# Patient Record
Sex: MALE | Race: WHITE | Employment: OTHER | URBAN - METROPOLITAN AREA
[De-identification: names, ages, dates, MRNs, and addresses within clinical notes are randomized per-mention and may not be internally consistent; named-entity substitution may affect disease eponyms.]

---

## 2017-01-03 ENCOUNTER — TELEPHONE (OUTPATIENT)
Dept: ENDOCRINOLOGY | Age: 67
End: 2017-01-03

## 2017-02-15 PROBLEM — R65.21 SEPTIC SHOCK (HCC): Status: ACTIVE | Noted: 2017-02-15

## 2017-02-15 PROBLEM — B99.9 INTRA-ABDOMINAL INFECTION: Status: ACTIVE | Noted: 2017-02-15

## 2017-02-15 PROBLEM — A41.9 SEPTIC SHOCK (HCC): Status: ACTIVE | Noted: 2017-02-15

## 2017-03-09 ENCOUNTER — TELEPHONE (OUTPATIENT)
Dept: SURGERY | Age: 67
End: 2017-03-09

## 2017-03-09 ENCOUNTER — OFFICE VISIT (OUTPATIENT)
Dept: SURGERY | Age: 67
End: 2017-03-09

## 2017-03-09 VITALS
HEIGHT: 77 IN | BODY MASS INDEX: 21.25 KG/M2 | WEIGHT: 180 LBS | DIASTOLIC BLOOD PRESSURE: 79 MMHG | SYSTOLIC BLOOD PRESSURE: 137 MMHG | HEART RATE: 81 BPM

## 2017-03-09 DIAGNOSIS — Z90.49 S/P LEFT COLECTOMY: Primary | ICD-10-CM

## 2017-03-09 PROCEDURE — 99024 POSTOP FOLLOW-UP VISIT: CPT | Performed by: SURGERY

## 2017-03-10 PROBLEM — Z90.49 S/P LEFT COLECTOMY: Status: ACTIVE | Noted: 2017-03-10

## 2017-03-23 ENCOUNTER — OFFICE VISIT (OUTPATIENT)
Dept: SURGERY | Age: 67
End: 2017-03-23

## 2017-03-23 VITALS
WEIGHT: 179.8 LBS | BODY MASS INDEX: 21.23 KG/M2 | SYSTOLIC BLOOD PRESSURE: 129 MMHG | HEIGHT: 77 IN | HEART RATE: 83 BPM | DIASTOLIC BLOOD PRESSURE: 75 MMHG

## 2017-03-23 DIAGNOSIS — Z90.49 S/P LEFT COLECTOMY: Primary | ICD-10-CM

## 2017-03-23 PROCEDURE — 99024 POSTOP FOLLOW-UP VISIT: CPT | Performed by: SURGERY

## 2017-04-05 PROBLEM — E87.20 LACTIC ACIDOSIS: Status: ACTIVE | Noted: 2017-04-05

## 2017-04-05 PROBLEM — R10.9 ABDOMINAL PAIN: Status: ACTIVE | Noted: 2017-04-05

## 2017-04-05 PROBLEM — E87.1 HYPONATREMIA: Status: ACTIVE | Noted: 2017-04-05

## 2017-04-12 ENCOUNTER — TELEPHONE (OUTPATIENT)
Dept: ENDOCRINOLOGY | Age: 67
End: 2017-04-12

## 2017-04-12 RX ORDER — INSULIN DEGLUDEC INJECTION 100 U/ML
INJECTION, SOLUTION SUBCUTANEOUS
Qty: 5 PEN | Refills: 0 | Status: SHIPPED | OUTPATIENT
Start: 2017-04-12 | End: 2017-06-20

## 2017-04-27 ENCOUNTER — OFFICE VISIT (OUTPATIENT)
Dept: SURGERY | Age: 67
End: 2017-04-27

## 2017-04-27 VITALS
HEART RATE: 75 BPM | WEIGHT: 182.6 LBS | DIASTOLIC BLOOD PRESSURE: 80 MMHG | SYSTOLIC BLOOD PRESSURE: 138 MMHG | HEIGHT: 77 IN | BODY MASS INDEX: 21.56 KG/M2

## 2017-04-27 DIAGNOSIS — Z90.49 S/P LEFT COLECTOMY: Primary | ICD-10-CM

## 2017-04-27 PROCEDURE — 99024 POSTOP FOLLOW-UP VISIT: CPT | Performed by: SURGERY

## 2017-05-02 ENCOUNTER — TELEPHONE (OUTPATIENT)
Dept: SURGERY | Age: 67
End: 2017-05-02

## 2017-06-13 ENCOUNTER — TELEPHONE (OUTPATIENT)
Dept: SURGERY | Age: 67
End: 2017-06-13

## 2017-07-02 PROBLEM — E87.6 HYPOKALEMIA: Status: ACTIVE | Noted: 2017-07-02

## 2017-07-02 PROBLEM — E83.42 HYPOMAGNESEMIA: Status: ACTIVE | Noted: 2017-07-02

## 2017-07-02 PROBLEM — I50.32 CHRONIC DIASTOLIC CHF (CONGESTIVE HEART FAILURE) (HCC): Chronic | Status: ACTIVE | Noted: 2017-07-02

## 2017-07-02 PROBLEM — E87.1 HYPONATREMIA: Status: RESOLVED | Noted: 2017-04-05 | Resolved: 2017-07-02

## 2017-07-02 PROBLEM — A41.9 SEPTIC SHOCK (HCC): Status: RESOLVED | Noted: 2017-02-15 | Resolved: 2017-07-02

## 2017-07-02 PROBLEM — J96.01 ACUTE HYPOXEMIC RESPIRATORY FAILURE (HCC): Status: ACTIVE | Noted: 2017-07-02

## 2017-07-02 PROBLEM — B99.9 INTRA-ABDOMINAL INFECTION: Status: RESOLVED | Noted: 2017-02-15 | Resolved: 2017-07-02

## 2017-07-02 PROBLEM — R18.8 ASCITES: Status: ACTIVE | Noted: 2017-07-02

## 2017-07-02 PROBLEM — S30.1XXA HEMATOMA OF ABDOMINAL WALL: Status: ACTIVE | Noted: 2017-07-01

## 2017-07-02 PROBLEM — R93.89 ABNORMAL CXR: Status: ACTIVE | Noted: 2017-07-02

## 2017-07-02 PROBLEM — R65.21 SEPTIC SHOCK (HCC): Status: RESOLVED | Noted: 2017-02-15 | Resolved: 2017-07-02

## 2017-07-02 PROBLEM — Z90.49 S/P LEFT COLECTOMY: Chronic | Status: ACTIVE | Noted: 2017-03-10

## 2017-07-02 PROBLEM — R10.9 ABDOMINAL PAIN: Status: RESOLVED | Noted: 2017-04-05 | Resolved: 2017-07-02

## 2017-07-12 LAB
GLUCOSE BLD-MCNC: 67 MG/DL (ref 70–99)
GLUCOSE BLD-MCNC: 83 MG/DL (ref 70–99)
PERFORMED ON: ABNORMAL
PERFORMED ON: NORMAL

## 2017-07-13 LAB
GLUCOSE BLD-MCNC: 192 MG/DL (ref 70–99)
GLUCOSE BLD-MCNC: 268 MG/DL (ref 70–99)
GLUCOSE BLD-MCNC: 301 MG/DL (ref 70–99)
PERFORMED ON: ABNORMAL

## 2017-07-18 PROBLEM — R93.89 ABNORMAL CXR: Status: RESOLVED | Noted: 2017-07-02 | Resolved: 2017-07-18

## 2017-07-18 PROBLEM — E83.42 HYPOMAGNESEMIA: Status: RESOLVED | Noted: 2017-07-02 | Resolved: 2017-07-18

## 2017-07-18 PROBLEM — E87.6 HYPOKALEMIA: Status: RESOLVED | Noted: 2017-07-02 | Resolved: 2017-07-18

## 2017-07-18 PROBLEM — R18.8 ASCITES: Status: RESOLVED | Noted: 2017-07-02 | Resolved: 2017-07-18

## 2017-07-18 PROBLEM — D64.9 NORMOCYTIC ANEMIA: Chronic | Status: ACTIVE | Noted: 2017-07-18

## 2017-07-18 PROBLEM — R73.9 ACUTE HYPERGLYCEMIA: Status: ACTIVE | Noted: 2017-07-18

## 2017-07-18 PROBLEM — J96.01 ACUTE HYPOXEMIC RESPIRATORY FAILURE (HCC): Status: RESOLVED | Noted: 2017-07-02 | Resolved: 2017-07-18

## 2017-07-18 PROBLEM — S30.1XXA HEMATOMA OF ABDOMINAL WALL: Status: RESOLVED | Noted: 2017-07-01 | Resolved: 2017-07-18

## 2017-07-18 PROBLEM — E87.20 LACTIC ACIDOSIS: Status: RESOLVED | Noted: 2017-04-05 | Resolved: 2017-07-18

## 2017-07-18 PROBLEM — R73.9 ACUTE HYPERGLYCEMIA: Chronic | Status: ACTIVE | Noted: 2017-07-18

## 2017-07-25 PROBLEM — E44.0 MODERATE MALNUTRITION (HCC): Chronic | Status: ACTIVE | Noted: 2017-07-25

## 2017-07-26 PROBLEM — R10.9 ABDOMINAL PAIN: Status: RESOLVED | Noted: 2017-04-05 | Resolved: 2017-07-26

## 2017-07-26 PROBLEM — G93.40 ENCEPHALOPATHY: Status: ACTIVE | Noted: 2017-07-26

## 2017-07-26 PROBLEM — R73.9 ACUTE HYPERGLYCEMIA: Status: RESOLVED | Noted: 2017-07-18 | Resolved: 2017-07-26

## 2017-08-14 ENCOUNTER — OFFICE VISIT (OUTPATIENT)
Dept: SURGERY | Age: 67
End: 2017-08-14

## 2017-08-14 VITALS
DIASTOLIC BLOOD PRESSURE: 85 MMHG | WEIGHT: 168.4 LBS | BODY MASS INDEX: 19.88 KG/M2 | HEIGHT: 77 IN | HEART RATE: 76 BPM | SYSTOLIC BLOOD PRESSURE: 133 MMHG

## 2017-08-14 DIAGNOSIS — Z90.49 S/P LEFT COLECTOMY: Primary | ICD-10-CM

## 2017-08-14 PROCEDURE — 99024 POSTOP FOLLOW-UP VISIT: CPT | Performed by: SURGERY

## 2017-08-28 ENCOUNTER — OFFICE VISIT (OUTPATIENT)
Dept: SURGERY | Age: 67
End: 2017-08-28

## 2017-08-28 VITALS
DIASTOLIC BLOOD PRESSURE: 78 MMHG | BODY MASS INDEX: 20.29 KG/M2 | HEIGHT: 77 IN | SYSTOLIC BLOOD PRESSURE: 131 MMHG | HEART RATE: 79 BPM | WEIGHT: 171.8 LBS

## 2017-08-28 DIAGNOSIS — Z90.49 S/P LEFT COLECTOMY: Primary | ICD-10-CM

## 2017-08-28 PROCEDURE — 99024 POSTOP FOLLOW-UP VISIT: CPT | Performed by: SURGERY

## 2017-09-18 ENCOUNTER — TELEPHONE (OUTPATIENT)
Dept: PULMONOLOGY | Age: 67
End: 2017-09-18

## 2017-09-20 ENCOUNTER — HOSPITAL ENCOUNTER (OUTPATIENT)
Dept: PULMONOLOGY | Age: 67
Discharge: HOME | End: 2017-09-20
Attending: INTERNAL MEDICINE | Admitting: INTERNAL MEDICINE

## 2017-09-20 VITALS — OXYGEN SATURATION: 99 %

## 2017-09-20 DIAGNOSIS — J44.9 CHRONIC OBSTRUCTIVE PULMONARY DISEASE (HCC): ICD-10-CM

## 2017-09-20 RX ORDER — ALBUTEROL SULFATE 90 UG/1
4 AEROSOL, METERED RESPIRATORY (INHALATION) ONCE
Status: COMPLETED | OUTPATIENT
Start: 2017-09-20 | End: 2017-09-20

## 2017-09-20 RX ADMIN — ALBUTEROL SULFATE 4 PUFF: 90 AEROSOL, METERED RESPIRATORY (INHALATION) at 10:02

## 2017-09-27 ENCOUNTER — OFFICE VISIT (OUTPATIENT)
Dept: PULMONOLOGY | Age: 67
End: 2017-09-27

## 2017-09-27 VITALS
RESPIRATION RATE: 20 BRPM | DIASTOLIC BLOOD PRESSURE: 54 MMHG | OXYGEN SATURATION: 94 % | SYSTOLIC BLOOD PRESSURE: 97 MMHG | TEMPERATURE: 97.7 F | HEART RATE: 81 BPM | BODY MASS INDEX: 20.78 KG/M2 | WEIGHT: 176 LBS | HEIGHT: 77 IN

## 2017-09-27 DIAGNOSIS — F17.200 TOBACCO DEPENDENCE: ICD-10-CM

## 2017-09-27 DIAGNOSIS — J41.8 MIXED SIMPLE AND MUCOPURULENT CHRONIC BRONCHITIS (HCC): Primary | Chronic | ICD-10-CM

## 2017-09-27 DIAGNOSIS — Z23 NEED FOR INFLUENZA VACCINATION: ICD-10-CM

## 2017-09-27 PROCEDURE — G8427 DOCREV CUR MEDS BY ELIG CLIN: HCPCS | Performed by: INTERNAL MEDICINE

## 2017-09-27 PROCEDURE — 3023F SPIROM DOC REV: CPT | Performed by: INTERNAL MEDICINE

## 2017-09-27 PROCEDURE — 1123F ACP DISCUSS/DSCN MKR DOCD: CPT | Performed by: INTERNAL MEDICINE

## 2017-09-27 PROCEDURE — 99214 OFFICE O/P EST MOD 30 MIN: CPT | Performed by: INTERNAL MEDICINE

## 2017-09-27 PROCEDURE — G8420 CALC BMI NORM PARAMETERS: HCPCS | Performed by: INTERNAL MEDICINE

## 2017-09-27 PROCEDURE — G0008 ADMIN INFLUENZA VIRUS VAC: HCPCS | Performed by: INTERNAL MEDICINE

## 2017-09-27 PROCEDURE — 4040F PNEUMOC VAC/ADMIN/RCVD: CPT | Performed by: INTERNAL MEDICINE

## 2017-09-27 PROCEDURE — 4004F PT TOBACCO SCREEN RCVD TLK: CPT | Performed by: INTERNAL MEDICINE

## 2017-09-27 PROCEDURE — G8926 SPIRO NO PERF OR DOC: HCPCS | Performed by: INTERNAL MEDICINE

## 2017-09-27 PROCEDURE — 3017F COLORECTAL CA SCREEN DOC REV: CPT | Performed by: INTERNAL MEDICINE

## 2017-09-27 PROCEDURE — 90662 IIV NO PRSV INCREASED AG IM: CPT | Performed by: INTERNAL MEDICINE

## 2017-09-27 RX ORDER — ATORVASTATIN CALCIUM 40 MG/1
40 TABLET, FILM COATED ORAL DAILY
COMMUNITY
End: 2018-03-29 | Stop reason: ALTCHOICE

## 2017-09-27 RX ORDER — AMLODIPINE BESYLATE 10 MG/1
10 TABLET ORAL DAILY
COMMUNITY
End: 2018-03-29

## 2017-09-27 RX ORDER — CLOPIDOGREL BISULFATE 75 MG/1
75 TABLET ORAL DAILY
COMMUNITY
End: 2018-03-29

## 2017-09-27 RX ORDER — ALBUTEROL SULFATE 90 UG/1
2 AEROSOL, METERED RESPIRATORY (INHALATION)
COMMUNITY
Start: 2016-08-18 | End: 2018-03-01 | Stop reason: CLARIF

## 2017-09-27 RX ORDER — CYCLOBENZAPRINE HCL 10 MG
10 TABLET ORAL 3 TIMES DAILY PRN
COMMUNITY
End: 2018-03-29 | Stop reason: ALTCHOICE

## 2017-09-27 RX ORDER — BUDESONIDE AND FORMOTEROL FUMARATE DIHYDRATE 160; 4.5 UG/1; UG/1
2 AEROSOL RESPIRATORY (INHALATION) 2 TIMES DAILY
COMMUNITY
End: 2018-03-27 | Stop reason: ALTCHOICE

## 2017-09-27 ASSESSMENT — ENCOUNTER SYMPTOMS
CONSTIPATION: 0
VOICE CHANGE: 0
EYE PAIN: 0
WHEEZING: 0
SORE THROAT: 0
STRIDOR: 0
ABDOMINAL PAIN: 0
EYE DISCHARGE: 0
DIARRHEA: 0
CHEST TIGHTNESS: 0
EYE ITCHING: 0
SHORTNESS OF BREATH: 1

## 2018-01-17 ENCOUNTER — OFFICE VISIT (OUTPATIENT)
Dept: ENDOCRINOLOGY | Age: 68
End: 2018-01-17

## 2018-01-17 VITALS
BODY MASS INDEX: 22.32 KG/M2 | SYSTOLIC BLOOD PRESSURE: 167 MMHG | HEIGHT: 77 IN | DIASTOLIC BLOOD PRESSURE: 87 MMHG | RESPIRATION RATE: 16 BRPM | WEIGHT: 189 LBS | OXYGEN SATURATION: 97 % | HEART RATE: 85 BPM

## 2018-01-17 LAB
GLUCOSE BLD-MCNC: 290 MG/DL
HBA1C MFR BLD: 8.7 %

## 2018-01-17 PROCEDURE — 4040F PNEUMOC VAC/ADMIN/RCVD: CPT | Performed by: INTERNAL MEDICINE

## 2018-01-17 PROCEDURE — G8484 FLU IMMUNIZE NO ADMIN: HCPCS | Performed by: INTERNAL MEDICINE

## 2018-01-17 PROCEDURE — 3017F COLORECTAL CA SCREEN DOC REV: CPT | Performed by: INTERNAL MEDICINE

## 2018-01-17 PROCEDURE — 1123F ACP DISCUSS/DSCN MKR DOCD: CPT | Performed by: INTERNAL MEDICINE

## 2018-01-17 PROCEDURE — 3045F PR MOST RECENT HEMOGLOBIN A1C LEVEL 7.0-9.0%: CPT | Performed by: INTERNAL MEDICINE

## 2018-01-17 PROCEDURE — G8427 DOCREV CUR MEDS BY ELIG CLIN: HCPCS | Performed by: INTERNAL MEDICINE

## 2018-01-17 PROCEDURE — 82962 GLUCOSE BLOOD TEST: CPT | Performed by: INTERNAL MEDICINE

## 2018-01-17 PROCEDURE — 4004F PT TOBACCO SCREEN RCVD TLK: CPT | Performed by: INTERNAL MEDICINE

## 2018-01-17 PROCEDURE — 99214 OFFICE O/P EST MOD 30 MIN: CPT | Performed by: INTERNAL MEDICINE

## 2018-01-17 PROCEDURE — 83036 HEMOGLOBIN GLYCOSYLATED A1C: CPT | Performed by: INTERNAL MEDICINE

## 2018-01-17 PROCEDURE — G8420 CALC BMI NORM PARAMETERS: HCPCS | Performed by: INTERNAL MEDICINE

## 2018-01-17 NOTE — PROGRESS NOTES
[facility] Endocrinology  Tg Dumont M.D.   Phone: [phone]   FAX: [phone]       Shalonda De La Rosa   YOB: 1950    Date of Visit:  1/17/2018    No Known Allergies  Outpatient Prescriptions Marked as Taking for the 1/17/18 encounter (Office Visit) with Lex Mclain MD   Medication Sig Dispense Refill    insulin aspart (NOVOLOG) 100 UNIT/ML injection vial Inject into the skin 3 times daily (before meals)      insulin detemir (LEVEMIR) 100 UNIT/ML injection vial Inject 22 Units into the skin nightly      umeclidinium-vilanterol (ANORO ELLIPTA) 62.5-25 MCG/INH AEPB inhaler Inhale 1 puff into the lungs daily 1 each 0    vitamin B-12 1000 MCG tablet Take 1 tablet by mouth daily 30 tablet 3    folic acid (FOLVITE) 1 MG tablet Take 1 tablet by mouth daily 30 tablet 3    Sodium Phosphates (FLEET) 7-19 GM/118ML Place 1 enema rectally as needed      glucagon 1 MG injection Inject 1 kit into the skin as needed      magnesium hydroxide (MILK OF MAGNESIA) 400 MG/5ML suspension Take 30 mLs by mouth daily as needed for Constipation      diazepam (VALIUM) 5 MG tablet Take 5 mg by mouth every 8 hours as needed for Anxiety      lisinopril (PRINIVIL;ZESTRIL) 10 MG tablet Take 2 tablets by mouth daily 30 tablet 3    metoprolol tartrate (LOPRESSOR) 25 MG tablet Take 1 tablet by mouth 2 times daily 60 tablet 3    bisacodyl (DULCOLAX) 10 MG suppository Place 10 mg rectally as needed for Constipation (if no result from MOM)      omeprazole (PRILOSEC) 20 MG delayed release capsule Take 1 capsule by mouth 2 times daily TAKE ONE CAPSULE BY MOUTH DAILY 60 capsule 2    acetaminophen (TYLENOL) 325 MG tablet Take 2 tablets by mouth every 4 hours as needed for Pain or Fever 120 tablet 3    VENTOLIN  (90 BASE) MCG/ACT inhaler INHALE TWO PUFFS BY MOUTH EVERY 6 HOURS AS NEEDED FOR WHEEZING OR SHORTNESS OF BREATH 1 Inhaler 3    tamsulosin (FLOMAX) 0.4 MG capsule TAKE TWO CAPSULES BY MOUTH DAILY 60 granuloma     per derm:Dr. Zuniga Kenly Restrictive lung disease     Under care of pulmo(Dr. Calhoun)    S/P colonoscopy 1996    Done secondary to rectal bleed:dx=hemorrhoids    S/P colonoscopy 11/11/10;10/23/2013    Dr. Aurora Clark 10/2016(3yrs):polyps;diverticulosis. Diverticulosis & polpy(removed). Next in10/2016.  S/P endoscopy 2009    EGD:stomach ulcers.  Superficial phlebitis of left leg 9/30/2013    Tachycardia     Therapeutic drug monitoring 4/8/15    OARRS report is consistent on 4/8/15;7/9/15;10/9/15;1/8/16;4/5/16    Type 1 diabetes mellitus not at goal [facility]) 3/17/14    updated diagnosis as per endo:Dr. Ivonne Albrecht     Past Surgical History:   Procedure Laterality Date    DIAGNOSTIC CARDIAC CATH LAB PROCEDURE  2013    FOOT SURGERY Left Hammer toe, 2nd toe    10/9/14    HIP FRACTURE SURGERY      LEG SURGERY      broken leg    OTHER SURGICAL HISTORY Left 11/21/2013    EXCISION 5TH METATRSAL LEFT FOOT          REVISION COLOSTOMY  06/27/2017    COLOSTOMY REVERSAL     TONSILLECTOMY      UPPER GASTROINTESTINAL ENDOSCOPY  7/16/2013    Esophageal Brushing     Family History   Problem Relation Age of Onset    Stroke Mother     Hypertension Mother     Arthritis Father     Substance Abuse Father      Etoh    Cancer Father      esophageal    Hypertension Father     Diabetes Brother     Diabetes Paternal Aunt     Asthma Neg Hx     Emphysema Neg Hx     Heart Failure Neg Hx      History   Smoking Status    Current Some Day Smoker    Packs/day: 0.25    Years: 35.00    Types: Cigarettes    Last attempt to quit: 12/1/2013   Smokeless Tobacco    Never Used      History   Alcohol Use    0.6 oz/week    1 Cans of beer per week       KRANTHI Walls is a 78 y.o. male who is here for follow-up for management of DM. He has a PMH of type 1 DM, COPD, GERD, anxiety, depression, diverticulosis. Was last seen in 08/16  Had colostomy for colon ischemia.      Currently at [facility] distension. There is no tenderness. Musculoskeletal: He exhibits no tenderness. Neurological: He is alert and oriented to person, place, and time. Skin: Skin is warm. No rash noted. He is not diaphoretic. Psychiatric: His behavior is normal.                 Assessment/Plan    1. Type 1 DM    This 78 y.o. old male has DM. Most likely  late onset type 1. ALLYSON antibodies and anti-islet cell antibodies are negative. C-peptide low consistent with insulinopenia. A1c. 8.3 %---> 8 %---> 8.4 %---> 7.5 % --->7.1 %---> 7.2 %  --> 8.6 % ---8.7 %       Fasting BS at goal.   BS high after meals.        -Continue levemir insulin 22 units QHS  -Increase novolog to 5 units TID with meals + medium dose sliding scale. Updated on eye exam. continue follow-up with the ophthalmologist.  No nephropathy ( last urine microalbumin 03/14). Pt on ASA. Non-smoker. BP at goal  Lipids at goal in 07/16 except low HDL. 2. Peripheral neuropathy. Has peripheral neuropathy . Follows with podiatry. 3. COPD managed by pulmonary      4. HTN/GERD. BP high. managed by PCP. 5. Colostomy. As per surgeon      Advised to fax sugars every 2 weeks to titrate the insulin dose.

## 2018-02-24 PROBLEM — E11.10 DIABETIC KETOACIDOSIS ASSOCIATED WITH TYPE 2 DIABETES MELLITUS (HCC): Status: ACTIVE | Noted: 2018-02-24

## 2018-02-24 PROBLEM — E87.20 ACIDOSIS, METABOLIC: Status: ACTIVE | Noted: 2018-02-24

## 2018-02-24 PROBLEM — N17.9 ARF (ACUTE RENAL FAILURE) (HCC): Status: ACTIVE | Noted: 2018-02-24

## 2018-02-24 PROBLEM — E11.10 DKA, TYPE 2, NOT AT GOAL (HCC): Status: ACTIVE | Noted: 2018-02-24

## 2018-02-25 PROBLEM — Z79.4 TYPE 2 DIABETES MELLITUS WITH KETOACIDOSIS WITHOUT COMA, WITH LONG-TERM CURRENT USE OF INSULIN (HCC): Status: ACTIVE | Noted: 2018-02-24

## 2018-03-01 ENCOUNTER — TELEPHONE (OUTPATIENT)
Dept: INTERNAL MEDICINE | Age: 68
End: 2018-03-01

## 2018-03-01 ENCOUNTER — OFFICE VISIT (OUTPATIENT)
Dept: INTERNAL MEDICINE | Age: 68
End: 2018-03-01

## 2018-03-01 ENCOUNTER — TELEPHONE (OUTPATIENT)
Dept: PAIN MANAGEMENT | Age: 68
End: 2018-03-01

## 2018-03-01 VITALS
BODY MASS INDEX: 21.61 KG/M2 | OXYGEN SATURATION: 99 % | WEIGHT: 183 LBS | HEART RATE: 89 BPM | TEMPERATURE: 97.8 F | SYSTOLIC BLOOD PRESSURE: 142 MMHG | HEIGHT: 77 IN | DIASTOLIC BLOOD PRESSURE: 82 MMHG

## 2018-03-01 DIAGNOSIS — G89.4 CHRONIC PAIN SYNDROME: Chronic | ICD-10-CM

## 2018-03-01 DIAGNOSIS — J41.8 MIXED SIMPLE AND MUCOPURULENT CHRONIC BRONCHITIS (HCC): Chronic | ICD-10-CM

## 2018-03-01 DIAGNOSIS — I10 HTN (HYPERTENSION), BENIGN: ICD-10-CM

## 2018-03-01 DIAGNOSIS — E11.10 TYPE 2 DIABETES MELLITUS WITH KETOACIDOSIS WITHOUT COMA, WITH LONG-TERM CURRENT USE OF INSULIN (HCC): Primary | ICD-10-CM

## 2018-03-01 DIAGNOSIS — Z79.4 TYPE 2 DIABETES MELLITUS WITH KETOACIDOSIS WITHOUT COMA, WITH LONG-TERM CURRENT USE OF INSULIN (HCC): Primary | ICD-10-CM

## 2018-03-01 PROBLEM — E87.20 ACIDOSIS, METABOLIC: Status: RESOLVED | Noted: 2018-02-24 | Resolved: 2018-03-01

## 2018-03-01 PROBLEM — G93.40 ENCEPHALOPATHY: Status: RESOLVED | Noted: 2017-07-26 | Resolved: 2018-03-01

## 2018-03-01 PROBLEM — E44.0 MODERATE MALNUTRITION (HCC): Chronic | Status: RESOLVED | Noted: 2017-07-25 | Resolved: 2018-03-01

## 2018-03-01 PROCEDURE — G8926 SPIRO NO PERF OR DOC: HCPCS | Performed by: NURSE PRACTITIONER

## 2018-03-01 PROCEDURE — 1111F DSCHRG MED/CURRENT MED MERGE: CPT | Performed by: NURSE PRACTITIONER

## 2018-03-01 PROCEDURE — G8427 DOCREV CUR MEDS BY ELIG CLIN: HCPCS | Performed by: NURSE PRACTITIONER

## 2018-03-01 PROCEDURE — G8484 FLU IMMUNIZE NO ADMIN: HCPCS | Performed by: NURSE PRACTITIONER

## 2018-03-01 PROCEDURE — 3045F PR MOST RECENT HEMOGLOBIN A1C LEVEL 7.0-9.0%: CPT | Performed by: NURSE PRACTITIONER

## 2018-03-01 PROCEDURE — 1123F ACP DISCUSS/DSCN MKR DOCD: CPT | Performed by: NURSE PRACTITIONER

## 2018-03-01 PROCEDURE — 4004F PT TOBACCO SCREEN RCVD TLK: CPT | Performed by: NURSE PRACTITIONER

## 2018-03-01 PROCEDURE — 3023F SPIROM DOC REV: CPT | Performed by: NURSE PRACTITIONER

## 2018-03-01 PROCEDURE — 4040F PNEUMOC VAC/ADMIN/RCVD: CPT | Performed by: NURSE PRACTITIONER

## 2018-03-01 PROCEDURE — 3017F COLORECTAL CA SCREEN DOC REV: CPT | Performed by: NURSE PRACTITIONER

## 2018-03-01 PROCEDURE — 99204 OFFICE O/P NEW MOD 45 MIN: CPT | Performed by: NURSE PRACTITIONER

## 2018-03-01 PROCEDURE — G8420 CALC BMI NORM PARAMETERS: HCPCS | Performed by: NURSE PRACTITIONER

## 2018-03-01 RX ORDER — SERTRALINE HYDROCHLORIDE 25 MG/1
25 TABLET, FILM COATED ORAL DAILY
COMMUNITY
End: 2018-03-06 | Stop reason: SDUPTHER

## 2018-03-01 RX ORDER — BLOOD-GLUCOSE METER
EACH MISCELLANEOUS
COMMUNITY
End: 2018-03-06 | Stop reason: SDUPTHER

## 2018-03-01 ASSESSMENT — ENCOUNTER SYMPTOMS
CHEST TIGHTNESS: 0
COUGH: 0
EYE REDNESS: 0
EYE ITCHING: 0
SINUS PRESSURE: 0
ABDOMINAL PAIN: 0
RHINORRHEA: 0
BLOOD IN STOOL: 0
SHORTNESS OF BREATH: 0
WHEEZING: 0
COLOR CHANGE: 0
BACK PAIN: 0
CONSTIPATION: 0
VOMITING: 0
DIARRHEA: 0
NAUSEA: 0
SORE THROAT: 0

## 2018-03-01 ASSESSMENT — PATIENT HEALTH QUESTIONNAIRE - PHQ9
SUM OF ALL RESPONSES TO PHQ9 QUESTIONS 1 & 2: 0
2. FEELING DOWN, DEPRESSED OR HOPELESS: 0
1. LITTLE INTEREST OR PLEASURE IN DOING THINGS: 0
SUM OF ALL RESPONSES TO PHQ QUESTIONS 1-9: 0

## 2018-03-01 NOTE — TELEPHONE ENCOUNTER
Thank you, this was my first time seeing this patient, so he is new to the practive. The pain is related to his osteoarthritis, primarily in his hips. His most recent urine drug screen was 7/26/17. I was not seeing him at this time, but it was positive for benzos, which I believe he was prescribed at that time. I have asked my MAs to respond to the letter sent.

## 2018-03-01 NOTE — PROGRESS NOTES
tablet Take 5 mg by mouth every 8 hours as needed for Anxiety      sennosides-docusate sodium (SENOKOT-S) 8.6-50 MG tablet Take 2 tablets by mouth 2 times daily      lisinopril (PRINIVIL;ZESTRIL) 10 MG tablet Take 2 tablets by mouth daily 30 tablet 3    metoprolol tartrate (LOPRESSOR) 25 MG tablet Take 1 tablet by mouth 2 times daily 60 tablet 3    bisacodyl (DULCOLAX) 10 MG suppository Place 10 mg rectally as needed for Constipation (if no result from MOM)      omeprazole (PRILOSEC) 20 MG delayed release capsule Take 1 capsule by mouth 2 times daily TAKE ONE CAPSULE BY MOUTH DAILY 60 capsule 2    acetaminophen (TYLENOL) 325 MG tablet Take 2 tablets by mouth every 4 hours as needed for Pain or Fever 120 tablet 3    VENTOLIN  (90 BASE) MCG/ACT inhaler INHALE TWO PUFFS BY MOUTH EVERY 6 HOURS AS NEEDED FOR WHEEZING OR SHORTNESS OF BREATH 1 Inhaler 3    tamsulosin (FLOMAX) 0.4 MG capsule TAKE TWO CAPSULES BY MOUTH DAILY 60 capsule 0    finasteride (PROSCAR) 5 MG tablet TAKE ONE TABLET BY MOUTH DAILY 30 tablet 0     No current facility-administered medications for this visit. Assessment:     1. Type 2 diabetes mellitus with ketoacidosis without coma, with long-term current use of insulin ([address]    2. Mixed simple and mucopurulent chronic bronchitis ([address]    3. HTN (hypertension), benign    4. Chronic pain syndrome      Plan:   1. Type 2 diabetes mellitus with ketoacidosis without coma, with long-term current use of insulin ([address]  - he has been managing well at home since discharge  - he is followed by Dr. Jose Alejandro Bearden and will be seeing him tomorrow  - believe that DKA is likely due to the fact that he was out of medications after discharge from a rehab facility recently. He now has his medication. 2. Mixed simple and mucopurulent chronic bronchitis (HCC)  - stable, controlled  - follows with pulmonology  - continue inhalers as prescribed     3.  HTN (hypertension), benign  - elevated today, will continue to monitor, may need to increase dose?  - encourage reestablishing with his cardiologist     4. Chronic pain syndrome  - will refer, he is aware we do no manage chronic pain in this office or prescribe any controlled substances for pain and anxiety.   - Jamse Kehr, MD (Pain)        Discussed use, benefit, and side effects of all prescribed medications. Barriers to medication compliance addressed. All patient questions answered. Pt voiced understanding. All patient questions answered. Pt voiced understanding.

## 2018-03-01 NOTE — TELEPHONE ENCOUNTER
Dante Darnell from [facility]  Concerned pt will go into OxyContin withdrawal   Pt was seen today 3/1/18

## 2018-03-01 NOTE — LETTER
all the above information, the referral will be considered incomplete and cannot be submitted for review. We will call the patient to schedule an appointment once the referral has been received and accepted. Unfortunately, our office is not able to accommodate any expedited referral or scheduling requests. If no new information is received within 30 days from this request, the referral will be canceled and will need to be resubmitted with the information requested above to be considered for new patient scheduling.      Thank you,     Pain Management Staff

## 2018-03-02 ENCOUNTER — OFFICE VISIT (OUTPATIENT)
Dept: ENDOCRINOLOGY | Age: 68
End: 2018-03-02

## 2018-03-02 VITALS
OXYGEN SATURATION: 99 % | WEIGHT: 192 LBS | DIASTOLIC BLOOD PRESSURE: 64 MMHG | HEIGHT: 77 IN | RESPIRATION RATE: 16 BRPM | SYSTOLIC BLOOD PRESSURE: 107 MMHG | HEART RATE: 88 BPM | BODY MASS INDEX: 22.67 KG/M2

## 2018-03-02 DIAGNOSIS — E10.65 TYPE 1 DIABETES MELLITUS WITH HYPERGLYCEMIA (HCC): Primary | ICD-10-CM

## 2018-03-02 PROCEDURE — G8420 CALC BMI NORM PARAMETERS: HCPCS | Performed by: INTERNAL MEDICINE

## 2018-03-02 PROCEDURE — 99214 OFFICE O/P EST MOD 30 MIN: CPT | Performed by: INTERNAL MEDICINE

## 2018-03-02 PROCEDURE — 1111F DSCHRG MED/CURRENT MED MERGE: CPT | Performed by: INTERNAL MEDICINE

## 2018-03-02 PROCEDURE — 3045F PR MOST RECENT HEMOGLOBIN A1C LEVEL 7.0-9.0%: CPT | Performed by: INTERNAL MEDICINE

## 2018-03-02 PROCEDURE — 1123F ACP DISCUSS/DSCN MKR DOCD: CPT | Performed by: INTERNAL MEDICINE

## 2018-03-02 PROCEDURE — 3017F COLORECTAL CA SCREEN DOC REV: CPT | Performed by: INTERNAL MEDICINE

## 2018-03-02 PROCEDURE — G8484 FLU IMMUNIZE NO ADMIN: HCPCS | Performed by: INTERNAL MEDICINE

## 2018-03-02 PROCEDURE — 4040F PNEUMOC VAC/ADMIN/RCVD: CPT | Performed by: INTERNAL MEDICINE

## 2018-03-02 PROCEDURE — 4004F PT TOBACCO SCREEN RCVD TLK: CPT | Performed by: INTERNAL MEDICINE

## 2018-03-02 PROCEDURE — G8427 DOCREV CUR MEDS BY ELIG CLIN: HCPCS | Performed by: INTERNAL MEDICINE

## 2018-03-02 NOTE — PROGRESS NOTES
tablet 3    omeprazole (PRILOSEC) 20 MG delayed release capsule Take 1 capsule by mouth 2 times daily TAKE ONE CAPSULE BY MOUTH DAILY 60 capsule 2    acetaminophen (TYLENOL) 325 MG tablet Take 2 tablets by mouth every 4 hours as needed for Pain or Fever 120 tablet 3    VENTOLIN  (90 BASE) MCG/ACT inhaler INHALE TWO PUFFS BY MOUTH EVERY 6 HOURS AS NEEDED FOR WHEEZING OR SHORTNESS OF BREATH 1 Inhaler 3    tamsulosin (FLOMAX) 0.4 MG capsule TAKE TWO CAPSULES BY MOUTH DAILY 60 capsule 0    finasteride (PROSCAR) 5 MG tablet TAKE ONE TABLET BY MOUTH DAILY 30 tablet 0         Vitals:    03/02/18 1026   BP: 107/64   Site: Left Arm   Position: Sitting   Cuff Size: Medium Adult   Pulse: 88   Resp: 16   SpO2: 99%   Weight: 192 lb (87.1 kg)   Height: 6' 5\" (1.956 m)     Body mass index is 22.77 kg/m². Wt Readings from Last 3 Encounters:   03/02/18 192 lb (87.1 kg)   03/01/18 183 lb (83 kg)   02/26/18 178 lb 6.4 oz (80.9 kg)     BP Readings from Last 3 Encounters:   03/02/18 107/64   03/01/18 (!) 142/82   02/27/18 (!) 142/88        Past Medical History:   Diagnosis Date    Anxiety disorder     BPH (benign prostatic hypertrophy)     Calcified granuloma of lung ([facility] 2014    2:stable per 3/25/14 CT chest    Cataract 1/20/14    OU:Dr. hayward:[facility]    Chronic pain     COPD (chronic obstructive pulmonary disease) ([facility]     Under care of pulmo(Dr. Calhoun)    Degenerative arthritis of hip     Depression 3/11/2015    Depression with anxiety     Prior psychiatrist & therapist:Dr. Skip Campbell.  Diabetes mellitus ([facility] 2004    Endo Dr. Yu Nip type 1 note below in [facility]    Diabetic eye exam ([facility] 1/20/14    [facility]:Dr. STEPHEN Hayward:No retinopathy.  Cataract    Diabetic retinopathy ([facility]     under care of [facility]:Dr. Adeel Parks    Diverticulitis 11/26/2011    Diverticulosis 11/26/2011    Emphysema     Esophageal candidiasis ([facility] 7/16/13    GI:EGD    Essential hypertension, benign 11/2010    Fatty liver 10/9/2012    Foot Status    Current Some Day Smoker    Packs/day: 0.25    Years: 35.00    Types: Cigarettes    Last attempt to quit: 12/1/2013   Smokeless Tobacco    Never Used      History   Alcohol Use    0.6 oz/week    1 Cans of beer per week       HPI      Jerome Roth is a 78 y.o. male who is here for follow-up for management of DM. He has a PMH of type 1 DM, COPD, GERD, anxiety, depression, diverticulosis. Had colostomy for colon ischemia. Was at [facility]. Now living     Interim history : had DKA in 02/18. ( was admitted at [facility]). He ran out of insulin. Diagnosed with Diabetes Mellitus in 2005. He says he has always been on insulin. Has diabetic retinopathy (Last eye exam: 05/15), No nephropathy . Has Peripheral neuropathy with numbness and tingling in both feet. No Macrovascular complications. Home regimen:  Currently on Basaglar 22 units at night. ( used levemir )                       Novolog 6 units TID with meals + SSI                              Was on metformin 500-1000 twice a  Day. Stopped as he most likely has type 1 DM. Blood glucose trend   fasting 100-150   pre-lunch 100-200  pre-supper 130-200  bedtime 100-150    Hypoglycemia. 1-2 times/week in fasting . Diet: Eats 3 meals/day at regular times   Had nutrition education in 01/14. Review of Systems   Constitutional: Positive for malaise/fatigue. Negative for weight loss. HENT: Negative for sore throat. Eyes: Negative for blurred vision. Respiratory: Positive for shortness of breath. Negative for cough. Cardiovascular: Positive for chest pain and leg swelling. Gastrointestinal: Positive for heartburn. Negative for nausea, vomiting and abdominal pain. Genitourinary: Negative for urgency and frequency. Musculoskeletal: Positive for myalgias and joint pain. Negative for back pain. Skin: Negative for rash. Neurological: Positive for tingling and sensory change.  Negative

## 2018-03-06 ENCOUNTER — TELEPHONE (OUTPATIENT)
Dept: INTERNAL MEDICINE | Age: 68
End: 2018-03-06

## 2018-03-06 RX ORDER — HYDRALAZINE HYDROCHLORIDE 50 MG/1
50 TABLET, FILM COATED ORAL 3 TIMES DAILY
Qty: 90 TABLET | Refills: 3 | Status: SHIPPED | OUTPATIENT
Start: 2018-03-06 | End: 2018-05-30 | Stop reason: SDUPTHER

## 2018-03-06 RX ORDER — OMEPRAZOLE 20 MG/1
20 CAPSULE, DELAYED RELEASE ORAL 2 TIMES DAILY
Qty: 60 CAPSULE | Refills: 2 | Status: SHIPPED | OUTPATIENT
Start: 2018-03-06 | End: 2018-05-30 | Stop reason: SDUPTHER

## 2018-03-06 RX ORDER — FOLIC ACID 1 MG/1
1 TABLET ORAL DAILY
Qty: 30 TABLET | Refills: 3 | Status: SHIPPED | OUTPATIENT
Start: 2018-03-06 | End: 2018-07-02 | Stop reason: SDUPTHER

## 2018-03-06 RX ORDER — FINASTERIDE 5 MG/1
TABLET, FILM COATED ORAL
Qty: 30 TABLET | Refills: 0 | Status: SHIPPED | OUTPATIENT
Start: 2018-03-06 | End: 2018-04-03 | Stop reason: SDUPTHER

## 2018-03-06 RX ORDER — BLOOD-GLUCOSE METER
EACH MISCELLANEOUS
Qty: 1 DEVICE | Refills: 0 | Status: ON HOLD | OUTPATIENT
Start: 2018-03-06 | End: 2021-10-07

## 2018-03-06 RX ORDER — LANOLIN ALCOHOL/MO/W.PET/CERES
100 CREAM (GRAM) TOPICAL DAILY
Qty: 30 TABLET | Refills: 3 | Status: ON HOLD | OUTPATIENT
Start: 2018-03-06 | End: 2022-02-21 | Stop reason: HOSPADM

## 2018-03-06 RX ORDER — TAMSULOSIN HYDROCHLORIDE 0.4 MG/1
CAPSULE ORAL
Qty: 60 CAPSULE | Refills: 0 | Status: SHIPPED | OUTPATIENT
Start: 2018-03-06 | End: 2018-04-03 | Stop reason: SDUPTHER

## 2018-03-06 RX ORDER — LANCETS 30 GAUGE
EACH MISCELLANEOUS
Qty: 100 EACH | Refills: 3 | OUTPATIENT
Start: 2018-03-06 | End: 2018-03-06 | Stop reason: SDUPTHER

## 2018-03-06 RX ORDER — SERTRALINE HYDROCHLORIDE 25 MG/1
25 TABLET, FILM COATED ORAL 3 TIMES DAILY
Qty: 90 TABLET | Refills: 0 | Status: SHIPPED | OUTPATIENT
Start: 2018-03-06 | End: 2018-04-03 | Stop reason: SDUPTHER

## 2018-03-06 RX ORDER — LISINOPRIL 10 MG/1
20 TABLET ORAL DAILY
Qty: 30 TABLET | Refills: 3 | Status: SHIPPED | OUTPATIENT
Start: 2018-03-06 | End: 2018-05-09 | Stop reason: SDUPTHER

## 2018-03-06 RX ORDER — LANCETS 30 GAUGE
EACH MISCELLANEOUS
Qty: 100 EACH | Refills: 3 | Status: SHIPPED | OUTPATIENT
Start: 2018-03-06 | End: 2018-03-22 | Stop reason: SDUPTHER

## 2018-03-07 ENCOUNTER — TELEPHONE (OUTPATIENT)
Dept: INTERNAL MEDICINE | Age: 68
End: 2018-03-07

## 2018-03-08 NOTE — TELEPHONE ENCOUNTER
Spoke with Siria from [facility]  She said she will stick with the Pain Management Dr. Janny Guadarrama she started with. Patient did p/u his medication and will start it. Patient needs referral to Psych. Siria will call us with a name of a physician that will except his insurance.

## 2018-03-12 ENCOUNTER — TELEPHONE (OUTPATIENT)
Dept: PAIN MANAGEMENT | Age: 68
End: 2018-03-12

## 2018-03-22 ENCOUNTER — TELEPHONE (OUTPATIENT)
Dept: ENDOCRINOLOGY | Age: 68
End: 2018-03-22

## 2018-03-22 RX ORDER — INSULIN GLARGINE 100 [IU]/ML
22 INJECTION, SOLUTION SUBCUTANEOUS NIGHTLY
Qty: 15 ML | Refills: 2 | Status: ON HOLD | OUTPATIENT
Start: 2018-03-22 | End: 2018-08-13

## 2018-03-22 RX ORDER — INSULIN GLARGINE 100 [IU]/ML
22 INJECTION, SOLUTION SUBCUTANEOUS NIGHTLY
COMMUNITY
End: 2018-03-22 | Stop reason: SDUPTHER

## 2018-03-22 RX ORDER — LANCETS 30 GAUGE
EACH MISCELLANEOUS
Qty: 100 EACH | Refills: 3 | Status: ON HOLD | OUTPATIENT
Start: 2018-03-22 | End: 2022-02-17

## 2018-03-27 ENCOUNTER — OFFICE VISIT (OUTPATIENT)
Dept: PULMONOLOGY | Age: 68
End: 2018-03-27

## 2018-03-27 ENCOUNTER — TELEPHONE (OUTPATIENT)
Dept: INTERNAL MEDICINE | Age: 68
End: 2018-03-27

## 2018-03-27 VITALS
TEMPERATURE: 97.5 F | BODY MASS INDEX: 22.2 KG/M2 | HEIGHT: 77 IN | HEART RATE: 85 BPM | DIASTOLIC BLOOD PRESSURE: 66 MMHG | SYSTOLIC BLOOD PRESSURE: 130 MMHG | RESPIRATION RATE: 16 BRPM | WEIGHT: 188 LBS | OXYGEN SATURATION: 99 %

## 2018-03-27 DIAGNOSIS — F17.200 TOBACCO DEPENDENCE: ICD-10-CM

## 2018-03-27 DIAGNOSIS — J41.0 SIMPLE CHRONIC BRONCHITIS (HCC): Primary | ICD-10-CM

## 2018-03-27 PROCEDURE — 99214 OFFICE O/P EST MOD 30 MIN: CPT | Performed by: INTERNAL MEDICINE

## 2018-03-27 PROCEDURE — 3017F COLORECTAL CA SCREEN DOC REV: CPT | Performed by: INTERNAL MEDICINE

## 2018-03-27 PROCEDURE — G8482 FLU IMMUNIZE ORDER/ADMIN: HCPCS | Performed by: INTERNAL MEDICINE

## 2018-03-27 PROCEDURE — 3023F SPIROM DOC REV: CPT | Performed by: INTERNAL MEDICINE

## 2018-03-27 PROCEDURE — G8427 DOCREV CUR MEDS BY ELIG CLIN: HCPCS | Performed by: INTERNAL MEDICINE

## 2018-03-27 PROCEDURE — G8420 CALC BMI NORM PARAMETERS: HCPCS | Performed by: INTERNAL MEDICINE

## 2018-03-27 PROCEDURE — 4004F PT TOBACCO SCREEN RCVD TLK: CPT | Performed by: INTERNAL MEDICINE

## 2018-03-27 PROCEDURE — 1111F DSCHRG MED/CURRENT MED MERGE: CPT | Performed by: INTERNAL MEDICINE

## 2018-03-27 PROCEDURE — G8926 SPIRO NO PERF OR DOC: HCPCS | Performed by: INTERNAL MEDICINE

## 2018-03-27 PROCEDURE — 4040F PNEUMOC VAC/ADMIN/RCVD: CPT | Performed by: INTERNAL MEDICINE

## 2018-03-27 PROCEDURE — 1123F ACP DISCUSS/DSCN MKR DOCD: CPT | Performed by: INTERNAL MEDICINE

## 2018-03-27 RX ORDER — ALBUTEROL SULFATE 90 UG/1
2 AEROSOL, METERED RESPIRATORY (INHALATION) EVERY 4 HOURS PRN
Qty: 1 INHALER | Refills: 11 | Status: SHIPPED | OUTPATIENT
Start: 2018-03-27

## 2018-03-27 ASSESSMENT — ENCOUNTER SYMPTOMS
EYE PAIN: 0
WHEEZING: 0
VOICE CHANGE: 0
EYE DISCHARGE: 0
CONSTIPATION: 0
EYE ITCHING: 0
COUGH: 0
DIARRHEA: 0
SHORTNESS OF BREATH: 1
ABDOMINAL PAIN: 0
SORE THROAT: 0

## 2018-03-27 NOTE — PROGRESS NOTES
Pulmonary Outpatient Note   Nirmal Low MD       3/27/2018    Chief Complaint:  Results (6 month fu,Mixed Simple & Mucopurulent Chronic Bronchitis)     HPI:   78y.o. year old male here for follow up regarding COPD. Has shortness of breath with heavy exertion and this is relieved with use of his albuterol MDI, he has used 5 times in the last two weeks. Was also prescribed Anoro but ran out of this medication and has not been using. Was recently admitted to [facility] for hyperglycemia issues and placed on ICS/LABA therapy due to formulary substitution. Past Medical History:   Diagnosis Date    Anxiety disorder     BPH (benign prostatic hypertrophy)     Calcified granuloma of lung ([address] 2014    2:stable per 3/25/14 CT chest    Cataract 1/20/14    OU:Dr. hayward:[facility]    Chronic pain     COPD (chronic obstructive pulmonary disease) (HCC)     Under care of pulmo(Dr. Calhoun)    Degenerative arthritis of hip     Depression 3/11/2015    Depression with anxiety     Prior psychiatrist & therapist:Dr. Talita Rodriguez.  Diabetes mellitus ([address] 2004    Endo Dr. Fabrizio Orellana type 1 note below in [facility]    Diabetic eye exam ([address] 1/20/14    [facility]:Dr. STEPHEN Hayward:No retinopathy. Cataract    Diabetic retinopathy ([address]     under care of [facility]:Dr. Vani Art    Diverticulitis 11/26/2011    Diverticulosis 11/26/2011    Emphysema     Esophageal candidiasis ([address] 7/16/13    GI:EGD    Essential hypertension, benign 11/2010    Fatty liver 10/9/2012    Foot ulcer:left 9/30/2013    Gastric ulcer, unspecified as acute or chronic, without mention of hemorrhage or perforation     GERD (gastroesophageal reflux disease)     Gout 1997    Right big toe    Hearing decreased     Left ear=60%. Right ear=80%.     Hemangioma 12/2010    Liver as per CT abdo    hepatitis c 7/26/17, 2010    Under care of Liver doc:Dr. Enrique Mejias    Hyperlipidemia LDL goal < 100     Low HDL (under 40) 10/9/2012    Peripheral neuropathy ([address] 2006    Pneumonia 10/15/13    Pyogenic granuloma     per derm:Dr. David Cancino Restrictive lung disease     Under care of pulmo(Dr. Calhoun)    S/P colonoscopy 1996    Done secondary to rectal bleed:dx=hemorrhoids    S/P colonoscopy 11/11/10;10/23/2013    Dr. Kirti Hughes 10/2016(3yrs):polyps;diverticulosis. Diverticulosis & polpy(removed). Next in10/2016.  S/P endoscopy 2009    EGD:stomach ulcers.     Superficial phlebitis of left leg 9/30/2013    Tachycardia     Therapeutic drug monitoring 4/8/15    OARRS report is consistent on 4/8/15;7/9/15;10/9/15;1/8/16;4/5/16    Type 1 diabetes mellitus not at goal [facility]) 3/17/14    updated diagnosis as per endo:Dr. Jaime Moody       Past Surgical History:   Procedure Laterality Date    DIAGNOSTIC CARDIAC CATH LAB PROCEDURE  2013    FOOT SURGERY Left Hammer toe, 2nd toe    10/9/14    HIP FRACTURE SURGERY      LEG SURGERY      broken leg    OTHER SURGICAL HISTORY Left 11/21/2013    EXCISION 5TH METATRSAL LEFT FOOT          REVISION COLOSTOMY  06/27/2017    COLOSTOMY REVERSAL     TONSILLECTOMY      UPPER GASTROINTESTINAL ENDOSCOPY  7/16/2013    Esophageal Brushing       Social History   Substance Use Topics    Smoking status: Current Some Day Smoker     Packs/day: 0.25     Years: 35.00     Types: Cigarettes     Last attempt to quit: 12/1/2013    Smokeless tobacco: Never Used    Alcohol use 0.6 oz/week     1 Cans of beer per week       Family History   Problem Relation Age of Onset    Stroke Mother     Hypertension Mother     Arthritis Father     Substance Abuse Father      Etoh    Cancer Father      esophageal    Hypertension Father     Diabetes Brother     Diabetes Paternal Aunt     Asthma Neg Hx     Emphysema Neg Hx     Heart Failure Neg Hx          Current Outpatient Prescriptions:     umeclidinium-vilanterol (ANORO ELLIPTA) 62.5-25 MCG/INH AEPB inhaler, Inhale 1 puff into the lungs daily, Disp: 1 each, Rfl: 11    albuterol sulfate HFA (PROAIR HFA) 108 (90 Base) MCG/ACT inhaler, Inhale 2 puffs into the lungs every 4 hours as needed for Wheezing or Shortness of Breath, Disp: 1 Inhaler, Rfl: 11    glucose blood VI test strips (TRUE METRIX BLOOD GLUCOSE TEST) strip, 1 each by In Vitro route 3 times daily Dx Code E 11.9, Disp: 100 each, Rfl: 2    BASAGLAR KWIKPEN 100 UNIT/ML injection pen, Inject 22 Units into the skin nightly Dx Code E 11.9, Disp: 15 mL, Rfl: 2    Lancets MISC, Testing 2-3 times daily DX Code: E11.9, Disp: 100 each, Rfl: 3    hydrALAZINE (APRESOLINE) 50 MG tablet, Take 1 tablet by mouth 3 times daily, Disp: 90 tablet, Rfl: 3    sertraline (ZOLOFT) 25 MG tablet, Take 1 tablet by mouth 3 times daily, Disp: 90 tablet, Rfl: 0    tamsulosin (FLOMAX) 0.4 MG capsule, TAKE TWO CAPSULES BY MOUTH DAILY, Disp: 60 capsule, Rfl: 0    omeprazole (PRILOSEC) 20 MG delayed release capsule, Take 1 capsule by mouth 2 times daily TAKE ONE CAPSULE BY MOUTH DAILY, Disp: 60 capsule, Rfl: 2    metoprolol tartrate (LOPRESSOR) 25 MG tablet, Take 1 tablet by mouth 2 times daily, Disp: 60 tablet, Rfl: 3    lisinopril (PRINIVIL;ZESTRIL) 10 MG tablet, Take 2 tablets by mouth daily, Disp: 30 tablet, Rfl: 3    thiamine 100 MG tablet, Take 1 tablet by mouth daily, Disp: 30 tablet, Rfl: 3    finasteride (PROSCAR) 5 MG tablet, TAKE ONE TABLET BY MOUTH DAILY, Disp: 30 tablet, Rfl: 0    folic acid (FOLVITE) 1 MG tablet, Take 1 tablet by mouth daily, Disp: 30 tablet, Rfl: 3    cyanocobalamin 1000 MCG tablet, Take 1 tablet by mouth daily, Disp: 30 tablet, Rfl: 3    Blood Glucose Monitoring Suppl (TRUE METRIX AIR GLUCOSE METER) DENIS, 1 meter, Disp: 1 Device, Rfl: 0    insulin aspart (NOVOLOG) 100 UNIT/ML injection vial, Inject 6 Units into the skin 3 times daily (before meals) +SSI, Disp: , Rfl:     amLODIPine (NORVASC) 10 MG tablet, Take 10 mg by mouth daily, Disp: , Rfl:     aspirin 81 MG tablet, Take 81 mg by mouth daily, Disp: , Rfl:     atorvastatin (LIPITOR) 40 MG tablet,

## 2018-03-29 ENCOUNTER — OFFICE VISIT (OUTPATIENT)
Dept: PAIN MANAGEMENT | Age: 68
End: 2018-03-29

## 2018-03-29 VITALS
SYSTOLIC BLOOD PRESSURE: 144 MMHG | BODY MASS INDEX: 22.2 KG/M2 | HEIGHT: 77 IN | OXYGEN SATURATION: 99 % | DIASTOLIC BLOOD PRESSURE: 79 MMHG | HEART RATE: 90 BPM | WEIGHT: 188 LBS

## 2018-03-29 DIAGNOSIS — M47.812 SPONDYLOSIS OF CERVICAL REGION WITHOUT MYELOPATHY OR RADICULOPATHY: ICD-10-CM

## 2018-03-29 DIAGNOSIS — M51.26 PROTRUSION OF LUMBAR INTERVERTEBRAL DISC: ICD-10-CM

## 2018-03-29 DIAGNOSIS — F41.9 ANXIETY: ICD-10-CM

## 2018-03-29 DIAGNOSIS — G89.4 CHRONIC PAIN SYNDROME: Primary | Chronic | ICD-10-CM

## 2018-03-29 DIAGNOSIS — M50.30 DDD (DEGENERATIVE DISC DISEASE), CERVICAL: ICD-10-CM

## 2018-03-29 DIAGNOSIS — M16.0 PRIMARY OSTEOARTHRITIS OF BOTH HIPS: ICD-10-CM

## 2018-03-29 DIAGNOSIS — F51.01 PRIMARY INSOMNIA: ICD-10-CM

## 2018-03-29 DIAGNOSIS — F32.A DEPRESSION, UNSPECIFIED DEPRESSION TYPE: ICD-10-CM

## 2018-03-29 DIAGNOSIS — G56.32 COMPRESSION OF LEFT RADIAL NERVE: ICD-10-CM

## 2018-03-29 DIAGNOSIS — J41.8 MIXED SIMPLE AND MUCOPURULENT CHRONIC BRONCHITIS (HCC): Chronic | ICD-10-CM

## 2018-03-29 DIAGNOSIS — M17.0 PRIMARY OSTEOARTHRITIS OF BOTH KNEES: Chronic | ICD-10-CM

## 2018-03-29 DIAGNOSIS — G60.9 IDIOPATHIC PERIPHERAL NEUROPATHY: Chronic | ICD-10-CM

## 2018-03-29 DIAGNOSIS — M51.36 LUMBAR DEGENERATIVE DISC DISEASE: Chronic | ICD-10-CM

## 2018-03-29 PROCEDURE — 3017F COLORECTAL CA SCREEN DOC REV: CPT | Performed by: NURSE PRACTITIONER

## 2018-03-29 PROCEDURE — G8427 DOCREV CUR MEDS BY ELIG CLIN: HCPCS | Performed by: NURSE PRACTITIONER

## 2018-03-29 PROCEDURE — G8420 CALC BMI NORM PARAMETERS: HCPCS | Performed by: NURSE PRACTITIONER

## 2018-03-29 PROCEDURE — 4040F PNEUMOC VAC/ADMIN/RCVD: CPT | Performed by: NURSE PRACTITIONER

## 2018-03-29 PROCEDURE — G8926 SPIRO NO PERF OR DOC: HCPCS | Performed by: NURSE PRACTITIONER

## 2018-03-29 PROCEDURE — 3023F SPIROM DOC REV: CPT | Performed by: NURSE PRACTITIONER

## 2018-03-29 PROCEDURE — G8482 FLU IMMUNIZE ORDER/ADMIN: HCPCS | Performed by: NURSE PRACTITIONER

## 2018-03-29 PROCEDURE — 99204 OFFICE O/P NEW MOD 45 MIN: CPT | Performed by: NURSE PRACTITIONER

## 2018-03-29 RX ORDER — HYDROCODONE BITARTRATE AND ACETAMINOPHEN 7.5; 325 MG/1; MG/1
1 TABLET ORAL EVERY 8 HOURS PRN
Qty: 84 TABLET | Refills: 0 | Status: SHIPPED | OUTPATIENT
Start: 2018-03-29 | End: 2018-04-26 | Stop reason: SDUPTHER

## 2018-03-29 RX ORDER — MULTIVIT WITH MINERALS/LUTEIN
1000 TABLET ORAL DAILY
COMMUNITY
End: 2018-11-05 | Stop reason: SDUPTHER

## 2018-03-29 RX ORDER — UREA 10 %
1 LOTION (ML) TOPICAL DAILY PRN
COMMUNITY
End: 2018-11-05 | Stop reason: CLARIF

## 2018-03-29 RX ORDER — DULOXETIN HYDROCHLORIDE 30 MG/1
30 CAPSULE, DELAYED RELEASE ORAL DAILY
Qty: 30 CAPSULE | Refills: 0 | Status: SHIPPED | OUTPATIENT
Start: 2018-03-29 | End: 2018-03-29

## 2018-03-29 RX ORDER — BUSPIRONE HYDROCHLORIDE 5 MG/1
5 TABLET ORAL DAILY
Qty: 30 TABLET | Refills: 0 | Status: SHIPPED | OUTPATIENT
Start: 2018-03-29 | End: 2018-04-26 | Stop reason: SDUPTHER

## 2018-03-29 RX ORDER — IBUPROFEN 200 MG
200 TABLET ORAL NIGHTLY PRN
COMMUNITY
End: 2019-03-02 | Stop reason: ALTCHOICE

## 2018-03-29 RX ORDER — VITAMIN B COMPLEX
1100 TABLET ORAL DAILY
COMMUNITY
End: 2018-11-05 | Stop reason: SDUPTHER

## 2018-04-02 ENCOUNTER — TELEPHONE (OUTPATIENT)
Dept: ENDOCRINOLOGY | Age: 68
End: 2018-04-02

## 2018-04-03 ENCOUNTER — TELEPHONE (OUTPATIENT)
Dept: INTERNAL MEDICINE | Age: 68
End: 2018-04-03

## 2018-04-03 RX ORDER — FINASTERIDE 5 MG/1
TABLET, FILM COATED ORAL
Qty: 30 TABLET | Refills: 3 | Status: SHIPPED | OUTPATIENT
Start: 2018-04-03 | End: 2018-05-30 | Stop reason: SDUPTHER

## 2018-04-03 RX ORDER — SERTRALINE HYDROCHLORIDE 25 MG/1
TABLET, FILM COATED ORAL
Qty: 90 TABLET | Refills: 0 | Status: SHIPPED | OUTPATIENT
Start: 2018-04-03 | End: 2018-04-03 | Stop reason: SDUPTHER

## 2018-04-03 RX ORDER — TAMSULOSIN HYDROCHLORIDE 0.4 MG/1
CAPSULE ORAL
Qty: 60 CAPSULE | Refills: 0 | Status: SHIPPED | OUTPATIENT
Start: 2018-04-03 | End: 2018-05-09 | Stop reason: SDUPTHER

## 2018-04-03 RX ORDER — SERTRALINE HYDROCHLORIDE 25 MG/1
25 TABLET, FILM COATED ORAL DAILY
Qty: 90 TABLET | Refills: 0 | Status: SHIPPED | OUTPATIENT
Start: 2018-04-03 | End: 2018-05-09 | Stop reason: SDUPTHER

## 2018-04-17 ENCOUNTER — TELEPHONE (OUTPATIENT)
Dept: INTERNAL MEDICINE | Age: 68
End: 2018-04-17

## 2018-04-18 ENCOUNTER — OFFICE VISIT (OUTPATIENT)
Dept: ENDOCRINOLOGY | Age: 68
End: 2018-04-18

## 2018-04-18 VITALS
DIASTOLIC BLOOD PRESSURE: 71 MMHG | RESPIRATION RATE: 18 BRPM | WEIGHT: 189.4 LBS | BODY MASS INDEX: 22.36 KG/M2 | SYSTOLIC BLOOD PRESSURE: 122 MMHG | HEART RATE: 89 BPM | HEIGHT: 77 IN | OXYGEN SATURATION: 97 %

## 2018-04-18 DIAGNOSIS — Z79.4 TYPE 2 DIABETES MELLITUS WITH KETOACIDOSIS WITHOUT COMA, WITH LONG-TERM CURRENT USE OF INSULIN (HCC): Primary | ICD-10-CM

## 2018-04-18 DIAGNOSIS — E11.10 TYPE 2 DIABETES MELLITUS WITH KETOACIDOSIS WITHOUT COMA, WITH LONG-TERM CURRENT USE OF INSULIN (HCC): Primary | ICD-10-CM

## 2018-04-18 LAB
GLUCOSE BLD-MCNC: 81 MG/DL
HBA1C MFR BLD: 7.6 %

## 2018-04-18 PROCEDURE — 3045F PR MOST RECENT HEMOGLOBIN A1C LEVEL 7.0-9.0%: CPT | Performed by: INTERNAL MEDICINE

## 2018-04-18 PROCEDURE — G8427 DOCREV CUR MEDS BY ELIG CLIN: HCPCS | Performed by: INTERNAL MEDICINE

## 2018-04-18 PROCEDURE — 83036 HEMOGLOBIN GLYCOSYLATED A1C: CPT | Performed by: INTERNAL MEDICINE

## 2018-04-18 PROCEDURE — 2022F DILAT RTA XM EVC RTNOPTHY: CPT | Performed by: INTERNAL MEDICINE

## 2018-04-18 PROCEDURE — 4004F PT TOBACCO SCREEN RCVD TLK: CPT | Performed by: INTERNAL MEDICINE

## 2018-04-18 PROCEDURE — 82962 GLUCOSE BLOOD TEST: CPT | Performed by: INTERNAL MEDICINE

## 2018-04-18 PROCEDURE — 1123F ACP DISCUSS/DSCN MKR DOCD: CPT | Performed by: INTERNAL MEDICINE

## 2018-04-18 PROCEDURE — 4040F PNEUMOC VAC/ADMIN/RCVD: CPT | Performed by: INTERNAL MEDICINE

## 2018-04-18 PROCEDURE — 99214 OFFICE O/P EST MOD 30 MIN: CPT | Performed by: INTERNAL MEDICINE

## 2018-04-18 PROCEDURE — G8420 CALC BMI NORM PARAMETERS: HCPCS | Performed by: INTERNAL MEDICINE

## 2018-04-18 PROCEDURE — 3017F COLORECTAL CA SCREEN DOC REV: CPT | Performed by: INTERNAL MEDICINE

## 2018-04-26 ENCOUNTER — OFFICE VISIT (OUTPATIENT)
Dept: PAIN MANAGEMENT | Age: 68
End: 2018-04-26

## 2018-04-26 VITALS
DIASTOLIC BLOOD PRESSURE: 82 MMHG | SYSTOLIC BLOOD PRESSURE: 158 MMHG | HEART RATE: 83 BPM | BODY MASS INDEX: 21.34 KG/M2 | WEIGHT: 180 LBS | OXYGEN SATURATION: 100 %

## 2018-04-26 DIAGNOSIS — M47.812 SPONDYLOSIS OF CERVICAL REGION WITHOUT MYELOPATHY OR RADICULOPATHY: ICD-10-CM

## 2018-04-26 DIAGNOSIS — M17.0 PRIMARY OSTEOARTHRITIS OF BOTH KNEES: Chronic | ICD-10-CM

## 2018-04-26 DIAGNOSIS — M16.0 PRIMARY OSTEOARTHRITIS OF BOTH HIPS: ICD-10-CM

## 2018-04-26 DIAGNOSIS — G89.4 CHRONIC PAIN SYNDROME: Primary | Chronic | ICD-10-CM

## 2018-04-26 DIAGNOSIS — G60.9 IDIOPATHIC PERIPHERAL NEUROPATHY: Chronic | ICD-10-CM

## 2018-04-26 DIAGNOSIS — F51.01 PRIMARY INSOMNIA: ICD-10-CM

## 2018-04-26 DIAGNOSIS — G43.109 MIGRAINE WITH AURA AND WITHOUT STATUS MIGRAINOSUS, NOT INTRACTABLE: ICD-10-CM

## 2018-04-26 DIAGNOSIS — G56.32 COMPRESSION OF LEFT RADIAL NERVE: ICD-10-CM

## 2018-04-26 DIAGNOSIS — F41.9 ANXIETY: ICD-10-CM

## 2018-04-26 DIAGNOSIS — M51.26 PROTRUSION OF LUMBAR INTERVERTEBRAL DISC: ICD-10-CM

## 2018-04-26 DIAGNOSIS — F32.A DEPRESSION, UNSPECIFIED DEPRESSION TYPE: ICD-10-CM

## 2018-04-26 DIAGNOSIS — M50.30 DDD (DEGENERATIVE DISC DISEASE), CERVICAL: ICD-10-CM

## 2018-04-26 DIAGNOSIS — M51.36 LUMBAR DEGENERATIVE DISC DISEASE: Chronic | ICD-10-CM

## 2018-04-26 PROCEDURE — 4040F PNEUMOC VAC/ADMIN/RCVD: CPT | Performed by: NURSE PRACTITIONER

## 2018-04-26 PROCEDURE — G8427 DOCREV CUR MEDS BY ELIG CLIN: HCPCS | Performed by: NURSE PRACTITIONER

## 2018-04-26 PROCEDURE — G8420 CALC BMI NORM PARAMETERS: HCPCS | Performed by: NURSE PRACTITIONER

## 2018-04-26 PROCEDURE — 1123F ACP DISCUSS/DSCN MKR DOCD: CPT | Performed by: NURSE PRACTITIONER

## 2018-04-26 PROCEDURE — 99213 OFFICE O/P EST LOW 20 MIN: CPT | Performed by: NURSE PRACTITIONER

## 2018-04-26 PROCEDURE — 3017F COLORECTAL CA SCREEN DOC REV: CPT | Performed by: NURSE PRACTITIONER

## 2018-04-26 PROCEDURE — 4004F PT TOBACCO SCREEN RCVD TLK: CPT | Performed by: NURSE PRACTITIONER

## 2018-04-26 RX ORDER — AMITRIPTYLINE HYDROCHLORIDE 25 MG/1
25 TABLET, FILM COATED ORAL NIGHTLY
Qty: 28 TABLET | Refills: 0 | Status: SHIPPED | OUTPATIENT
Start: 2018-04-26 | End: 2018-06-18 | Stop reason: SDUPTHER

## 2018-04-26 RX ORDER — BUSPIRONE HYDROCHLORIDE 5 MG/1
10 TABLET ORAL DAILY
Qty: 30 TABLET | Refills: 0 | Status: SHIPPED | OUTPATIENT
Start: 2018-04-26 | End: 2018-05-18 | Stop reason: SDUPTHER

## 2018-04-26 RX ORDER — HYDROCODONE BITARTRATE AND ACETAMINOPHEN 7.5; 325 MG/1; MG/1
1 TABLET ORAL EVERY 6 HOURS PRN
Qty: 112 TABLET | Refills: 0 | Status: SHIPPED | OUTPATIENT
Start: 2018-04-26 | End: 2018-05-18

## 2018-04-27 ENCOUNTER — TELEPHONE (OUTPATIENT)
Dept: PAIN MANAGEMENT | Age: 68
End: 2018-04-27

## 2018-04-30 ENCOUNTER — TELEPHONE (OUTPATIENT)
Dept: PAIN MANAGEMENT | Age: 68
End: 2018-04-30

## 2018-05-02 ENCOUNTER — TELEPHONE (OUTPATIENT)
Dept: ENDOCRINOLOGY | Age: 68
End: 2018-05-02

## 2018-05-03 ENCOUNTER — TELEPHONE (OUTPATIENT)
Dept: INTERNAL MEDICINE | Age: 68
End: 2018-05-03

## 2018-05-09 DIAGNOSIS — F51.01 PRIMARY INSOMNIA: ICD-10-CM

## 2018-05-09 DIAGNOSIS — G89.4 CHRONIC PAIN SYNDROME: Chronic | ICD-10-CM

## 2018-05-09 DIAGNOSIS — G43.109 MIGRAINE WITH AURA AND WITHOUT STATUS MIGRAINOSUS, NOT INTRACTABLE: ICD-10-CM

## 2018-05-09 DIAGNOSIS — F41.9 ANXIETY: ICD-10-CM

## 2018-05-09 RX ORDER — LISINOPRIL 10 MG/1
20 TABLET ORAL DAILY
Qty: 30 TABLET | Refills: 3 | Status: SHIPPED | OUTPATIENT
Start: 2018-05-09 | End: 2018-05-30 | Stop reason: SDUPTHER

## 2018-05-09 RX ORDER — AMITRIPTYLINE HYDROCHLORIDE 25 MG/1
25 TABLET, FILM COATED ORAL NIGHTLY
Qty: 28 TABLET | Refills: 0 | OUTPATIENT
Start: 2018-05-09

## 2018-05-09 RX ORDER — BUSPIRONE HYDROCHLORIDE 5 MG/1
TABLET ORAL
Qty: 30 TABLET | Refills: 0 | OUTPATIENT
Start: 2018-05-09

## 2018-05-09 RX ORDER — BLOOD SUGAR DIAGNOSTIC
STRIP MISCELLANEOUS
Qty: 300 EACH | Refills: 3 | Status: ON HOLD | OUTPATIENT
Start: 2018-05-09 | End: 2021-10-07

## 2018-05-09 RX ORDER — SERTRALINE HYDROCHLORIDE 25 MG/1
TABLET, FILM COATED ORAL
Qty: 90 TABLET | Refills: 0 | Status: SHIPPED | OUTPATIENT
Start: 2018-05-09 | End: 2018-08-27 | Stop reason: SDUPTHER

## 2018-05-09 RX ORDER — TAMSULOSIN HYDROCHLORIDE 0.4 MG/1
CAPSULE ORAL
Qty: 60 CAPSULE | Refills: 0 | Status: SHIPPED | OUTPATIENT
Start: 2018-05-09 | End: 2018-05-30 | Stop reason: SDUPTHER

## 2018-05-11 ENCOUNTER — TELEPHONE (OUTPATIENT)
Dept: INTERNAL MEDICINE | Age: 68
End: 2018-05-11

## 2018-05-18 ENCOUNTER — OFFICE VISIT (OUTPATIENT)
Dept: PAIN MANAGEMENT | Age: 68
End: 2018-05-18

## 2018-05-18 VITALS
WEIGHT: 191 LBS | HEART RATE: 84 BPM | DIASTOLIC BLOOD PRESSURE: 81 MMHG | OXYGEN SATURATION: 98 % | SYSTOLIC BLOOD PRESSURE: 141 MMHG | BODY MASS INDEX: 22.65 KG/M2

## 2018-05-18 DIAGNOSIS — M47.812 SPONDYLOSIS OF CERVICAL REGION WITHOUT MYELOPATHY OR RADICULOPATHY: ICD-10-CM

## 2018-05-18 DIAGNOSIS — G89.4 CHRONIC PAIN SYNDROME: Primary | Chronic | ICD-10-CM

## 2018-05-18 DIAGNOSIS — M51.36 LUMBAR DEGENERATIVE DISC DISEASE: Chronic | ICD-10-CM

## 2018-05-18 DIAGNOSIS — G89.4 CHRONIC PAIN SYNDROME: Chronic | ICD-10-CM

## 2018-05-18 DIAGNOSIS — M15.9 PRIMARY OSTEOARTHRITIS INVOLVING MULTIPLE JOINTS: ICD-10-CM

## 2018-05-18 DIAGNOSIS — G43.109 MIGRAINE WITH AURA AND WITHOUT STATUS MIGRAINOSUS, NOT INTRACTABLE: ICD-10-CM

## 2018-05-18 DIAGNOSIS — G60.9 IDIOPATHIC PERIPHERAL NEUROPATHY: Chronic | ICD-10-CM

## 2018-05-18 DIAGNOSIS — M50.30 DDD (DEGENERATIVE DISC DISEASE), CERVICAL: ICD-10-CM

## 2018-05-18 DIAGNOSIS — M51.26 PROTRUSION OF LUMBAR INTERVERTEBRAL DISC: ICD-10-CM

## 2018-05-18 DIAGNOSIS — F51.01 PRIMARY INSOMNIA: ICD-10-CM

## 2018-05-18 DIAGNOSIS — F41.9 ANXIETY: ICD-10-CM

## 2018-05-18 DIAGNOSIS — F32.A DEPRESSION, UNSPECIFIED DEPRESSION TYPE: ICD-10-CM

## 2018-05-18 PROCEDURE — 1123F ACP DISCUSS/DSCN MKR DOCD: CPT | Performed by: NURSE PRACTITIONER

## 2018-05-18 PROCEDURE — G8427 DOCREV CUR MEDS BY ELIG CLIN: HCPCS | Performed by: NURSE PRACTITIONER

## 2018-05-18 PROCEDURE — 4040F PNEUMOC VAC/ADMIN/RCVD: CPT | Performed by: NURSE PRACTITIONER

## 2018-05-18 PROCEDURE — 4004F PT TOBACCO SCREEN RCVD TLK: CPT | Performed by: NURSE PRACTITIONER

## 2018-05-18 PROCEDURE — G8420 CALC BMI NORM PARAMETERS: HCPCS | Performed by: NURSE PRACTITIONER

## 2018-05-18 PROCEDURE — 3017F COLORECTAL CA SCREEN DOC REV: CPT | Performed by: NURSE PRACTITIONER

## 2018-05-18 PROCEDURE — 99213 OFFICE O/P EST LOW 20 MIN: CPT | Performed by: NURSE PRACTITIONER

## 2018-05-18 RX ORDER — AMITRIPTYLINE HYDROCHLORIDE 25 MG/1
25 TABLET, FILM COATED ORAL NIGHTLY
Qty: 28 TABLET | Refills: 0 | OUTPATIENT
Start: 2018-05-18

## 2018-05-18 RX ORDER — BUSPIRONE HYDROCHLORIDE 5 MG/1
10 TABLET ORAL DAILY
Qty: 30 TABLET | Refills: 0 | Status: SHIPPED | OUTPATIENT
Start: 2018-05-18 | End: 2018-06-18 | Stop reason: SDUPTHER

## 2018-05-25 ENCOUNTER — TELEPHONE (OUTPATIENT)
Dept: ENDOCRINOLOGY | Age: 68
End: 2018-05-25

## 2018-05-29 ENCOUNTER — TELEPHONE (OUTPATIENT)
Dept: PAIN MANAGEMENT | Age: 68
End: 2018-05-29

## 2018-05-30 RX ORDER — HYDRALAZINE HYDROCHLORIDE 50 MG/1
50 TABLET, FILM COATED ORAL 3 TIMES DAILY
Qty: 270 TABLET | Refills: 0 | Status: SHIPPED | OUTPATIENT
Start: 2018-05-30 | End: 2019-03-02

## 2018-05-30 RX ORDER — TAMSULOSIN HYDROCHLORIDE 0.4 MG/1
CAPSULE ORAL
Qty: 180 CAPSULE | Refills: 0 | Status: ON HOLD | OUTPATIENT
Start: 2018-05-30 | End: 2020-04-12 | Stop reason: HOSPADM

## 2018-05-30 RX ORDER — FINASTERIDE 5 MG/1
TABLET, FILM COATED ORAL
Qty: 90 TABLET | Refills: 0 | Status: SHIPPED | OUTPATIENT
Start: 2018-05-30 | End: 2018-11-05 | Stop reason: SDUPTHER

## 2018-05-30 RX ORDER — LISINOPRIL 10 MG/1
20 TABLET ORAL DAILY
Qty: 90 TABLET | Refills: 0 | Status: SHIPPED | OUTPATIENT
Start: 2018-05-30 | End: 2018-06-05 | Stop reason: SDUPTHER

## 2018-05-30 RX ORDER — OMEPRAZOLE 20 MG/1
20 CAPSULE, DELAYED RELEASE ORAL 2 TIMES DAILY
Qty: 180 CAPSULE | Refills: 0 | Status: SHIPPED | OUTPATIENT
Start: 2018-05-30 | End: 2018-08-01 | Stop reason: SDUPTHER

## 2018-06-05 ENCOUNTER — OFFICE VISIT (OUTPATIENT)
Dept: INTERNAL MEDICINE | Age: 68
End: 2018-06-05

## 2018-06-05 VITALS
SYSTOLIC BLOOD PRESSURE: 172 MMHG | HEART RATE: 87 BPM | OXYGEN SATURATION: 98 % | TEMPERATURE: 98 F | BODY MASS INDEX: 22.32 KG/M2 | WEIGHT: 189 LBS | DIASTOLIC BLOOD PRESSURE: 96 MMHG | HEIGHT: 77 IN

## 2018-06-05 DIAGNOSIS — Z23 NEED FOR PROPHYLACTIC VACCINATION AND INOCULATION AGAINST VARICELLA: ICD-10-CM

## 2018-06-05 DIAGNOSIS — G43.109 MIGRAINE WITH AURA AND WITHOUT STATUS MIGRAINOSUS, NOT INTRACTABLE: ICD-10-CM

## 2018-06-05 DIAGNOSIS — Z13.220 SCREENING FOR HYPERLIPIDEMIA: ICD-10-CM

## 2018-06-05 DIAGNOSIS — J41.8 MIXED SIMPLE AND MUCOPURULENT CHRONIC BRONCHITIS (HCC): Chronic | ICD-10-CM

## 2018-06-05 DIAGNOSIS — I50.32 CHRONIC DIASTOLIC CHF (CONGESTIVE HEART FAILURE) (HCC): Chronic | ICD-10-CM

## 2018-06-05 DIAGNOSIS — Z00.00 ROUTINE GENERAL MEDICAL EXAMINATION AT A HEALTH CARE FACILITY: Primary | ICD-10-CM

## 2018-06-05 DIAGNOSIS — E11.10 TYPE 2 DIABETES MELLITUS WITH KETOACIDOSIS WITHOUT COMA, WITH LONG-TERM CURRENT USE OF INSULIN (HCC): ICD-10-CM

## 2018-06-05 DIAGNOSIS — J43.9 PULMONARY EMPHYSEMA, UNSPECIFIED EMPHYSEMA TYPE (HCC): ICD-10-CM

## 2018-06-05 DIAGNOSIS — Z79.4 TYPE 2 DIABETES MELLITUS WITH KETOACIDOSIS WITHOUT COMA, WITH LONG-TERM CURRENT USE OF INSULIN (HCC): ICD-10-CM

## 2018-06-05 DIAGNOSIS — F51.01 PRIMARY INSOMNIA: ICD-10-CM

## 2018-06-05 DIAGNOSIS — E78.2 MIXED HYPERLIPIDEMIA: Chronic | ICD-10-CM

## 2018-06-05 DIAGNOSIS — I10 HTN (HYPERTENSION), BENIGN: ICD-10-CM

## 2018-06-05 DIAGNOSIS — G89.4 CHRONIC PAIN SYNDROME: Chronic | ICD-10-CM

## 2018-06-05 LAB
CONTROL: POSITIVE
HEMOCCULT STL QL: POSITIVE

## 2018-06-05 PROCEDURE — 82274 ASSAY TEST FOR BLOOD FECAL: CPT | Performed by: NURSE PRACTITIONER

## 2018-06-05 PROCEDURE — 99397 PER PM REEVAL EST PAT 65+ YR: CPT | Performed by: NURSE PRACTITIONER

## 2018-06-05 PROCEDURE — 93000 ELECTROCARDIOGRAM COMPLETE: CPT | Performed by: NURSE PRACTITIONER

## 2018-06-05 RX ORDER — LISINOPRIL 30 MG/1
30 TABLET ORAL DAILY
Qty: 90 TABLET | Refills: 0 | Status: SHIPPED | OUTPATIENT
Start: 2018-06-05 | End: 2018-07-02 | Stop reason: SDUPTHER

## 2018-06-05 ASSESSMENT — ENCOUNTER SYMPTOMS
VOMITING: 0
DIARRHEA: 0
BACK PAIN: 0
PHOTOPHOBIA: 0
SORE THROAT: 0
EYE REDNESS: 0
BLOOD IN STOOL: 0
ABDOMINAL PAIN: 0
HEMOPTYSIS: 0
SINUS PAIN: 0
HEARTBURN: 0
DOUBLE VISION: 0
ORTHOPNEA: 0
COUGH: 0
STRIDOR: 0
BLURRED VISION: 0
SPUTUM PRODUCTION: 0
EYE DISCHARGE: 0
NAUSEA: 0
WHEEZING: 0
SHORTNESS OF BREATH: 0
CONSTIPATION: 0
EYE PAIN: 0

## 2018-06-05 ASSESSMENT — PATIENT HEALTH QUESTIONNAIRE - PHQ9
SUM OF ALL RESPONSES TO PHQ9 QUESTIONS 1 & 2: 0
1. LITTLE INTEREST OR PLEASURE IN DOING THINGS: 0
2. FEELING DOWN, DEPRESSED OR HOPELESS: 0
SUM OF ALL RESPONSES TO PHQ QUESTIONS 1-9: 0

## 2018-06-07 ENCOUNTER — TELEPHONE (OUTPATIENT)
Dept: INTERNAL MEDICINE | Age: 68
End: 2018-06-07

## 2018-06-07 DIAGNOSIS — F41.9 ANXIETY: ICD-10-CM

## 2018-06-07 DIAGNOSIS — G89.4 CHRONIC PAIN SYNDROME: Chronic | ICD-10-CM

## 2018-06-08 RX ORDER — OMEPRAZOLE 20 MG/1
CAPSULE, DELAYED RELEASE ORAL
Qty: 60 CAPSULE | Refills: 2 | Status: SHIPPED | OUTPATIENT
Start: 2018-06-08 | End: 2018-11-20 | Stop reason: CLARIF

## 2018-06-08 RX ORDER — BUSPIRONE HYDROCHLORIDE 5 MG/1
10 TABLET ORAL DAILY
Qty: 30 TABLET | Refills: 0 | OUTPATIENT
Start: 2018-06-08

## 2018-06-08 RX ORDER — TAMSULOSIN HYDROCHLORIDE 0.4 MG/1
CAPSULE ORAL
Qty: 60 CAPSULE | Refills: 0 | Status: ON HOLD | OUTPATIENT
Start: 2018-06-08 | End: 2018-10-19 | Stop reason: SDUPTHER

## 2018-06-12 ENCOUNTER — OFFICE VISIT (OUTPATIENT)
Dept: INTERNAL MEDICINE | Age: 68
End: 2018-06-12

## 2018-06-12 ENCOUNTER — TELEPHONE (OUTPATIENT)
Dept: INTERNAL MEDICINE | Age: 68
End: 2018-06-12

## 2018-06-12 VITALS
BODY MASS INDEX: 22.55 KG/M2 | HEIGHT: 77 IN | WEIGHT: 191 LBS | OXYGEN SATURATION: 99 % | HEART RATE: 80 BPM | SYSTOLIC BLOOD PRESSURE: 110 MMHG | DIASTOLIC BLOOD PRESSURE: 82 MMHG

## 2018-06-12 DIAGNOSIS — G56.30 RADIAL NERVE COMPRESSION, UNSPECIFIED LATERALITY: ICD-10-CM

## 2018-06-12 DIAGNOSIS — M51.36 LUMBAR DEGENERATIVE DISC DISEASE: Chronic | ICD-10-CM

## 2018-06-12 DIAGNOSIS — I50.32 CHRONIC DIASTOLIC CHF (CONGESTIVE HEART FAILURE) (HCC): Chronic | ICD-10-CM

## 2018-06-12 DIAGNOSIS — I10 HTN (HYPERTENSION), BENIGN: Primary | ICD-10-CM

## 2018-06-12 DIAGNOSIS — G89.4 CHRONIC PAIN SYNDROME: Primary | Chronic | ICD-10-CM

## 2018-06-12 DIAGNOSIS — R07.9 CHEST PAIN, UNSPECIFIED TYPE: ICD-10-CM

## 2018-06-12 PROCEDURE — 93000 ELECTROCARDIOGRAM COMPLETE: CPT | Performed by: NURSE PRACTITIONER

## 2018-06-12 PROCEDURE — 99213 OFFICE O/P EST LOW 20 MIN: CPT | Performed by: NURSE PRACTITIONER

## 2018-06-12 ASSESSMENT — ENCOUNTER SYMPTOMS
EYE ITCHING: 0
COLOR CHANGE: 0
ABDOMINAL PAIN: 0
NAUSEA: 0
BLOOD IN STOOL: 0
CONSTIPATION: 0
SHORTNESS OF BREATH: 0
CHEST TIGHTNESS: 0
BACK PAIN: 0
WHEEZING: 0
EYE REDNESS: 0
SINUS PRESSURE: 0
DIARRHEA: 0
RHINORRHEA: 0
VOMITING: 0
COUGH: 0
SORE THROAT: 0

## 2018-06-14 ENCOUNTER — TELEPHONE (OUTPATIENT)
Dept: ENDOCRINOLOGY | Age: 68
End: 2018-06-14

## 2018-06-18 ENCOUNTER — OFFICE VISIT (OUTPATIENT)
Dept: PAIN MANAGEMENT | Age: 68
End: 2018-06-18

## 2018-06-18 VITALS
DIASTOLIC BLOOD PRESSURE: 89 MMHG | HEART RATE: 78 BPM | OXYGEN SATURATION: 99 % | SYSTOLIC BLOOD PRESSURE: 137 MMHG | BODY MASS INDEX: 22.65 KG/M2 | WEIGHT: 191 LBS

## 2018-06-18 DIAGNOSIS — M47.812 SPONDYLOSIS OF CERVICAL REGION WITHOUT MYELOPATHY OR RADICULOPATHY: ICD-10-CM

## 2018-06-18 DIAGNOSIS — F41.9 ANXIETY: ICD-10-CM

## 2018-06-18 DIAGNOSIS — G43.109 MIGRAINE WITH AURA AND WITHOUT STATUS MIGRAINOSUS, NOT INTRACTABLE: ICD-10-CM

## 2018-06-18 DIAGNOSIS — M16.0 PRIMARY OSTEOARTHRITIS OF BOTH HIPS: ICD-10-CM

## 2018-06-18 DIAGNOSIS — G60.9 IDIOPATHIC PERIPHERAL NEUROPATHY: Chronic | ICD-10-CM

## 2018-06-18 DIAGNOSIS — G89.4 CHRONIC PAIN SYNDROME: Primary | Chronic | ICD-10-CM

## 2018-06-18 DIAGNOSIS — M51.36 LUMBAR DEGENERATIVE DISC DISEASE: Chronic | ICD-10-CM

## 2018-06-18 DIAGNOSIS — F51.01 PRIMARY INSOMNIA: ICD-10-CM

## 2018-06-18 DIAGNOSIS — M17.0 PRIMARY OSTEOARTHRITIS OF BOTH KNEES: Chronic | ICD-10-CM

## 2018-06-18 DIAGNOSIS — M50.30 DDD (DEGENERATIVE DISC DISEASE), CERVICAL: ICD-10-CM

## 2018-06-18 DIAGNOSIS — M51.26 PROTRUSION OF LUMBAR INTERVERTEBRAL DISC: ICD-10-CM

## 2018-06-18 PROCEDURE — 99213 OFFICE O/P EST LOW 20 MIN: CPT | Performed by: NURSE PRACTITIONER

## 2018-06-18 RX ORDER — AMITRIPTYLINE HYDROCHLORIDE 25 MG/1
25 TABLET, FILM COATED ORAL NIGHTLY
Qty: 28 TABLET | Refills: 0 | Status: SHIPPED | OUTPATIENT
Start: 2018-06-18 | End: 2018-07-16

## 2018-06-18 RX ORDER — HYDROCODONE BITARTRATE AND ACETAMINOPHEN 7.5; 325 MG/1; MG/1
1 TABLET ORAL EVERY 6 HOURS PRN
Qty: 112 TABLET | Refills: 0 | Status: SHIPPED | OUTPATIENT
Start: 2018-06-18 | End: 2018-07-16 | Stop reason: SDUPTHER

## 2018-06-18 RX ORDER — BUSPIRONE HYDROCHLORIDE 5 MG/1
10 TABLET ORAL DAILY
Qty: 60 TABLET | Refills: 0 | Status: SHIPPED | OUTPATIENT
Start: 2018-06-18 | End: 2018-08-01

## 2018-06-25 ENCOUNTER — TELEPHONE (OUTPATIENT)
Dept: INTERNAL MEDICINE | Age: 68
End: 2018-06-25

## 2018-06-26 ENCOUNTER — TELEPHONE (OUTPATIENT)
Dept: INTERNAL MEDICINE | Age: 68
End: 2018-06-26

## 2018-06-26 ENCOUNTER — TELEPHONE (OUTPATIENT)
Dept: SURGERY | Age: 68
End: 2018-06-26

## 2018-07-02 RX ORDER — FOLIC ACID 1 MG/1
1 TABLET ORAL DAILY
Qty: 30 TABLET | Refills: 3 | Status: SHIPPED | OUTPATIENT
Start: 2018-07-02 | End: 2019-04-27

## 2018-07-02 RX ORDER — LANOLIN ALCOHOL/MO/W.PET/CERES
1000 CREAM (GRAM) TOPICAL DAILY
Qty: 30 TABLET | Refills: 3 | Status: SHIPPED | OUTPATIENT
Start: 2018-07-02

## 2018-07-02 RX ORDER — LISINOPRIL 30 MG/1
30 TABLET ORAL DAILY
Qty: 90 TABLET | Refills: 0 | Status: ON HOLD | OUTPATIENT
Start: 2018-07-02 | End: 2018-10-19

## 2018-07-11 ENCOUNTER — TELEPHONE (OUTPATIENT)
Dept: SURGERY | Age: 68
End: 2018-07-11

## 2018-07-11 ENCOUNTER — TELEPHONE (OUTPATIENT)
Dept: INTERNAL MEDICINE | Age: 68
End: 2018-07-11

## 2018-07-11 NOTE — TELEPHONE ENCOUNTER
Patient called to check about what time he needed to start his Suprep. Went over instructions and over what things he can have to drink today. Advised patient that 2nd bottle of Suprep will be started tomorrow at 5:30 a.m. and no liquids after 8:30 a.m. Reminded patient to arrive at [facility]. at 10:00 a.m. Tomorrow and procedure is at 11:30 a.m. He knows that he will not be able to drive himself. No further questions at this time. Patient has my direct line and knows he can reach me until 6:30 p.m. today.

## 2018-07-12 ENCOUNTER — HOSPITAL ENCOUNTER (OUTPATIENT)
Dept: ENDOSCOPY | Age: 68
Discharge: HOME | End: 2018-07-12
Attending: SURGERY | Admitting: SURGERY

## 2018-07-12 VITALS
WEIGHT: 191 LBS | HEIGHT: 77 IN | SYSTOLIC BLOOD PRESSURE: 167 MMHG | OXYGEN SATURATION: 100 % | DIASTOLIC BLOOD PRESSURE: 86 MMHG | HEART RATE: 91 BPM | RESPIRATION RATE: 18 BRPM | TEMPERATURE: 97 F | BODY MASS INDEX: 22.55 KG/M2

## 2018-07-12 LAB
GLUCOSE BLD-MCNC: 224 MG/DL (ref 70–99)
PERFORMED ON: ABNORMAL

## 2018-07-12 PROCEDURE — 45385 COLONOSCOPY W/LESION REMOVAL: CPT | Performed by: SURGERY

## 2018-07-12 ASSESSMENT — PAIN - FUNCTIONAL ASSESSMENT: PAIN_FUNCTIONAL_ASSESSMENT: 0-10

## 2018-07-12 ASSESSMENT — LIFESTYLE VARIABLES: SMOKING_STATUS: 1

## 2018-07-12 ASSESSMENT — COPD QUESTIONNAIRES: CAT_SEVERITY: MODERATE

## 2018-07-12 NOTE — ANESTHESIA PRE-OP
Department of Anesthesiology  Preprocedure Note       Name:  Kacie Jules   Age:  78 y.o.  :  1950                                          MRN:  [id number]         Date:  2018      Surgeon:    Procedure:    Medications prior to admission:   Prior to Admission medications    Medication Sig Start Date End Date Taking? Authorizing Provider   vitamin B-12 (CYANOCOBALAMIN) 1000 MCG tablet TAKE 1 TABLET BY MOUTH DAILY 18  Yes RICCO James CNP   lisinopril (PRINIVIL;ZESTRIL) 30 MG tablet TAKE 1 TABLET BY MOUTH DAILY 18  Yes RICCO James CNP   folic acid (FOLVITE) 1 MG tablet TAKE 1 TABLET BY MOUTH DAILY 18  Yes RICCO James CNP   busPIRone (BUSPAR) 5 MG tablet Take 2 tablets by mouth daily 18  Yes RICCO Hidalgo CNP   amitriptyline (ELAVIL) 25 MG tablet Take 1 tablet by mouth nightly 18  Yes RICCO Hidalgo CNP   HYDROcodone-acetaminophen (NORCO) 7.5-325 MG per tablet Take 1 tablet by mouth every 6 hours as needed for Pain for up to 28 days. . 18 Yes RICCO Hidalgo CNP   tamsulosin (FLOMAX) 0.4 MG capsule TAKE TWO CAPSULES BY MOUTH EVERY DAY 18  Yes RICCO James CNP   omeprazole (PRILOSEC) 20 MG delayed release capsule TAKE 1 CAPSULE BY MOUTH 2 TIMES A DAY 18  Yes RICCO James CNP   hydrALAZINE (APRESOLINE) 50 MG tablet Take 1 tablet by mouth 3 times daily 18  Yes RICCO Crawford CNP   omeprazole (PRILOSEC) 20 MG delayed release capsule Take 1 capsule by mouth 2 times daily TAKE ONE CAPSULE BY MOUTH DAILY 18  Yes RICCO James CNP   finasteride (PROSCAR) 5 MG tablet TAKE 1 TABLET BY MOUTH DAILY 18  Yes RICCO James CNP   tamsulosin (FLOMAX) 0.4 MG capsule TAKE 2 CAPSULES BY MOUTH DAILY 18  Yes Osie Morovis, APRN - CNP   metoprolol tartrate (LOPRESSOR) 25 MG tablet Take 1 daily      ibuprofen (ADVIL;MOTRIN) 200 MG tablet Take 200 mg by mouth nightly as needed for Pain      B Complex Vitamins (VITAMIN B-COMPLEX) TABS Take 1,100 mg by mouth daily      umeclidinium-vilanterol (ANORO ELLIPTA) 62.5-25 MCG/INH AEPB inhaler Inhale 1 puff into the lungs daily 1 each 11    albuterol sulfate HFA (PROAIR HFA) 108 (90 Base) MCG/ACT inhaler Inhale 2 puffs into the lungs every 4 hours as needed for Wheezing or Shortness of Breath 1 Inhaler 11    BASAGLAR KWIKPEN 100 UNIT/ML injection pen Inject 22 Units into the skin nightly Dx Code E 11.9 (Patient taking differently: Inject 24 Units into the skin nightly Dx Code E 11.9) 15 mL 2    Lancets MISC Testing 2-3 times daily DX Code: E11.9 100 each 3    thiamine 100 MG tablet Take 1 tablet by mouth daily 30 tablet 3    Blood Glucose Monitoring Suppl (TRUE METRIX AIR GLUCOSE METER) DENIS 1 meter 1 Device 0    insulin aspart (NOVOLOG) 100 UNIT/ML injection vial Inject 10 Units into the skin 3 times daily (before meals) +SSI       glucagon 1 MG injection Inject 1 kit into the skin as needed      metoclopramide (REGLAN) 10 MG tablet Take 1 tablet by mouth 4 times daily as needed (Nausea and/or headache) 30 tablet 0    SUMAtriptan (IMITREX) 100 MG tablet Take 1 tablet by mouth once as needed for Migraine 9 tablet 0     No current facility-administered medications for this encounter. Allergies:     Allergies   Allergen Reactions    Neurontin [Gabapentin]      Gastric side effects       Problem List:    Patient Active Problem List   Diagnosis Code    GERD (gastroesophageal reflux disease) K21.9    Peripheral neuropathy of bilateral feet G62.9    COPD (chronic obstructive pulmonary disease) (HCC) J44.9    HLD (hyperlipidemia) E78.5    Fatty liver K76.0    Anxiety/depression F41.9    History of hepatitis C Z86.19    Knee osteoarthritis M17.10    Alcoholism ([address] F10.20    Chronic pain syndrome G89.4    Lumbar degenerative disc disease Smokeless tobacco: Never Used    Alcohol use Yes      Comment: 6 beers a week, sometimes more        LABS:    CBC  Lab Results   Component Value Date/Time    WBC 5.8 06/07/2018 11:08 AM    HGB 13.1 (L) 06/07/2018 11:08 AM    HCT 38.1 (L) 06/07/2018 11:08 AM     06/07/2018 11:08 AM     RENAL  Lab Results   Component Value Date/Time     06/07/2018 11:08 AM    K 3.8 06/07/2018 11:08 AM    K 4.0 02/27/2018 04:24 AM    CL 98 (L) 06/07/2018 11:08 AM    CO2 28 06/07/2018 11:08 AM    BUN 17 06/07/2018 11:08 AM    CREATININE 0.6 (L) 06/07/2018 11:08 AM    GLUCOSE 143 (H) 06/07/2018 11:08 AM     COAGS  Lab Results   Component Value Date/Time    PROTIME 12.3 07/26/2017 07:20 AM    INR 1.09 07/26/2017 07:20 AM    APTT 19.2 (L) 07/18/2017 06:06 AM        Anesthesia Plan      MAC     ASA 4       Induction: intravenous. Anesthetic plan and risks discussed with patient. Plan discussed with CRNA.     Attending anesthesiologist reviewed and agrees with Elli Bolaños MD   7/12/2018

## 2018-07-12 NOTE — H&P
Subjective:     Patient is a 78 y.o. man with history of colon polyps    HPI: Polyps on prior colonoscopies completed at outside hospitals. No FH of CRC    Patient Active Problem List    Diagnosis Date Noted    DDD (degenerative disc disease), cervical 03/29/2018    Spondylosis of cervical region without myelopathy or radiculopathy 03/29/2018    Protrusion of lumbar intervertebral disc 03/29/2018    ARF (acute renal failure) ([address] 02/24/2018    Type 2 diabetes mellitus with ketoacidosis without coma, with long-term current use of insulin ([address] 02/24/2018    Radial nerve compression     HTN (hypertension), benign     Migraine with aura and without status migrainosus, not intractable 07/26/2017    Chronic normocytic anemia 07/18/2017    Chronic dCHF (grade 1 LVDD) 07/02/2017    S/p reversal of colostomy (6/27/17)     S/P left colectomy 03/10/2017    Depression 03/11/2015    Lumbar degenerative disc disease 10/17/2014    Chronic pain syndrome 08/26/2013    Alcoholism ([address] 06/14/2013    Knee osteoarthritis 12/04/2012    History of hepatitis C 11/29/2012    Anxiety/depression 11/08/2012    Fatty liver 10/09/2012    HLD (hyperlipidemia) 11/29/2011    COPD (chronic obstructive pulmonary disease) ([address] 04/14/2011    Peripheral neuropathy of bilateral feet 11/12/2010    GERD (gastroesophageal reflux disease) 10/20/2010     Past Medical History:   Diagnosis Date    Anxiety disorder     BPH (benign prostatic hypertrophy)     Calcified granuloma of lung ([address] 2014    2:stable per 3/25/14 CT chest    Cataract 1/20/14    OU:Dr. mcclain:DANNYI    Chronic pain     COPD (chronic obstructive pulmonary disease) ([address]     Under care of pulmo(Dr. Calhoun)    Degenerative arthritis of hip     Depression 3/11/2015    Depression with anxiety     Prior psychiatrist & therapist:Dr. Tianna Ramachandran.      Diabetes mellitus ([address] 2004    Endo Dr. Leon Katz type 1 note below in [facility]    Diabetic eye exam ([facility] [facility] 1/20/14 admission. Allergies   Allergen Reactions    Neurontin [Gabapentin]      Gastric side effects      Social History   Substance Use Topics    Smoking status: Current Some Day Smoker     Packs/day: 1.00     Years: 35.00     Types: Cigarettes     Last attempt to quit: 12/1/2013    Smokeless tobacco: Never Used    Alcohol use Yes      Comment: 6 beers a week, sometimes more       Family History   Problem Relation Age of Onset   Osiel Downing Stroke Mother     Hypertension Mother     Arthritis Father     Substance Abuse Father         Etoh    Cancer Father         esophageal    Hypertension Father     Diabetes Brother     Diabetes Paternal Aunt     Asthma Neg Hx     Emphysema Neg Hx     Heart Failure Neg Hx       Review of Systems    GEN: reviewed and negative except as noted in HPI  GI: reviewed and negative except as noted in HPI      Objective:     Patient Vitals for the past 8 hrs:   BP Temp Pulse Resp SpO2 Height Weight   07/12/18 1020 (!) 167/86 97 °F (36.1 °C) 91 18 100 % 6' 5\" (1.956 m) 191 lb (86.6 kg)     GEN: appears well, no distress, appears stated age  PSYCH: normal mood, normal affect  NECK: no neck masses, trachea midline  ENT: moist oral mucosa; anicteric  SKIN: no rash or jaundice  CV: regular heart rate and rhythm  PULM: normal respiratory effort, no wheezing  GI: soft non tender abdomen. Normal bowel sounds  RECTAL: deferred   EXT/NEURO: normal gait, strength/sensation grossly intact in all extremities      Assessment:     Colonoscopy    Plan:     RBA discussed. Risks of bleeding and perforation. May need additional operation/treatment based on findings.  Patient understands and wishes to proceed    Chelle Patel M.D.  7/12/18   12:16 PM

## 2018-07-13 ENCOUNTER — OFFICE VISIT (OUTPATIENT)
Dept: INTERNAL MEDICINE | Age: 68
End: 2018-07-13

## 2018-07-13 VITALS
WEIGHT: 189 LBS | HEIGHT: 77 IN | SYSTOLIC BLOOD PRESSURE: 100 MMHG | HEART RATE: 90 BPM | OXYGEN SATURATION: 99 % | DIASTOLIC BLOOD PRESSURE: 60 MMHG | BODY MASS INDEX: 22.32 KG/M2

## 2018-07-13 DIAGNOSIS — F51.01 PRIMARY INSOMNIA: Primary | ICD-10-CM

## 2018-07-13 PROCEDURE — 99213 OFFICE O/P EST LOW 20 MIN: CPT | Performed by: NURSE PRACTITIONER

## 2018-07-13 ASSESSMENT — ENCOUNTER SYMPTOMS
DIARRHEA: 0
EYE REDNESS: 0
ABDOMINAL PAIN: 0
BACK PAIN: 0
RHINORRHEA: 0
WHEEZING: 0
EYE ITCHING: 0
SORE THROAT: 0
NAUSEA: 0
CHEST TIGHTNESS: 0
VOMITING: 0
CONSTIPATION: 0
SINUS PRESSURE: 0
SHORTNESS OF BREATH: 0
COUGH: 0
COLOR CHANGE: 0
BLOOD IN STOOL: 0

## 2018-07-13 NOTE — PROGRESS NOTES
Subjective:      Patient ID: Elian Williamson is a 78 y.o. male. Chief Complaint   Patient presents with    Medication Reaction     He is having hallucinations on         HPI   [address] is here today as he has been having hallucinations of seeing men in his room or dinosaurs outside his window that he knows are not there. He states that about a week and a half ago, he stopped his amitriptyline cold turkey because it was not helping with his sleep, and symptoms have been happening since. He is not sleeping at night, and this is making symptoms worse. He does not have and si/hi. He is not hearing things or voices. Past Medical History:   Diagnosis Date    Anxiety disorder     BPH (benign prostatic hypertrophy)     Calcified granuloma of lung ([address] 2014    2:stable per 3/25/14 CT chest    Cataract 1/20/14    OU:Dr. hayward:[facility]    Chronic pain     COPD (chronic obstructive pulmonary disease) (HCC)     Under care of pulmo(Dr. Calhoun)    Degenerative arthritis of hip     Depression 3/11/2015    Depression with anxiety     Prior psychiatrist & therapist:Dr. Riley Mckee.  Diabetes mellitus ([address] 2004    Endo Dr. Lary Jean type 1 [facility]    Diabetic eye exam ([facility] [address] 1/20/14    [facility]:Dr. STEPHEN Hayward:No retinopathy. Cataract    Diabetic retinopathy ([facility] [address]     under care of [facility]:Dr. Harlan Steiner    Diverticulitis 11/26/2011    Diverticulosis 11/26/2011    Emphysema     Esophageal candidiasis ([address] 7/16/13    GI:EGD    Essential hypertension, benign 11/2010    Fatty liver 10/9/2012    Foot ulcer:left 9/30/2013    Gastric ulcer, unspecified as acute or chronic, without mention of hemorrhage or perforation     GERD (gastroesophageal reflux disease)     Gout 1997    Right big toe    Hearing decreased     Left ear=60%. Right ear=80%.     Hemangioma 12/2010    Liver as per CT abdo    hepatitis c 7/26/17, 2010    Under care of Liver doc:Dr. Alejandrina Silva    Hyperlipidemia LDL goal < 100     Low HDL (under 40) 10/9/2012  Peripheral neuropathy ([facility] [address] 2006    Pneumonia 10/15/13    Pyogenic granuloma     per derm:Dr. Kris Fernandez Restrictive lung disease     Under care of pulmo(Dr. Calhoun)    S/P colonoscopy 1996    Done secondary to rectal bleed:dx=hemorrhoids    S/P colonoscopy 11/11/10;10/23/2013    Dr. Ely Vallecillo 10/2016(3yrs):polyps;diverticulosis. Diverticulosis & polpy(removed). Next in10/2016.  S/P endoscopy 2009    EGD:stomach ulcers.     Superficial phlebitis of left leg 9/30/2013    Tachycardia     Therapeutic drug monitoring 4/8/15    OARRS report is consistent on 4/8/15;7/9/15;10/9/15;1/8/16;4/5/16    Type 1 diabetes mellitus not at goal [facility]) 3/17/14    updated diagnosis as per endo:Dr. Rosemarie Hughes     Past Surgical History:   Procedure Laterality Date    DIAGNOSTIC CARDIAC CATH LAB PROCEDURE  2013    FOOT SURGERY Left Hammer toe, 2nd toe    10/9/14    HIP FRACTURE SURGERY      LEG SURGERY      broken leg    OTHER SURGICAL HISTORY Left 11/21/2013    EXCISION 5TH METATRSAL LEFT FOOT          REVISION COLOSTOMY  06/27/2017    COLOSTOMY REVERSAL     TONSILLECTOMY      UPPER GASTROINTESTINAL ENDOSCOPY  7/16/2013    Esophageal Brushing     Family History   Problem Relation Age of Onset    Stroke Mother     Hypertension Mother     Arthritis Father     Substance Abuse Father         Etoh    Cancer Father         esophageal    Hypertension Father     Diabetes Brother     Diabetes Paternal Aunt     Asthma Neg Hx     Emphysema Neg Hx     Heart Failure Neg Hx      Social History     Social History    Marital status: Single     Spouse name: N/A    Number of children: N/A    Years of education: N/A     Occupational History    unemployed      Social History Main Topics    Smoking status: Current Some Day Smoker     Packs/day: 1.00     Years: 35.00     Types: Cigarettes     Last attempt to quit: 12/1/2013    Smokeless tobacco: Never Used    Alcohol use Yes      Comment: 6 beers a week, sometimes more  Drug use: No    Sexual activity: No     Other Topics Concern    Not on file     Social History Narrative    No narrative on file       Review of Systems   Constitutional: Negative for chills, fatigue and fever. HENT: Negative for congestion, ear pain, postnasal drip, rhinorrhea, sinus pressure, sneezing and sore throat. Eyes: Negative for redness and itching. Respiratory: Negative for cough, chest tightness, shortness of breath and wheezing. Cardiovascular: Negative for chest pain and palpitations. Gastrointestinal: Negative for abdominal pain, blood in stool, constipation, diarrhea, nausea and vomiting. Endocrine: Negative for cold intolerance and heat intolerance. Genitourinary: Negative for difficulty urinating, dysuria, flank pain, frequency, hematuria and urgency. Musculoskeletal: Negative for arthralgias, back pain, joint swelling and myalgias. Skin: Negative for color change, pallor, rash and wound. Allergic/Immunologic: Negative for environmental allergies and food allergies. Neurological: Negative for dizziness, seizures, syncope, weakness, light-headedness, numbness and headaches. Hematological: Negative for adenopathy. Does not bruise/bleed easily. Psychiatric/Behavioral: Positive for hallucinations. Negative for confusion, sleep disturbance and suicidal ideas. The patient is not nervous/anxious and is not hyperactive. Objective:     Vitals:    07/13/18 1105   BP: 100/60   Site: Left Arm   Position: Sitting   Cuff Size: Medium Adult   Pulse: 90   SpO2: 99%   Weight: 189 lb (85.7 kg)   Height: 6' 5\" (1.956 m)     Physical Exam   Constitutional: He is oriented to person, place, and time. He appears well-developed and well-nourished. HENT:   Head: Normocephalic. Right Ear: External ear normal.   Left Ear: External ear normal.   Eyes: Conjunctivae and EOM are normal. Pupils are equal, round, and reactive to light. Neck: Normal range of motion. Neck supple.  No BY MOUTH DAILY 90 tablet 0    tamsulosin (FLOMAX) 0.4 MG capsule TAKE 2 CAPSULES BY MOUTH DAILY 180 capsule 0    metoprolol tartrate (LOPRESSOR) 25 MG tablet Take 1 tablet by mouth 2 times daily 180 tablet 0    AGAMATRIX PRESTO TEST strip USE TO TEST BLOOD SUGAR 3 TIMES A  each 3    sertraline (ZOLOFT) 25 MG tablet TAKE 1 TABLET BY MOUTH ONCE A DAY 90 tablet 0    Tens Unit MISC by Does not apply route Use as directed.  1 each 0    vitamin E 1000 units capsule Take 1,000 Units by mouth daily      B Complex Vitamins (VITAMIN B-COMPLEX) TABS Take 1,100 mg by mouth daily      umeclidinium-vilanterol (ANORO ELLIPTA) 62.5-25 MCG/INH AEPB inhaler Inhale 1 puff into the lungs daily 1 each 11    albuterol sulfate HFA (PROAIR HFA) 108 (90 Base) MCG/ACT inhaler Inhale 2 puffs into the lungs every 4 hours as needed for Wheezing or Shortness of Breath 1 Inhaler 11    BASAGLAR KWIKPEN 100 UNIT/ML injection pen Inject 22 Units into the skin nightly Dx Code E 11.9 (Patient taking differently: Inject 24 Units into the skin nightly Dx Code E 11.9) 15 mL 2    Lancets MISC Testing 2-3 times daily DX Code: E11.9 100 each 3    thiamine 100 MG tablet Take 1 tablet by mouth daily 30 tablet 3    Blood Glucose Monitoring Suppl (TRUE METRIX AIR GLUCOSE METER) DENIS 1 meter 1 Device 0    insulin aspart (NOVOLOG) 100 UNIT/ML injection vial Inject 10 Units into the skin 3 times daily (before meals) +SSI       glucagon 1 MG injection Inject 1 kit into the skin as needed      amitriptyline (ELAVIL) 25 MG tablet Take 1 tablet by mouth nightly 28 tablet 0    metoclopramide (REGLAN) 10 MG tablet Take 1 tablet by mouth 4 times daily as needed (Nausea and/or headache) 30 tablet 0    SUMAtriptan (IMITREX) 100 MG tablet Take 1 tablet by mouth once as needed for Migraine 9 tablet 0    melatonin 1 MG tablet Take 1 mg by mouth daily as needed for Sleep      ibuprofen (ADVIL;MOTRIN) 200 MG tablet Take 200 mg by mouth nightly as

## 2018-07-16 ENCOUNTER — OFFICE VISIT (OUTPATIENT)
Dept: PAIN MANAGEMENT | Age: 68
End: 2018-07-16

## 2018-07-16 VITALS
HEART RATE: 96 BPM | OXYGEN SATURATION: 96 % | DIASTOLIC BLOOD PRESSURE: 74 MMHG | SYSTOLIC BLOOD PRESSURE: 125 MMHG | BODY MASS INDEX: 22.41 KG/M2 | WEIGHT: 189 LBS

## 2018-07-16 DIAGNOSIS — F41.9 ANXIETY: ICD-10-CM

## 2018-07-16 DIAGNOSIS — F51.01 PRIMARY INSOMNIA: ICD-10-CM

## 2018-07-16 DIAGNOSIS — M16.0 PRIMARY OSTEOARTHRITIS OF BOTH HIPS: ICD-10-CM

## 2018-07-16 DIAGNOSIS — M47.812 SPONDYLOSIS OF CERVICAL REGION WITHOUT MYELOPATHY OR RADICULOPATHY: ICD-10-CM

## 2018-07-16 DIAGNOSIS — G89.4 CHRONIC PAIN SYNDROME: Primary | Chronic | ICD-10-CM

## 2018-07-16 DIAGNOSIS — M51.36 LUMBAR DEGENERATIVE DISC DISEASE: Chronic | ICD-10-CM

## 2018-07-16 DIAGNOSIS — M51.26 PROTRUSION OF LUMBAR INTERVERTEBRAL DISC: ICD-10-CM

## 2018-07-16 DIAGNOSIS — G43.109 MIGRAINE WITH AURA AND WITHOUT STATUS MIGRAINOSUS, NOT INTRACTABLE: ICD-10-CM

## 2018-07-16 DIAGNOSIS — G60.9 IDIOPATHIC PERIPHERAL NEUROPATHY: ICD-10-CM

## 2018-07-16 DIAGNOSIS — M50.30 DDD (DEGENERATIVE DISC DISEASE), CERVICAL: ICD-10-CM

## 2018-07-16 DIAGNOSIS — M17.0 PRIMARY OSTEOARTHRITIS OF BOTH KNEES: ICD-10-CM

## 2018-07-16 PROCEDURE — 99213 OFFICE O/P EST LOW 20 MIN: CPT | Performed by: NURSE PRACTITIONER

## 2018-07-16 RX ORDER — HYDROCODONE BITARTRATE AND ACETAMINOPHEN 7.5; 325 MG/1; MG/1
1 TABLET ORAL EVERY 6 HOURS PRN
Qty: 112 TABLET | Refills: 0 | Status: SHIPPED | OUTPATIENT
Start: 2018-07-16 | End: 2018-08-20

## 2018-07-16 NOTE — PATIENT INSTRUCTIONS
[email or web address] and sign in to your [employer] account. Enter Y037 in the [employer] box to learn more about \"Back Stretches: Exercises. \"     If you do not have an account, please click on the \"Sign Up Now\" link. Current as of: November 29, 2017  Content Version: 11.6  © 1410-3742 [email or web address], [employer]. Care instructions adapted under license by [facility] ([facility]). If you have questions about a medical condition or this instruction, always ask your healthcare professional. [address] any warranty or liability for your use of this information.

## 2018-07-16 NOTE — PROGRESS NOTES
lower back and legs. ALLERGIES: Patients list of allergies were reviewed     MEDICATIONS: Mr. Santillan [facility] list of medications were reviewed. His current medications are   Outpatient Medications Prior to Visit   Medication Sig Dispense Refill    Suvorexant (BELSOMRA) 10 MG TABS Take 10 mg by mouth nightly for 10 days. . 10 tablet 0    Suvorexant (BELSOMRA) 15 MG TABS Take 15 mg by mouth nightly for 10 days. . 10 tablet 0    Suvorexant (BELSOMRA) 20 MG TABS Take 1 tablet by mouth nightly for 10 days. . 10 tablet 0    vitamin B-12 (CYANOCOBALAMIN) 1000 MCG tablet TAKE 1 TABLET BY MOUTH DAILY 30 tablet 3    lisinopril (PRINIVIL;ZESTRIL) 30 MG tablet TAKE 1 TABLET BY MOUTH DAILY 90 tablet 0    folic acid (FOLVITE) 1 MG tablet TAKE 1 TABLET BY MOUTH DAILY 30 tablet 3    busPIRone (BUSPAR) 5 MG tablet Take 2 tablets by mouth daily 60 tablet 0    tamsulosin (FLOMAX) 0.4 MG capsule TAKE TWO CAPSULES BY MOUTH EVERY DAY 60 capsule 0    omeprazole (PRILOSEC) 20 MG delayed release capsule TAKE 1 CAPSULE BY MOUTH 2 TIMES A DAY 60 capsule 2    hydrALAZINE (APRESOLINE) 50 MG tablet Take 1 tablet by mouth 3 times daily 270 tablet 0    omeprazole (PRILOSEC) 20 MG delayed release capsule Take 1 capsule by mouth 2 times daily TAKE ONE CAPSULE BY MOUTH DAILY 180 capsule 0    finasteride (PROSCAR) 5 MG tablet TAKE 1 TABLET BY MOUTH DAILY 90 tablet 0    tamsulosin (FLOMAX) 0.4 MG capsule TAKE 2 CAPSULES BY MOUTH DAILY 180 capsule 0    metoprolol tartrate (LOPRESSOR) 25 MG tablet Take 1 tablet by mouth 2 times daily 180 tablet 0    AGAMATRIX PRESTO TEST strip USE TO TEST BLOOD SUGAR 3 TIMES A  each 3    sertraline (ZOLOFT) 25 MG tablet TAKE 1 TABLET BY MOUTH ONCE A DAY 90 tablet 0    metoclopramide (REGLAN) 10 MG tablet Take 1 tablet by mouth 4 times daily as needed (Nausea and/or headache) 30 tablet 0    Tens Unit MISC by Does not apply route Use as directed.  1 each 0    melatonin 1 MG tablet Take 1 mg by mouth daily as needed for Sleep      vitamin E 1000 units capsule Take 1,000 Units by mouth daily      ibuprofen (ADVIL;MOTRIN) 200 MG tablet Take 200 mg by mouth nightly as needed for Pain      B Complex Vitamins (VITAMIN B-COMPLEX) TABS Take 1,100 mg by mouth daily      umeclidinium-vilanterol (ANORO ELLIPTA) 62.5-25 MCG/INH AEPB inhaler Inhale 1 puff into the lungs daily 1 each 11    albuterol sulfate HFA (PROAIR HFA) 108 (90 Base) MCG/ACT inhaler Inhale 2 puffs into the lungs every 4 hours as needed for Wheezing or Shortness of Breath 1 Inhaler 11    BASAGLAR KWIKPEN 100 UNIT/ML injection pen Inject 22 Units into the skin nightly Dx Code E 11.9 (Patient taking differently: Inject 24 Units into the skin nightly Dx Code E 11.9) 15 mL 2    Lancets MISC Testing 2-3 times daily DX Code: E11.9 100 each 3    thiamine 100 MG tablet Take 1 tablet by mouth daily 30 tablet 3    Blood Glucose Monitoring Suppl (TRUE METRIX AIR GLUCOSE METER) DENIS 1 meter 1 Device 0    insulin aspart (NOVOLOG) 100 UNIT/ML injection vial Inject 10 Units into the skin 3 times daily (before meals) +SSI       glucagon 1 MG injection Inject 1 kit into the skin as needed      amitriptyline (ELAVIL) 25 MG tablet Take 1 tablet by mouth nightly 28 tablet 0    HYDROcodone-acetaminophen (NORCO) 7.5-325 MG per tablet Take 1 tablet by mouth every 6 hours as needed for Pain for up to 28 days. . 112 tablet 0    SUMAtriptan (IMITREX) 100 MG tablet Take 1 tablet by mouth once as needed for Migraine 9 tablet 0     No facility-administered medications prior to visit. SOCIAL/FAMILY/PAST MEDICAL HISTORY: Mr. Bambi Busch, family and past medical history was reviewed. REVIEW OF SYSTEMS:    Respiratory: Negative for apnea, chest tightness and shortness of breath or change in baseline breathing. Gastrointestinal: Negative for nausea, vomiting, abdominal pain, diarrhea, constipation, blood in stool and abdominal distention.        PHYSICAL EXAM: Nursing note and vitals reviewed. /74   Pulse 96   Wt 189 lb (85.7 kg)   SpO2 96%   BMI 22.41 kg/m²   Constitutional: He appears well-developed and well-nourished. No acute distress. Skin: Skin is warm and dry, good turgor. No rash noted. He is not diaphoretic. Cardiovascular: Normal rate, regular rhythm, normal heart sounds, and does not have murmur. Pulmonary/Chest: Effort normal. No respiratory distress. He does not have wheezes in the lung fields. He has no rales. Neurological/Psychiatric:He is alert and oriented to person, place, and time. Coordination is  normal. His mood isAppropriate and affect is Neutral/Euthymic(normal) . IMPRESSION:   1. Chronic pain syndrome    2. DDD (degenerative disc disease), cervical    3. Spondylosis of cervical region without myelopathy or radiculopathy    4. Lumbar degenerative disc disease    5. Protrusion of lumbar intervertebral disc    6. Primary osteoarthritis of both knees    7. Primary osteoarthritis of both hips    8. Idiopathic peripheral neuropathy    9. Migraine with aura and without status migrainosus, not intractable    10. Anxiety    11. Primary insomnia        PLAN:  Informed verbal consent was obtained  -Continue with Jo-Ann Tapia exercises  -Continue to follow-up with PCP for insomnia  -CBT techniques- relaxation therapies such as biofeedback, mindfulness based stress reduction, imagery, cognitive restructuring, problem solving discussed with patient  -Last UDS consistent  -Return in about 4 weeks (around 8/13/2018). Current Outpatient Prescriptions   Medication Sig Dispense Refill    HYDROcodone-acetaminophen (NORCO) 7.5-325 MG per tablet Take 1 tablet by mouth every 6 hours as needed for Pain for up to 28 days. . 112 tablet 0    Suvorexant (BELSOMRA) 10 MG TABS Take 10 mg by mouth nightly for 10 days. . 10 tablet 0    Suvorexant (BELSOMRA) 15 MG TABS Take 15 mg by mouth nightly for 10 days. . 10 tablet 0    Suvorexant (BELSOMRA) 20 MG TABS Take 1 tablet by mouth nightly for 10 days. . 10 tablet 0    vitamin B-12 (CYANOCOBALAMIN) 1000 MCG tablet TAKE 1 TABLET BY MOUTH DAILY 30 tablet 3    lisinopril (PRINIVIL;ZESTRIL) 30 MG tablet TAKE 1 TABLET BY MOUTH DAILY 90 tablet 0    folic acid (FOLVITE) 1 MG tablet TAKE 1 TABLET BY MOUTH DAILY 30 tablet 3    busPIRone (BUSPAR) 5 MG tablet Take 2 tablets by mouth daily 60 tablet 0    tamsulosin (FLOMAX) 0.4 MG capsule TAKE TWO CAPSULES BY MOUTH EVERY DAY 60 capsule 0    omeprazole (PRILOSEC) 20 MG delayed release capsule TAKE 1 CAPSULE BY MOUTH 2 TIMES A DAY 60 capsule 2    hydrALAZINE (APRESOLINE) 50 MG tablet Take 1 tablet by mouth 3 times daily 270 tablet 0    omeprazole (PRILOSEC) 20 MG delayed release capsule Take 1 capsule by mouth 2 times daily TAKE ONE CAPSULE BY MOUTH DAILY 180 capsule 0    finasteride (PROSCAR) 5 MG tablet TAKE 1 TABLET BY MOUTH DAILY 90 tablet 0    tamsulosin (FLOMAX) 0.4 MG capsule TAKE 2 CAPSULES BY MOUTH DAILY 180 capsule 0    metoprolol tartrate (LOPRESSOR) 25 MG tablet Take 1 tablet by mouth 2 times daily 180 tablet 0    AGAMATRIX PRESTO TEST strip USE TO TEST BLOOD SUGAR 3 TIMES A  each 3    sertraline (ZOLOFT) 25 MG tablet TAKE 1 TABLET BY MOUTH ONCE A DAY 90 tablet 0    metoclopramide (REGLAN) 10 MG tablet Take 1 tablet by mouth 4 times daily as needed (Nausea and/or headache) 30 tablet 0    Tens Unit MISC by Does not apply route Use as directed.  1 each 0    melatonin 1 MG tablet Take 1 mg by mouth daily as needed for Sleep      vitamin E 1000 units capsule Take 1,000 Units by mouth daily      ibuprofen (ADVIL;MOTRIN) 200 MG tablet Take 200 mg by mouth nightly as needed for Pain      B Complex Vitamins (VITAMIN B-COMPLEX) TABS Take 1,100 mg by mouth daily      umeclidinium-vilanterol (ANORO ELLIPTA) 62.5-25 MCG/INH AEPB inhaler Inhale 1 puff into the lungs daily 1 each 11    albuterol sulfate HFA (PROAIR HFA) 108 (90 Base) MCG/ACT inhaler Inhale 2 puffs into the lungs every 4 hours as needed for Wheezing or Shortness of Breath 1 Inhaler 11    BASAGLAR KWIKPEN 100 UNIT/ML injection pen Inject 22 Units into the skin nightly Dx Code E 11.9 (Patient taking differently: Inject 24 Units into the skin nightly Dx Code E 11.9) 15 mL 2    Lancets MISC Testing 2-3 times daily DX Code: E11.9 100 each 3    thiamine 100 MG tablet Take 1 tablet by mouth daily 30 tablet 3    Blood Glucose Monitoring Suppl (TRUE METRIX AIR GLUCOSE METER) DENIS 1 meter 1 Device 0    insulin aspart (NOVOLOG) 100 UNIT/ML injection vial Inject 10 Units into the skin 3 times daily (before meals) +SSI       glucagon 1 MG injection Inject 1 kit into the skin as needed      SUMAtriptan (IMITREX) 100 MG tablet Take 1 tablet by mouth once as needed for Migraine 9 tablet 0     No current facility-administered medications for this visit. I will continue his current medication regimen  which is part of the above treatment schedule. It has been helping with Mr. Hi's chronic  medical problems which for this visit include: The primary encounter diagnosis was Chronic pain syndrome. Diagnoses of DDD (degenerative disc disease), cervical, Spondylosis of cervical region without myelopathy or radiculopathy, Lumbar degenerative disc disease, Protrusion of lumbar intervertebral disc, Primary osteoarthritis of both knees, Primary osteoarthritis of both hips, Idiopathic peripheral neuropathy, Migraine with aura and without status migrainosus, not intractable, Anxiety, and Primary insomnia were also pertinent to this visit. Risks and benefits of the medications and other alternative treatments  including no treatment were discussed with the patient. The common side effects of these medications were also explained to the patient. Informed verbal consent was obtained.    Goals of current treatment regimen include improvement in pain, restoration of functioning- with focus on

## 2018-07-23 ENCOUNTER — TELEPHONE (OUTPATIENT)
Dept: PAIN MANAGEMENT | Age: 68
End: 2018-07-23

## 2018-07-23 ENCOUNTER — TELEPHONE (OUTPATIENT)
Dept: INTERNAL MEDICINE | Age: 68
End: 2018-07-23

## 2018-07-23 DIAGNOSIS — F41.9 ANXIETY DISORDER, UNSPECIFIED TYPE: Chronic | ICD-10-CM

## 2018-07-23 DIAGNOSIS — G89.4 CHRONIC PAIN SYNDROME: Primary | Chronic | ICD-10-CM

## 2018-07-23 RX ORDER — HYDROXYZINE PAMOATE 25 MG/1
25 CAPSULE ORAL DAILY PRN
Qty: 30 CAPSULE | Refills: 0 | Status: SHIPPED | OUTPATIENT
Start: 2018-07-23 | End: 2018-08-27 | Stop reason: SDUPTHER

## 2018-07-23 NOTE — TELEPHONE ENCOUNTER
Per PKA pt can not have diazepam. He can start vistaril 25 mg daily  PRN  Medication has been ordered

## 2018-07-30 ENCOUNTER — TELEPHONE (OUTPATIENT)
Dept: INTERNAL MEDICINE | Age: 68
End: 2018-07-30

## 2018-07-30 DIAGNOSIS — G47.30 SLEEP DISORDER BREATHING: Primary | ICD-10-CM

## 2018-08-01 ENCOUNTER — OFFICE VISIT (OUTPATIENT)
Dept: ENDOCRINOLOGY | Age: 68
End: 2018-08-01

## 2018-08-01 VITALS
WEIGHT: 191 LBS | DIASTOLIC BLOOD PRESSURE: 70 MMHG | OXYGEN SATURATION: 97 % | BODY MASS INDEX: 22.55 KG/M2 | HEIGHT: 77 IN | RESPIRATION RATE: 16 BRPM | SYSTOLIC BLOOD PRESSURE: 113 MMHG | HEART RATE: 80 BPM

## 2018-08-01 DIAGNOSIS — Z79.4 TYPE 2 DIABETES MELLITUS WITH KETOACIDOSIS WITHOUT COMA, WITH LONG-TERM CURRENT USE OF INSULIN (HCC): Primary | ICD-10-CM

## 2018-08-01 DIAGNOSIS — E11.10 TYPE 2 DIABETES MELLITUS WITH KETOACIDOSIS WITHOUT COMA, WITH LONG-TERM CURRENT USE OF INSULIN (HCC): Primary | ICD-10-CM

## 2018-08-01 LAB — HBA1C MFR BLD: 7 %

## 2018-08-01 PROCEDURE — 83036 HEMOGLOBIN GLYCOSYLATED A1C: CPT | Performed by: INTERNAL MEDICINE

## 2018-08-01 PROCEDURE — 99213 OFFICE O/P EST LOW 20 MIN: CPT | Performed by: INTERNAL MEDICINE

## 2018-08-01 RX ORDER — FLASH GLUCOSE SENSOR
KIT MISCELLANEOUS
Qty: 3 EACH | Refills: 2 | Status: ON HOLD | OUTPATIENT
Start: 2018-08-01 | End: 2021-10-07 | Stop reason: SDUPTHER

## 2018-08-01 RX ORDER — FLASH GLUCOSE SENSOR
KIT MISCELLANEOUS
Qty: 1 DEVICE | Refills: 0 | Status: ON HOLD | OUTPATIENT
Start: 2018-08-01 | End: 2022-02-17

## 2018-08-01 NOTE — PROGRESS NOTES
by mouth daily      ibuprofen (ADVIL;MOTRIN) 200 MG tablet Take 200 mg by mouth nightly as needed for Pain      B Complex Vitamins (VITAMIN B-COMPLEX) TABS Take 1,100 mg by mouth daily      umeclidinium-vilanterol (ANORO ELLIPTA) 62.5-25 MCG/INH AEPB inhaler Inhale 1 puff into the lungs daily 1 each 11    albuterol sulfate HFA (PROAIR HFA) 108 (90 Base) MCG/ACT inhaler Inhale 2 puffs into the lungs every 4 hours as needed for Wheezing or Shortness of Breath 1 Inhaler 11    BASAGLAR KWIKPEN 100 UNIT/ML injection pen Inject 22 Units into the skin nightly Dx Code E 11.9 (Patient taking differently: Inject 35 Units into the skin nightly Dx Code E 11.9) 15 mL 2    Lancets MISC Testing 2-3 times daily DX Code: E11.9 100 each 3    thiamine 100 MG tablet Take 1 tablet by mouth daily 30 tablet 3    Blood Glucose Monitoring Suppl (TRUE METRIX AIR GLUCOSE METER) DENIS 1 meter 1 Device 0    insulin aspart (NOVOLOG) 100 UNIT/ML injection vial Inject 10 Units into the skin 3 times daily (before meals) +SSI       glucagon 1 MG injection Inject 1 kit into the skin as needed           Vitals:    08/01/18 1023   BP: 113/70   Site: Left Arm   Position: Sitting   Cuff Size: Medium Adult   Pulse: 80   Resp: 16   SpO2: 97%   Weight: 191 lb (86.6 kg)   Height: 6' 5\" (1.956 m)     Body mass index is 22.65 kg/m².      Wt Readings from Last 3 Encounters:   08/01/18 191 lb (86.6 kg)   07/16/18 189 lb (85.7 kg)   07/13/18 189 lb (85.7 kg)     BP Readings from Last 3 Encounters:   08/01/18 113/70   07/16/18 125/74   07/13/18 100/60        Past Medical History:   Diagnosis Date    Anxiety disorder     BPH (benign prostatic hypertrophy)     Calcified granuloma of lung ([facility] [address] 2014    2:stable per 3/25/14 CT chest    Cataract 1/20/14    OU:Dr. mcclain:[facility]    Chronic pain     COPD (chronic obstructive pulmonary disease) ([facility] [address]     Under care of pulmo(Dr. Calhoun)    Degenerative arthritis of hip     Depression 3/11/2015    Depression with

## 2018-08-01 NOTE — PATIENT INSTRUCTIONS
Lab Results   Component Value Date    LABA1C 7.0 08/01/2018     Lab Results   Component Value Date    .1 06/28/2017

## 2018-08-06 ENCOUNTER — TELEPHONE (OUTPATIENT)
Dept: PAIN MANAGEMENT | Age: 68
End: 2018-08-06

## 2018-08-09 ENCOUNTER — APPOINTMENT (OUTPATIENT)
Dept: GENERAL RADIOLOGY | Age: 68
DRG: 563 | End: 2018-08-09
Payer: COMMERCIAL

## 2018-08-09 ENCOUNTER — APPOINTMENT (OUTPATIENT)
Dept: CT IMAGING | Age: 68
DRG: 563 | End: 2018-08-09
Payer: COMMERCIAL

## 2018-08-09 ENCOUNTER — HOSPITAL ENCOUNTER (INPATIENT)
Age: 68
LOS: 3 days | Discharge: HOME HEALTH | DRG: 563 | End: 2018-08-13
Attending: EMERGENCY MEDICINE | Admitting: HOSPITALIST
Payer: COMMERCIAL
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
## 2018-08-09 DIAGNOSIS — S81.812A NONINFECTED SKIN TEAR OF LEFT LOWER EXTREMITY, INITIAL ENCOUNTER: ICD-10-CM

## 2018-08-09 DIAGNOSIS — S09.90XA CLOSED HEAD INJURY, INITIAL ENCOUNTER: ICD-10-CM

## 2018-08-09 DIAGNOSIS — S82.65XA CLOSED NONDISPLACED FRACTURE OF LATERAL MALLEOLUS OF LEFT FIBULA, INITIAL ENCOUNTER: ICD-10-CM
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
## 2018-08-09 DIAGNOSIS — E11.621 DIABETIC ULCER OF RIGHT FOOT ASSOCIATED WITH TYPE 2 DIABETES MELLITUS, UNSPECIFIED PART OF FOOT, UNSPECIFIED ULCER STAGE (HCC): ICD-10-CM

## 2018-08-09 DIAGNOSIS — L97.519 DIABETIC ULCER OF RIGHT FOOT ASSOCIATED WITH TYPE 2 DIABETES MELLITUS, UNSPECIFIED PART OF FOOT, UNSPECIFIED ULCER STAGE (HCC): ICD-10-CM

## 2018-08-09 DIAGNOSIS — E11.10 DIABETIC KETOACIDOSIS WITHOUT COMA ASSOCIATED WITH TYPE 2 DIABETES MELLITUS (HCC): Primary | ICD-10-CM

## 2018-08-09 PROCEDURE — 73590 X-RAY EXAM OF LOWER LEG: CPT

## 2018-08-09 PROCEDURE — 99285 EMERGENCY DEPT VISIT HI MDM: CPT

## 2018-08-09 PROCEDURE — 70450 CT HEAD/BRAIN W/O DYE: CPT

## 2018-08-09 PROCEDURE — 72125 CT NECK SPINE W/O DYE: CPT

## 2018-08-09 PROCEDURE — 6370000000 HC RX 637 (ALT 250 FOR IP): Performed by: NURSE PRACTITIONER

## 2018-08-09 PROCEDURE — 73610 X-RAY EXAM OF ANKLE: CPT

## 2018-08-09 PROCEDURE — 96372 THER/PROPH/DIAG INJ SC/IM: CPT

## 2018-08-09 PROCEDURE — 73630 X-RAY EXAM OF FOOT: CPT

## 2018-08-09 PROCEDURE — 6360000002 HC RX W HCPCS: Performed by: NURSE PRACTITIONER

## 2018-08-09 RX ORDER — ORPHENADRINE CITRATE 30 MG/ML
60 INJECTION INTRAMUSCULAR; INTRAVENOUS ONCE
Status: COMPLETED | OUTPATIENT
Start: 2018-08-09 | End: 2018-08-09

## 2018-08-09 RX ORDER — HYDROCODONE BITARTRATE AND ACETAMINOPHEN 5; 325 MG/1; MG/1
1 TABLET ORAL ONCE
Status: COMPLETED | OUTPATIENT
Start: 2018-08-09 | End: 2018-08-09

## 2018-08-09 RX ADMIN — ORPHENADRINE CITRATE 60 MG: 30 INJECTION INTRAMUSCULAR; INTRAVENOUS at 22:50

## 2018-08-09 RX ADMIN — HYDROCODONE BITARTRATE AND ACETAMINOPHEN 1 TABLET: 5; 325 TABLET ORAL at 22:11

## 2018-08-09 ASSESSMENT — PAIN SCALES - GENERAL
PAINLEVEL_OUTOF10: 9

## 2018-08-09 ASSESSMENT — PAIN DESCRIPTION - LOCATION
LOCATION: FOOT;LEG
LOCATION: LEG
LOCATION: LEG;FOOT

## 2018-08-09 ASSESSMENT — PAIN DESCRIPTION - ORIENTATION
ORIENTATION: LEFT;LOWER
ORIENTATION: LEFT
ORIENTATION: LEFT

## 2018-08-10 PROBLEM — M17.0 PRIMARY OSTEOARTHRITIS OF BOTH KNEES: Status: RESOLVED | Noted: 2018-07-16 | Resolved: 2018-08-10

## 2018-08-10 PROBLEM — F51.01 PRIMARY INSOMNIA: Chronic | Status: ACTIVE | Noted: 2018-07-16

## 2018-08-10 PROBLEM — Z79.4 TYPE 2 DIABETES MELLITUS WITH KETOACIDOSIS WITHOUT COMA, WITH LONG-TERM CURRENT USE OF INSULIN (HCC): Chronic | Status: ACTIVE | Noted: 2018-02-24

## 2018-08-10 PROBLEM — F41.9 ANXIETY: Status: RESOLVED | Noted: 2018-07-16 | Resolved: 2018-08-10

## 2018-08-10 PROBLEM — G43.109 MIGRAINE WITH AURA AND WITHOUT STATUS MIGRAINOSUS, NOT INTRACTABLE: Status: RESOLVED | Noted: 2017-07-26 | Resolved: 2018-08-10

## 2018-08-10 PROBLEM — F17.200 TOBACCO SMOKER WITHIN LAST 12 MONTHS: Chronic | Status: ACTIVE | Noted: 2018-08-10

## 2018-08-10 PROBLEM — M51.26 PROTRUSION OF LUMBAR INTERVERTEBRAL DISC: Status: RESOLVED | Noted: 2018-03-29 | Resolved: 2018-08-10

## 2018-08-10 PROBLEM — S82.892A CLOSED LEFT ANKLE FRACTURE: Status: ACTIVE | Noted: 2018-08-10
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
## 2018-08-10 PROBLEM — S81.812A NONINFECTED SKIN TEAR OF LEFT LEG: Status: ACTIVE | Noted: 2018-08-10

## 2018-08-10 PROBLEM — E87.20 ACIDEMIA: Status: ACTIVE | Noted: 2018-08-10

## 2018-08-10 PROBLEM — R74.01 TRANSAMINASEMIA: Chronic | Status: ACTIVE | Noted: 2018-08-10

## 2018-08-10 PROBLEM — E87.8 HYPOCHLOREMIA: Status: ACTIVE | Noted: 2018-08-10

## 2018-08-10 PROBLEM — E11.10 TYPE 2 DIABETES MELLITUS WITH KETOACIDOSIS WITHOUT COMA, WITH LONG-TERM CURRENT USE OF INSULIN (HCC): Chronic | Status: ACTIVE | Noted: 2018-02-24

## 2018-08-10 PROBLEM — M47.812 SPONDYLOSIS OF CERVICAL REGION WITHOUT MYELOPATHY OR RADICULOPATHY: Status: RESOLVED | Noted: 2018-03-29 | Resolved: 2018-08-10
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
## 2018-08-10 PROBLEM — M16.0 PRIMARY OSTEOARTHRITIS OF BOTH HIPS: Status: RESOLVED | Noted: 2018-07-16 | Resolved: 2018-08-10

## 2018-08-10 PROBLEM — G60.9 IDIOPATHIC PERIPHERAL NEUROPATHY: Status: RESOLVED | Noted: 2018-07-16 | Resolved: 2018-08-10

## 2018-08-10 PROBLEM — E87.5 HYPERKALEMIA: Status: ACTIVE | Noted: 2018-08-10

## 2018-08-10 PROBLEM — K43.9 VENTRAL HERNIA: Chronic | Status: ACTIVE | Noted: 2018-08-10

## 2018-08-10 PROBLEM — S09.90XA HEAD TRAUMA: Status: ACTIVE | Noted: 2018-08-10

## 2018-08-10 PROBLEM — N17.9 ARF (ACUTE RENAL FAILURE) (HCC): Status: RESOLVED | Noted: 2018-02-24 | Resolved: 2018-08-10

## 2018-08-10 PROBLEM — M50.30 DDD (DEGENERATIVE DISC DISEASE), CERVICAL: Status: RESOLVED | Noted: 2018-03-29 | Resolved: 2018-08-10

## 2018-08-10 PROBLEM — S91.209A TOENAIL AVULSION, INITIAL ENCOUNTER: Status: ACTIVE | Noted: 2018-08-10

## 2018-08-10 LAB
A/G RATIO: 1.1 (ref 1.1–2.2)
ALBUMIN SERPL-MCNC: 3.8 G/DL (ref 3.4–5)
ALP BLD-CCNC: 68 U/L (ref 40–129)
ALT SERPL-CCNC: 72 U/L (ref 10–40)
AMPHETAMINE SCREEN, URINE: ABNORMAL
ANION GAP SERPL CALCULATED.3IONS-SCNC: 14 MMOL/L (ref 3–16)
AST SERPL-CCNC: 80 U/L (ref 15–37)
BARBITURATE SCREEN URINE: ABNORMAL
BASE EXCESS VENOUS: -6.5 MMOL/L (ref -3–3)
BENZODIAZEPINE SCREEN, URINE: ABNORMAL
BILIRUB SERPL-MCNC: 0.3 MG/DL (ref 0–1)
BILIRUBIN URINE: NEGATIVE
BLOOD, URINE: ABNORMAL
BUN BLDV-MCNC: 19 MG/DL (ref 7–20)
CALCIUM SERPL-MCNC: 8.5 MG/DL (ref 8.3–10.6)
CANNABINOID SCREEN URINE: ABNORMAL
CARBOXYHEMOGLOBIN: 2.3 % (ref 0–1.5)
CHLORIDE BLD-SCNC: 87 MMOL/L (ref 99–110)
CHOLESTEROL, TOTAL: 128 MG/DL (ref 0–199)
CLARITY: CLEAR
CO2: 21 MMOL/L (ref 21–32)
COCAINE METABOLITE SCREEN URINE: ABNORMAL
COLOR: ABNORMAL
CORTISOL - AM: 3 UG/DL (ref 4.3–22.4)
CREAT SERPL-MCNC: 0.8 MG/DL (ref 0.8–1.3)
ESTIMATED AVERAGE GLUCOSE: 157.1 MG/DL
ETHANOL: 145 MG/DL (ref 0–0.08)
GFR AFRICAN AMERICAN: >60
GFR NON-AFRICAN AMERICAN: >60
GLOBULIN: 3.5 G/DL
GLUCOSE BLD-MCNC: 228 MG/DL (ref 70–99)
GLUCOSE BLD-MCNC: 305 MG/DL (ref 70–99)
GLUCOSE BLD-MCNC: 31 MG/DL (ref 70–99)
GLUCOSE BLD-MCNC: 314 MG/DL (ref 70–99)
GLUCOSE BLD-MCNC: 317 MG/DL (ref 70–99)
GLUCOSE BLD-MCNC: 321 MG/DL (ref 70–99)
GLUCOSE BLD-MCNC: 33 MG/DL (ref 70–99)
GLUCOSE BLD-MCNC: 407 MG/DL
GLUCOSE BLD-MCNC: 407 MG/DL (ref 70–99)
GLUCOSE BLD-MCNC: 578 MG/DL (ref 70–99)
GLUCOSE BLD-MCNC: 593 MG/DL (ref 70–99)
GLUCOSE BLD-MCNC: 93 MG/DL (ref 70–99)
GLUCOSE URINE: >=1000 MG/DL
HBA1C MFR BLD: 7.1 %
HCO3 VENOUS: 20.2 MMOL/L (ref 23–29)
HCT VFR BLD CALC: 34.6 % (ref 40.5–52.5)
HDLC SERPL-MCNC: 40 MG/DL (ref 40–60)
HEMOGLOBIN: 11.6 G/DL (ref 13.5–17.5)
INR BLD: 0.95 (ref 0.86–1.14)
KETONES, URINE: NEGATIVE MG/DL
LACTIC ACID: 1 MMOL/L (ref 0.4–2)
LDL CHOLESTEROL CALCULATED: 67 MG/DL
LEUKOCYTE ESTERASE, URINE: NEGATIVE
LIPASE: 39 U/L (ref 13–60)
Lab: ABNORMAL
MAGNESIUM: 2.1 MG/DL (ref 1.8–2.4)
MCH RBC QN AUTO: 32.1 PG (ref 26–34)
MCHC RBC AUTO-ENTMCNC: 33.5 G/DL (ref 31–36)
MCV RBC AUTO: 95.8 FL (ref 80–100)
METHADONE SCREEN, URINE: ABNORMAL
METHEMOGLOBIN VENOUS: 0.4 %
MICROSCOPIC EXAMINATION: YES
NITRITE, URINE: NEGATIVE
O2 CONTENT, VEN: 12 VOL %
O2 SAT, VEN: 74 %
O2 THERAPY: ABNORMAL
OPIATE SCREEN URINE: ABNORMAL
OXYCODONE URINE: POSITIVE
PCO2, VEN: 44.9 MMHG (ref 40–50)
PDW BLD-RTO: 13.2 % (ref 12.4–15.4)
PERFORMED ON: ABNORMAL
PERFORMED ON: NORMAL
PH UA: 6
PH UA: 6
PH VENOUS: 7.27 (ref 7.35–7.45)
PHENCYCLIDINE SCREEN URINE: ABNORMAL
PLATELET # BLD: 218 K/UL (ref 135–450)
PMV BLD AUTO: 7.7 FL (ref 5–10.5)
PO2, VEN: 46.3 MMHG (ref 25–40)
POTASSIUM SERPL-SCNC: 5.3 MMOL/L (ref 3.5–5.1)
PROPOXYPHENE SCREEN: ABNORMAL
PROTEIN UA: NEGATIVE MG/DL
PROTHROMBIN TIME: 10.8 SEC (ref 9.8–13)
RBC # BLD: 3.61 M/UL (ref 4.2–5.9)
RBC UA: NORMAL /HPF (ref 0–2)
SODIUM BLD-SCNC: 122 MMOL/L (ref 136–145)
SPECIFIC GRAVITY UA: <=1.005
TCO2 CALC VENOUS: 22 MMOL/L
TOTAL CK: 242 U/L (ref 39–308)
TOTAL PROTEIN: 7.3 G/DL (ref 6.4–8.2)
TRIGL SERPL-MCNC: 107 MG/DL (ref 0–150)
TROPONIN: <0.01 NG/ML
TROPONIN: <0.01 NG/ML
TSH REFLEX: 2.7 UIU/ML (ref 0.27–4.2)
URINE TYPE: ABNORMAL
UROBILINOGEN, URINE: 0.2 E.U./DL
VITAMIN B-12: 931 PG/ML (ref 211–911)
VITAMIN D 25-HYDROXY: 19.3 NG/ML
VLDLC SERPL CALC-MCNC: 21 MG/DL
WBC # BLD: 7.5 K/UL (ref 4–11)
WBC UA: NORMAL /HPF (ref 0–5)

## 2018-08-10 PROCEDURE — G8988 SELF CARE GOAL STATUS: HCPCS

## 2018-08-10 PROCEDURE — G8979 MOBILITY GOAL STATUS: HCPCS

## 2018-08-10 PROCEDURE — 83690 ASSAY OF LIPASE: CPT

## 2018-08-10 PROCEDURE — 82607 VITAMIN B-12: CPT

## 2018-08-10 PROCEDURE — 97530 THERAPEUTIC ACTIVITIES: CPT

## 2018-08-10 PROCEDURE — G0378 HOSPITAL OBSERVATION PER HR: HCPCS

## 2018-08-10 PROCEDURE — 6370000000 HC RX 637 (ALT 250 FOR IP): Performed by: NURSE PRACTITIONER

## 2018-08-10 PROCEDURE — 6370000000 HC RX 637 (ALT 250 FOR IP): Performed by: INTERNAL MEDICINE

## 2018-08-10 PROCEDURE — 97535 SELF CARE MNGMENT TRAINING: CPT

## 2018-08-10 PROCEDURE — 2060000000 HC ICU INTERMEDIATE R&B

## 2018-08-10 PROCEDURE — 82306 VITAMIN D 25 HYDROXY: CPT

## 2018-08-10 PROCEDURE — 85027 COMPLETE CBC AUTOMATED: CPT

## 2018-08-10 PROCEDURE — 96374 THER/PROPH/DIAG INJ IV PUSH: CPT

## 2018-08-10 PROCEDURE — 83735 ASSAY OF MAGNESIUM: CPT

## 2018-08-10 PROCEDURE — 80061 LIPID PANEL: CPT

## 2018-08-10 PROCEDURE — 82550 ASSAY OF CK (CPK): CPT

## 2018-08-10 PROCEDURE — 84443 ASSAY THYROID STIM HORMONE: CPT

## 2018-08-10 PROCEDURE — 82803 BLOOD GASES ANY COMBINATION: CPT

## 2018-08-10 PROCEDURE — 83605 ASSAY OF LACTIC ACID: CPT

## 2018-08-10 PROCEDURE — 96375 TX/PRO/DX INJ NEW DRUG ADDON: CPT

## 2018-08-10 PROCEDURE — 93005 ELECTROCARDIOGRAM TRACING: CPT | Performed by: NURSE PRACTITIONER

## 2018-08-10 PROCEDURE — 99222 1ST HOSP IP/OBS MODERATE 55: CPT | Performed by: SURGERY

## 2018-08-10 PROCEDURE — G8987 SELF CARE CURRENT STATUS: HCPCS

## 2018-08-10 PROCEDURE — 96376 TX/PRO/DX INJ SAME DRUG ADON: CPT

## 2018-08-10 PROCEDURE — 97161 PT EVAL LOW COMPLEX 20 MIN: CPT

## 2018-08-10 PROCEDURE — 93010 ELECTROCARDIOGRAM REPORT: CPT | Performed by: INTERNAL MEDICINE

## 2018-08-10 PROCEDURE — 84425 ASSAY OF VITAMIN B-1: CPT

## 2018-08-10 PROCEDURE — 96361 HYDRATE IV INFUSION ADD-ON: CPT

## 2018-08-10 PROCEDURE — G0480 DRUG TEST DEF 1-7 CLASSES: HCPCS

## 2018-08-10 PROCEDURE — 84484 ASSAY OF TROPONIN QUANT: CPT

## 2018-08-10 PROCEDURE — 2580000003 HC RX 258: Performed by: NURSE PRACTITIONER

## 2018-08-10 PROCEDURE — 2580000003 HC RX 258: Performed by: HOSPITALIST

## 2018-08-10 PROCEDURE — 2580000003 HC RX 258

## 2018-08-10 PROCEDURE — 82533 TOTAL CORTISOL: CPT

## 2018-08-10 PROCEDURE — 83036 HEMOGLOBIN GLYCOSYLATED A1C: CPT

## 2018-08-10 PROCEDURE — 94150 VITAL CAPACITY TEST: CPT

## 2018-08-10 PROCEDURE — 80307 DRUG TEST PRSMV CHEM ANLYZR: CPT

## 2018-08-10 PROCEDURE — 80053 COMPREHEN METABOLIC PANEL: CPT

## 2018-08-10 PROCEDURE — 94664 DEMO&/EVAL PT USE INHALER: CPT

## 2018-08-10 PROCEDURE — 36415 COLL VENOUS BLD VENIPUNCTURE: CPT

## 2018-08-10 PROCEDURE — 97166 OT EVAL MOD COMPLEX 45 MIN: CPT

## 2018-08-10 PROCEDURE — 81001 URINALYSIS AUTO W/SCOPE: CPT

## 2018-08-10 PROCEDURE — 6370000000 HC RX 637 (ALT 250 FOR IP): Performed by: HOSPITALIST

## 2018-08-10 PROCEDURE — G8978 MOBILITY CURRENT STATUS: HCPCS

## 2018-08-10 PROCEDURE — 94761 N-INVAS EAR/PLS OXIMETRY MLT: CPT

## 2018-08-10 PROCEDURE — 85610 PROTHROMBIN TIME: CPT

## 2018-08-10 PROCEDURE — 6360000002 HC RX W HCPCS: Performed by: HOSPITALIST

## 2018-08-10 RX ORDER — ACETAMINOPHEN 325 MG/1
650 TABLET ORAL EVERY 4 HOURS PRN
Status: DISCONTINUED | OUTPATIENT
Start: 2018-08-10 | End: 2018-08-13 | Stop reason: HOSPADM

## 2018-08-10 RX ORDER — SODIUM CHLORIDE 0.9 % (FLUSH) 0.9 %
10 SYRINGE (ML) INJECTION PRN
Status: DISCONTINUED | OUTPATIENT
Start: 2018-08-10 | End: 2018-08-13 | Stop reason: HOSPADM

## 2018-08-10 RX ORDER — SODIUM CHLORIDE 0.9 % (FLUSH) 0.9 %
SYRINGE (ML) INJECTION
Status: COMPLETED
Start: 2018-08-10 | End: 2018-08-10

## 2018-08-10 RX ORDER — DEXTROSE MONOHYDRATE 25 G/50ML
12.5 INJECTION, SOLUTION INTRAVENOUS PRN
Status: DISCONTINUED | OUTPATIENT
Start: 2018-08-10 | End: 2018-08-13 | Stop reason: HOSPADM

## 2018-08-10 RX ORDER — POTASSIUM CHLORIDE 7.45 MG/ML
10 INJECTION INTRAVENOUS PRN
Status: DISCONTINUED | OUTPATIENT
Start: 2018-08-10 | End: 2018-08-13 | Stop reason: HOSPADM

## 2018-08-10 RX ORDER — OXYCODONE HYDROCHLORIDE 5 MG/1
5 TABLET ORAL ONCE
Status: COMPLETED | OUTPATIENT
Start: 2018-08-10 | End: 2018-08-10

## 2018-08-10 RX ORDER — VITAMIN E 268 MG
800 CAPSULE ORAL DAILY
Status: DISCONTINUED | OUTPATIENT
Start: 2018-08-10 | End: 2018-08-13 | Stop reason: HOSPADM

## 2018-08-10 RX ORDER — IBUPROFEN 400 MG/1
200 TABLET ORAL EVERY 4 HOURS PRN
Status: DISCONTINUED | OUTPATIENT
Start: 2018-08-10 | End: 2018-08-13 | Stop reason: HOSPADM

## 2018-08-10 RX ORDER — HYDROCODONE BITARTRATE AND ACETAMINOPHEN 5; 325 MG/1; MG/1
2 TABLET ORAL EVERY 4 HOURS PRN
Status: DISCONTINUED | OUTPATIENT
Start: 2018-08-10 | End: 2018-08-13 | Stop reason: HOSPADM

## 2018-08-10 RX ORDER — NICOTINE POLACRILEX 4 MG
15 LOZENGE BUCCAL PRN
Status: DISCONTINUED | OUTPATIENT
Start: 2018-08-10 | End: 2018-08-13 | Stop reason: HOSPADM

## 2018-08-10 RX ORDER — POTASSIUM CHLORIDE 20 MEQ/1
40 TABLET, EXTENDED RELEASE ORAL PRN
Status: DISCONTINUED | OUTPATIENT
Start: 2018-08-10 | End: 2018-08-13 | Stop reason: HOSPADM

## 2018-08-10 RX ORDER — DEXTROSE MONOHYDRATE 50 MG/ML
100 INJECTION, SOLUTION INTRAVENOUS PRN
Status: DISCONTINUED | OUTPATIENT
Start: 2018-08-10 | End: 2018-08-13 | Stop reason: HOSPADM

## 2018-08-10 RX ORDER — MORPHINE SULFATE 4 MG/ML
INJECTION, SOLUTION INTRAMUSCULAR; INTRAVENOUS
Status: DISPENSED
Start: 2018-08-10 | End: 2018-08-10

## 2018-08-10 RX ORDER — 0.9 % SODIUM CHLORIDE 0.9 %
1000 INTRAVENOUS SOLUTION INTRAVENOUS ONCE
Status: COMPLETED | OUTPATIENT
Start: 2018-08-10 | End: 2018-08-10

## 2018-08-10 RX ORDER — ONDANSETRON 2 MG/ML
4 INJECTION INTRAMUSCULAR; INTRAVENOUS EVERY 6 HOURS PRN
Status: DISCONTINUED | OUTPATIENT
Start: 2018-08-10 | End: 2018-08-13 | Stop reason: HOSPADM

## 2018-08-10 RX ORDER — SODIUM CHLORIDE 9 MG/ML
INJECTION, SOLUTION INTRAVENOUS CONTINUOUS
Status: DISCONTINUED | OUTPATIENT
Start: 2018-08-10 | End: 2018-08-13 | Stop reason: HOSPADM

## 2018-08-10 RX ORDER — HYDRALAZINE HYDROCHLORIDE 25 MG/1
50 TABLET, FILM COATED ORAL 3 TIMES DAILY
Status: DISCONTINUED | OUTPATIENT
Start: 2018-08-10 | End: 2018-08-13 | Stop reason: HOSPADM

## 2018-08-10 RX ORDER — METOCLOPRAMIDE 10 MG/1
10 TABLET ORAL 4 TIMES DAILY PRN
Status: DISCONTINUED | OUTPATIENT
Start: 2018-08-10 | End: 2018-08-13 | Stop reason: HOSPADM

## 2018-08-10 RX ORDER — VITAMIN B COMPLEX
1100 TABLET ORAL DAILY
Status: DISCONTINUED | OUTPATIENT
Start: 2018-08-10 | End: 2018-08-10

## 2018-08-10 RX ORDER — ALBUTEROL SULFATE 90 UG/1
2 AEROSOL, METERED RESPIRATORY (INHALATION) EVERY 4 HOURS PRN
Status: DISCONTINUED | OUTPATIENT
Start: 2018-08-10 | End: 2018-08-13 | Stop reason: HOSPADM

## 2018-08-10 RX ORDER — CHOLECALCIFEROL (VITAMIN D3) 125 MCG
1000 CAPSULE ORAL DAILY
Status: DISCONTINUED | OUTPATIENT
Start: 2018-08-10 | End: 2018-08-13 | Stop reason: HOSPADM

## 2018-08-10 RX ORDER — MORPHINE SULFATE 4 MG/ML
4 INJECTION, SOLUTION INTRAMUSCULAR; INTRAVENOUS
Status: DISCONTINUED | OUTPATIENT
Start: 2018-08-10 | End: 2018-08-13 | Stop reason: HOSPADM

## 2018-08-10 RX ORDER — FINASTERIDE 5 MG/1
5 TABLET, FILM COATED ORAL DAILY
Status: DISCONTINUED | OUTPATIENT
Start: 2018-08-10 | End: 2018-08-13 | Stop reason: HOSPADM

## 2018-08-10 RX ORDER — SODIUM CHLORIDE 0.9 % (FLUSH) 0.9 %
10 SYRINGE (ML) INJECTION EVERY 12 HOURS SCHEDULED
Status: DISCONTINUED | OUTPATIENT
Start: 2018-08-10 | End: 2018-08-13 | Stop reason: HOSPADM

## 2018-08-10 RX ORDER — NICOTINE 21 MG/24HR
1 PATCH, TRANSDERMAL 24 HOURS TRANSDERMAL DAILY
Status: DISCONTINUED | OUTPATIENT
Start: 2018-08-10 | End: 2018-08-13 | Stop reason: HOSPADM

## 2018-08-10 RX ORDER — HYDROXYZINE PAMOATE 25 MG/1
25 CAPSULE ORAL DAILY PRN
Status: DISCONTINUED | OUTPATIENT
Start: 2018-08-10 | End: 2018-08-13 | Stop reason: HOSPADM

## 2018-08-10 RX ORDER — MORPHINE SULFATE 2 MG/ML
2 INJECTION, SOLUTION INTRAMUSCULAR; INTRAVENOUS
Status: DISCONTINUED | OUTPATIENT
Start: 2018-08-10 | End: 2018-08-13 | Stop reason: HOSPADM

## 2018-08-10 RX ORDER — POTASSIUM CHLORIDE 20MEQ/15ML
40 LIQUID (ML) ORAL PRN
Status: DISCONTINUED | OUTPATIENT
Start: 2018-08-10 | End: 2018-08-13 | Stop reason: HOSPADM

## 2018-08-10 RX ORDER — HYDROCODONE BITARTRATE AND ACETAMINOPHEN 5; 325 MG/1; MG/1
1 TABLET ORAL EVERY 4 HOURS PRN
Status: DISCONTINUED | OUTPATIENT
Start: 2018-08-10 | End: 2018-08-13 | Stop reason: HOSPADM

## 2018-08-10 RX ORDER — SODIUM CHLORIDE 9 MG/ML
INJECTION, SOLUTION INTRAVENOUS
Status: DISPENSED
Start: 2018-08-10 | End: 2018-08-10

## 2018-08-10 RX ORDER — TAMSULOSIN HYDROCHLORIDE 0.4 MG/1
0.8 CAPSULE ORAL DAILY
Status: DISCONTINUED | OUTPATIENT
Start: 2018-08-10 | End: 2018-08-13 | Stop reason: HOSPADM

## 2018-08-10 RX ORDER — 0.9 % SODIUM CHLORIDE 0.9 %
2000 INTRAVENOUS SOLUTION INTRAVENOUS ONCE
Status: COMPLETED | OUTPATIENT
Start: 2018-08-10 | End: 2018-08-10

## 2018-08-10 RX ORDER — PANTOPRAZOLE SODIUM 40 MG/1
40 TABLET, DELAYED RELEASE ORAL DAILY
Status: DISCONTINUED | OUTPATIENT
Start: 2018-08-10 | End: 2018-08-13 | Stop reason: HOSPADM

## 2018-08-10 RX ORDER — MAGNESIUM SULFATE 1 G/100ML
1 INJECTION INTRAVENOUS PRN
Status: DISCONTINUED | OUTPATIENT
Start: 2018-08-10 | End: 2018-08-13 | Stop reason: HOSPADM

## 2018-08-10 RX ORDER — THIAMINE MONONITRATE (VIT B1) 100 MG
100 TABLET ORAL DAILY
Status: DISCONTINUED | OUTPATIENT
Start: 2018-08-10 | End: 2018-08-13 | Stop reason: HOSPADM

## 2018-08-10 RX ORDER — FOLIC ACID 1 MG/1
1 TABLET ORAL DAILY
Status: DISCONTINUED | OUTPATIENT
Start: 2018-08-10 | End: 2018-08-13 | Stop reason: HOSPADM

## 2018-08-10 RX ADMIN — Medication 6 MG: at 21:43

## 2018-08-10 RX ADMIN — HYDRALAZINE HYDROCHLORIDE 50 MG: 25 TABLET, FILM COATED ORAL at 16:11

## 2018-08-10 RX ADMIN — SODIUM CHLORIDE, PRESERVATIVE FREE 10 ML: 5 INJECTION INTRAVENOUS at 08:46

## 2018-08-10 RX ADMIN — HYDRALAZINE HYDROCHLORIDE 50 MG: 25 TABLET, FILM COATED ORAL at 21:20

## 2018-08-10 RX ADMIN — METOPROLOL TARTRATE 25 MG: 25 TABLET ORAL at 21:20

## 2018-08-10 RX ADMIN — INSULIN HUMAN 9 UNITS: 100 INJECTION, SOLUTION PARENTERAL at 01:12

## 2018-08-10 RX ADMIN — DEXTROSE 30 G: 15 GEL ORAL at 11:57

## 2018-08-10 RX ADMIN — OXYCODONE HYDROCHLORIDE 5 MG: 5 TABLET ORAL at 01:12

## 2018-08-10 RX ADMIN — Medication: at 18:55

## 2018-08-10 RX ADMIN — CYANOCOBALAMIN TAB 500 MCG 1000 MCG: 500 TAB at 08:45

## 2018-08-10 RX ADMIN — SODIUM CHLORIDE 1000 ML: 9 INJECTION, SOLUTION INTRAVENOUS at 02:20

## 2018-08-10 RX ADMIN — MORPHINE SULFATE 4 MG: 4 INJECTION INTRAVENOUS at 04:45

## 2018-08-10 RX ADMIN — IBUPROFEN 200 MG: 400 TABLET ORAL at 05:41

## 2018-08-10 RX ADMIN — INSULIN LISPRO 8 UNITS: 100 INJECTION, SOLUTION INTRAVENOUS; SUBCUTANEOUS at 17:53

## 2018-08-10 RX ADMIN — SODIUM CHLORIDE 1000 ML: 9 INJECTION, SOLUTION INTRAVENOUS at 01:12

## 2018-08-10 RX ADMIN — VITAMIN E CAP 400 UNIT 800 UNITS: 400 CAP at 08:44

## 2018-08-10 RX ADMIN — METOPROLOL TARTRATE 25 MG: 25 TABLET ORAL at 08:45

## 2018-08-10 RX ADMIN — Medication: at 05:22

## 2018-08-10 RX ADMIN — MORPHINE SULFATE 4 MG: 4 INJECTION INTRAVENOUS at 19:41

## 2018-08-10 RX ADMIN — SODIUM CHLORIDE: 9 INJECTION, SOLUTION INTRAVENOUS at 05:05

## 2018-08-10 RX ADMIN — INSULIN LISPRO 6 UNITS: 100 INJECTION, SOLUTION INTRAVENOUS; SUBCUTANEOUS at 08:58

## 2018-08-10 RX ADMIN — INSULIN LISPRO 4 UNITS: 100 INJECTION, SOLUTION INTRAVENOUS; SUBCUTANEOUS at 21:24

## 2018-08-10 RX ADMIN — MORPHINE SULFATE 4 MG: 4 INJECTION INTRAVENOUS at 22:36

## 2018-08-10 RX ADMIN — SERTRALINE HYDROCHLORIDE 25 MG: 50 TABLET ORAL at 08:45

## 2018-08-10 RX ADMIN — GLYCOPYRROLATE AND FORMOTEROL FUMARATE 2 PUFF: 9; 4.8 AEROSOL, METERED RESPIRATORY (INHALATION) at 09:53

## 2018-08-10 RX ADMIN — MORPHINE SULFATE 4 MG: 4 INJECTION INTRAVENOUS at 06:33

## 2018-08-10 RX ADMIN — SODIUM CHLORIDE, PRESERVATIVE FREE 10 ML: 5 INJECTION INTRAVENOUS at 22:37

## 2018-08-10 RX ADMIN — Medication 100 MG: at 08:45

## 2018-08-10 RX ADMIN — FINASTERIDE 5 MG: 5 TABLET, FILM COATED ORAL at 08:45

## 2018-08-10 RX ADMIN — SODIUM CHLORIDE 2000 ML: 9 INJECTION, SOLUTION INTRAVENOUS at 05:22

## 2018-08-10 RX ADMIN — PANTOPRAZOLE SODIUM 40 MG: 40 TABLET, DELAYED RELEASE ORAL at 08:45

## 2018-08-10 RX ADMIN — INSULIN HUMAN 10 UNITS: 100 INJECTION, SOLUTION PARENTERAL at 05:06

## 2018-08-10 RX ADMIN — MORPHINE SULFATE 4 MG: 4 INJECTION INTRAVENOUS at 10:58

## 2018-08-10 RX ADMIN — HYDROCODONE BITARTRATE AND ACETAMINOPHEN 2 TABLET: 5; 325 TABLET ORAL at 05:41

## 2018-08-10 RX ADMIN — HYDRALAZINE HYDROCHLORIDE 50 MG: 25 TABLET, FILM COATED ORAL at 08:45

## 2018-08-10 RX ADMIN — FOLIC ACID 1 MG: 1 TABLET ORAL at 08:45

## 2018-08-10 RX ADMIN — HYDROCODONE BITARTRATE AND ACETAMINOPHEN 2 TABLET: 5; 325 TABLET ORAL at 16:10

## 2018-08-10 RX ADMIN — HYDROCODONE BITARTRATE AND ACETAMINOPHEN 2 TABLET: 5; 325 TABLET ORAL at 23:42

## 2018-08-10 RX ADMIN — TAMSULOSIN HYDROCHLORIDE 0.8 MG: 0.4 CAPSULE ORAL at 08:45

## 2018-08-10 RX ADMIN — MORPHINE SULFATE 4 MG: 4 INJECTION INTRAVENOUS at 13:25

## 2018-08-10 RX ADMIN — MORPHINE SULFATE 4 MG: 4 INJECTION INTRAVENOUS at 08:43

## 2018-08-10 ASSESSMENT — PAIN SCALES - GENERAL
PAINLEVEL_OUTOF10: 8
PAINLEVEL_OUTOF10: 7
PAINLEVEL_OUTOF10: 8
PAINLEVEL_OUTOF10: 9
PAINLEVEL_OUTOF10: 9
PAINLEVEL_OUTOF10: 7
PAINLEVEL_OUTOF10: 8
PAINLEVEL_OUTOF10: 7
PAINLEVEL_OUTOF10: 8

## 2018-08-10 ASSESSMENT — ENCOUNTER SYMPTOMS
COUGH: 0
VOMITING: 0
DIARRHEA: 0
ABDOMINAL DISTENTION: 0
EYES NEGATIVE: 1
SHORTNESS OF BREATH: 0
CONSTIPATION: 0
BACK PAIN: 0
ALLERGIC/IMMUNOLOGIC NEGATIVE: 1
WHEEZING: 0
ABDOMINAL PAIN: 0
NAUSEA: 0

## 2018-08-10 ASSESSMENT — PAIN DESCRIPTION - PAIN TYPE
TYPE: ACUTE PAIN

## 2018-08-10 ASSESSMENT — PAIN DESCRIPTION - ORIENTATION
ORIENTATION: LEFT

## 2018-08-10 ASSESSMENT — PAIN DESCRIPTION - LOCATION
LOCATION: LEG
LOCATION: LEG
LOCATION: FOOT;LEG
LOCATION: LEG

## 2018-08-10 NOTE — ED NOTES
Pt pulled hard yellow toenail from left second toe. Scant amount of bleeding noted. Writer cleansed with Normal Saline and Hibiclens. Dressing applied. 2 x 2 gauze applied, wrapped with a two inch cling secured around left ankle with one inch tape. Pt tolerated well.       Betty Joy LPN  53/72/96 2035

## 2018-08-10 NOTE — ED NOTES
Pt's roommate at bedside. When patient's roommate arrived patient grabbed his chest and made a sound. Pt's guest concerned. Nurse assessed patient. Nurse ask patient if he was having jaw pain, arm pain, chest pain, patient states his neck and shoulders hurt. NP Yue todd.      Birdie Martins LPN  77/66/06 7765

## 2018-08-10 NOTE — PROGRESS NOTES
Vitals:    08/10/18 0828   BP: (!) 181/82   Pulse: 85   Resp: (!) 116   Temp: 98 °F (36.7 °C)   SpO2: 95%     Pt resting quietly in bed alert and oriented. Pt had no acute events overnight. Normal saline infusing at 150 ml/hr. Pt having pain rated 7/10 at this time but denies having further needs. Bed locked in lowest position, call light within reach, bedside table within reach. Will continue to monitor.

## 2018-08-10 NOTE — CONSULTS
Attempted to see patient today but he has already been seen by Dr. Liss Smith with Podiatry. Per Dr. Liss Smith:    Right: full thickness ulceration to plantar 5th MTP with surrounding hyperkeratotic tissue and granular wound base after debridement. 1cm x 1.5cm x 0.3cm. No signs of  Infection. No deep probe to bone. Left: laceration to anterior tibial 4cm x 0.3cm x 0.2cm, 2nd toenail avulsion no nail bed laceration no signs of infection noted. Granular base. Left ankle is edematous and mild ecchymotic. No open lesions. Wound Care Recommendations:    1) Left ankle Sprain: Ankle Ace bandage with boot. Weightbearing to tolerance in boot. Follow up in one week. 2) Left toenail 2nd traumatic avulsion: zeroform and DSD applied. Will keep dressed until healed. Change dressing daily. No signs of infection. 3) Left tibial laceration: zeroform and DSD applied. Change dressing daily until healed. No signs of infection. 4) Diabetic foot ulceration right 5th MTP: Wound was debrided in excisional manner down to subcutaneous tissue to help aid in healing. Aquacel Ag and DSD applied. Post op shoe ordered. Should decreased weightbearing to reduce pressure on the ulceration. No signs of infection. Wound care to continue to follow. Please notify wound care with any further questions, concerns, or wound deterioration at [phone] or pager [phone].     Thanks,  Nicholas Simon RN, BSN, [place], [employer]

## 2018-08-10 NOTE — ED NOTES
Pt's left knee was cleaned with Hibiclens and NS. Pt tolerated well. Wound was then treated with Adaptic, 4x4 gauze and kurlix. Pt's right elbow was also cleaned with Hibiclens and NS. Pt tolerated well. CNP notified.      Linda Perez  08/09/18 5018

## 2018-08-10 NOTE — ED PROVIDER NOTES
Diabetic retinopathy ([facility] [address]     under care of [facility]:Dr. Karina Cruz    Diverticulitis 11/26/2011    Diverticulosis 11/26/2011    Emphysema     Esophageal candidiasis ([facility] [address] 7/16/13    GI:EGD    Essential hypertension, benign 11/2010    Fatty liver 10/9/2012    Foot ulcer:left 9/30/2013    Gastric ulcer, unspecified as acute or chronic, without mention of hemorrhage or perforation     GERD (gastroesophageal reflux disease)     Gout 1997    Right big toe    Hearing decreased     Left ear=60%. Right ear=80%.  Hemangioma 12/2010    Liver as per CT abdo    hepatitis c 7/26/17, 2010    Under care of Liver doc:Dr. Diana Bernal    Hyperlipidemia LDL goal < 100     Low HDL (under 40) 10/9/2012    Peripheral neuropathy 2006    Pneumonia 10/15/13    Pyogenic granuloma     per derm:Dr. Nichole Martinez Restrictive lung disease     Under care of pulmo(Dr. Calhoun)    S/P colonoscopy 1996    Done secondary to rectal bleed:dx=hemorrhoids    S/P colonoscopy 11/11/10;10/23/2013    Dr. Darlin Reyes 10/2016(3yrs):polyps;diverticulosis. Diverticulosis & polpy(removed). Next in10/2016.  S/P endoscopy 2009    EGD:stomach ulcers.     Superficial phlebitis of left leg 9/30/2013    Tachycardia     Therapeutic drug monitoring 4/8/15    OARRS report is consistent on 4/8/15;7/9/15;10/9/15;1/8/16;4/5/16    Type 1 diabetes mellitus not at goal [facility]) 3/17/14    updated diagnosis as per endo:Dr. Joseline Live         Procedure Laterality Date    DIAGNOSTIC CARDIAC CATH LAB PROCEDURE  2013    FOOT SURGERY Left Hammer toe, 2nd toe    10/9/14    HIP FRACTURE SURGERY      LEG SURGERY      broken leg    OTHER SURGICAL HISTORY Left 11/21/2013    EXCISION 5TH METATRSAL LEFT FOOT          REVISION COLOSTOMY  06/27/2017    COLOSTOMY REVERSAL     TONSILLECTOMY      UPPER GASTROINTESTINAL ENDOSCOPY  7/16/2013    Esophageal Brushing       Medications:  Previous Medications    AGAMATRIX PRESTO TEST STRIP    USE TO TEST BLOOD SUGAR 3 TIMES A DAY URINALYSIS    Narrative:     Performed at:  [facility]) [address],  [place], [place]   Phone  of labs reviewed and were negative at this time or not returned at the time of this note. RADIOLOGY:   Non-plain film images such as CT, Ultrasound and MRI are read by the radiologist. RICCO De Los Santos CNP have directly visualized the radiologic plain film image(s) with the below findings:        Interpretation per the Radiologist below, if available at the time of this note:    XR FOOT LEFT (MIN 3 VIEWS)   Final Result   Suspect acute nondisplaced fracture through the lateral malleolus. XR ANKLE LEFT (MIN 3 VIEWS)   Final Result   Suspect acute nondisplaced fracture through the lateral malleolus. CT CERVICAL SPINE WO CONTRAST   Final Result   No acute abnormality of the cervical spine. CT HEAD WO CONTRAST   Final Result   No acute intracranial abnormality. XR TIBIA FIBULA LEFT (2 VIEWS)   Final Result   Soft-tissue swelling with no acute fracture. No results found. MEDICAL DECISION MAKING / ED COURSE:      PROCEDURES:   Procedures    None    Patient was given:  Medications   0.9 % sodium chloride bolus (not administered)   HYDROcodone-acetaminophen (NORCO) 5-325 MG per tablet 1 tablet (1 tablet Oral Given 8/9/18 2211)   orphenadrine (NORFLEX) injection 60 mg (60 mg Intramuscular Given 8/9/18 2250)   0.9 % sodium chloride bolus (0 mLs Intravenous Stopped 8/10/18 0204)   insulin regular (HUMULIN R;NOVOLIN R) injection 9 Units (9 Units Intravenous Given 8/10/18 0112)   oxyCODONE (ROXICODONE) immediate release tablet 5 mg (5 mg Oral Given 8/10/18 0112)       Patient is alert and oriented x4. PERRLA, EOMI. Head is normocephalic and atraumatic. Heart with RRR. Lungs are clear to auscultation. Abdomen is soft, non-tender and non-distended. Left lower leg is tender to palpation.  Patient has FROM of the left lower leg and distal pulses and neurovascular status is intact. 2cm skin tear noted to the anterior portion of the left lower leg. Left ankle and foot are also tender to palpation with decreased range of motion and mild edema. No obvious deformity noted. Bottom of right foot with a 1cm ulcerative area that patient states has been there for a very long time. Differentials: skin tear, intracranial hemorrhage, skull fracture, tib/fib fracture, contusion, diabetic foot ulcer  Norco given for pain  Labs: hyponatremia, low ph, elevated glucose, lateral malleolus fracture of left ankle, DKA  CT head: negative  CT cervical:negative  Xray left lower leg: Suspect acute nondisplaced fracture through the lateral malleolus. Patient given IV insulin ( 9 units), as well as Norflex, Roxicodone and Norco for pain  Hospitalist consulted and accepts admission    Boot placed to left ankle, dur to needing wound care to the left shin and toes of the left foot. The patient tolerated their visit well. I saw the patient  with the attending physician   The patient and / or the family were informed of the results of any tests, a time was given to answer questions, a plan was proposed and they agreed with plan. CLINICAL IMPRESSION:  1. Diabetic ketoacidosis without coma associated with type 2 diabetes mellitus ([address]    2. Fall at home, initial encounter    3. Closed nondisplaced fracture of lateral malleolus of left fibula, initial encounter    4. Noninfected skin tear of left lower extremity, initial encounter    5. Closed head injury, initial encounter    6. Diabetic ulcer of right foot associated with type 2 diabetes mellitus, unspecified part of foot, unspecified ulcer stage ([address]        DISPOSITION        PATIENT REFERRED TO:  No follow-up provider specified.     DISCHARGE MEDICATIONS:  New Prescriptions    No medications on file       DISCONTINUED MEDICATIONS:  Discontinued Medications    No medications on file (Please note the MDM and HPI sections of this note were completed with a voice recognition program.  Efforts were made to edit the dictations but occasionally words are mis-transcribed.)    Electronically signed, Thelma Meckel, APRN - CNP,         Thelma Meckel, APRN - CNP  08/10/18 [address] [facility] [place], APRN - CNP  08/10/18 0212

## 2018-08-10 NOTE — CONSULTS
Department of General Surgery Consult    PATIENT NAME: Raymond Neri   YOB: 1950    ADMISSION DATE: 8/9/2018  9:30 PM      TODAY'S DATE: 8/10/2018    Reason for Consult:  Incisional hernia    Chief Complaint: fell at home    Requesting Physician:  Gabe Orellana    HISTORY OF PRESENT ILLNESS:              The patient is a 78 y.o. male who presents following a fall. Reports hitting his left leg and head but no LOC. L lateral malleolus fracture. Admits to drinking daily at home. Hyperglycemic upon arrival to ER. Has a history of prior colostomy and then reversal.  Has some tenderness at midline incisional hernia but reports no nausea, constipation or diarrhea. Past Medical History:        Diagnosis Date    Anxiety disorder     BPH (benign prostatic hypertrophy)     Calcified granuloma of lung ([address] 2014    2:stable per 3/25/14 CT chest    Cataract 1/20/14    OU:Dr. hayward:[facility]    Chronic pain     COPD (chronic obstructive pulmonary disease) (HCC)     Under care of pulmo(Dr. Calhoun)    Degenerative arthritis of hip     Depression 3/11/2015    Depression with anxiety     Prior psychiatrist & therapist:Dr. Dat Roper.  Diabetes mellitus ([address] 2004    Endo Dr. Melyssa Alva type 1 [facility] below [facility]    Diabetic eye exam ([address] 1/20/14    [facility]:Dr. STEPHEN Hayward:No retinopathy. Cataract    Diabetic retinopathy ([address]     under care of [facility]:Dr. Shun James    Diverticulitis 11/26/2011    Diverticulosis 11/26/2011    Emphysema     Esophageal candidiasis ([address] 7/16/13    GI:EGD    Essential hypertension, benign 11/2010    Fatty liver 10/9/2012    Foot ulcer:left 9/30/2013    Gastric ulcer, unspecified as acute or chronic, without mention of hemorrhage or perforation     GERD (gastroesophageal reflux disease)     Gout 1997    Right big toe    Hearing decreased     Left ear=60%. Right ear=80%.     Hemangioma 12/2010    Liver as per CT abdo    hepatitis c 7/26/17, 2010    Under care of Liver tablet 25 mg, 25 mg, Oral, Daily  tamsulosin (FLOMAX) capsule 0.8 mg, 0.8 mg, Oral, Daily  vitamin B-1 (THIAMINE) tablet 100 mg, 100 mg, Oral, Daily  vitamin B-12 (CYANOCOBALAMIN) tablet 1,000 mcg, 1,000 mcg, Oral, Daily  vitamin E capsule 800 Units, 800 Units, Oral, Daily  sodium chloride flush 0.9 % injection 10 mL, 10 mL, Intravenous, 2 times per day  sodium chloride flush 0.9 % injection 10 mL, 10 mL, Intravenous, PRN  magnesium hydroxide (MILK OF MAGNESIA) 400 MG/5ML suspension 30 mL, 30 mL, Oral, Daily PRN  ondansetron (ZOFRAN) injection 4 mg, 4 mg, Intravenous, Q6H PRN  glucose (GLUTOSE) 40 % oral gel 15 g, 15 g, Oral, PRN  dextrose 50 % solution 12.5 g, 12.5 g, Intravenous, PRN  glucagon (rDNA) injection 1 mg, 1 mg, Intramuscular, PRN  dextrose 5 % solution, 100 mL/hr, Intravenous, PRN  0.9 % sodium chloride infusion, , Intravenous, Continuous  magnesium sulfate 1 g in dextrose 5% 100 mL IVPB, 1 g, Intravenous, PRN  potassium chloride (KLOR-CON M) extended release tablet 40 mEq, 40 mEq, Oral, PRN **OR** potassium chloride 20 MEQ/15ML (10%) oral solution 40 mEq, 40 mEq, Oral, PRN **OR** potassium chloride 10 mEq/100 mL IVPB (Peripheral Line), 10 mEq, Intravenous, PRN  nicotine (NICODERM CQ) 21 MG/24HR 1 patch, 1 patch, Transdermal, Daily  acetaminophen (TYLENOL) tablet 650 mg, 650 mg, Oral, Q4H PRN  HYDROcodone-acetaminophen (NORCO) 5-325 MG per tablet 1 tablet, 1 tablet, Oral, Q4H PRN **OR** HYDROcodone-acetaminophen (NORCO) 5-325 MG per tablet 2 tablet, 2 tablet, Oral, Q4H PRN  morphine injection 2 mg, 2 mg, Intravenous, Q2H PRN **OR** morphine (PF) injection 4 mg, 4 mg, Intravenous, Q2H PRN  morphine (PF) 4 MG/ML injection, , ,   sodium chloride 0.9 % infusion, , ,   glycopyrrolate-formoterol (BEVESPI) 9-4.8 MCG/ACT inhaler 2 puff, 2 puff, Inhalation, BID  insulin lispro (HUMALOG) injection vial 0-18 Units, 0-18 Units, Subcutaneous, Q4H  Prior to Admission medications    Medication Sig Start Date End Date Taking? Authorizing Provider   Continuous Blood Gluc  (FREESTYLE RBIDGET READER) DENIS Use to monitor sugars 8/1/18  Yes Jeri Ormond, MD   Continuous Blood Gluc Sensor ([address] [place] [address]) [facility] Use to monitor sugars 8/1/18  Yes Jeri Ormond, MD   hydrOXYzine (VISTARIL) 25 MG capsule Take 1 capsule by mouth daily as needed for Itching or Anxiety 7/23/18 8/22/18 Yes RICCO Otero CNP   HYDROcodone-acetaminophen (NORCO) 7.5-325 MG per tablet Take 1 tablet by mouth every 6 hours as needed for Pain for up to 28 days. . 7/16/18 8/13/18 Yes RICCO Otero CNP   vitamin B-12 (CYANOCOBALAMIN) 1000 MCG tablet TAKE 1 TABLET BY MOUTH DAILY 7/2/18  Yes RICCO Freeman CNP   lisinopril (PRINIVIL;ZESTRIL) 30 MG tablet TAKE 1 TABLET BY MOUTH DAILY 7/2/18  Yes RICCO Freeman CNP   folic acid (FOLVITE) 1 MG tablet TAKE 1 TABLET BY MOUTH DAILY 7/2/18  Yes RICCO Freeman CNP   tamsulosin (FLOMAX) 0.4 MG capsule TAKE TWO CAPSULES BY MOUTH EVERY DAY 6/8/18  Yes RICCO Freeman CNP   omeprazole (PRILOSEC) 20 MG delayed release capsule TAKE 1 CAPSULE BY MOUTH 2 TIMES A DAY 6/8/18  Yes RICCO Freeman CNP   hydrALAZINE (APRESOLINE) 50 MG tablet Take 1 tablet by mouth 3 times daily 5/30/18  Yes RICCO Freeman CNP   finasteride (PROSCAR) 5 MG tablet TAKE 1 TABLET BY MOUTH DAILY 5/30/18  Yes RICCO Freeman CNP   tamsulosin (FLOMAX) 0.4 MG capsule TAKE 2 CAPSULES BY MOUTH DAILY 5/30/18  Yes RICCO Freeman CNP   metoprolol tartrate (LOPRESSOR) 25 MG tablet Take 1 tablet by mouth 2 times daily 5/30/18  Yes RICCO Cooper CNP   AGAMATRIX PRESTO TEST strip USE TO TEST BLOOD SUGAR 3 TIMES A DAY 5/9/18  Yes Jeri Ormond, MD   sertraline (ZOLOFT) 25 MG tablet TAKE 1 TABLET BY MOUTH ONCE A DAY 5/9/18  Yes RICCO Freeman - CNP   metoclopramide (REGLAN) 10 MG tablet Take 1 tablet by mouth 4 times daily as needed (Nausea and/or headache) 4/25/18  Yes Nemesio Alvarez MD   Tens Unit MISC by Does not apply route Use as directed.  3/30/18  Yes RICCO Roberts CNP   melatonin 1 MG tablet Take 1 mg by mouth daily as needed for Sleep   Yes Historical Provider, MD   vitamin E 1000 units capsule Take 1,000 Units by mouth daily   Yes Historical Provider, MD   ibuprofen (ADVIL;MOTRIN) 200 MG tablet Take 200 mg by mouth nightly as needed for Pain   Yes Historical Provider, MD   B Complex Vitamins (VITAMIN B-COMPLEX) TABS Take 1,100 mg by mouth daily   Yes Historical Provider, MD   umeclidinium-vilanterol (ANORO ELLIPTA) 62.5-25 MCG/INH AEPB inhaler Inhale 1 puff into the lungs daily 3/27/18  Yes Saniya Gutierrez MD   albuterol sulfate HFA (PROAIR HFA) 108 (90 Base) MCG/ACT inhaler Inhale 2 puffs into the lungs every 4 hours as needed for Wheezing or Shortness of Breath 3/27/18  Yes Saniya Gutierrez MD   BASAGLAR KWIKPEN 100 UNIT/ML injection pen Inject 22 Units into the skin nightly Dx Code E 11.9  Patient taking differently: Inject 35 Units into the skin nightly Dx Code E 11.9 3/22/18  Yes Jelani Rousseau MD   Lancets MISC Testing 2-3 times daily DX Code: E11.9 3/22/18  Yes Jelani Rousseau MD   thiamine 100 MG tablet Take 1 tablet by mouth daily 3/6/18  Yes RICCO Guzman CNP   Blood Glucose Monitoring Suppl (TRUE METRIX AIR GLUCOSE METER) DENIS 1 meter 3/6/18  Yes RICCO Guzman CNP   insulin aspart (NOVOLOG) 100 UNIT/ML injection vial Inject 10 Units into the skin 3 times daily (before meals) +SSI    Yes Historical Provider, MD   glucagon 1 MG injection Inject 1 kit into the skin as needed   Yes Historical Provider, MD   SUMAtriptan (IMITREX) 100 MG tablet Take 1 tablet by mouth once as needed for Migraine 4/25/18 4/25/18  Nemesio Alvarez MD        Allergies:  Neurontin [gabapentin]    Social History:   TOBACCO:  yes  ETOH:

## 2018-08-10 NOTE — ED PROVIDER NOTES
errors.     Alfred Opitz, MD  [address] [facility]        Alfred Opitz, MD  08/10/18 [id number]

## 2018-08-10 NOTE — PROGRESS NOTES
Physical Therapy    Facility/Department: [facility]  Initial Assessment    NAME: Thao Patino  : 1950  MRN: [id number]    Date of Service: 8/10/2018    Discharge Recommendations:  Home with assist PRN, Home with Home health PT, S Level 1   PT Equipment Recommendations  Equipment Needed: Yes  Mobility Devices: Hezzie Potter: Rolling    Patient Diagnosis(es): The primary encounter diagnosis was Diabetic ketoacidosis without coma associated with type 2 diabetes mellitus ([address] Diagnoses of Fall at home, initial encounter, Closed nondisplaced fracture of lateral malleolus of left fibula, initial encounter, Noninfected skin tear of left lower extremity, initial encounter, Closed head injury, initial encounter, and Diabetic ulcer of right foot associated with type 2 diabetes mellitus, unspecified part of foot, unspecified ulcer stage ([address] were also pertinent to this visit. has a past medical history of Anxiety disorder; BPH (benign prostatic hypertrophy); Calcified granuloma of lung ([address] Cataract; Chronic pain; COPD (chronic obstructive pulmonary disease) ([address] Degenerative arthritis of hip; Depression; Depression with anxiety; Diabetes mellitus ([address] Diabetic eye exam ([address] Diabetic retinopathy ([address] Diverticulitis; Diverticulosis; Emphysema; Esophageal candidiasis ([address] Essential hypertension, benign; Fatty liver; Foot ulcer:left; Gastric ulcer, unspecified as acute or chronic, without mention of hemorrhage or perforation; GERD (gastroesophageal reflux disease); Gout; Hearing decreased; Hemangioma; hepatitis c; Hyperlipidemia LDL goal < 100; Low HDL (under 40); Peripheral neuropathy; Pneumonia; Pyogenic granuloma; Restrictive lung disease; S/P colonoscopy; S/P colonoscopy; S/P endoscopy; Superficial phlebitis of left leg; Tachycardia; Therapeutic drug monitoring; and Type 1 diabetes mellitus not at goal [facility]). has a past surgical history that includes Leg Surgery;  Hip fracture surgery; Tonsillectomy; Upper gastrointestinal endoscopy (7/16/2013); Diagnostic Cardiac Cath Lab Procedure (2013); other surgical history (Left, 11/21/2013); Foot surgery (Left, Hammer toe, 2nd toe); and Revision Colostomy (06/27/2017). Restrictions  Restrictions/Precautions  Restrictions/Precautions: General Precautions, Weight Bearing, Fall Risk  Required Braces or Orthoses?: Yes (LLE boot and RLE surgical shoe)  Lower Extremity Weight Bearing Restrictions  Right Lower Extremity Weight Bearing:  ('decreased WB with post op shoe' per podiatry note from 8/10) - pt was kept at [facility] for this eval/treatment session as post op shoe had not yet been delivered to pt  Left Lower Extremity Weight Bearing: Weight Bearing As Tolerated (with boot per podiatry note from 8/10)  Position Activity Restriction  Other position/activity restrictions: Bedrest with BSC     Vision/Hearing  Vision: Within Functional Limits  Hearing: Exceptions to [facility]  Hearing Exceptions: Hard of hearing/hearing concerns       Subjective  General  Chart Reviewed: Yes  Patient assessed for rehabilitation services?: Yes  Response To Previous Treatment: Not applicable  Family / Caregiver Present: No  Referring Practitioner: Lavaun Barthel, MD  Referral Date : 08/10/18  Diagnosis: Acute hyperglycemia/DKA without coma. Fall at home with L nondisplaced lateral malleolus avulsion fx/sprain, R diabetic foot ulceration at 5th MTP  Follows Commands: Impaired (Pt very impulsive, difficulty maintaining WBing precautions)  General Comment  Comments: PT eval and treatment cleared by RN. Pt presents resting in bed, provided verbal consent to PT eval and treatment.   Pain Screening  Patient Currently in Pain: Yes  Pain Assessment  Pain Level: 9     Orientation  Orientation  Overall Orientation Status: Within Normal Limits    Social/Functional History  Social/Functional History  Lives With: Friend(s) (Female friend)  Type of Home: House  Home Layout: One level  Bathroom Shower/Tub: Tub/Shower unit, Shower chair with back  Bathroom Toilet: Standard  Bathroom Equipment: Grab bars around toilet, Toilet raiser  Home Equipment: Carlie Martini, 4 wheeled walker, Lift chair, Hospital bed  ADL Assistance: Independent (With shower chair)  [address]:  (Friend \"Yoko\" does most housekeeping and grocery shopping)  Ambulation Assistance: Independent (Short community distances, alternates between [facility] and [facility])  Transfer Assistance: Independent  Active : Yes  Occupation: Retired  Leisure & Hobbies: Watch TV  Additional Comments: Leaves his home ~ 2x/wk     Objective  AROM RLE (degrees)  RLE AROM: WFL  AROM LLE (degrees)  LLE AROM : WFL  Strength RLE  Strength RLE: WFL  Comment: Grossl y4+/5  Strength LLE  Strength LLE: WFL  Comment: Grossly 4+/5     Sensation  Overall Sensation Status: Impaired (Reports impaired sensation in R forearm/hand d/t chronic radial nerve damage. Impaired sensation to light touch in distal LE's grossly throughout feet and distal third of lower legs)  Bed mobility  Supine to Sit: Independent (With HOB flat and without use of rail)  Sit to Supine: Independent  Scooting: Supervision (to EOB)  Transfers  Sit to Stand: Stand by assistance  Stand to sit: Stand by assistance  Bed to Chair: Minimal assistance with standard walker. Pt quick and impulsive with transfer, observed to break RLE WBing precautions. Pt was instructed verbally and with demonstration how to transfer bed<>chair while maintaining WBing precautions with standard walker. He underwent the transfer two additional times with improved adherence to [facility] precautions, but still impulsive and unsafe. Comment: Pt very impulsive with sit<>stand, initially observed bearing weight equally through BLE's after being informed of his WBing precautions. Pt also observed reaching for handles of walker to assist with sit<>stand.  Pt was instructed verbally and with demonstration how to transfer sit<>stand while maintaining WBing precautions with proper hand placement for safety. Ambulation  Ambulation?: No     Balance  Posture: Good  Sitting - Static: Good  Sitting - Dynamic: Good  Standing - Static: Fair;+  Standing - Dynamic: Fair      Assessment   Body structures, Functions, Activity limitations: Decreased safe awareness;Decreased balance  Assessment: The pt is a 78year old male with PMH significant for diabetes who presented to the ED 8/9/18 after a fall in his home and was found to be hyperglycemic. XR in ED showed avulsion fx of lateral malleolus of L ankle/lateral ankle sprain. Pt also found to have diabetic foot ulcer at 5th MTP on R foot. He lives with a friend who manages most of the homemaking tasks and is independent with ADL's and functional mobility including ambulation with cane and occasionally 4WW at his baseline. He currently requires min assist with max cues and reinforcement for maintenance of LE WBing precautions and safety with transfers. His limitations include impaired standing balance, decreased safety awareness, and impulsivity, putting him at risk for falls and delayed LE healing. The pt will benefit from continued PT services for increased safety and independence with functional mobility while faciltiating return to baseline LOF.   Treatment Diagnosis: Difficulty waking R26.2  Prognosis: Good  Decision Making: Low Complexity  Patient Education: Role of PT, precautions, safety with transfers, POC  Barriers to Learning: None, pt verbalized his understanding  REQUIRES PT FOLLOW UP: Yes  Activity Tolerance: Patient Tolerated treatment well  Activity Tolerance: Pt tolerated treatment well without compalints       Plan   Plan  Times per week: 3-5x/wk  Times per day: Daily  Current Treatment Recommendations: Strengthening, Balance Training, Functional Mobility Training, Transfer Training, Gait Training, Endurance Training, Home Exercise Program, Safety Education & Training, Patient/Caregiver Education & Training, Equipment Evaluation, Education, & procurement, Positioning  Safety Devices  Type of devices: Left in chair, Call light within reach, Nurse notified, Chair alarm in place, Gait belt, Patient at risk for falls    G-Code  PT G-Codes  Functional Assessment Tool Used: [facility] without stairs  Score: 17  Functional Limitation: Mobility: Walking and moving around  Mobility: Walking and Moving Around Current Status (): At least 20 percent but less than 40 percent impaired, limited or restricted  Mobility: Walking and Moving Around Goal Status ():  At least 1 percent but less than 20 percent impaired, limited or restricted    AM-PAC Score  AM-PAC Inpatient Mobility without Stair Climbing Raw Score : 17  AM-PAC Inpatient without Stair Climbing T-Scale Score : 48.47  Mobility Inpatient CMS 0-100% Score: 32.72  Mobility Inpatient without Stair CMS G-Code Modifier : CJ     Goals  Short term goals  Time Frame for Short term goals: 8/17/18  Short term goal 1: Pt will safely transfer sit<>stand while maintaining WBing precautions with supervision without requiring cues for safety  Short term goal 2: Pt will safely transfer bed<>chair with walker and SBA while maintaining WBing precautions without requiring cues for safety  Short term goal 3: Pt will safely ambulate [de-identified]' with RW while maintaining WBing precautions with SBA without requiring cues for safety  Short term goal 4: Pt will be independent with supine and seated LE ther ex for maintenance of LE strength and ROM     Therapy Time   Individual Concurrent Group Co-treatment   Time In 1443         Time Out 1502         Minutes 19         Timed Code Treatment Minutes: 9 Minutes       Amelia Tony PT

## 2018-08-10 NOTE — PROGRESS NOTES
following criteria are met:  a. Alert and cooperative     b. HR < 140 bpm  c. RR < 30 bpm                d. Can demonstrate a 2-3 second inspiratory hold  4. Bronchodilators will be administered via Hand Held Nebulizer ALIC[facility]) to patients when ANY of the following criteria are met  a. Incognizant or uncooperative          b. Patients treated with HHN at Home        c. Unable to demonstrate proper use of MDI with spacer     d. RR > 30 bpm   5. Bronchodilators will be delivered via Metered Dose Inhaler (MDI), HHN, Aerogen to intubated patients on mechanical ventilation. 6. Inhalation medication orders will be delivered and/or substituted as outlined below. Aerosolized Medications Ordering and Administration Guidelines:    1. All Medications will be ordered by a physician, and their frequency and/or modality will be adjusted as defined by the patients Respiratory Severity Index (RSI) score. 2. If the patient does not have documented COPD, consider discontinuing anticholinergics when RSI is less than 9.  3. If the bronchospasm worsens (increased RSI), then the bronchodilator frequency can be increased to a maximum of every 4 hours. If greater than every 4 hours is required, the physician will be contacted. 4. If the bronchospasm improves, the frequency of the bronchodilator can be decreased, based on the patient's RSI, but not less than home treatment regimen frequency. 5. Bronchodilator(s) will be discontinued if patient has a RSI less than 9 and has received no scheduled or as needed treatment for 72  Hrs. Patients Ordered on a Mucolytic Agent:    1. Must always be administered with a bronchodilator. 2. Discontinue if patient experiences worsened bronchospasm, or secretions have lessened to the point that the patient is able to clear them with a cough. Anti-inflammatory and Combination Medications:    1.  If the patient lacks prior history of lung disease, is not using inhaled anti-inflammatory medication at home, and lacks wheezing by examination or by history for at least 24 hours, contact physician for possible discontinuation.

## 2018-08-10 NOTE — PROGRESS NOTES
Hospitalist Progress Note      PCP: Serena Copeland, APRN - CNP    Date of Admission: 8/9/2018    Chief Complaint: Fall      Subjective:   Has some pain in LLE at ankle. Otherwise no acute complaints. Denies chest pain or dyspnea. BS elevated in 300s    Medications:  Reviewed    Infusion Medications    dextrose      sodium chloride 75 mL/hr at 08/10/18 1314    sodium chloride       Scheduled Medications    finasteride  5 mg Oral Daily    folic acid  1 mg Oral Daily    hydrALAZINE  50 mg Oral TID    metoprolol tartrate  25 mg Oral BID    pantoprazole  40 mg Oral Daily    sertraline  25 mg Oral Daily    tamsulosin  0.8 mg Oral Daily    thiamine  100 mg Oral Daily    vitamin B-12  1,000 mcg Oral Daily    vitamin E  800 Units Oral Daily    sodium chloride flush  10 mL Intravenous 2 times per day    nicotine  1 patch Transdermal Daily    morphine (PF)        glycopyrrolate-formoterol  2 puff Inhalation BID    insulin lispro  0-12 Units Subcutaneous TID WC    insulin lispro  0-6 Units Subcutaneous Nightly     PRN Meds: albuterol sulfate HFA, hydrOXYzine, ibuprofen, melatonin, metoclopramide, sodium chloride flush, magnesium hydroxide, ondansetron, glucose, dextrose, glucagon (rDNA), dextrose, magnesium sulfate, potassium chloride **OR** potassium chloride **OR** potassium chloride, acetaminophen, HYDROcodone 5 mg - acetaminophen **OR** HYDROcodone 5 mg - acetaminophen, morphine **OR** morphine      Intake/Output Summary (Last 24 hours) at 08/10/18 1504  Last data filed at 08/10/18 1100   Gross per 24 hour   Intake             2600 ml   Output             6025 ml   Net            -3425 ml       Exam:    BP (!) 181/82   Pulse 85   Temp 98 °F (36.7 °C) (Oral)   Resp (!) 116   Ht 6' 5\" (1.956 m)   Wt 191 lb 9.3 oz (86.9 kg)   SpO2 95%   BMI 22.72 kg/m²     General appearance: No apparent distress, appears stated age and cooperative. HEENT:  Conjunctivae/corneas clear.   Neck: Supple, with full range of motion. Trachea midline. Respiratory:  Normal respiratory effort. Clear to auscultation, bilaterally without Rales/Wheezes/Rhonchi. Cardiovascular: Regular rate and rhythm with normal S1/S2 without murmurs, rubs or gallops. Abdomen: Soft, non-tender, non-distended with normal bowel sounds. Musculoskeletal: ACE wrap over L ankle, no edema  Skin: Skin color, texture, turgor normal.  No rashes or lesions. Neurologic: Cranial nerves: II-XII intact, grossly non-focal.  Psychiatric: Alert and oriented, thought content appropriate, normal insight    Labs:   Recent Labs      08/10/18   0031   WBC  7.5   HGB  11.6*   HCT  34.6*   PLT  218     Recent Labs      08/10/18   0031   NA  122*   K  5.3*   CL  87*   CO2  21   BUN  19   CREATININE  0.8   CALCIUM  8.5     Recent Labs      08/10/18   0031   AST  80*   ALT  72*   BILITOT  0.3   ALKPHOS  68     Recent Labs      08/10/18   0807   INR  0.95     Recent Labs      08/10/18   0031  08/10/18   1141   CKTOTAL   --   242   TROPONINI  <0.01  <0.01       Assessment/Plan:    Active Hospital Problems    Diagnosis Date Noted    Chronic transaminasemia [R74.0] 08/10/2018    Tobacco smoker [F17.200] 08/10/2018    Fall at home [X14. Cat Aguilera, L23.562] 08/10/2018    Hypochloremia [E87.8] 08/10/2018    Hyperkalemia [E87.5] 08/10/2018    Mixed metabolic and respiratory acidosis [E87.2] 08/10/2018    Closed nondisplaced L ankle fracture (lateral malleolus) [S82.892A] 08/10/2018    L 2nd toenail avulsion [S91.209A] 08/10/2018    Skin tear of L leg [S81.812A] 08/10/2018    Head trauma [S09.90XA] 08/10/2018    IDDM (insulin dependent diabetes mellitus) [facility] [E11.9, Z79.4] 08/10/2018    Ventral hernia [K43.9] 08/10/2018    Diabetic ketoacidosis without coma associated with type 2 diabetes mellitus ([facility] [E11.10]     Primary insomnia [F51.01] 07/16/2018    Hx DKA (Feb 2018) [E11.10, Z79.4] 02/24/2018    HTN (hypertension) [I10]     Chronic normocytic anemia [D64.9] 07/18/2017    Acute hyperglycemia [R73.9] 07/18/2017    Chronic dCHF (grade 1 LVDD) [I50.32] 07/02/2017    S/p reversal of colostomy (6/27/17) [K55.9]     Hyponatremia [E87.1] 04/05/2017    S/P left colectomy [Z90.49] 03/10/2017    Multilevel spondylosis [M51.36] 10/17/2014    Chronic pain syndrome [G89.4] 08/26/2013    Mild-moderate alcohol consumption (beer) [F10.20] 06/14/2013    Bilateral knee & hip OA [M17.10] 12/04/2012    History of hepatitis C [Z86.19] 11/29/2012    Anxiety/depression [F41.9] 11/08/2012    Hepatic steatosis [K76.0] 10/09/2012    HLD (hyperlipidemia) [E78.5] 11/29/2011    COPD (chronic obstructive pulmonary disease) ([facility] [J44.9] 04/14/2011    Peripheral neuropathy of bilateral feet [G62.9] 11/12/2010    GERD (gastroesophageal reflux disease) [K21.9] 10/20/2010     Acute hyperglycemia. No clinical signs of DKA at this time. Hx of IDDM  - Start diet  - IVF hydration  - Titrate insulin regimen  - Diabetes educator    L lateral malleolus fracture and 2nd toenail avulsion s/p fall. Appears mechanical.  Non-syncopal  - ECHO pending  - Monitor tele  - podiatry on board  - wound care    Hyponatremia & Hypochloremia. Likely 2/2 beer potomania vs low solute. - C/w IVF hydration  - Trend Na closely    Mild-Moderate Alcohol Consumption. Serum EtOH elevated to 145  - monitor CIWAs scores  - no clinical signs of withdrawal at this time    Mixed Metabolic & Respiratory Acidosis. Improved    Ventral Abd Hernia. Hx of L-colectomy with colostomy and subsequent take down.   - GS consulted    PMH of tobacco abuse, ventral abd hernia, htn, anxiety, PMH    DVT Prophylaxis: SCDs  Diet: DIET CARB CONTROL;  Code Status: Full Code    PT/OT Eval Status: on board    Dispo - Home in 2-3 days    Fauzia Brown MD

## 2018-08-10 NOTE — CONSULTS
200 mg, Oral, Q4H PRN  melatonin tablet 6 mg, 6 mg, Oral, Nightly PRN  metoclopramide (REGLAN) tablet 10 mg, 10 mg, Oral, 4x Daily PRN  metoprolol tartrate (LOPRESSOR) tablet 25 mg, 25 mg, Oral, BID  pantoprazole (PROTONIX) tablet 40 mg, 40 mg, Oral, Daily  sertraline (ZOLOFT) tablet 25 mg, 25 mg, Oral, Daily  tamsulosin (FLOMAX) capsule 0.8 mg, 0.8 mg, Oral, Daily  vitamin B-1 (THIAMINE) tablet 100 mg, 100 mg, Oral, Daily  vitamin B-12 (CYANOCOBALAMIN) tablet 1,000 mcg, 1,000 mcg, Oral, Daily  vitamin E capsule 800 Units, 800 Units, Oral, Daily  sodium chloride flush 0.9 % injection 10 mL, 10 mL, Intravenous, 2 times per day  sodium chloride flush 0.9 % injection 10 mL, 10 mL, Intravenous, PRN  magnesium hydroxide (MILK OF MAGNESIA) 400 MG/5ML suspension 30 mL, 30 mL, Oral, Daily PRN  ondansetron (ZOFRAN) injection 4 mg, 4 mg, Intravenous, Q6H PRN  glucose (GLUTOSE) 40 % oral gel 15 g, 15 g, Oral, PRN  dextrose 50 % solution 12.5 g, 12.5 g, Intravenous, PRN  glucagon (rDNA) injection 1 mg, 1 mg, Intramuscular, PRN  dextrose 5 % solution, 100 mL/hr, Intravenous, PRN  0.9 % sodium chloride infusion, , Intravenous, Continuous  magnesium sulfate 1 g in dextrose 5% 100 mL IVPB, 1 g, Intravenous, PRN  potassium chloride (KLOR-CON M) extended release tablet 40 mEq, 40 mEq, Oral, PRN **OR** potassium chloride 20 MEQ/15ML (10%) oral solution 40 mEq, 40 mEq, Oral, PRN **OR** potassium chloride 10 mEq/100 mL IVPB (Peripheral Line), 10 mEq, Intravenous, PRN  nicotine (NICODERM CQ) 21 MG/24HR 1 patch, 1 patch, Transdermal, Daily  acetaminophen (TYLENOL) tablet 650 mg, 650 mg, Oral, Q4H PRN  HYDROcodone-acetaminophen (NORCO) 5-325 MG per tablet 1 tablet, 1 tablet, Oral, Q4H PRN **OR** HYDROcodone-acetaminophen (NORCO) 5-325 MG per tablet 2 tablet, 2 tablet, Oral, Q4H PRN  morphine injection 2 mg, 2 mg, Intravenous, Q2H PRN **OR** morphine (PF) injection 4 mg, 4 mg, Intravenous, Q2H PRN  morphine (PF) 4 MG/ML injection, , , sodium chloride 0.9 % infusion, , ,   glycopyrrolate-formoterol (BEVESPI) 9-4.8 MCG/ACT inhaler 2 puff, 2 puff, Inhalation, BID  insulin lispro (HUMALOG) injection vial 0-18 Units, 0-18 Units, Subcutaneous, Q4H    Allergies:  Neurontin [gabapentin]    Social History:  Current smoker, uses alcohol, denies drug use    Family History: Stroke, HTN, Cancer, DM, Substance abuse    Review of Systems:    Nausea, left ankle pain    Physical Exam:    Vitals:    BP (!) 181/82   Pulse 85   Temp 98 °F (36.7 °C) (Oral)   Resp (!) 116   Ht 6' 5\" (1.956 m)   Wt 191 lb 9.3 oz (86.9 kg)   SpO2 95%   BMI 22.72 kg/m²   Integument:    Right: full thickness ulceration to plantar 5th MTP with surrounding hyperkeratotic tissue and granular wound base after debridement. 1cm x 1.5cm x 0.3cm. No signs of  Infection. No deep probe to bone. Left: laceration to anterior tibial 4cm x 0.3cm x 0.2cm, 2nd toenail avulsion no nail bed laceration no signs of infection noted. Granular base. Left ankle is edematous and mild ecchymotic. No open lesions. Neurologic:  Protective sensation is grossly diminished to light touch at the level of the toes, bilateral.  Vascular:  DP and PT pulses are palpable, bilateral.  CFT is brisk to all toes. Musculoskeletal:  Patient has 5/5 strength on inversion/everison/dorsiflexion/plantarflexion, bilateral.  No gross musculoskeletal deformities noted. Pain to palpation to the ATFL, CFL, minimal to no pain along the lateral malleolus, lateral calcaneal compression, medial malleolus, 5th metatarsal base, navicular, interosseous ligament, or proximal tib fib compression.      Labs:  CBC with Differential:    Lab Results   Component Value Date    WBC 7.5 08/10/2018    RBC 3.61 08/10/2018    HGB 11.6 08/10/2018    HCT 34.6 08/10/2018     08/10/2018    MCV 95.8 08/10/2018    MCH 32.1 08/10/2018    MCHC 33.5 08/10/2018    RDW 13.2 08/10/2018    SEGSPCT 57.3 06/14/2013    BANDSPCT 6 07/11/2017    METACT 2

## 2018-08-10 NOTE — H&P
Hospital Medicine History & Physical      PCP: Abhijeet Leon, APRN - CNP    Date of Admission: 8/9/2018    Date of Service: Pt seen/examined on 8/10/18 and Admitted to Inpatient with expected LOS greater than two midnights due to medical therapy. Chief Complaint:   Chief Complaint   Patient presents with    Fall     Pt states he got out of bed to go to the bathroom and his left leg went out and he fell injuring his left lower leg with a skin tear. Happened on hardwood floor. Pt hit his head. Pt denies LOC. Pt was brought into ER by [facility]. Pt has been assessed per WILMAR Turner. History Of Present Illness:   78 y.o. male with a hx of IDDM, with a prior episode of DKA (Feb 2018), peripheral neuropathy, hepatitis C, hepatic steatosis, hx of L colectomy with colostomy which has since been taken-down, GERD, mild-moderate alcohol consumption, tobacco smoking, COPD, HLD, dCHF, chronic anemia, chronic pain, anxiety/depression, and other comorbidities, who presents to the [facility] ED from home via EMS due to a fall at home. He reportedly got out of bed to go to the bathroom when he fell onto the hardwood floor, hitting his LLE and head (he denied LOC). In the ED, CT head and neck were non-acute. XR L ankle showed a lateral malleolus fracture. He was noticed to have suffered a skin tear to his L shin as well as a L 2nd toenail avulsion (he later showed me the avulsed toenail which he kept in his pocket). The skin tear and toe were wrapped in Kerlix and the L leg/ankle/foot put in a hard boot. Labs showed acute hyperglycemia. He did have mild acidemia (VBG showed 7.27/44/46/20, which was consistent with mild combined metabolic and respiratory acidosis), but apparently without ketoacidosis, as his urine ketones were negative. He had hyponatremia even accounting for the hyperglycemia (corrected Na was 134), as well as hypochloremia and mild hyperkalemia. He was given IVF's and insulin.  When I saw him in the ED, he was resting in bed in NAD. He did c/o 8/10 pain, mainly from his LLE (knee-on-down). He did admit to alcohol consumption. His serum EtOH later came back at 145. UDS was positive for oxycodone; he is not prescribed oxycodone as far as I can tell but is prescribed hydrocodone. Past Medical History:          Diagnosis Date    Anxiety disorder     BPH (benign prostatic hypertrophy)     Calcified granuloma of lung ([address] 2014    2:stable per 3/25/14 CT chest    Cataract 1/20/14    OU:Dr. hayward:[facility]    Chronic pain     COPD (chronic obstructive pulmonary disease) (HCC)     Under care of pulmo(Dr. Calhoun)    Degenerative arthritis of hip     Depression 3/11/2015    Depression with anxiety     Prior psychiatrist & therapist:Dr. Roosevelt Metcalf.  Diabetes mellitus ([address] 2004    Endo Dr. Celio Mcmahan type 1 note below in [facility]    Diabetic eye exam ([facility] 1/20/14    [facility]:Dr. STEPHEN Hayward:No retinopathy. Cataract    Diabetic retinopathy ([facility]     under care of [facility]:Dr. Katelin Weaver    Diverticulitis 11/26/2011    Diverticulosis 11/26/2011    Emphysema     Esophageal candidiasis ([address] 7/16/13    GI:EGD    Essential hypertension, benign 11/2010    Fatty liver 10/9/2012    Foot ulcer:left 9/30/2013    Gastric ulcer, unspecified as acute or chronic, without mention of hemorrhage or perforation     GERD (gastroesophageal reflux disease)     Gout 1997    Right big toe    Hearing decreased     Left ear=60%. Right ear=80%.     Hemangioma 12/2010    Liver as per CT abdo    hepatitis c 7/26/17, 2010    Under care of Liver doc:Dr. Palmira Roman    Hyperlipidemia LDL goal < 100     Low HDL (under 40) 10/9/2012    Peripheral neuropathy 2006    Pneumonia 10/15/13    Pyogenic granuloma     per derm:Dr. Claudia Mcgarry Restrictive lung disease     Under care of pulmo(Dr. Calhoun)    S/P colonoscopy 1996    Done secondary to rectal bleed:dx=hemorrhoids    S/P colonoscopy 11/11/10;10/23/2013    Dr. Jenny Jain 10/2016(3yrs):polyps;diverticulosis. Diverticulosis & polpy(removed). Next in10/2016.  S/P endoscopy 2009    EGD:stomach ulcers.  Superficial phlebitis of left leg 9/30/2013    Tachycardia     Therapeutic drug monitoring 4/8/15    OARRS report is consistent on 4/8/15;7/9/15;10/9/15;1/8/16;4/5/16    Type 1 diabetes mellitus not at goal [facility]) 3/17/14    updated diagnosis as per endo:Dr. Carli Lilly       Past Surgical History:          Procedure Laterality Date    DIAGNOSTIC CARDIAC CATH LAB PROCEDURE  2013    FOOT SURGERY Left Hammer toe, 2nd toe    10/9/14    HIP FRACTURE SURGERY      LEG SURGERY      broken leg    OTHER SURGICAL HISTORY Left 11/21/2013    EXCISION 5TH METATRSAL LEFT FOOT          REVISION COLOSTOMY  06/27/2017    COLOSTOMY REVERSAL     TONSILLECTOMY      UPPER GASTROINTESTINAL ENDOSCOPY  7/16/2013    Esophageal Brushing       Medications Prior to Admission:      Prior to Admission medications    Medication Sig Start Date End Date Taking? Authorizing Provider   Continuous Blood Gluc  (FREESTYLE BRIDGET READER) DENIS Use to monitor sugars 8/1/18  Yes Iker Ralph MD   Continuous Blood Gluc Sensor ([address] [place] [address]) [facility] 8/1/18  Yes Iker Ralph MD   hydrOXYzine (VISTARIL) 25 MG capsule Take 1 capsule by mouth daily as needed for Itching or Anxiety 7/23/18 8/22/18 Yes RICCO Mix CNP   HYDROcodone-acetaminophen (NORCO) 7.5-325 MG per tablet Take 1 tablet by mouth every 6 hours as needed for Pain for up to 28 days. . 7/16/18 8/13/18 Yes RICCO Mix CNP   vitamin B-12 (CYANOCOBALAMIN) 1000 MCG tablet TAKE 1 TABLET BY MOUTH DAILY 7/2/18  Yes RICCO Amador CNP   lisinopril (PRINIVIL;ZESTRIL) 30 MG tablet TAKE 1 TABLET BY MOUTH DAILY 7/2/18  Yes RICCO Amador CNP   folic acid (FOLVITE) 1 MG tablet TAKE 1 TABLET BY MOUTH DAILY 7/2/18  Yes RICCO Amador CNP   tamsulosin KWIKPEN 100 UNIT/ML injection pen Inject 22 Units into the skin nightly Dx Code E 11.9  Patient taking differently: Inject 35 Units into the skin nightly Dx Code E 11.9 3/22/18  Yes Kain Rankin MD   Lancets MISC Testing 2-3 times daily DX Code: E11.9 3/22/18  Yes Kain Rankin MD   thiamine 100 MG tablet Take 1 tablet by mouth daily 3/6/18  Yes Barb Foss APRN - CNP   Blood Glucose Monitoring Suppl (TRUE METRIX AIR GLUCOSE METER) DENIS 1 meter 3/6/18  Yes Barb Foss APRN - CNP   insulin aspart (NOVOLOG) 100 UNIT/ML injection vial Inject 10 Units into the skin 3 times daily (before meals) +SSI    Yes Historical Provider, MD   glucagon 1 MG injection Inject 1 kit into the skin as needed   Yes Historical Provider, MD   SUMAtriptan (IMITREX) 100 MG tablet Take 1 tablet by mouth once as needed for Migraine 4/25/18 4/25/18  Marie Jason MD       Allergies:  Neurontin [gabapentin]    Social History:      The patient currently lives at home, apparently alone. TOBACCO:   reports that he has been smoking Cigarettes. He has a 35.00 pack-year smoking history. He has never used smokeless tobacco.  ETOH:   reports that he drinks alcohol. Family History:      Positive as follows:    Family History   Problem Relation Age of Onset   [place] Stroke Mother     Hypertension Mother     Arthritis Father     Substance Abuse Father         Etoh    Cancer Father         esophageal    Hypertension Father     Diabetes Brother     Diabetes Paternal Aunt     Asthma Neg Hx     Emphysema Neg Hx     Heart Failure Neg Hx        REVIEW OF SYSTEMS:   Pertinent positives as noted in the HPI. All other systems reviewed and negative.     PHYSICAL EXAM PERFORMED:    BP (!) 143/82   Pulse 90   Temp 98.1 °F (36.7 °C) (Oral)   Resp 14   Ht 6' 5\" (1.956 m)   Wt 195 lb (88.5 kg)   SpO2 96%   BMI 23.12 kg/m²     General appearance:  Age-appropriate-appearing 67-yo WM, thin and tall, lying in bed in malleolus. CT CERVICAL SPINE WO CONTRAST   Final Result   No acute abnormality of the cervical spine. CT HEAD WO CONTRAST   Final Result   No acute intracranial abnormality. XR TIBIA FIBULA LEFT (2 VIEWS)   Final Result   Soft-tissue swelling with no acute fracture. ASSESSMENT:    Active Hospital Problems    Diagnosis Date Noted    Chronic transaminasemia [R74.0] 08/10/2018    Tobacco smoker [F17.200] 08/10/2018    Fall at home [E71. Meryle Ducking, L56.346] 08/10/2018    Hypochloremia [E87.8] 08/10/2018    Hyperkalemia [E87.5] 08/10/2018    Mixed metabolic and respiratory acidosis [E87.2] 08/10/2018    Closed nondisplaced L ankle fracture (lateral malleolus) [S82.892A] 08/10/2018    L 2nd toenail avulsion [S91.209A] 08/10/2018    Skin tear of L leg [S81.812A] 08/10/2018    Head trauma [S09.90XA] 08/10/2018    IDDM (insulin dependent diabetes mellitus) ([facility] [E11.9, Z79.4] 08/10/2018    Ventral hernia [K43.9] 08/10/2018    Primary insomnia [F51.01] 07/16/2018    Hx DKA (Feb 2018) [E11.10, Z79.4] 02/24/2018    HTN (hypertension) [I10]     Chronic normocytic anemia [D64.9] 07/18/2017    Acute hyperglycemia [R73.9] 07/18/2017    Chronic dCHF (grade 1 LVDD) [I50.32] 07/02/2017    S/p reversal of colostomy (6/27/17) [K55.9]     Hyponatremia [E87.1] 04/05/2017    S/P left colectomy [Z90.49] 03/10/2017    Multilevel spondylosis [M51.36] 10/17/2014    Chronic pain syndrome [G89.4] 08/26/2013    Mild-moderate alcohol consumption (beer) [F10.20] 06/14/2013    Bilateral knee & hip OA [M17.10] 12/04/2012    History of hepatitis C [Z86.19] 11/29/2012    Anxiety/depression [F41.9] 11/08/2012    Hepatic steatosis [K76.0] 10/09/2012    HLD (hyperlipidemia) [E78.5] 11/29/2011    COPD (chronic obstructive pulmonary disease) ([facility] [J44.9] 04/14/2011    Peripheral neuropathy of bilateral feet [G62.9] 11/12/2010    GERD (gastroesophageal reflux disease) [K21.9] 10/20/2010

## 2018-08-10 NOTE — PROGRESS NOTES
MD Paged:    BG checked at this time and BG= 33. Patient slightly lethargic, clammy, and shaky.      Electronically signed by Pranav Celaya RN on 8/10/2018 at 11:52 AM

## 2018-08-10 NOTE — PROGRESS NOTES
Occupational Therapy   Occupational Therapy Initial Assessment and Treatment  Date: 8/10/2018   Patient Name: Sammy Kidd  MRN: [id number]     : 1950    Date of Service: 8/10/2018    Discharge Recommendations:  24 hour supervision or assist, S Level 3  OT Equipment Recommendations  Equipment Needed: Yes  Other: mobility device rec by PT, seated showering (if allowed to get BLEs wet) vs. sponge bathing seated    Patient Diagnosis(es): The primary encounter diagnosis was Diabetic ketoacidosis without coma associated with type 2 diabetes mellitus ([address] Diagnoses of Fall at home, initial encounter, Closed nondisplaced fracture of lateral malleolus of left fibula, initial encounter, Noninfected skin tear of left lower extremity, initial encounter, Closed head injury, initial encounter, and Diabetic ulcer of right foot associated with type 2 diabetes mellitus, unspecified part of foot, unspecified ulcer stage ([address] were also pertinent to this visit. has a past medical history of Anxiety disorder; BPH (benign prostatic hypertrophy); Calcified granuloma of lung ([address] Cataract; Chronic pain; COPD (chronic obstructive pulmonary disease) ([address] Degenerative arthritis of hip; Depression; Depression with anxiety; Diabetes mellitus ([address] Diabetic eye exam ([address] Diabetic retinopathy ([address] Diverticulitis; Diverticulosis; Emphysema; Esophageal candidiasis ([address] Essential hypertension, benign; Fatty liver; Foot ulcer:left; Gastric ulcer, unspecified as acute or chronic, without mention of hemorrhage or perforation; GERD (gastroesophageal reflux disease); Gout; Hearing decreased; Hemangioma; hepatitis c; Hyperlipidemia LDL goal < 100; Low HDL (under 40); Peripheral neuropathy; Pneumonia; Pyogenic granuloma; Restrictive lung disease; S/P colonoscopy; S/P colonoscopy; S/P endoscopy; Superficial phlebitis of left leg; Tachycardia; Therapeutic drug monitoring; and Type 1 diabetes mellitus not at goal [facility]).    has a distances, alternates between [facility] and [facility])  Transfer Assistance: Independent  Active : Yes  Occupation: Retired  Leisure & Hobbies: Watch TV  Additional Comments: Leaves his home ~ 2x/wk     Objective   Orientation  Overall Orientation Status: Within Functional Limits     Balance  Sitting Balance: Supervision  Standing Balance: Minimal assistance (with SW)    Functional Mobility Comments: Pt able to take 2 steps bed > chair > bed > chair with gait belt, SW, and min A with max VCs. Poor safety awareness regarding safe t/fs, safe use of SW, and maintaining WB status. ADL  Grooming: Independent (to comb hair seated)     Tone RUE  RUE Tone: Normotonic  Tone LUE  LUE Tone: Normotonic  Coordination  Movements Are Fluid And Coordinated: Yes     Bed mobility  Supine to Sit: Independent (HOB flat, no rail)  Sit to Supine: Independent  Scooting: Supervision (to EOB)     Transfers  Sit to stand: Minimal assistance (with SW while maintaining NWB status; pt initially stood impulsively from EOB with supervision and no device without maintaing WB)  Stand to sit: Contact guard assistance     Cognition  Overall Cognitive Status: Exceptions  Following Commands:  Follows one step commands with repetition  Attention Span: Attends with cues to redirect  Safety Judgement: Decreased awareness of need for safety;Decreased awareness of need for assistance  Problem Solving: Assistance required to generate solutions;Assistance required to implement solutions;Assistance required to identify errors made;Assistance required to correct errors made;Decreased awareness of errors  Insights: Decreased awareness of deficits  Initiation: Requires cues for some  Sequencing: Requires cues for some     Sensation  Overall Sensation Status: Impaired (Pt reports numbness distal to R elbow 2/2 baseline radial nerve injury and numbness in RLE distal to bottom third of calf)      LUE AROM (degrees)  LUE AROM : WF  RUE AROM (degrees)  RUE AROM : [facility] LUE Strength  Gross LUE Strength: WFL (5/5)  RUE Strength  Gross RUE Strength: WFL (5/5)      Education: Role of OT, safe t/f training, safe use of DME, awareness of deficits, discharge planning, ADL as therapeutic exercise, importance of OOB, WB status    Assessment   Performance deficits / Impairments: Decreased functional mobility ; Decreased ADL status; Decreased safe awareness;Decreased cognition;Decreased balance;Decreased high-level IADLs  After evaluation, pt found to be presenting with the above mentioned deficits. Pt would benefit from continued skilled occupational therapy to address these deficits, increasing safety and independence with ADL and functional mobility. Pt has good potential to meet therapy goals. Will continue to assess for discharge needs. Rec SNF. Prognosis: Good  Decision Making: Medium Complexity  REQUIRES OT FOLLOW UP: Yes  Activity Tolerance  Activity Tolerance: Patient Tolerated treatment well  Safety Devices  Safety Devices in place: Yes  Type of devices: Call light within reach; Chair alarm in place; Left in chair;Gait belt;Nurse notified         Plan   Plan  Times per week: 3-5    G-Code  OT G-codes  Functional Assessment Tool Used: -PAC  Score: 21  Functional Limitation: Self care  Self Care Current Status (): At least 20 percent but less than 40 percent impaired, limited or restricted  Self Care Goal Status ():  At least 1 percent but less than 20 percent impaired, limited or restricted    AM-PAC Score   AM-[facility] Inpatient Daily Activity Raw Score: 21  AM-PAC Inpatient ADL T-Scale Score : 44.27  ADL Inpatient CMS 0-100% Score: 32.79  ADL Inpatient CMS G-Code Modifier : CJ    Goals  Short term goals  Time Frame for Short term goals: 1 week unless otherwise specified  Short term goal 1: LB dressing with supervision  Short term goal 2: Toilet t/f and hygiene with supervision  Short term goal 3: Household functional mobility with supervision  Short term goal 4: 5 mins of dynamic standing activity with supervision  Short term goal 5: Maintain WB status with min VCs and SBA by 8/15/18. Patient Goals   Patient goals : \"Get out of bed. \"       Therapy Time   Individual Concurrent Group Co-treatment   Time In 4567         Time Out 1503         Minutes 20         Timed Code Treatment Minutes: 10 Minutes     If patient is discharged prior to next treatment session, this note will serve as the discharge summary.   Anya Guardado, OTR/L #[id number]

## 2018-08-10 NOTE — PLAN OF CARE
Problem: Falls - Risk of:  Goal: Will remain free from falls  Will remain free from falls   Outcome: Ongoing  Pt high fall risk. Instructed to use call light before getting out of bed. Call light within reach. Bed in low position. Bed alarm on. Will continue to monitor. Problem: Pain:  Goal: Pain level will decrease  Pain level will decrease   Outcome: Ongoing  Pt states he undestands the 0-10 numeric pain scale. Pt also states that his pain is being adequately treated and will reach out to report changes. Problem: Risk for Impaired Skin Integrity  Goal: Tissue integrity - skin and mucous membranes  Structural intactness and normal physiological function of skin and  mucous membranes. Outcome: Ongoing  Pt is a Q2H, educated pt on preventing skin breakdown.  See flowsheet for assessment

## 2018-08-11 LAB
ALBUMIN SERPL-MCNC: 3.5 G/DL (ref 3.4–5)
ALP BLD-CCNC: 66 U/L (ref 40–129)
ALT SERPL-CCNC: 67 U/L (ref 10–40)
ANION GAP SERPL CALCULATED.3IONS-SCNC: 10 MMOL/L (ref 3–16)
AST SERPL-CCNC: 64 U/L (ref 15–37)
BASOPHILS ABSOLUTE: 0 K/UL (ref 0–0.2)
BASOPHILS RELATIVE PERCENT: 0.7 %
BILIRUB SERPL-MCNC: 0.8 MG/DL (ref 0–1)
BILIRUBIN DIRECT: <0.2 MG/DL (ref 0–0.3)
BILIRUBIN, INDIRECT: ABNORMAL MG/DL (ref 0–1)
BUN BLDV-MCNC: 12 MG/DL (ref 7–20)
CALCIUM SERPL-MCNC: 8.4 MG/DL (ref 8.3–10.6)
CHLORIDE BLD-SCNC: 97 MMOL/L (ref 99–110)
CO2: 25 MMOL/L (ref 21–32)
CREAT SERPL-MCNC: 0.6 MG/DL (ref 0.8–1.3)
EOSINOPHILS ABSOLUTE: 0.2 K/UL (ref 0–0.6)
EOSINOPHILS RELATIVE PERCENT: 3 %
GFR AFRICAN AMERICAN: >60
GFR NON-AFRICAN AMERICAN: >60
GLUCOSE BLD-MCNC: 153 MG/DL (ref 70–99)
GLUCOSE BLD-MCNC: 168 MG/DL (ref 70–99)
GLUCOSE BLD-MCNC: 171 MG/DL (ref 70–99)
GLUCOSE BLD-MCNC: 250 MG/DL (ref 70–99)
GLUCOSE BLD-MCNC: 284 MG/DL (ref 70–99)
GLUCOSE BLD-MCNC: 320 MG/DL (ref 70–99)
GLUCOSE BLD-MCNC: 364 MG/DL (ref 70–99)
GLUCOSE BLD-MCNC: 371 MG/DL (ref 70–99)
GLUCOSE BLD-MCNC: 475 MG/DL (ref 70–99)
HCT VFR BLD CALC: 34.6 % (ref 40.5–52.5)
HEMOGLOBIN: 11.8 G/DL (ref 13.5–17.5)
LV EF: 63 %
LVEF MODALITY: NORMAL
LYMPHOCYTES ABSOLUTE: 1.8 K/UL (ref 1–5.1)
LYMPHOCYTES RELATIVE PERCENT: 30.5 %
MAGNESIUM: 2 MG/DL (ref 1.8–2.4)
MCH RBC QN AUTO: 32 PG (ref 26–34)
MCHC RBC AUTO-ENTMCNC: 34 G/DL (ref 31–36)
MCV RBC AUTO: 94 FL (ref 80–100)
MONOCYTES ABSOLUTE: 0.8 K/UL (ref 0–1.3)
MONOCYTES RELATIVE PERCENT: 12.9 %
NEUTROPHILS ABSOLUTE: 3.1 K/UL (ref 1.7–7.7)
NEUTROPHILS RELATIVE PERCENT: 52.9 %
PDW BLD-RTO: 13.1 % (ref 12.4–15.4)
PERFORMED ON: ABNORMAL
PHOSPHORUS: 3.2 MG/DL (ref 2.5–4.9)
PLATELET # BLD: 196 K/UL (ref 135–450)
PMV BLD AUTO: 8 FL (ref 5–10.5)
POTASSIUM SERPL-SCNC: 4.7 MMOL/L (ref 3.5–5.1)
RBC # BLD: 3.69 M/UL (ref 4.2–5.9)
SODIUM BLD-SCNC: 132 MMOL/L (ref 136–145)
TOTAL PROTEIN: 6.7 G/DL (ref 6.4–8.2)
WBC # BLD: 5.9 K/UL (ref 4–11)

## 2018-08-11 PROCEDURE — 2580000003 HC RX 258: Performed by: INTERNAL MEDICINE

## 2018-08-11 PROCEDURE — 6370000000 HC RX 637 (ALT 250 FOR IP): Performed by: INTERNAL MEDICINE

## 2018-08-11 PROCEDURE — 83735 ASSAY OF MAGNESIUM: CPT

## 2018-08-11 PROCEDURE — 6370000000 HC RX 637 (ALT 250 FOR IP): Performed by: HOSPITALIST

## 2018-08-11 PROCEDURE — 80076 HEPATIC FUNCTION PANEL: CPT

## 2018-08-11 PROCEDURE — 2580000003 HC RX 258: Performed by: HOSPITALIST

## 2018-08-11 PROCEDURE — 85025 COMPLETE CBC W/AUTO DIFF WBC: CPT

## 2018-08-11 PROCEDURE — 94760 N-INVAS EAR/PLS OXIMETRY 1: CPT

## 2018-08-11 PROCEDURE — 84100 ASSAY OF PHOSPHORUS: CPT

## 2018-08-11 PROCEDURE — 96376 TX/PRO/DX INJ SAME DRUG ADON: CPT

## 2018-08-11 PROCEDURE — 94640 AIRWAY INHALATION TREATMENT: CPT

## 2018-08-11 PROCEDURE — 6360000002 HC RX W HCPCS: Performed by: HOSPITALIST

## 2018-08-11 PROCEDURE — G0378 HOSPITAL OBSERVATION PER HR: HCPCS

## 2018-08-11 PROCEDURE — 1200000000 HC SEMI PRIVATE

## 2018-08-11 PROCEDURE — 97535 SELF CARE MNGMENT TRAINING: CPT

## 2018-08-11 PROCEDURE — 93306 TTE W/DOPPLER COMPLETE: CPT

## 2018-08-11 PROCEDURE — 36415 COLL VENOUS BLD VENIPUNCTURE: CPT

## 2018-08-11 PROCEDURE — 94761 N-INVAS EAR/PLS OXIMETRY MLT: CPT

## 2018-08-11 PROCEDURE — 80048 BASIC METABOLIC PNL TOTAL CA: CPT

## 2018-08-11 PROCEDURE — 96375 TX/PRO/DX INJ NEW DRUG ADDON: CPT

## 2018-08-11 RX ORDER — INSULIN GLARGINE 100 [IU]/ML
22 INJECTION, SOLUTION SUBCUTANEOUS NIGHTLY
Status: DISCONTINUED | OUTPATIENT
Start: 2018-08-11 | End: 2018-08-12

## 2018-08-11 RX ADMIN — PANTOPRAZOLE SODIUM 40 MG: 40 TABLET, DELAYED RELEASE ORAL at 10:17

## 2018-08-11 RX ADMIN — HYDROCODONE BITARTRATE AND ACETAMINOPHEN 2 TABLET: 5; 325 TABLET ORAL at 11:50

## 2018-08-11 RX ADMIN — HYDRALAZINE HYDROCHLORIDE 50 MG: 25 TABLET, FILM COATED ORAL at 22:00

## 2018-08-11 RX ADMIN — SODIUM CHLORIDE, PRESERVATIVE FREE 10 ML: 5 INJECTION INTRAVENOUS at 01:24

## 2018-08-11 RX ADMIN — SODIUM CHLORIDE, PRESERVATIVE FREE 10 ML: 5 INJECTION INTRAVENOUS at 04:55

## 2018-08-11 RX ADMIN — METOPROLOL TARTRATE 25 MG: 25 TABLET ORAL at 10:17

## 2018-08-11 RX ADMIN — ONDANSETRON 4 MG: 2 INJECTION INTRAMUSCULAR; INTRAVENOUS at 05:01

## 2018-08-11 RX ADMIN — SERTRALINE HYDROCHLORIDE 25 MG: 50 TABLET ORAL at 10:17

## 2018-08-11 RX ADMIN — MORPHINE SULFATE 4 MG: 4 INJECTION INTRAVENOUS at 14:14

## 2018-08-11 RX ADMIN — SODIUM CHLORIDE: 9 INJECTION, SOLUTION INTRAVENOUS at 06:42

## 2018-08-11 RX ADMIN — ONDANSETRON 4 MG: 2 INJECTION INTRAMUSCULAR; INTRAVENOUS at 22:00

## 2018-08-11 RX ADMIN — GLYCOPYRROLATE AND FORMOTEROL FUMARATE 2 PUFF: 9; 4.8 AEROSOL, METERED RESPIRATORY (INHALATION) at 21:38

## 2018-08-11 RX ADMIN — Medication 100 MG: at 10:17

## 2018-08-11 RX ADMIN — INSULIN GLARGINE 22 UNITS: 100 INJECTION, SOLUTION SUBCUTANEOUS at 22:02

## 2018-08-11 RX ADMIN — INSULIN LISPRO 12 UNITS: 100 INJECTION, SOLUTION INTRAVENOUS; SUBCUTANEOUS at 08:28

## 2018-08-11 RX ADMIN — SODIUM CHLORIDE, PRESERVATIVE FREE 10 ML: 5 INJECTION INTRAVENOUS at 10:21

## 2018-08-11 RX ADMIN — MORPHINE SULFATE 4 MG: 4 INJECTION INTRAVENOUS at 04:53

## 2018-08-11 RX ADMIN — INSULIN LISPRO 2 UNITS: 100 INJECTION, SOLUTION INTRAVENOUS; SUBCUTANEOUS at 18:24

## 2018-08-11 RX ADMIN — METOPROLOL TARTRATE 25 MG: 25 TABLET ORAL at 22:00

## 2018-08-11 RX ADMIN — MORPHINE SULFATE 4 MG: 4 INJECTION INTRAVENOUS at 01:23

## 2018-08-11 RX ADMIN — HYDROCODONE BITARTRATE AND ACETAMINOPHEN 2 TABLET: 5; 325 TABLET ORAL at 22:15

## 2018-08-11 RX ADMIN — MORPHINE SULFATE 4 MG: 4 INJECTION INTRAVENOUS at 10:16

## 2018-08-11 RX ADMIN — FINASTERIDE 5 MG: 5 TABLET, FILM COATED ORAL at 10:17

## 2018-08-11 RX ADMIN — INSULIN LISPRO 10 UNITS: 100 INJECTION, SOLUTION INTRAVENOUS; SUBCUTANEOUS at 18:30

## 2018-08-11 RX ADMIN — SODIUM CHLORIDE: 9 INJECTION, SOLUTION INTRAVENOUS at 18:23

## 2018-08-11 RX ADMIN — MORPHINE SULFATE 4 MG: 4 INJECTION INTRAVENOUS at 17:54

## 2018-08-11 RX ADMIN — HYDROCODONE BITARTRATE AND ACETAMINOPHEN 2 TABLET: 5; 325 TABLET ORAL at 06:05

## 2018-08-11 RX ADMIN — INSULIN LISPRO 10 UNITS: 100 INJECTION, SOLUTION INTRAVENOUS; SUBCUTANEOUS at 14:10

## 2018-08-11 RX ADMIN — TAMSULOSIN HYDROCHLORIDE 0.8 MG: 0.4 CAPSULE ORAL at 10:17

## 2018-08-11 RX ADMIN — VITAMIN E CAP 400 UNIT 800 UNITS: 400 CAP at 10:18

## 2018-08-11 RX ADMIN — FOLIC ACID 1 MG: 1 TABLET ORAL at 10:18

## 2018-08-11 RX ADMIN — INSULIN LISPRO 8 UNITS: 100 INJECTION, SOLUTION INTRAVENOUS; SUBCUTANEOUS at 14:10

## 2018-08-11 RX ADMIN — MORPHINE SULFATE 4 MG: 4 INJECTION INTRAVENOUS at 20:23

## 2018-08-11 RX ADMIN — Medication 6 MG: at 21:59

## 2018-08-11 RX ADMIN — INSULIN LISPRO 1 UNITS: 100 INJECTION, SOLUTION INTRAVENOUS; SUBCUTANEOUS at 22:02

## 2018-08-11 RX ADMIN — HYDRALAZINE HYDROCHLORIDE 50 MG: 25 TABLET, FILM COATED ORAL at 10:17

## 2018-08-11 RX ADMIN — CYANOCOBALAMIN TAB 500 MCG 1000 MCG: 500 TAB at 10:17

## 2018-08-11 RX ADMIN — GLYCOPYRROLATE AND FORMOTEROL FUMARATE 2 PUFF: 9; 4.8 AEROSOL, METERED RESPIRATORY (INHALATION) at 07:50

## 2018-08-11 ASSESSMENT — PAIN SCALES - GENERAL
PAINLEVEL_OUTOF10: 8
PAINLEVEL_OUTOF10: 9
PAINLEVEL_OUTOF10: 9
PAINLEVEL_OUTOF10: 8
PAINLEVEL_OUTOF10: 7
PAINLEVEL_OUTOF10: 7
PAINLEVEL_OUTOF10: 9
PAINLEVEL_OUTOF10: 8
PAINLEVEL_OUTOF10: 8
PAINLEVEL_OUTOF10: 7
PAINLEVEL_OUTOF10: 9
PAINLEVEL_OUTOF10: 8

## 2018-08-11 NOTE — PROGRESS NOTES
cooperative. HEENT:  Conjunctivae/corneas clear. Neck: Supple, with full range of motion. Trachea midline. Respiratory:  Normal respiratory effort. Clear to auscultation, bilaterally without Rales/Wheezes/Rhonchi. Cardiovascular: Regular rate and rhythm with normal S1/S2 without murmurs, rubs or gallops. Abdomen: Soft, non-tender, non-distended with normal bowel sounds. Musculoskeletal: ACE wrap over L ankle, no edema  Skin: Skin color, texture, turgor normal.  No rashes or lesions. Neurologic: Cranial nerves: II-XII intact, grossly non-focal.  Psychiatric: Alert and oriented, thought content appropriate, normal insight    Labs:   Recent Labs      08/10/18   0031  08/11/18   0421   WBC  7.5  5.9   HGB  11.6*  11.8*   HCT  34.6*  34.6*   PLT  218  196     Recent Labs      08/10/18   0031 08/11/18   0421   NA  122*  132*   K  5.3*  4.7   CL  87*  97*   CO2  21  25   BUN  19  12   CREATININE  0.8  0.6*   CALCIUM  8.5  8.4   PHOS   --   3.2     Recent Labs      08/10/18   0031  08/11/18   0421   AST  80*  64*   ALT  72*  67*   BILIDIR   --   <0.2   BILITOT  0.3  0.8   ALKPHOS  68  66     Recent Labs      08/10/18   0807   INR  0.95     Recent Labs      08/10/18   0031  08/10/18   1141   CKTOTAL   --   242   TROPONINI  <0.01  <0.01       Assessment/Plan:    Active Hospital Problems    Diagnosis Date Noted    Chronic transaminasemia [R74.0] 08/10/2018    Tobacco smoker [F17.200] 08/10/2018    Fall at home [I82. Katya Roles, Y31.947] 08/10/2018    Hypochloremia [E87.8] 08/10/2018    Hyperkalemia [E87.5] 08/10/2018    Mixed metabolic and respiratory acidosis [E87.2] 08/10/2018    Closed nondisplaced L ankle fracture (lateral malleolus) [S82.892A] 08/10/2018    L 2nd toenail avulsion [S91.209A] 08/10/2018    Skin tear of L leg [S81.812A] 08/10/2018    Head trauma [S09.90XA] 08/10/2018    IDDM (insulin dependent diabetes mellitus) ([facility] [E11.9, Z79.4] 08/10/2018    Ventral hernia [K43.9] 08/10/2018    Diabetic ketoacidosis without coma associated with type 2 diabetes mellitus ([address] [E11.10]     Primary insomnia [F51.01] 07/16/2018    Hx DKA (Feb 2018) [E11.10, Z79.4] 02/24/2018    HTN (hypertension) [I10]     Chronic normocytic anemia [D64.9] 07/18/2017    Acute hyperglycemia [R73.9] 07/18/2017    Chronic dCHF (grade 1 LVDD) [I50.32] 07/02/2017    S/p reversal of colostomy (6/27/17) [K55.9]     Hyponatremia [E87.1] 04/05/2017    S/P left colectomy [Z90.49] 03/10/2017    Multilevel spondylosis [M51.36] 10/17/2014    Chronic pain syndrome [G89.4] 08/26/2013    Mild-moderate alcohol consumption (beer) [F10.20] 06/14/2013    Bilateral knee & hip OA [M17.10] 12/04/2012    History of hepatitis C [Z86.19] 11/29/2012    Anxiety/depression [F41.9] 11/08/2012    Hepatic steatosis [K76.0] 10/09/2012    HLD (hyperlipidemia) [E78.5] 11/29/2011    COPD (chronic obstructive pulmonary disease) ([facility] [J44.9] 04/14/2011    Peripheral neuropathy of bilateral feet [G62.9] 11/12/2010    GERD (gastroesophageal reflux disease) [K21.9] 10/20/2010     Acute hyperglycemia. No clinical signs of DKA at this time. Hx of IDDM  - Titrate insulin regimen  - IVF hydration  - Diabetes educator on board    L lateral malleolus fracture and 2nd toenail avulsion s/p fall. Appears mechanical.  Non-syncopal.  ECHO without significant valvular disease  - Monitor tele  - podiatry on board  - wound care    Hyponatremia & Hypochloremia. Likely 2/2 beer potomania vs low solute. - C/w IVF hydration  - Trend Na closely    Mild-Moderate Alcohol Consumption. Serum EtOH elevated to 145  - monitor CIWAs scores  - no clinical signs of withdrawal at this time    Mixed Metabolic & Respiratory Acidosis. Improved    Ventral Abd Hernia. Hx of L-colectomy with colostomy and subsequent take down.   - GS consulted    PMH of tobacco abuse, ventral abd hernia, htn, anxiety, PMH    DVT Prophylaxis: SCDs  Diet: DIET CARB CONTROL; Carb Control: 4 carb choices (60 gms)/meal  Dietary Nutrition Supplements: Diabetic Oral Supplement  Code Status: Full Code    PT/OT Eval Status: on board    Dispo - Home vs SNF in 2-3 days    Giuliano Castaneda MD

## 2018-08-11 NOTE — PROGRESS NOTES
Nutrition Assessment    Type and Reason for Visit: Initial, Positive Nutrition Screen    Nutrition Recommendations:   Continue carb controlled diet   Add glucerna ONS BID  Monitor nutrition adequacy, pertinent labs, bowel habits, wt changes, and clinical progress      Malnutrition Assessment:  · Malnutrition Status: At risk for malnutrition    Nutrition Diagnosis:   · Problem: Increased nutrient needs (protein )  · Etiology: related to Increased demand for energy/nutrients due to     Signs and symptoms:  as evidenced by Presence of wounds    Nutrition Assessment:  · Subjective Assessment: Positive nutrition screen for wounds. Pt is a 77 y/o male admitted with hyperglycemia. -475 mg/dL today. Hx of uncontrolled DM. Ordered on carb controlled diet. Pt seen in room, reports good appetite and eating well. States BG elevated \"no matter what\". He reports having recieved carb controlled diet in the past and declined education. Writer reviewed diet and encouraged compliance to help control BG. States he has been to OP diabetes classes and interested in re-attending. Will modify diet to 4 CHO restriction. Per EMR, wounds to Rt heel. Podiatry and wound care following. Encouraged increased protien sources to promote wound healing. Likes glucerna ONS. WIll monitor. · Nutrition-Focused Physical Findings:    · Wound Type:  Wound Consult Pending (Unstageable to Left heel )  · Current Nutrition Therapies:  · Oral Diet Orders: Carb Control 4 Carbs/Meal   · Oral Diet intake: %  · Oral Nutrition Supplement (ONS) Orders: Diabetic Oral Supplement  · Anthropometric Measures:  · Ht: 6' 5\" (195.6 cm)   · Current Body Wt: 191 lb (86.6 kg)  · Ideal Body Wt: 208 lb (94.3 kg)   · BMI Classification: BMI 18.5 - 24.9 Normal Weight  · Comparative Standards (Estimated Nutrition Needs):  · Estimated Daily Total Kcal: 1232-1325  · Estimated Daily Protein (g): 113-131    Estimated Intake vs Estimated Needs: Intake

## 2018-08-11 NOTE — PROGRESS NOTES
Bonny Daniels is a 78 y.o. male patient seen today has some pain to the left tibia. States his pain to his ankle has improved.     Current Facility-Administered Medications   Medication Dose Route Frequency Provider Last Rate Last Dose    albuterol sulfate  (90 Base) MCG/ACT inhaler 2 puff  2 puff Inhalation Q4H PRN Destiny Cheek MD        finasteride (PROSCAR) tablet 5 mg  5 mg Oral Daily Destiny Cheek MD   5 mg at 90/10/72 5623    folic acid (FOLVITE) tablet 1 mg  1 mg Oral Daily Destiny Cheek MD   1 mg at 08/11/18 1018    hydrALAZINE (APRESOLINE) tablet 50 mg  50 mg Oral TID Destiny Cheek MD   50 mg at 08/11/18 1017    hydrOXYzine (VISTARIL) capsule 25 mg  25 mg Oral Daily PRN Destiny Cheek MD        ibuprofen (ADVIL;MOTRIN) tablet 200 mg  200 mg Oral Q4H PRN Destiny Cheek MD   200 mg at 08/10/18 0541    melatonin tablet 6 mg  6 mg Oral Nightly PRN Destiny Cheek MD   6 mg at 08/10/18 2143    metoclopramide (REGLAN) tablet 10 mg  10 mg Oral 4x Daily PRN Destiny Cheek MD        metoprolol tartrate (LOPRESSOR) tablet 25 mg  25 mg Oral BID Destiny Cheek MD   25 mg at 08/11/18 1017    pantoprazole (PROTONIX) tablet 40 mg  40 mg Oral Daily Destiny Cheek MD   40 mg at 08/11/18 1017    sertraline (ZOLOFT) tablet 25 mg  25 mg Oral Daily Destiny Cheek MD   25 mg at 08/11/18 1017    tamsulosin (FLOMAX) capsule 0.8 mg  0.8 mg Oral Daily Destiny hCeek MD   0.8 mg at 08/11/18 1017    vitamin B-1 (THIAMINE) tablet 100 mg  100 mg Oral Daily Destiny Cheek MD   100 mg at 08/11/18 1017    vitamin B-12 (CYANOCOBALAMIN) tablet 1,000 mcg  1,000 mcg Oral Daily Destiny Cheek MD   1,000 mcg at 08/11/18 1017    vitamin E capsule 800 Units  800 Units Oral Daily Destiny Cheek MD   800 Units at 08/11/18 1018    sodium chloride flush 0.9 % injection 10 mL  10 mL Intravenous 2 times per day Destiny Cheek MD glycopyrrolate-formoterol (BEVESPI) 9-4.8 MCG/ACT inhaler 2 puff  2 puff Inhalation BID Atul Brown MD   2 puff at 08/11/18 0750    insulin lispro (HUMALOG) injection vial 0-12 Units  0-12 Units Subcutaneous TID WC Fauzia Brown MD   12 Units at 08/11/18 5231    insulin lispro (HUMALOG) injection vial 0-6 Units  0-6 Units Subcutaneous Nightly Fauzia Brown MD   4 Units at 08/10/18 2124     Allergies   Allergen Reactions    Neurontin [Gabapentin]      Gastric side effects     Principal Problem:    Acute hyperglycemia  Active Problems:    GERD (gastroesophageal reflux disease)    Peripheral neuropathy of bilateral feet    COPD (chronic obstructive pulmonary disease) (HCC)    HLD (hyperlipidemia)    Hepatic steatosis    Anxiety/depression    History of hepatitis C    Bilateral knee & hip OA    Mild-moderate alcohol consumption (beer)    Chronic pain syndrome    Multilevel spondylosis    S/P left colectomy    Hyponatremia    S/p reversal of colostomy (6/27/17)    Chronic dCHF (grade 1 LVDD)    Chronic normocytic anemia    HTN (hypertension)    Hx DKA (Feb 2018)    Primary insomnia    Chronic transaminasemia    Tobacco smoker    Fall at home    Hypochloremia    Hyperkalemia    Mixed metabolic and respiratory acidosis    Closed nondisplaced L ankle fracture (lateral malleolus)    L 2nd toenail avulsion    Skin tear of L leg    Head trauma    IDDM (insulin dependent diabetes mellitus) ([address]    Ventral hernia    Diabetic ketoacidosis without coma associated with type 2 diabetes mellitus ([facility]  Resolved Problems:    * No resolved hospital problems. *    Blood pressure 126/71, pulse 92, temperature 97.8 °F (36.6 °C), temperature source Oral, resp. rate 16, height 6' 5\" (1.956 m), weight 191 lb 9.3 oz (86.9 kg), SpO2 97 %. Subjective  Objective   Integument:    Right: full thickness ulceration to plantar 5th MTP with surrounding hyperkeratotic tissue and granular wound base after debridement.  1cm x

## 2018-08-11 NOTE — PROGRESS NOTES
End of shift report given to Maury Ramos RN at bedside. Patient alert and oriented. Bed in lowest position with wheels locked. Call light within reach. No further needs at this time.

## 2018-08-11 NOTE — PROGRESS NOTES
Patient requested to remove post-op shoe from right foot during sleep. Discussed patient's request with orthopedic floor charge nurse, who advised that due to reason for placement, and the fact that patient is laying in bed without bearing weight to extremity at this time, boot could safely be removed until morning. Patient advised that he is to remain in bed while post-op shoe is off, and that if he needs to get out of bed for any reason, to call for assistance so that shoe can be re-applied. Will handoff request for clarification about post-op shoe application/removal during sleep to day shift nurse. Will continue to monitor patient.

## 2018-08-11 NOTE — PLAN OF CARE
Problem: Serum Glucose Level - Abnormal:  Goal: Ability to maintain appropriate glucose levels will improve  Ability to maintain appropriate glucose levels will improve  Outcome: Ongoing  Pt at risk for elevated blood glucose levels R/T DM. Pt will have glucose levels monitored prior to breakfast, lunch, dinner, and bedtime. Pt understands the need to monitor levels and has no further complaints at this time.

## 2018-08-11 NOTE — PROGRESS NOTES
surgical shoe on R foot seated EOB)   Bed mobility  Supine to Sit: Independent (HOB flat, no rail)  Sit to Supine: Independent - pt had to return to supine 2/2 orthostatic hypotension  Scooting: Supervision (to EOB)      Transfers  Sit to stand: CGA (with SW while maintaining recommended WB status)  Stand to sit: Contact guard assistance with uncontrolled descent despite cues initially; pt re-educated and able to demo controlled sit with proper technique with SBA. On/off toilet: CGA with VCs    Cognition  Overall Cognitive Status: Exceptions  Following Commands: Follows one step commands with repetition  Attention Span: Attends with cues to redirect  Safety Judgement: Decreased awareness of need for safety;Decreased awareness of need for assistance  Problem Solving: Assistance required to generate solutions;Assistance required to implement solutions;Assistance required to identify errors made;Assistance required to correct errors made;Decreased awareness of errors  Insights: Decreased awareness of deficits  Initiation: Requires cues for some  Sequencing: Requires cues for some     Education: Role of OT, safe t/f training, safe use of DME, awareness of deficits, discharge planning, ADL as therapeutic exercise, importance of OOB, WB status     Assessment   Performance deficits / Impairments: Decreased functional mobility ; Decreased ADL status; Decreased safe awareness;Decreased cognition;Decreased balance;Decreased high-level IADLs  After tx, pt found to be presenting with the above mentioned deficits. Pt would benefit from continued skilled occupational therapy to address these deficits, increasing safety and independence with ADL and functional mobility. Pt has good potential to meet therapy goals. Will continue to assess for discharge needs.      Prognosis: Good  Decision Making: Medium Complexity  REQUIRES OT FOLLOW UP: Yes    Activity Tolerance: Limited by orthostatic hypotension.  RN notified immediately after pt

## 2018-08-12 LAB
ALBUMIN SERPL-MCNC: 3.1 G/DL (ref 3.4–5)
ALP BLD-CCNC: 59 U/L (ref 40–129)
ALT SERPL-CCNC: 67 U/L (ref 10–40)
ANION GAP SERPL CALCULATED.3IONS-SCNC: 11 MMOL/L (ref 3–16)
AST SERPL-CCNC: 79 U/L (ref 15–37)
BASOPHILS ABSOLUTE: 0 K/UL (ref 0–0.2)
BASOPHILS RELATIVE PERCENT: 0.5 %
BILIRUB SERPL-MCNC: 0.6 MG/DL (ref 0–1)
BILIRUBIN DIRECT: <0.2 MG/DL (ref 0–0.3)
BILIRUBIN, INDIRECT: ABNORMAL MG/DL (ref 0–1)
BUN BLDV-MCNC: 11 MG/DL (ref 7–20)
CALCIUM SERPL-MCNC: 8.4 MG/DL (ref 8.3–10.6)
CHLORIDE BLD-SCNC: 96 MMOL/L (ref 99–110)
CO2: 25 MMOL/L (ref 21–32)
CREAT SERPL-MCNC: 0.7 MG/DL (ref 0.8–1.3)
EOSINOPHILS ABSOLUTE: 0.3 K/UL (ref 0–0.6)
EOSINOPHILS RELATIVE PERCENT: 4.5 %
GFR AFRICAN AMERICAN: >60
GFR NON-AFRICAN AMERICAN: >60
GLUCOSE BLD-MCNC: 102 MG/DL (ref 70–99)
GLUCOSE BLD-MCNC: 142 MG/DL (ref 70–99)
GLUCOSE BLD-MCNC: 194 MG/DL (ref 70–99)
GLUCOSE BLD-MCNC: 240 MG/DL (ref 70–99)
GLUCOSE BLD-MCNC: 257 MG/DL (ref 70–99)
GLUCOSE BLD-MCNC: 287 MG/DL (ref 70–99)
GLUCOSE BLD-MCNC: 308 MG/DL (ref 70–99)
GLUCOSE BLD-MCNC: 95 MG/DL (ref 70–99)
HCT VFR BLD CALC: 32.1 % (ref 40.5–52.5)
HEMOGLOBIN: 11.1 G/DL (ref 13.5–17.5)
LYMPHOCYTES ABSOLUTE: 2 K/UL (ref 1–5.1)
LYMPHOCYTES RELATIVE PERCENT: 35.5 %
MAGNESIUM: 1.9 MG/DL (ref 1.8–2.4)
MCH RBC QN AUTO: 32.2 PG (ref 26–34)
MCHC RBC AUTO-ENTMCNC: 34.5 G/DL (ref 31–36)
MCV RBC AUTO: 93.3 FL (ref 80–100)
MONOCYTES ABSOLUTE: 0.7 K/UL (ref 0–1.3)
MONOCYTES RELATIVE PERCENT: 11.9 %
NEUTROPHILS ABSOLUTE: 2.7 K/UL (ref 1.7–7.7)
NEUTROPHILS RELATIVE PERCENT: 47.6 %
PDW BLD-RTO: 12.8 % (ref 12.4–15.4)
PERFORMED ON: ABNORMAL
PERFORMED ON: NORMAL
PHOSPHORUS: 3 MG/DL (ref 2.5–4.9)
PLATELET # BLD: 194 K/UL (ref 135–450)
PMV BLD AUTO: 8.1 FL (ref 5–10.5)
POTASSIUM SERPL-SCNC: 4.1 MMOL/L (ref 3.5–5.1)
RBC # BLD: 3.44 M/UL (ref 4.2–5.9)
SODIUM BLD-SCNC: 132 MMOL/L (ref 136–145)
TOTAL PROTEIN: 6.4 G/DL (ref 6.4–8.2)
WBC # BLD: 5.7 K/UL (ref 4–11)

## 2018-08-12 PROCEDURE — 85025 COMPLETE CBC W/AUTO DIFF WBC: CPT

## 2018-08-12 PROCEDURE — 2580000003 HC RX 258: Performed by: HOSPITALIST

## 2018-08-12 PROCEDURE — 83735 ASSAY OF MAGNESIUM: CPT

## 2018-08-12 PROCEDURE — 6370000000 HC RX 637 (ALT 250 FOR IP): Performed by: INTERNAL MEDICINE

## 2018-08-12 PROCEDURE — 6360000002 HC RX W HCPCS: Performed by: HOSPITALIST

## 2018-08-12 PROCEDURE — G0378 HOSPITAL OBSERVATION PER HR: HCPCS

## 2018-08-12 PROCEDURE — 36415 COLL VENOUS BLD VENIPUNCTURE: CPT

## 2018-08-12 PROCEDURE — 96375 TX/PRO/DX INJ NEW DRUG ADDON: CPT

## 2018-08-12 PROCEDURE — 6370000000 HC RX 637 (ALT 250 FOR IP): Performed by: HOSPITALIST

## 2018-08-12 PROCEDURE — 94640 AIRWAY INHALATION TREATMENT: CPT

## 2018-08-12 PROCEDURE — 84100 ASSAY OF PHOSPHORUS: CPT

## 2018-08-12 PROCEDURE — 2580000003 HC RX 258: Performed by: INTERNAL MEDICINE

## 2018-08-12 PROCEDURE — 80048 BASIC METABOLIC PNL TOTAL CA: CPT

## 2018-08-12 PROCEDURE — 1200000000 HC SEMI PRIVATE

## 2018-08-12 PROCEDURE — 80076 HEPATIC FUNCTION PANEL: CPT

## 2018-08-12 RX ORDER — INSULIN GLARGINE 100 [IU]/ML
25 INJECTION, SOLUTION SUBCUTANEOUS NIGHTLY
Status: DISCONTINUED | OUTPATIENT
Start: 2018-08-12 | End: 2018-08-13 | Stop reason: HOSPADM

## 2018-08-12 RX ADMIN — MORPHINE SULFATE 4 MG: 4 INJECTION INTRAVENOUS at 16:55

## 2018-08-12 RX ADMIN — CYANOCOBALAMIN TAB 500 MCG 1000 MCG: 500 TAB at 09:14

## 2018-08-12 RX ADMIN — TAMSULOSIN HYDROCHLORIDE 0.8 MG: 0.4 CAPSULE ORAL at 09:14

## 2018-08-12 RX ADMIN — MORPHINE SULFATE 4 MG: 4 INJECTION INTRAVENOUS at 09:06

## 2018-08-12 RX ADMIN — GLYCOPYRROLATE AND FORMOTEROL FUMARATE 2 PUFF: 9; 4.8 AEROSOL, METERED RESPIRATORY (INHALATION) at 08:56

## 2018-08-12 RX ADMIN — HYDRALAZINE HYDROCHLORIDE 50 MG: 25 TABLET, FILM COATED ORAL at 21:18

## 2018-08-12 RX ADMIN — FINASTERIDE 5 MG: 5 TABLET, FILM COATED ORAL at 09:13

## 2018-08-12 RX ADMIN — INSULIN LISPRO 8 UNITS: 100 INJECTION, SOLUTION INTRAVENOUS; SUBCUTANEOUS at 12:29

## 2018-08-12 RX ADMIN — VITAMIN E CAP 400 UNIT 800 UNITS: 400 CAP at 09:21

## 2018-08-12 RX ADMIN — MORPHINE SULFATE 4 MG: 4 INJECTION INTRAVENOUS at 05:43

## 2018-08-12 RX ADMIN — MORPHINE SULFATE 4 MG: 4 INJECTION INTRAVENOUS at 19:48

## 2018-08-12 RX ADMIN — SERTRALINE HYDROCHLORIDE 25 MG: 50 TABLET ORAL at 09:13

## 2018-08-12 RX ADMIN — GLYCOPYRROLATE AND FORMOTEROL FUMARATE 2 PUFF: 9; 4.8 AEROSOL, METERED RESPIRATORY (INHALATION) at 19:32

## 2018-08-12 RX ADMIN — INSULIN LISPRO 10 UNITS: 100 INJECTION, SOLUTION INTRAVENOUS; SUBCUTANEOUS at 17:03

## 2018-08-12 RX ADMIN — MORPHINE SULFATE 4 MG: 4 INJECTION INTRAVENOUS at 11:21

## 2018-08-12 RX ADMIN — INSULIN GLARGINE 25 UNITS: 100 INJECTION, SOLUTION SUBCUTANEOUS at 21:22

## 2018-08-12 RX ADMIN — HYDROCODONE BITARTRATE AND ACETAMINOPHEN 2 TABLET: 5; 325 TABLET ORAL at 15:56

## 2018-08-12 RX ADMIN — PANTOPRAZOLE SODIUM 40 MG: 40 TABLET, DELAYED RELEASE ORAL at 09:13

## 2018-08-12 RX ADMIN — SODIUM CHLORIDE: 9 INJECTION, SOLUTION INTRAVENOUS at 09:06

## 2018-08-12 RX ADMIN — HYDRALAZINE HYDROCHLORIDE 50 MG: 25 TABLET, FILM COATED ORAL at 09:23

## 2018-08-12 RX ADMIN — MORPHINE SULFATE 4 MG: 4 INJECTION INTRAVENOUS at 02:07

## 2018-08-12 RX ADMIN — INSULIN LISPRO 2 UNITS: 100 INJECTION, SOLUTION INTRAVENOUS; SUBCUTANEOUS at 17:02

## 2018-08-12 RX ADMIN — INSULIN LISPRO 10 UNITS: 100 INJECTION, SOLUTION INTRAVENOUS; SUBCUTANEOUS at 12:30

## 2018-08-12 RX ADMIN — INSULIN LISPRO 10 UNITS: 100 INJECTION, SOLUTION INTRAVENOUS; SUBCUTANEOUS at 09:07

## 2018-08-12 RX ADMIN — METOPROLOL TARTRATE 25 MG: 25 TABLET ORAL at 21:18

## 2018-08-12 RX ADMIN — HYDROCODONE BITARTRATE AND ACETAMINOPHEN 2 TABLET: 5; 325 TABLET ORAL at 21:27

## 2018-08-12 RX ADMIN — HYDROCODONE BITARTRATE AND ACETAMINOPHEN 2 TABLET: 5; 325 TABLET ORAL at 10:37

## 2018-08-12 RX ADMIN — FOLIC ACID 1 MG: 1 TABLET ORAL at 09:14

## 2018-08-12 RX ADMIN — Medication 100 MG: at 09:15

## 2018-08-12 RX ADMIN — INSULIN LISPRO 6 UNITS: 100 INJECTION, SOLUTION INTRAVENOUS; SUBCUTANEOUS at 09:11

## 2018-08-12 RX ADMIN — MORPHINE SULFATE 4 MG: 4 INJECTION INTRAVENOUS at 13:56

## 2018-08-12 RX ADMIN — SODIUM CHLORIDE, PRESERVATIVE FREE 10 ML: 5 INJECTION INTRAVENOUS at 19:48

## 2018-08-12 RX ADMIN — METOPROLOL TARTRATE 25 MG: 25 TABLET ORAL at 09:22

## 2018-08-12 RX ADMIN — HYDRALAZINE HYDROCHLORIDE 50 MG: 25 TABLET, FILM COATED ORAL at 13:56

## 2018-08-12 ASSESSMENT — PAIN SCALES - GENERAL
PAINLEVEL_OUTOF10: 8
PAINLEVEL_OUTOF10: 9
PAINLEVEL_OUTOF10: 8
PAINLEVEL_OUTOF10: 9
PAINLEVEL_OUTOF10: 9
PAINLEVEL_OUTOF10: 7
PAINLEVEL_OUTOF10: 9
PAINLEVEL_OUTOF10: 9

## 2018-08-12 ASSESSMENT — PAIN DESCRIPTION - PAIN TYPE
TYPE: ACUTE PAIN

## 2018-08-12 ASSESSMENT — PAIN DESCRIPTION - ORIENTATION: ORIENTATION: LEFT

## 2018-08-12 ASSESSMENT — PAIN DESCRIPTION - LOCATION: LOCATION: ANKLE;LEG

## 2018-08-12 NOTE — PROGRESS NOTES
Pt a/o. Dressings on BLE clean, dry, intact. Pain rated at 8/10 -- pain medication given per [facility] CIWA score of 1. Pt stated nausea--medication given per MAR. No dry heaving; no emesis. Bed locked in lowest position; side rails 2/4; call light within reach; will continue to monitor.

## 2018-08-12 NOTE — PROGRESS NOTES
Shift assessment updated as charted. Patient a/ox4. VSS. Patient resting, denies further needs. Will monitor.

## 2018-08-12 NOTE — PLAN OF CARE
Problem: Falls - Risk of:  Goal: Will remain free from falls  Will remain free from falls   Outcome: Ongoing  Patient is a fall risk. Patient a/ox4. Encouraged patient to call out for assistance, pt verbalized understanding. Bed locked in lowest position, bed alarm active and audible. Will monitor.
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
## 2018-08-12 NOTE — PLAN OF CARE
lung disease     Under care of pulmo(Dr. Calhoun)    S/P colonoscopy 1996    Done secondary to rectal bleed:dx=hemorrhoids    S/P colonoscopy 11/11/10;10/23/2013    Dr. Ray Huitron 10/2016(3yrs):polyps;diverticulosis. Diverticulosis & polpy(removed). Next in10/2016.  S/P endoscopy 2009    EGD:stomach ulcers.  Superficial phlebitis of left leg 9/30/2013    Tachycardia     Therapeutic drug monitoring 4/8/15    OARRS report is consistent on 4/8/15;7/9/15;10/9/15;1/8/16;4/5/16    Type 1 diabetes mellitus not at goal [facility]) 3/17/14    updated diagnosis as per endo:Dr. Paul Patino     Past Surgical History:        Procedure Laterality Date    DIAGNOSTIC CARDIAC CATH LAB PROCEDURE  2013    FOOT SURGERY Left Hammer toe, 2nd toe    10/9/14    HIP FRACTURE SURGERY      LEG SURGERY      broken leg    OTHER SURGICAL HISTORY Left 11/21/2013    EXCISION 5TH METATRSAL LEFT FOOT          REVISION COLOSTOMY  06/27/2017    COLOSTOMY REVERSAL     TONSILLECTOMY      UPPER GASTROINTESTINAL ENDOSCOPY  7/16/2013    Esophageal Brushing         Medications Prior to Admission:   Prior to Admission medications    Medication Sig Start Date End Date Taking? Authorizing Provider   Continuous Blood Gluc  (FREESTYLE BRIDGET READER) DENIS Use to monitor sugars 8/1/18  Yes Darell Donohue MD   Continuous Blood Gluc Sensor ([address] [place] [address]) MISC Use to monitor sugars 8/1/18  Yes Darell Donohue MD   hydrOXYzine (VISTARIL) 25 MG capsule Take 1 capsule by mouth daily as needed for Itching or Anxiety 7/23/18 8/22/18 Yes RICCO Wadsworth CNP   HYDROcodone-acetaminophen (NORCO) 7.5-325 MG per tablet Take 1 tablet by mouth every 6 hours as needed for Pain for up to 28 days. . 7/16/18 8/13/18 Yes RICCO Wadsworth CNP   vitamin B-12 (CYANOCOBALAMIN) 1000 MCG tablet TAKE 1 TABLET BY MOUTH DAILY 7/2/18  Yes RICCO Delaney CNP   lisinopril (PRINIVIL;ZESTRIL) 30 MG tablet TAKE 1 TABLET BY MOUTH intact but has some neuropathy at baseling. range of motion limited in boot but moving knee without problems. DATA:    Admission weight: 195 lb (88.5 kg)  6' 5\" (195.6 cm)  VITALS:  /70   Pulse 92   Temp 97.6 °F (36.4 °C) (Oral)   Resp 16   Ht 6' 5\" (1.956 m)   Wt 201 lb 11.5 oz (91.5 kg)   SpO2 96%   BMI 23.92 kg/m²   CBC:   Recent Labs      08/10/18   0031  08/11/18   0421  08/12/18   0605   WBC  7.5  5.9  5.7   HGB  11.6*  11.8*  11.1*   PLT  218  196  194     BMP:    Recent Labs      08/10/18   0031  08/10/18   0205  08/11/18 0421 08/12/18   0605   NA  122*   --   132*  132*   K  5.3*   --   4.7  4.1   CL  87*   --   97*  96*   CO2  21   --   25  25   BUN  19   --   12  11   CREATININE  0.8   --   0.6*  0.7*   GLUCOSE  593*  407  371*  287*     INR:   Recent Labs      08/10/18   0807   INR  0.95       Radiology:   XR FOOT LEFT (MIN 3 VIEWS)   Final Result   Suspect acute nondisplaced fracture through the lateral malleolus. XR ANKLE LEFT (MIN 3 VIEWS)   Final Result   Suspect acute nondisplaced fracture through the lateral malleolus. CT CERVICAL SPINE WO CONTRAST   Final Result   No acute abnormality of the cervical spine. CT HEAD WO CONTRAST   Final Result   No acute intracranial abnormality. XR TIBIA FIBULA LEFT (2 VIEWS)   Final Result   Soft-tissue swelling with no acute fracture. IMPRESSION/RECOMMENDATIONS:    Assessment: old tibial midshaft fracture with possible new small fracture present as well. Plan:  1) Will change to high tide boot for weight bearing and if anterior plate then we can remove that part. Will follow up as outpatient to see how his pain improves. No surgical needs. Can be up to work with PT when the high tide boot arrives.         Full consult to follow    Norah Manley

## 2018-08-13 VITALS
HEART RATE: 74 BPM | HEIGHT: 77 IN | TEMPERATURE: 97.8 F | RESPIRATION RATE: 18 BRPM | DIASTOLIC BLOOD PRESSURE: 87 MMHG | WEIGHT: 201.06 LBS | SYSTOLIC BLOOD PRESSURE: 135 MMHG | OXYGEN SATURATION: 95 % | BODY MASS INDEX: 23.74 KG/M2

## 2018-08-13 LAB
ALBUMIN SERPL-MCNC: 3.5 G/DL (ref 3.4–5)
ALP BLD-CCNC: 64 U/L (ref 40–129)
ALT SERPL-CCNC: 77 U/L (ref 10–40)
ANION GAP SERPL CALCULATED.3IONS-SCNC: 11 MMOL/L (ref 3–16)
AST SERPL-CCNC: 89 U/L (ref 15–37)
BASOPHILS ABSOLUTE: 0 K/UL (ref 0–0.2)
BASOPHILS RELATIVE PERCENT: 0.6 %
BILIRUB SERPL-MCNC: 0.6 MG/DL (ref 0–1)
BILIRUBIN DIRECT: <0.2 MG/DL (ref 0–0.3)
BILIRUBIN, INDIRECT: ABNORMAL MG/DL (ref 0–1)
BUN BLDV-MCNC: 8 MG/DL (ref 7–20)
CALCIUM SERPL-MCNC: 8.7 MG/DL (ref 8.3–10.6)
CHLORIDE BLD-SCNC: 96 MMOL/L (ref 99–110)
CO2: 27 MMOL/L (ref 21–32)
CREAT SERPL-MCNC: 0.6 MG/DL (ref 0.8–1.3)
EOSINOPHILS ABSOLUTE: 0.3 K/UL (ref 0–0.6)
EOSINOPHILS RELATIVE PERCENT: 4.8 %
GFR AFRICAN AMERICAN: >60
GFR NON-AFRICAN AMERICAN: >60
GLUCOSE BLD-MCNC: 233 MG/DL (ref 70–99)
GLUCOSE BLD-MCNC: 256 MG/DL (ref 70–99)
GLUCOSE BLD-MCNC: 305 MG/DL (ref 70–99)
GLUCOSE BLD-MCNC: 307 MG/DL (ref 70–99)
HCT VFR BLD CALC: 34.1 % (ref 40.5–52.5)
HEMOGLOBIN: 11.5 G/DL (ref 13.5–17.5)
LYMPHOCYTES ABSOLUTE: 1.8 K/UL (ref 1–5.1)
LYMPHOCYTES RELATIVE PERCENT: 31.7 %
MAGNESIUM: 1.8 MG/DL (ref 1.8–2.4)
MCH RBC QN AUTO: 31.7 PG (ref 26–34)
MCHC RBC AUTO-ENTMCNC: 33.7 G/DL (ref 31–36)
MCV RBC AUTO: 94.1 FL (ref 80–100)
MONOCYTES ABSOLUTE: 0.9 K/UL (ref 0–1.3)
MONOCYTES RELATIVE PERCENT: 15.5 %
NEUTROPHILS ABSOLUTE: 2.6 K/UL (ref 1.7–7.7)
NEUTROPHILS RELATIVE PERCENT: 47.4 %
PDW BLD-RTO: 12.9 % (ref 12.4–15.4)
PERFORMED ON: ABNORMAL
PHOSPHORUS: 3.4 MG/DL (ref 2.5–4.9)
PLATELET # BLD: 207 K/UL (ref 135–450)
PMV BLD AUTO: 8.1 FL (ref 5–10.5)
POTASSIUM SERPL-SCNC: 4.3 MMOL/L (ref 3.5–5.1)
RBC # BLD: 3.63 M/UL (ref 4.2–5.9)
SODIUM BLD-SCNC: 134 MMOL/L (ref 136–145)
TOTAL PROTEIN: 6.8 G/DL (ref 6.4–8.2)
WBC # BLD: 5.6 K/UL (ref 4–11)

## 2018-08-13 PROCEDURE — 83735 ASSAY OF MAGNESIUM: CPT

## 2018-08-13 PROCEDURE — 96376 TX/PRO/DX INJ SAME DRUG ADON: CPT

## 2018-08-13 PROCEDURE — 6370000000 HC RX 637 (ALT 250 FOR IP): Performed by: INTERNAL MEDICINE

## 2018-08-13 PROCEDURE — 2580000003 HC RX 258: Performed by: HOSPITALIST

## 2018-08-13 PROCEDURE — 94761 N-INVAS EAR/PLS OXIMETRY MLT: CPT

## 2018-08-13 PROCEDURE — 85025 COMPLETE CBC W/AUTO DIFF WBC: CPT

## 2018-08-13 PROCEDURE — 6360000002 HC RX W HCPCS: Performed by: HOSPITALIST

## 2018-08-13 PROCEDURE — G0378 HOSPITAL OBSERVATION PER HR: HCPCS

## 2018-08-13 PROCEDURE — 6370000000 HC RX 637 (ALT 250 FOR IP): Performed by: HOSPITALIST

## 2018-08-13 PROCEDURE — 80076 HEPATIC FUNCTION PANEL: CPT

## 2018-08-13 PROCEDURE — 97116 GAIT TRAINING THERAPY: CPT

## 2018-08-13 PROCEDURE — G8980 MOBILITY D/C STATUS: HCPCS

## 2018-08-13 PROCEDURE — 94640 AIRWAY INHALATION TREATMENT: CPT

## 2018-08-13 PROCEDURE — 80048 BASIC METABOLIC PNL TOTAL CA: CPT

## 2018-08-13 PROCEDURE — 36415 COLL VENOUS BLD VENIPUNCTURE: CPT

## 2018-08-13 PROCEDURE — 84100 ASSAY OF PHOSPHORUS: CPT

## 2018-08-13 PROCEDURE — 2580000003 HC RX 258: Performed by: INTERNAL MEDICINE

## 2018-08-13 RX ORDER — OXYCODONE HYDROCHLORIDE AND ACETAMINOPHEN 5; 325 MG/1; MG/1
1 TABLET ORAL EVERY 6 HOURS PRN
Qty: 20 TABLET | Refills: 0 | Status: SHIPPED | OUTPATIENT
Start: 2018-08-13 | End: 2018-08-20

## 2018-08-13 RX ORDER — INSULIN GLARGINE 100 [IU]/ML
25 INJECTION, SOLUTION SUBCUTANEOUS NIGHTLY
Qty: 15 ML | Refills: 2 | Status: SHIPPED | OUTPATIENT
Start: 2018-08-13 | End: 2019-03-02 | Stop reason: ALTCHOICE

## 2018-08-13 RX ADMIN — METOPROLOL TARTRATE 25 MG: 25 TABLET ORAL at 08:10

## 2018-08-13 RX ADMIN — GLYCOPYRROLATE AND FORMOTEROL FUMARATE 2 PUFF: 9; 4.8 AEROSOL, METERED RESPIRATORY (INHALATION) at 08:23

## 2018-08-13 RX ADMIN — SODIUM CHLORIDE, PRESERVATIVE FREE 10 ML: 5 INJECTION INTRAVENOUS at 08:15

## 2018-08-13 RX ADMIN — INSULIN LISPRO 8 UNITS: 100 INJECTION, SOLUTION INTRAVENOUS; SUBCUTANEOUS at 08:14

## 2018-08-13 RX ADMIN — INSULIN LISPRO 6 UNITS: 100 INJECTION, SOLUTION INTRAVENOUS; SUBCUTANEOUS at 12:07

## 2018-08-13 RX ADMIN — MORPHINE SULFATE 4 MG: 4 INJECTION INTRAVENOUS at 01:25

## 2018-08-13 RX ADMIN — HYDRALAZINE HYDROCHLORIDE 50 MG: 25 TABLET, FILM COATED ORAL at 14:22

## 2018-08-13 RX ADMIN — SODIUM CHLORIDE: 9 INJECTION, SOLUTION INTRAVENOUS at 10:32

## 2018-08-13 RX ADMIN — SERTRALINE HYDROCHLORIDE 25 MG: 50 TABLET ORAL at 08:10

## 2018-08-13 RX ADMIN — INSULIN LISPRO 10 UNITS: 100 INJECTION, SOLUTION INTRAVENOUS; SUBCUTANEOUS at 12:07

## 2018-08-13 RX ADMIN — CYANOCOBALAMIN TAB 500 MCG 1000 MCG: 500 TAB at 08:09

## 2018-08-13 RX ADMIN — MORPHINE SULFATE 4 MG: 4 INJECTION INTRAVENOUS at 07:33

## 2018-08-13 RX ADMIN — VITAMIN E CAP 400 UNIT 800 UNITS: 400 CAP at 08:08

## 2018-08-13 RX ADMIN — INSULIN LISPRO 10 UNITS: 100 INJECTION, SOLUTION INTRAVENOUS; SUBCUTANEOUS at 08:15

## 2018-08-13 RX ADMIN — FINASTERIDE 5 MG: 5 TABLET, FILM COATED ORAL at 08:08

## 2018-08-13 RX ADMIN — HYDRALAZINE HYDROCHLORIDE 50 MG: 25 TABLET, FILM COATED ORAL at 08:09

## 2018-08-13 RX ADMIN — Medication 100 MG: at 08:09

## 2018-08-13 RX ADMIN — TAMSULOSIN HYDROCHLORIDE 0.8 MG: 0.4 CAPSULE ORAL at 08:07

## 2018-08-13 RX ADMIN — MORPHINE SULFATE 4 MG: 4 INJECTION INTRAVENOUS at 09:35

## 2018-08-13 RX ADMIN — PANTOPRAZOLE SODIUM 40 MG: 40 TABLET, DELAYED RELEASE ORAL at 08:10

## 2018-08-13 RX ADMIN — FOLIC ACID 1 MG: 1 TABLET ORAL at 08:08

## 2018-08-13 RX ADMIN — MORPHINE SULFATE 4 MG: 4 INJECTION INTRAVENOUS at 11:38

## 2018-08-13 ASSESSMENT — PAIN SCALES - GENERAL
PAINLEVEL_OUTOF10: 9
PAINLEVEL_OUTOF10: 6
PAINLEVEL_OUTOF10: 8
PAINLEVEL_OUTOF10: 4
PAINLEVEL_OUTOF10: 8
PAINLEVEL_OUTOF10: 5
PAINLEVEL_OUTOF10: 8

## 2018-08-13 NOTE — DISCHARGE SUMMARY
Hospital Medicine Discharge Summary    Patient ID: Radha Walls      Patient's PCP: Erman Meckel, APRN - CNP    Admit Date: 8/9/2018     Discharge Date: 8/13/2018      Admitting Physician: Lora Grubbs MD     Discharge Physician: Greg Clemons MD     Discharge Diagnoses and hospital course:    Acute hyperglycemia. No clinical signs of DKA at this time. Hx of IDDM  - Insulin regimen titrated with improved control.    - discharged on  lantus up to 25  - Diabetes educator evaluated     L lateral malleolus fracture and 2nd toenail avulsion s/p fall. Likely mechanical.  Non-syncopal.  ECHO without significant valvular disease  - podiatry and ortho on board  - discharged with hard boot with plans for outpt ortho follow up     Hyponatremia & Hypochloremia. Likely 2/2 beer potomania vs low solute. - IVF hydration with improvement     Mild-Moderate Alcohol Consumption. Serum EtOH elevated to 145 upon admission  - monitored on CIWAs scores; no signs of withdrawal     Mixed Metabolic & Respiratory Acidosis. Likely 2/2 above. Resolved.     Ventral Abd Hernia. Hx of L-colectomy with colostomy and subsequent take down. - GS consulted. Will f/u as outpt     PMH of tobacco abuse, ventral abd hernia, htn, anxiety     Active Hospital Problems    Diagnosis Date Noted    Chronic transaminasemia [R74.0] 08/10/2018    Tobacco smoker [F17.200] 08/10/2018    Fall at home [P14. Addison Knutson, G73.298] 08/10/2018    Hypochloremia [E87.8] 08/10/2018    Hyperkalemia [E87.5] 08/10/2018    Mixed metabolic and respiratory acidosis [E87.2] 08/10/2018    Closed nondisplaced L ankle fracture (lateral malleolus) [S82.892A] 08/10/2018    L 2nd toenail avulsion [S91.209A] 08/10/2018    Skin tear of L leg [S81.812A] 08/10/2018    Head trauma [S09.90XA] 08/10/2018    IDDM (insulin dependent diabetes mellitus) ([facility] [E11.9, Z79.4] 08/10/2018    Ventral hernia [K43.9] 08/10/2018    Diabetic ketoacidosis without

## 2018-08-13 NOTE — PROGRESS NOTES
Message sent to [employer] CNP \"341. Juan Draft would like something for sleep. He wants Ambien. \"

## 2018-08-13 NOTE — PROGRESS NOTES
Shift assessment completed. Pt A&O, VSS. PRN pain medication given per MAR. Denies any needs at this time. Bed locked and in lowest position. Non skid socks in place. Call light and bedside table within reach. Will continue to monitor.

## 2018-08-13 NOTE — PROGRESS NOTES
Recommendations: Strengthening, Balance Training, Functional Mobility Training, Transfer Training, Gait Training, Endurance Training, Home Exercise Program, Safety Education & Training, Patient/Caregiver Education & Training, Equipment Evaluation, Education, & procurement, Positioning  Safety Devices  Type of devices: Left in chair, Call light within reach, Nurse notified, Chair alarm in place, Gait belt, Patient at risk for falls     Therapy Time   Individual Concurrent Group Co-treatment   Time In 1420         Time Out 1430         Minutes [address], [id number]

## 2018-08-14 ENCOUNTER — CARE COORDINATION (OUTPATIENT)
Dept: CASE MANAGEMENT | Age: 68
End: 2018-08-14

## 2018-08-14 LAB
EKG ATRIAL RATE: 90 BPM
EKG DIAGNOSIS: NORMAL
EKG P AXIS: 63 DEGREES
EKG P-R INTERVAL: 186 MS
EKG Q-T INTERVAL: 380 MS
EKG QRS DURATION: 120 MS
EKG QTC CALCULATION (BAZETT): 464 MS
EKG R AXIS: -9 DEGREES
EKG T AXIS: 57 DEGREES
EKG VENTRICULAR RATE: 90 BPM

## 2018-08-15 ENCOUNTER — TELEPHONE (OUTPATIENT)
Dept: INTERNAL MEDICINE | Age: 68
End: 2018-08-15

## 2018-08-15 ENCOUNTER — CARE COORDINATION (OUTPATIENT)
Dept: CASE MANAGEMENT | Age: 68
End: 2018-08-15

## 2018-08-15 NOTE — TELEPHONE ENCOUNTER
Pt called about a Follow Up Hospital.  Pt is scheduled for 2018     [facility] 45 Transitions Initial Follow Up Call    Outreach made within 2 business days of discharge: No    Patient: Billy Salguero Patient : 1950   MRN: [id number]  Reason for Admission: There are no discharge diagnoses documented for the most recent discharge. Discharge Date: 18       Spoke with: Home Care Provider    Discharge department/facility:     [facility] Interactive Patient Contact:  Was patient able to fill all prescriptions: Yes  Was patient instructed to bring all medications to the follow-up visit: Yes  Is patient taking all medications as directed in the discharge summary?  Yes  Does patient understand their discharge instructions: Yes  Does patient have questions or concerns that need addressed prior to 7-14 day follow up office visit: yes - 2018     Scheduled appointment with PCP within 7-14 days    Follow Up  Future Appointments  Date Time Provider Margret Musa   2018 8:20 AM Madeleine Patient, APRN - CNP [facility] PAIN [facility]   2018 11:00 AM RICCO Ochoa CNP KWOOD 206 IM [facility]   2018 10:45 AM RICCO Ochoa CNPOOD 206 IM [facility]   10/2/2018 11:00 AM [id number] ELEAZAR Payton MD AND PULM [facility]   10/23/2018 10:20 AM Juana Ruiz MD FF SLEEP MED [facility]   2018 11:40 AM Jerome Yap MD AND ENDO [facility]       Mike Benson MA

## 2018-08-15 NOTE — TELEPHONE ENCOUNTER
Courtesy call from Jessica [address]  Pt fell and went to  ER  Has a sprained ankle and a laceration on his shin  He has to go to wound care  They ordered xeroform and gauze so they may be sending orders regarding those supplie

## 2018-08-16 LAB — VITAMIN B1 WHOLE BLOOD: 117 NMOL/L (ref 70–180)

## 2018-08-19 ENCOUNTER — HOSPITAL ENCOUNTER (EMERGENCY)
Age: 68
Discharge: HOME | End: 2018-08-19
Attending: EMERGENCY MEDICINE
Payer: COMMERCIAL

## 2018-08-19 VITALS
DIASTOLIC BLOOD PRESSURE: 74 MMHG | BODY MASS INDEX: 22.67 KG/M2 | TEMPERATURE: 99 F | HEIGHT: 77 IN | SYSTOLIC BLOOD PRESSURE: 129 MMHG | WEIGHT: 192 LBS | HEART RATE: 85 BPM | OXYGEN SATURATION: 99 % | RESPIRATION RATE: 16 BRPM

## 2018-08-19 DIAGNOSIS — R73.9 HYPERGLYCEMIA: Primary | ICD-10-CM

## 2018-08-19 DIAGNOSIS — E86.0 DEHYDRATION: ICD-10-CM

## 2018-08-19 LAB
A/G RATIO: 1 (ref 1.1–2.2)
ALBUMIN SERPL-MCNC: 3.6 G/DL (ref 3.4–5)
ALP BLD-CCNC: 64 U/L (ref 40–129)
ALT SERPL-CCNC: 91 U/L (ref 10–40)
ANION GAP SERPL CALCULATED.3IONS-SCNC: 19 MMOL/L (ref 3–16)
AST SERPL-CCNC: 111 U/L (ref 15–37)
BASE EXCESS VENOUS: -5.1 MMOL/L (ref -3–3)
BASOPHILS ABSOLUTE: 0.1 K/UL (ref 0–0.2)
BASOPHILS RELATIVE PERCENT: 1.2 %
BILIRUB SERPL-MCNC: 0.8 MG/DL (ref 0–1)
BILIRUBIN URINE: NEGATIVE
BLOOD, URINE: NEGATIVE
BUN BLDV-MCNC: 19 MG/DL (ref 7–20)
CALCIUM SERPL-MCNC: 8.9 MG/DL (ref 8.3–10.6)
CARBOXYHEMOGLOBIN: 3.1 % (ref 0–1.5)
CHLORIDE BLD-SCNC: 88 MMOL/L (ref 99–110)
CLARITY: CLEAR
CO2: 16 MMOL/L (ref 21–32)
COLOR: YELLOW
CREAT SERPL-MCNC: 0.8 MG/DL (ref 0.8–1.3)
EOSINOPHILS ABSOLUTE: 0 K/UL (ref 0–0.6)
EOSINOPHILS RELATIVE PERCENT: 0.3 %
GFR AFRICAN AMERICAN: >60
GFR NON-AFRICAN AMERICAN: >60
GLOBULIN: 3.7 G/DL
GLUCOSE BLD-MCNC: 457 MG/DL (ref 70–99)
GLUCOSE BLD-MCNC: 479 MG/DL (ref 70–99)
GLUCOSE URINE: >=1000 MG/DL
HCO3 VENOUS: 16.6 MMOL/L (ref 23–29)
HCT VFR BLD CALC: 36 % (ref 40.5–52.5)
HEMOGLOBIN: 12.4 G/DL (ref 13.5–17.5)
INR BLD: 1.02 (ref 0.86–1.14)
KETONES, URINE: 40 MG/DL
LACTIC ACID: 2.5 MMOL/L (ref 0.4–2)
LEUKOCYTE ESTERASE, URINE: NEGATIVE
LYMPHOCYTES ABSOLUTE: 1.6 K/UL (ref 1–5.1)
LYMPHOCYTES RELATIVE PERCENT: 26.3 %
MCH RBC QN AUTO: 32.2 PG (ref 26–34)
MCHC RBC AUTO-ENTMCNC: 34.4 G/DL (ref 31–36)
MCV RBC AUTO: 93.7 FL (ref 80–100)
METHEMOGLOBIN VENOUS: 0.2 %
MICROSCOPIC EXAMINATION: ABNORMAL
MONOCYTES ABSOLUTE: 0.7 K/UL (ref 0–1.3)
MONOCYTES RELATIVE PERCENT: 11.8 %
NEUTROPHILS ABSOLUTE: 3.8 K/UL (ref 1.7–7.7)
NEUTROPHILS RELATIVE PERCENT: 60.4 %
NITRITE, URINE: NEGATIVE
O2 CONTENT, VEN: 14 VOL %
O2 SAT, VEN: 83 %
O2 THERAPY: ABNORMAL
PCO2, VEN: 22.9 MMHG (ref 40–50)
PDW BLD-RTO: 13 % (ref 12.4–15.4)
PERFORMED ON: ABNORMAL
PH UA: 6
PH VENOUS: 7.48 (ref 7.35–7.45)
PLATELET # BLD: 297 K/UL (ref 135–450)
PMV BLD AUTO: 8.1 FL (ref 5–10.5)
PO2, VEN: 43.3 MMHG (ref 25–40)
POTASSIUM SERPL-SCNC: 4.1 MMOL/L (ref 3.5–5.1)
POTASSIUM SERPL-SCNC: 5.9 MMOL/L (ref 3.5–5.1)
PROTEIN UA: NEGATIVE MG/DL
PROTHROMBIN TIME: 11.6 SEC (ref 9.8–13)
RBC # BLD: 3.84 M/UL (ref 4.2–5.9)
SODIUM BLD-SCNC: 123 MMOL/L (ref 136–145)
SPECIFIC GRAVITY UA: <=1.005
SPECIMEN STATUS: NORMAL
TCO2 CALC VENOUS: 17 MMOL/L
TOTAL PROTEIN: 7.3 G/DL (ref 6.4–8.2)
URINE REFLEX TO CULTURE: ABNORMAL
URINE TYPE: ABNORMAL
UROBILINOGEN, URINE: 0.2 E.U./DL
WBC # BLD: 6.2 K/UL (ref 4–11)

## 2018-08-19 PROCEDURE — 82803 BLOOD GASES ANY COMBINATION: CPT

## 2018-08-19 PROCEDURE — 2580000003 HC RX 258: Performed by: EMERGENCY MEDICINE

## 2018-08-19 PROCEDURE — 81003 URINALYSIS AUTO W/O SCOPE: CPT

## 2018-08-19 PROCEDURE — 96374 THER/PROPH/DIAG INJ IV PUSH: CPT

## 2018-08-19 PROCEDURE — 85025 COMPLETE CBC W/AUTO DIFF WBC: CPT

## 2018-08-19 PROCEDURE — 83605 ASSAY OF LACTIC ACID: CPT

## 2018-08-19 PROCEDURE — 6360000002 HC RX W HCPCS: Performed by: EMERGENCY MEDICINE

## 2018-08-19 PROCEDURE — 96361 HYDRATE IV INFUSION ADD-ON: CPT

## 2018-08-19 PROCEDURE — 84132 ASSAY OF SERUM POTASSIUM: CPT

## 2018-08-19 PROCEDURE — 93005 ELECTROCARDIOGRAM TRACING: CPT | Performed by: EMERGENCY MEDICINE

## 2018-08-19 PROCEDURE — 80053 COMPREHEN METABOLIC PANEL: CPT

## 2018-08-19 PROCEDURE — 85610 PROTHROMBIN TIME: CPT

## 2018-08-19 PROCEDURE — 99285 EMERGENCY DEPT VISIT HI MDM: CPT

## 2018-08-19 RX ORDER — 0.9 % SODIUM CHLORIDE 0.9 %
2000 INTRAVENOUS SOLUTION INTRAVENOUS ONCE
Status: COMPLETED | OUTPATIENT
Start: 2018-08-19 | End: 2018-08-19

## 2018-08-19 RX ORDER — ONDANSETRON 2 MG/ML
4 INJECTION INTRAMUSCULAR; INTRAVENOUS
Status: DISCONTINUED | OUTPATIENT
Start: 2018-08-19 | End: 2018-08-19 | Stop reason: HOSPADM

## 2018-08-19 RX ADMIN — ONDANSETRON 4 MG: 2 SOLUTION INTRAMUSCULAR; INTRAVENOUS at 14:48

## 2018-08-19 RX ADMIN — SODIUM CHLORIDE 2000 ML: 9 INJECTION, SOLUTION INTRAVENOUS at 14:48

## 2018-08-19 ASSESSMENT — PAIN SCALES - GENERAL: PAINLEVEL_OUTOF10: 6

## 2018-08-19 NOTE — ED PROVIDER NOTES
CAPSULE BY MOUTH 2 TIMES A DAY 6/8/18 Duffy Loh, APRN - CNP   hydrALAZINE (APRESOLINE) 50 MG tablet Take 1 tablet by mouth 3 times daily 5/30/18 Duffy Loh, APRN - CNP   finasteride (PROSCAR) 5 MG tablet TAKE 1 TABLET BY MOUTH DAILY 5/30/18 Duffy Loh, APRN - CNP   tamsulosin (FLOMAX) 0.4 MG capsule TAKE 2 CAPSULES BY MOUTH DAILY 5/30/18 Duffy Loh, APRN - CNP   metoprolol tartrate (LOPRESSOR) 25 MG tablet Take 1 tablet by mouth 2 times daily 5/30/18 Duffy Loh, APRN - CNP   AGAMATRIX PRESTO TEST strip USE TO TEST BLOOD SUGAR 3 TIMES A DAY 5/9/18   Odalis Bello MD   sertraline (ZOLOFT) 25 MG tablet TAKE 1 TABLET BY MOUTH ONCE A DAY 5/9/18 Duffy Loh, APRN - CNP   metoclopramide (REGLAN) 10 MG tablet Take 1 tablet by mouth 4 times daily as needed (Nausea and/or headache) 4/25/18   Lyndon Parham MD   SUMAtriptan (IMITREX) 100 MG tablet Take 1 tablet by mouth once as needed for Migraine 4/25/18 4/25/18  Lyndon Parham MD   Tens Unit MISC by Does not apply route Use as directed.  3/30/18   RICCO Salinas - CNP   melatonin 1 MG tablet Take 1 mg by mouth daily as needed for Sleep    Historical Provider, MD   vitamin E 1000 units capsule Take 1,000 Units by mouth daily    Historical Provider, MD   ibuprofen (ADVIL;MOTRIN) 200 MG tablet Take 200 mg by mouth nightly as needed for Pain    Historical Provider, MD   B Complex Vitamins (VITAMIN B-COMPLEX) TABS Take 1,100 mg by mouth daily    Historical Provider, MD   umeclidinium-vilanterol (ANORO ELLIPTA) 62.5-25 MCG/INH AEPB inhaler Inhale 1 puff into the lungs daily 3/27/18   Miguel Matson MD   albuterol sulfate HFA (PROAIR HFA) 108 (90 Base) MCG/ACT inhaler Inhale 2 puffs into the lungs every 4 hours as needed for Wheezing or Shortness of Breath 3/27/18   Miguel Matson MD   Lancets MISC Testing 2-3 times daily DX Code: E11.9 3/22/18   Odalis Bello MD   thiamine 100 MG tablet Take 1 tablet by mouth daily 3/6/18   RICCO Wagoner - CNP   Blood Glucose Monitoring Suppl (TRUE METRIX AIR GLUCOSE METER) DENIS 1 meter 3/6/18   RICCO Wagoner CNP   insulin aspart (NOVOLOG) 100 UNIT/ML injection vial Inject 10 Units into the skin 3 times daily (before meals) +SSI     Historical Provider, MD   glucagon 1 MG injection Inject 1 kit into the skin as needed    Historical Provider, MD       Allergies as of 08/19/2018 - Review Complete 08/19/2018   Allergen Reaction Noted    Neurontin [gabapentin]  04/26/2018       Past Medical History:   Diagnosis Date    Anxiety disorder     BPH (benign prostatic hypertrophy)     Calcified granuloma of lung ([facility] 2014    2:stable per 3/25/14 CT chest    Cataract 1/20/14    OU:Dr. hayward:[facility]    Chronic pain     COPD (chronic obstructive pulmonary disease) ([facility]     Under care of pulmo(Dr. Calhoun)    Degenerative arthritis of hip     Depression 3/11/2015    Depression with anxiety     Prior psychiatrist & therapist:Dr. Abhishek Aviles.  Diabetes mellitus ([facility] 2004    Endo Dr. Valles Cue type 1 note below in [facility]    Diabetic eye exam ([facility] 1/20/14    [facility]:Dr. STEPHEN Hayward:No retinopathy. Cataract    Diabetic retinopathy ([facility]     under care of [facility]:Dr. Aneesh Villalobos    Diverticulitis 11/26/2011    Diverticulosis 11/26/2011    Emphysema     Esophageal candidiasis ([facility] 7/16/13    GI:EGD    Essential hypertension, benign 11/2010    Fatty liver 10/9/2012    Foot ulcer:left 9/30/2013    Gastric ulcer, unspecified as acute or chronic, without mention of hemorrhage or perforation     GERD (gastroesophageal reflux disease)     Gout 1997    Right big toe    Hearing decreased     Left ear=60%. Right ear=80%.     Hemangioma 12/2010    Liver as per CT abdo    hepatitis c 7/26/17, 2010    Under care of Liver doc:Dr. Markell Kay    Hyperlipidemia LDL goal < 100     Low HDL (under 40) 10/9/2012    Peripheral neuropathy 2006    Pneumonia STROKE, or URINARY TRACT INFECTION, thus I consider the discharge disposition reasonable. Bonny Daniels and I have discussed the diagnosis and risks, and we agree with discharging home to follow-up with their primary doctor. We also discussed returning to the Emergency Department immediately if new or worsening symptoms occur. We have discussed the symptoms which are most concerning (e.g., changing or worsening pain, weakness, vomiting, fever) that necessitate immediate return. Final Impression    1. Hyperglycemia    2. Dehydration        Blood pressure 138/77, pulse 89, temperature 99 °F (37.2 °C), temperature source Oral, resp. rate 16, height 6' 5\" (1.956 m), weight 192 lb (87.1 kg), SpO2 98 %. Patient was given scripts for the following medications. I counseled patient how to take these medications. New Prescriptions    No medications on file       Disposition  Pt is in good condition upon Discharge to home. This chart was generated using the [address] [place] [address] dictation system. I created this record but it may contain dictation errors.           Heriberto Dowling MD  08/19/18 2575

## 2018-08-20 ENCOUNTER — CARE COORDINATION (OUTPATIENT)
Dept: CASE MANAGEMENT | Age: 68
End: 2018-08-20

## 2018-08-20 ENCOUNTER — OFFICE VISIT (OUTPATIENT)
Dept: INTERNAL MEDICINE | Age: 68
End: 2018-08-20

## 2018-08-20 ENCOUNTER — OFFICE VISIT (OUTPATIENT)
Dept: PAIN MANAGEMENT | Age: 68
End: 2018-08-20

## 2018-08-20 VITALS
WEIGHT: 189 LBS | HEART RATE: 79 BPM | DIASTOLIC BLOOD PRESSURE: 71 MMHG | SYSTOLIC BLOOD PRESSURE: 124 MMHG | OXYGEN SATURATION: 99 % | BODY MASS INDEX: 22.41 KG/M2

## 2018-08-20 VITALS
HEIGHT: 77 IN | SYSTOLIC BLOOD PRESSURE: 102 MMHG | WEIGHT: 192 LBS | BODY MASS INDEX: 22.67 KG/M2 | HEART RATE: 84 BPM | OXYGEN SATURATION: 100 % | DIASTOLIC BLOOD PRESSURE: 52 MMHG
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
## 2018-08-20 DIAGNOSIS — G89.4 CHRONIC PAIN SYNDROME: Primary | Chronic | ICD-10-CM

## 2018-08-20 DIAGNOSIS — S82.65XA CLOSED NONDISPLACED FRACTURE OF LATERAL MALLEOLUS OF LEFT FIBULA, INITIAL ENCOUNTER: ICD-10-CM

## 2018-08-20 DIAGNOSIS — M47.812 SPONDYLOSIS OF CERVICAL REGION WITHOUT MYELOPATHY OR RADICULOPATHY: ICD-10-CM

## 2018-08-20 DIAGNOSIS — K55.9 ISCHEMIC COLITIS (HCC): ICD-10-CM

## 2018-08-20 DIAGNOSIS — F41.9 ANXIETY: ICD-10-CM

## 2018-08-20 DIAGNOSIS — G60.9 IDIOPATHIC PERIPHERAL NEUROPATHY: Chronic | ICD-10-CM

## 2018-08-20 DIAGNOSIS — M51.26 PROTRUSION OF LUMBAR INTERVERTEBRAL DISC: ICD-10-CM

## 2018-08-20 DIAGNOSIS — M17.0 PRIMARY OSTEOARTHRITIS OF BOTH KNEES: ICD-10-CM
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
## 2018-08-20 DIAGNOSIS — F10.20 ALCOHOLISM (HCC): ICD-10-CM

## 2018-08-20 DIAGNOSIS — F51.01 PRIMARY INSOMNIA: ICD-10-CM

## 2018-08-20 DIAGNOSIS — F51.01 PRIMARY INSOMNIA: Chronic | ICD-10-CM

## 2018-08-20 DIAGNOSIS — M51.36 LUMBAR DEGENERATIVE DISC DISEASE: ICD-10-CM

## 2018-08-20 DIAGNOSIS — G43.109 MIGRAINE WITH AURA AND WITHOUT STATUS MIGRAINOSUS, NOT INTRACTABLE: ICD-10-CM

## 2018-08-20 DIAGNOSIS — S81.812D NONINFECTED SKIN TEAR OF LEFT LOWER EXTREMITY, SUBSEQUENT ENCOUNTER: ICD-10-CM
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
## 2018-08-20 DIAGNOSIS — M50.30 DDD (DEGENERATIVE DISC DISEASE), CERVICAL: ICD-10-CM
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
## 2018-08-20 DIAGNOSIS — M16.0 PRIMARY OSTEOARTHRITIS OF BOTH HIPS: ICD-10-CM

## 2018-08-20 DIAGNOSIS — I10 ESSENTIAL HYPERTENSION: Chronic | ICD-10-CM

## 2018-08-20 PROBLEM — M51.369 DDD (DEGENERATIVE DISC DISEASE), LUMBAR: Status: ACTIVE | Noted: 2018-08-20

## 2018-08-20 PROCEDURE — 99213 OFFICE O/P EST LOW 20 MIN: CPT | Performed by: NURSE PRACTITIONER

## 2018-08-20 PROCEDURE — 93010 ELECTROCARDIOGRAM REPORT: CPT | Performed by: INTERNAL MEDICINE

## 2018-08-20 PROCEDURE — 99214 OFFICE O/P EST MOD 30 MIN: CPT | Performed by: NURSE PRACTITIONER

## 2018-08-20 RX ORDER — BUPRENORPHINE 10 UG/H
1 PATCH TRANSDERMAL WEEKLY
Qty: 4 PATCH | Refills: 0 | Status: SHIPPED | OUTPATIENT
Start: 2018-08-20 | End: 2018-09-20 | Stop reason: SDUPTHER

## 2018-08-20 RX ORDER — ZOLPIDEM TARTRATE 5 MG/1
5 TABLET ORAL NIGHTLY PRN
Qty: 30 TABLET | Refills: 3 | Status: SHIPPED | OUTPATIENT
Start: 2018-08-20 | End: 2018-09-04 | Stop reason: SDUPTHER

## 2018-08-20 ASSESSMENT — ENCOUNTER SYMPTOMS
EYE ITCHING: 0
COUGH: 0
SINUS PRESSURE: 0
VOMITING: 0
WHEEZING: 0
CONSTIPATION: 0
BLOOD IN STOOL: 0
SHORTNESS OF BREATH: 0
RHINORRHEA: 0
COLOR CHANGE: 0
EYE REDNESS: 0
CHEST TIGHTNESS: 0
ABDOMINAL PAIN: 0
NAUSEA: 0
SORE THROAT: 0
BACK PAIN: 0
DIARRHEA: 0

## 2018-08-20 ASSESSMENT — PATIENT HEALTH QUESTIONNAIRE - PHQ9
SUM OF ALL RESPONSES TO PHQ QUESTIONS 1-9: 0
2. FEELING DOWN, DEPRESSED OR HOPELESS: 0
SUM OF ALL RESPONSES TO PHQ QUESTIONS 1-9: 0
SUM OF ALL RESPONSES TO PHQ9 QUESTIONS 1 & 2: 0
1. LITTLE INTEREST OR PLEASURE IN DOING THINGS: 0

## 2018-08-20 NOTE — PATIENT INSTRUCTIONS
with your affected leg straight and supported on the floor. Your other leg should be bent, with that foot flat on the floor. 2. Keeping your affected leg straight, gently flex your foot back toward your body so your toes point upward. Then slowly relax your foot to the starting position. 3. Repeat 8 to 12 times. Resisted ankle plantar flexion    For the next four exercises, you will need elastic exercise material, such as surgical tubing or Thera-Band. 1. Sit with your affected leg straight and supported on the floor. Your other leg should be bent, with that foot flat on the floor. 2. Place an elastic band around your affected foot just under the toes. 3. Hold each end of the band in each hand, with your hands above your knees. 4. Keeping your affected leg straight, gently flex your foot downward so your toes are pointed away from your body. Then slowly relax your foot to the starting position. 5. Repeat 8 to 12 times. Resisted ankle dorsiflexion    1. Tie the ends of an exercise band together to form a loop. Attach one end of the loop to a secure object, like a table leg, or shut a door on it to hold it in place. (Or you can have someone hold one end of the loop to provide resistance.)  2. While sitting on the floor or in a chair, loop the other end of the band over the top of your affected foot. 3. Keeping your knee and leg straight, slowly flex your foot toward you to pull back on the exercise band, and then slowly relax. 4. Repeat 8 to 12 times. Resisted ankle inversion    1. Sit on the floor with your good leg crossed over your other leg. 2. Hold both ends of an exercise band and loop the band around the inside of your affected foot. Then press your good foot against the band. 3. Keeping your legs crossed, slowly push your affected foot against the band so that foot moves away from your good foot. Then slowly relax. 4. Repeat 8 to 12 times. Resisted ankle eversion    1.  Sit on the floor with your legs straight. 2. Hold both ends of an exercise band and loop the band around the outside of your affected foot. Then press your good foot against the band. 3. Keeping your leg straight, slowly push your affected foot outward against the band and away from your good foot without letting your leg rotate. Then slowly relax. 4. Repeat 8 to 12 times. Ankle alphabet    1. Sit in a chair with your feet flat on the floor. (You can also do this exercise lying on your back with your affected leg propped up on a pillow). 2. Lift the heel of your affected foot off the floor, and slowly trace the letters of the alphabet. Heel raises    1. Stand with your feet a few inches apart, with your hands lightly resting on a counter or chair in front of you. 2. Slowly raise your heels off the floor while keeping your knees straight. 3. Hold for about 6 seconds, then slowly lower your heels to the floor. 4. Do 8 to 12 repetitions several times during the day. Follow-up care is a key part of your treatment and safety. Be sure to make and go to all appointments, and call your doctor if you are having problems. It's also a good idea to know your test results and keep a list of the medicines you take. Where can you learn more? Go to https://[employer].Alerts. org and sign in to your [employer] account. Enter T800 in the [facility] box to learn more about \"Ankle Fracture: Rehab Exercises. \"     If you do not have an account, please click on the \"Sign Up Now\" link. Current as of: November 29, 2017  Content Version: 11.7  © 4283-1546 [facility]. Care instructions adapted under license by [facility] ([facility]). If you have questions about a medical condition or this instruction, always ask your healthcare professional. [address] any warranty or liability for your use of this information.

## 2018-08-20 NOTE — PROGRESS NOTES
RICCO Ryan CNP   metoprolol tartrate (LOPRESSOR) 25 MG tablet Take 1 tablet by mouth 2 times daily Yes RICCO Javed CNP   AGAMATRIX PRESTO TEST strip USE TO TEST BLOOD SUGAR 3 TIMES A DAY Yes Kennedi Womack MD   sertraline (ZOLOFT) 25 MG tablet TAKE 1 TABLET BY MOUTH ONCE A DAY Yes RICCO Javed CNP   metoclopramide (REGLAN) 10 MG tablet Take 1 tablet by mouth 4 times daily as needed (Nausea and/or headache) Yes Hoang Fields MD   Tens Unit MISC by Does not apply route Use as directed.  Yes RICCO Reeves CNP   melatonin 1 MG tablet Take 1 mg by mouth daily as needed for Sleep Yes Historical Provider, MD   vitamin E 1000 units capsule Take 1,000 Units by mouth daily Yes Historical Provider, MD   ibuprofen (ADVIL;MOTRIN) 200 MG tablet Take 200 mg by mouth nightly as needed for Pain Yes Historical Provider, MD   B Complex Vitamins (VITAMIN B-COMPLEX) TABS Take 1,100 mg by mouth daily Yes Historical Provider, MD   umeclidinium-vilanterol (ANORO ELLIPTA) 62.5-25 MCG/INH AEPB inhaler Inhale 1 puff into the lungs daily Yes Dean Dewitt MD   albuterol sulfate HFA (PROAIR HFA) 108 (90 Base) MCG/ACT inhaler Inhale 2 puffs into the lungs every 4 hours as needed for Wheezing or Shortness of Breath Yes Dean Dewitt MD   Lancets MISC Testing 2-3 times daily DX Code: E11.9 Yes Kennedi Womack MD   thiamine 100 MG tablet Take 1 tablet by mouth daily Yes RICCO Kelley CNP   Blood Glucose Monitoring Suppl (TRUE METRIX AIR GLUCOSE METER) DENIS 1 meter Yes RICCO Javed CNP   insulin aspart (NOVOLOG) 100 UNIT/ML injection vial Inject 10 Units into the skin 3 times daily (before meals) +SSI  Yes Historical Provider, MD   glucagon 1 MG injection Inject 1 kit into the skin as needed Yes Historical Provider, MD   SUMAtriptan (IMITREX) 100 MG tablet Take 1 tablet by mouth once as needed for Migraine  Hoang Fields MD        Social History   Substance Use Topics    Smoking status: Current Some Day Smoker     Packs/day: 1.00     Years: 35.00     Types: Cigarettes     Last attempt to quit: 12/1/2013    Smokeless tobacco: Never Used    Alcohol use Yes      Comment: 6 beers a week, sometimes more         Vitals:    08/20/18 1019   BP: (!) 102/52   Site: Left Arm   Position: Sitting   Cuff Size: Medium Adult   Pulse: 84   SpO2: 100%   Weight: 192 lb (87.1 kg)   Height: 6' 5\" (1.956 m)     Estimated body mass index is 22.77 kg/m² as calculated from the following:    Height as of this encounter: 6' 5\" (1.956 m). Weight as of this encounter: 192 lb (87.1 kg). Physical Exam   Constitutional: He is oriented to person, place, and time. He appears well-developed and well-nourished. HENT:   Head: Normocephalic. Right Ear: External ear normal.   Left Ear: External ear normal.   Eyes: Pupils are equal, round, and reactive to light. Conjunctivae and EOM are normal.   Neck: Normal range of motion. Neck supple. No JVD present. Cardiovascular: Normal rate and regular rhythm. Exam reveals no gallop and no friction rub. No murmur heard. Pulmonary/Chest: Effort normal and breath sounds normal. No respiratory distress. He has no wheezes. Abdominal: Soft. Bowel sounds are normal. He exhibits no distension and no mass. There is no tenderness. There is no rebound and no guarding. Musculoskeletal: Normal range of motion. He exhibits no tenderness. Left foot in walking boot, wrapped up. Following with ortho   Neurological: He is alert and oriented to person, place, and time. He has normal reflexes. Skin: Skin is warm and dry. Psychiatric: He has a normal mood and affect. His behavior is normal.       ASSESSMENT/PLAN:  1. IDDM (insulin dependent diabetes mellitus) (HCC)  - blood sugars better controlled  - following with Dr. Aniceto Huynh     2.  Primary insomnia  -   Controlled Substances Monitoring:     RX Monitoring 8/20/2018   Attestation The

## 2018-08-21 LAB
EKG ATRIAL RATE: 84 BPM
EKG DIAGNOSIS: NORMAL
EKG P AXIS: 66 DEGREES
EKG P-R INTERVAL: 158 MS
EKG Q-T INTERVAL: 394 MS
EKG QRS DURATION: 118 MS
EKG QTC CALCULATION (BAZETT): 465 MS
EKG R AXIS: -10 DEGREES
EKG T AXIS: 64 DEGREES
EKG VENTRICULAR RATE: 84 BPM

## 2018-08-21 NOTE — CARE COORDINATION
[facility] Transitions Follow Up Call    2018    Patient: Charito Lawler  Patient : 1950   MRN: [id number]  Reason for Admission: Acute Hyperglycemia  Discharge Date: 18 RARS: Readmission Risk Score: 0       Attempted to reach patient via phone for care transition, no answer. VM left stating purpose of call along with my contact information requesting a return call.    Noted in record that patient at PCP appt at time of my call      Follow Up  Future Appointments  Date Time Provider Margret Musa   2018 10:45 AM RICCO Sebastian CNP [facility]   2018 9:20 AM RICCO Mcnamara CNP KW PAIN [facility]   10/2/2018 11:00 AM [id number] [address] Payton MD AND PULM [facility]   10/23/2018 10:20 AM Jalil Desai MD FF SLEEP MED [facility]   2018 10:00 AM RICCO Sebastian CNP [facility] IM [facility]   2018 11:40 AM Victor M Palencia MD AND ENDO [facility]       Zehra Menard RN
visit. Educated patient that they can have a home care nurse visit even if it isn't their already scheduled day.          Follow Up  Future Appointments  Date Time Provider Margret Rosei   8/22/2018 2:15 PM Yaakov Prakash MD [facility]   9/5/2018 10:45 AM RICCO Fisher CNP [facility] [address] IM [facility]   9/17/2018 9:20 AM RICCO Harris CNP KW PAIN [facility]   10/2/2018 11:00 AM [address], MD AND PULM [facility]   10/23/2018 10:20 AM Tien Howell MD FF SLEEP MED [facility]   11/20/2018 10:00 AM RICCO Fisher CNP [facility] [address] IM [facility]   12/5/2018 11:40 AM Rufino Harrell MD AND ENDO [facility]       Woo Carlin RN

## 2018-08-22 ENCOUNTER — OFFICE VISIT (OUTPATIENT)
Dept: ORTHOPEDIC SURGERY | Age: 68
End: 2018-08-22

## 2018-08-22 DIAGNOSIS — M79.605 LEFT LEG PAIN: Primary | ICD-10-CM

## 2018-08-22 PROCEDURE — 99212 OFFICE O/P EST SF 10 MIN: CPT | Performed by: ORTHOPAEDIC SURGERY

## 2018-08-23 ENCOUNTER — TELEPHONE (OUTPATIENT)
Dept: PAIN MANAGEMENT | Age: 68
End: 2018-08-23

## 2018-08-23 NOTE — TELEPHONE ENCOUNTER
Per PKA she will not prescribe any oral opioids at this time due to his last UDS, patient can come  a RX for the lower dosage of Butrans. Called patient and advised, he states he will see what happens in the next few days, and if needed will  RX for lower dosage ( Butrans 7.5 mcg ) and bring back the Butrans 10mcg.

## 2018-08-24 ENCOUNTER — CARE COORDINATION (OUTPATIENT)
Dept: CASE MANAGEMENT | Age: 68
End: 2018-08-24

## 2018-08-27 DIAGNOSIS — G89.4 CHRONIC PAIN SYNDROME: Chronic | ICD-10-CM

## 2018-08-27 DIAGNOSIS — F41.9 ANXIETY DISORDER, UNSPECIFIED TYPE: Chronic | ICD-10-CM

## 2018-08-27 RX ORDER — FINASTERIDE 5 MG/1
TABLET, FILM COATED ORAL
Qty: 30 TABLET | Refills: 3 | Status: ON HOLD | OUTPATIENT
Start: 2018-08-27 | End: 2018-10-19 | Stop reason: SDUPTHER

## 2018-08-27 RX ORDER — SERTRALINE HYDROCHLORIDE 25 MG/1
TABLET, FILM COATED ORAL
Qty: 30 TABLET | Refills: 2 | Status: SHIPPED | OUTPATIENT
Start: 2018-08-27 | End: 2018-09-18 | Stop reason: SDUPTHER

## 2018-08-28 RX ORDER — HYDROXYZINE PAMOATE 25 MG/1
25 CAPSULE ORAL DAILY PRN
Qty: 30 CAPSULE | Refills: 0 | Status: SHIPPED | OUTPATIENT
Start: 2018-08-28 | End: 2018-09-27

## 2018-08-31 ENCOUNTER — TELEPHONE (OUTPATIENT)
Dept: INTERNAL MEDICINE | Age: 68
End: 2018-08-31

## 2018-08-31 NOTE — TELEPHONE ENCOUNTER
Home care nurse called. Pt is having very hard time sleeping. Pt unable to fall asleep and stay asleep. Took 2 doses of imitrex 100 mg.    Has nausea and upset stomach

## 2018-09-04 ENCOUNTER — OFFICE VISIT (OUTPATIENT)
Dept: INTERNAL MEDICINE | Age: 68
End: 2018-09-04

## 2018-09-04 ENCOUNTER — CLINICAL DOCUMENTATION (OUTPATIENT)
Dept: PSYCHOLOGY | Age: 68
End: 2018-09-04

## 2018-09-04 VITALS
HEIGHT: 77 IN | SYSTOLIC BLOOD PRESSURE: 110 MMHG | OXYGEN SATURATION: 97 % | WEIGHT: 185 LBS | DIASTOLIC BLOOD PRESSURE: 62 MMHG | BODY MASS INDEX: 21.84 KG/M2 | HEART RATE: 76 BPM

## 2018-09-04 DIAGNOSIS — F51.01 PRIMARY INSOMNIA: ICD-10-CM

## 2018-09-04 PROCEDURE — 99213 OFFICE O/P EST LOW 20 MIN: CPT | Performed by: NURSE PRACTITIONER

## 2018-09-04 RX ORDER — ZOLPIDEM TARTRATE 10 MG/1
10 TABLET ORAL NIGHTLY PRN
Qty: 30 TABLET | Refills: 3 | Status: SHIPPED | OUTPATIENT
Start: 2018-09-04 | End: 2018-10-04

## 2018-09-04 ASSESSMENT — ENCOUNTER SYMPTOMS
SHORTNESS OF BREATH: 0
WHEEZING: 0
COUGH: 0

## 2018-09-04 NOTE — PROGRESS NOTES
SENSOR SYSTEM) MISC Use to monitor sugars Yes Sherrell Valadez MD   vitamin B-12 (CYANOCOBALAMIN) 1000 MCG tablet TAKE 1 TABLET BY MOUTH DAILY Yes RICCO Grijalva CNP   lisinopril (PRINIVIL;ZESTRIL) 30 MG tablet TAKE 1 TABLET BY MOUTH DAILY Yes RICCO Harrison CNP   folic acid (FOLVITE) 1 MG tablet TAKE 1 TABLET BY MOUTH DAILY Yes RICCO Harrison CNP   tamsulosin (FLOMAX) 0.4 MG capsule TAKE TWO CAPSULES BY MOUTH EVERY DAY Yes RICCO Harrison CNP   omeprazole (PRILOSEC) 20 MG delayed release capsule TAKE 1 CAPSULE BY MOUTH 2 TIMES A DAY Yes RICCO Javed CNP   hydrALAZINE (APRESOLINE) 50 MG tablet Take 1 tablet by mouth 3 times daily Yes RICCO Grijalva CNP   finasteride (PROSCAR) 5 MG tablet TAKE 1 TABLET BY MOUTH DAILY Yes RICCO Grijalva CNP   tamsulosin (FLOMAX) 0.4 MG capsule TAKE 2 CAPSULES BY MOUTH DAILY Yes RICCO Campoverde CNP   metoprolol tartrate (LOPRESSOR) 25 MG tablet Take 1 tablet by mouth 2 times daily Yes RICCO Javed CNP   AGAMATRIX PRESTO TEST strip USE TO TEST BLOOD SUGAR 3 TIMES A DAY Yes Sherrell Valadez MD   metoclopramide (REGLAN) 10 MG tablet Take 1 tablet by mouth 4 times daily as needed (Nausea and/or headache) Yes Pilar Gil MD   Tens Unit MISC by Does not apply route Use as directed.  Yes RICCO Zee CNP   melatonin 1 MG tablet Take 1 mg by mouth daily as needed for Sleep Yes Historical Provider, MD   vitamin E 1000 units capsule Take 1,000 Units by mouth daily Yes Historical Provider, MD   ibuprofen (ADVIL;MOTRIN) 200 MG tablet Take 200 mg by mouth nightly as needed for Pain Yes Historical Provider, MD   B Complex Vitamins (VITAMIN B-COMPLEX) TABS Take 1,100 mg by mouth daily Yes Historical Provider, MD   umeclidinium-vilanterol (ANORO ELLIPTA) 62.5-25 MCG/INH AEPB inhaler Inhale 1 puff into the lungs daily Yes Judy Joesph, MD albuterol sulfate HFA (PROAIR HFA) 108 (90 Base) MCG/ACT inhaler Inhale 2 puffs into the lungs every 4 hours as needed for Wheezing or Shortness of Breath Yes Quincy Regalado MD   Lancets MISC Testing 2-3 times daily DX Code: E11.9 Yes Orrin Frankel, MD   thiamine 100 MG tablet Take 1 tablet by mouth daily Yes RICCO Wagoner CNP   Blood Glucose Monitoring Suppl (TRUE METRIX AIR GLUCOSE METER) DENIS 1 meter Yes RICCO Phan CNP   insulin aspart (NOVOLOG) 100 UNIT/ML injection vial Inject 10 Units into the skin 3 times daily (before meals) +SSI  Yes Historical Provider, MD   glucagon 1 MG injection Inject 1 kit into the skin as needed Yes Historical Provider, MD   SUMAtriptan (IMITREX) 100 MG tablet Take 1 tablet by mouth once as needed for Migraine  Alexander Granado MD        Social History   Substance Use Topics    Smoking status: Current Some Day Smoker     Packs/day: 1.00     Years: 35.00     Types: Cigarettes     Last attempt to quit: 12/1/2013    Smokeless tobacco: Never Used    Alcohol use Yes      Comment: 6 beers a week, sometimes more         Vitals:    09/04/18 1139   BP: 110/62   Site: Left Arm   Position: Sitting   Cuff Size: Medium Adult   Pulse: 76   SpO2: 97%   Weight: 185 lb (83.9 kg)   Height: 6' 5\" (1.956 m)     Estimated body mass index is 21.94 kg/m² as calculated from the following:    Height as of this encounter: 6' 5\" (1.956 m). Weight as of this encounter: 185 lb (83.9 kg). Physical Exam   Constitutional: He appears well-developed and well-nourished. HENT:   Right Ear: Hearing, tympanic membrane, external ear and ear canal normal.   Left Ear: Hearing, tympanic membrane, external ear and ear canal normal.   Nose: No mucosal edema or rhinorrhea. Right sinus exhibits no maxillary sinus tenderness and no frontal sinus tenderness. Left sinus exhibits no maxillary sinus tenderness and no frontal sinus tenderness.    Mouth/Throat: No oropharyngeal

## 2018-09-06 ENCOUNTER — OFFICE VISIT (OUTPATIENT)
Dept: SURGERY | Age: 68
End: 2018-09-06

## 2018-09-06 VITALS
DIASTOLIC BLOOD PRESSURE: 88 MMHG | WEIGHT: 191 LBS | HEIGHT: 77 IN | HEART RATE: 80 BPM | BODY MASS INDEX: 22.55 KG/M2 | SYSTOLIC BLOOD PRESSURE: 124 MMHG

## 2018-09-06 DIAGNOSIS — K43.9 VENTRAL HERNIA WITHOUT OBSTRUCTION OR GANGRENE: Primary | ICD-10-CM

## 2018-09-06 PROCEDURE — 99212 OFFICE O/P EST SF 10 MIN: CPT | Performed by: SURGERY

## 2018-09-06 NOTE — PATIENT INSTRUCTIONS
trying to quit smoking. · Consider signing up for a smoking cessation program, such as the American Lung Association's Freedom from Smoking program.  · Get text messaging support. Go to the website at www.smokefree. gov to sign up for the [facility] program.  · Set a quit date. Pick your date carefully so that it is not right in the middle of a big deadline or stressful time. Once you quit, do not even take a puff. Get rid of all ashtrays and lighters after your last cigarette. Clean your house and your clothes so that they do not smell of smoke. · Learn how to be a nonsmoker. Think about ways you can avoid those things that make you reach for a cigarette. ¨ Avoid situations that put you at greatest risk for smoking. For some people, it is hard to have a drink with friends without smoking. For others, they might skip a coffee break with coworkers who smoke. ¨ Change your daily routine. Take a different route to work or eat a meal in a different place. · Cut down on stress. Calm yourself or release tension by doing an activity you enjoy, such as reading a book, taking a hot bath, or gardening. · Talk to your doctor or pharmacist about nicotine replacement therapy, which replaces the nicotine in your body. You still get nicotine but you do not use tobacco. Nicotine replacement products help you slowly reduce the amount of nicotine you need. These products come in several forms, many of them available over-the-counter:  ¨ Nicotine patches  ¨ Nicotine gum and lozenges  ¨ Nicotine inhaler  · Ask your doctor about bupropion (Wellbutrin) or varenicline (Chantix), which are prescription medicines. They do not contain nicotine. They help you by reducing withdrawal symptoms, such as stress and anxiety. · Some people find hypnosis, acupuncture, and massage helpful for ending the smoking habit. · Eat a healthy diet and get regular exercise.  Having healthy habits will help your body move past its craving for nicotine. · Be prepared to keep trying. Most people are not successful the first few times they try to quit. Do not get mad at yourself if you smoke again. Make a list of things you learned and think about when you want to try again, such as next week, next month, or next year. Where can you learn more? Go to https://[employer]. org and sign in to your [employer] account. Enter N218 in the [employer] box to learn more about \"Stopping Smoking: Care Instructions. \"     If you do not have an account, please click on the \"Sign Up Now\" link. Current as of: November 29, 2017  Content Version: 11.7  © 0990-9247 [employer]. Care instructions adapted under license by [facility] ([facility]). If you have questions about a medical condition or this instruction, always ask your healthcare professional. [address] any warranty or liability for your use of this information.

## 2018-09-17 ENCOUNTER — OFFICE VISIT (OUTPATIENT)
Dept: PAIN MANAGEMENT | Age: 68
End: 2018-09-17

## 2018-09-17 ENCOUNTER — TELEPHONE (OUTPATIENT)
Dept: PAIN MANAGEMENT | Age: 68
End: 2018-09-17

## 2018-09-17 VITALS
DIASTOLIC BLOOD PRESSURE: 95 MMHG | SYSTOLIC BLOOD PRESSURE: 155 MMHG | OXYGEN SATURATION: 97 % | HEART RATE: 103 BPM | BODY MASS INDEX: 22.89 KG/M2 | WEIGHT: 193 LBS

## 2018-09-17 DIAGNOSIS — M17.0 PRIMARY OSTEOARTHRITIS OF BOTH KNEES: ICD-10-CM

## 2018-09-17 DIAGNOSIS — G89.4 CHRONIC PAIN SYNDROME: Chronic | ICD-10-CM

## 2018-09-17 DIAGNOSIS — M50.30 DDD (DEGENERATIVE DISC DISEASE), CERVICAL: ICD-10-CM

## 2018-09-17 DIAGNOSIS — G60.9 IDIOPATHIC PERIPHERAL NEUROPATHY: ICD-10-CM

## 2018-09-17 DIAGNOSIS — S82.65XA CLOSED NONDISPLACED FRACTURE OF LATERAL MALLEOLUS OF LEFT FIBULA, INITIAL ENCOUNTER: ICD-10-CM

## 2018-09-17 DIAGNOSIS — M16.0 PRIMARY OSTEOARTHRITIS OF BOTH HIPS: ICD-10-CM
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
## 2018-09-17 DIAGNOSIS — M47.812 SPONDYLOSIS OF CERVICAL REGION WITHOUT MYELOPATHY OR RADICULOPATHY: ICD-10-CM

## 2018-09-17 DIAGNOSIS — M51.26 PROTRUSION OF LUMBAR INTERVERTEBRAL DISC: ICD-10-CM

## 2018-09-17 DIAGNOSIS — M51.36 LUMBAR DEGENERATIVE DISC DISEASE: ICD-10-CM
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
## 2018-09-17 PROBLEM — M51.369 LUMBAR DEGENERATIVE DISC DISEASE: Status: ACTIVE | Noted: 2018-09-17

## 2018-09-17 PROCEDURE — 99213 OFFICE O/P EST LOW 20 MIN: CPT | Performed by: NURSE PRACTITIONER

## 2018-09-17 NOTE — PROGRESS NOTES
Radha [facility]  1950  [id number]      HISTORY OF PRESENT ILLNESS:  Mr. Alma Thrope is a 78 y.o. male returns for a follow up visit for pain management  He has a diagnosis of   1. Chronic pain syndrome    2. Closed nondisplaced fracture of lateral malleolus of left fibula, initial encounter    3. DDD (degenerative disc disease), cervical    4. Spondylosis of cervical region without myelopathy or radiculopathy    5. Lumbar degenerative disc disease    6. Protrusion of lumbar intervertebral disc    7. Primary osteoarthritis of both knees    8. Primary osteoarthritis of both hips    9. Idiopathic peripheral neuropathy    . He complains of pain in the abdomen, both buttocks, both knee(s), bilateral lower back, bilateral mid-back and neck He rates the pain 7/10 and describes it as sharp, aching, burning. Current treatment regimen has helped relieve about 30% of the pain. He denies any side effects from the current pain regimen. Patient reports that since the last follow up visit the physical functioning is worse, family/social relationships are worse, mood is worse sleep patterns are worse, and that the overall functioning is worse. Patient denies misusing/abusing his narcotic pain medications or using any illegal drugs. There are No indicators for possible drug abuse, addiction or diversion problems. Upon obtaining medical history from Mr. Alma Thorpe states that pain is not manageable on current pain therapy. He refuses to take the Butrans, or non-oral opioids. Mood is stable without anxiety. Sleep is poor with an average of 4 hours due to pain. Denies to having issues of constipation. Tolerating activities/house chores with moderate tenderness to the lower back. ALLERGIES: Patients list of allergies were reviewed     MEDICATIONS: Mr. Alma Thorpe list of medications were reviewed. His current medications are   Outpatient Medications Prior to Visit   Medication Sig Dispense Refill    zolpidem (AMBIEN) 10 MG 1000 units capsule Take 1,000 Units by mouth daily      ibuprofen (ADVIL;MOTRIN) 200 MG tablet Take 200 mg by mouth nightly as needed for Pain      B Complex Vitamins (VITAMIN B-COMPLEX) TABS Take 1,100 mg by mouth daily      umeclidinium-vilanterol (ANORO ELLIPTA) 62.5-25 MCG/INH AEPB inhaler Inhale 1 puff into the lungs daily 1 each 11    albuterol sulfate HFA (PROAIR HFA) 108 (90 Base) MCG/ACT inhaler Inhale 2 puffs into the lungs every 4 hours as needed for Wheezing or Shortness of Breath 1 Inhaler 11    Lancets MISC Testing 2-3 times daily DX Code: E11.9 100 each 3    thiamine 100 MG tablet Take 1 tablet by mouth daily 30 tablet 3    Blood Glucose Monitoring Suppl (TRUE METRIX AIR GLUCOSE METER) DENIS 1 meter 1 Device 0    insulin aspart (NOVOLOG) 100 UNIT/ML injection vial Inject 10 Units into the skin 3 times daily (before meals) +SSI       glucagon 1 MG injection Inject 1 kit into the skin as needed      SUMAtriptan (IMITREX) 100 MG tablet Take 1 tablet by mouth once as needed for Migraine 9 tablet 0     No facility-administered medications prior to visit. SOCIAL/FAMILY/PAST MEDICAL HISTORY: Mr. Nelsy Alvarado, family and past medical history was reviewed. REVIEW OF SYSTEMS:    Respiratory: Negative for apnea, chest tightness and shortness of breath or change in baseline breathing. Gastrointestinal: Negative for nausea, vomiting, abdominal pain, diarrhea, constipation, blood in stool and abdominal distention. PHYSICAL EXAM:   Nursing note and vitals reviewed. BP (!) 155/95   Pulse 103   Wt 193 lb (87.5 kg)   SpO2 97%   BMI 22.89 kg/m²   Constitutional: He appears well-developed and well-nourished. No acute distress. Skin: Skin is warm and dry, good turgor. No rash noted. He is not diaphoretic. Cardiovascular: Normal rate, regular rhythm, normal heart sounds, and does not have murmur. Pulmonary/Chest: Effort normal. No respiratory distress.  He does not have wheezes in 0    BASAGLAR KWIKPEN 100 UNIT/ML injection pen Inject 25 Units into the skin nightly Dx Code E 11.9 15 mL 2    Continuous Blood Gluc  (FREESTYLE BRIDGET READER) DENIS Use to monitor sugars 1 Device 0    Continuous Blood Gluc Sensor (FREESTYLE BRIDGET SENSOR SYSTEM) MISC Use to monitor sugars 3 each 2    vitamin B-12 (CYANOCOBALAMIN) 1000 MCG tablet TAKE 1 TABLET BY MOUTH DAILY 30 tablet 3    lisinopril (PRINIVIL;ZESTRIL) 30 MG tablet TAKE 1 TABLET BY MOUTH DAILY 90 tablet 0    folic acid (FOLVITE) 1 MG tablet TAKE 1 TABLET BY MOUTH DAILY 30 tablet 3    tamsulosin (FLOMAX) 0.4 MG capsule TAKE TWO CAPSULES BY MOUTH EVERY DAY 60 capsule 0    omeprazole (PRILOSEC) 20 MG delayed release capsule TAKE 1 CAPSULE BY MOUTH 2 TIMES A DAY 60 capsule 2    hydrALAZINE (APRESOLINE) 50 MG tablet Take 1 tablet by mouth 3 times daily 270 tablet 0    finasteride (PROSCAR) 5 MG tablet TAKE 1 TABLET BY MOUTH DAILY 90 tablet 0    tamsulosin (FLOMAX) 0.4 MG capsule TAKE 2 CAPSULES BY MOUTH DAILY 180 capsule 0    metoprolol tartrate (LOPRESSOR) 25 MG tablet Take 1 tablet by mouth 2 times daily 180 tablet 0    AGAMATRIX PRESTO TEST strip USE TO TEST BLOOD SUGAR 3 TIMES A  each 3    metoclopramide (REGLAN) 10 MG tablet Take 1 tablet by mouth 4 times daily as needed (Nausea and/or headache) 30 tablet 0    Tens Unit MISC by Does not apply route Use as directed.  1 each 0    melatonin 1 MG tablet Take 1 mg by mouth daily as needed for Sleep      vitamin E 1000 units capsule Take 1,000 Units by mouth daily      ibuprofen (ADVIL;MOTRIN) 200 MG tablet Take 200 mg by mouth nightly as needed for Pain      B Complex Vitamins (VITAMIN B-COMPLEX) TABS Take 1,100 mg by mouth daily      umeclidinium-vilanterol (ANORO ELLIPTA) 62.5-25 MCG/INH AEPB inhaler Inhale 1 puff into the lungs daily 1 each 11    albuterol sulfate HFA (PROAIR HFA) 108 (90 Base) MCG/ACT inhaler Inhale 2 puffs into the lungs every 4 hours as needed for

## 2018-09-17 NOTE — TELEPHONE ENCOUNTER
Per PKA patient can  an Butrans patch tomorrow. Called sister and advised, she states if he wants to do that then they will come to the [place] office Thursday 9/20/18. RX will be logged into the book.

## 2018-09-18 ENCOUNTER — OFFICE VISIT (OUTPATIENT)
Dept: PSYCHOLOGY | Age: 68
End: 2018-09-18

## 2018-09-18 ENCOUNTER — OFFICE VISIT (OUTPATIENT)
Dept: INTERNAL MEDICINE | Age: 68
End: 2018-09-18

## 2018-09-18 VITALS
HEART RATE: 70 BPM | DIASTOLIC BLOOD PRESSURE: 74 MMHG | WEIGHT: 191 LBS | OXYGEN SATURATION: 99 % | BODY MASS INDEX: 22.65 KG/M2 | SYSTOLIC BLOOD PRESSURE: 116 MMHG

## 2018-09-18 DIAGNOSIS — F41.9 ANXIETY DISORDER, UNSPECIFIED TYPE: Chronic | ICD-10-CM

## 2018-09-18 DIAGNOSIS — F51.04 INSOMNIA, PSYCHOPHYSIOLOGICAL: ICD-10-CM

## 2018-09-18 DIAGNOSIS — G89.4 CHRONIC PAIN SYNDROME: Primary | Chronic | ICD-10-CM

## 2018-09-18 DIAGNOSIS — G89.4 CHRONIC PAIN SYNDROME: ICD-10-CM

## 2018-09-18 DIAGNOSIS — F33.1 MAJOR DEPRESSIVE DISORDER, RECURRENT EPISODE, MODERATE (HCC): ICD-10-CM

## 2018-09-18 DIAGNOSIS — F41.1 GENERALIZED ANXIETY DISORDER: Primary | ICD-10-CM

## 2018-09-18 PROCEDURE — 99213 OFFICE O/P EST LOW 20 MIN: CPT | Performed by: NURSE PRACTITIONER

## 2018-09-18 PROCEDURE — 90791 PSYCH DIAGNOSTIC EVALUATION: CPT | Performed by: PSYCHOLOGIST

## 2018-09-18 RX ORDER — BUPROPION HYDROCHLORIDE 75 MG/1
75 TABLET ORAL 3 TIMES DAILY
Qty: 60 TABLET | Refills: 3 | Status: SHIPPED | OUTPATIENT
Start: 2018-09-18 | End: 2018-10-12 | Stop reason: SINTOL

## 2018-09-18 RX ORDER — SERTRALINE HYDROCHLORIDE 25 MG/1
50 TABLET, FILM COATED ORAL DAILY
Qty: 30 TABLET | Refills: 2 | Status: SHIPPED | OUTPATIENT
Start: 2018-09-18 | End: 2018-11-05 | Stop reason: SDUPTHER

## 2018-09-18 ASSESSMENT — ENCOUNTER SYMPTOMS
SINUS PRESSURE: 0
WHEEZING: 0
SINUS PAIN: 0
ABDOMINAL DISTENTION: 0
SHORTNESS OF BREATH: 0
COUGH: 0

## 2018-09-18 ASSESSMENT — ANXIETY QUESTIONNAIRES
3. WORRYING TOO MUCH ABOUT DIFFERENT THINGS: 2-OVER HALF THE DAYS
1. FEELING NERVOUS, ANXIOUS, OR ON EDGE: 2-OVER HALF THE DAYS
7. FEELING AFRAID AS IF SOMETHING AWFUL MIGHT HAPPEN: 1-SEVERAL DAYS
5. BEING SO RESTLESS THAT IT IS HARD TO SIT STILL: 3-NEARLY EVERY DAY
2. NOT BEING ABLE TO STOP OR CONTROL WORRYING: 2-OVER HALF THE DAYS
6. BECOMING EASILY ANNOYED OR IRRITABLE: 3-NEARLY EVERY DAY
GAD7 TOTAL SCORE: 16
4. TROUBLE RELAXING: 3-NEARLY EVERY DAY

## 2018-09-18 ASSESSMENT — PATIENT HEALTH QUESTIONNAIRE - PHQ9
10. IF YOU CHECKED OFF ANY PROBLEMS, HOW DIFFICULT HAVE THESE PROBLEMS MADE IT FOR YOU TO DO YOUR WORK, TAKE CARE OF THINGS AT HOME, OR GET ALONG WITH OTHER PEOPLE: 2
9. THOUGHTS THAT YOU WOULD BE BETTER OFF DEAD, OR OF HURTING YOURSELF: 1
5. POOR APPETITE OR OVEREATING: 3
4. FEELING TIRED OR HAVING LITTLE ENERGY: 2
2. FEELING DOWN, DEPRESSED OR HOPELESS: 2
8. MOVING OR SPEAKING SO SLOWLY THAT OTHER PEOPLE COULD HAVE NOTICED. OR THE OPPOSITE, BEING SO FIGETY OR RESTLESS THAT YOU HAVE BEEN MOVING AROUND A LOT MORE THAN USUAL: 2
3. TROUBLE FALLING OR STAYING ASLEEP: 3
SUM OF ALL RESPONSES TO PHQ QUESTIONS 1-9: 19
7. TROUBLE CONCENTRATING ON THINGS, SUCH AS READING THE NEWSPAPER OR WATCHING TELEVISION: 2
SUM OF ALL RESPONSES TO PHQ QUESTIONS 1-9: 19
SUM OF ALL RESPONSES TO PHQ9 QUESTIONS 1 & 2: 4
1. LITTLE INTEREST OR PLEASURE IN DOING THINGS: 2
6. FEELING BAD ABOUT YOURSELF - OR THAT YOU ARE A FAILURE OR HAVE LET YOURSELF OR YOUR FAMILY DOWN: 2

## 2018-09-18 NOTE — PROGRESS NOTES
2018     Dave Ovalles (:  1950) is a 78 y.o. male, here for evaluation of the following medical concerns:    HPI    Here for follow up of his depression and insomnia. He is seeing Dr. Rizwan Casarez. Paolo Qureshi every night as well as OTC sleep aids. Reports getting 4 hours of sleep at night, sometimes less. Not sleeping during the day. He is taking 25 mg of zoloft. Reports compliance. Feels it is not helping much. Has been on 100 mg previously and tolerated well. He has also tried wellbutrin and did well with that. Review of Systems   HENT: Negative for congestion, sinus pain and sinus pressure. Respiratory: Negative for cough, shortness of breath and wheezing. Gastrointestinal: Negative for abdominal distention. Musculoskeletal: Negative for arthralgias. Neurological: Negative for light-headedness and numbness. Hematological: Negative for adenopathy. Psychiatric/Behavioral: Positive for decreased concentration and dysphoric mood. Negative for agitation and self-injury. The patient is nervous/anxious. Prior to Visit Medications    Medication Sig Taking? Authorizing Provider   sertraline (ZOLOFT) 25 MG tablet Take 2 tablets by mouth daily Yes RICCO Archuleta CNP   buPROPion (WELLBUTRIN) 75 MG tablet Take 1 tablet by mouth 3 times daily Yes RICCO Archuleta CNP   zolpidem (AMBIEN) 10 MG tablet Take 1 tablet by mouth nightly as needed for Sleep for up to 30 days. Christobal BarefootRICCO CNP   hydrOXYzine (VISTARIL) 25 MG capsule TAKE 1 CAPSULE BY MOUTH DAILY AS NEEDED FOR ITCHING OR ANXIETY Yes RICCO Muniz CNP   finasteride (PROSCAR) 5 MG tablet TAKE 1 TABLET DAILY Yes RICCO Archuleta CNP   buprenorphine (BUTRANS) 10 MCG/HR PTWK Place 1 patch onto the skin once a week for 30 days. Pickett Stair, APRN - CNP   BASAGLAR KWIKPEN 100 UNIT/ML injection pen Inject 25 Units into the skin nightly Dx Code E

## 2018-09-18 NOTE — PATIENT INSTRUCTIONS
Goals: 1. Read through information about sleep hygiene and stimulus control  2. Limit caffeine, alcohol and nicotine later in the day  3. If you cannot fall asleep after 15-20 minutes, get out of bed and engage in a relaxing activity (e.g., listen to soft music, read by a soft light) until you feel drowsy. 4. Practice diaphragmatic breathing before bed and throughout the day to manage anxiety and pain  5. Go out to spend time around people you enjoy more often        Sleep Hygiene Guidelines    Good dental hygiene is important in determining the health of your teeth and gums. We all know we are supposed to brush and floss regularly. Those who do so are more likely to have strong, healthy gums and less cavities. Similarly, good sleep hygiene is important in determining the quality and quantity of your sleep. Below are guidelines for good sleep hygiene practices. Review these guidelines and evaluate how well you practice good sleep hygiene. Caffeine:  Avoid Caffeine After 10AM  Caffeine disturbs sleep, even in people who do not think they experience a stimulation effect. Individuals with insomnia are often more sensitive to mild stimulants than are normal sleepers. Caffeine is found in items such as coffee, tea, soda, chocolate, and many over-the-counter medications (e.g., Excedrin). Its effects can last up to 12 hours. Thus, drinking caffeinated beverages should be avoided after 10AM, and should be limited to no more than two cups. You might consider a trial period of no caffeine if you tend to be sensitive to its effects. Nicotine:  Avoid Nicotine Before Bedtime  Although some smokers claim that smoking helps them relax, nicotine is a stimulant. The relaxing effects occur with the initial entry of the nicotine, but as the nicotine builds in the system it produces an effect similar to caffeine. Thus, smoking, dipping, or chewing tobacco should be avoided near bedtime and during the night.   Dont smoke to get yourself back to sleep. Alcohol:  Avoid Alcohol After Dinner  Alcohol often promotes the onset of sleep, but as alcohol is metabolized sleep becomes disturbed and fragmented. Thus,alcohol is a poor sleep aid and should not be used as such. Limit alcohol use to small quantities (e.g., no more than two drinks). It is best not to drink alcohol after 7PM, as alcohol may not be metabolized before going to sleep and will likely affect sleep. Sleeping Pills:  Sleep Medications are Effective Only Temporarily  Scientists have shown that sleep medications lose their effectiveness in about 2 - 4 weeks when taken regularly. Despite advertisements to the contrary, over-the-counter sleeping aids have little impact on sleep beyond the placebo effect. Over time, sleeping pills actually can make sleep problems worse. When sleeping pills have been used for a long period, withdrawal from the medication can lead to an insomnia rebound. Thus, after long-term use, many individuals incorrectly conclude that they need sleeping pills in order to sleep normally. Keep use of sleep pills infrequent, but dont worry if you need to use one on an occasional basis. Regular Exercise  Get regular exercise, preferably 40 minutes each day of an activity that causes sweating. Exercise in the late afternoon or early evening (i.e., 3-6 hours before bed) seems to aid sleep, although the positive effect often takes several weeks to become noticeable. Exercising sporadically is not likely to improve sleep, and exercise within 2 hours of bedtime may elevate nervous system activity and interfere with sleep onset. Hot Baths  Spending 20 minutes in a tub of hot water an hour or two prior to bedtime may promote sleep and is strongly recommended. Bedroom Environment: Moderate Temperature, Quiet, and Dark  Extremes of heat or cold can disrupt sleep. A quiet environment is more sleep promoting than a noisy one.   Noises can be masked with background white noise (such as the noise of a fan) or with earplugs. Bedrooms may be darkened with black-out shades or sleep masks can be worn. Position clocks out-of-sight since clock-watching can increase worry about the effects of lack of sleep. Be sure your mattress is not too soft or too firm and that your pillow is the right height and firmness. Eating  A light bedtime snack, such a glass of warm milk, cheese, or a bowl of cereal, can promote sleep. You should avoid the following foods at bedtime:  any caffeinated foods (e.g., chocolate), peanuts, beans, most raw fruits and vegetables (since they may cause gas), and high-fat foods such as potato chips or corn chips. Avoid snacks in the middle of the nights since awakening may become associated with hunger. If you have trouble with regurgitation, be especially careful to avoid heavy meals and spices in the evening. Do not go to bed too hungry or too full. It may help to elevate your head with some pillows. Allow Yourself at [facility] an Hour Before Bedtime to Unwind  Find what works for you to wind down, and perhaps give yourself an hour to do so. Regular Sleep Schedule   Keep a regular time each day, 7 days a week, to get out of bed. Keeping a regular waking time helps set your circadian rhythm so that your body learns to sleep at the desired time. Set a Reasonable Bedtime and Arising Time and Stick to Them   Set the alarm clock and get out of bed at the same time each morning, weekdays and weekends, regardless of your bedtime or the amount of sleep you obtained on the previous night. This guideline is designed to regulate your internal biological clock and reset your clock.       Guidelines for Sleep Stimulus Control    Set a Reasonable Bedtime and Arising Time and Stick to Them  Spending excessive time in bed has two unfortunate consequences: (1) you begin to associate your bedroom with arousal and frustration and (2) your sleep actually becomes more shallow. Restrict your sleep period to the average number of hours you have actually slept per night during the preceding week, plus one additional hour. Set the alarm clock and get out of bed at the same time each morning, weekdays and weekends, regardless of your bedtime or the amount of sleep you obtained on the previous night. You probably will be tempted to stay in bed in the morning if you did not sleep well, but try to maintain your new schedule. This guideline is designed to regulate your internal biological clock and reset your sleep-wake rhythm. Go to Bed Only When You Are Sleepy  There is no reason to go to bed if you are not sleepy. When you go to bed too early, it only gives you more time to become frustrated with your inability to sleep. Individuals often ponder the events of the day, plan the next day's schedule, or worry about their inability to fall asleep. You should therefore delay your bedtime until you are sleepy. This may mean that you go to bed even later than your scheduled bedtime. Remember to stick to your scheduled arising time regardless of the time you go to bed. Get Out of Bed When You Can't Fall Asleep or Go Back to Sleep in About 20-30 Minutes. Return to Bed Only When You Are Sleepy. Repeat This Step as Often as Necessary. Too much time in bed can decrease the sleep quality on subsequent nights and contribute to the maintenance of existing sleep problems. Dont lay in bed for extended times while awake. Although we don't want you to be a clock-watcher, get out of bed if you don't fall asleep fairly soon (i.e., 20-30 minutes). Use this time to engage in a quiet activity (e.g., reading by a soft light). Return to bed only when you are sleepy (e.g., yawning, head bobbing, eyes closing, concentration decreasing). Dont confuse tiredness with sleepiness; they are different. Tiredness doesnt lead to sleep, only sleepiness does.  The object is for you that it is common to all meditative and prayer traditions. \" Anxiety Fear & Breathing - Breathing. com    \"When overcoming high levels of anxiety, it is important to learn the techniques of correct breathing. Many people who live with high levels of anxiety are known to breathe through their chest. Shallow breathing through the chest means you are disrupting the balance of oxygen and carbon dioxide necessary to be in a relaxed state. This type of breathing will perpetuate the symptoms of anxiety. \" [employer]. com      What Is Diaphragmatic Breathing? Diaphragmatic breathing is a technique that helps you slow down your breathing when feeling stressed or anxious by using your diaphragm muscle to bring about a state of physiological relaxation.  babies naturally breathe this way, and singers, wind instrument players, and yoga practitioners also use this type of breathing. The diaphragm is a large muscle that rests across the bottom of your rib cage. When you inhale, the diaphragm muscle drops, opening up space so air can come in. When watching someone do this, it looks like your stomach is filling with air. This type of breathing helps activate the part of your nervous system that controls relaxation. It can lead to decreased heart rate, blood pressure, decreased muscle tension, and overall feelings of relaxation. Why Is Diaphragmatic Breathing Important? ? Our breathing changes when we are feeling anxious. We tend to take short,  quick, shallow breaths, or even hyperventilate; this is called overbreathing.    ? It is a good idea to learn techniques for managing overbreathing, because this  type of breathing can actually make you feel even more anxious!    ? Diaphragmatic breathing is a great portable tool that you can use whenever you are feeling anxious. However, it does require some practice.     Key point: Like other anxiety-management skills, the purpose of calm  breathing is not to avoid anxiety at all costs, but just to take the edge off or  help you ride out the feelings. Why Be Concerned With How Im Breathing?  To increase your awareness of the role that breathing plays in physical tension and your bodys stress response.  To lower your level of stress-related arousal and tension.  To give you a method of taking calm, relaxing breaths to break the cycle of increasing arousal during stressful situations. What Is the Best Way To Use Diaphragmatic Breathing Exercises?  Use diaphragmatic breathing frequently.  Take deep breaths at the first signs of stress, anxiety, physical tension, or other symptoms.  Schedule time for relaxation. How to Do It  Diaphragmatic breathing involves taking smooth, slow, and regular breaths. Sitting upright can increase the capacity of your lungs to fill with air. It is best to 'take the weight' off your shoulders by supporting your arms on the side-arms of a chair, or on your lap. You may also choose to practice breathing while lying down as well. 1. Take a slow breath in through the nose, breathing into your lower belly (for about 4 seconds)   2. Exhale slowly through the mouth (for about 7-8 seconds)     About 6-8 breathing cycles per minute is often helpful to decrease anxiety, but find your  own comfortable breathing rhythm. These cycles regulate the amount of oxygen you  take in so that you do not experience the fainting, tingling, and giddy sensations that are  sometimes associated with overbreathing. Helpful Hints  Make sure that you arent hyperventitating; it is important to pause for a second or two after each breath. Try to breathe from your diaphragm or abdomen. Your shoulders and chest area  should be fairly relaxed and still. If this is challenging at first, it can be helpful to  first try this exercise by lying down on the floor with one hand on your heart, the  other hand on your abdomen.  Watch the hand on your abdomen rise as you fill  your lungs with air, expanding your chest.     Rules of Practice   Try diaphragmatic breathing for one to two minutes throughout the day. You do not need to be feeling anxious to practice  in fact, at first you  should practice while feeling relatively calm. You need to be comfortable  breathing this way when feeling calm before you can feel comfortable doing it  when anxious. Jovanna Sanders gradually master this skill and feel the benefits! Once you are comfortable with this technique, you will feel more comfortable using it in situations that cause anxiety.

## 2018-09-18 NOTE — PROGRESS NOTES
Turns on the TV during the night. Also watches it right up until bedtime. Gets up 4-6am. Drinks caffeinated soda in the morning and afternoon. Has a big glass of water before bed. Drinks beer, average of 2 beers daily. Does drink 5-6 beers a couple times per week. Suicide has crossed his mind in the past. No intent at any time. Has a 16yo daughter who keeps him going. O:  MSE:  Appearance: good hygiene and appropriate attire  Attitude: cooperative, friendly, tearful and moderate distress  Consciousness: alert  Orientation: oriented to person, place, time, general circumstance  Memory: recent and remote memory intact  Attention/Concentration: intact during session although he did stop paying attention at times because he was having difficulty hearing the discussion  Psychomotor Activity: normal  Eye Contact: normal  Speech: normal rate and volume, well-articulated  Mood: dyshporic and anxious  Affect: congruent with thought content and mood  Perception: within normal limits  Thought Content: within normal limits   Thought Process: logical, goal-directed, coherent  Insight: fair  Judgment: intact  Morbid ideation: no  Suicide Assessment: no suicidal ideation, plan or intent recently. Pt did report having occasional thoughts about suicide a long time ago but denied plan or intent at any time. History:    Medications:   Current Outpatient Prescriptions   Medication Sig Dispense Refill    zolpidem (AMBIEN) 10 MG tablet Take 1 tablet by mouth nightly as needed for Sleep for up to 30 days. . 30 tablet 3    hydrOXYzine (VISTARIL) 25 MG capsule TAKE 1 CAPSULE BY MOUTH DAILY AS NEEDED FOR ITCHING OR ANXIETY 30 capsule 0    sertraline (ZOLOFT) 25 MG tablet TAKE ONE TABLET BY MOUTH ONE TIME A DAY 30 tablet 2    finasteride (PROSCAR) 5 MG tablet TAKE 1 TABLET DAILY 30 tablet 3    buprenorphine (BUTRANS) 10 MCG/HR PTWK Place 1 patch onto the skin once a week for 30 days. . 4 patch 0    BASAGLAR KWIKPEN 100 UNIT/ML injection pen Inject 25 Units into the skin nightly Dx Code E 11.9 15 mL 2    Continuous Blood Gluc  (FREESTYLE BRIDGET READER) DENIS Use to monitor sugars 1 Device 0    Continuous Blood Gluc Sensor (FREESTYLE BRIDGET SENSOR SYSTEM) MISC Use to monitor sugars 3 each 2    vitamin B-12 (CYANOCOBALAMIN) 1000 MCG tablet TAKE 1 TABLET BY MOUTH DAILY 30 tablet 3    lisinopril (PRINIVIL;ZESTRIL) 30 MG tablet TAKE 1 TABLET BY MOUTH DAILY 90 tablet 0    folic acid (FOLVITE) 1 MG tablet TAKE 1 TABLET BY MOUTH DAILY 30 tablet 3    tamsulosin (FLOMAX) 0.4 MG capsule TAKE TWO CAPSULES BY MOUTH EVERY DAY 60 capsule 0    omeprazole (PRILOSEC) 20 MG delayed release capsule TAKE 1 CAPSULE BY MOUTH 2 TIMES A DAY 60 capsule 2    hydrALAZINE (APRESOLINE) 50 MG tablet Take 1 tablet by mouth 3 times daily 270 tablet 0    finasteride (PROSCAR) 5 MG tablet TAKE 1 TABLET BY MOUTH DAILY 90 tablet 0    tamsulosin (FLOMAX) 0.4 MG capsule TAKE 2 CAPSULES BY MOUTH DAILY 180 capsule 0    metoprolol tartrate (LOPRESSOR) 25 MG tablet Take 1 tablet by mouth 2 times daily 180 tablet 0    AGAMATRIX PRESTO TEST strip USE TO TEST BLOOD SUGAR 3 TIMES A  each 3    metoclopramide (REGLAN) 10 MG tablet Take 1 tablet by mouth 4 times daily as needed (Nausea and/or headache) 30 tablet 0    SUMAtriptan (IMITREX) 100 MG tablet Take 1 tablet by mouth once as needed for Migraine 9 tablet 0    Tens Unit MISC by Does not apply route Use as directed.  1 each 0    melatonin 1 MG tablet Take 1 mg by mouth daily as needed for Sleep      vitamin E 1000 units capsule Take 1,000 Units by mouth daily      ibuprofen (ADVIL;MOTRIN) 200 MG tablet Take 200 mg by mouth nightly as needed for Pain      B Complex Vitamins (VITAMIN B-COMPLEX) TABS Take 1,100 mg by mouth daily      umeclidinium-vilanterol (ANORO ELLIPTA) 62.5-25 MCG/INH AEPB inhaler Inhale 1 puff into the lungs daily 1 each 11    albuterol sulfate HFA (PROAIR HFA) 108 (90 Base) MCG/ACT inhaler Inhale 2 puffs into the lungs every 4 hours as needed for Wheezing or Shortness of Breath 1 Inhaler 11    Lancets MISC Testing 2-3 times daily DX Code: E11.9 100 each 3    thiamine 100 MG tablet Take 1 tablet by mouth daily 30 tablet 3    Blood Glucose Monitoring Suppl (TRUE METRIX AIR GLUCOSE METER) DENIS 1 meter 1 Device 0    insulin aspart (NOVOLOG) 100 UNIT/ML injection vial Inject 10 Units into the skin 3 times daily (before meals) +SSI       glucagon 1 MG injection Inject 1 kit into the skin as needed       No current facility-administered medications for this visit. Social History:   Social History     Social History    Marital status: Single     Spouse name: N/A    Number of children: N/A    Years of education: N/A     Occupational History    unemployed      Social History Main Topics    Smoking status: Current Some Day Smoker     Packs/day: 0.50     Years: 35.00     Types: Cigarettes     Last attempt to quit: 12/1/2013    Smokeless tobacco: Never Used    Alcohol use Yes      Comment: 6 beers a week, sometimes more     Drug use: No    Sexual activity: No     Other Topics Concern    Not on file     Social History Narrative    No narrative on file       TOBACCO:   reports that he has been smoking Cigarettes. He has a 17.50 pack-year smoking history. He has never used smokeless tobacco.  ETOH:   reports that he drinks alcohol. Family History:   Family History   Problem Relation Age of Onset   Tushar Charm Stroke Mother     Hypertension Mother     Arthritis Father     Substance Abuse Father         Etoh    Cancer Father         esophageal    Hypertension Father     Diabetes Brother     Diabetes Paternal Aunt     Asthma Neg Hx     Emphysema Neg Hx     Heart Failure Neg Hx        A:  Pt reported a history of anxiety, depression, chronic pain, and insomnia, all of which are contributing to his current distress and difficulty functioning in daily life.  Discussed significant increased socialization, Explained relaxed breathing technique in detail and practiced this with pt in visit and Emphasized importance of regular practice of relaxation strategies to target / promote stress mgt. Provided handouts on diaphragmatic breathing and sleep hygiene and stimulus control. Pt Behavioral Change Plan:  Pt set the following goals:  1. Read through information about sleep hygiene and stimulus control  2. Limit caffeine, alcohol and nicotine later in the day  3. If you cannot fall asleep after 15-20 minutes, get out of bed and engage in a relaxing activity (e.g., listen to soft music, read by a soft light) until you feel drowsy. 4. Practice diaphragmatic breathing before bed and throughout the day to manage anxiety and pain  5. Go out to spend time around people you enjoy more often    Pt scheduled F/U visit in 3 weeks.

## 2018-09-20 RX ORDER — BUPRENORPHINE 10 UG/H
1 PATCH TRANSDERMAL WEEKLY
Qty: 4 PATCH | Refills: 0 | Status: SHIPPED | OUTPATIENT
Start: 2018-09-20 | End: 2018-10-20

## 2018-09-21 ENCOUNTER — TELEPHONE (OUTPATIENT)
Dept: INTERNAL MEDICINE | Age: 68
End: 2018-09-21

## 2018-09-21 NOTE — TELEPHONE ENCOUNTER
Destiny Martin referred pt to pain clinic  [address] [facility]  Dr. Raymon Malloy  Ph. [phone]      Pt needs referral   Has to be faxed to office  Fax: [phone]

## 2018-09-25 ENCOUNTER — TELEPHONE (OUTPATIENT)
Dept: PAIN MANAGEMENT | Age: 68
End: 2018-09-25

## 2018-09-25 NOTE — TELEPHONE ENCOUNTER
[address]    Screening Questionnaire     Name of current [facility] PCP (per patient): Dr. Bryce Rincon   Referring diagnosis: back pian, and joint pain. 1. Name of last Pain provider: WILMAR Acosta   2. When were you last seen by this Pain provider: 9/2018   3. Last time you had MRI/XRays done for your pain: no    Report available?: No  4. Are you taking any opioids at this time:  Yes   Name of medication: Butrans Patch   5. Are you under opioid contract with your current provider:  Yes Was not anymore since he was discharged    Last date medication filled: 9/20/18  6. Reason for switch:    - Were you discharged?:  Yes   - Other Reason: Pt states he was discharged due to going to the ER for pain and they gave him pain meds,      We need the last 3 office notes and MRI reports (within past 5 years), if any    available, before being seen    PLEASE READ FOLLOWING INFORMATION TO PATIENTS:     - We are a Comprehensive Pain Management program.   - Prior pain medications may be changed, based on our evaluation.   - You need a CURRENT [facility] provider.    (check with the referring provider's office to confirm). IF OLD RECORDS (INCLUDING MRI REPORT) NOT RECEIVED WITHIN 2 WEEKS, WE MAY SEND REFERRAL BACK TO REFERRING PROVIDER.      Any unresolved questions, please refer to the Provider for approval.    Approved for Consult:  Pending    Pain notes in chart

## 2018-09-25 NOTE — TELEPHONE ENCOUNTER
Currently with another [facility] pain provider. Please consider referral to outside pain provider - will send note to PCP. Pain staff - do not schedule.

## 2018-10-02 ENCOUNTER — TELEPHONE (OUTPATIENT)
Dept: INTERNAL MEDICINE CLINIC | Age: 68
End: 2018-10-02

## 2018-10-03 ENCOUNTER — OFFICE VISIT (OUTPATIENT)
Dept: ORTHOPEDIC SURGERY | Age: 68
End: 2018-10-03
Payer: COMMERCIAL

## 2018-10-03 VITALS
WEIGHT: 192 LBS | HEIGHT: 77 IN | DIASTOLIC BLOOD PRESSURE: 56 MMHG | BODY MASS INDEX: 22.67 KG/M2 | SYSTOLIC BLOOD PRESSURE: 94 MMHG | HEART RATE: 77 BPM

## 2018-10-03 DIAGNOSIS — M79.605 LEG PAIN, ANTERIOR, LEFT: Primary | ICD-10-CM

## 2018-10-03 PROCEDURE — 99212 OFFICE O/P EST SF 10 MIN: CPT | Performed by: ORTHOPAEDIC SURGERY

## 2018-10-04 ENCOUNTER — APPOINTMENT (OUTPATIENT)
Dept: GENERAL RADIOLOGY | Age: 68
End: 2018-10-04
Payer: COMMERCIAL

## 2018-10-04 ENCOUNTER — OFFICE VISIT (OUTPATIENT)
Dept: SURGERY | Age: 68
End: 2018-10-04
Payer: COMMERCIAL

## 2018-10-04 ENCOUNTER — HOSPITAL ENCOUNTER (EMERGENCY)
Age: 68
Discharge: HOME | End: 2018-10-04
Attending: EMERGENCY MEDICINE
Payer: COMMERCIAL

## 2018-10-04 ENCOUNTER — APPOINTMENT (OUTPATIENT)
Dept: CT IMAGING | Age: 68
End: 2018-10-04
Payer: COMMERCIAL

## 2018-10-04 VITALS
HEART RATE: 72 BPM | RESPIRATION RATE: 18 BRPM | SYSTOLIC BLOOD PRESSURE: 141 MMHG | DIASTOLIC BLOOD PRESSURE: 92 MMHG | TEMPERATURE: 98.3 F | WEIGHT: 194 LBS | BODY MASS INDEX: 23.01 KG/M2 | OXYGEN SATURATION: 98 %

## 2018-10-04 VITALS
HEIGHT: 77 IN | DIASTOLIC BLOOD PRESSURE: 45 MMHG | HEART RATE: 75 BPM | WEIGHT: 192 LBS | SYSTOLIC BLOOD PRESSURE: 77 MMHG | BODY MASS INDEX: 22.67 KG/M2

## 2018-10-04 DIAGNOSIS — R94.31 T WAVE INVERSION IN ELECTROCARDIOGRAM: ICD-10-CM

## 2018-10-04 DIAGNOSIS — I95.1 ORTHOSTATIC HYPOTENSION: ICD-10-CM

## 2018-10-04 DIAGNOSIS — K43.9 VENTRAL HERNIA WITHOUT OBSTRUCTION OR GANGRENE: Primary | ICD-10-CM

## 2018-10-04 DIAGNOSIS — R06.02 SHORTNESS OF BREATH: Primary | ICD-10-CM

## 2018-10-04 DIAGNOSIS — K76.9 LIVER LESION: ICD-10-CM

## 2018-10-04 LAB
A/G RATIO: 1 (ref 1.1–2.2)
ALBUMIN SERPL-MCNC: 4 G/DL (ref 3.4–5)
ALP BLD-CCNC: 65 U/L (ref 40–129)
ALT SERPL-CCNC: 67 U/L (ref 10–40)
AMORPHOUS: ABNORMAL /HPF
ANION GAP SERPL CALCULATED.3IONS-SCNC: 13 MMOL/L (ref 3–16)
AST SERPL-CCNC: 82 U/L (ref 15–37)
ATYPICAL LYMPHOCYTE RELATIVE PERCENT: 3 % (ref 0–6)
BASOPHILS ABSOLUTE: 0.1 K/UL (ref 0–0.2)
BASOPHILS RELATIVE PERCENT: 1 %
BILIRUB SERPL-MCNC: 0.6 MG/DL (ref 0–1)
BILIRUBIN URINE: NEGATIVE
BLOOD, URINE: NEGATIVE
BUN BLDV-MCNC: 15 MG/DL (ref 7–20)
CALCIUM SERPL-MCNC: 9.3 MG/DL (ref 8.3–10.6)
CHLORIDE BLD-SCNC: 97 MMOL/L (ref 99–110)
CLARITY: CLEAR
CO2: 25 MMOL/L (ref 21–32)
COLOR: YELLOW
CREAT SERPL-MCNC: 0.7 MG/DL (ref 0.8–1.3)
EKG ATRIAL RATE: 70 BPM
EKG DIAGNOSIS: NORMAL
EKG P AXIS: 70 DEGREES
EKG P-R INTERVAL: 162 MS
EKG Q-T INTERVAL: 408 MS
EKG QRS DURATION: 118 MS
EKG QTC CALCULATION (BAZETT): 440 MS
EKG R AXIS: -3 DEGREES
EKG T AXIS: 70 DEGREES
EKG VENTRICULAR RATE: 70 BPM
EOSINOPHILS ABSOLUTE: 0.2 K/UL (ref 0–0.6)
EOSINOPHILS RELATIVE PERCENT: 3 %
GFR AFRICAN AMERICAN: >60
GFR NON-AFRICAN AMERICAN: >60
GLOBULIN: 4.1 G/DL
GLUCOSE BLD-MCNC: 75 MG/DL (ref 70–99)
GLUCOSE URINE: NEGATIVE MG/DL
HCT VFR BLD CALC: 40.8 % (ref 40.5–52.5)
HEMOGLOBIN: 13.7 G/DL (ref 13.5–17.5)
KETONES, URINE: NEGATIVE MG/DL
LEUKOCYTE ESTERASE, URINE: NEGATIVE
LYMPHOCYTES ABSOLUTE: 2 K/UL (ref 1–5.1)
LYMPHOCYTES RELATIVE PERCENT: 34 %
MCH RBC QN AUTO: 31.2 PG (ref 26–34)
MCHC RBC AUTO-ENTMCNC: 33.7 G/DL (ref 31–36)
MCV RBC AUTO: 92.6 FL (ref 80–100)
MICROSCOPIC EXAMINATION: YES
MONOCYTES ABSOLUTE: 0.5 K/UL (ref 0–1.3)
MONOCYTES RELATIVE PERCENT: 9 %
NEUTROPHILS ABSOLUTE: 2.8 K/UL (ref 1.7–7.7)
NEUTROPHILS RELATIVE PERCENT: 50 %
NITRITE, URINE: NEGATIVE
PDW BLD-RTO: 12.7 % (ref 12.4–15.4)
PH UA: 7
PLATELET # BLD: 225 K/UL (ref 135–450)
PMV BLD AUTO: 8.2 FL (ref 5–10.5)
POTASSIUM SERPL-SCNC: 5.2 MMOL/L (ref 3.5–5.1)
PROTEIN UA: 30 MG/DL
RBC # BLD: 4.41 M/UL (ref 4.2–5.9)
RBC # BLD: NORMAL 10*6/UL
RBC UA: ABNORMAL /HPF (ref 0–2)
SODIUM BLD-SCNC: 135 MMOL/L (ref 136–145)
SPECIFIC GRAVITY UA: 1.01
TOTAL PROTEIN: 8.1 G/DL (ref 6.4–8.2)
TROPONIN: <0.01 NG/ML
TROPONIN: <0.01 NG/ML
URINE REFLEX TO CULTURE: ABNORMAL
URINE TYPE: ABNORMAL
UROBILINOGEN, URINE: 0.2 E.U./DL
WBC # BLD: 5.5 K/UL (ref 4–11)
WBC UA: ABNORMAL /HPF (ref 0–5)

## 2018-10-04 PROCEDURE — 71046 X-RAY EXAM CHEST 2 VIEWS: CPT

## 2018-10-04 PROCEDURE — 96374 THER/PROPH/DIAG INJ IV PUSH: CPT

## 2018-10-04 PROCEDURE — 81001 URINALYSIS AUTO W/SCOPE: CPT

## 2018-10-04 PROCEDURE — 71275 CT ANGIOGRAPHY CHEST: CPT

## 2018-10-04 PROCEDURE — 80053 COMPREHEN METABOLIC PANEL: CPT

## 2018-10-04 PROCEDURE — 85025 COMPLETE CBC W/AUTO DIFF WBC: CPT

## 2018-10-04 PROCEDURE — 96361 HYDRATE IV INFUSION ADD-ON: CPT

## 2018-10-04 PROCEDURE — 6360000004 HC RX CONTRAST MEDICATION: Performed by: EMERGENCY MEDICINE

## 2018-10-04 PROCEDURE — 99285 EMERGENCY DEPT VISIT HI MDM: CPT

## 2018-10-04 PROCEDURE — 6360000002 HC RX W HCPCS: Performed by: EMERGENCY MEDICINE

## 2018-10-04 PROCEDURE — 93010 ELECTROCARDIOGRAM REPORT: CPT | Performed by: INTERNAL MEDICINE

## 2018-10-04 PROCEDURE — 2580000003 HC RX 258: Performed by: EMERGENCY MEDICINE

## 2018-10-04 PROCEDURE — 93005 ELECTROCARDIOGRAM TRACING: CPT | Performed by: EMERGENCY MEDICINE

## 2018-10-04 PROCEDURE — 84484 ASSAY OF TROPONIN QUANT: CPT

## 2018-10-04 PROCEDURE — 99212 OFFICE O/P EST SF 10 MIN: CPT | Performed by: SURGERY

## 2018-10-04 RX ORDER — LORAZEPAM 2 MG/ML
1 INJECTION INTRAMUSCULAR ONCE
Status: COMPLETED | OUTPATIENT
Start: 2018-10-04 | End: 2018-10-04

## 2018-10-04 RX ORDER — 0.9 % SODIUM CHLORIDE 0.9 %
1000 INTRAVENOUS SOLUTION INTRAVENOUS ONCE
Status: COMPLETED | OUTPATIENT
Start: 2018-10-04 | End: 2018-10-04

## 2018-10-04 RX ADMIN — LORAZEPAM 1 MG: 2 INJECTION INTRAMUSCULAR; INTRAVENOUS at 13:07

## 2018-10-04 RX ADMIN — IOPAMIDOL 75 ML: 755 INJECTION, SOLUTION INTRAVENOUS at 14:32

## 2018-10-04 RX ADMIN — SODIUM CHLORIDE 1000 ML: 9 INJECTION, SOLUTION INTRAVENOUS at 15:49

## 2018-10-04 ASSESSMENT — PAIN DESCRIPTION - LOCATION: LOCATION: NECK

## 2018-10-04 ASSESSMENT — PAIN SCALES - GENERAL: PAINLEVEL_OUTOF10: 8

## 2018-10-04 NOTE — ED NOTES
Dr. Naida Miranda at [facility] to re evaluate patient. Dr. Naida Miranda asking patinet if he is anxious about anything. Pt states \"no. \"  Pt still moving arms and legs. See orders for interventions.        Rhesa Bloch, RN  10/04/18 [id number]

## 2018-10-04 NOTE — ED NOTES
Pt friend came out to nurses station stating that patient is seizing. Upon entering room patients arms and legs arm moving but patient is blinking. Asked patient if he was ok and he states \"what do you think, I'm having a seizure. \" Patient looked over at Chiara Avendaño during conversation.        Allen Jeter RN  10/04/18 [id number]

## 2018-10-05 NOTE — ED PROVIDER NOTES
per CT abdo    hepatitis c 7/26/17, 2010    Under care of Liver doc:Dr. Morton Stage    Hyperlipidemia LDL goal < 100     Low HDL (under 40) 10/9/2012    Peripheral neuropathy 2006    Pneumonia 10/15/13    Pyogenic granuloma     per derm:Dr. Hay Monteiro Restrictive lung disease     Under care of pulmo(Dr. Calhoun)    S/P colonoscopy 1996    Done secondary to rectal bleed:dx=hemorrhoids    S/P colonoscopy 11/11/10;10/23/2013    Dr. Sybil Chandler 10/2016(3yrs):polyps;diverticulosis. Diverticulosis & polpy(removed). Next in10/2016.  S/P endoscopy 2009    EGD:stomach ulcers.     Superficial phlebitis of left leg 9/30/2013    Tachycardia     Therapeutic drug monitoring 4/8/15    OARRS report is consistent on 4/8/15;7/9/15;10/9/15;1/8/16;4/5/16    Type 1 diabetes mellitus not at goal [facility]) 3/17/14    updated diagnosis as per endo:Dr. Steffanie Ganser     Past Surgical History:   Procedure Laterality Date    DIAGNOSTIC CARDIAC CATH LAB PROCEDURE  2013    FOOT SURGERY Left Hammer toe, 2nd toe    10/9/14    HIP FRACTURE SURGERY      LEG SURGERY      broken leg    OTHER SURGICAL HISTORY Left 11/21/2013    EXCISION 5TH METATRSAL LEFT FOOT          REVISION COLOSTOMY  06/27/2017    COLOSTOMY REVERSAL     TONSILLECTOMY      UPPER GASTROINTESTINAL ENDOSCOPY  7/16/2013    Esophageal Brushing     Family History   Problem Relation Age of Onset    Stroke Mother     Hypertension Mother     Arthritis Father     Substance Abuse Father         Etoh    Cancer Father         esophageal    Hypertension Father     Diabetes Brother     Diabetes Paternal Aunt     Asthma Neg Hx     Emphysema Neg Hx     Heart Failure Neg Hx      Social History     Social History    Marital status: Single     Spouse name: N/A    Number of children: N/A    Years of education: N/A     Occupational History    unemployed      Social History Main Topics    Smoking status: Former Smoker     Packs/day: 0.50     Years: 35.00     Types: Cigarettes times daily 180 tablet 0    AGAMATRIX PRESTO TEST strip USE TO TEST BLOOD SUGAR 3 TIMES A  each 3    metoclopramide (REGLAN) 10 MG tablet Take 1 tablet by mouth 4 times daily as needed (Nausea and/or headache) 30 tablet 0    SUMAtriptan (IMITREX) 100 MG tablet Take 1 tablet by mouth once as needed for Migraine 9 tablet 0    Tens Unit MISC by Does not apply route Use as directed. 1 each 0    melatonin 1 MG tablet Take 1 mg by mouth daily as needed for Sleep      vitamin E 1000 units capsule Take 1,000 Units by mouth daily      ibuprofen (ADVIL;MOTRIN) 200 MG tablet Take 200 mg by mouth nightly as needed for Pain      B Complex Vitamins (VITAMIN B-COMPLEX) TABS Take 1,100 mg by mouth daily      umeclidinium-vilanterol (ANORO ELLIPTA) 62.5-25 MCG/INH AEPB inhaler Inhale 1 puff into the lungs daily 1 each 11    albuterol sulfate HFA (PROAIR HFA) 108 (90 Base) MCG/ACT inhaler Inhale 2 puffs into the lungs every 4 hours as needed for Wheezing or Shortness of Breath 1 Inhaler 11    Lancets MISC Testing 2-3 times daily DX Code: E11.9 100 each 3    thiamine 100 MG tablet Take 1 tablet by mouth daily 30 tablet 3    Blood Glucose Monitoring Suppl (TRUE METRIX AIR GLUCOSE METER) DENIS 1 meter 1 Device 0    insulin aspart (NOVOLOG) 100 UNIT/ML injection vial Inject 10 Units into the skin 3 times daily (before meals) +SSI       glucagon 1 MG injection Inject 1 kit into the skin as needed       Allergies   Allergen Reactions    Neurontin [Gabapentin]      Gastric side effects       REVIEW OF SYSTEMS  10 systems reviewed, pertinent positives per HPI otherwise noted to be negative. PHYSICAL EXAM  BP (!) 141/92   Pulse 72   Temp 98.3 °F (36.8 °C) (Oral)   Resp 18   Wt 194 lb (88 kg)   SpO2 98%   BMI 23.01 kg/m²   GENERAL APPEARANCE: Awake and alert. Cooperative. No acute distress. HEAD: Normocephalic. Atraumatic. EYES: PERRL. EOM's grossly intact.    ENT: Mucous membranes are moist.   NECK: Supple, - 2.2    Total Bilirubin 0.6 0.0 - 1.0 mg/dL    Alkaline Phosphatase 65 40 - 129 U/L    ALT 67 (H) 10 - 40 U/L    AST 82 (H) 15 - 37 U/L    Globulin 4.1 g/dL   Urinalysis Reflex to Culture   Result Value Ref Range    Color, UA Yellow Straw/Yellow    Clarity, UA Clear Clear    Glucose, Ur Negative Negative mg/dL    Bilirubin Urine Negative Negative    Ketones, Urine Negative Negative mg/dL    Specific Gravity, UA 1.010 1.005 - 1.030    Blood, Urine Negative Negative    pH, UA 7.0 5.0 - 8.0    Protein, UA 30 (A) Negative mg/dL    Urobilinogen, Urine 0.2 <2.0 E.U./dL    Nitrite, Urine Negative Negative    Leukocyte Esterase, Urine Negative Negative    Microscopic Examination YES     Urine Reflex to Culture Not Indicated     Urine Type Not Specified    Troponin   Result Value Ref Range    Troponin <0.01 <0.01 ng/mL   Microscopic Urinalysis   Result Value Ref Range    WBC, UA None seen 0 - 5 /HPF    RBC, UA None seen 0 - 2 /HPF    Amorphous, UA Rare (A) /HPF   Troponin   Result Value Ref Range    Troponin <0.01 <0.01 ng/mL   EKG 12 Lead   Result Value Ref Range    Ventricular Rate 70 BPM    Atrial Rate 70 BPM    P-R Interval 162 ms    QRS Duration 118 ms    Q-T Interval 408 ms    QTc Calculation (Bazett) 440 ms    P Axis 70 degrees    R Axis -3 degrees    T Axis 70 degrees    Diagnosis       Normal sinus rhythmBiatrial enlargementIncomplete left bundle branch blockST & T wave abnormality, consider lateral ischemiaAbnormal ECGWhen compared with ECG of 19-AUG-2018 14:47,Nonspecific T wave abnormality now evident in Inferior leadsT wave inversion now evident in Lateral leadsConfirmed by [facility] SB LOPEZ MD ([id number]) on 10/4/2018 2:23:12 PM       EKG  The Ekg interpreted by myself  normal sinus rhythm with a rate of 70  Axis is   Normal  QTc is  normal   Incomplete left bundle branch block. Intervals and Durations are unremarkable. Lateral T wave inversion present. This appears to be new from previous EKG on 8/19/18. Shortness of breath    2. Liver lesion    3. Orthostatic hypotension    4. T wave inversion in electrocardiogram        Blood pressure (!) 141/92, pulse 72, temperature 98.3 °F (36.8 °C), temperature source Oral, resp. rate 18, weight 194 lb (88 kg), SpO2 98 %. DISPOSITION  Hezzie Dubin was discharged to home in stable condition.         Kennedy Biswas,   10/04/18 [id number]

## 2018-10-09 ENCOUNTER — TELEPHONE (OUTPATIENT)
Dept: INTERNAL MEDICINE CLINIC | Age: 68
End: 2018-10-09

## 2018-10-11 ENCOUNTER — TELEPHONE (OUTPATIENT)
Dept: PULMONOLOGY | Age: 68
End: 2018-10-11

## 2018-10-12 ENCOUNTER — OFFICE VISIT (OUTPATIENT)
Dept: INTERNAL MEDICINE CLINIC | Age: 68
End: 2018-10-12
Payer: COMMERCIAL

## 2018-10-12 ENCOUNTER — OFFICE VISIT (OUTPATIENT)
Dept: PSYCHOLOGY | Age: 68
End: 2018-10-12
Payer: COMMERCIAL

## 2018-10-12 VITALS
OXYGEN SATURATION: 100 % | BODY MASS INDEX: 22.55 KG/M2 | HEIGHT: 77 IN | WEIGHT: 191 LBS | DIASTOLIC BLOOD PRESSURE: 68 MMHG | SYSTOLIC BLOOD PRESSURE: 120 MMHG | HEART RATE: 51 BPM

## 2018-10-12 DIAGNOSIS — R11.2 INTRACTABLE VOMITING WITH NAUSEA, UNSPECIFIED VOMITING TYPE: ICD-10-CM

## 2018-10-12 DIAGNOSIS — G89.4 CHRONIC PAIN SYNDROME: Chronic | ICD-10-CM

## 2018-10-12 DIAGNOSIS — F51.5 NIGHTMARES: ICD-10-CM

## 2018-10-12 DIAGNOSIS — F41.1 GENERALIZED ANXIETY DISORDER: Primary | ICD-10-CM

## 2018-10-12 DIAGNOSIS — G89.4 CHRONIC PAIN SYNDROME: ICD-10-CM

## 2018-10-12 DIAGNOSIS — F41.9 ANXIETY DISORDER, UNSPECIFIED TYPE: Primary | Chronic | ICD-10-CM

## 2018-10-12 DIAGNOSIS — F33.1 MAJOR DEPRESSIVE DISORDER, RECURRENT EPISODE, MODERATE (HCC): ICD-10-CM

## 2018-10-12 DIAGNOSIS — F51.04 INSOMNIA, PSYCHOPHYSIOLOGICAL: ICD-10-CM

## 2018-10-12 PROCEDURE — 99214 OFFICE O/P EST MOD 30 MIN: CPT | Performed by: NURSE PRACTITIONER

## 2018-10-12 PROCEDURE — 90832 PSYTX W PT 30 MINUTES: CPT | Performed by: PSYCHOLOGIST

## 2018-10-12 RX ORDER — ONDANSETRON 4 MG/1
4 TABLET, FILM COATED ORAL EVERY 8 HOURS PRN
Qty: 30 TABLET | Refills: 0 | Status: SHIPPED | OUTPATIENT
Start: 2018-10-12 | End: 2018-11-20 | Stop reason: CLARIF

## 2018-10-12 RX ORDER — PRAZOSIN HYDROCHLORIDE 2 MG/1
CAPSULE ORAL
Qty: 30 CAPSULE | Refills: 3 | Status: ON HOLD | OUTPATIENT
Start: 2018-10-12 | End: 2018-10-19 | Stop reason: SDUPTHER

## 2018-10-12 RX ORDER — PRAZOSIN HYDROCHLORIDE 1 MG/1
CAPSULE ORAL
Qty: 30 CAPSULE | Refills: 3 | Status: SHIPPED | OUTPATIENT
Start: 2018-10-12 | End: 2018-10-16 | Stop reason: SDUPTHER

## 2018-10-12 ASSESSMENT — ANXIETY QUESTIONNAIRES
5. BEING SO RESTLESS THAT IT IS HARD TO SIT STILL: 2-OVER HALF THE DAYS
1. FEELING NERVOUS, ANXIOUS, OR ON EDGE: 2-OVER HALF THE DAYS
3. WORRYING TOO MUCH ABOUT DIFFERENT THINGS: 3-NEARLY EVERY DAY
GAD7 TOTAL SCORE: 16
2. NOT BEING ABLE TO STOP OR CONTROL WORRYING: 2-OVER HALF THE DAYS
6. BECOMING EASILY ANNOYED OR IRRITABLE: 2-OVER HALF THE DAYS
7. FEELING AFRAID AS IF SOMETHING AWFUL MIGHT HAPPEN: 2-OVER HALF THE DAYS
4. TROUBLE RELAXING: 3-NEARLY EVERY DAY

## 2018-10-12 ASSESSMENT — PATIENT HEALTH QUESTIONNAIRE - PHQ9
8. MOVING OR SPEAKING SO SLOWLY THAT OTHER PEOPLE COULD HAVE NOTICED. OR THE OPPOSITE, BEING SO FIGETY OR RESTLESS THAT YOU HAVE BEEN MOVING AROUND A LOT MORE THAN USUAL: 2
SUM OF ALL RESPONSES TO PHQ QUESTIONS 1-9: 21
6. FEELING BAD ABOUT YOURSELF - OR THAT YOU ARE A FAILURE OR HAVE LET YOURSELF OR YOUR FAMILY DOWN: 2
7. TROUBLE CONCENTRATING ON THINGS, SUCH AS READING THE NEWSPAPER OR WATCHING TELEVISION: 2
3. TROUBLE FALLING OR STAYING ASLEEP: 3
10. IF YOU CHECKED OFF ANY PROBLEMS, HOW DIFFICULT HAVE THESE PROBLEMS MADE IT FOR YOU TO DO YOUR WORK, TAKE CARE OF THINGS AT HOME, OR GET ALONG WITH OTHER PEOPLE: 2
5. POOR APPETITE OR OVEREATING: 3
SUM OF ALL RESPONSES TO PHQ9 QUESTIONS 1 & 2: 5
SUM OF ALL RESPONSES TO PHQ QUESTIONS 1-9: 21
4. FEELING TIRED OR HAVING LITTLE ENERGY: 3
2. FEELING DOWN, DEPRESSED OR HOPELESS: 2
9. THOUGHTS THAT YOU WOULD BE BETTER OFF DEAD, OR OF HURTING YOURSELF: 1
1. LITTLE INTEREST OR PLEASURE IN DOING THINGS: 3

## 2018-10-12 ASSESSMENT — ENCOUNTER SYMPTOMS
SINUS PAIN: 0
CONSTIPATION: 0
ABDOMINAL DISTENTION: 0
SINUS PRESSURE: 0
RHINORRHEA: 0
SHORTNESS OF BREATH: 0
BACK PAIN: 1
COUGH: 0
VOMITING: 1
WHEEZING: 0
NAUSEA: 1
ABDOMINAL PAIN: 0
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
## 2018-10-12 NOTE — PROGRESS NOTES
melatonin 1 MG tablet Take 1 mg by mouth daily as needed for Sleep      vitamin E 1000 units capsule Take 1,000 Units by mouth daily      ibuprofen (ADVIL;MOTRIN) 200 MG tablet Take 200 mg by mouth nightly as needed for Pain      B Complex Vitamins (VITAMIN B-COMPLEX) TABS Take 1,100 mg by mouth daily      umeclidinium-vilanterol (ANORO ELLIPTA) 62.5-25 MCG/INH AEPB inhaler Inhale 1 puff into the lungs daily 1 each 11    albuterol sulfate HFA (PROAIR HFA) 108 (90 Base) MCG/ACT inhaler Inhale 2 puffs into the lungs every 4 hours as needed for Wheezing or Shortness of Breath 1 Inhaler 11    Lancets MISC Testing 2-3 times daily DX Code: E11.9 100 each 3    thiamine 100 MG tablet Take 1 tablet by mouth daily 30 tablet 3    Blood Glucose Monitoring Suppl (TRUE METRIX AIR GLUCOSE METER) DENIS 1 meter 1 Device 0    insulin aspart (NOVOLOG) 100 UNIT/ML injection vial Inject 10 Units into the skin 3 times daily (before meals) +SSI       glucagon 1 MG injection Inject 1 kit into the skin as needed       No current facility-administered medications for this visit. Social History:   Social History     Social History    Marital status: Single     Spouse name: N/A    Number of children: N/A    Years of education: N/A     Occupational History    unemployed      Social History Main Topics    Smoking status: Former Smoker     Packs/day: 0.50     Years: 35.00     Types: Cigarettes     Quit date: 9/20/2018    Smokeless tobacco: Never Used    Alcohol use Yes      Comment: 6 beers a week, sometimes more     Drug use: No    Sexual activity: No     Other Topics Concern    Not on file     Social History Narrative    No narrative on file       TOBACCO:   reports that he quit smoking about 3 weeks ago. His smoking use included Cigarettes. He has a 17.50 pack-year smoking history. He has never used smokeless tobacco.  ETOH:   reports that he drinks alcohol.     Family History:   Family History   Problem Relation Age 15-20) positive      ALLYSON 7 SCORE 10/12/2018 9/18/2018   ALLYSON-7 Total Score 16 16     Interpretation of ALLYSON-7 score: 5-9 = mild anxiety, 10-14 = moderate anxiety, 15+ = severe anxiety. Recommend referral to behavioral health for scores 10 or greater. PHQ Scores 10/12/2018 9/18/2018 8/20/2018 6/5/2018 3/1/2018 10/16/2014 10/6/2014   PHQ2 Score 5 4 0 0 0 0 0   PHQ9 Score 21 19 0 0 0 0 0     Interpretation of Total Score Depression Severity: 1-4 = Minimal depression, 5-9 = Mild depression, 10-14 = Moderate depression, 15-19 = Moderately severe depression, 20-27 = Severe depression    Diagnosis:    1. Generalized anxiety disorder    2. Major depressive disorder, recurrent episode, moderate (HCC)    3. Insomnia, psychophysiological    4.  Chronic pain syndrome    r/o PTSD    Patient Active Problem List   Diagnosis    GERD (gastroesophageal reflux disease)    Peripheral neuropathy of bilateral feet    COPD (chronic obstructive pulmonary disease) (HCC)    HLD (hyperlipidemia)    Hepatic steatosis    Anxiety/depression    History of hepatitis C    Bilateral knee & hip OA    Mild-moderate alcohol consumption (beer)    Chronic pain syndrome    Multilevel spondylosis    S/P left colectomy    Hyponatremia    S/p reversal of colostomy (6/27/17)    Chronic dCHF (grade 1 LVDD)    Chronic normocytic anemia    Acute hyperglycemia    HTN (hypertension)    Hx DKA (Feb 2018)    Primary insomnia    Chronic transaminasemia    Tobacco smoker    Fall at home    Hypochloremia    Hyperkalemia    Mixed metabolic and respiratory acidosis    Closed nondisplaced L ankle fracture (lateral malleolus)    L 2nd toenail avulsion    Skin tear of L leg    Head trauma    IDDM (insulin dependent diabetes mellitus) (HCC)    Ventral hernia without obstruction or gangrene    Diabetic ketoacidosis without coma associated with type 2 diabetes mellitus ([facility]    DDD (degenerative disc disease), cervical    Spondylosis of

## 2018-10-12 NOTE — PROGRESS NOTES
10/12/2018     Oni Nieves (:  1950) is a 78 y.o. male, here for evaluation of the following medical concerns:    HPI  Mood Disorder:  Patient presents for follow-up of depression, anxiety disorder and PTSD. Current complaints include: depressed mood, insomnia and nightmares. He denies any other symptoms. Symptoms/signs of jenny: none. External stressors: financial concern, difficulty with establishing with pain management. Current treatment includes: Zoloft- 100 mg and Wellbutrin. Medication side effects: nausea with vomiting. Continues to follow with Dr. Jocelynn Pacheco. He has been referred to Dr. Ariza by Dr. Jocelynn Pacheco for medication management. ED visit for hypotension and anxiety. Reports he was given ativan for anxiety and some IV fluids. Felt better after. No episodes since. Chronic pain- He is having a hard time finding a new pain management doctor. He is out of Butrans patch. Last patch was put on last week, taken off yesterday. Needs new referral to pain management- Dr. Daisy Devine on [address] Review of Systems   Constitutional: Negative for chills, fatigue and fever. HENT: Negative for congestion, rhinorrhea, sinus pain and sinus pressure. Respiratory: Negative for cough, shortness of breath and wheezing. Cardiovascular: Negative for chest pain and palpitations. Gastrointestinal: Positive for nausea and vomiting. Negative for abdominal distention, abdominal pain and constipation. Musculoskeletal: Positive for arthralgias and back pain. Negative for myalgias. Neurological: Negative for dizziness, light-headedness and headaches. Hematological: Negative for adenopathy. Psychiatric/Behavioral: Positive for decreased concentration, dysphoric mood, hallucinations and sleep disturbance (nightmares). Negative for agitation, behavioral problems and confusion. The patient is nervous/anxious. Prior to Visit Medications    Medication Sig Taking?  Authorizing Provider CAPSULES BY MOUTH DAILY Yes RICCO Joyce - ROOPA   metoprolol tartrate (LOPRESSOR) 25 MG tablet Take 1 tablet by mouth 2 times daily Yes RICCO Javed CNP   AGAMATRIX PRESTO TEST strip USE TO TEST BLOOD SUGAR 3 TIMES A DAY Yes Popeye Saldivar MD   metoclopramide (REGLAN) 10 MG tablet Take 1 tablet by mouth 4 times daily as needed (Nausea and/or headache) Yes Zack Davenport MD   Tens Unit MISC by Does not apply route Use as directed.  Yes RICCO Post CNP   melatonin 1 MG tablet Take 1 mg by mouth daily as needed for Sleep Yes Historical Provider, MD   vitamin E 1000 units capsule Take 1,000 Units by mouth daily Yes Historical Provider, MD   ibuprofen (ADVIL;MOTRIN) 200 MG tablet Take 200 mg by mouth nightly as needed for Pain Yes Historical Provider, MD   B Complex Vitamins (VITAMIN B-COMPLEX) TABS Take 1,100 mg by mouth daily Yes Historical Provider, MD   umeclidinium-vilanterol (ANORO ELLIPTA) 62.5-25 MCG/INH AEPB inhaler Inhale 1 puff into the lungs daily Yes Rosanne Delaney MD   albuterol sulfate HFA (PROAIR HFA) 108 (90 Base) MCG/ACT inhaler Inhale 2 puffs into the lungs every 4 hours as needed for Wheezing or Shortness of Breath Yes Rosanne Delaney MD   Lancets MISC Testing 2-3 times daily DX Code: E11.9 Yes Popeye Saldivar MD   thiamine 100 MG tablet Take 1 tablet by mouth daily Yes Lilliam Bobo APRN - CNP   Blood Glucose Monitoring Suppl (TRUE METRIX AIR GLUCOSE METER) DENIS 1 meter Yes RICCO Javed CNP   insulin aspart (NOVOLOG) 100 UNIT/ML injection vial Inject 10 Units into the skin 3 times daily (before meals) +SSI  Yes Historical Provider, MD   glucagon 1 MG injection Inject 1 kit into the skin as needed Yes Historical Provider, MD   SUMAtriptan (IMITREX) 100 MG tablet Take 1 tablet by mouth once as needed for Migraine  Zack Davenport MD        Social History   Substance Use Topics    Smoking status: Former Smoker

## 2018-10-15 ENCOUNTER — TELEPHONE (OUTPATIENT)
Dept: INTERNAL MEDICINE CLINIC | Age: 68
End: 2018-10-15

## 2018-10-15 NOTE — TELEPHONE ENCOUNTER
That would be his decision as he has his own rights, if she is concerned for her safety, she needs to alert authorities. If he wants to go to a nursing home, then he needs to chose one and apply, there is no medical need to send him.

## 2018-10-16 ENCOUNTER — TELEPHONE (OUTPATIENT)
Dept: INTERNAL MEDICINE CLINIC | Age: 68
End: 2018-10-16

## 2018-10-16 DIAGNOSIS — F51.5 NIGHTMARES: ICD-10-CM

## 2018-10-16 RX ORDER — PRAZOSIN HYDROCHLORIDE 1 MG/1
CAPSULE ORAL
Qty: 90 CAPSULE | Refills: 3 | Status: SHIPPED | OUTPATIENT
Start: 2018-10-16 | End: 2018-12-17

## 2018-10-16 NOTE — TELEPHONE ENCOUNTER
[employer] is asking if Parth Cortez can send a new script of 90, 1 mg tablets of prazosin (MINIPRESS) instead of the 2 different prescriptions

## 2018-10-16 NOTE — TELEPHONE ENCOUNTER
[employer] got 2 prescriptions for prazosin (MINIPRESS)   One says to take 1 mg for for 3 days but 2 mg was prescribed and it is a capsule that can't be broken  Should they give him 1 mg capsules instead?

## 2018-10-17 ENCOUNTER — APPOINTMENT (OUTPATIENT)
Dept: GENERAL RADIOLOGY | Age: 68
DRG: 069 | End: 2018-10-17
Payer: COMMERCIAL

## 2018-10-17 ENCOUNTER — TELEPHONE (OUTPATIENT)
Dept: INTERNAL MEDICINE CLINIC | Age: 68
End: 2018-10-17

## 2018-10-17 ENCOUNTER — APPOINTMENT (OUTPATIENT)
Dept: CT IMAGING | Age: 68
DRG: 069 | End: 2018-10-17
Payer: COMMERCIAL

## 2018-10-17 ENCOUNTER — APPOINTMENT (OUTPATIENT)
Dept: MRI IMAGING | Age: 68
DRG: 069 | End: 2018-10-17
Payer: COMMERCIAL

## 2018-10-17 ENCOUNTER — HOSPITAL ENCOUNTER (INPATIENT)
Age: 68
LOS: 6 days | Discharge: HOME HEALTH | DRG: 069 | End: 2018-10-23
Attending: EMERGENCY MEDICINE | Admitting: INTERNAL MEDICINE
Payer: COMMERCIAL

## 2018-10-17 DIAGNOSIS — M51.36 LUMBAR DEGENERATIVE DISC DISEASE: ICD-10-CM

## 2018-10-17 DIAGNOSIS — G89.29 OTHER CHRONIC PAIN: ICD-10-CM

## 2018-10-17 DIAGNOSIS — R73.9 HYPERGLYCEMIA: ICD-10-CM

## 2018-10-17 DIAGNOSIS — G45.9 TIA (TRANSIENT ISCHEMIC ATTACK): Primary | ICD-10-CM

## 2018-10-17 DIAGNOSIS — R07.9 CHEST PAIN, UNSPECIFIED TYPE: ICD-10-CM

## 2018-10-17 PROBLEM — I10 HTN (HYPERTENSION): Status: ACTIVE | Noted: 2018-10-17

## 2018-10-17 PROBLEM — I63.9 ACUTE CEREBROVASCULAR ACCIDENT (CVA) (HCC): Status: ACTIVE | Noted: 2018-10-17

## 2018-10-17 PROBLEM — N40.0 BPH (BENIGN PROSTATIC HYPERPLASIA): Status: ACTIVE | Noted: 2018-10-17

## 2018-10-17 PROBLEM — E11.9 DM (DIABETES MELLITUS) (HCC): Status: ACTIVE | Noted: 2018-10-17

## 2018-10-17 PROBLEM — J44.9 COPD (CHRONIC OBSTRUCTIVE PULMONARY DISEASE) (HCC): Status: ACTIVE | Noted: 2018-10-17

## 2018-10-17 LAB
A/G RATIO: 1 (ref 1.1–2.2)
ALBUMIN SERPL-MCNC: 3.9 G/DL (ref 3.4–5)
ALP BLD-CCNC: 69 U/L (ref 40–129)
ALT SERPL-CCNC: 36 U/L (ref 10–40)
AMPHETAMINE SCREEN, URINE: NORMAL
ANION GAP SERPL CALCULATED.3IONS-SCNC: 14 MMOL/L (ref 3–16)
AST SERPL-CCNC: 39 U/L (ref 15–37)
BARBITURATE SCREEN URINE: NORMAL
BASOPHILS ABSOLUTE: 0.1 K/UL (ref 0–0.2)
BASOPHILS RELATIVE PERCENT: 0.8 %
BENZODIAZEPINE SCREEN, URINE: NORMAL
BILIRUB SERPL-MCNC: 1 MG/DL (ref 0–1)
BUN BLDV-MCNC: 14 MG/DL (ref 7–20)
CALCIUM SERPL-MCNC: 9.2 MG/DL (ref 8.3–10.6)
CANNABINOID SCREEN URINE: NORMAL
CHLORIDE BLD-SCNC: 94 MMOL/L (ref 99–110)
CO2: 24 MMOL/L (ref 21–32)
COCAINE METABOLITE SCREEN URINE: NORMAL
CREAT SERPL-MCNC: 1 MG/DL (ref 0.8–1.3)
EKG ATRIAL RATE: 78 BPM
EKG DIAGNOSIS: NORMAL
EKG P AXIS: 65 DEGREES
EKG P-R INTERVAL: 162 MS
EKG Q-T INTERVAL: 430 MS
EKG QRS DURATION: 116 MS
EKG QTC CALCULATION (BAZETT): 490 MS
EKG R AXIS: -12 DEGREES
EKG T AXIS: 74 DEGREES
EKG VENTRICULAR RATE: 78 BPM
EOSINOPHILS ABSOLUTE: 0.1 K/UL (ref 0–0.6)
EOSINOPHILS RELATIVE PERCENT: 2.3 %
ETHANOL: NORMAL MG/DL (ref 0–0.08)
GFR AFRICAN AMERICAN: >60
GFR NON-AFRICAN AMERICAN: >60
GLOBULIN: 3.8 G/DL
GLUCOSE BLD-MCNC: 299 MG/DL (ref 70–99)
GLUCOSE BLD-MCNC: 305 MG/DL (ref 70–99)
GLUCOSE BLD-MCNC: 386 MG/DL (ref 70–99)
GLUCOSE BLD-MCNC: 399 MG/DL (ref 70–99)
HCT VFR BLD CALC: 39.3 % (ref 40.5–52.5)
HEMOGLOBIN: 13.5 G/DL (ref 13.5–17.5)
INR BLD: 1.04 (ref 0.86–1.14)
LYMPHOCYTES ABSOLUTE: 2.2 K/UL (ref 1–5.1)
LYMPHOCYTES RELATIVE PERCENT: 33.8 %
Lab: NORMAL
MCH RBC QN AUTO: 31.5 PG (ref 26–34)
MCHC RBC AUTO-ENTMCNC: 34.4 G/DL (ref 31–36)
MCV RBC AUTO: 91.7 FL (ref 80–100)
METHADONE SCREEN, URINE: NORMAL
MONOCYTES ABSOLUTE: 0.7 K/UL (ref 0–1.3)
MONOCYTES RELATIVE PERCENT: 11.4 %
NEUTROPHILS ABSOLUTE: 3.4 K/UL (ref 1.7–7.7)
NEUTROPHILS RELATIVE PERCENT: 51.7 %
OPIATE SCREEN URINE: NORMAL
OXYCODONE URINE: NORMAL
PDW BLD-RTO: 12.8 % (ref 12.4–15.4)
PERFORMED ON: ABNORMAL
PH UA: 7
PHENCYCLIDINE SCREEN URINE: NORMAL
PLATELET # BLD: 298 K/UL (ref 135–450)
PMV BLD AUTO: 8.1 FL (ref 5–10.5)
POTASSIUM SERPL-SCNC: 4.2 MMOL/L (ref 3.5–5.1)
PROPOXYPHENE SCREEN: NORMAL
PROTHROMBIN TIME: 11.8 SEC (ref 9.8–13)
RBC # BLD: 4.29 M/UL (ref 4.2–5.9)
SODIUM BLD-SCNC: 132 MMOL/L (ref 136–145)
SPECIMEN STATUS: NORMAL
TOTAL PROTEIN: 7.7 G/DL (ref 6.4–8.2)
TROPONIN: <0.01 NG/ML
TROPONIN: <0.01 NG/ML
WBC # BLD: 6.5 K/UL (ref 4–11)

## 2018-10-17 PROCEDURE — 85610 PROTHROMBIN TIME: CPT

## 2018-10-17 PROCEDURE — 70450 CT HEAD/BRAIN W/O DYE: CPT

## 2018-10-17 PROCEDURE — 70496 CT ANGIOGRAPHY HEAD: CPT

## 2018-10-17 PROCEDURE — 6370000000 HC RX 637 (ALT 250 FOR IP): Performed by: NURSE PRACTITIONER

## 2018-10-17 PROCEDURE — 1200000000 HC SEMI PRIVATE

## 2018-10-17 PROCEDURE — G0378 HOSPITAL OBSERVATION PER HR: HCPCS

## 2018-10-17 PROCEDURE — 70498 CT ANGIOGRAPHY NECK: CPT

## 2018-10-17 PROCEDURE — 96372 THER/PROPH/DIAG INJ SC/IM: CPT

## 2018-10-17 PROCEDURE — 6360000002 HC RX W HCPCS: Performed by: INTERNAL MEDICINE

## 2018-10-17 PROCEDURE — 99285 EMERGENCY DEPT VISIT HI MDM: CPT

## 2018-10-17 PROCEDURE — 94664 DEMO&/EVAL PT USE INHALER: CPT

## 2018-10-17 PROCEDURE — 70551 MRI BRAIN STEM W/O DYE: CPT

## 2018-10-17 PROCEDURE — 83036 HEMOGLOBIN GLYCOSYLATED A1C: CPT

## 2018-10-17 PROCEDURE — 2580000003 HC RX 258: Performed by: INTERNAL MEDICINE

## 2018-10-17 PROCEDURE — 93010 ELECTROCARDIOGRAM REPORT: CPT | Performed by: INTERNAL MEDICINE

## 2018-10-17 PROCEDURE — 36415 COLL VENOUS BLD VENIPUNCTURE: CPT

## 2018-10-17 PROCEDURE — 80053 COMPREHEN METABOLIC PANEL: CPT

## 2018-10-17 PROCEDURE — 6370000000 HC RX 637 (ALT 250 FOR IP): Performed by: INTERNAL MEDICINE

## 2018-10-17 PROCEDURE — G0480 DRUG TEST DEF 1-7 CLASSES: HCPCS

## 2018-10-17 PROCEDURE — 6370000000 HC RX 637 (ALT 250 FOR IP): Performed by: EMERGENCY MEDICINE

## 2018-10-17 PROCEDURE — 71045 X-RAY EXAM CHEST 1 VIEW: CPT

## 2018-10-17 PROCEDURE — 84484 ASSAY OF TROPONIN QUANT: CPT

## 2018-10-17 PROCEDURE — 85025 COMPLETE CBC W/AUTO DIFF WBC: CPT

## 2018-10-17 PROCEDURE — 80307 DRUG TEST PRSMV CHEM ANLYZR: CPT

## 2018-10-17 PROCEDURE — 6360000004 HC RX CONTRAST MEDICATION: Performed by: EMERGENCY MEDICINE

## 2018-10-17 PROCEDURE — 93005 ELECTROCARDIOGRAM TRACING: CPT | Performed by: EMERGENCY MEDICINE

## 2018-10-17 RX ORDER — SIMVASTATIN 10 MG
20 TABLET ORAL NIGHTLY
Status: DISCONTINUED | OUTPATIENT
Start: 2018-10-17 | End: 2018-10-23 | Stop reason: HOSPADM

## 2018-10-17 RX ORDER — DEXTROSE MONOHYDRATE 50 MG/ML
100 INJECTION, SOLUTION INTRAVENOUS PRN
Status: DISCONTINUED | OUTPATIENT
Start: 2018-10-17 | End: 2018-10-23 | Stop reason: HOSPADM

## 2018-10-17 RX ORDER — PANTOPRAZOLE SODIUM 40 MG/1
40 TABLET, DELAYED RELEASE ORAL
Status: DISCONTINUED | OUTPATIENT
Start: 2018-10-18 | End: 2018-10-23 | Stop reason: HOSPADM

## 2018-10-17 RX ORDER — INSULIN GLARGINE 100 [IU]/ML
15 INJECTION, SOLUTION SUBCUTANEOUS NIGHTLY
Status: DISCONTINUED | OUTPATIENT
Start: 2018-10-17 | End: 2018-10-21

## 2018-10-17 RX ORDER — NITROGLYCERIN 0.4 MG/1
0.4 TABLET SUBLINGUAL EVERY 5 MIN PRN
Status: DISCONTINUED | OUTPATIENT
Start: 2018-10-17 | End: 2018-10-23 | Stop reason: HOSPADM

## 2018-10-17 RX ORDER — SODIUM CHLORIDE 0.9 % (FLUSH) 0.9 %
10 SYRINGE (ML) INJECTION EVERY 12 HOURS SCHEDULED
Status: DISCONTINUED | OUTPATIENT
Start: 2018-10-17 | End: 2018-10-23 | Stop reason: HOSPADM

## 2018-10-17 RX ORDER — TRAMADOL HYDROCHLORIDE 50 MG/1
50 TABLET ORAL EVERY 6 HOURS PRN
Status: DISCONTINUED | OUTPATIENT
Start: 2018-10-17 | End: 2018-10-18

## 2018-10-17 RX ORDER — ASPIRIN 81 MG/1
81 TABLET ORAL DAILY
Status: DISCONTINUED | OUTPATIENT
Start: 2018-10-17 | End: 2018-10-23 | Stop reason: HOSPADM

## 2018-10-17 RX ORDER — NICOTINE POLACRILEX 4 MG
15 LOZENGE BUCCAL PRN
Status: DISCONTINUED | OUTPATIENT
Start: 2018-10-17 | End: 2018-10-17 | Stop reason: SDUPTHER

## 2018-10-17 RX ORDER — OXYCODONE HYDROCHLORIDE AND ACETAMINOPHEN 5; 325 MG/1; MG/1
1 TABLET ORAL ONCE
Status: COMPLETED | OUTPATIENT
Start: 2018-10-17 | End: 2018-10-17

## 2018-10-17 RX ORDER — LISINOPRIL 10 MG/1
10 TABLET ORAL DAILY
Status: DISCONTINUED | OUTPATIENT
Start: 2018-10-17 | End: 2018-10-23 | Stop reason: HOSPADM

## 2018-10-17 RX ORDER — TAMSULOSIN HYDROCHLORIDE 0.4 MG/1
0.4 CAPSULE ORAL DAILY
Status: DISCONTINUED | OUTPATIENT
Start: 2018-10-17 | End: 2018-10-23 | Stop reason: HOSPADM

## 2018-10-17 RX ORDER — DEXTROSE MONOHYDRATE 50 MG/ML
100 INJECTION, SOLUTION INTRAVENOUS PRN
Status: DISCONTINUED | OUTPATIENT
Start: 2018-10-17 | End: 2018-10-17 | Stop reason: SDUPTHER

## 2018-10-17 RX ORDER — DEXTROSE MONOHYDRATE 25 G/50ML
12.5 INJECTION, SOLUTION INTRAVENOUS PRN
Status: DISCONTINUED | OUTPATIENT
Start: 2018-10-17 | End: 2018-10-23 | Stop reason: HOSPADM

## 2018-10-17 RX ORDER — FINASTERIDE 5 MG/1
5 TABLET, FILM COATED ORAL DAILY
Status: DISCONTINUED | OUTPATIENT
Start: 2018-10-17 | End: 2018-10-23 | Stop reason: HOSPADM

## 2018-10-17 RX ORDER — PRAZOSIN HYDROCHLORIDE 1 MG/1
2 CAPSULE ORAL NIGHTLY
Status: CANCELLED | OUTPATIENT
Start: 2018-10-17

## 2018-10-17 RX ORDER — ALBUTEROL SULFATE 90 UG/1
2 AEROSOL, METERED RESPIRATORY (INHALATION) EVERY 4 HOURS PRN
Status: DISCONTINUED | OUTPATIENT
Start: 2018-10-17 | End: 2018-10-23 | Stop reason: HOSPADM

## 2018-10-17 RX ORDER — DEXTROSE MONOHYDRATE 25 G/50ML
12.5 INJECTION, SOLUTION INTRAVENOUS PRN
Status: DISCONTINUED | OUTPATIENT
Start: 2018-10-17 | End: 2018-10-17 | Stop reason: SDUPTHER

## 2018-10-17 RX ORDER — ONDANSETRON 2 MG/ML
4 INJECTION INTRAMUSCULAR; INTRAVENOUS EVERY 6 HOURS PRN
Status: DISCONTINUED | OUTPATIENT
Start: 2018-10-17 | End: 2018-10-23 | Stop reason: HOSPADM

## 2018-10-17 RX ORDER — HYDRALAZINE HYDROCHLORIDE 25 MG/1
50 TABLET, FILM COATED ORAL 3 TIMES DAILY
Status: CANCELLED | OUTPATIENT
Start: 2018-10-17

## 2018-10-17 RX ORDER — SODIUM CHLORIDE 0.9 % (FLUSH) 0.9 %
10 SYRINGE (ML) INJECTION PRN
Status: DISCONTINUED | OUTPATIENT
Start: 2018-10-17 | End: 2018-10-23 | Stop reason: HOSPADM

## 2018-10-17 RX ORDER — NICOTINE POLACRILEX 4 MG
15 LOZENGE BUCCAL PRN
Status: DISCONTINUED | OUTPATIENT
Start: 2018-10-17 | End: 2018-10-23 | Stop reason: HOSPADM

## 2018-10-17 RX ADMIN — INSULIN LISPRO 5 UNITS: 100 INJECTION, SOLUTION INTRAVENOUS; SUBCUTANEOUS at 17:36

## 2018-10-17 RX ADMIN — METOPROLOL TARTRATE 12.5 MG: 25 TABLET ORAL at 19:51

## 2018-10-17 RX ADMIN — SIMVASTATIN 20 MG: 10 TABLET, FILM COATED ORAL at 19:51

## 2018-10-17 RX ADMIN — TRAMADOL HYDROCHLORIDE 50 MG: 50 TABLET, FILM COATED ORAL at 22:12

## 2018-10-17 RX ADMIN — ENOXAPARIN SODIUM 40 MG: 40 INJECTION SUBCUTANEOUS at 17:36

## 2018-10-17 RX ADMIN — TAMSULOSIN HYDROCHLORIDE 0.4 MG: 0.4 CAPSULE ORAL at 19:51

## 2018-10-17 RX ADMIN — IOPAMIDOL 75 ML: 755 INJECTION, SOLUTION INTRAVENOUS at 11:34

## 2018-10-17 RX ADMIN — Medication 10 ML: at 19:53

## 2018-10-17 RX ADMIN — INSULIN LISPRO 3 UNITS: 100 INJECTION, SOLUTION INTRAVENOUS; SUBCUTANEOUS at 21:13

## 2018-10-17 RX ADMIN — OXYCODONE AND ACETAMINOPHEN 1 TABLET: 5; 325 TABLET ORAL at 13:33

## 2018-10-17 RX ADMIN — INSULIN GLARGINE 15 UNITS: 100 INJECTION, SOLUTION SUBCUTANEOUS at 21:12

## 2018-10-17 ASSESSMENT — PAIN SCALES - GENERAL
PAINLEVEL_OUTOF10: 8
PAINLEVEL_OUTOF10: 8
PAINLEVEL_OUTOF10: 7

## 2018-10-17 ASSESSMENT — PAIN DESCRIPTION - PROGRESSION: CLINICAL_PROGRESSION: NOT CHANGED

## 2018-10-17 ASSESSMENT — PAIN DESCRIPTION - DESCRIPTORS: DESCRIPTORS: DULL;ACHING

## 2018-10-17 ASSESSMENT — PAIN DESCRIPTION - ONSET: ONSET: ON-GOING

## 2018-10-17 ASSESSMENT — PAIN DESCRIPTION - FREQUENCY: FREQUENCY: CONTINUOUS

## 2018-10-17 ASSESSMENT — PAIN DESCRIPTION - ORIENTATION: ORIENTATION: LEFT

## 2018-10-17 ASSESSMENT — PAIN DESCRIPTION - LOCATION: LOCATION: ANKLE

## 2018-10-17 NOTE — H&P
GI:EGD    Essential hypertension, benign 11/2010    Fatty liver 10/9/2012    Foot ulcer:left 9/30/2013    Gastric ulcer, unspecified as acute or chronic, without mention of hemorrhage or perforation     GERD (gastroesophageal reflux disease)     Gout 1997    Right big toe    Hearing decreased     Left ear=60%. Right ear=80%.  Hemangioma 12/2010    Liver as per CT abdo    hepatitis c 7/26/17, 2010    Under care of Liver doc:Dr. Dominga Enriquez    Hyperlipidemia LDL goal < 100     Low HDL (under 40) 10/9/2012    Peripheral neuropathy 2006    Pneumonia 10/15/13    Pyogenic granuloma     per derm:Dr. Angy Koenig Restrictive lung disease     Under care of pulmo(Dr. Calhoun)    S/P colonoscopy 1996    Done secondary to rectal bleed:dx=hemorrhoids    S/P colonoscopy 11/11/10;10/23/2013    Dr. Pj Borjas 10/2016(3yrs):polyps;diverticulosis. Diverticulosis & polpy(removed). Next in10/2016.  S/P endoscopy 2009    EGD:stomach ulcers.  Superficial phlebitis of left leg 9/30/2013    Tachycardia     Therapeutic drug monitoring 4/8/15    OARRS report is consistent on 4/8/15;7/9/15;10/9/15;1/8/16;4/5/16    Type 1 diabetes mellitus not at goal [facility]) 3/17/14    updated diagnosis as per nicole:Dr. Nanda Chadwick       Past Surgical History:          Procedure Laterality Date    DIAGNOSTIC CARDIAC CATH LAB PROCEDURE  2013    FOOT SURGERY Left Hammer toe, 2nd toe    10/9/14    HIP FRACTURE SURGERY      LEG SURGERY      broken leg    OTHER SURGICAL HISTORY Left 11/21/2013    EXCISION 5TH METATRSAL LEFT FOOT          REVISION COLOSTOMY  06/27/2017    COLOSTOMY REVERSAL     TONSILLECTOMY      UPPER GASTROINTESTINAL ENDOSCOPY  7/16/2013    Esophageal Brushing       Medications Prior to Admission:      Prior to Admission medications    Medication Sig Start Date End Date Taking?  Authorizing Provider   prazosin (MINIPRESS) 1 MG capsule Take 1 mg for 3 days, increase to 2 mg for 3 days, then increase to 3 mg 10/16/18 [gabapentin]    Social History:      The patient currently lives with wife    TOBACCO:   reports that he quit smoking about 3 weeks ago. His smoking use included Cigarettes. He has a 17.50 pack-year smoking history. He has never used smokeless tobacco.  ETOH:   reports that he drinks alcohol. Family History:       Reviewed in detail and negative for DM, CAD, Cancer, CVA. Positive as follows:    Family History   Problem Relation Age of Onset   [de-identified] Stroke Mother     Hypertension Mother     Arthritis Father     Substance Abuse Father         Etoh    Cancer Father         esophageal    Hypertension Father     Diabetes Brother     Diabetes Paternal Aunt     Asthma Neg Hx     Emphysema Neg Hx     Heart Failure Neg Hx        REVIEW OF SYSTEMS:   Pertinent positives as noted in the HPI. All other systems reviewed and negative. PHYSICAL EXAM PERFORMED:    /74   Pulse 84   Temp 98.1 °F (36.7 °C)   Resp 16   Wt 191 lb (86.6 kg)   SpO2 100%   BMI 22.65 kg/m²     General appearance:  No apparent distress, appears stated age and cooperative. HEENT:  Normal cephalic, atraumatic without obvious deformity. Pupils equal, round, and reactive to light. Extra ocular muscles intact. Conjunctivae/corneas clear. , flattening of the face left side  Neck: Supple, with full range of motion. No jugular venous distention. Trachea midline. Respiratory:  Normal respiratory effort. Clear to auscultation, bilaterally without Rales/Wheezes/Rhonchi. Cardiovascular:  Regular rate and rhythm with normal S1/S2 without murmurs, rubs or gallops. Abdomen: Soft, non-tender, non-distended with normal bowel sounds. abdominal wall hernia and no evidence of obstruction  Musculoskeletal:  No clubbing, cyanosis or edema bilaterally. R OM limited  Skin: Skin color, texture, turgor normal. Callus over the sole right side. Neurologic:  Numbness over the left face up warm and the left lower extremity.   Power is 4 x 5  Speech is Problems    Diagnosis Date Noted    [facility] [I63.9] 10/17/2018    DM (diabetes mellitus) ([facility] [E11.9] 10/17/2018    HTN (hypertension) [I10] 10/17/2018    BPH (benign prostatic hyperplasia) [N40.0] 10/17/2018    COPD (chronic obstructive pulmonary disease) ([facility] [J44.9] 10/17/2018    Closed nondisplaced L ankle fracture (lateral malleolus) [S82.892A] 08/10/2018         PLAN:    Left-sided numbness, slurred qatrdw-ctcmbxal-gsaxbijk TIA versus CVA-admit, telemetry, troponin, aspirin, statin, lipid profile, monitor neuro check, stroke team was notified from ER, and patient is not a TPA candidate, neurology evaluation, OT PT swallowing eval, echocardiogram and MRI    History of CVA with left-sided weakness 2017    Diabetes -adult the Lantus to 15 units daily at bedtime, hold oral hypoglycemic monitors blood sugar with sliding scale insulin and hemoglobin A1c    Hypertension-on multiple medications including metoprolol, lisinopril, hydralazine, Minipress-given the symptoms of CVA and low blood pressure-discontinue the hydralazine, cut down the dose of lisinopril and metoprolol, blood pressure parameters, adjust the medication according to blood pressure    Chest pain-telemetry, serial troponin, aspirin, nitro when necessary, consider cardiology if necessary    BPH-finasteride, Flomax    COPD-stable, continue home inhalers    Recent ankle fracture left side-continue walking boot and orthopedic follow-up as an outpatient      Abdominal wall hernia-no evidence of obstruction    Callus over the right sole- podiatric follow-upas outpatient        DVT Prophylaxis: Lovenox  Diet:  Low carb diet if pass swallowing eval     PT/OT Eval Status: Ordered    Dispo - 1-3 days       Ish Vela MD    Thank you RICCO Corado - ROOPA for the opportunity to be involved in this patient's care.  If you have any questions or concerns please feel free to contact me at [phone].

## 2018-10-17 NOTE — ED PROVIDER NOTES
holds 90 (or 45) degrees for full 10 seconds   7. Limb Ataxia: 0=Absent   8. Sensory: 1=Mild to moderate sensory loss; patient feels pinprick is less sharp or is dull on the affected side; there is a loss of superficial pain with pinprick but patient is aware He is being touched   9. Best Language:  0=No aphasia, normal   10. Dysarthria: 0=Normal   11. Extinction and Inattention: 0=No abnormality   12. Distal motor function: 0=Normal    Total:  3     DERMATOLOGIC: No petechiae, rashes, or ecchymoses. No erythema. PSYCH: normal mood and affect. Normal thought content. ED COURSE AND MEDICAL DECISION MAKING:      EKG as interpreted by myself:  normal sinus rhythm with a rate of 78  Axis is   Normal  QTc is  490ms  IVCD     ST depression in 1, aVL, V4 through V6   Compared to prior EKG dated 6/14/18, ST segment depression laterally is more pronounced    Radiology:  Films have been read by radiologist as noted in chart unless otherwise stated. Other radiologic studies (i.e. CT, MRI, ultrasounds, etc ) have been interpreted by radiologist.     XR CHEST PORTABLE   Final Result   No acute cardiopulmonary disease. CTA NECK W CONTRAST   Final Result   No intracranial arterial large vessel occlusion. There is moderate stenosis   of the proximal right posterior cerebral artery. No evidence of arterial flow-limiting stenosis in the neck. CTA HEAD W CONTRAST   Final Result   No intracranial arterial large vessel occlusion. There is moderate stenosis   of the proximal right posterior cerebral artery. No evidence of arterial flow-limiting stenosis in the neck. CT HEAD WO CONTRAST   Final Result   No acute intracranial abnormality. Chronic-appearing left maxillary and sphenoid sinusitis.          MRI BRAIN WO CONTRAST    (Results Pending)       Labs:  Results for orders placed or performed during the hospital encounter of 10/17/18   CBC Auto Differential   Result Value Ref Range    WBC and xray findings with patient and they understand. Questions were answered to the best of my ability. Discharge vitals:  Blood pressure (!) 153/86, pulse 71, temperature 97.6 °F (36.4 °C), temperature source Oral, resp. rate 16, height 6' 5\" (1.956 m), weight 195 lb 15.8 oz (88.9 kg), SpO2 98 %. Prescriptions given:   Current Discharge Medication List          This chart was created using Dragon voice recognition software.        Ashley Ngo MD  10/17/18 1392

## 2018-10-17 NOTE — PROGRESS NOTES
Pt states that he takes a medication that was recently prescribed at [facility] for Hep C. Pt is unsure of medication name. RN asked patient which pharmacy he used and pt states that they gave him his prescription at [facility].

## 2018-10-18 ENCOUNTER — APPOINTMENT (OUTPATIENT)
Dept: GENERAL RADIOLOGY | Age: 68
DRG: 069 | End: 2018-10-18
Payer: COMMERCIAL

## 2018-10-18 PROBLEM — R47.1 DYSARTHRIA: Status: ACTIVE | Noted: 2018-10-18

## 2018-10-18 PROBLEM — R53.1 LEFT-SIDED WEAKNESS: Status: ACTIVE | Noted: 2018-10-18

## 2018-10-18 LAB
CHOLESTEROL, TOTAL: 134 MG/DL (ref 0–199)
ESTIMATED AVERAGE GLUCOSE: 177.2 MG/DL
GLUCOSE BLD-MCNC: 159 MG/DL (ref 70–99)
GLUCOSE BLD-MCNC: 270 MG/DL (ref 70–99)
GLUCOSE BLD-MCNC: 303 MG/DL (ref 70–99)
GLUCOSE BLD-MCNC: 305 MG/DL (ref 70–99)
GLUCOSE BLD-MCNC: 313 MG/DL (ref 70–99)
HBA1C MFR BLD: 7.8 %
HDLC SERPL-MCNC: 36 MG/DL (ref 40–60)
LDL CHOLESTEROL CALCULATED: 73 MG/DL
LV EF: 58 %
LVEF MODALITY: NORMAL
PERFORMED ON: ABNORMAL
TRIGL SERPL-MCNC: 127 MG/DL (ref 0–150)
TROPONIN: <0.01 NG/ML
VLDLC SERPL CALC-MCNC: 25 MG/DL

## 2018-10-18 PROCEDURE — G0378 HOSPITAL OBSERVATION PER HR: HCPCS

## 2018-10-18 PROCEDURE — 71045 X-RAY EXAM CHEST 1 VIEW: CPT

## 2018-10-18 PROCEDURE — 6370000000 HC RX 637 (ALT 250 FOR IP): Performed by: HOSPITALIST

## 2018-10-18 PROCEDURE — 36415 COLL VENOUS BLD VENIPUNCTURE: CPT

## 2018-10-18 PROCEDURE — 6370000000 HC RX 637 (ALT 250 FOR IP): Performed by: NURSE PRACTITIONER

## 2018-10-18 PROCEDURE — 97162 PT EVAL MOD COMPLEX 30 MIN: CPT

## 2018-10-18 PROCEDURE — 6360000002 HC RX W HCPCS: Performed by: HOSPITALIST

## 2018-10-18 PROCEDURE — 97166 OT EVAL MOD COMPLEX 45 MIN: CPT

## 2018-10-18 PROCEDURE — 1200000000 HC SEMI PRIVATE

## 2018-10-18 PROCEDURE — 84484 ASSAY OF TROPONIN QUANT: CPT

## 2018-10-18 PROCEDURE — 2580000003 HC RX 258: Performed by: INTERNAL MEDICINE

## 2018-10-18 PROCEDURE — G8978 MOBILITY CURRENT STATUS: HCPCS

## 2018-10-18 PROCEDURE — 6370000000 HC RX 637 (ALT 250 FOR IP): Performed by: INTERNAL MEDICINE

## 2018-10-18 PROCEDURE — 80061 LIPID PANEL: CPT

## 2018-10-18 PROCEDURE — 93005 ELECTROCARDIOGRAM TRACING: CPT | Performed by: HOSPITALIST

## 2018-10-18 PROCEDURE — 96374 THER/PROPH/DIAG INJ IV PUSH: CPT

## 2018-10-18 PROCEDURE — G8988 SELF CARE GOAL STATUS: HCPCS

## 2018-10-18 PROCEDURE — 6360000002 HC RX W HCPCS: Performed by: INTERNAL MEDICINE

## 2018-10-18 PROCEDURE — 97530 THERAPEUTIC ACTIVITIES: CPT

## 2018-10-18 PROCEDURE — 93308 TTE F-UP OR LMTD: CPT | Performed by: INTERNAL MEDICINE

## 2018-10-18 PROCEDURE — G8979 MOBILITY GOAL STATUS: HCPCS

## 2018-10-18 PROCEDURE — 97535 SELF CARE MNGMENT TRAINING: CPT

## 2018-10-18 PROCEDURE — G8987 SELF CARE CURRENT STATUS: HCPCS

## 2018-10-18 PROCEDURE — 96372 THER/PROPH/DIAG INJ SC/IM: CPT

## 2018-10-18 RX ORDER — MORPHINE SULFATE 4 MG/ML
4 INJECTION, SOLUTION INTRAMUSCULAR; INTRAVENOUS
Status: DISCONTINUED | OUTPATIENT
Start: 2018-10-18 | End: 2018-10-21

## 2018-10-18 RX ORDER — OXYCODONE HYDROCHLORIDE AND ACETAMINOPHEN 5; 325 MG/1; MG/1
2 TABLET ORAL EVERY 4 HOURS PRN
Status: DISCONTINUED | OUTPATIENT
Start: 2018-10-18 | End: 2018-10-23 | Stop reason: HOSPADM

## 2018-10-18 RX ORDER — MORPHINE SULFATE 2 MG/ML
2 INJECTION, SOLUTION INTRAMUSCULAR; INTRAVENOUS
Status: DISCONTINUED | OUTPATIENT
Start: 2018-10-18 | End: 2018-10-21

## 2018-10-18 RX ORDER — CYCLOBENZAPRINE HCL 10 MG
10 TABLET ORAL 3 TIMES DAILY PRN
Status: DISCONTINUED | OUTPATIENT
Start: 2018-10-18 | End: 2018-10-23 | Stop reason: HOSPADM

## 2018-10-18 RX ORDER — OXYCODONE HYDROCHLORIDE AND ACETAMINOPHEN 5; 325 MG/1; MG/1
1 TABLET ORAL EVERY 4 HOURS PRN
Status: DISCONTINUED | OUTPATIENT
Start: 2018-10-18 | End: 2018-10-23 | Stop reason: HOSPADM

## 2018-10-18 RX ADMIN — OXYCODONE AND ACETAMINOPHEN 2 TABLET: 5; 325 TABLET ORAL at 16:07

## 2018-10-18 RX ADMIN — CYCLOBENZAPRINE HYDROCHLORIDE 10 MG: 10 TABLET, FILM COATED ORAL at 16:06

## 2018-10-18 RX ADMIN — FINASTERIDE 5 MG: 5 TABLET, FILM COATED ORAL at 07:58

## 2018-10-18 RX ADMIN — INSULIN LISPRO 2 UNITS: 100 INJECTION, SOLUTION INTRAVENOUS; SUBCUTANEOUS at 21:26

## 2018-10-18 RX ADMIN — LISINOPRIL 10 MG: 10 TABLET ORAL at 07:59

## 2018-10-18 RX ADMIN — Medication 10 ML: at 21:29

## 2018-10-18 RX ADMIN — LIDOCAINE HYDROCHLORIDE: 20 SOLUTION ORAL; TOPICAL at 04:23

## 2018-10-18 RX ADMIN — TRAMADOL HYDROCHLORIDE 50 MG: 50 TABLET, FILM COATED ORAL at 07:59

## 2018-10-18 RX ADMIN — OXYCODONE AND ACETAMINOPHEN 2 TABLET: 5; 325 TABLET ORAL at 10:58

## 2018-10-18 RX ADMIN — OXYCODONE AND ACETAMINOPHEN 2 TABLET: 5; 325 TABLET ORAL at 21:27

## 2018-10-18 RX ADMIN — PANTOPRAZOLE SODIUM 40 MG: 40 TABLET, DELAYED RELEASE ORAL at 07:59

## 2018-10-18 RX ADMIN — INSULIN LISPRO 3 UNITS: 100 INJECTION, SOLUTION INTRAVENOUS; SUBCUTANEOUS at 11:55

## 2018-10-18 RX ADMIN — METOPROLOL TARTRATE 12.5 MG: 25 TABLET ORAL at 21:28

## 2018-10-18 RX ADMIN — SERTRALINE HYDROCHLORIDE 50 MG: 50 TABLET ORAL at 07:59

## 2018-10-18 RX ADMIN — ENOXAPARIN SODIUM 40 MG: 40 INJECTION SUBCUTANEOUS at 08:02

## 2018-10-18 RX ADMIN — INSULIN LISPRO 4 UNITS: 100 INJECTION, SOLUTION INTRAVENOUS; SUBCUTANEOUS at 16:08

## 2018-10-18 RX ADMIN — MORPHINE SULFATE 2 MG: 2 INJECTION, SOLUTION INTRAMUSCULAR; INTRAVENOUS at 04:47

## 2018-10-18 RX ADMIN — Medication 10 ML: at 08:02

## 2018-10-18 RX ADMIN — SIMVASTATIN 20 MG: 10 TABLET, FILM COATED ORAL at 21:27

## 2018-10-18 RX ADMIN — METOPROLOL TARTRATE 12.5 MG: 25 TABLET ORAL at 07:59

## 2018-10-18 RX ADMIN — ASPIRIN 81 MG: 81 TABLET, COATED ORAL at 07:59

## 2018-10-18 RX ADMIN — TAMSULOSIN HYDROCHLORIDE 0.4 MG: 0.4 CAPSULE ORAL at 21:27

## 2018-10-18 RX ADMIN — INSULIN GLARGINE 15 UNITS: 100 INJECTION, SOLUTION SUBCUTANEOUS at 21:27

## 2018-10-18 RX ADMIN — INSULIN LISPRO 1 UNITS: 100 INJECTION, SOLUTION INTRAVENOUS; SUBCUTANEOUS at 08:01

## 2018-10-18 ASSESSMENT — PAIN SCALES - GENERAL
PAINLEVEL_OUTOF10: 8
PAINLEVEL_OUTOF10: 6
PAINLEVEL_OUTOF10: 7
PAINLEVEL_OUTOF10: 8
PAINLEVEL_OUTOF10: 8
PAINLEVEL_OUTOF10: 7
PAINLEVEL_OUTOF10: 7
PAINLEVEL_OUTOF10: 8
PAINLEVEL_OUTOF10: 7
PAINLEVEL_OUTOF10: 6
PAINLEVEL_OUTOF10: 5

## 2018-10-18 ASSESSMENT — PAIN DESCRIPTION - PAIN TYPE
TYPE: ACUTE PAIN

## 2018-10-18 ASSESSMENT — PAIN DESCRIPTION - LOCATION
LOCATION: ABDOMEN;CHEST
LOCATION: BACK;LEG;NECK;SHOULDER
LOCATION: CHEST
LOCATION: NECK;BACK

## 2018-10-18 ASSESSMENT — PAIN DESCRIPTION - ORIENTATION
ORIENTATION: LEFT
ORIENTATION: LEFT

## 2018-10-18 ASSESSMENT — PAIN DESCRIPTION - DESCRIPTORS
DESCRIPTORS: BURNING
DESCRIPTORS: SHARP;BURNING

## 2018-10-18 ASSESSMENT — PAIN DESCRIPTION - ONSET: ONSET: AWAKENED FROM SLEEP

## 2018-10-18 ASSESSMENT — PAIN DESCRIPTION - FREQUENCY
FREQUENCY: CONTINUOUS
FREQUENCY: CONTINUOUS

## 2018-10-18 NOTE — CONSULTS
noted.  His CT scan of the head during that evaluation demonstrated multiple previous lacunar infarctions consistent with hypertension as per the records. He was instructed to continue taking aspirin and was started on an increased dose of atorvastatin 40 mg for secondary stroke prevention. He states he has not been taking an aspirin and cannot explain why he stopped taking it. His home medications prior to admission does not reflect he takes statin therapy and he cannot deny her confirm whether he is taking such medications. Follow-up MRI of the brain revealed no acute cerebral infarctions. CT angiogram head and neck with no significant limiting stenosis but did show moderate stenosis of the right posterior cerebral artery. Past Medical History:   has a past medical history of Anxiety disorder; BPH (benign prostatic hypertrophy); Calcified granuloma of lung ([address] Cataract; Chronic pain; COPD (chronic obstructive pulmonary disease) ([address] Degenerative arthritis of hip; Depression; Depression with anxiety; Diabetes mellitus ([address] Diabetic eye exam ([address] Diabetic retinopathy ([address] Diverticulitis; Diverticulosis; Emphysema; Esophageal candidiasis ([address] Essential hypertension, benign; Fatty liver; Foot ulcer:left; Gastric ulcer, unspecified as acute or chronic, without mention of hemorrhage or perforation; GERD (gastroesophageal reflux disease); Gout; Hearing decreased; Hemangioma; hepatitis c; Hyperlipidemia LDL goal < 100; Low HDL (under 40); Peripheral neuropathy; Pneumonia; Pyogenic granuloma; Restrictive lung disease; S/P colonoscopy; S/P colonoscopy; S/P endoscopy; Superficial phlebitis of left leg; Tachycardia; Therapeutic drug monitoring; and Type 1 diabetes mellitus not at goal [facility]).   Patient Active Problem List   Diagnosis    GERD (gastroesophageal reflux disease)    Peripheral neuropathy of bilateral feet    COPD (chronic obstructive pulmonary disease) (HCC)    HLD (hyperlipidemia) the skin 3 times daily (before meals) +SSI   glucagon 1 MG injection, Inject 1 kit into the skin as needed    Review of Systems:  ROS:  Constitutional- No weight loss or fevers  Eyes- No diplopia. No photophobia. Ears/nose/throat- No dysphagia. No Dysarthria  Cardiovascular- No palpitations. No chest pain  Respiratory- No dyspnea. No Cough  Gastrointestinal- No Abdominal pain. No Vomiting. Genitourinary- No incontinence. No urinary retention  Musculoskeletal- No myalgia. No arthralgia  Skin- No rash. No easy bruising. Psychiatric- No depression. No anxiety  Endocrine- No diabetes. No thyroid issues. Hematologic- No bleeding difficulty. No fatigue  Neurologic- as per HPI. OBJECTIVE   Neurological Exam:  Exam:  Blood pressure (!) 159/88, pulse 65, temperature 97.9 °F (36.6 °C), temperature source Oral, resp. rate 16, height 6' 5\" (1.956 m), weight 195 lb 15.8 oz (88.9 kg), SpO2 97 %. Constitutional    Vital signs: BP, HR, and RR reviewed   General Alert, no distress, well-nourished  Eyes: fundoscopic exam not visualized. Cardiovascular: pulses symmetric in all 4 extremities. No peripheral edema. Psychiatric: cooperative with examination, no  psychotic behavior noted. Neurologic  Mental status:   orientation to person and place and time    General fund of knowledge grossly intact   Memory grossly intact   Attention intact as able to attend well to the exam     Language fluent in conversation   Comprehension intact; follows simple commands  Cranial nerves:   CN2: Visual Fields full w/o extinction on confrontational testing,   CN 3,4,6: extraocular muscles intact,  CN5: facial sensation symmetric   CN7:face symmetric without dysarthria,   CN8: hearing grossly intact  CN9: palate elevated symmetrically  CN11: trap full strength on shoulder shrug  CN12: tongue midline with protrusion  Strength: No prontator drift. Good strength in all 3 Extremities.   Left lower extremity strength 4/5 with left foot drop

## 2018-10-18 NOTE — PROGRESS NOTES
Inhaler (MDI) with spacer when the following criteria are met:  a. Alert and cooperative     b. HR < 140 bpm  c. RR < 30 bpm                d. Can demonstrate a 2-3 second inspiratory hold  4. Bronchodilators will be administered via Hand Held Nebulizer ALICIA [facility]) to patients when ANY of the following criteria are met  a. Incognizant or uncooperative          b. Patients treated with HHN at Home        c. Unable to demonstrate proper use of MDI with spacer     d. RR > 30 bpm   5. Bronchodilators will be delivered via Metered Dose Inhaler (MDI), HHN, Aerogen to intubated patients on mechanical ventilation. 6. Inhalation medication orders will be delivered and/or substituted as outlined below. Aerosolized Medications Ordering and Administration Guidelines:    1. All Medications will be ordered by a physician, and their frequency and/or modality will be adjusted as defined by the patients Respiratory Severity Index (RSI) score. 2. If the patient does not have documented COPD, consider discontinuing anticholinergics when RSI is less than 9.  3. If the bronchospasm worsens (increased RSI), then the bronchodilator frequency can be increased to a maximum of every 4 hours. If greater than every 4 hours is required, the physician will be contacted. 4. If the bronchospasm improves, the frequency of the bronchodilator can be decreased, based on the patient's RSI, but not less than home treatment regimen frequency. 5. Bronchodilator(s) will be discontinued if patient has a RSI less than 9 and has received no scheduled or as needed treatment for 72  Hrs. Patients Ordered on a Mucolytic Agent:    1. Must always be administered with a bronchodilator. 2. Discontinue if patient experiences worsened bronchospasm, or secretions have lessened to the point that the patient is able to clear them with a cough. Anti-inflammatory and Combination Medications:    1.  If the patient lacks prior history of lung disease, is not using

## 2018-10-18 NOTE — CARE COORDINATION
[address] Transitions Interview     10/18/2018    Patient: Maico Meraz Patient : 1950   MRN: [id number]  Reason for Admission: Acute CVA  RARS: Readmission Risk Score: 32       Spoke with: patient Junaid Lopez and his sister Bigg Lehman      Readmission Risk  Patient Active Problem List   Diagnosis    GERD (gastroesophageal reflux disease)    Peripheral neuropathy of bilateral feet    COPD (chronic obstructive pulmonary disease) ([address]    HLD (hyperlipidemia)    Hepatic steatosis    Anxiety/depression    History of hepatitis C    Bilateral knee & hip OA    Mild-moderate alcohol consumption (beer)    Chronic pain syndrome    Multilevel spondylosis    S/P left colectomy    Hyponatremia    S/p reversal of colostomy (17)    Chronic dCHF (grade 1 LVDD)    Chronic normocytic anemia    Acute hyperglycemia    HTN (hypertension)    Hx DKA (2018)    Primary insomnia    Chronic transaminasemia    Tobacco smoker    Fall at home    Hypochloremia    Hyperkalemia    Mixed metabolic and respiratory acidosis    Closed nondisplaced L ankle fracture (lateral malleolus)    L 2nd toenail avulsion    Skin tear of L leg    Head trauma    IDDM (insulin dependent diabetes mellitus) (HCC)    Ventral hernia without obstruction or gangrene    Diabetic ketoacidosis without coma associated with type 2 diabetes mellitus ([address]    DDD (degenerative disc disease), cervical    Spondylosis of cervical region without myelopathy or radiculopathy    DDD (degenerative disc disease), lumbar    Closed nondisplaced fracture of lateral malleolus of left fibula    Lumbar degenerative disc disease    Protrusion of lumbar intervertebral disc    Osteoarthritis of both knees    Osteoarthritis of both hips    Idiopathic peripheral neuropathy    Leg pain, anterior, left    Acute cerebrovascular accident (CVA) ([address]    DM (diabetes mellitus) ([address]    HTN (hypertension)    BPH (benign

## 2018-10-18 NOTE — PROGRESS NOTES
Hospitalist Progress Note      PCP: RICCO Velazquez - CNP    Date of Admission: 10/17/2018    Chief Complaint: Left-sided numbness and slurred speech    Hospital Course: 78 y.o. male who presented to [facility] with about complaint. He has a history of stroke and left-sided weakness in the past.  He recovered some extent. He used to walk with a cane. This morning he experienced left-sided numbness over the extremities and face and slurred speech. This reason he was brought to the emergency room. His symptoms improved by the time he reached the emergency room. But still he has no symptoms. He denies headache or visual changes. No swallowing difficulty. No  trouble breathing dizziness or syncope. He has a fracture of the left ankle and he uses walking boot. he complains chest pain, over the left side in intensity more with her breathing and movements. This going on for last couple of weeks. Subjective: EM and notes reviewed the patient seen and examined. The patient had no acute events overnight left-sided numbness in the upper extremity still persists, the weakness is at baseline for the patient he has left residual weakness from previous CVA. This a.m. he is complaining of pain to his right scapula and neck area as well as some generalized area*heparin and lower back and left lower extremity he is 3 weeks status post left ankle fracture. He has had chest pain or shortness of breath no nausea vomiting or diarrhea and is tolerating by mouth and voiding without difficulty.   She does report that he was previously on pain medications and was seen a pain management physician) he will as last admitted he lost association with the pain doctor and is in the process of finding a new one and he has been without pain medication for the last week or so      Medications:  Reviewed    Infusion Medications    dextrose       Scheduled Medications    finasteride  5 mg Oral Daily    lisinopril

## 2018-10-18 NOTE — PROGRESS NOTES
Ambulation?: No  Ambulation 1  Surface: level tile  Device: Single point cane  Assistance: Contact guard assistance  Quality of Gait: long step length. Unsteady without LOB. quick gait  Distance: 76'  Stairs/Curb  Stairs?: No     Balance  Posture: Good  Sitting - Static: Good  Sitting - Dynamic: Good  Standing - Static: Fair  Standing - Dynamic: Fair        Assessment   Assessment: Pt functioning below baseline. Pt present with mild balance deficits and slightly impulsive affecting balance. Pt fatigued quickly needing frequent rest breaks although VSS throughout. Pt would benefit from skilled PT to address balance deficits. Recommend NSF upon DC   Treatment Diagnosis: decreased mobility   Prognosis: Good  Decision Making: Medium Complexity  Patient Education: role of PT  Barriers to Learning: [place]  REQUIRES PT FOLLOW UP: Yes  Activity Tolerance  Activity Tolerance: Patient Tolerated treatment well         Plan   Plan  Times per week: 3-5x/week   Times per day: Daily  Current Treatment Recommendations: Strengthening, Gait Training, Home Exercise Program, Functional Mobility Training, Balance Training  Safety Devices  Type of devices: All fall risk precautions in place, Left in bed, Gait belt, Patient at risk for falls, Nurse notified, Call light within reach  Restraints  Initially in place: No    G-Code  PT G-Codes  Functional Assessment Tool Used: -PAC  Score: 21  Functional Limitation: Mobility: Walking and moving around  Mobility: Walking and Moving Around Current Status ():  At least 20 percent but less than 40 percent impaired, limited or restricted  Mobility: Walking and Moving Around Goal Status (): 0 percent impaired, limited or restricted  OutComes Score                                           -PAC Score  AM-PAC Inpatient Mobility Raw Score : 21  -PAC Inpatient T-Scale Score : 50.25  Mobility Inpatient CMS 0-100% Score: 28.97  Mobility Inpatient CMS G-Code Modifier : CARRILLO          Goals  Short term goals  Time Frame for Short term goals: 3 days  Short term goal 1: Pt will transfer supine to sit with mod I - met  Short term goal 2:  Pt will sit to stand with Mod I   Short term goal 3:  Pt will ambulate x 100' with LRAD with CGA  Short term goal 4: Pt will complete B LE ther ex to improve functional strength for mobility  Patient Goals   Patient goals : to feel better       Therapy Time   Individual Concurrent Group Co-treatment   Time In 1405         Time Out 1430         Minutes 25         Timed Code Treatment Minutes: [address]ON[address] Se, PT

## 2018-10-19 ENCOUNTER — CARE COORDINATION (OUTPATIENT)
Dept: CARE COORDINATION | Age: 68
End: 2018-10-19

## 2018-10-19 LAB
EKG ATRIAL RATE: 68 BPM
EKG DIAGNOSIS: NORMAL
EKG P AXIS: 76 DEGREES
EKG P-R INTERVAL: 154 MS
EKG Q-T INTERVAL: 452 MS
EKG QRS DURATION: 122 MS
EKG QTC CALCULATION (BAZETT): 480 MS
EKG R AXIS: -11 DEGREES
EKG T AXIS: 61 DEGREES
EKG VENTRICULAR RATE: 68 BPM
GLUCOSE BLD-MCNC: 193 MG/DL (ref 70–99)
GLUCOSE BLD-MCNC: 226 MG/DL (ref 70–99)
GLUCOSE BLD-MCNC: 290 MG/DL (ref 70–99)
GLUCOSE BLD-MCNC: 336 MG/DL (ref 70–99)
PERFORMED ON: ABNORMAL

## 2018-10-19 PROCEDURE — 6360000002 HC RX W HCPCS: Performed by: NURSE PRACTITIONER

## 2018-10-19 PROCEDURE — 90686 IIV4 VACC NO PRSV 0.5 ML IM: CPT

## 2018-10-19 PROCEDURE — 94640 AIRWAY INHALATION TREATMENT: CPT

## 2018-10-19 PROCEDURE — 96372 THER/PROPH/DIAG INJ SC/IM: CPT

## 2018-10-19 PROCEDURE — G0378 HOSPITAL OBSERVATION PER HR: HCPCS

## 2018-10-19 PROCEDURE — 6370000000 HC RX 637 (ALT 250 FOR IP): Performed by: INTERNAL MEDICINE

## 2018-10-19 PROCEDURE — 94664 DEMO&/EVAL PT USE INHALER: CPT

## 2018-10-19 PROCEDURE — G0008 ADMIN INFLUENZA VIRUS VAC: HCPCS

## 2018-10-19 PROCEDURE — 6360000002 HC RX W HCPCS: Performed by: INTERNAL MEDICINE

## 2018-10-19 PROCEDURE — 6360000002 HC RX W HCPCS

## 2018-10-19 PROCEDURE — 93010 ELECTROCARDIOGRAM REPORT: CPT | Performed by: INTERNAL MEDICINE

## 2018-10-19 PROCEDURE — 6370000000 HC RX 637 (ALT 250 FOR IP): Performed by: NURSE PRACTITIONER

## 2018-10-19 PROCEDURE — 2580000003 HC RX 258: Performed by: INTERNAL MEDICINE

## 2018-10-19 PROCEDURE — 1200000000 HC SEMI PRIVATE

## 2018-10-19 RX ORDER — OXYCODONE HYDROCHLORIDE AND ACETAMINOPHEN 5; 325 MG/1; MG/1
2 TABLET ORAL EVERY 6 HOURS PRN
Qty: 10 TABLET | Refills: 0 | Status: SHIPPED | OUTPATIENT
Start: 2018-10-19 | End: 2018-10-23

## 2018-10-19 RX ORDER — LISINOPRIL 30 MG/1
10 TABLET ORAL DAILY
Qty: 90 TABLET | Refills: 0 | Status: SHIPPED | OUTPATIENT
Start: 2018-10-19 | End: 2018-11-05 | Stop reason: SDUPTHER

## 2018-10-19 RX ORDER — HYDRALAZINE HYDROCHLORIDE 20 MG/ML
10 INJECTION INTRAMUSCULAR; INTRAVENOUS ONCE
Status: COMPLETED | OUTPATIENT
Start: 2018-10-19 | End: 2018-10-19

## 2018-10-19 RX ORDER — ASPIRIN 81 MG/1
81 TABLET ORAL DAILY
Qty: 30 TABLET | Refills: 3 | COMMUNITY
Start: 2018-10-20 | End: 2019-04-27 | Stop reason: ALTCHOICE

## 2018-10-19 RX ORDER — SIMVASTATIN 20 MG
20 TABLET ORAL NIGHTLY
Qty: 30 TABLET | Refills: 3 | Status: SHIPPED | OUTPATIENT
Start: 2018-10-19 | End: 2019-03-02

## 2018-10-19 RX ORDER — CYCLOBENZAPRINE HCL 10 MG
10 TABLET ORAL 3 TIMES DAILY PRN
Qty: 10 TABLET | Refills: 0 | Status: SHIPPED | OUTPATIENT
Start: 2018-10-19 | End: 2018-10-22

## 2018-10-19 RX ADMIN — METOPROLOL TARTRATE 12.5 MG: 25 TABLET ORAL at 20:36

## 2018-10-19 RX ADMIN — Medication 10 ML: at 07:56

## 2018-10-19 RX ADMIN — ENOXAPARIN SODIUM 40 MG: 40 INJECTION SUBCUTANEOUS at 07:56

## 2018-10-19 RX ADMIN — PANTOPRAZOLE SODIUM 40 MG: 40 TABLET, DELAYED RELEASE ORAL at 07:55

## 2018-10-19 RX ADMIN — CYCLOBENZAPRINE HYDROCHLORIDE 10 MG: 10 TABLET, FILM COATED ORAL at 22:17

## 2018-10-19 RX ADMIN — Medication 10 ML: at 22:17

## 2018-10-19 RX ADMIN — ASPIRIN 81 MG: 81 TABLET, COATED ORAL at 07:56

## 2018-10-19 RX ADMIN — HYDRALAZINE HYDROCHLORIDE 10 MG: 20 INJECTION INTRAMUSCULAR; INTRAVENOUS at 22:29

## 2018-10-19 RX ADMIN — OXYCODONE AND ACETAMINOPHEN 2 TABLET: 5; 325 TABLET ORAL at 04:36

## 2018-10-19 RX ADMIN — METOPROLOL TARTRATE 12.5 MG: 25 TABLET ORAL at 07:55

## 2018-10-19 RX ADMIN — CYCLOBENZAPRINE HYDROCHLORIDE 10 MG: 10 TABLET, FILM COATED ORAL at 16:56

## 2018-10-19 RX ADMIN — INSULIN LISPRO 2 UNITS: 100 INJECTION, SOLUTION INTRAVENOUS; SUBCUTANEOUS at 11:43

## 2018-10-19 RX ADMIN — INSULIN LISPRO 4 UNITS: 100 INJECTION, SOLUTION INTRAVENOUS; SUBCUTANEOUS at 16:52

## 2018-10-19 RX ADMIN — GLYCOPYRROLATE AND FORMOTEROL FUMARATE 2 PUFF: 9; 4.8 AEROSOL, METERED RESPIRATORY (INHALATION) at 00:21

## 2018-10-19 RX ADMIN — INSULIN LISPRO 2 UNITS: 100 INJECTION, SOLUTION INTRAVENOUS; SUBCUTANEOUS at 20:38

## 2018-10-19 RX ADMIN — OXYCODONE AND ACETAMINOPHEN 2 TABLET: 5; 325 TABLET ORAL at 20:36

## 2018-10-19 RX ADMIN — INFLUENZA A VIRUS A/MICHIGAN/45/2015 X-275 (H1N1) ANTIGEN (FORMALDEHYDE INACTIVATED), INFLUENZA A VIRUS A/SINGAPORE/INFIMH-16-0019/2016 IVR-186 (H3N2) ANTIGEN (FORMALDEHYDE INACTIVATED), INFLUENZA B VIRUS B/PHUKET/3073/2013 ANTIGEN (FORMALDEHYDE INACTIVATED), AND INFLUENZA B VIRUS B/MARYLAND/15/2016 BX-69A ANTIGEN (FORMALDEHYDE INACTIVATED) 0.5 ML: 15; 15; 15; 15 INJECTION, SUSPENSION INTRAMUSCULAR at 08:14

## 2018-10-19 RX ADMIN — OXYCODONE AND ACETAMINOPHEN 2 TABLET: 5; 325 TABLET ORAL at 09:12

## 2018-10-19 RX ADMIN — SERTRALINE HYDROCHLORIDE 50 MG: 50 TABLET ORAL at 07:55

## 2018-10-19 RX ADMIN — TAMSULOSIN HYDROCHLORIDE 0.4 MG: 0.4 CAPSULE ORAL at 20:36

## 2018-10-19 RX ADMIN — LISINOPRIL 10 MG: 10 TABLET ORAL at 07:55

## 2018-10-19 RX ADMIN — FINASTERIDE 5 MG: 5 TABLET, FILM COATED ORAL at 07:55

## 2018-10-19 RX ADMIN — SIMVASTATIN 20 MG: 10 TABLET, FILM COATED ORAL at 20:36

## 2018-10-19 RX ADMIN — INSULIN GLARGINE 15 UNITS: 100 INJECTION, SOLUTION SUBCUTANEOUS at 20:38

## 2018-10-19 RX ADMIN — GLYCOPYRROLATE AND FORMOTEROL FUMARATE 2 PUFF: 9; 4.8 AEROSOL, METERED RESPIRATORY (INHALATION) at 07:57

## 2018-10-19 RX ADMIN — GLYCOPYRROLATE AND FORMOTEROL FUMARATE 2 PUFF: 9; 4.8 AEROSOL, METERED RESPIRATORY (INHALATION) at 19:28

## 2018-10-19 RX ADMIN — INSULIN LISPRO 1 UNITS: 100 INJECTION, SOLUTION INTRAVENOUS; SUBCUTANEOUS at 07:57

## 2018-10-19 RX ADMIN — OXYCODONE AND ACETAMINOPHEN 2 TABLET: 5; 325 TABLET ORAL at 14:55

## 2018-10-19 ASSESSMENT — PAIN SCALES - GENERAL
PAINLEVEL_OUTOF10: 7
PAINLEVEL_OUTOF10: 8
PAINLEVEL_OUTOF10: 7
PAINLEVEL_OUTOF10: 8
PAINLEVEL_OUTOF10: 7

## 2018-10-19 NOTE — PROGRESS NOTES
Hospitalist Progress Note      PCP: RICCO Oates - CNP    Date of Admission: 10/17/2018    Chief Complaint: Left-sided numbness and slurred speech    Hospital Course: 78 y.o. male who presented to [facility] with about complaint. He has a history of stroke and left-sided weakness in the past.  He recovered some extent. He used to walk with a cane. This morning he experienced left-sided numbness over the extremities and face and slurred speech. This reason he was brought to the emergency room. His symptoms improved by the time he reached the emergency room. But still he has no symptoms. He denies headache or visual changes. No swallowing difficulty. No  trouble breathing dizziness or syncope. He has a fracture of the left ankle and he uses walking boot. he complains chest pain, over the left side in intensity more with her breathing and movements. This going on for last couple of weeks. Subjective: EM and notes reviewed the patient seen and examined. The patient had no acute events overnight. Report better pain control with medications.      Medications:  Reviewed    Infusion Medications    dextrose       Scheduled Medications    [START ON 10/20/2018] NONFORMULARY 3 tablet  3 tablet Oral Daily    finasteride  5 mg Oral Daily    lisinopril  10 mg Oral Daily    metoprolol tartrate  12.5 mg Oral BID    pantoprazole  40 mg Oral QAM AC    sertraline  50 mg Oral Daily    tamsulosin  0.4 mg Oral Daily    sodium chloride flush  10 mL Intravenous 2 times per day    enoxaparin  40 mg Subcutaneous Daily    aspirin  81 mg Oral Daily    simvastatin  20 mg Oral Nightly    insulin lispro  0-6 Units Subcutaneous TID WC    insulin lispro  0-3 Units Subcutaneous Nightly    insulin glargine  15 Units Subcutaneous Nightly    glycopyrrolate-formoterol  2 puff Inhalation BID     PRN Meds: morphine **OR** morphine, cyclobenzaprine, oxyCODONE-acetaminophen **OR** oxyCODONE-acetaminophen, Recent Labs      10/17/18   1110   INR  1.04     Recent Labs      10/17/18   1110  10/17/18   2013  10/18/18   0610   TROPONINI  <0.01  <0.01  <0.01       Urinalysis:      Lab Results   Component Value Date    NITRU Negative 10/04/2018    WBCUA None seen 10/04/2018    BACTERIA Rare 06/07/2018    RBCUA None seen 10/04/2018    BLOODU Negative 10/04/2018    SPECGRAV 1.010 10/04/2018    GLUCOSEU Negative 10/04/2018       Radiology:  XR CHEST PORTABLE   Final Result   No significant findings in the chest.         MRI BRAIN WO CONTRAST   Final Result   1. No acute intracranial abnormality. 2. Multifocal remote lacunar infarcts as above. 3. Mild chronic white matter microvascular ischemic changes. 4. Findings suggest left maxillary and left sphenoid sinusitis. Correlation   with clinical symptoms is advised. XR CHEST PORTABLE   Final Result   No acute cardiopulmonary disease. CTA NECK W CONTRAST   Final Result   No intracranial arterial large vessel occlusion. There is moderate stenosis   of the proximal right posterior cerebral artery. No evidence of arterial flow-limiting stenosis in the neck. CTA HEAD W CONTRAST   Final Result   No intracranial arterial large vessel occlusion. There is moderate stenosis   of the proximal right posterior cerebral artery. No evidence of arterial flow-limiting stenosis in the neck. CT HEAD WO CONTRAST   Final Result   No acute intracranial abnormality. Chronic-appearing left maxillary and sphenoid sinusitis.                  Assessment/Plan:    Active Hospital Problems    Diagnosis Date Noted    Dysarthria [R47.1] 10/18/2018    Left-sided weakness [R53.1] 10/18/2018    DM (diabetes mellitus) [address] [E11.9] 10/17/2018    HTN (hypertension) [I10] 10/17/2018    BPH (benign prostatic hyperplasia) [N40.0] 10/17/2018    COPD (chronic obstructive pulmonary disease) ([address] [J44.9] 10/17/2018    Closed nondisplaced L ankle fracture placraymon  NINA and med rec completed.  Pain scripts on chart    Nnamdi Nieto, APRN - CNP

## 2018-10-19 NOTE — CARE COORDINATION
SW received call from pt. He reported that RN was looking to place him at [facility], he was unsure what happened with [facility]. He stated that his phone was about to cut off, he provided his room number. PAULO informed pt that call had been placed to [facility] and all information was obtained. SW informed him that calls were currently being made to  agencies for rent assistance. PAULO will update pt when all calls are made.     SANTI Ch, [address] [place] [address]

## 2018-10-19 NOTE — PROGRESS NOTES
his father; Diabetes in his brother and paternal aunt; Hypertension in his father and mother; Stroke in his mother; Substance Abuse in his father. Social History:  he reports that he quit smoking about 4 weeks ago. His smoking use included Cigarettes. He has a 17.50 pack-year smoking history. He has never used smokeless tobacco. He reports that he drinks alcohol. He reports that he does not use drugs. Social History     Social History    Marital status: Single     Spouse name: N/A    Number of children: N/A    Years of education: N/A     Occupational History    unemployed      Social History Main Topics    Smoking status: Former Smoker     Packs/day: 0.50     Years: 35.00     Types: Cigarettes     Quit date: 9/20/2018    Smokeless tobacco: Never Used    Alcohol use Yes      Comment: 6 beers a week, sometimes more     Drug use: No    Sexual activity: No     Other Topics Concern    Not on file     Social History Narrative    No narrative on file       Medications: Allergies   Allergen Reactions    Neurontin [Gabapentin]      Gastric side effects           Review of Systems:  ROS:  Constitutional- No weight loss or fevers  Eyes- No diplopia. No photophobia. Ears/nose/throat- No dysphagia. No Dysarthria  Cardiovascular- No palpitations. No chest pain  Respiratory- No dyspnea. No Cough  Gastrointestinal- No Abdominal pain. No Vomiting. Genitourinary- No incontinence. No urinary retention  Musculoskeletal- No myalgia. No arthralgia  Skin- No rash. No easy bruising. Psychiatric- No depression. No anxiety  Endocrine- No diabetes. No thyroid issues. Hematologic- No bleeding difficulty. No fatigue  Neurologic- as per HPI. OBJECTIVE     Neurological Exam:  Exam:  Blood pressure (!) 145/81, pulse 68, temperature 97.8 °F (36.6 °C), temperature source Oral, resp. rate 18, height 6' 5\" (1.956 m), weight 195 lb 15.8 oz (88.9 kg), SpO2 98 %.   Constitutional    Vital signs: BP, HR, and RR

## 2018-10-19 NOTE — CARE COORDINATION
Received call from Misael Stone with [facility] and he states they are unable to accept patient due to past behavioral issues when patient was in the community. Patient is requesting referral to [facility]. Placed call to Latesha Mejia with [facility] and notified of referral.  She states they should be in network with patient insurance. Facesheet faxed. [phone]: Sydney with [facility] called and states they do not have any beds. Discussed this with patient. He states he would like a referral to be made to [facility]. Call placed to Rebeca Barraza with [facility] and notified of referral. Facesheet faxed. [phone]: Received call from Rebeca Barraza with [facility] and she states they cannot accept patient due to past behavior in community and too high functioning for SNF. Mariann Ramos with [facility] present with this writer and discussed the situation. She called her facility and they will accept patient there. She states they will initiate precert today.

## 2018-10-20 LAB
GLUCOSE BLD-MCNC: 293 MG/DL (ref 70–99)
GLUCOSE BLD-MCNC: 350 MG/DL (ref 70–99)
GLUCOSE BLD-MCNC: 371 MG/DL (ref 70–99)
GLUCOSE BLD-MCNC: 385 MG/DL (ref 70–99)
PERFORMED ON: ABNORMAL

## 2018-10-20 PROCEDURE — 2580000003 HC RX 258: Performed by: INTERNAL MEDICINE

## 2018-10-20 PROCEDURE — 6370000000 HC RX 637 (ALT 250 FOR IP): Performed by: INTERNAL MEDICINE

## 2018-10-20 PROCEDURE — 6360000002 HC RX W HCPCS: Performed by: INTERNAL MEDICINE

## 2018-10-20 PROCEDURE — 94640 AIRWAY INHALATION TREATMENT: CPT

## 2018-10-20 PROCEDURE — 6370000000 HC RX 637 (ALT 250 FOR IP): Performed by: NURSE PRACTITIONER

## 2018-10-20 PROCEDURE — 96376 TX/PRO/DX INJ SAME DRUG ADON: CPT

## 2018-10-20 PROCEDURE — 96372 THER/PROPH/DIAG INJ SC/IM: CPT

## 2018-10-20 PROCEDURE — 6360000002 HC RX W HCPCS: Performed by: HOSPITALIST

## 2018-10-20 PROCEDURE — 1200000000 HC SEMI PRIVATE

## 2018-10-20 PROCEDURE — G0378 HOSPITAL OBSERVATION PER HR: HCPCS

## 2018-10-20 RX ADMIN — MORPHINE SULFATE 2 MG: 2 INJECTION, SOLUTION INTRAMUSCULAR; INTRAVENOUS at 23:21

## 2018-10-20 RX ADMIN — INSULIN LISPRO 5 UNITS: 100 INJECTION, SOLUTION INTRAVENOUS; SUBCUTANEOUS at 12:21

## 2018-10-20 RX ADMIN — GLYCOPYRROLATE AND FORMOTEROL FUMARATE 2 PUFF: 9; 4.8 AEROSOL, METERED RESPIRATORY (INHALATION) at 21:09

## 2018-10-20 RX ADMIN — INSULIN LISPRO 5 UNITS: 100 INJECTION, SOLUTION INTRAVENOUS; SUBCUTANEOUS at 17:01

## 2018-10-20 RX ADMIN — OXYCODONE AND ACETAMINOPHEN 2 TABLET: 5; 325 TABLET ORAL at 12:20

## 2018-10-20 RX ADMIN — FINASTERIDE 5 MG: 5 TABLET, FILM COATED ORAL at 08:10

## 2018-10-20 RX ADMIN — METOPROLOL TARTRATE 12.5 MG: 25 TABLET ORAL at 21:21

## 2018-10-20 RX ADMIN — SODIUM CHLORIDE, PRESERVATIVE FREE 10 ML: 5 INJECTION INTRAVENOUS at 23:21

## 2018-10-20 RX ADMIN — INSULIN GLARGINE 15 UNITS: 100 INJECTION, SOLUTION SUBCUTANEOUS at 21:26

## 2018-10-20 RX ADMIN — Medication 10 ML: at 21:22

## 2018-10-20 RX ADMIN — SIMVASTATIN 20 MG: 10 TABLET, FILM COATED ORAL at 21:21

## 2018-10-20 RX ADMIN — MORPHINE SULFATE 2 MG: 2 INJECTION, SOLUTION INTRAMUSCULAR; INTRAVENOUS at 21:22

## 2018-10-20 RX ADMIN — INSULIN LISPRO 3 UNITS: 100 INJECTION, SOLUTION INTRAVENOUS; SUBCUTANEOUS at 08:28

## 2018-10-20 RX ADMIN — SERTRALINE HYDROCHLORIDE 50 MG: 50 TABLET ORAL at 08:11

## 2018-10-20 RX ADMIN — TAMSULOSIN HYDROCHLORIDE 0.4 MG: 0.4 CAPSULE ORAL at 21:21

## 2018-10-20 RX ADMIN — GLYCOPYRROLATE AND FORMOTEROL FUMARATE 2 PUFF: 9; 4.8 AEROSOL, METERED RESPIRATORY (INHALATION) at 09:20

## 2018-10-20 RX ADMIN — ASPIRIN 81 MG: 81 TABLET, COATED ORAL at 08:11

## 2018-10-20 RX ADMIN — Medication 10 ML: at 08:11

## 2018-10-20 RX ADMIN — OXYCODONE AND ACETAMINOPHEN 2 TABLET: 5; 325 TABLET ORAL at 02:25

## 2018-10-20 RX ADMIN — ENOXAPARIN SODIUM 40 MG: 40 INJECTION SUBCUTANEOUS at 08:11

## 2018-10-20 RX ADMIN — METOPROLOL TARTRATE 12.5 MG: 25 TABLET ORAL at 08:11

## 2018-10-20 RX ADMIN — OXYCODONE AND ACETAMINOPHEN 2 TABLET: 5; 325 TABLET ORAL at 16:58

## 2018-10-20 RX ADMIN — INSULIN LISPRO 3 UNITS: 100 INJECTION, SOLUTION INTRAVENOUS; SUBCUTANEOUS at 21:26

## 2018-10-20 RX ADMIN — LISINOPRIL 10 MG: 10 TABLET ORAL at 08:11

## 2018-10-20 RX ADMIN — PANTOPRAZOLE SODIUM 40 MG: 40 TABLET, DELAYED RELEASE ORAL at 08:11

## 2018-10-20 RX ADMIN — CYCLOBENZAPRINE HYDROCHLORIDE 10 MG: 10 TABLET, FILM COATED ORAL at 21:22

## 2018-10-20 RX ADMIN — OXYCODONE AND ACETAMINOPHEN 2 TABLET: 5; 325 TABLET ORAL at 08:10

## 2018-10-20 ASSESSMENT — PAIN SCALES - GENERAL
PAINLEVEL_OUTOF10: 7
PAINLEVEL_OUTOF10: 3
PAINLEVEL_OUTOF10: 7
PAINLEVEL_OUTOF10: 3

## 2018-10-20 ASSESSMENT — PAIN DESCRIPTION - LOCATION
LOCATION: ARM;NECK
LOCATION: GENERALIZED
LOCATION: FOOT;BACK

## 2018-10-20 ASSESSMENT — PAIN DESCRIPTION - PAIN TYPE
TYPE: ACUTE PAIN;CHRONIC PAIN
TYPE: ACUTE PAIN;CHRONIC PAIN
TYPE: ACUTE PAIN

## 2018-10-20 NOTE — PROGRESS NOTES
10/17/18   2013  10/18/18   0610   TROPONINI  <0.01  <0.01       Urinalysis:      Lab Results   Component Value Date    NITRU Negative 10/04/2018    WBCUA None seen 10/04/2018    BACTERIA Rare 06/07/2018    RBCUA None seen 10/04/2018    BLOODU Negative 10/04/2018    SPECGRAV 1.010 10/04/2018    GLUCOSEU Negative 10/04/2018       Radiology:  XR CHEST PORTABLE   Final Result   No significant findings in the chest.         MRI BRAIN WO CONTRAST   Final Result   1. No acute intracranial abnormality. 2. Multifocal remote lacunar infarcts as above. 3. Mild chronic white matter microvascular ischemic changes. 4. Findings suggest left maxillary and left sphenoid sinusitis. Correlation   with clinical symptoms is advised. XR CHEST PORTABLE   Final Result   No acute cardiopulmonary disease. CTA NECK W CONTRAST   Final Result   No intracranial arterial large vessel occlusion. There is moderate stenosis   of the proximal right posterior cerebral artery. No evidence of arterial flow-limiting stenosis in the neck. CTA HEAD W CONTRAST   Final Result   No intracranial arterial large vessel occlusion. There is moderate stenosis   of the proximal right posterior cerebral artery. No evidence of arterial flow-limiting stenosis in the neck. CT HEAD WO CONTRAST   Final Result   No acute intracranial abnormality. Chronic-appearing left maxillary and sphenoid sinusitis.                  Assessment/Plan:    Active Hospital Problems    Diagnosis Date Noted    Dysarthria [R47.1] 10/18/2018    Left-sided weakness [R53.1] 10/18/2018    DM (diabetes mellitus) ([facility] [E11.9] 10/17/2018    HTN (hypertension) [I10] 10/17/2018    BPH (benign prostatic hyperplasia) [N40.0] 10/17/2018    COPD (chronic obstructive pulmonary disease) ([facility] [J44.9] 10/17/2018    Closed nondisplaced L ankle fracture (lateral malleolus) [S82.892A] 08/10/2018    HTN (hypertension) [I10]     Chronic dCHF (grade 1

## 2018-10-21 LAB
GLUCOSE BLD-MCNC: 114 MG/DL (ref 70–99)
GLUCOSE BLD-MCNC: 281 MG/DL (ref 70–99)
GLUCOSE BLD-MCNC: 291 MG/DL (ref 70–99)
GLUCOSE BLD-MCNC: 374 MG/DL (ref 70–99)
PERFORMED ON: ABNORMAL

## 2018-10-21 PROCEDURE — 6370000000 HC RX 637 (ALT 250 FOR IP): Performed by: NURSE PRACTITIONER

## 2018-10-21 PROCEDURE — 6360000002 HC RX W HCPCS: Performed by: INTERNAL MEDICINE

## 2018-10-21 PROCEDURE — 6360000002 HC RX W HCPCS: Performed by: HOSPITALIST

## 2018-10-21 PROCEDURE — 96376 TX/PRO/DX INJ SAME DRUG ADON: CPT

## 2018-10-21 PROCEDURE — G0378 HOSPITAL OBSERVATION PER HR: HCPCS

## 2018-10-21 PROCEDURE — 96372 THER/PROPH/DIAG INJ SC/IM: CPT

## 2018-10-21 PROCEDURE — 2580000003 HC RX 258: Performed by: INTERNAL MEDICINE

## 2018-10-21 PROCEDURE — 6370000000 HC RX 637 (ALT 250 FOR IP): Performed by: INTERNAL MEDICINE

## 2018-10-21 PROCEDURE — 94640 AIRWAY INHALATION TREATMENT: CPT

## 2018-10-21 PROCEDURE — 1200000000 HC SEMI PRIVATE

## 2018-10-21 RX ORDER — INSULIN GLARGINE 100 [IU]/ML
20 INJECTION, SOLUTION SUBCUTANEOUS NIGHTLY
Status: DISCONTINUED | OUTPATIENT
Start: 2018-10-21 | End: 2018-10-22

## 2018-10-21 RX ORDER — POLYETHYLENE GLYCOL 3350 17 G/17G
17 POWDER, FOR SOLUTION ORAL DAILY
Status: DISCONTINUED | OUTPATIENT
Start: 2018-10-21 | End: 2018-10-23 | Stop reason: HOSPADM

## 2018-10-21 RX ADMIN — SODIUM CHLORIDE, PRESERVATIVE FREE 10 ML: 5 INJECTION INTRAVENOUS at 02:00

## 2018-10-21 RX ADMIN — GLYCOPYRROLATE AND FORMOTEROL FUMARATE 2 PUFF: 9; 4.8 AEROSOL, METERED RESPIRATORY (INHALATION) at 08:24

## 2018-10-21 RX ADMIN — Medication 10 ML: at 08:58

## 2018-10-21 RX ADMIN — INSULIN GLARGINE 20 UNITS: 100 INJECTION, SOLUTION SUBCUTANEOUS at 22:24

## 2018-10-21 RX ADMIN — MORPHINE SULFATE 2 MG: 2 INJECTION, SOLUTION INTRAMUSCULAR; INTRAVENOUS at 02:00

## 2018-10-21 RX ADMIN — ENOXAPARIN SODIUM 40 MG: 40 INJECTION SUBCUTANEOUS at 08:55

## 2018-10-21 RX ADMIN — OXYCODONE AND ACETAMINOPHEN 2 TABLET: 5; 325 TABLET ORAL at 20:42

## 2018-10-21 RX ADMIN — SERTRALINE HYDROCHLORIDE 50 MG: 50 TABLET ORAL at 08:56

## 2018-10-21 RX ADMIN — Medication 10 ML: at 20:43

## 2018-10-21 RX ADMIN — PANTOPRAZOLE SODIUM 40 MG: 40 TABLET, DELAYED RELEASE ORAL at 08:56

## 2018-10-21 RX ADMIN — LISINOPRIL 10 MG: 10 TABLET ORAL at 08:55

## 2018-10-21 RX ADMIN — OXYCODONE AND ACETAMINOPHEN 2 TABLET: 5; 325 TABLET ORAL at 12:42

## 2018-10-21 RX ADMIN — TAMSULOSIN HYDROCHLORIDE 0.4 MG: 0.4 CAPSULE ORAL at 20:42

## 2018-10-21 RX ADMIN — OXYCODONE AND ACETAMINOPHEN 2 TABLET: 5; 325 TABLET ORAL at 17:10

## 2018-10-21 RX ADMIN — CYCLOBENZAPRINE HYDROCHLORIDE 10 MG: 10 TABLET, FILM COATED ORAL at 20:42

## 2018-10-21 RX ADMIN — FINASTERIDE 5 MG: 5 TABLET, FILM COATED ORAL at 08:56

## 2018-10-21 RX ADMIN — INSULIN LISPRO 3 UNITS: 100 INJECTION, SOLUTION INTRAVENOUS; SUBCUTANEOUS at 12:19

## 2018-10-21 RX ADMIN — SIMVASTATIN 20 MG: 10 TABLET, FILM COATED ORAL at 20:42

## 2018-10-21 RX ADMIN — INSULIN LISPRO 3 UNITS: 100 INJECTION, SOLUTION INTRAVENOUS; SUBCUTANEOUS at 09:00

## 2018-10-21 RX ADMIN — MORPHINE SULFATE 2 MG: 2 INJECTION, SOLUTION INTRAMUSCULAR; INTRAVENOUS at 08:56

## 2018-10-21 RX ADMIN — POLYETHYLENE GLYCOL (3350) 17 G: 17 POWDER, FOR SOLUTION ORAL at 12:19

## 2018-10-21 RX ADMIN — INSULIN LISPRO 3 UNITS: 100 INJECTION, SOLUTION INTRAVENOUS; SUBCUTANEOUS at 20:47

## 2018-10-21 RX ADMIN — GLYCOPYRROLATE AND FORMOTEROL FUMARATE 2 PUFF: 9; 4.8 AEROSOL, METERED RESPIRATORY (INHALATION) at 20:33

## 2018-10-21 RX ADMIN — METOPROLOL TARTRATE 12.5 MG: 25 TABLET ORAL at 08:55

## 2018-10-21 RX ADMIN — ASPIRIN 81 MG: 81 TABLET, COATED ORAL at 08:55

## 2018-10-21 RX ADMIN — METOPROLOL TARTRATE 12.5 MG: 25 TABLET ORAL at 20:42

## 2018-10-21 RX ADMIN — BISACODYL 10 MG: 5 TABLET, DELAYED RELEASE ORAL at 12:24

## 2018-10-21 RX ADMIN — MORPHINE SULFATE 2 MG: 2 INJECTION, SOLUTION INTRAMUSCULAR; INTRAVENOUS at 04:50

## 2018-10-21 ASSESSMENT — PAIN SCALES - GENERAL
PAINLEVEL_OUTOF10: 8
PAINLEVEL_OUTOF10: 8
PAINLEVEL_OUTOF10: 7
PAINLEVEL_OUTOF10: 8
PAINLEVEL_OUTOF10: 3
PAINLEVEL_OUTOF10: 8
PAINLEVEL_OUTOF10: 8
PAINLEVEL_OUTOF10: 4
PAINLEVEL_OUTOF10: 8
PAINLEVEL_OUTOF10: 8
PAINLEVEL_OUTOF10: 6
PAINLEVEL_OUTOF10: 5

## 2018-10-21 ASSESSMENT — PAIN DESCRIPTION - LOCATION: LOCATION: GENERALIZED

## 2018-10-21 ASSESSMENT — PAIN DESCRIPTION - PAIN TYPE: TYPE: ACUTE PAIN

## 2018-10-21 NOTE — CARE COORDINATION
Spoke to blank Carlton for [facility] [employer]. She has received a denial from [facility]. If p2p is desired, the contact is Rober Perez, phone number [phone] for Monday.

## 2018-10-21 NOTE — PROGRESS NOTES
last 72 hours. No results for input(s): INR in the last 72 hours. No results for input(s): Chloe Brittany in the last 72 hours. Urinalysis:      Lab Results   Component Value Date    NITRU Negative 10/04/2018    WBCUA None seen 10/04/2018    BACTERIA Rare 06/07/2018    RBCUA None seen 10/04/2018    BLOODU Negative 10/04/2018    SPECGRAV 1.010 10/04/2018    GLUCOSEU Negative 10/04/2018       Radiology:  XR CHEST PORTABLE   Final Result   No significant findings in the chest.         MRI BRAIN WO CONTRAST   Final Result   1. No acute intracranial abnormality. 2. Multifocal remote lacunar infarcts as above. 3. Mild chronic white matter microvascular ischemic changes. 4. Findings suggest left maxillary and left sphenoid sinusitis. Correlation   with clinical symptoms is advised. XR CHEST PORTABLE   Final Result   No acute cardiopulmonary disease. CTA NECK W CONTRAST   Final Result   No intracranial arterial large vessel occlusion. There is moderate stenosis   of the proximal right posterior cerebral artery. No evidence of arterial flow-limiting stenosis in the neck. CTA HEAD W CONTRAST   Final Result   No intracranial arterial large vessel occlusion. There is moderate stenosis   of the proximal right posterior cerebral artery. No evidence of arterial flow-limiting stenosis in the neck. CT HEAD WO CONTRAST   Final Result   No acute intracranial abnormality. Chronic-appearing left maxillary and sphenoid sinusitis.                  Assessment/Plan:    Active Hospital Problems    Diagnosis Date Noted    Dysarthria [R47.1] 10/18/2018    Left-sided weakness [R53.1] 10/18/2018    DM (diabetes mellitus) [facility] [E11.9] 10/17/2018    HTN (hypertension) [I10] 10/17/2018    BPH (benign prostatic hyperplasia) [N40.0] 10/17/2018    COPD (chronic obstructive pulmonary disease) ([facility] [J44.9] 10/17/2018    Closed nondisplaced L ankle fracture (lateral malleolus)

## 2018-10-22 LAB
ANION GAP SERPL CALCULATED.3IONS-SCNC: 9 MMOL/L (ref 3–16)
BUN BLDV-MCNC: 15 MG/DL (ref 7–20)
CALCIUM SERPL-MCNC: 8.7 MG/DL (ref 8.3–10.6)
CHLORIDE BLD-SCNC: 93 MMOL/L (ref 99–110)
CO2: 27 MMOL/L (ref 21–32)
CREAT SERPL-MCNC: 0.9 MG/DL (ref 0.8–1.3)
GFR AFRICAN AMERICAN: >60
GFR NON-AFRICAN AMERICAN: >60
GLUCOSE BLD-MCNC: 206 MG/DL (ref 70–99)
GLUCOSE BLD-MCNC: 318 MG/DL (ref 70–99)
GLUCOSE BLD-MCNC: 320 MG/DL (ref 70–99)
GLUCOSE BLD-MCNC: 361 MG/DL (ref 70–99)
GLUCOSE BLD-MCNC: 367 MG/DL (ref 70–99)
GLUCOSE BLD-MCNC: 73 MG/DL (ref 70–99)
HCT VFR BLD CALC: 35.5 % (ref 40.5–52.5)
HEMOGLOBIN: 12.1 G/DL (ref 13.5–17.5)
MCH RBC QN AUTO: 31 PG (ref 26–34)
MCHC RBC AUTO-ENTMCNC: 34 G/DL (ref 31–36)
MCV RBC AUTO: 91.2 FL (ref 80–100)
PDW BLD-RTO: 12.7 % (ref 12.4–15.4)
PERFORMED ON: ABNORMAL
PERFORMED ON: NORMAL
PLATELET # BLD: 206 K/UL (ref 135–450)
PMV BLD AUTO: 7.7 FL (ref 5–10.5)
POTASSIUM REFLEX MAGNESIUM: 4.5 MMOL/L (ref 3.5–5.1)
RBC # BLD: 3.89 M/UL (ref 4.2–5.9)
SODIUM BLD-SCNC: 129 MMOL/L (ref 136–145)
WBC # BLD: 6.2 K/UL (ref 4–11)

## 2018-10-22 PROCEDURE — 80048 BASIC METABOLIC PNL TOTAL CA: CPT

## 2018-10-22 PROCEDURE — 96376 TX/PRO/DX INJ SAME DRUG ADON: CPT

## 2018-10-22 PROCEDURE — 6370000000 HC RX 637 (ALT 250 FOR IP): Performed by: NURSE PRACTITIONER

## 2018-10-22 PROCEDURE — 85027 COMPLETE CBC AUTOMATED: CPT

## 2018-10-22 PROCEDURE — 36415 COLL VENOUS BLD VENIPUNCTURE: CPT

## 2018-10-22 PROCEDURE — 1200000000 HC SEMI PRIVATE

## 2018-10-22 PROCEDURE — 94664 DEMO&/EVAL PT USE INHALER: CPT

## 2018-10-22 PROCEDURE — 6370000000 HC RX 637 (ALT 250 FOR IP): Performed by: INTERNAL MEDICINE

## 2018-10-22 PROCEDURE — 97530 THERAPEUTIC ACTIVITIES: CPT

## 2018-10-22 PROCEDURE — 96372 THER/PROPH/DIAG INJ SC/IM: CPT

## 2018-10-22 PROCEDURE — G0378 HOSPITAL OBSERVATION PER HR: HCPCS

## 2018-10-22 PROCEDURE — 97535 SELF CARE MNGMENT TRAINING: CPT

## 2018-10-22 PROCEDURE — 99233 SBSQ HOSP IP/OBS HIGH 50: CPT | Performed by: PSYCHIATRY & NEUROLOGY

## 2018-10-22 PROCEDURE — 97116 GAIT TRAINING THERAPY: CPT

## 2018-10-22 PROCEDURE — 6360000002 HC RX W HCPCS: Performed by: INTERNAL MEDICINE

## 2018-10-22 PROCEDURE — 94640 AIRWAY INHALATION TREATMENT: CPT

## 2018-10-22 PROCEDURE — 2580000003 HC RX 258: Performed by: INTERNAL MEDICINE

## 2018-10-22 PROCEDURE — 6370000000 HC RX 637 (ALT 250 FOR IP): Performed by: REGISTERED NURSE

## 2018-10-22 RX ORDER — INSULIN GLARGINE 100 [IU]/ML
25 INJECTION, SOLUTION SUBCUTANEOUS NIGHTLY
Status: DISCONTINUED | OUTPATIENT
Start: 2018-10-22 | End: 2018-10-22

## 2018-10-22 RX ORDER — INSULIN GLARGINE 100 [IU]/ML
30 INJECTION, SOLUTION SUBCUTANEOUS NIGHTLY
Status: DISCONTINUED | OUTPATIENT
Start: 2018-10-22 | End: 2018-10-23 | Stop reason: HOSPADM

## 2018-10-22 RX ADMIN — INSULIN LISPRO 10 UNITS: 100 INJECTION, SOLUTION INTRAVENOUS; SUBCUTANEOUS at 17:01

## 2018-10-22 RX ADMIN — OXYCODONE AND ACETAMINOPHEN 2 TABLET: 5; 325 TABLET ORAL at 18:42

## 2018-10-22 RX ADMIN — CYCLOBENZAPRINE HYDROCHLORIDE 10 MG: 10 TABLET, FILM COATED ORAL at 20:45

## 2018-10-22 RX ADMIN — POLYETHYLENE GLYCOL (3350) 17 G: 17 POWDER, FOR SOLUTION ORAL at 09:06

## 2018-10-22 RX ADMIN — ENOXAPARIN SODIUM 40 MG: 40 INJECTION SUBCUTANEOUS at 09:04

## 2018-10-22 RX ADMIN — GLYCOPYRROLATE AND FORMOTEROL FUMARATE 2 PUFF: 9; 4.8 AEROSOL, METERED RESPIRATORY (INHALATION) at 12:14

## 2018-10-22 RX ADMIN — OXYCODONE AND ACETAMINOPHEN 2 TABLET: 5; 325 TABLET ORAL at 22:47

## 2018-10-22 RX ADMIN — INSULIN LISPRO 10 UNITS: 100 INJECTION, SOLUTION INTRAVENOUS; SUBCUTANEOUS at 12:10

## 2018-10-22 RX ADMIN — METOPROLOL TARTRATE 12.5 MG: 25 TABLET ORAL at 20:44

## 2018-10-22 RX ADMIN — OXYCODONE AND ACETAMINOPHEN 2 TABLET: 5; 325 TABLET ORAL at 00:48

## 2018-10-22 RX ADMIN — METOPROLOL TARTRATE 12.5 MG: 25 TABLET ORAL at 09:04

## 2018-10-22 RX ADMIN — Medication 10 ML: at 20:45

## 2018-10-22 RX ADMIN — FINASTERIDE 5 MG: 5 TABLET, FILM COATED ORAL at 09:03

## 2018-10-22 RX ADMIN — INSULIN LISPRO 4 UNITS: 100 INJECTION, SOLUTION INTRAVENOUS; SUBCUTANEOUS at 09:07

## 2018-10-22 RX ADMIN — LISINOPRIL 10 MG: 10 TABLET ORAL at 09:03

## 2018-10-22 RX ADMIN — TAMSULOSIN HYDROCHLORIDE 0.4 MG: 0.4 CAPSULE ORAL at 20:45

## 2018-10-22 RX ADMIN — INSULIN LISPRO 4 UNITS: 100 INJECTION, SOLUTION INTRAVENOUS; SUBCUTANEOUS at 16:56

## 2018-10-22 RX ADMIN — ONDANSETRON HYDROCHLORIDE 4 MG: 2 INJECTION, SOLUTION INTRAVENOUS at 12:05

## 2018-10-22 RX ADMIN — SERTRALINE HYDROCHLORIDE 50 MG: 50 TABLET ORAL at 09:03

## 2018-10-22 RX ADMIN — INSULIN LISPRO 8 UNITS: 100 INJECTION, SOLUTION INTRAVENOUS; SUBCUTANEOUS at 12:05

## 2018-10-22 RX ADMIN — Medication 10 ML: at 09:04

## 2018-10-22 RX ADMIN — OXYCODONE AND ACETAMINOPHEN 2 TABLET: 5; 325 TABLET ORAL at 04:50

## 2018-10-22 RX ADMIN — PANTOPRAZOLE SODIUM 40 MG: 40 TABLET, DELAYED RELEASE ORAL at 09:04

## 2018-10-22 RX ADMIN — OXYCODONE AND ACETAMINOPHEN 2 TABLET: 5; 325 TABLET ORAL at 09:13

## 2018-10-22 RX ADMIN — INSULIN GLARGINE 30 UNITS: 100 INJECTION, SOLUTION SUBCUTANEOUS at 20:46

## 2018-10-22 RX ADMIN — GLYCOPYRROLATE AND FORMOTEROL FUMARATE 2 PUFF: 9; 4.8 AEROSOL, METERED RESPIRATORY (INHALATION) at 09:04

## 2018-10-22 RX ADMIN — OXYCODONE AND ACETAMINOPHEN 2 TABLET: 5; 325 TABLET ORAL at 13:20

## 2018-10-22 RX ADMIN — SIMVASTATIN 20 MG: 10 TABLET, FILM COATED ORAL at 20:44

## 2018-10-22 RX ADMIN — ASPIRIN 81 MG: 81 TABLET, COATED ORAL at 09:04

## 2018-10-22 ASSESSMENT — PAIN DESCRIPTION - LOCATION
LOCATION: GENERALIZED
LOCATION: BACK;ANKLE;KNEE
LOCATION: GENERALIZED
LOCATION: GENERALIZED

## 2018-10-22 ASSESSMENT — PAIN DESCRIPTION - PROGRESSION
CLINICAL_PROGRESSION: NOT CHANGED
CLINICAL_PROGRESSION: NOT CHANGED

## 2018-10-22 ASSESSMENT — PAIN SCALES - GENERAL
PAINLEVEL_OUTOF10: 7
PAINLEVEL_OUTOF10: 7
PAINLEVEL_OUTOF10: 8
PAINLEVEL_OUTOF10: 7
PAINLEVEL_OUTOF10: 8
PAINLEVEL_OUTOF10: 8
PAINLEVEL_OUTOF10: 7
PAINLEVEL_OUTOF10: 8

## 2018-10-22 ASSESSMENT — PAIN DESCRIPTION - PAIN TYPE
TYPE: ACUTE PAIN;CHRONIC PAIN
TYPE: CHRONIC PAIN
TYPE: ACUTE PAIN;CHRONIC PAIN
TYPE: CHRONIC PAIN;ACUTE PAIN
TYPE: CHRONIC PAIN

## 2018-10-22 ASSESSMENT — PAIN DESCRIPTION - ONSET
ONSET: ON-GOING
ONSET: ON-GOING

## 2018-10-22 ASSESSMENT — PAIN DESCRIPTION - FREQUENCY
FREQUENCY: INTERMITTENT
FREQUENCY: INTERMITTENT

## 2018-10-22 ASSESSMENT — PAIN DESCRIPTION - ORIENTATION
ORIENTATION: LEFT

## 2018-10-22 ASSESSMENT — PAIN DESCRIPTION - DESCRIPTORS
DESCRIPTORS: ACHING
DESCRIPTORS: ACHING

## 2018-10-22 NOTE — PROGRESS NOTES
unspecified as acute or chronic, without mention of hemorrhage or perforation     GERD (gastroesophageal reflux disease)     Gout 1997    Right big toe    Hearing decreased     Left ear=60%. Right ear=80%.  Hemangioma 12/2010    Liver as per CT abdo    hepatitis c 7/26/17, 2010    Under care of Liver doc:Dr. Morton Stage    Hyperlipidemia LDL goal < 100     Low HDL (under 40) 10/9/2012    Peripheral neuropathy 2006    Pneumonia 10/15/13    Pyogenic granuloma     per derm:Dr. Hay Monteiro Restrictive lung disease     Under care of pulmo(Dr. Calhoun)    S/P colonoscopy 1996    Done secondary to rectal bleed:dx=hemorrhoids    S/P colonoscopy 11/11/10;10/23/2013    Dr. Sybil Chandler 10/2016(3yrs):polyps;diverticulosis. Diverticulosis & polpy(removed). Next in10/2016.  S/P endoscopy 2009    EGD:stomach ulcers.     Superficial phlebitis of left leg 9/30/2013    Tachycardia     Therapeutic drug monitoring 4/8/15    OARRS report is consistent on 4/8/15;7/9/15;10/9/15;1/8/16;4/5/16    Type 1 diabetes mellitus not at goal [facility]) 3/17/14    updated diagnosis as per endo:Dr. Steffanie Ganser     Current Facility-Administered Medications   Medication Dose Route Frequency Provider Last Rate Last Dose    insulin lispro (HUMALOG) injection vial 10 Units  10 Units Subcutaneous TID  RICCO Meier - CNP        insulin lispro (HUMALOG) injection vial 0-12 Units  0-12 Units Subcutaneous TID  RICCO Meier - CNP        insulin lispro (HUMALOG) injection vial 0-6 Units  0-6 Units Subcutaneous Nightly RICCO Meier - CNP        insulin glargine (LANTUS) injection vial 30 Units  30 Units Subcutaneous Nightly RICCO Meier - CNP        polyethylene glycol (GLYCOLAX) packet 17 g  17 g Oral Daily Mortimer Sprawls, APRN - CNP   17 g at 10/22/18 7937    Glecaprevir-Pibrentasvir 100-40 MG TABS 3 tablet  3 tablet Oral Daily Mortimer Sprawls, APRN - CNP   3 tablet at 10/22/18 7534    errors in the transcription that are not intended.

## 2018-10-22 NOTE — PLAN OF CARE
Problem: Falls - Risk of:  Goal: Will remain free from falls  Will remain free from falls   Outcome: Ongoing  Pt is a high fall risk   Fall precautions in place   Call light within reach and pt aware to call out for assistance

## 2018-10-22 NOTE — PROGRESS NOTES
Hospitalist Progress Note      PCP: RICCO Joyce CNP    Date of Admission: 10/17/2018    Chief Complaint: Left-sided numbness and slurred speech     Hospital Course:   78 y. o. male who presented to [facility] with complaints of left-sided numbness and slurred speech. He has a history of stroke and left-sided weakness in the past. He recovered to some extent. He used to walk with a cane. This morning he experienced left-sided numbness over the extremities and face and slurred speech. His symptoms improved by the time he reached the emergency room. He denies headache or visual changes. No swallowing difficulty. No  trouble breathing, dizziness or syncope. He has a fracture of the left ankle and he uses walking boot. He complains chest pain, over the left side in intensity more with her breathing and movements. This going on for last couple of weeks. Subjective:   Pt is on RA. Afebrile. Pt reports nausea, no vomiting. No diarrhea. No dyspnea or chest pain. Blood sugars ~300s.      Medications:  Reviewed    Infusion Medications    dextrose       Scheduled Medications    insulin lispro  10 Units Subcutaneous TID WC    insulin lispro  0-12 Units Subcutaneous TID WC    insulin lispro  0-6 Units Subcutaneous Nightly    insulin glargine  30 Units Subcutaneous Nightly    polyethylene glycol  17 g Oral Daily    Glecaprevir-Pibrentasvir  3 tablet Oral Daily    finasteride  5 mg Oral Daily    lisinopril  10 mg Oral Daily    metoprolol tartrate  12.5 mg Oral BID    pantoprazole  40 mg Oral QAM AC    sertraline  50 mg Oral Daily    tamsulosin  0.4 mg Oral Daily    sodium chloride flush  10 mL Intravenous 2 times per day    enoxaparin  40 mg Subcutaneous Daily    aspirin  81 mg Oral Daily    simvastatin  20 mg Oral Nightly    glycopyrrolate-formoterol  2 puff Inhalation BID     PRN Meds: cyclobenzaprine, oxyCODONE-acetaminophen **OR** oxyCODONE-acetaminophen, albuterol sulfate HFA,

## 2018-10-23 VITALS
RESPIRATION RATE: 16 BRPM | BODY MASS INDEX: 23.14 KG/M2 | OXYGEN SATURATION: 93 % | TEMPERATURE: 97.5 F | DIASTOLIC BLOOD PRESSURE: 62 MMHG | SYSTOLIC BLOOD PRESSURE: 119 MMHG | HEIGHT: 77 IN | HEART RATE: 84 BPM | WEIGHT: 195.99 LBS

## 2018-10-23 LAB
GLUCOSE BLD-MCNC: 218 MG/DL (ref 70–99)
GLUCOSE BLD-MCNC: 258 MG/DL (ref 70–99)
GLUCOSE BLD-MCNC: 264 MG/DL (ref 70–99)
PERFORMED ON: ABNORMAL

## 2018-10-23 PROCEDURE — 6360000002 HC RX W HCPCS: Performed by: INTERNAL MEDICINE

## 2018-10-23 PROCEDURE — 6370000000 HC RX 637 (ALT 250 FOR IP): Performed by: NURSE PRACTITIONER

## 2018-10-23 PROCEDURE — G0378 HOSPITAL OBSERVATION PER HR: HCPCS

## 2018-10-23 PROCEDURE — 6370000000 HC RX 637 (ALT 250 FOR IP): Performed by: REGISTERED NURSE

## 2018-10-23 PROCEDURE — 96372 THER/PROPH/DIAG INJ SC/IM: CPT

## 2018-10-23 PROCEDURE — 6370000000 HC RX 637 (ALT 250 FOR IP): Performed by: INTERNAL MEDICINE

## 2018-10-23 PROCEDURE — 2580000003 HC RX 258: Performed by: INTERNAL MEDICINE

## 2018-10-23 RX ORDER — OXYCODONE HYDROCHLORIDE AND ACETAMINOPHEN 5; 325 MG/1; MG/1
2 TABLET ORAL EVERY 6 HOURS PRN
Qty: 24 TABLET | Refills: 0 | Status: SHIPPED | OUTPATIENT
Start: 2018-10-23 | End: 2018-10-26

## 2018-10-23 RX ADMIN — GLYCOPYRROLATE AND FORMOTEROL FUMARATE 2 PUFF: 9; 4.8 AEROSOL, METERED RESPIRATORY (INHALATION) at 08:14

## 2018-10-23 RX ADMIN — LISINOPRIL 10 MG: 10 TABLET ORAL at 08:13

## 2018-10-23 RX ADMIN — INSULIN LISPRO 10 UNITS: 100 INJECTION, SOLUTION INTRAVENOUS; SUBCUTANEOUS at 11:28

## 2018-10-23 RX ADMIN — SERTRALINE HYDROCHLORIDE 50 MG: 50 TABLET ORAL at 08:12

## 2018-10-23 RX ADMIN — METOPROLOL TARTRATE 12.5 MG: 25 TABLET ORAL at 08:12

## 2018-10-23 RX ADMIN — ASPIRIN 81 MG: 81 TABLET, COATED ORAL at 08:12

## 2018-10-23 RX ADMIN — PANTOPRAZOLE SODIUM 40 MG: 40 TABLET, DELAYED RELEASE ORAL at 05:40

## 2018-10-23 RX ADMIN — OXYCODONE AND ACETAMINOPHEN 2 TABLET: 5; 325 TABLET ORAL at 03:45

## 2018-10-23 RX ADMIN — OXYCODONE AND ACETAMINOPHEN 2 TABLET: 5; 325 TABLET ORAL at 12:37

## 2018-10-23 RX ADMIN — FINASTERIDE 5 MG: 5 TABLET, FILM COATED ORAL at 08:12

## 2018-10-23 RX ADMIN — INSULIN LISPRO 4 UNITS: 100 INJECTION, SOLUTION INTRAVENOUS; SUBCUTANEOUS at 11:28

## 2018-10-23 RX ADMIN — INSULIN LISPRO 10 UNITS: 100 INJECTION, SOLUTION INTRAVENOUS; SUBCUTANEOUS at 08:17

## 2018-10-23 RX ADMIN — POLYETHYLENE GLYCOL (3350) 17 G: 17 POWDER, FOR SOLUTION ORAL at 08:13

## 2018-10-23 RX ADMIN — INSULIN LISPRO 6 UNITS: 100 INJECTION, SOLUTION INTRAVENOUS; SUBCUTANEOUS at 08:17

## 2018-10-23 RX ADMIN — OXYCODONE AND ACETAMINOPHEN 2 TABLET: 5; 325 TABLET ORAL at 08:15

## 2018-10-23 RX ADMIN — GLYCOPYRROLATE AND FORMOTEROL FUMARATE 2 PUFF: 9; 4.8 AEROSOL, METERED RESPIRATORY (INHALATION) at 03:47

## 2018-10-23 RX ADMIN — Medication 10 ML: at 08:13

## 2018-10-23 RX ADMIN — CYCLOBENZAPRINE HYDROCHLORIDE 10 MG: 10 TABLET, FILM COATED ORAL at 05:40

## 2018-10-23 RX ADMIN — ENOXAPARIN SODIUM 40 MG: 40 INJECTION SUBCUTANEOUS at 08:13

## 2018-10-23 ASSESSMENT — PAIN DESCRIPTION - PAIN TYPE: TYPE: CHRONIC PAIN

## 2018-10-23 ASSESSMENT — PAIN DESCRIPTION - DESCRIPTORS: DESCRIPTORS: ACHING

## 2018-10-23 ASSESSMENT — PAIN SCALES - GENERAL
PAINLEVEL_OUTOF10: 7
PAINLEVEL_OUTOF10: 8

## 2018-10-23 ASSESSMENT — PAIN DESCRIPTION - LOCATION: LOCATION: BACK;ANKLE

## 2018-10-23 ASSESSMENT — PAIN DESCRIPTION - FREQUENCY: FREQUENCY: INTERMITTENT

## 2018-10-23 ASSESSMENT — PAIN DESCRIPTION - PROGRESSION: CLINICAL_PROGRESSION: NOT CHANGED

## 2018-10-23 ASSESSMENT — PAIN DESCRIPTION - ORIENTATION: ORIENTATION: LEFT

## 2018-10-23 ASSESSMENT — PAIN DESCRIPTION - ONSET: ONSET: ON-GOING

## 2018-10-23 NOTE — DISCHARGE SUMMARY
Hospital Medicine Discharge Summary    Patient ID: Ivan Brown      Patient's PCP: RICCO Currie CNP    Admit Date: 10/17/2018     Discharge Date:   10/23/18    Admitting Physician: Kimberly Copeland MD     Discharge Physician: RICCO Yuen CNP     Discharge Diagnoses: Active Hospital Problems    Diagnosis Date Noted    TIA (transient ischemic attack) [G45.9]     Dyslipidemia [E78.5]     Dysarthria [R47.1] 10/18/2018    Left-sided weakness [R53.1] 10/18/2018    DM (diabetes mellitus) ([facility] [E11.9] 10/17/2018    HTN (hypertension), benign [I10] 10/17/2018    BPH (benign prostatic hyperplasia) [N40.0] 10/17/2018    COPD (chronic obstructive pulmonary disease) ([facility] [J44.9] 10/17/2018    Closed nondisplaced L ankle fracture (lateral malleolus) [S82.892A] 08/10/2018    HTN (hypertension) [I10]     Chronic dCHF (grade 1 LVDD) [I50.32] 07/02/2017    S/P left colectomy [Z90.49] 03/10/2017    Chronic pain syndrome [G89.4] 08/26/2013    Bilateral knee & hip OA [M17.10] 12/04/2012    History of hepatitis C [Z86.19] 11/29/2012    Anxiety/depression [F41.9] 11/08/2012    Hepatic steatosis [K76.0] 10/09/2012    HLD (hyperlipidemia) [E78.5] 11/29/2011    COPD (chronic obstructive pulmonary disease) ([facility] [J44.9] 04/14/2011    Peripheral neuropathy of bilateral feet [G62.9] 11/12/2010    GERD (gastroesophageal reflux disease) [K21.9] 10/20/2010       The patient was seen and examined on day of discharge and this discharge summary is in conjunction with any daily progress note from day of discharge. Hospital Course:   78 y. o. male who presented to [facility] with complaints of left-sided numbness and slurred speech. He has a history of stroke and left-sided weakness in the past. He recovered to some extent.  This morning he experienced left-sided numbness over the extremities and face and slurred speech. His symptoms improved by the time he reached the advised. XR CHEST PORTABLE   Final Result   No acute cardiopulmonary disease. CTA NECK W CONTRAST   Final Result   No intracranial arterial large vessel occlusion. There is moderate stenosis   of the proximal right posterior cerebral artery. No evidence of arterial flow-limiting stenosis in the neck. CTA HEAD W CONTRAST   Final Result   No intracranial arterial large vessel occlusion. There is moderate stenosis   of the proximal right posterior cerebral artery. No evidence of arterial flow-limiting stenosis in the neck. CT HEAD WO CONTRAST   Final Result   No acute intracranial abnormality. Chronic-appearing left maxillary and sphenoid sinusitis. Consults:     IP CONSULT TO STROKE TEAM  IP CONSULT TO HOSPITALIST  IP CONSULT TO NEUROLOGY  IP CONSULT TO HOME CARE NEEDS  IP CONSULT TO HOME CARE NEEDS    Disposition:  Home with home health     Condition at Discharge: Stable    Discharge Instructions/Follow-up:  Follow-up with PCP and Neurology in 1 month     Code Status:  Full Code     Activity: activity as tolerated    Diet: diabetic diet      Discharge Medications:     Current Discharge Medication List           Details   oxyCODONE-acetaminophen (PERCOCET) 5-325 MG per tablet Take 2 tablets by mouth every 6 hours as needed for Pain for up to 3 days. Intended supply: 3 days. Take lowest dose possible to manage pain.   Qty: 24 tablet, Refills: 0    Associated Diagnoses: Lumbar degenerative disc disease      aspirin 81 MG EC tablet Take 1 tablet by mouth daily  Qty: 30 tablet, Refills: 3      cyclobenzaprine (FLEXERIL) 10 MG tablet Take 1 tablet by mouth 3 times daily as needed for Muscle spasms  Qty: 10 tablet, Refills: 0      simvastatin (ZOCOR) 20 MG tablet Take 1 tablet by mouth nightly  Qty: 30 tablet, Refills: 3              Details   lisinopril (PRINIVIL;ZESTRIL) 30 MG tablet Take 0.5 tablets by mouth daily  Qty: 90 tablet, Refills: 0

## 2018-10-24 ENCOUNTER — CARE COORDINATION (OUTPATIENT)
Dept: CASE MANAGEMENT | Age: 68
End: 2018-10-24

## 2018-10-24 NOTE — CARE COORDINATION
[facility] Transitions Initial Follow Up Call    Call within 2 business days of discharge: Yes    Patient: Macy Resendiz Patient : 1950   MRN: [id number]  Reason for Admission: Acute CVA   Discharge Date: 10/23/18 RARS: Readmission Risk Score: 29     Attempted to reach patient for 24hr post hospital transition call, no answer. Attempted home/mobile number listed and received message no VM setup at this time, CTC unable to leave VM for patient. CTC contacted [facility] [employer] [facility] and spoke with Dolly Gay in intake who verified orders for resumption of care have been received and someone will be contacting patient today.        Follow Up  Future Appointments  Date Time Provider Margret Musa   10/26/2018 9:45 AM RICCO Campbell CNP [address] [facility]   2018 11:30 AM ANAND Marquez  LUISY [facility]   2018 10:15 AM Nissa Mendez MD [facility]   11/15/2018 11:00 AM Gary Arechiga MD [facility] SUMA VASQUEZ [facility]   2018 10:00 AM RICCO Campbell CNP [address] [facility]   2018 11:15 AM Chandan Bay MD AND PULM [facility]   2018 11:40 AM Ayden Johnson MD AND [facility]       Riley Bedoya RN, BSN, [address] [facility] Transitions Coordinator    [phone]

## 2018-10-25 ENCOUNTER — TELEPHONE (OUTPATIENT)
Dept: INTERNAL MEDICINE CLINIC | Age: 68
End: 2018-10-25

## 2018-10-25 NOTE — CARE COORDINATION
SW contacted pt. He reported being in pain and having an appt tomorrow. SW inquired about update on eviction. He reported that he had to be out by the beginning of the week. He does not have any friends or family to stay with. He stated that he would like to go to a nursing home. SW explained that SNF was original d/c plan however was denied by insurance. Educated pt on SNF stay vs permanent nursing home. SW processed pt thoughts about a shelter, he was not open to a shelter stating that he did not know what to do with his things and he had a car but did not have gas to get there. SW offered to call around to obtain more information about open beds however pt asked that SW call him back tomorrow around noon. PAULO will call pt back tomorrow.     SANTI Prince, [address] [place] [address]

## 2018-10-26 ENCOUNTER — APPOINTMENT (OUTPATIENT)
Dept: CT IMAGING | Age: 68
End: 2018-10-26
Payer: COMMERCIAL

## 2018-10-26 ENCOUNTER — TELEPHONE (OUTPATIENT)
Dept: INTERNAL MEDICINE CLINIC | Age: 68
End: 2018-10-26

## 2018-10-26 ENCOUNTER — APPOINTMENT (OUTPATIENT)
Dept: GENERAL RADIOLOGY | Age: 68
End: 2018-10-26
Payer: COMMERCIAL

## 2018-10-26 ENCOUNTER — HOSPITAL ENCOUNTER (EMERGENCY)
Age: 68
Discharge: HOME | End: 2018-10-26
Payer: COMMERCIAL

## 2018-10-26 ENCOUNTER — OFFICE VISIT (OUTPATIENT)
Dept: INTERNAL MEDICINE CLINIC | Age: 68
End: 2018-10-26
Payer: COMMERCIAL

## 2018-10-26 VITALS
HEIGHT: 77 IN | SYSTOLIC BLOOD PRESSURE: 191 MMHG | WEIGHT: 193 LBS | RESPIRATION RATE: 16 BRPM | BODY MASS INDEX: 22.79 KG/M2 | HEART RATE: 98 BPM | OXYGEN SATURATION: 100 % | TEMPERATURE: 98.2 F | DIASTOLIC BLOOD PRESSURE: 86 MMHG

## 2018-10-26 VITALS
SYSTOLIC BLOOD PRESSURE: 120 MMHG | HEART RATE: 108 BPM | OXYGEN SATURATION: 94 % | HEIGHT: 77 IN | WEIGHT: 193 LBS | BODY MASS INDEX: 22.79 KG/M2 | DIASTOLIC BLOOD PRESSURE: 70 MMHG

## 2018-10-26 DIAGNOSIS — R53.81 PHYSICAL DECONDITIONING: ICD-10-CM

## 2018-10-26 DIAGNOSIS — S70.02XA CONTUSION OF LEFT HIP, INITIAL ENCOUNTER: ICD-10-CM

## 2018-10-26 DIAGNOSIS — S59.902A ELBOW INJURY, LEFT, INITIAL ENCOUNTER: Primary | ICD-10-CM

## 2018-10-26 DIAGNOSIS — M25.552 LEFT HIP PAIN: ICD-10-CM
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
## 2018-10-26 DIAGNOSIS — S51.012A LACERATION OF LEFT ELBOW, INITIAL ENCOUNTER: Primary | ICD-10-CM

## 2018-10-26 DIAGNOSIS — R07.81 RIB PAIN: ICD-10-CM

## 2018-10-26 DIAGNOSIS — S09.90XA CLOSED HEAD INJURY, INITIAL ENCOUNTER: ICD-10-CM

## 2018-10-26 PROCEDURE — 73502 X-RAY EXAM HIP UNI 2-3 VIEWS: CPT

## 2018-10-26 PROCEDURE — 4500000023 HC ED LEVEL 3 PROCEDURE

## 2018-10-26 PROCEDURE — 72125 CT NECK SPINE W/O DYE: CPT

## 2018-10-26 PROCEDURE — 99214 OFFICE O/P EST MOD 30 MIN: CPT | Performed by: NURSE PRACTITIONER

## 2018-10-26 PROCEDURE — 73080 X-RAY EXAM OF ELBOW: CPT

## 2018-10-26 PROCEDURE — 70450 CT HEAD/BRAIN W/O DYE: CPT

## 2018-10-26 PROCEDURE — 99283 EMERGENCY DEPT VISIT LOW MDM: CPT

## 2018-10-26 RX ORDER — HYDROCODONE BITARTRATE AND ACETAMINOPHEN 5; 325 MG/1; MG/1
1 TABLET ORAL EVERY 8 HOURS PRN
Qty: 8 TABLET | Refills: 0 | Status: SHIPPED | OUTPATIENT
Start: 2018-10-26 | End: 2018-10-29

## 2018-10-26 RX ORDER — CEPHALEXIN 500 MG/1
500 CAPSULE ORAL 4 TIMES DAILY
Qty: 40 CAPSULE | Refills: 0 | Status: SHIPPED | OUTPATIENT
Start: 2018-10-26 | End: 2018-11-05 | Stop reason: CLARIF

## 2018-10-26 ASSESSMENT — ENCOUNTER SYMPTOMS
BACK PAIN: 1
WHEEZING: 0
SINUS PRESSURE: 0
ABDOMINAL DISTENTION: 0
COUGH: 0
NAUSEA: 0
DIARRHEA: 0
SINUS PAIN: 0
SHORTNESS OF BREATH: 1
RHINORRHEA: 0
ABDOMINAL PAIN: 0

## 2018-10-26 ASSESSMENT — PAIN DESCRIPTION - ORIENTATION: ORIENTATION: LEFT

## 2018-10-26 ASSESSMENT — PAIN SCALES - GENERAL: PAINLEVEL_OUTOF10: 8

## 2018-10-26 ASSESSMENT — PAIN DESCRIPTION - PAIN TYPE: TYPE: ACUTE PAIN

## 2018-10-26 ASSESSMENT — PAIN DESCRIPTION - LOCATION: LOCATION: ARM;HIP

## 2018-10-26 NOTE — PROGRESS NOTES
10/26/2018     Meri Velasquez (:  1950) is a 78 y.o. male, here for evaluation of the following medical concerns:    KRANTHI Mosher is here because of concern after a fall that occurred this morning. He reports tripping and falling on his left side. He sustained a laceration to his L elbow. Reports rib and L hip pain but without bruising or abrasions. He reports falling more frequently lately. He feels weaker and off balance. Does have laceration to elbow that appears to require sutures. Discussed him going to the emergency room vs urgent care to have this done. He is electing to go to the emergency room as he is concerned he will not have money for his co-pay at an urgent care. He was recently admitted to the hospital for TIA. All of his testing came back normal to his knowledge and he was discharged on aspirin. He denies dizziness, syncope, and light headedness since discharge. Reports having a few concerns he wanted to discuss but reports he cannot remember them at this time. He requests that I call his sister to discuss his overall care. Review of Systems   Constitutional: Positive for activity change and fatigue. Negative for chills and fever. HENT: Negative for congestion, postnasal drip, rhinorrhea, sinus pain and sinus pressure. Respiratory: Positive for shortness of breath (with exertion). Negative for cough and wheezing. Cardiovascular: Negative for chest pain, palpitations and leg swelling. Gastrointestinal: Negative for abdominal distention, abdominal pain, diarrhea and nausea. Genitourinary: Negative for difficulty urinating and dysuria. Musculoskeletal: Positive for arthralgias, back pain and gait problem. Negative for myalgias. Neurological: Negative for dizziness, light-headedness and numbness. Hematological: Negative for adenopathy. Psychiatric/Behavioral: Positive for dysphoric mood.  Negative for agitation, confusion, decreased concentration, sleep

## 2018-10-26 NOTE — ED PROVIDER NOTES
[facility] Emergency Department    CHIEF COMPLAINT  Arm Pain (Pt states he fell left elbow pain hip pain )      HISTORY OF PRESENT ILLNESS  Vaughn Drake is a 78 y.o. male who presents to the ED complaining of left elbow and hip pain status post fall. Patient observed lying in bed, appears nontoxic and in no acute distress at this time. Brought him by friends today for evaluation. Patient reports that left knee gave out on him which she reported it does often. Does ambulate with cane. That today fell on left elbow and hip. Pain described as throbbing aching. Rated at a 45 out of 10. Worse with movement. No numbness, tingling, weakness of extremity. Patient is right-hand dominant. He believes he is up-to-date on tetanus vaccination. States that he did strike head. Denies any loss of consciousness. No headaches, lightheadedness, dizziness or confusion. No visual changes or disturbances. No difficulty speaking swallowing. Denies any neck or back pain. No other complaints, modifying factors or associated symptoms. Nursing notes reviewed. Past Medical History:   Diagnosis Date    Anxiety disorder     BPH (benign prostatic hypertrophy)     Calcified granuloma of lung ([address] 2014    2:stable per 3/25/14 CT chest    Cataract 1/20/14    OU:Dr. hayward:[facility]    Chronic pain     COPD (chronic obstructive pulmonary disease) (HCC)     Under care of pulmo(Dr. Calhoun)    Degenerative arthritis of hip     Depression 3/11/2015    Depression with anxiety     Prior psychiatrist & therapist:Dr. Michael Dallas.  Diabetes mellitus ([address] 2004    Endo Dr. Gurvinder Graham type 1 note below in [facility]    Diabetic eye exam ([address] 1/20/14    [facility]:Dr. STEPHEN Hayward:No retinopathy.  Cataract    Diabetic retinopathy ([address]     under care of [facility]:Dr. Juliann Navas    Diverticulitis 11/26/2011    Diverticulosis 11/26/2011    Emphysema     Esophageal candidiasis ([address] 7/16/13    GI:EGD    Essential 1 MG tablet TAKE 1 TABLET BY MOUTH DAILY 30 tablet 3    omeprazole (PRILOSEC) 20 MG delayed release capsule TAKE 1 CAPSULE BY MOUTH 2 TIMES A DAY 60 capsule 2    hydrALAZINE (APRESOLINE) 50 MG tablet Take 1 tablet by mouth 3 times daily 270 tablet 0    finasteride (PROSCAR) 5 MG tablet TAKE 1 TABLET BY MOUTH DAILY 90 tablet 0    tamsulosin (FLOMAX) 0.4 MG capsule TAKE 2 CAPSULES BY MOUTH DAILY 180 capsule 0    metoprolol tartrate (LOPRESSOR) 25 MG tablet Take 1 tablet by mouth 2 times daily 180 tablet 0    AGAMATRIX PRESTO TEST strip USE TO TEST BLOOD SUGAR 3 TIMES A  each 3    metoclopramide (REGLAN) 10 MG tablet Take 1 tablet by mouth 4 times daily as needed (Nausea and/or headache) 30 tablet 0    Tens Unit MISC by Does not apply route Use as directed.  1 each 0    melatonin 1 MG tablet Take 1 mg by mouth daily as needed for Sleep      vitamin E 1000 units capsule Take 1,000 Units by mouth daily      ibuprofen (ADVIL;MOTRIN) 200 MG tablet Take 200 mg by mouth nightly as needed for Pain      B Complex Vitamins (VITAMIN B-COMPLEX) TABS Take 1,100 mg by mouth daily      umeclidinium-vilanterol (ANORO ELLIPTA) 62.5-25 MCG/INH AEPB inhaler Inhale 1 puff into the lungs daily 1 each 11    albuterol sulfate HFA (PROAIR HFA) 108 (90 Base) MCG/ACT inhaler Inhale 2 puffs into the lungs every 4 hours as needed for Wheezing or Shortness of Breath 1 Inhaler 11    Lancets MISC Testing 2-3 times daily DX Code: E11.9 100 each 3    thiamine 100 MG tablet Take 1 tablet by mouth daily 30 tablet 3    Blood Glucose Monitoring Suppl (TRUE METRIX AIR GLUCOSE METER) DENIS 1 meter 1 Device 0    insulin aspart (NOVOLOG) 100 UNIT/ML injection vial Inject 10 Units into the skin 3 times daily (before meals) +SSI       glucagon 1 MG injection Inject 1 kit into the skin as needed      SUMAtriptan (IMITREX) 100 MG tablet Take 1 tablet by mouth once as needed for Migraine 9 tablet 0     Allergies   Allergen Reactions    fracture, dislocation or femoral head flattening. Mild narrowing of the bilateral hip joint spaces with associated osteophyte spurring. Osseous excrescence at the bilateral femoral head and neck junctions which can be seen with cam type RODNEY. Pelvic phleboliths. Iliac wings and pubic rami symmetric in appearance. 1. Mild bilateral hip osteoarthrosis. 2. Osseous excrescence at the bilateral femoral head and neck junctions which can be seen with cam type RODNEY. 3. No acute fracture or gross dislocation. PROCEDURE:  LACERATION REPAIR  Annamary Bumps or their surrogate had an opportunity to ask questions, and the risks, benefits, and alternatives were discussed. The wound was prepped and draped to maintain a sterile field. A local anesthetic was used to completely anesthetize the wound. It was copiously irrigated. It was explored to its depth in a bloodless field with no sign of tendon, nerve, or vascular injury. No foreign bodies were identified. The 1.5 cm wound was closed with 2, 4-0 Ethilon, simple interrupted sutures. There were no complications during the procedure. ED COURSE   I have evaluated this patient. Patient received local anesthetic for pain, with good relief. Triage vitals stable. Head CT without in evidence for acute intracranial abnormality. CT cervical spine without evidence to suggest fractures or dislocations. Left elbow plain films negative for fractures or dislocations as well as left hip plain films. Wound was cleansed and close without complication. Initiated on Keflex. Ambulatory prior to discharge without difficulty. Sent with recommendations for PCP and orthopedic follow-up. Have discussed wound aftercare as well as return precautions and patient in agreement with discharge. A discussion was had with Mr. Hi regarding fall and multiple injuries, ED findings and recommendations for follow-up. All questions were answered.  Patient will follow up with  PCP and

## 2018-10-29 ENCOUNTER — TELEPHONE (OUTPATIENT)
Dept: INTERNAL MEDICINE CLINIC | Age: 68
End: 2018-10-29

## 2018-10-29 ENCOUNTER — CARE COORDINATION (OUTPATIENT)
Dept: CASE MANAGEMENT | Age: 68
End: 2018-10-29

## 2018-10-29 DIAGNOSIS — G89.29 CHRONIC LOW BACK PAIN, UNSPECIFIED BACK PAIN LATERALITY, WITH SCIATICA PRESENCE UNSPECIFIED: Primary | ICD-10-CM

## 2018-10-29 DIAGNOSIS — M54.5 CHRONIC LOW BACK PAIN, UNSPECIFIED BACK PAIN LATERALITY, WITH SCIATICA PRESENCE UNSPECIFIED: Primary | ICD-10-CM

## 2018-10-29 NOTE — TELEPHONE ENCOUNTER
Visit was regarding fall that morning and evaluating priority which sent him to the ER. Needs to have callous evaluated.

## 2018-10-29 NOTE — TELEPHONE ENCOUNTER
Saw Phyllistine Awkward on Friday  Wanted to know if there were any orders regarding area on bottom of R foot  Large open callous

## 2018-10-30 ENCOUNTER — CARE COORDINATION (OUTPATIENT)
Dept: CARE COORDINATION | Age: 68
End: 2018-10-30

## 2018-10-31 ENCOUNTER — CARE COORDINATION (OUTPATIENT)
Dept: CASE MANAGEMENT | Age: 68
End: 2018-10-31

## 2018-10-31 NOTE — CARE COORDINATION
SW returned pt call. He inquired if after his hernia surgery or knee replacement would he then be able to go to a nursing home. SW explained that he may be d/c to a SNF for rehab but not long term. Explained the difference between each stay. SW contacted sister. She reported that she was waiting on a call from [facility] regarding admission. SW will look into getting pt evaluated for NF.      SANTI Thomas, [address] [place] [address]

## 2018-11-01 ENCOUNTER — TELEPHONE (OUTPATIENT)
Dept: INTERNAL MEDICINE CLINIC | Age: 68
End: 2018-11-01

## 2018-11-02 ENCOUNTER — TELEPHONE (OUTPATIENT)
Dept: INTERNAL MEDICINE CLINIC | Age: 68
End: 2018-11-02

## 2018-11-02 NOTE — TELEPHONE ENCOUNTER
Spoke to Jay Epperson   She advised patient to go to ER. Spoke to patient   He is reluctant to go to ER. I expressed the importance of him going and getting checked out in the ER.    Patient refused, he said he would go if he got worse

## 2018-11-02 NOTE — CARE COORDINATION
PAULO contacted pt for update. He reported that he was told by his CM at [address] [place] [address]  through [address] that his stay may be covered. PAULO left message for CM.     SANTI Chilel, [address] [place] [address]

## 2018-11-02 NOTE — TELEPHONE ENCOUNTER
Just left patients home   /104  Pulse running 102-104  Denies symptoms     Pt is being evicted from home  Has not pain medication    Callous on bottom of foot has bloody drainage   Working with insurance to get into [facility]  Probably wouldn't happen until next week     Advised pt to go to ER.  Doubts pt will go

## 2018-11-05 ENCOUNTER — CARE COORDINATION (OUTPATIENT)
Dept: CASE MANAGEMENT | Age: 68
End: 2018-11-05

## 2018-11-05 ENCOUNTER — TELEPHONE (OUTPATIENT)
Dept: INTERNAL MEDICINE CLINIC | Age: 68
End: 2018-11-05

## 2018-11-05 ENCOUNTER — OFFICE VISIT (OUTPATIENT)
Dept: INTERNAL MEDICINE CLINIC | Age: 68
End: 2018-11-05
Payer: COMMERCIAL

## 2018-11-05 VITALS
BODY MASS INDEX: 23.02 KG/M2 | HEART RATE: 102 BPM | OXYGEN SATURATION: 96 % | WEIGHT: 195 LBS | DIASTOLIC BLOOD PRESSURE: 82 MMHG | SYSTOLIC BLOOD PRESSURE: 132 MMHG | HEIGHT: 77 IN

## 2018-11-05 DIAGNOSIS — R42 DIZZINESS: Primary | ICD-10-CM

## 2018-11-05 DIAGNOSIS — M51.26 PROTRUSION OF LUMBAR INTERVERTEBRAL DISC: ICD-10-CM

## 2018-11-05 DIAGNOSIS — M50.30 DDD (DEGENERATIVE DISC DISEASE), CERVICAL: ICD-10-CM

## 2018-11-05 DIAGNOSIS — F41.9 ANXIETY DISORDER, UNSPECIFIED TYPE: Chronic | ICD-10-CM

## 2018-11-05 DIAGNOSIS — M47.812 SPONDYLOSIS OF CERVICAL REGION WITHOUT MYELOPATHY OR RADICULOPATHY: ICD-10-CM

## 2018-11-05 DIAGNOSIS — M51.36 LUMBAR DEGENERATIVE DISC DISEASE: ICD-10-CM

## 2018-11-05 PROCEDURE — 99214 OFFICE O/P EST MOD 30 MIN: CPT | Performed by: NURSE PRACTITIONER

## 2018-11-05 RX ORDER — SERTRALINE HYDROCHLORIDE 25 MG/1
50 TABLET, FILM COATED ORAL DAILY
Qty: 30 TABLET | Refills: 2 | Status: SHIPPED | OUTPATIENT
Start: 2018-11-05 | End: 2018-12-17

## 2018-11-05 RX ORDER — VITAMIN B COMPLEX
1100 TABLET ORAL DAILY
Qty: 30 TABLET | Refills: 3 | Status: SHIPPED | OUTPATIENT
Start: 2018-11-05 | End: 2019-03-02

## 2018-11-05 RX ORDER — MECLIZINE HYDROCHLORIDE 25 MG/1
25 TABLET ORAL 3 TIMES DAILY PRN
Qty: 30 TABLET | Refills: 3 | Status: SHIPPED | OUTPATIENT
Start: 2018-11-05 | End: 2018-11-15

## 2018-11-05 RX ORDER — LISINOPRIL 30 MG/1
30 TABLET ORAL DAILY
Qty: 90 TABLET | Refills: 0 | Status: ON HOLD | OUTPATIENT
Start: 2018-11-05 | End: 2019-03-08 | Stop reason: HOSPADM

## 2018-11-05 RX ORDER — FINASTERIDE 5 MG/1
TABLET, FILM COATED ORAL
Qty: 90 TABLET | Refills: 0 | Status: SHIPPED | OUTPATIENT
Start: 2018-11-05 | End: 2019-04-27

## 2018-11-05 RX ORDER — MULTIVIT WITH MINERALS/LUTEIN
1000 TABLET ORAL DAILY
Qty: 30 CAPSULE | Refills: 3 | Status: ON HOLD | OUTPATIENT
Start: 2018-11-05 | End: 2021-10-07

## 2018-11-05 ASSESSMENT — ENCOUNTER SYMPTOMS
CONSTIPATION: 0
CHEST TIGHTNESS: 0
ABDOMINAL PAIN: 0
EYE REDNESS: 0
SORE THROAT: 0
BACK PAIN: 1
WHEEZING: 0
COLOR CHANGE: 0
NAUSEA: 0
VOMITING: 0
EYE ITCHING: 0
RHINORRHEA: 0
DIARRHEA: 0
SINUS PRESSURE: 0
SHORTNESS OF BREATH: 0
COUGH: 0
BLOOD IN STOOL: 0

## 2018-11-05 NOTE — PROGRESS NOTES
Psychiatric/Behavioral: Negative for confusion, sleep disturbance and suicidal ideas. The patient is not nervous/anxious and is not hyperactive. Prior to Visit Medications    Medication Sig Taking?  Authorizing Provider   meclizine (ANTIVERT) 25 MG tablet Take 1 tablet by mouth 3 times daily as needed for Dizziness Yes RICCO Moyer CNP   sertraline (ZOLOFT) 25 MG tablet Take 2 tablets by mouth daily Yes RICCO Shaw CNP   vitamin E 1000 units capsule Take 1 capsule by mouth daily Yes RICCO Shaw CNP   B Complex Vitamins (VITAMIN B-COMPLEX) TABS Take 1,100 mg by mouth daily Yes RICCO Shaw CNP   metoprolol tartrate (LOPRESSOR) 25 MG tablet Take 1 tablet by mouth 2 times daily Yes RICCO Moyer CNP   finasteride (PROSCAR) 5 MG tablet TAKE 1 TABLET BY MOUTH DAILY Yes RICCO Javed CNP   lisinopril (PRINIVIL;ZESTRIL) 30 MG tablet Take 1 tablet by mouth daily Yes RICCO Moyer CNP   aspirin 81 MG EC tablet Take 1 tablet by mouth daily Yes Ya MadisonRICCO CNP   ondansetron (ZOFRAN) 4 MG tablet Take 1 tablet by mouth every 8 hours as needed for Nausea or Vomiting Yes RICCO Javed CNP   BASAGLAR KWIKPEN 100 UNIT/ML injection pen Inject 25 Units into the skin nightly Dx Code E 11.9  Patient taking differently: Inject 35 Units into the skin nightly Dx Code E 11.9 Yes Leslie Soriano MD   Continuous Blood Gluc  (FREESTYLE BRIDGET READER) DENIS Use to monitor sugars Yes Josefina Zambrano MD   Continuous Blood Gluc Sensor (FREESTYLE BRIDGET SENSOR SYSTEM) MISC Use to monitor sugars Yes Josefina Zambrano MD   vitamin B-12 (CYANOCOBALAMIN) 1000 MCG tablet TAKE 1 TABLET BY MOUTH DAILY Yes RICCO Moyer CNP   folic acid (FOLVITE) 1 MG tablet TAKE 1 TABLET BY MOUTH DAILY Yes RICCO Shaw CNP   hydrALAZINE (APRESOLINE) 50 MG tablet Take 1 tablet

## 2018-11-05 NOTE — TELEPHONE ENCOUNTER
Patient was unable to get into pain management because of hx of alcohol abuse and failed urine drug test

## 2018-11-06 NOTE — CARE COORDINATION
PAULO contacted CM to confirm receipt of documents. She reported that she sent info to [facility] and he was denied admission due to concerns from previous stay. CM indicated she had not informed pt as of yet. She stated that she will see if he wants to go to another facility. Explained to CM that his pending eviction may be a concern to facilities, CM indicated she would explain to facilities. CM will update  with information as it occurs.     Rm Quinteros, SANTI, [address] [place] [address]

## 2018-11-07 ENCOUNTER — TELEPHONE (OUTPATIENT)
Dept: INTERNAL MEDICINE CLINIC | Age: 68
End: 2018-11-07

## 2018-11-07 NOTE — TELEPHONE ENCOUNTER
Patient called wanting to see if a referral can be sent [facility] off of [address]. Patient tried the first Pain Doctor Dr. Brooke To but they denied patient.

## 2018-11-09 ENCOUNTER — OFFICE VISIT (OUTPATIENT)
Dept: PSYCHOLOGY | Age: 68
End: 2018-11-09
Payer: COMMERCIAL

## 2018-11-09 DIAGNOSIS — F33.1 MAJOR DEPRESSIVE DISORDER, RECURRENT EPISODE, MODERATE (HCC): ICD-10-CM

## 2018-11-09 DIAGNOSIS — F43.10 PTSD (POST-TRAUMATIC STRESS DISORDER): Primary | ICD-10-CM

## 2018-11-09 DIAGNOSIS — F41.1 GENERALIZED ANXIETY DISORDER: ICD-10-CM

## 2018-11-09 DIAGNOSIS — F19.11 HISTORY OF SUBSTANCE ABUSE (HCC): ICD-10-CM

## 2018-11-09 DIAGNOSIS — G89.4 CHRONIC PAIN SYNDROME: ICD-10-CM

## 2018-11-09 PROCEDURE — 90832 PSYTX W PT 30 MINUTES: CPT | Performed by: PSYCHOLOGIST

## 2018-11-09 ASSESSMENT — PATIENT HEALTH QUESTIONNAIRE - PHQ9
3. TROUBLE FALLING OR STAYING ASLEEP: 2
SUM OF ALL RESPONSES TO PHQ9 QUESTIONS 1 & 2: 3
6. FEELING BAD ABOUT YOURSELF - OR THAT YOU ARE A FAILURE OR HAVE LET YOURSELF OR YOUR FAMILY DOWN: 1
5. POOR APPETITE OR OVEREATING: 1
4. FEELING TIRED OR HAVING LITTLE ENERGY: 2
7. TROUBLE CONCENTRATING ON THINGS, SUCH AS READING THE NEWSPAPER OR WATCHING TELEVISION: 1
8. MOVING OR SPEAKING SO SLOWLY THAT OTHER PEOPLE COULD HAVE NOTICED. OR THE OPPOSITE, BEING SO FIGETY OR RESTLESS THAT YOU HAVE BEEN MOVING AROUND A LOT MORE THAN USUAL: 1
SUM OF ALL RESPONSES TO PHQ QUESTIONS 1-9: 11
1. LITTLE INTEREST OR PLEASURE IN DOING THINGS: 2
2. FEELING DOWN, DEPRESSED OR HOPELESS: 1
SUM OF ALL RESPONSES TO PHQ QUESTIONS 1-9: 11
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
## 2018-11-12 NOTE — CARE COORDINATION
PAULO contacted [facility] BRICE and left message. SW contacted pt for update. He reported that admission request was sent to [facility]. [facility]. While on the phone with pt, admissions called for pt social security #. He reported that during court date he was give 9-10 to leave the home. Pt is still not sure where he would go. Pt still unsure about shelter. PAULO will follow up with pt on Wed.     SANTI Aceves, [address] [place] [address]

## 2018-11-13 ENCOUNTER — HOSPITAL ENCOUNTER (OUTPATIENT)
Dept: MRI IMAGING | Age: 68
Discharge: HOME | End: 2018-11-13
Payer: COMMERCIAL

## 2018-11-13 DIAGNOSIS — M47.812 SPONDYLOSIS OF CERVICAL REGION WITHOUT MYELOPATHY OR RADICULOPATHY: ICD-10-CM

## 2018-11-13 DIAGNOSIS — M51.36 LUMBAR DEGENERATIVE DISC DISEASE: ICD-10-CM

## 2018-11-13 DIAGNOSIS — M50.30 DDD (DEGENERATIVE DISC DISEASE), CERVICAL: ICD-10-CM

## 2018-11-13 DIAGNOSIS — M51.26 PROTRUSION OF LUMBAR INTERVERTEBRAL DISC: ICD-10-CM

## 2018-11-13 PROCEDURE — A9579 GAD-BASE MR CONTRAST NOS,1ML: HCPCS | Performed by: NURSE PRACTITIONER

## 2018-11-13 PROCEDURE — 72156 MRI NECK SPINE W/O & W/DYE: CPT

## 2018-11-13 PROCEDURE — 72158 MRI LUMBAR SPINE W/O & W/DYE: CPT

## 2018-11-13 PROCEDURE — 6360000004 HC RX CONTRAST MEDICATION: Performed by: NURSE PRACTITIONER

## 2018-11-13 RX ADMIN — GADOTERIDOL 18 ML: 279.3 INJECTION, SOLUTION INTRAVENOUS at 11:32

## 2018-11-14 ENCOUNTER — OFFICE VISIT (OUTPATIENT)
Dept: ORTHOPEDIC SURGERY | Age: 68
End: 2018-11-14
Payer: COMMERCIAL

## 2018-11-14 VITALS
SYSTOLIC BLOOD PRESSURE: 126 MMHG | WEIGHT: 195.11 LBS | BODY MASS INDEX: 23.04 KG/M2 | DIASTOLIC BLOOD PRESSURE: 75 MMHG | HEART RATE: 84 BPM | HEIGHT: 77 IN

## 2018-11-14 DIAGNOSIS — M79.605 LEG PAIN, ANTERIOR, LEFT: ICD-10-CM

## 2018-11-14 DIAGNOSIS — R52 PAIN: Primary | ICD-10-CM

## 2018-11-14 PROCEDURE — 99212 OFFICE O/P EST SF 10 MIN: CPT | Performed by: ORTHOPAEDIC SURGERY

## 2018-11-15 ENCOUNTER — CARE COORDINATION (OUTPATIENT)
Dept: CARE COORDINATION | Age: 68
End: 2018-11-15

## 2018-11-15 ENCOUNTER — CLINICAL DOCUMENTATION (OUTPATIENT)
Dept: PSYCHIATRY | Age: 68
End: 2018-11-15

## 2018-11-15 ENCOUNTER — TELEPHONE (OUTPATIENT)
Dept: INTERNAL MEDICINE CLINIC | Age: 68
End: 2018-11-15

## 2018-11-15 NOTE — TELEPHONE ENCOUNTER
Patient needs a Letter of Requesting admittance into [place]. Fax [phone]  Attn: Nava Bruner    Need dx code added to the later. Will need MD signature on this letter also.

## 2018-11-16 ENCOUNTER — CARE COORDINATION (OUTPATIENT)
Dept: CARE COORDINATION | Age: 68
End: 2018-11-16

## 2018-11-20 ENCOUNTER — CARE COORDINATION (OUTPATIENT)
Dept: CARE COORDINATION | Age: 68
End: 2018-11-20

## 2018-11-20 ENCOUNTER — OFFICE VISIT (OUTPATIENT)
Dept: INTERNAL MEDICINE CLINIC | Age: 68
End: 2018-11-20
Payer: COMMERCIAL

## 2018-11-20 ENCOUNTER — OFFICE VISIT (OUTPATIENT)
Dept: PSYCHOLOGY | Age: 68
End: 2018-11-20
Payer: COMMERCIAL

## 2018-11-20 ENCOUNTER — OFFICE VISIT (OUTPATIENT)
Dept: PULMONOLOGY | Age: 68
End: 2018-11-20
Payer: COMMERCIAL

## 2018-11-20 VITALS
BODY MASS INDEX: 22.79 KG/M2 | HEIGHT: 77 IN | HEART RATE: 90 BPM | OXYGEN SATURATION: 98 % | WEIGHT: 193 LBS | DIASTOLIC BLOOD PRESSURE: 92 MMHG | SYSTOLIC BLOOD PRESSURE: 148 MMHG

## 2018-11-20 VITALS
OXYGEN SATURATION: 98 % | DIASTOLIC BLOOD PRESSURE: 89 MMHG | WEIGHT: 193 LBS | HEIGHT: 77 IN | RESPIRATION RATE: 20 BRPM | SYSTOLIC BLOOD PRESSURE: 149 MMHG | TEMPERATURE: 97.9 F | BODY MASS INDEX: 22.79 KG/M2 | HEART RATE: 97 BPM

## 2018-11-20 DIAGNOSIS — F51.04 INSOMNIA, PSYCHOPHYSIOLOGICAL: ICD-10-CM

## 2018-11-20 DIAGNOSIS — F33.1 MAJOR DEPRESSIVE DISORDER, RECURRENT EPISODE, MODERATE (HCC): ICD-10-CM

## 2018-11-20 DIAGNOSIS — J41.0 SIMPLE CHRONIC BRONCHITIS (HCC): Primary | ICD-10-CM

## 2018-11-20 DIAGNOSIS — E78.2 MIXED HYPERLIPIDEMIA: Chronic | ICD-10-CM

## 2018-11-20 DIAGNOSIS — M48.061 SPINAL STENOSIS OF LUMBAR REGION WITHOUT NEUROGENIC CLAUDICATION: Primary | ICD-10-CM

## 2018-11-20 DIAGNOSIS — F41.9 ANXIETY DISORDER, UNSPECIFIED TYPE: Chronic | ICD-10-CM

## 2018-11-20 DIAGNOSIS — F41.1 GENERALIZED ANXIETY DISORDER: ICD-10-CM

## 2018-11-20 DIAGNOSIS — F43.10 PTSD (POST-TRAUMATIC STRESS DISORDER): Primary | ICD-10-CM

## 2018-11-20 DIAGNOSIS — G89.4 CHRONIC PAIN SYNDROME: ICD-10-CM

## 2018-11-20 DIAGNOSIS — F17.200 TOBACCO DEPENDENCE: ICD-10-CM

## 2018-11-20 DIAGNOSIS — F19.11 HISTORY OF SUBSTANCE ABUSE (HCC): ICD-10-CM

## 2018-11-20 DIAGNOSIS — J41.8 MIXED SIMPLE AND MUCOPURULENT CHRONIC BRONCHITIS (HCC): Chronic | ICD-10-CM

## 2018-11-20 DIAGNOSIS — I10 ESSENTIAL HYPERTENSION: Chronic | ICD-10-CM

## 2018-11-20 PROCEDURE — 99214 OFFICE O/P EST MOD 30 MIN: CPT | Performed by: INTERNAL MEDICINE

## 2018-11-20 PROCEDURE — 99214 OFFICE O/P EST MOD 30 MIN: CPT | Performed by: NURSE PRACTITIONER

## 2018-11-20 PROCEDURE — 90832 PSYTX W PT 30 MINUTES: CPT | Performed by: PSYCHOLOGIST

## 2018-11-20 ASSESSMENT — ENCOUNTER SYMPTOMS
CONSTIPATION: 0
COUGH: 0
EYE DISCHARGE: 0
EYE DISCHARGE: 0
CHOKING: 0
EYE PAIN: 0
NAUSEA: 0
SORE THROAT: 0
BACK PAIN: 1
STRIDOR: 0
SORE THROAT: 0
CONSTIPATION: 0
WHEEZING: 0
PHOTOPHOBIA: 0
VOMITING: 0
VOICE CHANGE: 0
DIARRHEA: 0
DIARRHEA: 0
EYE REDNESS: 0
SHORTNESS OF BREATH: 0
BLOOD IN STOOL: 0
EYE ITCHING: 0
EYE PAIN: 0
SINUS PAIN: 0
ABDOMINAL PAIN: 0
ABDOMINAL PAIN: 0
COUGH: 1
SHORTNESS OF BREATH: 0

## 2018-11-20 ASSESSMENT — PATIENT HEALTH QUESTIONNAIRE - PHQ9
9. THOUGHTS THAT YOU WOULD BE BETTER OFF DEAD, OR OF HURTING YOURSELF: 0
5. POOR APPETITE OR OVEREATING: 2
1. LITTLE INTEREST OR PLEASURE IN DOING THINGS: 2
4. FEELING TIRED OR HAVING LITTLE ENERGY: 2
SUM OF ALL RESPONSES TO PHQ QUESTIONS 1-9: 13
7. TROUBLE CONCENTRATING ON THINGS, SUCH AS READING THE NEWSPAPER OR WATCHING TELEVISION: 1
SUM OF ALL RESPONSES TO PHQ QUESTIONS 1-9: 13
10. IF YOU CHECKED OFF ANY PROBLEMS, HOW DIFFICULT HAVE THESE PROBLEMS MADE IT FOR YOU TO DO YOUR WORK, TAKE CARE OF THINGS AT HOME, OR GET ALONG WITH OTHER PEOPLE: 2
2. FEELING DOWN, DEPRESSED OR HOPELESS: 1
3. TROUBLE FALLING OR STAYING ASLEEP: 2
SUM OF ALL RESPONSES TO PHQ9 QUESTIONS 1 & 2: 3
6. FEELING BAD ABOUT YOURSELF - OR THAT YOU ARE A FAILURE OR HAVE LET YOURSELF OR YOUR FAMILY DOWN: 1
8. MOVING OR SPEAKING SO SLOWLY THAT OTHER PEOPLE COULD HAVE NOTICED. OR THE OPPOSITE, BEING SO FIGETY OR RESTLESS THAT YOU HAVE BEEN MOVING AROUND A LOT MORE THAN USUAL: 2

## 2018-11-20 ASSESSMENT — ANXIETY QUESTIONNAIRES
2. NOT BEING ABLE TO STOP OR CONTROL WORRYING: 2-OVER HALF THE DAYS
1. FEELING NERVOUS, ANXIOUS, OR ON EDGE: 2-OVER HALF THE DAYS
GAD7 TOTAL SCORE: 14
3. WORRYING TOO MUCH ABOUT DIFFERENT THINGS: 2-OVER HALF THE DAYS
6. BECOMING EASILY ANNOYED OR IRRITABLE: 2-OVER HALF THE DAYS
5. BEING SO RESTLESS THAT IT IS HARD TO SIT STILL: 2-OVER HALF THE DAYS
7. FEELING AFRAID AS IF SOMETHING AWFUL MIGHT HAPPEN: 2-OVER HALF THE DAYS
4. TROUBLE RELAXING: 2-OVER HALF THE DAYS

## 2018-11-20 NOTE — PROGRESS NOTES
Pulmonary Outpatient Note   Casimiro Padgett MD       11/20/2018    Chief Complaint:  6 Month Follow-Up (bronchitis )     HPI:   80y.o. year old male here for follow up of COPD. Had quit smoking 2 months ago but recently relapsed due to increased stress. He intends to cut back again. Denies significant respiratory complaints, no acute exacerbations or respiratory infections since last seen. Actually ran out of his Anoro inhaler 2 months ago and stopped taking due to inability to afford refill. Apparently has been homeless and living with family. Denies significant weight changes, hemoptysis, or chest pain today. Past Medical History:   Diagnosis Date    Anxiety disorder     BPH (benign prostatic hypertrophy)     Calcified granuloma of lung ([address] 2014    2:stable per 3/25/14 CT chest    Cataract 1/20/14    OU:Dr. hayward:[facility]    Chronic pain     COPD (chronic obstructive pulmonary disease) (HCC)     Under care of pulmo(Dr. Calhoun)    Degenerative arthritis of hip     Depression 3/11/2015    Depression with anxiety     Prior psychiatrist & therapist:Dr. Carroll Santizo.  Diabetes mellitus ([address] 2004    Endo Dr. Paris Martinez type 1 note below in [facility]    Diabetic eye exam ([address] 1/20/14    [facility]:Dr. STEPHEN Hayward:No retinopathy. Cataract    Diabetic retinopathy ([address]     under care of [facility]:Dr. Tanvi Zhou    Diverticulitis 11/26/2011    Diverticulosis 11/26/2011    Emphysema     Esophageal candidiasis ([address] 7/16/13    GI:EGD    Essential hypertension, benign 11/2010    Fatty liver 10/9/2012    Foot ulcer:left 9/30/2013    Gastric ulcer, unspecified as acute or chronic, without mention of hemorrhage or perforation     GERD (gastroesophageal reflux disease)     Gout 1997    Right big toe    Hearing decreased     Left ear=60%. Right ear=80%.     Hemangioma 12/2010    Liver as per CT abdo    hepatitis c 7/26/17, 2010    Under care of Liver doc:Dr. Tori Paz    Hyperlipidemia LDL goal < 100     Low HDL (under 40) DAY, Disp: 300 each, Rfl: 3    Tens Unit MISC, by Does not apply route Use as directed., Disp: 1 each, Rfl: 0    ibuprofen (ADVIL;MOTRIN) 200 MG tablet, Take 200 mg by mouth nightly as needed for Pain, Disp: , Rfl:     umeclidinium-vilanterol (ANORO ELLIPTA) 62.5-25 MCG/INH AEPB inhaler, Inhale 1 puff into the lungs daily, Disp: 1 each, Rfl: 11    albuterol sulfate HFA (PROAIR HFA) 108 (90 Base) MCG/ACT inhaler, Inhale 2 puffs into the lungs every 4 hours as needed for Wheezing or Shortness of Breath, Disp: 1 Inhaler, Rfl: 11    Lancets MISC, Testing 2-3 times daily DX Code: E11.9, Disp: 100 each, Rfl: 3    thiamine 100 MG tablet, Take 1 tablet by mouth daily, Disp: 30 tablet, Rfl: 3    Blood Glucose Monitoring Suppl (TRUE METRIX AIR GLUCOSE METER) DENIS, 1 meter, Disp: 1 Device, Rfl: 0    glucagon 1 MG injection, Inject 1 kit into the skin as needed, Disp: , Rfl:     SUMAtriptan (IMITREX) 100 MG tablet, Take 1 tablet by mouth once as needed for Migraine, Disp: 9 tablet, Rfl: 0    Neurontin [gabapentin]    Vitals:    11/20/18 1115 11/20/18 1120   BP: (!) 140/98 (!) 149/89   Pulse: 97    Resp: 20    Temp: 97.9 °F (36.6 °C)    TempSrc: Oral    SpO2: 98%    Weight: 193 lb (87.5 kg)    Height: 6' 5\" (1.956 m)        Review of Systems   Constitutional: Negative for chills, fever and unexpected weight change. HENT: Negative for mouth sores, sore throat and voice change. Eyes: Negative for pain, discharge and itching. Respiratory: Positive for cough. Negative for choking and shortness of breath. Cardiovascular: Negative for chest pain, palpitations and leg swelling. Gastrointestinal: Negative for abdominal pain, constipation and diarrhea. Endocrine: Negative for cold intolerance, heat intolerance and polydipsia. Genitourinary: Negative for dysuria, frequency and hematuria. Musculoskeletal: Negative for gait problem, joint swelling and neck stiffness.    Neurological: Negative for dizziness, numbness and headaches. Psychiatric/Behavioral: Negative for agitation, confusion and hallucinations. Physical Exam   Constitutional: He appears well-developed and well-nourished. No distress. HENT:   Head: Normocephalic and atraumatic. Mouth/Throat: Oropharynx is clear and moist. No oropharyngeal exudate. Eyes: Pupils are equal, round, and reactive to light. EOM are normal.   Neck: Neck supple. No JVD present. Cardiovascular: Normal heart sounds. Exam reveals no gallop and no friction rub. No murmur heard. Pulmonary/Chest: Effort normal. He has no wheezes. He has no rales. Equal chest rise and expansion bilaterally   Abdominal: Soft. Bowel sounds are normal. He exhibits no distension. There is no tenderness. Musculoskeletal: Normal range of motion. He exhibits no edema. Lymphadenopathy:     He has no cervical adenopathy. Neurological: He is alert. No cranial nerve deficit. CN 2-12 grossly intact   Skin: Skin is warm and dry. No rash noted. He is not diaphoretic. CT chest personally reviewed, no significant lung parenchymal findings apart from benign granulomatous disease  PFTs personally reviewed, moderate obstruction, slightly worsened since prior study    ASSESSMENT:    1. Simple chronic bronchitis ([address]    2. Tobacco dependence      PLAN:    -Continue Anoro, I provided him with samples and instructed him to notify if he is unable to obtain next year so we can assist in the interim.  Also provided him with information on [employer] patient assistance program.   -Avoiding ICS therapy due to hyperglycemia issues, he actually has been well controlled without acute exacerbations and therefore should not require this for now  -Counseled on tobacco cessation         Ayesha Pham MD

## 2018-11-20 NOTE — PROGRESS NOTES
Behavioral Health Consultation  Patric Blanc Psy.D. Psychologist  11/20/2018  9:22 AM      Time spent with Patient: 30 minutes  This is patient's fourth [facility] appointment. Reason for Consult:  Insomnia, depression and anxiety  Referring Provider: RICCO Temple - CNP  [address]  [address] [facility], [address]      S:  Pt Robert Matthew) arrived today using a Rollator that someone had dropped off at his apartment. Pt was recently evicted. Was supposed to have help from [facility] to move into a care facility but he was told they were no longer coming so he got a friend to help him. Pt was in the process of moving into [facility] when the facility decided not to accept him for unknown reasons. Pt is currently sleeping at his sister's house and livnig out of his car and a storage unit. Sister is helping pt look for a place to live. Unsure where his medicines are, has not been taking most of them. Living options are also limited by his access to a car, which disqualifies him for some services.  He is not able to see the pain specialist he was referred to because that provider shares a practice with another provider who stopped seeing pt.      O:  MSE:  Appearance: good hygiene and appropriate attire  Attitude: cooperative, friendly and moderate distress  Consciousness: alert  Orientation: oriented to person, place, time, general circumstance  Memory: recent and remote memory intact  Attention/Concentration: intact during session   Psychomotor Activity: normal  Eye Contact: normal  Speech: normal rate and volume, well-articulated  Mood: dyshporic and anxious  Affect: congruent with thought content and mood  Perception: within normal limits  Thought Content: within normal limits   Thought Process: logical, goal-directed, coherent  Insight: improving  Judgment: intact  Morbid ideation: no  Suicide Assessment: no suicidal ideation, plan or intent       History:    Medications:   Current Outpatient Prescriptions   Medication Sig Dispense Refill    insulin aspart (NOVOLOG) 100 UNIT/ML injection vial Inject 10 Units into the skin 3 times daily (before meals) +SSI 3 vial 1    sertraline (ZOLOFT) 25 MG tablet Take 2 tablets by mouth daily 30 tablet 2    vitamin E 1000 units capsule Take 1 capsule by mouth daily 30 capsule 3    B Complex Vitamins (VITAMIN B-COMPLEX) TABS Take 1,100 mg by mouth daily 30 tablet 3    metoprolol tartrate (LOPRESSOR) 25 MG tablet Take 1 tablet by mouth 2 times daily 180 tablet 0    finasteride (PROSCAR) 5 MG tablet TAKE 1 TABLET BY MOUTH DAILY 90 tablet 0    lisinopril (PRINIVIL;ZESTRIL) 30 MG tablet Take 1 tablet by mouth daily 90 tablet 0    aspirin 81 MG EC tablet Take 1 tablet by mouth daily 30 tablet 3    simvastatin (ZOCOR) 20 MG tablet Take 1 tablet by mouth nightly 30 tablet 3    prazosin (MINIPRESS) 1 MG capsule Take 1 mg for 3 days, increase to 2 mg for 3 days, then increase to 3 mg 90 capsule 3    ondansetron (ZOFRAN) 4 MG tablet Take 1 tablet by mouth every 8 hours as needed for Nausea or Vomiting 30 tablet 0    BASAGLAR KWIKPEN 100 UNIT/ML injection pen Inject 25 Units into the skin nightly Dx Code E 11.9 (Patient taking differently: Inject 35 Units into the skin nightly Dx Code E 11.9) 15 mL 2    Continuous Blood Gluc  (FREESTYLE BRIDGET READER) DENIS Use to monitor sugars 1 Device 0    Continuous Blood Gluc Sensor (FREESTYLE BRIDGET SENSOR SYSTEM) MISC Use to monitor sugars 3 each 2    vitamin B-12 (CYANOCOBALAMIN) 1000 MCG tablet TAKE 1 TABLET BY MOUTH DAILY 30 tablet 3    folic acid (FOLVITE) 1 MG tablet TAKE 1 TABLET BY MOUTH DAILY 30 tablet 3    omeprazole (PRILOSEC) 20 MG delayed release capsule TAKE 1 CAPSULE BY MOUTH 2 TIMES A DAY 60 capsule 2    hydrALAZINE (APRESOLINE) 50 MG tablet Take 1 tablet by mouth 3 times daily 270 tablet 0    tamsulosin (FLOMAX) 0.4 MG capsule TAKE 2 CAPSULES BY MOUTH DAILY 180 capsule 0    AGAMATRIX Father         Etoh    Cancer Father         esophageal    Hypertension Father     Diabetes Brother     Diabetes Paternal Aunt     Asthma Neg Hx     Emphysema Neg Hx     Heart Failure Neg Hx        A:  Pt's distress remains elevated due to chronic pain and housing insecurity. Pt detailed recent events and concern about where he will go when he can no longer stay with his sister. Pt is at a loss as to where to turn next. He is in constant physical pain and knows he needs to take his medications and attend his appointments, but his current situation is affecting his ability to do those things. Validated pt's distress. Encouraged him to continue his efforts to figure out his housing situation. Pt did share thoughts about potential choices that he knows are unwise (e.g., robbing a bank, taking heroin to manage his uncontrolled pain), although he denied intent to do pursue either course of action. Pt reported that he does not have a history of felony convictions and would prefer not to go either route, particularly because he knows heroin would likely kill him. Pt denied SI/HI, plan or intent at this time. ALLYSON 7 SCORE 11/20/2018 10/12/2018 9/18/2018   ALLYSON-7 Total Score 14 16 16     Interpretation of ALLYSON-7 score: 5-9 = mild anxiety, 10-14 = moderate anxiety, 15+ = severe anxiety. Recommend referral to behavioral health for scores 10 or greater. PHQ Scores 11/20/2018 11/9/2018 10/12/2018 9/18/2018 8/20/2018 6/5/2018 3/1/2018   PHQ2 Score 3 3 5 4 0 0 0   PHQ9 Score 13 11 21 19 0 0 0     Interpretation of Total Score Depression Severity: 1-4 = Minimal depression, 5-9 = Mild depression, 10-14 = Moderate depression, 15-19 = Moderately severe depression, 20-27 = Severe depression    Diagnosis:    1. PTSD (post-traumatic stress disorder)    2. Major depressive disorder, recurrent episode, moderate (HCC)    3. Generalized anxiety disorder    4. Chronic pain syndrome    5. History of substance abuse    6. Dyslipidemia         Plan:  Pt interventions:  Supportive techniques, Emphasized self-care as important for managing overall health, Identified relevant behavioral strategies for targeting symptom reduction including diaphragmatic breathing, sleep hygiene and stimulus control, increased socialization and Emphasized importance of regular practice of relaxation strategies to target / promote stress mgt. Pt Behavioral Change Plan:  Pt set the following goals:  1. Limit caffeine, alcohol and nicotine later in the day  2. If you cannot fall asleep after 15-20 minutes, get out of bed and engage in a relaxing activity (e.g., listen to soft music, read by a soft light) until you feel drowsy. 3 Practice diaphragmatic breathing before bed and throughout the day to manage anxiety and pain  4. Go out to spend time around people you enjoy more often    Pt scheduled F/U visit in 4 weeks following his first rescheduled appt with Dr. Ariza. Follow-Up  This writer reached out to [employer] to determine whether pt might be eligible for additional services through the Care Coordination program. She looked into it and informed me that pt is currently receiving additional services from SANTI Torre, LELO, from a recent hospitalization. Ricco BRICE is also involved with pt at this time.

## 2018-11-20 NOTE — PATIENT INSTRUCTIONS
Patient Self-Management Goal for Chronic Condition  Goal: I will follow the diet recommendations provided by my doctor: low fat, low cholesterol. Barriers to success: none  Plan for overcoming my barriers: N/A     Confidence: 9/10  Date goal set: 11/20/18  Date goal attained:   Patient Self-Management Goal for Chronic Condition  Goal: I will take all medications as prescribed by my doctor, and I will call the office if I am having any medication problems. Barriers to success: none  Plan for overcoming my barriers: N/A     Confidence: 9/10  Date goal set: 11/20/18  Date goal attained:   Self- Management Goals for the Patient with High Cholesterol: It is important to have goals to work towards when you have High Cholesterol. Below is a list of goals your doctor would like you to work towards to help control your hyperlipidemia and also maintain and improve your overall health. Please select one of these goals to try before your next follow up visit:    Goal: I will take all my medications as prescribed, and call the office with any problems. Guess your barriers before they happen. Everyone runs into barriers to their goals. You may already know what's going to get in your way. Write down these problems (cost? time? stress? fear?), and think of ways to get around them.      Barriers to success: none  Plan for overcoming my barriers: N/A     Confidence: 9/10  Date goal set: 11/20/18  Date goal attained:

## 2018-11-21 ENCOUNTER — CARE COORDINATION (OUTPATIENT)
Dept: CARE COORDINATION | Age: 68
End: 2018-11-21

## 2018-11-26 ENCOUNTER — CARE COORDINATION (OUTPATIENT)
Dept: CARE COORDINATION | Age: 68
End: 2018-11-26

## 2018-11-29 ENCOUNTER — HOSPITAL ENCOUNTER (EMERGENCY)
Age: 68
Discharge: HOME | End: 2018-11-29
Attending: EMERGENCY MEDICINE
Payer: COMMERCIAL

## 2018-11-29 VITALS
RESPIRATION RATE: 14 BRPM | DIASTOLIC BLOOD PRESSURE: 64 MMHG | BODY MASS INDEX: 23.02 KG/M2 | HEIGHT: 77 IN | WEIGHT: 195 LBS | SYSTOLIC BLOOD PRESSURE: 100 MMHG | HEART RATE: 90 BPM | TEMPERATURE: 97.6 F | OXYGEN SATURATION: 99 %

## 2018-11-29 DIAGNOSIS — M54.2 NECK PAIN: ICD-10-CM

## 2018-11-29 DIAGNOSIS — M54.5 CHRONIC MIDLINE LOW BACK PAIN, WITH SCIATICA PRESENCE UNSPECIFIED: Primary | ICD-10-CM

## 2018-11-29 DIAGNOSIS — G89.29 CHRONIC MIDLINE LOW BACK PAIN, WITH SCIATICA PRESENCE UNSPECIFIED: Primary | ICD-10-CM

## 2018-11-29 PROCEDURE — 6360000002 HC RX W HCPCS: Performed by: STUDENT IN AN ORGANIZED HEALTH CARE EDUCATION/TRAINING PROGRAM

## 2018-11-29 PROCEDURE — 99282 EMERGENCY DEPT VISIT SF MDM: CPT

## 2018-11-29 PROCEDURE — 96374 THER/PROPH/DIAG INJ IV PUSH: CPT

## 2018-11-29 RX ORDER — MORPHINE SULFATE 4 MG/ML
4 INJECTION, SOLUTION INTRAMUSCULAR; INTRAVENOUS ONCE
Status: COMPLETED | OUTPATIENT
Start: 2018-11-29 | End: 2018-11-29

## 2018-11-29 RX ORDER — TRAMADOL HYDROCHLORIDE 50 MG/1
50 TABLET ORAL EVERY 6 HOURS PRN
Qty: 12 TABLET | Refills: 0 | Status: SHIPPED | OUTPATIENT
Start: 2018-11-29 | End: 2018-12-02

## 2018-11-29 RX ADMIN — MORPHINE SULFATE 4 MG: 4 INJECTION, SOLUTION INTRAMUSCULAR; INTRAVENOUS at 16:25

## 2018-11-29 ASSESSMENT — ENCOUNTER SYMPTOMS
EYES NEGATIVE: 1
BACK PAIN: 1
ALLERGIC/IMMUNOLOGIC NEGATIVE: 1
RESPIRATORY NEGATIVE: 1
GASTROINTESTINAL NEGATIVE: 1

## 2018-11-29 ASSESSMENT — PAIN SCALES - GENERAL: PAINLEVEL_OUTOF10: 10

## 2018-11-29 ASSESSMENT — PAIN DESCRIPTION - LOCATION: LOCATION: BACK;NECK

## 2018-11-29 ASSESSMENT — PAIN DESCRIPTION - PAIN TYPE: TYPE: CHRONIC PAIN

## 2018-11-29 NOTE — ED PROVIDER NOTES
GLUCAGON 1 MG INJECTION    Inject 1 kit into the skin as needed    HYDRALAZINE (APRESOLINE) 50 MG TABLET    Take 1 tablet by mouth 3 times daily    IBUPROFEN (ADVIL;MOTRIN) 200 MG TABLET    Take 200 mg by mouth nightly as needed for Pain    INSULIN ASPART (NOVOLOG) 100 UNIT/ML INJECTION VIAL    Inject 10 Units into the skin 3 times daily (before meals) +SSI    LANCETS MISC    Testing 2-3 times daily DX Code: E11.9    LISINOPRIL (PRINIVIL;ZESTRIL) 30 MG TABLET    Take 1 tablet by mouth daily    METOPROLOL TARTRATE (LOPRESSOR) 25 MG TABLET    Take 1 tablet by mouth 2 times daily    PRAZOSIN (MINIPRESS) 1 MG CAPSULE    Take 1 mg for 3 days, increase to 2 mg for 3 days, then increase to 3 mg    SERTRALINE (ZOLOFT) 25 MG TABLET    Take 2 tablets by mouth daily    SIMVASTATIN (ZOCOR) 20 MG TABLET    Take 1 tablet by mouth nightly    SUMATRIPTAN (IMITREX) 100 MG TABLET    Take 1 tablet by mouth once as needed for Migraine    TAMSULOSIN (FLOMAX) 0.4 MG CAPSULE    TAKE 2 CAPSULES BY MOUTH DAILY    TENS UNIT MISC    by Does not apply route Use as directed. THIAMINE 100 MG TABLET    Take 1 tablet by mouth daily    UMECLIDINIUM-VILANTEROL (ANORO ELLIPTA) 62.5-25 MCG/INH AEPB INHALER    Inhale 1 puff into the lungs daily    UMECLIDINIUM-VILANTEROL (ANORO ELLIPTA) 62.5-25 MCG/INH AEPB INHALER    Inhale 1 puff into the lungs daily    VITAMIN B-12 (CYANOCOBALAMIN) 1000 MCG TABLET    TAKE 1 TABLET BY MOUTH DAILY    VITAMIN E 1000 UNITS CAPSULE    Take 1 capsule by mouth daily       Allergies     He is allergic to neurontin [gabapentin]. Physical Exam     INITIAL VITALS: BP: 100/64, Temp: 97.6 °F (36.4 °C), Pulse: 90, Resp: 14, SpO2: 99 %   Physical Exam   Constitutional: He is oriented to person, place, and time. He appears well-developed and well-nourished. HENT:   Right Ear: External ear normal.   Left Ear: External ear normal.   Eyes: Pupils are equal, round, and reactive to light.  EOM are normal.   Neck: Normal range of

## 2018-11-29 NOTE — ED NOTES
Patient prepared for and ready to be discharged. Patient discharged at this time in no acute distress after verbalizing understanding of discharge instructions. Patient left after receiving After Visit Summary instructions.         Gregorio Brand RN  11/29/18 7510

## 2018-12-05 ENCOUNTER — CARE COORDINATION (OUTPATIENT)
Dept: CARE COORDINATION | Age: 68
End: 2018-12-05

## 2018-12-06 ENCOUNTER — CARE COORDINATION (OUTPATIENT)
Dept: CARE COORDINATION | Age: 68
End: 2018-12-06

## 2018-12-07 ENCOUNTER — CARE COORDINATION (OUTPATIENT)
Dept: CARE COORDINATION | Age: 68
End: 2018-12-07

## 2018-12-17 ENCOUNTER — OFFICE VISIT (OUTPATIENT)
Dept: PSYCHIATRY | Age: 68
End: 2018-12-17
Payer: COMMERCIAL

## 2018-12-17 VITALS
WEIGHT: 196 LBS | BODY MASS INDEX: 23.14 KG/M2 | SYSTOLIC BLOOD PRESSURE: 106 MMHG | DIASTOLIC BLOOD PRESSURE: 68 MMHG | HEART RATE: 55 BPM | HEIGHT: 77 IN

## 2018-12-17 DIAGNOSIS — F33.1 MODERATE EPISODE OF RECURRENT MAJOR DEPRESSIVE DISORDER (HCC): ICD-10-CM

## 2018-12-17 DIAGNOSIS — G47.09 OTHER INSOMNIA: ICD-10-CM

## 2018-12-17 DIAGNOSIS — G89.4 CHRONIC PAIN SYNDROME: Chronic | ICD-10-CM

## 2018-12-17 DIAGNOSIS — F43.10 PTSD (POST-TRAUMATIC STRESS DISORDER): Primary | ICD-10-CM

## 2018-12-17 PROBLEM — J44.9 COPD (CHRONIC OBSTRUCTIVE PULMONARY DISEASE) (HCC): Status: RESOLVED | Noted: 2018-10-17 | Resolved: 2018-12-17

## 2018-12-17 PROCEDURE — 99204 OFFICE O/P NEW MOD 45 MIN: CPT | Performed by: PSYCHIATRY & NEUROLOGY

## 2018-12-17 RX ORDER — SERTRALINE HYDROCHLORIDE 100 MG/1
100 TABLET, FILM COATED ORAL DAILY
Qty: 30 TABLET | Refills: 1 | Status: SHIPPED | OUTPATIENT
Start: 2018-12-17 | End: 2019-03-25 | Stop reason: SDUPTHER

## 2018-12-17 RX ORDER — PRAZOSIN HYDROCHLORIDE 1 MG/1
3 CAPSULE ORAL NIGHTLY
Qty: 90 CAPSULE | Refills: 1 | Status: SHIPPED | OUTPATIENT
Start: 2018-12-17 | End: 2019-03-02
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
## 2019-01-09 ENCOUNTER — CARE COORDINATION (OUTPATIENT)
Dept: CARE COORDINATION | Age: 69
End: 2019-01-09

## 2019-01-15 ENCOUNTER — TELEPHONE (OUTPATIENT)
Dept: INTERNAL MEDICINE CLINIC | Age: 69
End: 2019-01-15

## 2019-01-15 DIAGNOSIS — M54.5 LOW BACK PAIN, UNSPECIFIED BACK PAIN LATERALITY, UNSPECIFIED CHRONICITY, WITH SCIATICA PRESENCE UNSPECIFIED: Primary | ICD-10-CM

## 2019-01-25 ENCOUNTER — CARE COORDINATION (OUTPATIENT)
Dept: CARE COORDINATION | Age: 69
End: 2019-01-25

## 2019-01-29 ENCOUNTER — CLINICAL DOCUMENTATION (OUTPATIENT)
Dept: PSYCHIATRY | Age: 69
End: 2019-01-29

## 2019-02-20 ENCOUNTER — HOSPITAL ENCOUNTER (EMERGENCY)
Age: 69
Discharge: OTHER INSTITUTION | End: 2019-02-20
Attending: EMERGENCY MEDICINE
Payer: COMMERCIAL

## 2019-02-20 ENCOUNTER — APPOINTMENT (OUTPATIENT)
Dept: GENERAL RADIOLOGY | Age: 69
End: 2019-02-20
Payer: COMMERCIAL

## 2019-02-20 ENCOUNTER — APPOINTMENT (OUTPATIENT)
Dept: CT IMAGING | Age: 69
End: 2019-02-20
Payer: COMMERCIAL

## 2019-02-20 VITALS
HEART RATE: 117 BPM | DIASTOLIC BLOOD PRESSURE: 92 MMHG | SYSTOLIC BLOOD PRESSURE: 135 MMHG | OXYGEN SATURATION: 95 % | HEIGHT: 77 IN | TEMPERATURE: 98.3 F | RESPIRATION RATE: 17 BRPM | WEIGHT: 185 LBS | BODY MASS INDEX: 21.84 KG/M2
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
## 2019-02-20 DIAGNOSIS — I61.5 INTRAVENTRICULAR HEMORRHAGE (HCC): Primary | ICD-10-CM

## 2019-02-20 DIAGNOSIS — M54.2 ACUTE NECK PAIN: ICD-10-CM

## 2019-02-20 DIAGNOSIS — S22.32XA CLOSED FRACTURE OF ONE RIB OF LEFT SIDE, INITIAL ENCOUNTER: ICD-10-CM

## 2019-02-20 DIAGNOSIS — S01.81XA LACERATION OF BROW WITHOUT COMPLICATION, INITIAL ENCOUNTER: ICD-10-CM

## 2019-02-20 LAB
A/G RATIO: 1.1 (ref 1.1–2.2)
ALBUMIN SERPL-MCNC: 3.8 G/DL (ref 3.4–5)
ALP BLD-CCNC: 106 U/L (ref 40–129)
ALT SERPL-CCNC: 39 U/L (ref 10–40)
ANION GAP SERPL CALCULATED.3IONS-SCNC: 10 MMOL/L (ref 3–16)
APTT: 32.1 SEC (ref 26–36)
AST SERPL-CCNC: 30 U/L (ref 15–37)
BASOPHILS ABSOLUTE: 0.1 K/UL (ref 0–0.2)
BASOPHILS RELATIVE PERCENT: 0.8 %
BILIRUB SERPL-MCNC: 0.5 MG/DL (ref 0–1)
BUN BLDV-MCNC: 9 MG/DL (ref 7–20)
CALCIUM SERPL-MCNC: 9.1 MG/DL (ref 8.3–10.6)
CHLORIDE BLD-SCNC: 98 MMOL/L (ref 99–110)
CO2: 27 MMOL/L (ref 21–32)
CREAT SERPL-MCNC: 0.8 MG/DL (ref 0.8–1.3)
EOSINOPHILS ABSOLUTE: 0.1 K/UL (ref 0–0.6)
EOSINOPHILS RELATIVE PERCENT: 1.8 %
GFR AFRICAN AMERICAN: >60
GFR NON-AFRICAN AMERICAN: >60
GLOBULIN: 3.5 G/DL
GLUCOSE BLD-MCNC: 125 MG/DL (ref 70–99)
HCT VFR BLD CALC: 34.4 % (ref 40.5–52.5)
HEMOGLOBIN: 11.5 G/DL (ref 13.5–17.5)
INR BLD: 0.96 (ref 0.86–1.14)
LYMPHOCYTES ABSOLUTE: 1.8 K/UL (ref 1–5.1)
LYMPHOCYTES RELATIVE PERCENT: 26.5 %
MCH RBC QN AUTO: 31.3 PG (ref 26–34)
MCHC RBC AUTO-ENTMCNC: 33.6 G/DL (ref 31–36)
MCV RBC AUTO: 93.1 FL (ref 80–100)
MONOCYTES ABSOLUTE: 1.1 K/UL (ref 0–1.3)
MONOCYTES RELATIVE PERCENT: 16.5 %
NEUTROPHILS ABSOLUTE: 3.6 K/UL (ref 1.7–7.7)
NEUTROPHILS RELATIVE PERCENT: 54.4 %
PDW BLD-RTO: 14.6 % (ref 12.4–15.4)
PLATELET # BLD: 309 K/UL (ref 135–450)
PMV BLD AUTO: 7 FL (ref 5–10.5)
POTASSIUM SERPL-SCNC: 4.1 MMOL/L (ref 3.5–5.1)
PROTHROMBIN TIME: 11 SEC (ref 9.8–13)
RBC # BLD: 3.69 M/UL (ref 4.2–5.9)
SODIUM BLD-SCNC: 135 MMOL/L (ref 136–145)
TOTAL PROTEIN: 7.3 G/DL (ref 6.4–8.2)
TROPONIN: <0.01 NG/ML
WBC # BLD: 6.6 K/UL (ref 4–11)

## 2019-02-20 PROCEDURE — 73030 X-RAY EXAM OF SHOULDER: CPT

## 2019-02-20 PROCEDURE — 72125 CT NECK SPINE W/O DYE: CPT

## 2019-02-20 PROCEDURE — 71260 CT THORAX DX C+: CPT

## 2019-02-20 PROCEDURE — 71100 X-RAY EXAM RIBS UNI 2 VIEWS: CPT

## 2019-02-20 PROCEDURE — 6360000004 HC RX CONTRAST MEDICATION: Performed by: EMERGENCY MEDICINE

## 2019-02-20 PROCEDURE — 70486 CT MAXILLOFACIAL W/O DYE: CPT

## 2019-02-20 PROCEDURE — 6360000002 HC RX W HCPCS: Performed by: NURSE PRACTITIONER

## 2019-02-20 PROCEDURE — 96376 TX/PRO/DX INJ SAME DRUG ADON: CPT

## 2019-02-20 PROCEDURE — 96374 THER/PROPH/DIAG INJ IV PUSH: CPT

## 2019-02-20 PROCEDURE — 96375 TX/PRO/DX INJ NEW DRUG ADDON: CPT

## 2019-02-20 PROCEDURE — 84484 ASSAY OF TROPONIN QUANT: CPT

## 2019-02-20 PROCEDURE — 85025 COMPLETE CBC W/AUTO DIFF WBC: CPT

## 2019-02-20 PROCEDURE — 85610 PROTHROMBIN TIME: CPT

## 2019-02-20 PROCEDURE — 73110 X-RAY EXAM OF WRIST: CPT

## 2019-02-20 PROCEDURE — 70450 CT HEAD/BRAIN W/O DYE: CPT

## 2019-02-20 PROCEDURE — 99291 CRITICAL CARE FIRST HOUR: CPT

## 2019-02-20 PROCEDURE — 93005 ELECTROCARDIOGRAM TRACING: CPT | Performed by: EMERGENCY MEDICINE

## 2019-02-20 PROCEDURE — 4500000026 HC ED CRITICAL CARE PROCEDURE

## 2019-02-20 PROCEDURE — 71046 X-RAY EXAM CHEST 2 VIEWS: CPT

## 2019-02-20 PROCEDURE — 85730 THROMBOPLASTIN TIME PARTIAL: CPT

## 2019-02-20 PROCEDURE — 80053 COMPREHEN METABOLIC PANEL: CPT

## 2019-02-20 RX ORDER — MORPHINE SULFATE 4 MG/ML
8 INJECTION, SOLUTION INTRAMUSCULAR; INTRAVENOUS ONCE
Status: COMPLETED | OUTPATIENT
Start: 2019-02-20 | End: 2019-02-20

## 2019-02-20 RX ORDER — MORPHINE SULFATE 4 MG/ML
4 INJECTION, SOLUTION INTRAMUSCULAR; INTRAVENOUS ONCE
Status: COMPLETED | OUTPATIENT
Start: 2019-02-20 | End: 2019-02-20

## 2019-02-20 RX ORDER — LORAZEPAM 2 MG/ML
1 INJECTION INTRAMUSCULAR ONCE
Status: DISCONTINUED | OUTPATIENT
Start: 2019-02-20 | End: 2019-02-20 | Stop reason: HOSPADM

## 2019-02-20 RX ORDER — LORAZEPAM 2 MG/ML
1 INJECTION INTRAMUSCULAR ONCE
Status: COMPLETED | OUTPATIENT
Start: 2019-02-20 | End: 2019-02-20

## 2019-02-20 RX ORDER — ONDANSETRON 2 MG/ML
4 INJECTION INTRAMUSCULAR; INTRAVENOUS ONCE
Status: COMPLETED | OUTPATIENT
Start: 2019-02-20 | End: 2019-02-20

## 2019-02-20 RX ADMIN — MORPHINE SULFATE 8 MG: 4 INJECTION INTRAVENOUS at 16:24

## 2019-02-20 RX ADMIN — MORPHINE SULFATE 4 MG: 4 INJECTION INTRAVENOUS at 12:24

## 2019-02-20 RX ADMIN — ONDANSETRON 4 MG: 2 INJECTION INTRAMUSCULAR; INTRAVENOUS at 12:24

## 2019-02-20 RX ADMIN — MORPHINE SULFATE 8 MG: 4 INJECTION INTRAVENOUS at 15:06

## 2019-02-20 RX ADMIN — MORPHINE SULFATE 4 MG: 4 INJECTION INTRAVENOUS at 13:57

## 2019-02-20 RX ADMIN — IOPAMIDOL 75 ML: 755 INJECTION, SOLUTION INTRAVENOUS at 13:04

## 2019-02-20 RX ADMIN — LORAZEPAM 1 MG: 2 INJECTION INTRAMUSCULAR; INTRAVENOUS at 16:06

## 2019-02-20 ASSESSMENT — PAIN DESCRIPTION - PAIN TYPE: TYPE: ACUTE PAIN

## 2019-02-20 ASSESSMENT — PAIN DESCRIPTION - ORIENTATION: ORIENTATION: LEFT

## 2019-02-20 ASSESSMENT — PAIN DESCRIPTION - LOCATION: LOCATION: RIB CAGE;NECK

## 2019-02-20 ASSESSMENT — PAIN SCALES - GENERAL
PAINLEVEL_OUTOF10: 10

## 2019-02-21 LAB
EKG ATRIAL RATE: 79 BPM
EKG DIAGNOSIS: NORMAL
EKG P AXIS: 65 DEGREES
EKG P-R INTERVAL: 168 MS
EKG Q-T INTERVAL: 386 MS
EKG QRS DURATION: 114 MS
EKG QTC CALCULATION (BAZETT): 442 MS
EKG R AXIS: -10 DEGREES
EKG T AXIS: 11 DEGREES
EKG VENTRICULAR RATE: 79 BPM

## 2019-02-21 PROCEDURE — 93010 ELECTROCARDIOGRAM REPORT: CPT | Performed by: INTERNAL MEDICINE

## 2019-03-01 ENCOUNTER — TELEPHONE (OUTPATIENT)
Dept: ENDOCRINOLOGY | Age: 69
End: 2019-03-01

## 2019-03-02 ENCOUNTER — HOSPITAL ENCOUNTER (INPATIENT)
Age: 69
LOS: 6 days | Discharge: NURSING, REHAB OR LONG-TERM CARE | DRG: 637 | End: 2019-03-08
Attending: EMERGENCY MEDICINE | Admitting: PEDIATRICS
Payer: COMMERCIAL

## 2019-03-02 ENCOUNTER — APPOINTMENT (OUTPATIENT)
Dept: GENERAL RADIOLOGY | Age: 69
DRG: 637 | End: 2019-03-02
Payer: COMMERCIAL

## 2019-03-02 DIAGNOSIS — G60.9 IDIOPATHIC PERIPHERAL NEUROPATHY: Chronic | ICD-10-CM

## 2019-03-02 DIAGNOSIS — E10.10 TYPE 1 DIABETES MELLITUS WITH KETOACIDOSIS WITHOUT COMA (HCC): Primary | ICD-10-CM

## 2019-03-02 PROBLEM — E11.10 DKA, TYPE 2, NOT AT GOAL (HCC): Status: ACTIVE | Noted: 2019-03-02

## 2019-03-02 LAB
A/G RATIO: 1.3 (ref 1.1–2.2)
ALBUMIN SERPL-MCNC: 4.4 G/DL (ref 3.4–5)
ALP BLD-CCNC: 123 U/L (ref 40–129)
ALT SERPL-CCNC: 14 U/L (ref 10–40)
ANION GAP SERPL CALCULATED.3IONS-SCNC: 24 MMOL/L (ref 3–16)
AST SERPL-CCNC: 16 U/L (ref 15–37)
BASE EXCESS VENOUS: -8.1 MMOL/L (ref -3–3)
BASOPHILS ABSOLUTE: 0 K/UL (ref 0–0.2)
BASOPHILS RELATIVE PERCENT: 0.4 %
BILIRUB SERPL-MCNC: 0.7 MG/DL (ref 0–1)
BILIRUBIN URINE: ABNORMAL
BLOOD, URINE: NEGATIVE
BUN BLDV-MCNC: 36 MG/DL (ref 7–20)
CALCIUM SERPL-MCNC: 9.6 MG/DL (ref 8.3–10.6)
CARBOXYHEMOGLOBIN: 1.9 % (ref 0–1.5)
CHLORIDE BLD-SCNC: 85 MMOL/L (ref 99–110)
CLARITY: CLEAR
CO2: 19 MMOL/L (ref 21–32)
COLOR: YELLOW
CREAT SERPL-MCNC: 1.6 MG/DL (ref 0.8–1.3)
EOSINOPHILS ABSOLUTE: 0 K/UL (ref 0–0.6)
EOSINOPHILS RELATIVE PERCENT: 0.1 %
GFR AFRICAN AMERICAN: 52
GFR NON-AFRICAN AMERICAN: 43
GLOBULIN: 3.5 G/DL
GLUCOSE BLD-MCNC: 464 MG/DL (ref 70–99)
GLUCOSE BLD-MCNC: 517 MG/DL (ref 70–99)
GLUCOSE BLD-MCNC: 558 MG/DL (ref 70–99)
GLUCOSE URINE: >=1000 MG/DL
HCO3 VENOUS: 17.6 MMOL/L (ref 23–29)
HCT VFR BLD CALC: 35 % (ref 40.5–52.5)
HEMOGLOBIN: 11.7 G/DL (ref 13.5–17.5)
KETONES, URINE: >=80 MG/DL
LACTIC ACID: 1.8 MMOL/L (ref 0.4–2)
LEUKOCYTE ESTERASE, URINE: NEGATIVE
LYMPHOCYTES ABSOLUTE: 2.1 K/UL (ref 1–5.1)
LYMPHOCYTES RELATIVE PERCENT: 20.6 %
MCH RBC QN AUTO: 31.7 PG (ref 26–34)
MCHC RBC AUTO-ENTMCNC: 33.3 G/DL (ref 31–36)
MCV RBC AUTO: 95.1 FL (ref 80–100)
METHEMOGLOBIN VENOUS: 0.3 %
MICROSCOPIC EXAMINATION: ABNORMAL
MONOCYTES ABSOLUTE: 1.3 K/UL (ref 0–1.3)
MONOCYTES RELATIVE PERCENT: 12.9 %
NEUTROPHILS ABSOLUTE: 6.7 K/UL (ref 1.7–7.7)
NEUTROPHILS RELATIVE PERCENT: 66 %
NITRITE, URINE: NEGATIVE
O2 CONTENT, VEN: 14 VOL %
O2 SAT, VEN: 79 %
O2 THERAPY: ABNORMAL
PCO2, VEN: 36.7 MMHG (ref 40–50)
PDW BLD-RTO: 14.2 % (ref 12.4–15.4)
PERFORMED ON: ABNORMAL
PERFORMED ON: ABNORMAL
PH UA: 5 (ref 5–8)
PH VENOUS: 7.3 (ref 7.35–7.45)
PLATELET # BLD: 389 K/UL (ref 135–450)
PMV BLD AUTO: 7.3 FL (ref 5–10.5)
PO2, VEN: 45.7 MMHG (ref 25–40)
POTASSIUM SERPL-SCNC: 4.8 MMOL/L (ref 3.5–5.1)
PROTEIN UA: NEGATIVE MG/DL
RAPID INFLUENZA  B AGN: NEGATIVE
RAPID INFLUENZA A AGN: NEGATIVE
RBC # BLD: 3.68 M/UL (ref 4.2–5.9)
SODIUM BLD-SCNC: 128 MMOL/L (ref 136–145)
SPECIFIC GRAVITY UA: 1.01 (ref 1–1.03)
TCO2 CALC VENOUS: 19 MMOL/L
TOTAL PROTEIN: 7.9 G/DL (ref 6.4–8.2)
URINE TYPE: ABNORMAL
UROBILINOGEN, URINE: 0.2 E.U./DL
WBC # BLD: 10.2 K/UL (ref 4–11)

## 2019-03-02 PROCEDURE — 87804 INFLUENZA ASSAY W/OPTIC: CPT

## 2019-03-02 PROCEDURE — 2580000003 HC RX 258: Performed by: PHYSICIAN ASSISTANT

## 2019-03-02 PROCEDURE — 99292 CRITICAL CARE ADDL 30 MIN: CPT

## 2019-03-02 PROCEDURE — 85025 COMPLETE CBC W/AUTO DIFF WBC: CPT

## 2019-03-02 PROCEDURE — 99291 CRITICAL CARE FIRST HOUR: CPT

## 2019-03-02 PROCEDURE — 71045 X-RAY EXAM CHEST 1 VIEW: CPT

## 2019-03-02 PROCEDURE — 82803 BLOOD GASES ANY COMBINATION: CPT

## 2019-03-02 PROCEDURE — 80053 COMPREHEN METABOLIC PANEL: CPT

## 2019-03-02 PROCEDURE — 81003 URINALYSIS AUTO W/O SCOPE: CPT

## 2019-03-02 PROCEDURE — 6360000002 HC RX W HCPCS: Performed by: EMERGENCY MEDICINE

## 2019-03-02 PROCEDURE — 83605 ASSAY OF LACTIC ACID: CPT

## 2019-03-02 PROCEDURE — 96374 THER/PROPH/DIAG INJ IV PUSH: CPT

## 2019-03-02 PROCEDURE — 82010 KETONE BODYS QUAN: CPT

## 2019-03-02 PROCEDURE — 2000000000 HC ICU R&B

## 2019-03-02 RX ORDER — DEXTROSE MONOHYDRATE 50 MG/ML
100 INJECTION, SOLUTION INTRAVENOUS PRN
Status: DISCONTINUED | OUTPATIENT
Start: 2019-03-02 | End: 2019-03-03 | Stop reason: SDUPTHER

## 2019-03-02 RX ORDER — NICOTINE POLACRILEX 4 MG
15 LOZENGE BUCCAL PRN
Status: DISCONTINUED | OUTPATIENT
Start: 2019-03-02 | End: 2019-03-03 | Stop reason: SDUPTHER

## 2019-03-02 RX ORDER — NICOTINE POLACRILEX 4 MG
15 LOZENGE BUCCAL PRN
Status: DISCONTINUED | OUTPATIENT
Start: 2019-03-02 | End: 2019-03-08 | Stop reason: HOSPADM

## 2019-03-02 RX ORDER — DEXTROSE MONOHYDRATE 50 MG/ML
100 INJECTION, SOLUTION INTRAVENOUS PRN
Status: DISCONTINUED | OUTPATIENT
Start: 2019-03-02 | End: 2019-03-08 | Stop reason: HOSPADM

## 2019-03-02 RX ORDER — DEXTROSE MONOHYDRATE 25 G/50ML
12.5 INJECTION, SOLUTION INTRAVENOUS PRN
Status: DISCONTINUED | OUTPATIENT
Start: 2019-03-02 | End: 2019-03-04

## 2019-03-02 RX ORDER — CYCLOBENZAPRINE HCL 5 MG
10 TABLET ORAL 3 TIMES DAILY
COMMUNITY
End: 2021-03-29 | Stop reason: ALTCHOICE

## 2019-03-02 RX ORDER — LORAZEPAM 2 MG/ML
1 INJECTION INTRAMUSCULAR ONCE
Status: COMPLETED | OUTPATIENT
Start: 2019-03-02 | End: 2019-03-02

## 2019-03-02 RX ORDER — OXYCODONE HYDROCHLORIDE 5 MG/1
10 TABLET ORAL EVERY 8 HOURS PRN
Status: ON HOLD | COMMUNITY
End: 2019-03-08 | Stop reason: SDUPTHER

## 2019-03-02 RX ORDER — ATORVASTATIN CALCIUM 80 MG/1
80 TABLET, FILM COATED ORAL NIGHTLY
COMMUNITY

## 2019-03-02 RX ORDER — 0.9 % SODIUM CHLORIDE 0.9 %
1000 INTRAVENOUS SOLUTION INTRAVENOUS ONCE
Status: COMPLETED | OUTPATIENT
Start: 2019-03-02 | End: 2019-03-02

## 2019-03-02 RX ORDER — LIDOCAINE 50 MG/G
1 PATCH TOPICAL DAILY
COMMUNITY
End: 2019-04-27

## 2019-03-02 RX ORDER — DEXTROSE MONOHYDRATE 25 G/50ML
12.5 INJECTION, SOLUTION INTRAVENOUS PRN
Status: DISCONTINUED | OUTPATIENT
Start: 2019-03-02 | End: 2019-03-03 | Stop reason: SDUPTHER

## 2019-03-02 RX ORDER — LANOLIN ALCOHOL/MO/W.PET/CERES
6 CREAM (GRAM) TOPICAL NIGHTLY
COMMUNITY
End: 2021-03-29 | Stop reason: ALTCHOICE

## 2019-03-02 RX ORDER — HALOPERIDOL 1 MG/1
1 TABLET ORAL EVERY 8 HOURS PRN
COMMUNITY
End: 2019-03-19 | Stop reason: ALTCHOICE

## 2019-03-02 RX ORDER — PANTOPRAZOLE SODIUM 40 MG/1
40 TABLET, DELAYED RELEASE ORAL DAILY
COMMUNITY
End: 2019-04-27

## 2019-03-02 RX ORDER — 0.9 % SODIUM CHLORIDE 0.9 %
1000 INTRAVENOUS SOLUTION INTRAVENOUS ONCE
Status: COMPLETED | OUTPATIENT
Start: 2019-03-02 | End: 2019-03-03

## 2019-03-02 RX ADMIN — LORAZEPAM 1 MG: 2 INJECTION, SOLUTION INTRAMUSCULAR; INTRAVENOUS at 23:02

## 2019-03-02 RX ADMIN — SODIUM CHLORIDE 1000 ML: 9 INJECTION, SOLUTION INTRAVENOUS at 21:30

## 2019-03-02 ASSESSMENT — ENCOUNTER SYMPTOMS
DIARRHEA: 0
BACK PAIN: 1
COUGH: 1
VOMITING: 0
COLOR CHANGE: 0

## 2019-03-02 ASSESSMENT — PAIN DESCRIPTION - PAIN TYPE: TYPE: CHRONIC PAIN

## 2019-03-02 ASSESSMENT — PAIN DESCRIPTION - LOCATION: LOCATION: BACK;SHOULDER;NECK;KNEE

## 2019-03-02 ASSESSMENT — PAIN DESCRIPTION - ONSET: ONSET: ON-GOING

## 2019-03-02 ASSESSMENT — PAIN SCALES - GENERAL: PAINLEVEL_OUTOF10: 8

## 2019-03-02 ASSESSMENT — PAIN DESCRIPTION - PROGRESSION: CLINICAL_PROGRESSION: NOT CHANGED

## 2019-03-03 LAB
AMMONIA: 19 UMOL/L (ref 16–60)
AMPHETAMINE SCREEN, URINE: ABNORMAL
ANION GAP SERPL CALCULATED.3IONS-SCNC: 12 MMOL/L (ref 3–16)
ANION GAP SERPL CALCULATED.3IONS-SCNC: 12 MMOL/L (ref 3–16)
ANION GAP SERPL CALCULATED.3IONS-SCNC: 14 MMOL/L (ref 3–16)
ANION GAP SERPL CALCULATED.3IONS-SCNC: 15 MMOL/L (ref 3–16)
BARBITURATE SCREEN URINE: ABNORMAL
BASE EXCESS ARTERIAL: -3 (ref -3–3)
BENZODIAZEPINE SCREEN, URINE: ABNORMAL
BETA-HYDROXYBUTYRATE: 5.67 MMOL/L (ref 0–0.27)
BUN BLDV-MCNC: 19 MG/DL (ref 7–20)
BUN BLDV-MCNC: 24 MG/DL (ref 7–20)
BUN BLDV-MCNC: 29 MG/DL (ref 7–20)
BUN BLDV-MCNC: 32 MG/DL (ref 7–20)
CALCIUM SERPL-MCNC: 9 MG/DL (ref 8.3–10.6)
CALCIUM SERPL-MCNC: 9.1 MG/DL (ref 8.3–10.6)
CALCIUM SERPL-MCNC: 9.3 MG/DL (ref 8.3–10.6)
CALCIUM SERPL-MCNC: 9.4 MG/DL (ref 8.3–10.6)
CANNABINOID SCREEN URINE: ABNORMAL
CHLORIDE BLD-SCNC: 101 MMOL/L (ref 99–110)
CHLORIDE BLD-SCNC: 96 MMOL/L (ref 99–110)
CHLORIDE BLD-SCNC: 98 MMOL/L (ref 99–110)
CHLORIDE BLD-SCNC: 98 MMOL/L (ref 99–110)
CO2: 22 MMOL/L (ref 21–32)
CO2: 23 MMOL/L (ref 21–32)
CO2: 25 MMOL/L (ref 21–32)
CO2: 25 MMOL/L (ref 21–32)
COCAINE METABOLITE SCREEN URINE: ABNORMAL
CREAT SERPL-MCNC: 0.9 MG/DL (ref 0.8–1.3)
CREAT SERPL-MCNC: 1 MG/DL (ref 0.8–1.3)
CREAT SERPL-MCNC: 1.1 MG/DL (ref 0.8–1.3)
CREAT SERPL-MCNC: 1.2 MG/DL (ref 0.8–1.3)
ETHANOL: NORMAL MG/DL (ref 0–0.08)
GFR AFRICAN AMERICAN: >60
GFR NON-AFRICAN AMERICAN: >60
GLUCOSE BLD-MCNC: 110 MG/DL (ref 70–99)
GLUCOSE BLD-MCNC: 118 MG/DL (ref 70–99)
GLUCOSE BLD-MCNC: 120 MG/DL (ref 70–99)
GLUCOSE BLD-MCNC: 123 MG/DL (ref 70–99)
GLUCOSE BLD-MCNC: 124 MG/DL (ref 70–99)
GLUCOSE BLD-MCNC: 128 MG/DL (ref 70–99)
GLUCOSE BLD-MCNC: 133 MG/DL (ref 70–99)
GLUCOSE BLD-MCNC: 139 MG/DL (ref 70–99)
GLUCOSE BLD-MCNC: 148 MG/DL (ref 70–99)
GLUCOSE BLD-MCNC: 151 MG/DL (ref 70–99)
GLUCOSE BLD-MCNC: 152 MG/DL (ref 70–99)
GLUCOSE BLD-MCNC: 170 MG/DL (ref 70–99)
GLUCOSE BLD-MCNC: 177 MG/DL (ref 70–99)
GLUCOSE BLD-MCNC: 181 MG/DL (ref 70–99)
GLUCOSE BLD-MCNC: 197 MG/DL (ref 70–99)
GLUCOSE BLD-MCNC: 211 MG/DL (ref 70–99)
GLUCOSE BLD-MCNC: 218 MG/DL (ref 70–99)
GLUCOSE BLD-MCNC: 218 MG/DL (ref 70–99)
GLUCOSE BLD-MCNC: 243 MG/DL (ref 70–99)
GLUCOSE BLD-MCNC: 286 MG/DL (ref 70–99)
GLUCOSE BLD-MCNC: 366 MG/DL (ref 70–99)
GLUCOSE BLD-MCNC: 447 MG/DL (ref 70–99)
HCO3 ARTERIAL: 22.3 MMOL/L (ref 21–29)
Lab: ABNORMAL
MAGNESIUM: 2.2 MG/DL (ref 1.8–2.4)
MAGNESIUM: 2.2 MG/DL (ref 1.8–2.4)
MAGNESIUM: 2.3 MG/DL (ref 1.8–2.4)
MAGNESIUM: 2.3 MG/DL (ref 1.8–2.4)
METHADONE SCREEN, URINE: ABNORMAL
O2 SAT, ARTERIAL: 97 % (ref 93–100)
OPIATE SCREEN URINE: ABNORMAL
OXYCODONE URINE: POSITIVE
PCO2 ARTERIAL: 38.8 MM HG (ref 35–45)
PERFORMED ON: ABNORMAL
PH ARTERIAL: 7.37 (ref 7.35–7.45)
PH UA: 5
PHENCYCLIDINE SCREEN URINE: ABNORMAL
PHOSPHORUS: 2.2 MG/DL (ref 2.5–4.9)
PHOSPHORUS: 3.1 MG/DL (ref 2.5–4.9)
PHOSPHORUS: 3.5 MG/DL (ref 2.5–4.9)
PHOSPHORUS: 3.9 MG/DL (ref 2.5–4.9)
PO2 ARTERIAL: 88.2 MM HG (ref 75–108)
POC SAMPLE TYPE: ABNORMAL
POTASSIUM REFLEX MAGNESIUM: 3.5 MMOL/L (ref 3.5–5.1)
POTASSIUM SERPL-SCNC: 3.5 MMOL/L (ref 3.5–5.1)
POTASSIUM SERPL-SCNC: 3.8 MMOL/L (ref 3.5–5.1)
POTASSIUM SERPL-SCNC: 4 MMOL/L (ref 3.5–5.1)
POTASSIUM SERPL-SCNC: 4.1 MMOL/L (ref 3.5–5.1)
PROPOXYPHENE SCREEN: ABNORMAL
SODIUM BLD-SCNC: 134 MMOL/L (ref 136–145)
SODIUM BLD-SCNC: 134 MMOL/L (ref 136–145)
SODIUM BLD-SCNC: 135 MMOL/L (ref 136–145)
SODIUM BLD-SCNC: 138 MMOL/L (ref 136–145)
TCO2 ARTERIAL: 24 MMOL/L

## 2019-03-03 PROCEDURE — 6370000000 HC RX 637 (ALT 250 FOR IP): Performed by: PEDIATRICS

## 2019-03-03 PROCEDURE — 83735 ASSAY OF MAGNESIUM: CPT

## 2019-03-03 PROCEDURE — 6370000000 HC RX 637 (ALT 250 FOR IP)

## 2019-03-03 PROCEDURE — 6360000002 HC RX W HCPCS: Performed by: PEDIATRICS

## 2019-03-03 PROCEDURE — 36415 COLL VENOUS BLD VENIPUNCTURE: CPT

## 2019-03-03 PROCEDURE — 2580000003 HC RX 258: Performed by: PEDIATRICS

## 2019-03-03 PROCEDURE — G0480 DRUG TEST DEF 1-7 CLASSES: HCPCS

## 2019-03-03 PROCEDURE — 99291 CRITICAL CARE FIRST HOUR: CPT | Performed by: INTERNAL MEDICINE

## 2019-03-03 PROCEDURE — 96376 TX/PRO/DX INJ SAME DRUG ADON: CPT

## 2019-03-03 PROCEDURE — 6370000000 HC RX 637 (ALT 250 FOR IP): Performed by: PHYSICIAN ASSISTANT

## 2019-03-03 PROCEDURE — 82947 ASSAY GLUCOSE BLOOD QUANT: CPT

## 2019-03-03 PROCEDURE — 2500000003 HC RX 250 WO HCPCS: Performed by: PEDIATRICS

## 2019-03-03 PROCEDURE — 80307 DRUG TEST PRSMV CHEM ANLYZR: CPT

## 2019-03-03 PROCEDURE — 82803 BLOOD GASES ANY COMBINATION: CPT

## 2019-03-03 PROCEDURE — 80048 BASIC METABOLIC PNL TOTAL CA: CPT

## 2019-03-03 PROCEDURE — 82140 ASSAY OF AMMONIA: CPT

## 2019-03-03 PROCEDURE — 2580000003 HC RX 258: Performed by: PHYSICIAN ASSISTANT

## 2019-03-03 PROCEDURE — 2000000000 HC ICU R&B

## 2019-03-03 PROCEDURE — 6370000000 HC RX 637 (ALT 250 FOR IP): Performed by: INTERNAL MEDICINE

## 2019-03-03 PROCEDURE — 84100 ASSAY OF PHOSPHORUS: CPT

## 2019-03-03 PROCEDURE — 2500000003 HC RX 250 WO HCPCS: Performed by: INTERNAL MEDICINE

## 2019-03-03 PROCEDURE — 94640 AIRWAY INHALATION TREATMENT: CPT

## 2019-03-03 RX ORDER — CYCLOBENZAPRINE HCL 10 MG
5 TABLET ORAL 3 TIMES DAILY PRN
Status: DISCONTINUED | OUTPATIENT
Start: 2019-03-03 | End: 2019-03-08 | Stop reason: HOSPADM

## 2019-03-03 RX ORDER — TAMSULOSIN HYDROCHLORIDE 0.4 MG/1
0.8 CAPSULE ORAL DAILY
Status: DISCONTINUED | OUTPATIENT
Start: 2019-03-03 | End: 2019-03-08 | Stop reason: HOSPADM

## 2019-03-03 RX ORDER — HYDROXYZINE PAMOATE 25 MG/1
CAPSULE ORAL
Status: COMPLETED
Start: 2019-03-03 | End: 2019-03-03

## 2019-03-03 RX ORDER — SODIUM CHLORIDE 450 MG/100ML
INJECTION, SOLUTION INTRAVENOUS CONTINUOUS
Status: DISCONTINUED | OUTPATIENT
Start: 2019-03-03 | End: 2019-03-04

## 2019-03-03 RX ORDER — THIAMINE MONONITRATE (VIT B1) 100 MG
100 TABLET ORAL DAILY
Status: DISCONTINUED | OUTPATIENT
Start: 2019-03-03 | End: 2019-03-08 | Stop reason: HOSPADM

## 2019-03-03 RX ORDER — HALOPERIDOL 1 MG/1
1 TABLET ORAL EVERY 8 HOURS PRN
Status: DISCONTINUED | OUTPATIENT
Start: 2019-03-03 | End: 2019-03-08 | Stop reason: HOSPADM

## 2019-03-03 RX ORDER — ZIPRASIDONE MESYLATE 20 MG/ML
10 INJECTION, POWDER, LYOPHILIZED, FOR SOLUTION INTRAMUSCULAR ONCE
Status: COMPLETED | OUTPATIENT
Start: 2019-03-03 | End: 2019-03-03

## 2019-03-03 RX ORDER — FOLIC ACID 1 MG/1
1 TABLET ORAL DAILY
Status: DISCONTINUED | OUTPATIENT
Start: 2019-03-03 | End: 2019-03-08 | Stop reason: HOSPADM

## 2019-03-03 RX ORDER — LIDOCAINE 4 G/G
1 PATCH TOPICAL DAILY
Status: DISCONTINUED | OUTPATIENT
Start: 2019-03-03 | End: 2019-03-08 | Stop reason: HOSPADM

## 2019-03-03 RX ORDER — MAGNESIUM SULFATE 1 G/100ML
1 INJECTION INTRAVENOUS PRN
Status: DISCONTINUED | OUTPATIENT
Start: 2019-03-03 | End: 2019-03-06

## 2019-03-03 RX ORDER — SODIUM CHLORIDE 0.9 % (FLUSH) 0.9 %
10 SYRINGE (ML) INJECTION EVERY 12 HOURS SCHEDULED
Status: DISCONTINUED | OUTPATIENT
Start: 2019-03-03 | End: 2019-03-08 | Stop reason: HOSPADM

## 2019-03-03 RX ORDER — ATORVASTATIN CALCIUM 80 MG/1
80 TABLET, FILM COATED ORAL DAILY
Status: DISCONTINUED | OUTPATIENT
Start: 2019-03-03 | End: 2019-03-08 | Stop reason: HOSPADM

## 2019-03-03 RX ORDER — OXYCODONE HYDROCHLORIDE 5 MG/1
5 TABLET ORAL EVERY 8 HOURS PRN
Status: DISCONTINUED | OUTPATIENT
Start: 2019-03-03 | End: 2019-03-05

## 2019-03-03 RX ORDER — ZIPRASIDONE MESYLATE 20 MG/ML
INJECTION, POWDER, LYOPHILIZED, FOR SOLUTION INTRAMUSCULAR
Status: DISPENSED
Start: 2019-03-03 | End: 2019-03-03

## 2019-03-03 RX ORDER — DEXTROSE AND SODIUM CHLORIDE 5; .45 G/100ML; G/100ML
INJECTION, SOLUTION INTRAVENOUS CONTINUOUS PRN
Status: DISCONTINUED | OUTPATIENT
Start: 2019-03-03 | End: 2019-03-04

## 2019-03-03 RX ORDER — POTASSIUM CHLORIDE 7.45 MG/ML
10 INJECTION INTRAVENOUS PRN
Status: DISCONTINUED | OUTPATIENT
Start: 2019-03-03 | End: 2019-03-06

## 2019-03-03 RX ORDER — INSULIN GLARGINE 100 [IU]/ML
20 INJECTION, SOLUTION SUBCUTANEOUS DAILY
Status: DISCONTINUED | OUTPATIENT
Start: 2019-03-03 | End: 2019-03-07

## 2019-03-03 RX ORDER — FINASTERIDE 5 MG/1
5 TABLET, FILM COATED ORAL DAILY
Status: DISCONTINUED | OUTPATIENT
Start: 2019-03-03 | End: 2019-03-08 | Stop reason: HOSPADM

## 2019-03-03 RX ORDER — SODIUM CHLORIDE 0.9 % (FLUSH) 0.9 %
10 SYRINGE (ML) INJECTION PRN
Status: DISCONTINUED | OUTPATIENT
Start: 2019-03-03 | End: 2019-03-08 | Stop reason: HOSPADM

## 2019-03-03 RX ORDER — LORAZEPAM 2 MG/ML
2 INJECTION INTRAMUSCULAR ONCE
Status: COMPLETED | OUTPATIENT
Start: 2019-03-03 | End: 2019-03-03

## 2019-03-03 RX ORDER — PANTOPRAZOLE SODIUM 40 MG/1
40 TABLET, DELAYED RELEASE ORAL DAILY
Status: DISCONTINUED | OUTPATIENT
Start: 2019-03-03 | End: 2019-03-08 | Stop reason: HOSPADM

## 2019-03-03 RX ORDER — ASPIRIN 81 MG/1
81 TABLET ORAL DAILY
Status: DISCONTINUED | OUTPATIENT
Start: 2019-03-03 | End: 2019-03-08 | Stop reason: HOSPADM

## 2019-03-03 RX ORDER — LORAZEPAM 2 MG/ML
INJECTION INTRAMUSCULAR
Status: DISPENSED
Start: 2019-03-03 | End: 2019-03-03

## 2019-03-03 RX ORDER — ONDANSETRON 2 MG/ML
4 INJECTION INTRAMUSCULAR; INTRAVENOUS EVERY 6 HOURS PRN
Status: DISCONTINUED | OUTPATIENT
Start: 2019-03-03 | End: 2019-03-08 | Stop reason: HOSPADM

## 2019-03-03 RX ORDER — ALBUTEROL SULFATE 90 UG/1
2 AEROSOL, METERED RESPIRATORY (INHALATION) EVERY 4 HOURS PRN
Status: DISCONTINUED | OUTPATIENT
Start: 2019-03-03 | End: 2019-03-08 | Stop reason: HOSPADM

## 2019-03-03 RX ORDER — LABETALOL HYDROCHLORIDE 5 MG/ML
20 INJECTION, SOLUTION INTRAVENOUS EVERY 4 HOURS PRN
Status: DISCONTINUED | OUTPATIENT
Start: 2019-03-03 | End: 2019-03-03 | Stop reason: CLARIF

## 2019-03-03 RX ORDER — FLUTICASONE PROPIONATE 110 UG/1
2 AEROSOL, METERED RESPIRATORY (INHALATION) 2 TIMES DAILY
Status: DISCONTINUED | OUTPATIENT
Start: 2019-03-03 | End: 2019-03-08 | Stop reason: HOSPADM

## 2019-03-03 RX ORDER — DEXTROSE MONOHYDRATE 25 G/50ML
12.5 INJECTION, SOLUTION INTRAVENOUS PRN
Status: DISCONTINUED | OUTPATIENT
Start: 2019-03-03 | End: 2019-03-08 | Stop reason: HOSPADM

## 2019-03-03 RX ADMIN — DEXMEDETOMIDINE HYDROCHLORIDE 0.6 MCG/KG/HR: 100 INJECTION, SOLUTION INTRAVENOUS at 22:58

## 2019-03-03 RX ADMIN — METOPROLOL TARTRATE 25 MG: 25 TABLET ORAL at 16:08

## 2019-03-03 RX ADMIN — ASPIRIN 81 MG: 81 TABLET, COATED ORAL at 16:10

## 2019-03-03 RX ADMIN — DEXTROSE AND SODIUM CHLORIDE: 5; .45 INJECTION, SOLUTION INTRAVENOUS at 03:22

## 2019-03-03 RX ADMIN — POTASSIUM CHLORIDE 10 MEQ: 7.46 INJECTION, SOLUTION INTRAVENOUS at 10:47

## 2019-03-03 RX ADMIN — Medication 10 ML: at 09:00

## 2019-03-03 RX ADMIN — Medication 2 PUFF: at 19:38

## 2019-03-03 RX ADMIN — DEXMEDETOMIDINE HYDROCHLORIDE 0.2 MCG/KG/HR: 100 INJECTION, SOLUTION INTRAVENOUS at 02:06

## 2019-03-03 RX ADMIN — ENOXAPARIN SODIUM 40 MG: 40 INJECTION SUBCUTANEOUS at 16:14

## 2019-03-03 RX ADMIN — FINASTERIDE 5 MG: 5 TABLET, FILM COATED ORAL at 16:10

## 2019-03-03 RX ADMIN — LORAZEPAM 2 MG: 2 INJECTION, SOLUTION INTRAMUSCULAR; INTRAVENOUS at 00:33

## 2019-03-03 RX ADMIN — INSULIN LISPRO 6 UNITS: 100 INJECTION, SOLUTION INTRAVENOUS; SUBCUTANEOUS at 22:40

## 2019-03-03 RX ADMIN — SODIUM CHLORIDE: 4.5 INJECTION, SOLUTION INTRAVENOUS at 01:30

## 2019-03-03 RX ADMIN — TAMSULOSIN HYDROCHLORIDE 0.8 MG: 0.4 CAPSULE ORAL at 16:10

## 2019-03-03 RX ADMIN — Medication 6 MG: at 19:37

## 2019-03-03 RX ADMIN — SODIUM CHLORIDE 0.1 UNITS/KG/HR: 9 INJECTION, SOLUTION INTRAVENOUS at 00:41

## 2019-03-03 RX ADMIN — OXYCODONE HYDROCHLORIDE 5 MG: 5 TABLET ORAL at 15:55

## 2019-03-03 RX ADMIN — SERTRALINE 100 MG: 50 TABLET, FILM COATED ORAL at 16:10

## 2019-03-03 RX ADMIN — LABETALOL HYDROCHLORIDE 20 MG: 5 INJECTION INTRAVENOUS at 23:13

## 2019-03-03 RX ADMIN — ATORVASTATIN CALCIUM 80 MG: 80 TABLET, FILM COATED ORAL at 16:10

## 2019-03-03 RX ADMIN — DEXTROSE AND SODIUM CHLORIDE: 5; .45 INJECTION, SOLUTION INTRAVENOUS at 09:31

## 2019-03-03 RX ADMIN — CYCLOBENZAPRINE HYDROCHLORIDE 5 MG: 10 TABLET, FILM COATED ORAL at 19:37

## 2019-03-03 RX ADMIN — POTASSIUM CHLORIDE 10 MEQ: 7.46 INJECTION, SOLUTION INTRAVENOUS at 11:35

## 2019-03-03 RX ADMIN — HYDROXYZINE PAMOATE: 25 CAPSULE ORAL at 00:33

## 2019-03-03 RX ADMIN — METOPROLOL TARTRATE 25 MG: 25 TABLET ORAL at 19:37

## 2019-03-03 RX ADMIN — POTASSIUM CHLORIDE 10 MEQ: 7.46 INJECTION, SOLUTION INTRAVENOUS at 09:32

## 2019-03-03 RX ADMIN — INSULIN GLARGINE 20 UNITS: 100 INJECTION, SOLUTION SUBCUTANEOUS at 16:11

## 2019-03-03 RX ADMIN — PANTOPRAZOLE SODIUM 40 MG: 40 TABLET, DELAYED RELEASE ORAL at 16:10

## 2019-03-03 RX ADMIN — FOLIC ACID 1 MG: 1 TABLET ORAL at 16:10

## 2019-03-03 RX ADMIN — DEXMEDETOMIDINE HYDROCHLORIDE 0.2 MCG/KG/HR: 100 INJECTION, SOLUTION INTRAVENOUS at 18:17

## 2019-03-03 RX ADMIN — Medication 100 MG: at 16:10

## 2019-03-03 RX ADMIN — SODIUM CHLORIDE 1000 ML: 9 INJECTION, SOLUTION INTRAVENOUS at 00:51

## 2019-03-03 RX ADMIN — ZIPRASIDONE MESYLATE 10 MG: 20 INJECTION, POWDER, LYOPHILIZED, FOR SOLUTION INTRAMUSCULAR at 01:21

## 2019-03-03 RX ADMIN — SODIUM CHLORIDE 0.1 UNITS/KG/HR: 9 INJECTION, SOLUTION INTRAVENOUS at 00:51

## 2019-03-03 ASSESSMENT — PAIN SCALES - GENERAL: PAINLEVEL_OUTOF10: 9

## 2019-03-04 PROBLEM — R45.1 AGITATION REQUIRING SEDATION PROTOCOL: Status: ACTIVE | Noted: 2019-03-04

## 2019-03-04 PROBLEM — E10.10 DKA, TYPE 1, NOT AT GOAL (HCC): Status: ACTIVE | Noted: 2019-03-04

## 2019-03-04 LAB
ANION GAP SERPL CALCULATED.3IONS-SCNC: 11 MMOL/L (ref 3–16)
BUN BLDV-MCNC: 14 MG/DL (ref 7–20)
CALCIUM SERPL-MCNC: 8.8 MG/DL (ref 8.3–10.6)
CHLORIDE BLD-SCNC: 103 MMOL/L (ref 99–110)
CO2: 23 MMOL/L (ref 21–32)
CREAT SERPL-MCNC: 0.8 MG/DL (ref 0.8–1.3)
FOLATE: >20 NG/ML (ref 4.78–24.2)
GFR AFRICAN AMERICAN: >60
GFR NON-AFRICAN AMERICAN: >60
GLUCOSE BLD-MCNC: 124 MG/DL (ref 70–99)
GLUCOSE BLD-MCNC: 175 MG/DL (ref 70–99)
GLUCOSE BLD-MCNC: 189 MG/DL (ref 70–99)
GLUCOSE BLD-MCNC: 266 MG/DL (ref 70–99)
GLUCOSE BLD-MCNC: 289 MG/DL (ref 70–99)
HCT VFR BLD CALC: 31.4 % (ref 40.5–52.5)
HEMOGLOBIN: 10.6 G/DL (ref 13.5–17.5)
MAGNESIUM: 2.2 MG/DL (ref 1.8–2.4)
MCH RBC QN AUTO: 31.8 PG (ref 26–34)
MCHC RBC AUTO-ENTMCNC: 33.8 G/DL (ref 31–36)
MCV RBC AUTO: 93.9 FL (ref 80–100)
PDW BLD-RTO: 14.8 % (ref 12.4–15.4)
PERFORMED ON: ABNORMAL
PHOSPHORUS: 2.8 MG/DL (ref 2.5–4.9)
PHOSPHORUS: 2.9 MG/DL (ref 2.5–4.9)
PHOSPHORUS: 3.1 MG/DL (ref 2.5–4.9)
PHOSPHORUS: 3.1 MG/DL (ref 2.5–4.9)
PHOSPHORUS: 3.2 MG/DL (ref 2.5–4.9)
PHOSPHORUS: 3.5 MG/DL (ref 2.5–4.9)
PLATELET # BLD: 229 K/UL (ref 135–450)
PMV BLD AUTO: 7.3 FL (ref 5–10.5)
POTASSIUM SERPL-SCNC: 4 MMOL/L (ref 3.5–5.1)
RBC # BLD: 3.34 M/UL (ref 4.2–5.9)
SODIUM BLD-SCNC: 137 MMOL/L (ref 136–145)
VITAMIN B-12: 1820 PG/ML (ref 211–911)
WBC # BLD: 6.1 K/UL (ref 4–11)

## 2019-03-04 PROCEDURE — 84100 ASSAY OF PHOSPHORUS: CPT

## 2019-03-04 PROCEDURE — 2500000003 HC RX 250 WO HCPCS: Performed by: PEDIATRICS

## 2019-03-04 PROCEDURE — 2000000000 HC ICU R&B

## 2019-03-04 PROCEDURE — 80048 BASIC METABOLIC PNL TOTAL CA: CPT

## 2019-03-04 PROCEDURE — 85027 COMPLETE CBC AUTOMATED: CPT

## 2019-03-04 PROCEDURE — 83735 ASSAY OF MAGNESIUM: CPT

## 2019-03-04 PROCEDURE — 99233 SBSQ HOSP IP/OBS HIGH 50: CPT | Performed by: INTERNAL MEDICINE

## 2019-03-04 PROCEDURE — 94640 AIRWAY INHALATION TREATMENT: CPT

## 2019-03-04 PROCEDURE — 82746 ASSAY OF FOLIC ACID SERUM: CPT

## 2019-03-04 PROCEDURE — 6370000000 HC RX 637 (ALT 250 FOR IP): Performed by: INTERNAL MEDICINE

## 2019-03-04 PROCEDURE — 2580000003 HC RX 258: Performed by: PEDIATRICS

## 2019-03-04 PROCEDURE — 82607 VITAMIN B-12: CPT

## 2019-03-04 PROCEDURE — 36415 COLL VENOUS BLD VENIPUNCTURE: CPT

## 2019-03-04 PROCEDURE — 6360000002 HC RX W HCPCS: Performed by: PEDIATRICS

## 2019-03-04 PROCEDURE — 6370000000 HC RX 637 (ALT 250 FOR IP): Performed by: PEDIATRICS

## 2019-03-04 RX ORDER — LISINOPRIL 20 MG/1
20 TABLET ORAL DAILY
Status: DISCONTINUED | OUTPATIENT
Start: 2019-03-04 | End: 2019-03-08 | Stop reason: HOSPADM

## 2019-03-04 RX ADMIN — METOPROLOL TARTRATE 25 MG: 25 TABLET ORAL at 09:57

## 2019-03-04 RX ADMIN — PANTOPRAZOLE SODIUM 40 MG: 40 TABLET, DELAYED RELEASE ORAL at 09:57

## 2019-03-04 RX ADMIN — INSULIN LISPRO 3 UNITS: 100 INJECTION, SOLUTION INTRAVENOUS; SUBCUTANEOUS at 12:34

## 2019-03-04 RX ADMIN — Medication 100 MG: at 09:56

## 2019-03-04 RX ADMIN — INSULIN GLARGINE 20 UNITS: 100 INJECTION, SOLUTION SUBCUTANEOUS at 09:49

## 2019-03-04 RX ADMIN — Medication 6 MG: at 20:50

## 2019-03-04 RX ADMIN — INSULIN LISPRO 9 UNITS: 100 INJECTION, SOLUTION INTRAVENOUS; SUBCUTANEOUS at 08:12

## 2019-03-04 RX ADMIN — Medication 2 PUFF: at 08:01

## 2019-03-04 RX ADMIN — MUPIROCIN: 20 OINTMENT TOPICAL at 09:50

## 2019-03-04 RX ADMIN — OXYCODONE HYDROCHLORIDE 5 MG: 5 TABLET ORAL at 03:16

## 2019-03-04 RX ADMIN — TAMSULOSIN HYDROCHLORIDE 0.8 MG: 0.4 CAPSULE ORAL at 09:57

## 2019-03-04 RX ADMIN — OXYCODONE HYDROCHLORIDE 5 MG: 5 TABLET ORAL at 20:50

## 2019-03-04 RX ADMIN — ATORVASTATIN CALCIUM 80 MG: 80 TABLET, FILM COATED ORAL at 09:56

## 2019-03-04 RX ADMIN — SERTRALINE 100 MG: 50 TABLET, FILM COATED ORAL at 09:57

## 2019-03-04 RX ADMIN — DEXMEDETOMIDINE HYDROCHLORIDE 0.5 MCG/KG/HR: 100 INJECTION, SOLUTION INTRAVENOUS at 09:08

## 2019-03-04 RX ADMIN — LABETALOL HYDROCHLORIDE 20 MG: 5 INJECTION INTRAVENOUS at 05:17

## 2019-03-04 RX ADMIN — Medication 10 ML: at 09:53

## 2019-03-04 RX ADMIN — ASPIRIN 81 MG: 81 TABLET, COATED ORAL at 09:57

## 2019-03-04 RX ADMIN — Medication 2 PUFF: at 20:09

## 2019-03-04 RX ADMIN — CYCLOBENZAPRINE HYDROCHLORIDE 5 MG: 10 TABLET, FILM COATED ORAL at 16:52

## 2019-03-04 RX ADMIN — METOPROLOL TARTRATE 25 MG: 25 TABLET ORAL at 20:50

## 2019-03-04 RX ADMIN — Medication 10 ML: at 20:51

## 2019-03-04 RX ADMIN — FINASTERIDE 5 MG: 5 TABLET, FILM COATED ORAL at 09:57

## 2019-03-04 RX ADMIN — MUPIROCIN: 20 OINTMENT TOPICAL at 20:51

## 2019-03-04 RX ADMIN — OXYCODONE HYDROCHLORIDE 5 MG: 5 TABLET ORAL at 12:31

## 2019-03-04 RX ADMIN — ENOXAPARIN SODIUM 40 MG: 40 INJECTION SUBCUTANEOUS at 09:52

## 2019-03-04 RX ADMIN — FOLIC ACID 1 MG: 1 TABLET ORAL at 09:57

## 2019-03-04 ASSESSMENT — PAIN DESCRIPTION - DESCRIPTORS: DESCRIPTORS: ACHING;SHARP

## 2019-03-04 ASSESSMENT — PAIN DESCRIPTION - PAIN TYPE
TYPE: CHRONIC PAIN
TYPE: CHRONIC PAIN

## 2019-03-04 ASSESSMENT — PAIN DESCRIPTION - LOCATION
LOCATION: GENERALIZED
LOCATION: GENERALIZED

## 2019-03-04 ASSESSMENT — PAIN SCALES - GENERAL
PAINLEVEL_OUTOF10: 0
PAINLEVEL_OUTOF10: 9
PAINLEVEL_OUTOF10: 9
PAINLEVEL_OUTOF10: 8
PAINLEVEL_OUTOF10: 5
PAINLEVEL_OUTOF10: 6

## 2019-03-04 ASSESSMENT — PAIN DESCRIPTION - FREQUENCY: FREQUENCY: CONTINUOUS

## 2019-03-05 LAB
ANION GAP SERPL CALCULATED.3IONS-SCNC: 10 MMOL/L (ref 3–16)
BUN BLDV-MCNC: 10 MG/DL (ref 7–20)
CALCIUM SERPL-MCNC: 8.8 MG/DL (ref 8.3–10.6)
CHLORIDE BLD-SCNC: 101 MMOL/L (ref 99–110)
CO2: 26 MMOL/L (ref 21–32)
CREAT SERPL-MCNC: 0.7 MG/DL (ref 0.8–1.3)
GFR AFRICAN AMERICAN: >60
GFR NON-AFRICAN AMERICAN: >60
GLUCOSE BLD-MCNC: 144 MG/DL (ref 70–99)
GLUCOSE BLD-MCNC: 158 MG/DL (ref 70–99)
GLUCOSE BLD-MCNC: 205 MG/DL (ref 70–99)
GLUCOSE BLD-MCNC: 219 MG/DL (ref 70–99)
MAGNESIUM: 2.1 MG/DL (ref 1.8–2.4)
PERFORMED ON: ABNORMAL
PHOSPHORUS: 2.5 MG/DL (ref 2.5–4.9)
PHOSPHORUS: 2.9 MG/DL (ref 2.5–4.9)
PHOSPHORUS: 3 MG/DL (ref 2.5–4.9)
PHOSPHORUS: 3.3 MG/DL (ref 2.5–4.9)
PHOSPHORUS: 3.3 MG/DL (ref 2.5–4.9)
PHOSPHORUS: 3.4 MG/DL (ref 2.5–4.9)
POTASSIUM SERPL-SCNC: 3.6 MMOL/L (ref 3.5–5.1)
SODIUM BLD-SCNC: 137 MMOL/L (ref 136–145)

## 2019-03-05 PROCEDURE — 94640 AIRWAY INHALATION TREATMENT: CPT

## 2019-03-05 PROCEDURE — 6370000000 HC RX 637 (ALT 250 FOR IP): Performed by: INTERNAL MEDICINE

## 2019-03-05 PROCEDURE — 6370000000 HC RX 637 (ALT 250 FOR IP): Performed by: PEDIATRICS

## 2019-03-05 PROCEDURE — 83735 ASSAY OF MAGNESIUM: CPT

## 2019-03-05 PROCEDURE — 84100 ASSAY OF PHOSPHORUS: CPT

## 2019-03-05 PROCEDURE — 36415 COLL VENOUS BLD VENIPUNCTURE: CPT

## 2019-03-05 PROCEDURE — 2580000003 HC RX 258: Performed by: PEDIATRICS

## 2019-03-05 PROCEDURE — 97530 THERAPEUTIC ACTIVITIES: CPT

## 2019-03-05 PROCEDURE — 6360000002 HC RX W HCPCS: Performed by: PEDIATRICS

## 2019-03-05 PROCEDURE — 97162 PT EVAL MOD COMPLEX 30 MIN: CPT

## 2019-03-05 PROCEDURE — 99232 SBSQ HOSP IP/OBS MODERATE 35: CPT | Performed by: INTERNAL MEDICINE

## 2019-03-05 PROCEDURE — 2060000000 HC ICU INTERMEDIATE R&B

## 2019-03-05 PROCEDURE — 80048 BASIC METABOLIC PNL TOTAL CA: CPT

## 2019-03-05 PROCEDURE — 97166 OT EVAL MOD COMPLEX 45 MIN: CPT

## 2019-03-05 RX ORDER — OXYCODONE HYDROCHLORIDE 5 MG/1
10 TABLET ORAL EVERY 8 HOURS PRN
Status: DISCONTINUED | OUTPATIENT
Start: 2019-03-05 | End: 2019-03-08 | Stop reason: HOSPADM

## 2019-03-05 RX ADMIN — LISINOPRIL 20 MG: 20 TABLET ORAL at 08:17

## 2019-03-05 RX ADMIN — OXYCODONE HYDROCHLORIDE 5 MG: 5 TABLET ORAL at 04:29

## 2019-03-05 RX ADMIN — OXYCODONE HYDROCHLORIDE 10 MG: 5 TABLET ORAL at 19:39

## 2019-03-05 RX ADMIN — MUPIROCIN: 20 OINTMENT TOPICAL at 08:18

## 2019-03-05 RX ADMIN — TAMSULOSIN HYDROCHLORIDE 0.8 MG: 0.4 CAPSULE ORAL at 08:18

## 2019-03-05 RX ADMIN — INSULIN LISPRO 6 UNITS: 100 INJECTION, SOLUTION INTRAVENOUS; SUBCUTANEOUS at 12:39

## 2019-03-05 RX ADMIN — INSULIN LISPRO 3 UNITS: 100 INJECTION, SOLUTION INTRAVENOUS; SUBCUTANEOUS at 17:15

## 2019-03-05 RX ADMIN — HALOPERIDOL 1 MG: 1 TABLET ORAL at 02:22

## 2019-03-05 RX ADMIN — HALOPERIDOL 1 MG: 1 TABLET ORAL at 10:45

## 2019-03-05 RX ADMIN — Medication 100 MG: at 08:17

## 2019-03-05 RX ADMIN — ASPIRIN 81 MG: 81 TABLET, COATED ORAL at 08:17

## 2019-03-05 RX ADMIN — INSULIN LISPRO 6 UNITS: 100 INJECTION, SOLUTION INTRAVENOUS; SUBCUTANEOUS at 08:12

## 2019-03-05 RX ADMIN — Medication 2 PUFF: at 08:06

## 2019-03-05 RX ADMIN — Medication 10 ML: at 22:08

## 2019-03-05 RX ADMIN — PANTOPRAZOLE SODIUM 40 MG: 40 TABLET, DELAYED RELEASE ORAL at 08:17

## 2019-03-05 RX ADMIN — CYCLOBENZAPRINE HYDROCHLORIDE 5 MG: 10 TABLET, FILM COATED ORAL at 17:14

## 2019-03-05 RX ADMIN — FINASTERIDE 5 MG: 5 TABLET, FILM COATED ORAL at 08:17

## 2019-03-05 RX ADMIN — CYCLOBENZAPRINE HYDROCHLORIDE 5 MG: 10 TABLET, FILM COATED ORAL at 02:22

## 2019-03-05 RX ADMIN — ENOXAPARIN SODIUM 40 MG: 40 INJECTION SUBCUTANEOUS at 08:17

## 2019-03-05 RX ADMIN — SERTRALINE 100 MG: 50 TABLET, FILM COATED ORAL at 08:17

## 2019-03-05 RX ADMIN — METOPROLOL TARTRATE 25 MG: 25 TABLET ORAL at 20:54

## 2019-03-05 RX ADMIN — INSULIN GLARGINE 20 UNITS: 100 INJECTION, SOLUTION SUBCUTANEOUS at 08:12

## 2019-03-05 RX ADMIN — ATORVASTATIN CALCIUM 80 MG: 80 TABLET, FILM COATED ORAL at 08:17

## 2019-03-05 RX ADMIN — LABETALOL HYDROCHLORIDE 20 MG: 5 INJECTION INTRAVENOUS at 05:04

## 2019-03-05 RX ADMIN — FOLIC ACID 1 MG: 1 TABLET ORAL at 08:17

## 2019-03-05 RX ADMIN — Medication 10 ML: at 08:18

## 2019-03-05 RX ADMIN — LABETALOL HYDROCHLORIDE 20 MG: 5 INJECTION INTRAVENOUS at 00:18

## 2019-03-05 RX ADMIN — CYCLOBENZAPRINE HYDROCHLORIDE 5 MG: 10 TABLET, FILM COATED ORAL at 10:45

## 2019-03-05 RX ADMIN — METOPROLOL TARTRATE 25 MG: 25 TABLET ORAL at 08:17

## 2019-03-05 RX ADMIN — OXYCODONE HYDROCHLORIDE 10 MG: 5 TABLET ORAL at 11:29

## 2019-03-05 ASSESSMENT — PAIN DESCRIPTION - PROGRESSION
CLINICAL_PROGRESSION: NOT CHANGED

## 2019-03-05 ASSESSMENT — PAIN DESCRIPTION - FREQUENCY
FREQUENCY: CONTINUOUS
FREQUENCY: CONTINUOUS

## 2019-03-05 ASSESSMENT — PAIN SCALES - GENERAL
PAINLEVEL_OUTOF10: 8
PAINLEVEL_OUTOF10: 5
PAINLEVEL_OUTOF10: 10
PAINLEVEL_OUTOF10: 9
PAINLEVEL_OUTOF10: 10
PAINLEVEL_OUTOF10: 9
PAINLEVEL_OUTOF10: 7
PAINLEVEL_OUTOF10: 7

## 2019-03-05 ASSESSMENT — PAIN DESCRIPTION - PAIN TYPE
TYPE: ACUTE PAIN;CHRONIC PAIN
TYPE: CHRONIC PAIN
TYPE: CHRONIC PAIN
TYPE: ACUTE PAIN;CHRONIC PAIN

## 2019-03-05 ASSESSMENT — PAIN DESCRIPTION - DESCRIPTORS: DESCRIPTORS: SHARP

## 2019-03-05 ASSESSMENT — PAIN DESCRIPTION - LOCATION
LOCATION: BACK;NECK
LOCATION: NECK;BACK
LOCATION: BACK;NECK
LOCATION: NECK;BACK

## 2019-03-05 ASSESSMENT — PAIN DESCRIPTION - ORIENTATION
ORIENTATION: MID;UPPER
ORIENTATION: MID;UPPER

## 2019-03-05 ASSESSMENT — PAIN DESCRIPTION - ONSET: ONSET: ON-GOING

## 2019-03-06 LAB
ANION GAP SERPL CALCULATED.3IONS-SCNC: 17 MMOL/L (ref 3–16)
BUN BLDV-MCNC: 12 MG/DL (ref 7–20)
CALCIUM SERPL-MCNC: 9.4 MG/DL (ref 8.3–10.6)
CHLORIDE BLD-SCNC: 93 MMOL/L (ref 99–110)
CO2: 21 MMOL/L (ref 21–32)
CREAT SERPL-MCNC: 0.6 MG/DL (ref 0.8–1.3)
GFR AFRICAN AMERICAN: >60
GFR NON-AFRICAN AMERICAN: >60
GLUCOSE BLD-MCNC: 106 MG/DL (ref 70–99)
GLUCOSE BLD-MCNC: 221 MG/DL (ref 70–99)
GLUCOSE BLD-MCNC: 304 MG/DL (ref 70–99)
GLUCOSE BLD-MCNC: 473 MG/DL (ref 70–99)
MAGNESIUM: 2.1 MG/DL (ref 1.8–2.4)
PERFORMED ON: ABNORMAL
PHOSPHORUS: 2.2 MG/DL (ref 2.5–4.9)
PHOSPHORUS: 2.8 MG/DL (ref 2.5–4.9)
POTASSIUM SERPL-SCNC: 4.8 MMOL/L (ref 3.5–5.1)
SODIUM BLD-SCNC: 131 MMOL/L (ref 136–145)

## 2019-03-06 PROCEDURE — 2580000003 HC RX 258: Performed by: PEDIATRICS

## 2019-03-06 PROCEDURE — 83735 ASSAY OF MAGNESIUM: CPT

## 2019-03-06 PROCEDURE — 6360000002 HC RX W HCPCS: Performed by: PEDIATRICS

## 2019-03-06 PROCEDURE — 6370000000 HC RX 637 (ALT 250 FOR IP): Performed by: PEDIATRICS

## 2019-03-06 PROCEDURE — 6370000000 HC RX 637 (ALT 250 FOR IP): Performed by: INTERNAL MEDICINE

## 2019-03-06 PROCEDURE — 36415 COLL VENOUS BLD VENIPUNCTURE: CPT

## 2019-03-06 PROCEDURE — 80048 BASIC METABOLIC PNL TOTAL CA: CPT

## 2019-03-06 PROCEDURE — 94640 AIRWAY INHALATION TREATMENT: CPT

## 2019-03-06 PROCEDURE — 97110 THERAPEUTIC EXERCISES: CPT

## 2019-03-06 PROCEDURE — 2060000000 HC ICU INTERMEDIATE R&B

## 2019-03-06 PROCEDURE — 84100 ASSAY OF PHOSPHORUS: CPT

## 2019-03-06 RX ORDER — INSULIN GLARGINE 100 [IU]/ML
10 INJECTION, SOLUTION SUBCUTANEOUS NIGHTLY
Status: DISCONTINUED | OUTPATIENT
Start: 2019-03-06 | End: 2019-03-07

## 2019-03-06 RX ORDER — HALOPERIDOL 5 MG/ML
2 INJECTION INTRAMUSCULAR ONCE
Status: DISCONTINUED | OUTPATIENT
Start: 2019-03-06 | End: 2019-03-08 | Stop reason: HOSPADM

## 2019-03-06 RX ADMIN — OXYCODONE HYDROCHLORIDE 10 MG: 5 TABLET ORAL at 04:18

## 2019-03-06 RX ADMIN — ENOXAPARIN SODIUM 40 MG: 40 INJECTION SUBCUTANEOUS at 09:16

## 2019-03-06 RX ADMIN — Medication 6 MG: at 21:10

## 2019-03-06 RX ADMIN — Medication 2 PUFF: at 19:33

## 2019-03-06 RX ADMIN — LISINOPRIL 20 MG: 20 TABLET ORAL at 09:09

## 2019-03-06 RX ADMIN — OXYCODONE HYDROCHLORIDE 10 MG: 5 TABLET ORAL at 21:03

## 2019-03-06 RX ADMIN — OXYCODONE HYDROCHLORIDE 10 MG: 5 TABLET ORAL at 12:46

## 2019-03-06 RX ADMIN — HALOPERIDOL 1 MG: 1 TABLET ORAL at 09:10

## 2019-03-06 RX ADMIN — FOLIC ACID 1 MG: 1 TABLET ORAL at 09:10

## 2019-03-06 RX ADMIN — METOPROLOL TARTRATE 25 MG: 25 TABLET ORAL at 21:04

## 2019-03-06 RX ADMIN — INSULIN GLARGINE 10 UNITS: 100 INJECTION, SOLUTION SUBCUTANEOUS at 21:05

## 2019-03-06 RX ADMIN — Medication 2 PUFF: at 08:12

## 2019-03-06 RX ADMIN — PANTOPRAZOLE SODIUM 40 MG: 40 TABLET, DELAYED RELEASE ORAL at 09:10

## 2019-03-06 RX ADMIN — ATORVASTATIN CALCIUM 80 MG: 80 TABLET, FILM COATED ORAL at 09:09

## 2019-03-06 RX ADMIN — METOPROLOL TARTRATE 25 MG: 25 TABLET ORAL at 09:09

## 2019-03-06 RX ADMIN — Medication 100 MG: at 09:09

## 2019-03-06 RX ADMIN — INSULIN LISPRO 6 UNITS: 100 INJECTION, SOLUTION INTRAVENOUS; SUBCUTANEOUS at 13:33

## 2019-03-06 RX ADMIN — INSULIN GLARGINE 20 UNITS: 100 INJECTION, SOLUTION SUBCUTANEOUS at 09:25

## 2019-03-06 RX ADMIN — HALOPERIDOL 1 MG: 1 TABLET ORAL at 02:29

## 2019-03-06 RX ADMIN — TAMSULOSIN HYDROCHLORIDE 0.8 MG: 0.4 CAPSULE ORAL at 09:09

## 2019-03-06 RX ADMIN — ASPIRIN 81 MG: 81 TABLET, COATED ORAL at 09:09

## 2019-03-06 RX ADMIN — FINASTERIDE 5 MG: 5 TABLET, FILM COATED ORAL at 09:09

## 2019-03-06 RX ADMIN — Medication 10 ML: at 21:06

## 2019-03-06 RX ADMIN — Medication 10 ML: at 09:16

## 2019-03-06 RX ADMIN — INSULIN LISPRO 18 UNITS: 100 INJECTION, SOLUTION INTRAVENOUS; SUBCUTANEOUS at 09:18

## 2019-03-06 RX ADMIN — SERTRALINE 100 MG: 50 TABLET, FILM COATED ORAL at 09:09

## 2019-03-06 RX ADMIN — CYCLOBENZAPRINE HYDROCHLORIDE 5 MG: 10 TABLET, FILM COATED ORAL at 21:10

## 2019-03-06 RX ADMIN — Medication 6 MG: at 01:33

## 2019-03-06 ASSESSMENT — PAIN SCALES - GENERAL
PAINLEVEL_OUTOF10: 9
PAINLEVEL_OUTOF10: 10
PAINLEVEL_OUTOF10: 10

## 2019-03-06 ASSESSMENT — PAIN DESCRIPTION - PROGRESSION
CLINICAL_PROGRESSION: NOT CHANGED

## 2019-03-06 ASSESSMENT — PAIN DESCRIPTION - LOCATION: LOCATION: ABDOMEN;NECK

## 2019-03-06 ASSESSMENT — PAIN DESCRIPTION - PAIN TYPE: TYPE: ACUTE PAIN;CHRONIC PAIN

## 2019-03-07 PROBLEM — E43 SEVERE MALNUTRITION (HCC): Chronic | Status: ACTIVE | Noted: 2019-03-07

## 2019-03-07 LAB
ALBUMIN SERPL-MCNC: 3.8 G/DL (ref 3.4–5)
AMMONIA: 34 UMOL/L (ref 16–60)
ANION GAP SERPL CALCULATED.3IONS-SCNC: 11 MMOL/L (ref 3–16)
BUN BLDV-MCNC: 14 MG/DL (ref 7–20)
CALCIUM SERPL-MCNC: 9.4 MG/DL (ref 8.3–10.6)
CHLORIDE BLD-SCNC: 98 MMOL/L (ref 99–110)
CO2: 27 MMOL/L (ref 21–32)
CREAT SERPL-MCNC: 0.7 MG/DL (ref 0.8–1.3)
GFR AFRICAN AMERICAN: >60
GFR NON-AFRICAN AMERICAN: >60
GLUCOSE BLD-MCNC: 136 MG/DL (ref 70–99)
GLUCOSE BLD-MCNC: 201 MG/DL (ref 70–99)
GLUCOSE BLD-MCNC: 234 MG/DL (ref 70–99)
GLUCOSE BLD-MCNC: 266 MG/DL (ref 70–99)
GLUCOSE BLD-MCNC: 343 MG/DL (ref 70–99)
GLUCOSE BLD-MCNC: 433 MG/DL (ref 70–99)
HCT VFR BLD CALC: 34.9 % (ref 40.5–52.5)
HEMOGLOBIN: 12 G/DL (ref 13.5–17.5)
MCH RBC QN AUTO: 31.9 PG (ref 26–34)
MCHC RBC AUTO-ENTMCNC: 34.3 G/DL (ref 31–36)
MCV RBC AUTO: 92.8 FL (ref 80–100)
PDW BLD-RTO: 14.2 % (ref 12.4–15.4)
PERFORMED ON: ABNORMAL
PHOSPHORUS: 3.8 MG/DL (ref 2.5–4.9)
PLATELET # BLD: 232 K/UL (ref 135–450)
PMV BLD AUTO: 7.8 FL (ref 5–10.5)
POTASSIUM SERPL-SCNC: 3.8 MMOL/L (ref 3.5–5.1)
RBC # BLD: 3.76 M/UL (ref 4.2–5.9)
SODIUM BLD-SCNC: 136 MMOL/L (ref 136–145)
WBC # BLD: 6.7 K/UL (ref 4–11)

## 2019-03-07 PROCEDURE — 81003 URINALYSIS AUTO W/O SCOPE: CPT

## 2019-03-07 PROCEDURE — 6360000002 HC RX W HCPCS: Performed by: INTERNAL MEDICINE

## 2019-03-07 PROCEDURE — 6370000000 HC RX 637 (ALT 250 FOR IP): Performed by: PEDIATRICS

## 2019-03-07 PROCEDURE — 85027 COMPLETE CBC AUTOMATED: CPT

## 2019-03-07 PROCEDURE — 36415 COLL VENOUS BLD VENIPUNCTURE: CPT

## 2019-03-07 PROCEDURE — 6370000000 HC RX 637 (ALT 250 FOR IP): Performed by: INTERNAL MEDICINE

## 2019-03-07 PROCEDURE — 2580000003 HC RX 258: Performed by: PEDIATRICS

## 2019-03-07 PROCEDURE — 94640 AIRWAY INHALATION TREATMENT: CPT

## 2019-03-07 PROCEDURE — 82140 ASSAY OF AMMONIA: CPT

## 2019-03-07 PROCEDURE — 80069 RENAL FUNCTION PANEL: CPT

## 2019-03-07 PROCEDURE — 6360000002 HC RX W HCPCS: Performed by: PEDIATRICS

## 2019-03-07 PROCEDURE — 2060000000 HC ICU INTERMEDIATE R&B

## 2019-03-07 RX ORDER — INSULIN GLARGINE 100 [IU]/ML
25 INJECTION, SOLUTION SUBCUTANEOUS DAILY
Status: DISCONTINUED | OUTPATIENT
Start: 2019-03-08 | End: 2019-03-08 | Stop reason: HOSPADM

## 2019-03-07 RX ORDER — INSULIN GLARGINE 100 [IU]/ML
20 INJECTION, SOLUTION SUBCUTANEOUS NIGHTLY
Status: DISCONTINUED | OUTPATIENT
Start: 2019-03-07 | End: 2019-03-08 | Stop reason: HOSPADM

## 2019-03-07 RX ORDER — KETOROLAC TROMETHAMINE 30 MG/ML
15 INJECTION, SOLUTION INTRAMUSCULAR; INTRAVENOUS EVERY 6 HOURS PRN
Status: DISCONTINUED | OUTPATIENT
Start: 2019-03-07 | End: 2019-03-08 | Stop reason: HOSPADM

## 2019-03-07 RX ADMIN — Medication 100 MG: at 11:00

## 2019-03-07 RX ADMIN — OXYCODONE HYDROCHLORIDE 10 MG: 5 TABLET ORAL at 10:06

## 2019-03-07 RX ADMIN — FOLIC ACID 1 MG: 1 TABLET ORAL at 11:00

## 2019-03-07 RX ADMIN — INSULIN GLARGINE 20 UNITS: 100 INJECTION, SOLUTION SUBCUTANEOUS at 20:52

## 2019-03-07 RX ADMIN — ENOXAPARIN SODIUM 40 MG: 40 INJECTION SUBCUTANEOUS at 10:59

## 2019-03-07 RX ADMIN — PANTOPRAZOLE SODIUM 40 MG: 40 TABLET, DELAYED RELEASE ORAL at 11:00

## 2019-03-07 RX ADMIN — KETOROLAC TROMETHAMINE 15 MG: 30 INJECTION, SOLUTION INTRAMUSCULAR at 22:24

## 2019-03-07 RX ADMIN — CYCLOBENZAPRINE HYDROCHLORIDE 5 MG: 10 TABLET, FILM COATED ORAL at 20:51

## 2019-03-07 RX ADMIN — OXYCODONE HYDROCHLORIDE 10 MG: 5 TABLET ORAL at 18:18

## 2019-03-07 RX ADMIN — TAMSULOSIN HYDROCHLORIDE 0.8 MG: 0.4 CAPSULE ORAL at 11:00

## 2019-03-07 RX ADMIN — FINASTERIDE 5 MG: 5 TABLET, FILM COATED ORAL at 11:00

## 2019-03-07 RX ADMIN — Medication 10 ML: at 11:00

## 2019-03-07 RX ADMIN — ATORVASTATIN CALCIUM 80 MG: 80 TABLET, FILM COATED ORAL at 11:00

## 2019-03-07 RX ADMIN — GLYCOPYRROLATE AND FORMOTEROL FUMARATE 2 PUFF: 9; 4.8 AEROSOL, METERED RESPIRATORY (INHALATION) at 19:35

## 2019-03-07 RX ADMIN — METOPROLOL TARTRATE 25 MG: 25 TABLET ORAL at 11:01

## 2019-03-07 RX ADMIN — Medication 2 PUFF: at 07:32

## 2019-03-07 RX ADMIN — SERTRALINE 100 MG: 50 TABLET, FILM COATED ORAL at 11:00

## 2019-03-07 RX ADMIN — INSULIN LISPRO 12 UNITS: 100 INJECTION, SOLUTION INTRAVENOUS; SUBCUTANEOUS at 11:17

## 2019-03-07 RX ADMIN — ASPIRIN 81 MG: 81 TABLET, COATED ORAL at 11:02

## 2019-03-07 RX ADMIN — Medication 6 MG: at 20:50

## 2019-03-07 RX ADMIN — KETOROLAC TROMETHAMINE 15 MG: 30 INJECTION, SOLUTION INTRAMUSCULAR at 16:05

## 2019-03-07 RX ADMIN — METOPROLOL TARTRATE 25 MG: 25 TABLET ORAL at 20:50

## 2019-03-07 RX ADMIN — Medication 10 ML: at 20:50

## 2019-03-07 ASSESSMENT — PAIN SCALES - GENERAL
PAINLEVEL_OUTOF10: 10
PAINLEVEL_OUTOF10: 10
PAINLEVEL_OUTOF10: 9
PAINLEVEL_OUTOF10: 9
PAINLEVEL_OUTOF10: 7

## 2019-03-07 ASSESSMENT — PAIN DESCRIPTION - PAIN TYPE: TYPE: ACUTE PAIN

## 2019-03-07 ASSESSMENT — PAIN DESCRIPTION - LOCATION: LOCATION: ABDOMEN;NECK

## 2019-03-08 VITALS
SYSTOLIC BLOOD PRESSURE: 114 MMHG | TEMPERATURE: 98.2 F | RESPIRATION RATE: 16 BRPM | HEART RATE: 89 BPM | WEIGHT: 151.5 LBS | BODY MASS INDEX: 17.89 KG/M2 | OXYGEN SATURATION: 96 % | HEIGHT: 77 IN | DIASTOLIC BLOOD PRESSURE: 68 MMHG

## 2019-03-08 LAB
ALBUMIN SERPL-MCNC: 3.4 G/DL (ref 3.4–5)
ALP BLD-CCNC: 106 U/L (ref 40–129)
ALT SERPL-CCNC: 20 U/L (ref 10–40)
ANION GAP SERPL CALCULATED.3IONS-SCNC: 12 MMOL/L (ref 3–16)
AST SERPL-CCNC: 26 U/L (ref 15–37)
BILIRUB SERPL-MCNC: 0.7 MG/DL (ref 0–1)
BILIRUBIN DIRECT: <0.2 MG/DL (ref 0–0.3)
BILIRUBIN URINE: NEGATIVE
BILIRUBIN, INDIRECT: NORMAL MG/DL (ref 0–1)
BLOOD, URINE: NEGATIVE
BUN BLDV-MCNC: 16 MG/DL (ref 7–20)
CALCIUM SERPL-MCNC: 8.7 MG/DL (ref 8.3–10.6)
CHLORIDE BLD-SCNC: 96 MMOL/L (ref 99–110)
CLARITY: CLEAR
CO2: 24 MMOL/L (ref 21–32)
COLOR: YELLOW
CREAT SERPL-MCNC: 0.7 MG/DL (ref 0.8–1.3)
GFR AFRICAN AMERICAN: >60
GFR NON-AFRICAN AMERICAN: >60
GLUCOSE BLD-MCNC: 213 MG/DL (ref 70–99)
GLUCOSE BLD-MCNC: 248 MG/DL (ref 70–99)
GLUCOSE BLD-MCNC: 275 MG/DL (ref 70–99)
GLUCOSE BLD-MCNC: 304 MG/DL (ref 70–99)
GLUCOSE URINE: >=1000 MG/DL
HCT VFR BLD CALC: 31.5 % (ref 40.5–52.5)
HEMOGLOBIN: 10.7 G/DL (ref 13.5–17.5)
KETONES, URINE: ABNORMAL MG/DL
LEUKOCYTE ESTERASE, URINE: NEGATIVE
MCH RBC QN AUTO: 31.8 PG (ref 26–34)
MCHC RBC AUTO-ENTMCNC: 34.1 G/DL (ref 31–36)
MCV RBC AUTO: 93.2 FL (ref 80–100)
MICROSCOPIC EXAMINATION: ABNORMAL
NITRITE, URINE: NEGATIVE
PDW BLD-RTO: 13.8 % (ref 12.4–15.4)
PERFORMED ON: ABNORMAL
PH UA: 5.5 (ref 5–8)
PHOSPHORUS: 4 MG/DL (ref 2.5–4.9)
PLATELET # BLD: 182 K/UL (ref 135–450)
PMV BLD AUTO: 8 FL (ref 5–10.5)
POTASSIUM SERPL-SCNC: 3.9 MMOL/L (ref 3.5–5.1)
PROTEIN UA: NEGATIVE MG/DL
RBC # BLD: 3.38 M/UL (ref 4.2–5.9)
SODIUM BLD-SCNC: 132 MMOL/L (ref 136–145)
SPECIFIC GRAVITY UA: 1.01 (ref 1–1.03)
TOTAL PROTEIN: 6.5 G/DL (ref 6.4–8.2)
URINE REFLEX TO CULTURE: ABNORMAL
URINE TYPE: ABNORMAL
UROBILINOGEN, URINE: 0.2 E.U./DL
WBC # BLD: 6.9 K/UL (ref 4–11)

## 2019-03-08 PROCEDURE — 85027 COMPLETE CBC AUTOMATED: CPT

## 2019-03-08 PROCEDURE — 6370000000 HC RX 637 (ALT 250 FOR IP): Performed by: INTERNAL MEDICINE

## 2019-03-08 PROCEDURE — 80076 HEPATIC FUNCTION PANEL: CPT

## 2019-03-08 PROCEDURE — 97535 SELF CARE MNGMENT TRAINING: CPT

## 2019-03-08 PROCEDURE — 94640 AIRWAY INHALATION TREATMENT: CPT

## 2019-03-08 PROCEDURE — 84100 ASSAY OF PHOSPHORUS: CPT

## 2019-03-08 PROCEDURE — 6360000002 HC RX W HCPCS: Performed by: INTERNAL MEDICINE

## 2019-03-08 PROCEDURE — 6360000002 HC RX W HCPCS: Performed by: PEDIATRICS

## 2019-03-08 PROCEDURE — 80048 BASIC METABOLIC PNL TOTAL CA: CPT

## 2019-03-08 PROCEDURE — 6370000000 HC RX 637 (ALT 250 FOR IP): Performed by: PEDIATRICS

## 2019-03-08 PROCEDURE — 2580000003 HC RX 258: Performed by: PEDIATRICS

## 2019-03-08 PROCEDURE — 36415 COLL VENOUS BLD VENIPUNCTURE: CPT

## 2019-03-08 RX ORDER — OXYCODONE HYDROCHLORIDE 5 MG/1
10 TABLET ORAL EVERY 8 HOURS PRN
Qty: 42 TABLET | Refills: 0 | Status: SHIPPED | OUTPATIENT
Start: 2019-03-08 | End: 2019-03-15

## 2019-03-08 RX ORDER — LISINOPRIL 20 MG/1
20 TABLET ORAL DAILY
Qty: 30 TABLET | Refills: 0 | Status: ON HOLD | OUTPATIENT
Start: 2019-03-09 | End: 2019-05-31 | Stop reason: HOSPADM

## 2019-03-08 RX ADMIN — FINASTERIDE 5 MG: 5 TABLET, FILM COATED ORAL at 07:57

## 2019-03-08 RX ADMIN — ENOXAPARIN SODIUM 40 MG: 40 INJECTION SUBCUTANEOUS at 07:57

## 2019-03-08 RX ADMIN — PANTOPRAZOLE SODIUM 40 MG: 40 TABLET, DELAYED RELEASE ORAL at 07:58

## 2019-03-08 RX ADMIN — METOPROLOL TARTRATE 25 MG: 25 TABLET ORAL at 07:58

## 2019-03-08 RX ADMIN — KETOROLAC TROMETHAMINE 15 MG: 30 INJECTION, SOLUTION INTRAMUSCULAR at 06:33

## 2019-03-08 RX ADMIN — Medication 10 ML: at 08:19

## 2019-03-08 RX ADMIN — ATORVASTATIN CALCIUM 80 MG: 80 TABLET, FILM COATED ORAL at 07:58

## 2019-03-08 RX ADMIN — ASPIRIN 81 MG: 81 TABLET, COATED ORAL at 07:58

## 2019-03-08 RX ADMIN — INSULIN GLARGINE 25 UNITS: 100 INJECTION, SOLUTION SUBCUTANEOUS at 08:18

## 2019-03-08 RX ADMIN — FOLIC ACID 1 MG: 1 TABLET ORAL at 07:58

## 2019-03-08 RX ADMIN — SERTRALINE 100 MG: 50 TABLET, FILM COATED ORAL at 07:57

## 2019-03-08 RX ADMIN — Medication 100 MG: at 07:58

## 2019-03-08 RX ADMIN — LISINOPRIL 20 MG: 20 TABLET ORAL at 07:58

## 2019-03-08 RX ADMIN — INSULIN LISPRO 9 UNITS: 100 INJECTION, SOLUTION INTRAVENOUS; SUBCUTANEOUS at 08:02

## 2019-03-08 RX ADMIN — OXYCODONE HYDROCHLORIDE 10 MG: 5 TABLET ORAL at 02:20

## 2019-03-08 RX ADMIN — Medication 2 PUFF: at 08:24

## 2019-03-08 RX ADMIN — TAMSULOSIN HYDROCHLORIDE 0.8 MG: 0.4 CAPSULE ORAL at 07:58

## 2019-03-08 RX ADMIN — GLYCOPYRROLATE AND FORMOTEROL FUMARATE 2 PUFF: 9; 4.8 AEROSOL, METERED RESPIRATORY (INHALATION) at 08:00

## 2019-03-08 RX ADMIN — GLYCOPYRROLATE AND FORMOTEROL FUMARATE 2 PUFF: 9; 4.8 AEROSOL, METERED RESPIRATORY (INHALATION) at 08:25

## 2019-03-08 RX ADMIN — INSULIN LISPRO 6 UNITS: 100 INJECTION, SOLUTION INTRAVENOUS; SUBCUTANEOUS at 12:23

## 2019-03-08 RX ADMIN — INSULIN LISPRO 6 UNITS: 100 INJECTION, SOLUTION INTRAVENOUS; SUBCUTANEOUS at 16:50

## 2019-03-08 RX ADMIN — HALOPERIDOL 1 MG: 1 TABLET ORAL at 13:45

## 2019-03-08 RX ADMIN — OXYCODONE HYDROCHLORIDE 10 MG: 5 TABLET ORAL at 12:19

## 2019-03-08 ASSESSMENT — PAIN DESCRIPTION - DESCRIPTORS: DESCRIPTORS: ACHING

## 2019-03-08 ASSESSMENT — PAIN SCALES - GENERAL
PAINLEVEL_OUTOF10: 10
PAINLEVEL_OUTOF10: 7
PAINLEVEL_OUTOF10: 8
PAINLEVEL_OUTOF10: 10
PAINLEVEL_OUTOF10: 8

## 2019-03-08 ASSESSMENT — PAIN DESCRIPTION - FREQUENCY: FREQUENCY: CONTINUOUS

## 2019-03-08 ASSESSMENT — PAIN DESCRIPTION - PAIN TYPE: TYPE: CHRONIC PAIN

## 2019-03-08 ASSESSMENT — PAIN DESCRIPTION - LOCATION: LOCATION: LEG

## 2019-03-11 ENCOUNTER — TELEPHONE (OUTPATIENT)
Dept: INTERNAL MEDICINE CLINIC | Age: 69
End: 2019-03-11

## 2019-03-11 ENCOUNTER — TELEPHONE (OUTPATIENT)
Dept: ENDOCRINOLOGY | Age: 69
End: 2019-03-11

## 2019-03-14 ENCOUNTER — TELEPHONE (OUTPATIENT)
Dept: PSYCHIATRY | Age: 69
End: 2019-03-14

## 2019-03-19 ENCOUNTER — HOSPITAL ENCOUNTER (EMERGENCY)
Age: 69
Discharge: HOME | End: 2019-03-19
Attending: EMERGENCY MEDICINE
Payer: COMMERCIAL

## 2019-03-19 VITALS
OXYGEN SATURATION: 95 % | BODY MASS INDEX: 21.94 KG/M2 | RESPIRATION RATE: 17 BRPM | WEIGHT: 185 LBS | DIASTOLIC BLOOD PRESSURE: 88 MMHG | SYSTOLIC BLOOD PRESSURE: 176 MMHG | HEART RATE: 88 BPM | TEMPERATURE: 97.8 F

## 2019-03-19 DIAGNOSIS — E87.20 LACTIC ACIDOSIS: ICD-10-CM

## 2019-03-19 DIAGNOSIS — E86.0 DEHYDRATION: ICD-10-CM

## 2019-03-19 DIAGNOSIS — R73.9 HYPERGLYCEMIA: Primary | ICD-10-CM

## 2019-03-19 DIAGNOSIS — N39.0 URINARY TRACT INFECTION WITHOUT HEMATURIA, SITE UNSPECIFIED: ICD-10-CM

## 2019-03-19 LAB
A/G RATIO: 1.1 (ref 1.1–2.2)
ALBUMIN SERPL-MCNC: 4.1 G/DL (ref 3.4–5)
ALP BLD-CCNC: 101 U/L (ref 40–129)
ALT SERPL-CCNC: 16 U/L (ref 10–40)
ANION GAP SERPL CALCULATED.3IONS-SCNC: 16 MMOL/L (ref 3–16)
AST SERPL-CCNC: 21 U/L (ref 15–37)
BACTERIA: ABNORMAL /HPF
BASE EXCESS VENOUS: -2.1 MMOL/L (ref -3–3)
BASOPHILS ABSOLUTE: 0 K/UL (ref 0–0.2)
BASOPHILS RELATIVE PERCENT: 0.6 %
BETA-HYDROXYBUTYRATE: 0.93 MMOL/L (ref 0–0.27)
BILIRUB SERPL-MCNC: 0.7 MG/DL (ref 0–1)
BILIRUBIN URINE: NEGATIVE
BLOOD, URINE: ABNORMAL
BUN BLDV-MCNC: 15 MG/DL (ref 7–20)
CALCIUM SERPL-MCNC: 9.5 MG/DL (ref 8.3–10.6)
CARBOXYHEMOGLOBIN: 1.5 % (ref 0–1.5)
CHLORIDE BLD-SCNC: 90 MMOL/L (ref 99–110)
CLARITY: CLEAR
CO2: 23 MMOL/L (ref 21–32)
COLOR: YELLOW
CREAT SERPL-MCNC: 0.9 MG/DL (ref 0.8–1.3)
EOSINOPHILS ABSOLUTE: 0 K/UL (ref 0–0.6)
EOSINOPHILS RELATIVE PERCENT: 0.6 %
GFR AFRICAN AMERICAN: >60
GFR NON-AFRICAN AMERICAN: >60
GLOBULIN: 3.8 G/DL
GLUCOSE BLD-MCNC: 115 MG/DL (ref 70–99)
GLUCOSE BLD-MCNC: 168 MG/DL (ref 70–99)
GLUCOSE BLD-MCNC: 351 MG/DL (ref 70–99)
GLUCOSE BLD-MCNC: 419 MG/DL (ref 70–99)
GLUCOSE BLD-MCNC: 95 MG/DL (ref 70–99)
GLUCOSE URINE: >=1000 MG/DL
HCO3 VENOUS: 24 MMOL/L (ref 23–29)
HCT VFR BLD CALC: 33.2 % (ref 40.5–52.5)
HEMOGLOBIN: 11.3 G/DL (ref 13.5–17.5)
KETONES, URINE: ABNORMAL MG/DL
LACTIC ACID: 1.4 MMOL/L (ref 0.4–2)
LACTIC ACID: 3.1 MMOL/L (ref 0.4–2)
LEUKOCYTE ESTERASE, URINE: NEGATIVE
LYMPHOCYTES ABSOLUTE: 1.1 K/UL (ref 1–5.1)
LYMPHOCYTES RELATIVE PERCENT: 16.4 %
MCH RBC QN AUTO: 31.7 PG (ref 26–34)
MCHC RBC AUTO-ENTMCNC: 34.1 G/DL (ref 31–36)
MCV RBC AUTO: 93 FL (ref 80–100)
METHEMOGLOBIN VENOUS: 0.3 %
MICROSCOPIC EXAMINATION: YES
MONOCYTES ABSOLUTE: 0.6 K/UL (ref 0–1.3)
MONOCYTES RELATIVE PERCENT: 8 %
NEUTROPHILS ABSOLUTE: 5.1 K/UL (ref 1.7–7.7)
NEUTROPHILS RELATIVE PERCENT: 74.4 %
NITRITE, URINE: NEGATIVE
O2 CONTENT, VEN: 11 VOL %
O2 SAT, VEN: 70 %
O2 THERAPY: ABNORMAL
PCO2, VEN: 46.4 MMHG (ref 40–50)
PDW BLD-RTO: 13.7 % (ref 12.4–15.4)
PERFORMED ON: ABNORMAL
PERFORMED ON: NORMAL
PH UA: 6.5 (ref 5–8)
PH VENOUS: 7.33 (ref 7.35–7.45)
PLATELET # BLD: 305 K/UL (ref 135–450)
PMV BLD AUTO: 7.5 FL (ref 5–10.5)
PO2, VEN: 39.4 MMHG (ref 25–40)
POTASSIUM SERPL-SCNC: 4.1 MMOL/L (ref 3.5–5.1)
PROTEIN UA: NEGATIVE MG/DL
RBC # BLD: 3.57 M/UL (ref 4.2–5.9)
RBC UA: ABNORMAL /HPF (ref 0–2)
SODIUM BLD-SCNC: 129 MMOL/L (ref 136–145)
SPECIFIC GRAVITY UA: <=1.005 (ref 1–1.03)
TCO2 CALC VENOUS: 25 MMOL/L
TOTAL PROTEIN: 7.9 G/DL (ref 6.4–8.2)
URINE REFLEX TO CULTURE: YES
URINE TYPE: ABNORMAL
UROBILINOGEN, URINE: 0.2 E.U./DL
WBC # BLD: 6.9 K/UL (ref 4–11)
WBC UA: ABNORMAL /HPF (ref 0–5)

## 2019-03-19 PROCEDURE — 87186 SC STD MICRODIL/AGAR DIL: CPT

## 2019-03-19 PROCEDURE — 87086 URINE CULTURE/COLONY COUNT: CPT

## 2019-03-19 PROCEDURE — 6370000000 HC RX 637 (ALT 250 FOR IP): Performed by: EMERGENCY MEDICINE

## 2019-03-19 PROCEDURE — 2580000003 HC RX 258: Performed by: EMERGENCY MEDICINE

## 2019-03-19 PROCEDURE — 6360000002 HC RX W HCPCS: Performed by: EMERGENCY MEDICINE

## 2019-03-19 PROCEDURE — 96372 THER/PROPH/DIAG INJ SC/IM: CPT

## 2019-03-19 PROCEDURE — 82010 KETONE BODYS QUAN: CPT

## 2019-03-19 PROCEDURE — 96361 HYDRATE IV INFUSION ADD-ON: CPT

## 2019-03-19 PROCEDURE — 81001 URINALYSIS AUTO W/SCOPE: CPT

## 2019-03-19 PROCEDURE — 99285 EMERGENCY DEPT VISIT HI MDM: CPT

## 2019-03-19 PROCEDURE — 85025 COMPLETE CBC W/AUTO DIFF WBC: CPT

## 2019-03-19 PROCEDURE — 80053 COMPREHEN METABOLIC PANEL: CPT

## 2019-03-19 PROCEDURE — 82803 BLOOD GASES ANY COMBINATION: CPT

## 2019-03-19 PROCEDURE — 83605 ASSAY OF LACTIC ACID: CPT

## 2019-03-19 PROCEDURE — 87077 CULTURE AEROBIC IDENTIFY: CPT

## 2019-03-19 PROCEDURE — 96365 THER/PROPH/DIAG IV INF INIT: CPT

## 2019-03-19 RX ORDER — HYDROCODONE BITARTRATE AND ACETAMINOPHEN 5; 325 MG/1; MG/1
1 TABLET ORAL ONCE
Status: COMPLETED | OUTPATIENT
Start: 2019-03-19 | End: 2019-03-19

## 2019-03-19 RX ORDER — 0.9 % SODIUM CHLORIDE 0.9 %
1000 INTRAVENOUS SOLUTION INTRAVENOUS ONCE
Status: COMPLETED | OUTPATIENT
Start: 2019-03-19 | End: 2019-03-19

## 2019-03-19 RX ORDER — CEFUROXIME AXETIL 500 MG/1
500 TABLET ORAL 2 TIMES DAILY
Qty: 20 TABLET | Refills: 0 | Status: SHIPPED | OUTPATIENT
Start: 2019-03-19 | End: 2019-03-29

## 2019-03-19 RX ORDER — OXYCODONE HYDROCHLORIDE 10 MG/1
10 TABLET ORAL EVERY 6 HOURS
Status: ON HOLD | COMMUNITY
End: 2020-04-12 | Stop reason: SDUPTHER

## 2019-03-19 RX ADMIN — INSULIN HUMAN 10 UNITS: 100 INJECTION, SOLUTION PARENTERAL at 08:53

## 2019-03-19 RX ADMIN — SODIUM CHLORIDE 1000 ML: 9 INJECTION, SOLUTION INTRAVENOUS at 08:52

## 2019-03-19 RX ADMIN — CEFTRIAXONE SODIUM 1 G: 1 INJECTION, POWDER, FOR SOLUTION INTRAMUSCULAR; INTRAVENOUS at 12:50

## 2019-03-19 RX ADMIN — HYDROCODONE BITARTRATE AND ACETAMINOPHEN 1 TABLET: 5; 325 TABLET ORAL at 08:52

## 2019-03-19 ASSESSMENT — PAIN SCALES - GENERAL
PAINLEVEL_OUTOF10: 10
PAINLEVEL_OUTOF10: 10
PAINLEVEL_OUTOF10: 0

## 2019-03-19 ASSESSMENT — PAIN DESCRIPTION - PROGRESSION: CLINICAL_PROGRESSION: RESOLVED

## 2019-03-21 LAB
ORGANISM: ABNORMAL
URINE CULTURE, ROUTINE: ABNORMAL
URINE CULTURE, ROUTINE: ABNORMAL

## 2019-03-25 ENCOUNTER — OFFICE VISIT (OUTPATIENT)
Dept: PSYCHIATRY | Age: 69
End: 2019-03-25
Payer: COMMERCIAL

## 2019-03-25 VITALS
DIASTOLIC BLOOD PRESSURE: 62 MMHG | HEIGHT: 77 IN | BODY MASS INDEX: 19.84 KG/M2 | SYSTOLIC BLOOD PRESSURE: 98 MMHG | HEART RATE: 76 BPM | WEIGHT: 168 LBS | OXYGEN SATURATION: 95 %

## 2019-03-25 DIAGNOSIS — F43.10 PTSD (POST-TRAUMATIC STRESS DISORDER): Primary | ICD-10-CM

## 2019-03-25 DIAGNOSIS — F33.1 MODERATE EPISODE OF RECURRENT MAJOR DEPRESSIVE DISORDER (HCC): ICD-10-CM

## 2019-03-25 PROCEDURE — 99214 OFFICE O/P EST MOD 30 MIN: CPT | Performed by: PSYCHIATRY & NEUROLOGY

## 2019-03-25 RX ORDER — SERTRALINE HYDROCHLORIDE 100 MG/1
150 TABLET, FILM COATED ORAL DAILY
Qty: 45 TABLET | Refills: 2 | Status: SHIPPED | OUTPATIENT
Start: 2019-03-25 | End: 2019-04-27

## 2019-03-25 RX ORDER — PRAZOSIN HYDROCHLORIDE 1 MG/1
3 CAPSULE ORAL NIGHTLY
Qty: 90 CAPSULE | Refills: 2 | Status: SHIPPED | OUTPATIENT
Start: 2019-03-25 | End: 2019-04-27

## 2019-03-26 ENCOUNTER — TELEPHONE (OUTPATIENT)
Dept: INTERNAL MEDICINE CLINIC | Age: 69
End: 2019-03-26

## 2019-03-28 ENCOUNTER — TELEPHONE (OUTPATIENT)
Dept: ENDOCRINOLOGY | Age: 69
End: 2019-03-28

## 2019-04-17 ENCOUNTER — OFFICE VISIT (OUTPATIENT)
Dept: ENDOCRINOLOGY | Age: 69
End: 2019-04-17
Payer: COMMERCIAL

## 2019-04-17 VITALS
HEART RATE: 80 BPM | OXYGEN SATURATION: 99 % | SYSTOLIC BLOOD PRESSURE: 120 MMHG | BODY MASS INDEX: 20.38 KG/M2 | DIASTOLIC BLOOD PRESSURE: 76 MMHG | HEIGHT: 77 IN | WEIGHT: 172.6 LBS

## 2019-04-17 DIAGNOSIS — Z79.4 DIABETES MELLITUS DUE TO UNDERLYING CONDITION WITH HYPEROSMOLARITY WITHOUT COMA, WITH LONG-TERM CURRENT USE OF INSULIN (HCC): Primary | ICD-10-CM

## 2019-04-17 DIAGNOSIS — E08.00 DIABETES MELLITUS DUE TO UNDERLYING CONDITION WITH HYPEROSMOLARITY WITHOUT COMA, WITH LONG-TERM CURRENT USE OF INSULIN (HCC): Primary | ICD-10-CM

## 2019-04-17 LAB — HBA1C MFR BLD: 7.8 %

## 2019-04-17 PROCEDURE — 95250 CONT GLUC MNTR PHYS/QHP EQP: CPT | Performed by: NURSE PRACTITIONER

## 2019-04-17 PROCEDURE — 83036 HEMOGLOBIN GLYCOSYLATED A1C: CPT | Performed by: NURSE PRACTITIONER

## 2019-04-17 PROCEDURE — 99213 OFFICE O/P EST LOW 20 MIN: CPT | Performed by: NURSE PRACTITIONER

## 2019-04-17 NOTE — PROGRESS NOTES
Pt came in today for a f/u visit. .freestylelibrepro inserted on 4/17/2019 1:38 PM. Site: Left Arm   LOT# [id number]   Exp:7/31/2021. Return on 4/24/2019 to have sensor removed. Advised pt to come in between 8am-430pm.     Pt stated understanding.

## 2019-04-17 NOTE — PATIENT INSTRUCTIONS
A1c is 7/8% today    Increase Lantus to 50 units  No change in novalog  Use this sliding scale  Sliding scale for pre meal insulin  < 80  Do not take any humalog   Take your fixed dose insulin as prescribed : 10 units  151- 200 Add  2 units  201- 250 Add 4 units  251- 300 Add 6 units   301- 350 Add 8 units  351- 400 Add 10 units    Freestyle  Pro sensor applied today

## 2019-04-17 NOTE — PROGRESS NOTES
Endocrinology  [place], [place]  Phone: [phone]   FAX: [phone]    María Zee is a 80 y.o. male who is following up for management of Diabetes Mellitus Type 2. PCP : RICCO Grant    Last A1C:   Lab Results   Component Value Date    LABA1C 7.8 2019    LABA1C 7.8 10/17/2018    LABA1C 7.1 08/10/2018     Last BP Readings:  BP Readings from Last 3 Encounters:   19 120/76   19 98/62   19 (!) 176/88     Last LDL:   Lab Results   Component Value Date    LDLCALC 73 10/18/2018     Aspirin Use: 81mg    Tobacco/Alcohol History:    Smoking status: Former Smoker     Packs/day: 0.50     Years: 35.00     Pack years: 17.50     Types: Cigarettes     Last attempt to quit: 2018     Years since quittin.5    Smokeless tobacco: Never Used    Alcohol use: Not Currently     Comment: 6 beers a week, sometimes more     Drug use: No     Diabetes:  María Zee  has been diabetic since 2004 and on insulin since then   Patient reports that diabetes is generally not well controlled. Disease course has been variable; reports significant frequency of hypo &hyperglycemic episodes  Current microvascular complications include peripheral neuropathy, retinopathy. Patient reports compliance for about 80% of the time and adheres to medication, but usually not to diet and exercise instructions.      Recent ED visit include for hyperglycemia in 2019, 3/2019    PMH includes  Past Medical History:   Diagnosis Date    Anxiety disorder     BPH (benign prostatic hypertrophy)     Calcified granuloma of lung ([address]     2:stable per 3/25/14 CT chest    Cataract 14    OU:Dr. mcclain:[facility]    Cerebral artery occlusion with cerebral infarction ([address]     Chronic pain     COPD (chronic obstructive pulmonary disease) (HCC)     Under care of pulmo(Dr. Calhoun)    Degenerative arthritis of hip     Dementia     Depression 3/11/2015    Depression with anxiety     Prior psychiatrist & therapist:Dr. Alek Alejandra.  Diabetes mellitus ([facility] [address] 2004    Endo Dr. Chacho Madrid type 1 note below in [facility]    Diabetic eye exam ([facility] [address] 1/20/14    [facility]:Dr. STEPHEN Hayward:No retinopathy. Cataract    Diabetic retinopathy ([facility] [address]     under care of [facility]:Dr. Vivek Gardiner    Diverticulitis 11/26/2011    Diverticulosis 11/26/2011    DKA (diabetic ketoacidoses) (HCC)     Emphysema     Esophageal candidiasis ([facility] [address] 7/16/13    GI:EGD    Essential hypertension, benign 11/2010    ETOH abuse     Fatty liver 10/9/2012    Foot ulcer:left 9/30/2013    Gastric ulcer, unspecified as acute or chronic, without mention of hemorrhage or perforation     GERD (gastroesophageal reflux disease)     Gout 1997    Right big toe    Hearing decreased     Left ear=60%. Right ear=80%.  Hemangioma 12/2010    Liver as per CT abdo    hepatitis c 7/26/17, 2010    Under care of Liver doc:Dr. Elham Cohen    Hyperlipidemia LDL goal < 100     Left-sided weakness     Low HDL (under 40) 10/9/2012    Peripheral neuropathy 2006    Pneumonia 10/15/13    Pyogenic granuloma     per derm:Dr. Ezequiel Cardozo Restrictive lung disease     Under care of pulmo(Dr. Calhoun)    S/P colonoscopy 1996    Done secondary to rectal bleed:dx=hemorrhoids    S/P colonoscopy 11/11/10;10/23/2013    Dr. Polo Vasquez 10/2016(3yrs):polyps;diverticulosis. Diverticulosis & polpy(removed). Next in10/2016.  S/P endoscopy 2009    EGD:stomach ulcers.     Sciatica     Superficial phlebitis of left leg 9/30/2013    Tachycardia     Therapeutic drug monitoring 4/8/15    OARRS report is consistent on 4/8/15;7/9/15;10/9/15;1/8/16;4/5/16    Type 1 diabetes mellitus not at goal [facility]) 3/17/14    updated diagnosis as per endo:Dr. Haleigh Riggins     Blood glucose trends:  Patient brought log glucometer for download: no  Reports could not get sensor  Readings per day  4 times  Average reading high 200s  Lowest in past 2 weeks 48  Highest in past 2 weeks > 500  Hypoglycemia awareness and symptoms:  Has not done CGM    Lab Results   Component Value Date    .2 10/17/2018    .1 08/10/2018    .1 06/28/2017     Current Medication regimen:   Basaglar 35 units QHS--> 45 units currently  Novolog 10 units TID with meals +  sliding scale.      Other meds tried in past: Was on metformin 500-1000 twice a  Day. Stopped as he most likely has type 1 DM. GFR > 60    Current Dietary regimen:   Cannot describe: highly variable    Microvascular Complications:  · Neuropathy: denies concerns  · Retinopathy: no concerns with vision, field of vision  · Nephropathy: no recent MACR    Diabetic Health Maintenance   · Last Eye Exam: 12/2018 per patient,   · Last Foot exam: previous visit  · Has patient seen a dietitian? Not recently  · Current Exercise: No structured exercise  · On ACEI or ARB:  · On statin:     Hyperlipidemia: Current complaints include occasional myalgias but otherwise tolerates well. Lab Results   Component Value Date    CHOL 134 10/18/2018    CHOL 128 08/10/2018    CHOL 164 11/30/2015     Lab Results   Component Value Date    TRIG 127 10/18/2018    TRIG 107 08/10/2018    TRIG 147 11/30/2015     Lab Results   Component Value Date    HDL 36 10/18/2018    HDL 40 08/10/2018    HDL 45 11/30/2015    HDL 59 05/30/2012     Lab Results   Component Value Date    LDLCALC 73 10/18/2018    [address] 67 08/10/2018    [address] 90 11/30/2015     No results found for: LDLDIRECT  No results found for: CHOLHDLRATIO    Vitamin D deficiency: Currently is on no Rx supplementation. Current complaints include fatigue on daily basis. Last vitamin Dlevel is:  Lab Results   Component Value Date    VITD25 19.3 08/10/2018     Hypertension  Currently on on lisinopril, hydralazine, metoprolol. Has occasional symptoms of dizziness, light headedness. Has dependent edema. Does not follow a salt restricted diet.    Lab Results   Component Value Date     (L) 03/19/2019    K 4.1 03/19/2019    CL 90 (L) 03/19/2019    CO2 23 and reactive to light. Neck: Normal range of motion. No thyromegaly present. Cardiovascular: Normal rate, regular rhythm and normal heart sounds. Pulmonary/Chest: Effort normal and breath sounds normal.   Abdominal: He exhibits no distension. Musculoskeletal: Normal range of motion. He exhibits no edema. Neurological: He is alert and oriented to person, place, and time. No sensory deficit. Skin: Skin is warm and dry. No skin changes or evidence of trauma. Psychiatric: He has a normal mood and affect. His behavior is normal. Thought content normal.   Vitals reviewed. Skeletal foot exam: deferred  . Assessment  Jesica Mccullough is a 80 y.o. male with uncontrolled Diabetes Mellitus type 2 complicated by peripheral neuropathy, retinopathy and associated with HTN, GERD, COPD, HLD, DKA  (mulitple times). Plan  Problem List Items Addressed This Visit     DM (diabetes mellitus) ([facility] [address] - Primary     A1c is 7.8% today, however reports unpredictable sugars    Increase Lantus to 50 units  No change in novalog  Use this sliding scale  Sliding scale for pre meal insulin  < 80  Do not take any humalog   Take your fixed dose insulin as prescribed : 10 units  151- 200 Add  2 units  201- 250 Add 4 units  251- 300 Add 6 units   301- 350 Add 8 units  351- 400 Add 10 units    Freestyle  Pro sensor applied today as no data on actual BG patterns         Relevant Orders    POCT glycosylated hemoglobin (Hb A1C) (Completed)    MN CONT GLUC MNTR PHYSICIAN/QHP PROVIDED EQUIPTMENT (Completed)        Greater than 30 minutes spent directly counseling patient about topics listed above (such as lifestyle modifications, preventative screenings and/or disease related processes. Return in about 1 month (around 5/17/2019).

## 2019-04-24 ENCOUNTER — TELEPHONE (OUTPATIENT)
Dept: ENDOCRINOLOGY | Age: 69
End: 2019-04-24

## 2019-04-24 NOTE — TELEPHONE ENCOUNTER
The nursing home that this patient is at called. Patient was supposed to come in today to get his 10 day arm thing off but the nurse said that this patient is absolutely refusing to go. They wanted to make sure that it was okay that it stayed on a little longer, or what needs to happen. They can be reached at [phone].

## 2019-04-24 NOTE — TELEPHONE ENCOUNTER
Spoke with Keven Patton pt's nurse and let her know he can leave in for a few days longer but we do need to have the pt come in to have sensor taken off and to get the results. Irving Ryder to call us if the sensor falls off. Keven Patton stated her understanding.

## 2019-04-27 ENCOUNTER — APPOINTMENT (OUTPATIENT)
Dept: GENERAL RADIOLOGY | Age: 69
End: 2019-04-27
Payer: COMMERCIAL

## 2019-04-27 ENCOUNTER — HOSPITAL ENCOUNTER (EMERGENCY)
Age: 69
Discharge: HOME | End: 2019-04-27
Attending: EMERGENCY MEDICINE
Payer: COMMERCIAL

## 2019-04-27 ENCOUNTER — APPOINTMENT (OUTPATIENT)
Dept: CT IMAGING | Age: 69
End: 2019-04-27
Payer: COMMERCIAL

## 2019-04-27 VITALS
DIASTOLIC BLOOD PRESSURE: 73 MMHG | HEART RATE: 66 BPM | WEIGHT: 165 LBS | TEMPERATURE: 97.8 F | SYSTOLIC BLOOD PRESSURE: 130 MMHG | BODY MASS INDEX: 19.48 KG/M2 | RESPIRATION RATE: 16 BRPM | HEIGHT: 77 IN | OXYGEN SATURATION: 98 %

## 2019-04-27 DIAGNOSIS — G89.4 CHRONIC PAIN SYNDROME: ICD-10-CM

## 2019-04-27 DIAGNOSIS — S76.012A STRAIN OF LEFT HIP, INITIAL ENCOUNTER: ICD-10-CM
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
## 2019-04-27 DIAGNOSIS — M19.90 OSTEOARTHRITIS, UNSPECIFIED OSTEOARTHRITIS TYPE, UNSPECIFIED SITE: ICD-10-CM

## 2019-04-27 PROCEDURE — 99284 EMERGENCY DEPT VISIT MOD MDM: CPT

## 2019-04-27 PROCEDURE — 6370000000 HC RX 637 (ALT 250 FOR IP): Performed by: EMERGENCY MEDICINE

## 2019-04-27 PROCEDURE — 72125 CT NECK SPINE W/O DYE: CPT

## 2019-04-27 PROCEDURE — 73080 X-RAY EXAM OF ELBOW: CPT

## 2019-04-27 PROCEDURE — 73502 X-RAY EXAM HIP UNI 2-3 VIEWS: CPT

## 2019-04-27 PROCEDURE — 72100 X-RAY EXAM L-S SPINE 2/3 VWS: CPT

## 2019-04-27 RX ORDER — MAGNESIUM HYDROXIDE/ALUMINUM HYDROXICE/SIMETHICONE 120; 1200; 1200 MG/30ML; MG/30ML; MG/30ML
5 SUSPENSION ORAL EVERY 6 HOURS PRN
Status: ON HOLD | COMMUNITY
End: 2020-04-10

## 2019-04-27 RX ORDER — OXYCODONE HYDROCHLORIDE AND ACETAMINOPHEN 5; 325 MG/1; MG/1
2 TABLET ORAL ONCE
Status: COMPLETED | OUTPATIENT
Start: 2019-04-27 | End: 2019-04-27

## 2019-04-27 RX ADMIN — OXYCODONE HYDROCHLORIDE AND ACETAMINOPHEN 2 TABLET: 5; 325 TABLET ORAL at 08:28

## 2019-04-27 ASSESSMENT — PAIN DESCRIPTION - LOCATION
LOCATION: BACK;HIP;ELBOW
LOCATION: KNEE;HIP;BACK

## 2019-04-27 ASSESSMENT — PAIN SCALES - GENERAL
PAINLEVEL_OUTOF10: 9
PAINLEVEL_OUTOF10: 8

## 2019-04-27 ASSESSMENT — PAIN DESCRIPTION - PAIN TYPE: TYPE: ACUTE PAIN;CHRONIC PAIN

## 2019-04-27 NOTE — ED NOTES
Medication list was verified with [facility] rec that was sent from [facility].       Bashir Berrios RN  04/27/19 1411

## 2019-04-27 NOTE — ED NOTES
Pt able to stand and pivot in room. Pt states his right knee and left hip are painful when standing. Pt states he uses a walker at the nursing home. Pt states he thinks his right knee gave out and caused his fall.       Ernst Muñoz RN  04/27/19 1014

## 2019-04-27 NOTE — ED PROVIDER NOTES
[address] ED      CHIEF COMPLAINT  Fall (Pt fell at nursing home. C/o back pain, hit head, not on blood thinners. )       HISTORY OF PRESENT ILLNESS  David Durbin is a 80 y.o. male  who presents to the ED complaining of a fall. The patient states that at nursing home he does have chronic pain and multiple medical issues. The patient was getting up to use the bathroom this morning not too long ago and he states he was just resting back to the bed when he lost his balance and fell. He didn't get dizzy he didn't pass out he states he simply just fell. He landed backwards on his back. He does have chronic pain in his neck, back and hips. He states he feels sore normally but little bit worse because he fell. He is able to get up and sit on the bed on his own. He's not on any blood thinners. He denies a headache he may be bumped his head but denies headache or dizziness. No chest pain or shortness of breath no abdominal pain and vomiting. He denies recent illness. He does say oxycodone for pain at baseline. EMS picked him up they didn't appreciate any obvious deformities or findings. He does have significant arthritis and he states he always has pain in his knees back hips and neck. He said his back and hips are little bit worse than usual.  He denies any incontinence, focal weakness or numbness, saddle paresthesias    No other complaints, modifying factors or associated symptoms. I have reviewed the following from the nursing documentation.     Past Medical History:   Diagnosis Date    Anxiety disorder     BPH (benign prostatic hypertrophy)     Calcified granuloma of lung ([address] 2014    2:stable per 3/25/14 CT chest    Cataract 1/20/14    OU:Dr. mcclain:[facility]    Cerebral artery occlusion with cerebral infarction ([address]     Chronic pain     COPD (chronic obstructive pulmonary disease) [facility]     Under care of pulmo(Dr. Calhoun)    Degenerative arthritis of hip     Dementia     Depression 3/11/2015    Depression with anxiety     Prior psychiatrist & therapist:Dr. Amado Hodge.  Diabetes mellitus ([facility] 2004    Endo Dr. Aurelia Valentine type 1 note below [facility]    Diabetic eye exam ([facility] 1/20/14    [facility]:Dr. STEPHEN Hayward:No retinopathy. Cataract    Diabetic retinopathy ([facility]     under care of [facility]:Dr. Irwin Hernandez    Diverticulitis 11/26/2011    Diverticulosis 11/26/2011    DKA (diabetic ketoacidoses) (HCC)     Emphysema     Esophageal candidiasis ([facility] 7/16/13    GI:EGD    Essential hypertension, benign 11/2010    ETOH abuse     Fatty liver 10/9/2012    Foot ulcer:left 9/30/2013    Gastric ulcer, unspecified as acute or chronic, without mention of hemorrhage or perforation     GERD (gastroesophageal reflux disease)     Gout 1997    Right big toe    Hearing decreased     Left ear=60%. Right ear=80%.  Hemangioma 12/2010    Liver as per CT abdo    hepatitis c 7/26/17, 2010    Under care of Liver doc:Dr. Bella Austin    Hyperlipidemia LDL goal < 100     Left-sided weakness     Low HDL (under 40) 10/9/2012    Peripheral neuropathy 2006    Pneumonia 10/15/13    Pyogenic granuloma     per derm:Dr. Guilherme Ortez Restrictive lung disease     Under care of pulmo(Dr. Calhoun)    S/P colonoscopy 1996    Done secondary to rectal bleed:dx=hemorrhoids    S/P colonoscopy 11/11/10;10/23/2013    Dr. Cecilie Bernheim 10/2016(3yrs):polyps;diverticulosis. Diverticulosis & polpy(removed). Next in10/2016.  S/P endoscopy 2009    EGD:stomach ulcers.     Sciatica     Superficial phlebitis of left leg 9/30/2013    Tachycardia     Therapeutic drug monitoring 4/8/15    OARRS report is consistent on 4/8/15;7/9/15;10/9/15;1/8/16;4/5/16    Type 1 diabetes mellitus not at goal [facility]) 3/17/14    updated diagnosis as per endo:Dr. Nito Dominique     Past Surgical History:   Procedure Laterality Date    DIAGNOSTIC CARDIAC CATH LAB PROCEDURE  2013    FOOT SURGERY Left Hammer toe, 2nd toe    10/9/14    HIP FRACTURE SURGERY      LEG SURGERY      broken leg    OTHER SURGICAL HISTORY Left 2013    EXCISION 5TH METATRSAL LEFT FOOT          REVISION COLOSTOMY  2017    COLOSTOMY REVERSAL     TONSILLECTOMY      UPPER GASTROINTESTINAL ENDOSCOPY  2013    Esophageal Brushing     Family History   Problem Relation Age of Onset    Stroke Mother     Hypertension Mother     Arthritis Father     Substance Abuse Father         Etoh    Cancer Father         esophageal    Hypertension Father     Diabetes Brother     Diabetes Paternal Aunt     Asthma Neg Hx     Emphysema Neg Hx     Heart Failure Neg Hx      Social History     Socioeconomic History    Marital status: Single     Spouse name: Not on file    Number of children: Not on file    Years of education: Not on file    Highest education level: Not on file   Occupational History    Occupation: unemployed   Social Needs    Financial resource strain: Not on file    Food insecurity:     Worry: Not on file     Inability: Not on file   [employer] needs:     Medical: Not on file     Non-medical: Not on file   Tobacco Use    Smoking status: Former Smoker     Packs/day: 0.50     Years: 35.00     Pack years: 17.50     Types: Cigarettes     Last attempt to quit: 2018     Years since quittin.6    Smokeless tobacco: Never Used   Substance and Sexual Activity    Alcohol use: Not Currently     Comment: 6 beers a week, sometimes more     Drug use: No    Sexual activity: Never   Lifestyle    Physical activity:     Days per week: Not on file     Minutes per session: Not on file    Stress: Not on file   Relationships    Social connections:     Talks on phone: Not on file     Gets together: Not on file     Attends Christianity service: Not on file     Active member of club or organization: Not on file     Attends meetings of clubs or organizations: Not on file     Relationship status: Not on file    Intimate partner violence:     Fear of current or ex partner: Not on file     Emotionally abused: Not on file     Physically abused: Not on file     Forced sexual activity: Not on file   Other Topics Concern    Not on file   Social History Narrative    Not on file     No current facility-administered medications for this encounter. Current Outpatient Medications   Medication Sig Dispense Refill    aluminum & magnesium hydroxide-simethicone (MAALOX) 200-200-20 MG/5ML SUSP suspension Take 5 mLs by mouth every 6 hours as needed for Indigestion      oxyCODONE HCl (OXY-IR) 10 MG immediate release tablet Take 10 mg by mouth every 6 hours.        atorvastatin (LIPITOR) 80 MG tablet Take 80 mg by mouth daily      Insulin Glargine (LANTUS SOLOSTAR SC) Inject 45 Units into the skin nightly       cyclobenzaprine (FLEXERIL) 5 MG tablet Take 5 mg by mouth 3 times daily as needed for Muscle spasms      melatonin 3 MG TABS tablet Take 6 mg by mouth nightly as needed      insulin aspart (NOVOLOG) 100 UNIT/ML injection vial Inject 10 Units into the skin 3 times daily (before meals) +SSI 3 vial 1    vitamin E 1000 units capsule Take 1 capsule by mouth daily 30 capsule 3    vitamin B-12 (CYANOCOBALAMIN) 1000 MCG tablet TAKE 1 TABLET BY MOUTH DAILY 30 tablet 3    tamsulosin (FLOMAX) 0.4 MG capsule TAKE 2 CAPSULES BY MOUTH DAILY 180 capsule 0    SUMAtriptan (IMITREX) 100 MG tablet Take 1 tablet by mouth once as needed for Migraine (Patient taking differently: Take 100 mg by mouth daily as needed for Migraine ) 9 tablet 0    umeclidinium-vilanterol (ANORO ELLIPTA) 62.5-25 MCG/INH AEPB inhaler Inhale 1 puff into the lungs daily 1 each 11    albuterol sulfate HFA (PROAIR HFA) 108 (90 Base) MCG/ACT inhaler Inhale 2 puffs into the lungs every 4 hours as needed for Wheezing or Shortness of Breath 1 Inhaler 11    thiamine 100 MG tablet Take 1 tablet by mouth daily 30 tablet 3    glucagon 1 MG injection Inject 1 kit into the skin as needed      lisinopril (PRINIVIL;ZESTRIL) 20 MG tablet Take 1 tablet by mouth daily 30 tablet 0    Continuous Blood Gluc  (FREESTYLE BRIDGET READER) DENIS Use to monitor sugars 1 Device 0    Continuous Blood Gluc Sensor (FREESTYLE BRIDGET SENSOR SYSTEM) [facility] Use to monitor sugars 3 each 2    AGAMATRIX PRESTO TEST strip USE TO TEST BLOOD SUGAR 3 TIMES A  each 3    Tens Unit MISC by Does not apply route Use as directed. 1 each 0    Lancets MISC Testing 2-3 times daily DX Code: E11.9 100 each 3    Blood Glucose Monitoring Suppl (TRUE METRIX AIR GLUCOSE METER) DENIS 1 meter 1 Device 0     Allergies   Allergen Reactions    Neurontin [Gabapentin]      Gastric side effects       REVIEW OF SYSTEMS  10 systems reviewed, pertinent positives per HPI otherwise noted to be negative. PHYSICAL EXAM  /73   Pulse 66   Temp 97.8 °F (36.6 °C)   Resp 16   Ht 6' 5\" (1.956 m)   Wt 165 lb (74.8 kg)   SpO2 98%   BMI 19.57 kg/m²    GENERAL APPEARANCE: Awake and alert. Cooperative. No acute distress. HENT: Normocephalic. Atraumatic. Mucous membranes are moist  NECK: Supple. General discomfort in his neck without step-offs. EYES: PERRL. EOM's grossly intact. HEART/CHEST: RRR. No murmurs. 2+ radial and pedal pulses bilaterally  LUNGS: Respirations unlabored. CTAB. Good air exchange. Speaking comfortably in full sentences. ABDOMEN: No tenderness. Soft. Non-distended. Old midline scar well-healed. No guarding or rebound. MUSCULOSKELETAL: No extremity edema. He does have what appears to be some tenderness over the left hip with some movement. No deformities noted. Small abrasion to the right knee with no pain on palpation no significant joint effusions of the knees. Normal range of motion of the upper extremities without pain. He does have some general tenderness lumbar area more in the paraspinal areas without step-offs. SKIN: Warm and dry. No acute rashes. NEUROLOGICAL: Alert and oriented. CN's 2-12 intact.   He is able to move all is grossly with intact normal strength. Sensation intact. No facial asymmetry speech is clear. Reflexes difficult to palpate secondary to his chronic arthritis but no asymmetry   PSYCHIATRIC: Normal mood and affect. LABS  I have reviewed all labs for this visit. No results found for this visit on 04/27/19. ECG      RADIOLOGY  Xr Lumbar Spine (2-3 Views)    Result Date: 4/27/2019  EXAMINATION: 3 XRAY VIEWS OF THE LUMBAR SPINE 4/27/2019 8:09 am COMPARISON: None. HISTORY: ORDERING SYSTEM PROVIDED HISTORY: fall, pain TECHNOLOGIST PROVIDED HISTORY: Reason for exam:->fall, pain Ordering Physician Provided Reason for Exam: fall at nursing home Acuity: Acute Type of Exam: Initial FINDINGS: There is approximately 2-3 mm anterolisthesis of L4 on L5. There is no fracture or osseous destruction. Moderate disc space narrowing, endplate osteophyte formation and vacuum phenomena are present at the L5/S1 level. The SI joints are symmetric. No acute radiographic abnormality. Xr Elbow Left (min 3 Views)    Result Date: 4/27/2019  EXAMINATION: 3 XRAY VIEWS OF THE LEFT ELBOW 4/27/2019 8:20 am COMPARISON: None. HISTORY: ORDERING SYSTEM PROVIDED HISTORY: fall/injury/pain TECHNOLOGIST PROVIDED HISTORY: Reason for exam:->fall/injury/pain Ordering Physician Provided Reason for Exam: fall Acuity: Acute Type of Exam: Initial Mechanism of Injury: fall Left elbow pain. FINDINGS: There is no evidence of acute fracture. There is normal alignment. No acute joint abnormality. No focal osseous lesion. No focal soft tissue abnormality. No acute osseous abnormality. Ct Cervical Spine Wo Contrast    Result Date: 4/27/2019  EXAMINATION: CT OF THE CERVICAL SPINE WITHOUT CONTRAST 4/27/2019 8:24 am TECHNIQUE: CT of the cervical spine was performed without the administration of intravenous contrast. Multiplanar reformatted images are provided for review.  Dose modulation, iterative reconstruction, and/or weight based adjustment of the mA/kV was utilized to reduce the radiation dose to as low as reasonably achievable. COMPARISON: None. HISTORY: ORDERING SYSTEM PROVIDED HISTORY: fall, pain TECHNOLOGIST PROVIDED HISTORY: Ordering Physician Provided Reason for Exam: pt fell, hit his head, lupe neck. hurts everywhere Acuity: Acute Type of Exam: Initial FINDINGS: BONES/ALIGNMENT: There is no evidence of an acute cervical spine fracture. There is normal alignment of the cervical spine. DEGENERATIVE CHANGES: Multilevel degenerative changes. SOFT TISSUES: There is no prevertebral soft tissue swelling. No acute abnormality of the cervical spine. Xr Hip 2-3 Vw W Pelvis Left    Result Date: 4/27/2019  EXAMINATION: SINGLE XRAY VIEW OF THE PELVIS AND 2 XRAY VIEWS LEFT HIP 4/27/2019 8:09 am COMPARISON: None. HISTORY: ORDERING SYSTEM PROVIDED HISTORY: fall, pain TECHNOLOGIST PROVIDED HISTORY: Reason for exam:->fall, pain Ordering Physician Provided Reason for Exam: fall at nursing home Acuity: Acute Type of Exam: Initial Left hip pain. FINDINGS: There is no fracture or dislocation. Moderate degenerative joint space narrowing and rim-like acetabular/femoral neck osteophyte formation is present. The soft tissues are unremarkable. Negative for fracture. ED COURSE/MDM  Patient seen and evaluated. Old records reviewed. Labs and imaging reviewed and results discussed with patient. Patient here today with a fall. The sounds mechanical the patient remembers what happened and he didn't lose consciousness he normally gets around the walker he has chronic pain as it is with bad arthritis. I did do a CT of his neck given his pain I see no evidence of head trauma and he is not on any blood thinners. Low suspicion for intracranial hemorrhage or skull fracture. CT of his neck ordered I did order x-rays of his hip and back but no other acute trauma seen. He is breathing comfortably alert answering questions appropriately.   Apparently he had complained of some left elbow pain once he got x-ray I did reevaluate his elbow he has an old abrasion there but no significant swelling so we did x-ray that is negative as well. Patient's x-rays all showed some arthritic changes but no acute findings. No fracture of his hip. Patient was able to stand and transfer at his baseline. He is having some pain but he doesn't appear to be having pain out of proportion and appears be all chronic and the patient agrees with me. I feel this is likely just more of a fall on the strain on top of his chronic pain. I did explain to the patient if he's having worsening pain that is ongoing despite his medicines at his facility he would come back and get reevaluated and possibly need CTs of his lumbar or pelvic areas but given his exam now and his ability to stand at baseline and take some steps I feel he is safe for discharge. He got his medicine here appears be resting comfortably no worsening symptoms. During the patient's ED course, the patient was given:  Medications   oxyCODONE-acetaminophen (PERCOCET) 5-325 MG per tablet 2 tablet (2 tablets Oral Given 4/27/19 0828)        CLINICAL IMPRESSION  1. Accidental fall, initial encounter    2. Chronic pain syndrome    3. Osteoarthritis, unspecified osteoarthritis type, unspecified site    4. Strain of left hip, initial encounter        Blood pressure 130/73, pulse 66, temperature 97.8 °F (36.6 °C), resp. rate 16, height 6' 5\" (1.956 m), weight 165 lb (74.8 kg), SpO2 98 %. DISPOSITION  Ramon Coppola was discharged to home in stable condition. Patient was given scripts for the following medications. I counseled patient how to take these medications. New Prescriptions    No medications on file   Patient has prescriptions already for pain medicine he is to continue his normal medicines.     Follow-up with:  RICCO Pritchard - CNP  [address]  [facility] [id number]  [phone]            DISCLAIMER: This chart was created using Dragon dictation software. Efforts were made by me to ensure accuracy, however some errors may be present due to limitations of this technology and occasionally words are not transcribed correctly.         Artie Lane,   04/27/19 1030

## 2019-05-14 ENCOUNTER — TELEPHONE (OUTPATIENT)
Dept: ENDOCRINOLOGY | Age: 69
End: 2019-05-14

## 2019-05-14 NOTE — TELEPHONE ENCOUNTER
Britney Langley left voicemail asking if Bright Bryan needed to keep his appointment on Friday. He was last seen in April. Britney Langley wanted to know if this was just a simple check up or if there was a reason that he had to be seen again so soon.

## 2019-05-15 NOTE — TELEPHONE ENCOUNTER
Spoke with Miguel Manzanares and she stated she cancelled his appointment because arrangements weren't made for transportation. Alert-The patient is alert, awake and responds to voice. The patient is oriented to time, place, and person. The triage nurse is able to obtain subjective information.

## 2019-05-28 ENCOUNTER — APPOINTMENT (OUTPATIENT)
Dept: GENERAL RADIOLOGY | Age: 69
DRG: 617 | End: 2019-05-28
Payer: COMMERCIAL

## 2019-05-28 ENCOUNTER — HOSPITAL ENCOUNTER (INPATIENT)
Age: 69
LOS: 3 days | Discharge: NURSING, REHAB OR LONG-TERM CARE | DRG: 617 | End: 2019-05-31
Attending: EMERGENCY MEDICINE | Admitting: INTERNAL MEDICINE
Payer: COMMERCIAL

## 2019-05-28 ENCOUNTER — TELEPHONE (OUTPATIENT)
Dept: PULMONOLOGY | Age: 69
End: 2019-05-28

## 2019-05-28 DIAGNOSIS — N17.9 AKI (ACUTE KIDNEY INJURY) (HCC): ICD-10-CM

## 2019-05-28 DIAGNOSIS — S91.301A OPEN WOUND OF RIGHT FOOT, INITIAL ENCOUNTER: Primary | ICD-10-CM

## 2019-05-28 LAB
A/G RATIO: 0.7 (ref 1.1–2.2)
ALBUMIN SERPL-MCNC: 3.3 G/DL (ref 3.4–5)
ALP BLD-CCNC: 90 U/L (ref 40–129)
ALT SERPL-CCNC: 9 U/L (ref 10–40)
ANION GAP SERPL CALCULATED.3IONS-SCNC: 11 MMOL/L (ref 3–16)
ANION GAP SERPL CALCULATED.3IONS-SCNC: 12 MMOL/L (ref 3–16)
AST SERPL-CCNC: 17 U/L (ref 15–37)
BASOPHILS ABSOLUTE: 0.1 K/UL (ref 0–0.2)
BASOPHILS RELATIVE PERCENT: 0.8 %
BILIRUB SERPL-MCNC: 0.4 MG/DL (ref 0–1)
BUN BLDV-MCNC: 30 MG/DL (ref 7–20)
BUN BLDV-MCNC: 31 MG/DL (ref 7–20)
CALCIUM SERPL-MCNC: 8.8 MG/DL (ref 8.3–10.6)
CALCIUM SERPL-MCNC: 9 MG/DL (ref 8.3–10.6)
CHLORIDE BLD-SCNC: 95 MMOL/L (ref 99–110)
CHLORIDE BLD-SCNC: 98 MMOL/L (ref 99–110)
CO2: 24 MMOL/L (ref 21–32)
CO2: 24 MMOL/L (ref 21–32)
CREAT SERPL-MCNC: 1.6 MG/DL (ref 0.8–1.3)
CREAT SERPL-MCNC: 1.6 MG/DL (ref 0.8–1.3)
EOSINOPHILS ABSOLUTE: 0.2 K/UL (ref 0–0.6)
EOSINOPHILS RELATIVE PERCENT: 2.2 %
GFR AFRICAN AMERICAN: 52
GFR AFRICAN AMERICAN: 52
GFR NON-AFRICAN AMERICAN: 43
GFR NON-AFRICAN AMERICAN: 43
GLOBULIN: 4.7 G/DL
GLUCOSE BLD-MCNC: 108 MG/DL (ref 70–99)
GLUCOSE BLD-MCNC: 256 MG/DL (ref 70–99)
GLUCOSE BLD-MCNC: 90 MG/DL (ref 70–99)
HCT VFR BLD CALC: 31.5 % (ref 40.5–52.5)
HEMOGLOBIN: 10.2 G/DL (ref 13.5–17.5)
LYMPHOCYTES ABSOLUTE: 2.6 K/UL (ref 1–5.1)
LYMPHOCYTES RELATIVE PERCENT: 27.4 %
MCH RBC QN AUTO: 27.9 PG (ref 26–34)
MCHC RBC AUTO-ENTMCNC: 32.5 G/DL (ref 31–36)
MCV RBC AUTO: 85.9 FL (ref 80–100)
MONOCYTES ABSOLUTE: 1.1 K/UL (ref 0–1.3)
MONOCYTES RELATIVE PERCENT: 11.3 %
NEUTROPHILS ABSOLUTE: 5.5 K/UL (ref 1.7–7.7)
NEUTROPHILS RELATIVE PERCENT: 58.3 %
PDW BLD-RTO: 13.8 % (ref 12.4–15.4)
PERFORMED ON: NORMAL
PLATELET # BLD: 282 K/UL (ref 135–450)
PMV BLD AUTO: 6.9 FL (ref 5–10.5)
POTASSIUM SERPL-SCNC: 4.6 MMOL/L (ref 3.5–5.1)
POTASSIUM SERPL-SCNC: 4.9 MMOL/L (ref 3.5–5.1)
RBC # BLD: 3.67 M/UL (ref 4.2–5.9)
SODIUM BLD-SCNC: 130 MMOL/L (ref 136–145)
SODIUM BLD-SCNC: 134 MMOL/L (ref 136–145)
TOTAL PROTEIN: 8 G/DL (ref 6.4–8.2)
WBC # BLD: 9.4 K/UL (ref 4–11)

## 2019-05-28 PROCEDURE — 80053 COMPREHEN METABOLIC PANEL: CPT

## 2019-05-28 PROCEDURE — 74018 RADEX ABDOMEN 1 VIEW: CPT

## 2019-05-28 PROCEDURE — 0HBMXZZ EXCISION OF RIGHT FOOT SKIN, EXTERNAL APPROACH: ICD-10-PCS | Performed by: INTERNAL MEDICINE

## 2019-05-28 PROCEDURE — 6370000000 HC RX 637 (ALT 250 FOR IP): Performed by: EMERGENCY MEDICINE

## 2019-05-28 PROCEDURE — 96360 HYDRATION IV INFUSION INIT: CPT

## 2019-05-28 PROCEDURE — 85025 COMPLETE CBC W/AUTO DIFF WBC: CPT

## 2019-05-28 PROCEDURE — 1200000000 HC SEMI PRIVATE

## 2019-05-28 PROCEDURE — 83036 HEMOGLOBIN GLYCOSYLATED A1C: CPT

## 2019-05-28 PROCEDURE — 36415 COLL VENOUS BLD VENIPUNCTURE: CPT

## 2019-05-28 PROCEDURE — 2580000003 HC RX 258: Performed by: EMERGENCY MEDICINE

## 2019-05-28 PROCEDURE — 73630 X-RAY EXAM OF FOOT: CPT

## 2019-05-28 PROCEDURE — 99285 EMERGENCY DEPT VISIT HI MDM: CPT

## 2019-05-28 PROCEDURE — 73070 X-RAY EXAM OF ELBOW: CPT

## 2019-05-28 RX ORDER — SERTRALINE HYDROCHLORIDE 100 MG/1
150 TABLET, FILM COATED ORAL DAILY
COMMUNITY
End: 2021-03-29 | Stop reason: ALTCHOICE

## 2019-05-28 RX ORDER — FINASTERIDE 5 MG/1
5 TABLET, FILM COATED ORAL DAILY
COMMUNITY

## 2019-05-28 RX ORDER — METOPROLOL TARTRATE 50 MG/1
25 TABLET, FILM COATED ORAL ONCE
Status: COMPLETED | OUTPATIENT
Start: 2019-05-28 | End: 2019-05-28

## 2019-05-28 RX ORDER — CYCLOBENZAPRINE HCL 10 MG
10 TABLET ORAL ONCE
Status: COMPLETED | OUTPATIENT
Start: 2019-05-28 | End: 2019-05-28

## 2019-05-28 RX ORDER — PANTOPRAZOLE SODIUM 40 MG/1
40 GRANULE, DELAYED RELEASE ORAL
COMMUNITY
End: 2019-12-03

## 2019-05-28 RX ORDER — FOLIC ACID 1 MG/1
1 TABLET ORAL DAILY
COMMUNITY

## 2019-05-28 RX ORDER — 0.9 % SODIUM CHLORIDE 0.9 %
1500 INTRAVENOUS SOLUTION INTRAVENOUS ONCE
Status: COMPLETED | OUTPATIENT
Start: 2019-05-28 | End: 2019-05-28

## 2019-05-28 RX ORDER — HYDROCODONE BITARTRATE AND ACETAMINOPHEN 5; 325 MG/1; MG/1
2 TABLET ORAL ONCE
Status: COMPLETED | OUTPATIENT
Start: 2019-05-28 | End: 2019-05-28

## 2019-05-28 RX ORDER — SUMATRIPTAN 100 MG/1
100 TABLET, FILM COATED ORAL DAILY PRN
COMMUNITY

## 2019-05-28 RX ADMIN — METOPROLOL TARTRATE 25 MG: 50 TABLET ORAL at 23:41

## 2019-05-28 RX ADMIN — HYDROCODONE BITARTRATE AND ACETAMINOPHEN 2 TABLET: 5; 325 TABLET ORAL at 21:23

## 2019-05-28 RX ADMIN — SODIUM CHLORIDE 1500 ML: 9 INJECTION, SOLUTION INTRAVENOUS at 16:37

## 2019-05-28 RX ADMIN — CYCLOBENZAPRINE HYDROCHLORIDE 10 MG: 10 TABLET, FILM COATED ORAL at 21:23

## 2019-05-28 ASSESSMENT — ENCOUNTER SYMPTOMS
DIARRHEA: 0
CONSTIPATION: 0
ABDOMINAL PAIN: 0
NAUSEA: 0
CHEST TIGHTNESS: 0
BACK PAIN: 0
SORE THROAT: 0
SHORTNESS OF BREATH: 0
COUGH: 0
VOMITING: 0
TROUBLE SWALLOWING: 0
RHINORRHEA: 0

## 2019-05-28 ASSESSMENT — PAIN DESCRIPTION - ORIENTATION: ORIENTATION: RIGHT

## 2019-05-28 ASSESSMENT — PAIN SCALES - GENERAL
PAINLEVEL_OUTOF10: 8
PAINLEVEL_OUTOF10: 7
PAINLEVEL_OUTOF10: 8

## 2019-05-28 ASSESSMENT — PAIN DESCRIPTION - PAIN TYPE: TYPE: ACUTE PAIN

## 2019-05-28 ASSESSMENT — PAIN DESCRIPTION - FREQUENCY: FREQUENCY: CONTINUOUS

## 2019-05-28 ASSESSMENT — PAIN DESCRIPTION - DESCRIPTORS: DESCRIPTORS: BURNING

## 2019-05-28 ASSESSMENT — PAIN DESCRIPTION - ONSET: ONSET: ON-GOING

## 2019-05-28 ASSESSMENT — PAIN DESCRIPTION - LOCATION: LOCATION: FOOT

## 2019-05-28 NOTE — ED NOTES
Patient resting on stretcher, no needs at this time. Nad. VSS. Pt awaiting further dispo. Bed in low position, call bell in reach. Will continue to monitor.       Nhi Sanders RN  05/28/19 [id number]

## 2019-05-28 NOTE — TELEPHONE ENCOUNTER
Jorje Fermin (nurse from nursing [place]). She said patient and his sister make all of their own appointments and they were aware it was cancelled due to a transportation issue. Pts sister is supposed to call back to our office to reschedule appointment.

## 2019-05-28 NOTE — ED NOTES
Dr. Olivia Reyes at bed side evaluating pt at this time. Will continue to monitor.       Richard Bar RN  05/28/19 [id number]

## 2019-05-28 NOTE — ED PROVIDER NOTES
[address] [facility]  eMERGENCYdEPARTMENT eNCOUnter      Pt Name: Megan Tiwari  MRN: [id number]  Terrigfmary 1950  Date of evaluation: 5/28/2019  Ann Brown MD    CHIEF COMPLAINT       Chief Complaint   Patient presents with    Wound Infection     patient brought in by EMS from [facility] for an infection in his right foot. HISTORY OF PRESENT ILLNESS   (Location/Symptom, Timing/Onset,Context/Setting, Quality, Duration, Modifying Factors, Severity)  Note limiting factors. Megan Tiwari is a 80 y.o. male with history of peripheral neuropathy, COPD, hyperlipidemia, hep C, DKA who presents to the emergency department for wound infection. Patient reports 3 days he was rubbing his feet and felt something hanging off of it and pulled it out and foot started bleeding which resolved after several hours. Today nurse noted object hanging off his foot with wound and sent him to get evaluated for foot infection. HPI    Nursing Notes were reviewed. REVIEW OF SYSTEMS    (2-9 systems for level 4, 10 or more for level 5)     Review of Systems   Constitutional: Negative for activity change, appetite change, chills, diaphoresis and fever. HENT: Negative for congestion, rhinorrhea, sneezing, sore throat and trouble swallowing. Respiratory: Negative for cough, chest tightness and shortness of breath. Cardiovascular: Negative for chest pain and palpitations. Gastrointestinal: Negative for abdominal pain, constipation, diarrhea, nausea and vomiting. Genitourinary: Negative for dysuria, flank pain and hematuria. Musculoskeletal: Negative for back pain, gait problem and myalgias. Skin: Negative for rash and wound. Neurological: Negative for dizziness, weakness and numbness. Except as noted above the remainder of the review of systems was reviewedand negative.        PAST MEDICAL HISTORY     Past Medical History:   Diagnosis Date    Anxiety disorder     BPH (benign prostatic hypertrophy)     Calcified granuloma of lung ([facility] [address] 2014    2:stable per 3/25/14 CT chest    Cataract 1/20/14    OU:Dr. hayward:[facility]    Cerebral artery occlusion with cerebral infarction ([address]     Chronic pain     COPD (chronic obstructive pulmonary disease) (HCC)     Under care of pulmo(Dr. Calhoun)    Degenerative arthritis of hip     Dementia     Depression 3/11/2015    Depression with anxiety     Prior psychiatrist & therapist:Dr. Lluvia Parikh.  Diabetes mellitus ([address] 2004    Endo Dr. Destini Mejia type 1 note below in [facility]    Diabetic eye exam ([address] 1/20/14    [facility]:Dr. STEPHEN Hayward:No retinopathy. Cataract    Diabetic retinopathy ([address]     under care of [facility]:Dr. Cande Willis    Diverticulitis 11/26/2011    Diverticulosis 11/26/2011    DKA (diabetic ketoacidoses) (HCC)     Emphysema     Esophageal candidiasis ([address] 7/16/13    GI:EGD    Essential hypertension, benign 11/2010    ETOH abuse     Fatty liver 10/9/2012    Foot ulcer:left 9/30/2013    Gastric ulcer, unspecified as acute or chronic, without mention of hemorrhage or perforation     GERD (gastroesophageal reflux disease)     Gout 1997    Right big toe    Hearing decreased     Left ear=60%. Right ear=80%.  Hemangioma 12/2010    Liver as per CT abdo    hepatitis c 7/26/17, 2010    Under care of Liver doc:Dr. Taryn Min    Hyperlipidemia LDL goal < 100     Left-sided weakness     Low HDL (under 40) 10/9/2012    Peripheral neuropathy 2006    Pneumonia 10/15/13    Pyogenic granuloma     per derm:Dr. Radha Sauer Restrictive lung disease     Under care of pulmo(Dr. Calhoun)    S/P colonoscopy 1996    Done secondary to rectal bleed:dx=hemorrhoids    S/P colonoscopy 11/11/10;10/23/2013    Dr. Mason Lancaster 10/2016(3yrs):polyps;diverticulosis. Diverticulosis & polpy(removed). Next in10/2016.  S/P endoscopy 2009    EGD:stomach ulcers.     Sciatica     Superficial phlebitis of left leg 9/30/2013    Tachycardia     Therapeutic drug monitoring 4/8/15    OARRS report is consistent on 4/8/15;7/9/15;10/9/15;1/8/16;4/5/16    Type 1 diabetes mellitus not at goal [facility]) 3/17/14    updated diagnosis as per endo:Dr. Sedrick Ruff         SURGICAL HISTORY       Past Surgical History:   Procedure Laterality Date    DIAGNOSTIC CARDIAC CATH LAB PROCEDURE  2013    FOOT SURGERY Left Hammer toe, 2nd toe    10/9/14    HIP FRACTURE SURGERY      LEG SURGERY      broken leg    OTHER SURGICAL HISTORY Left 11/21/2013    EXCISION 5TH METATRSAL LEFT FOOT          REVISION COLOSTOMY  06/27/2017    COLOSTOMY REVERSAL     TONSILLECTOMY      UPPER GASTROINTESTINAL ENDOSCOPY  7/16/2013    Esophageal Brushing         CURRENT MEDICATIONS       Current Discharge Medication List      CONTINUE these medications which have NOT CHANGED    Details   mometasone (ASMANEX 120 METERED DOSES) 220 MCG/INH inhaler Inhale 2 puffs into the lungs daily      finasteride (PROSCAR) 5 MG tablet Take 5 mg by mouth daily      folic acid (FOLVITE) 1 MG tablet Take 1 mg by mouth daily      metoprolol tartrate (LOPRESSOR) 25 MG tablet Take 25 mg by mouth 2 times daily      pantoprazole sodium (PROTONIX) 40 MG PACK packet Take 40 mg by mouth every morning (before breakfast)      sertraline (ZOLOFT) 100 MG tablet Take 150 mg by mouth daily      Dulaglutide (TRULICITY) 2.74 NR/7.3UC SOPN Inject 0.75 mg into the skin once a week      SUMAtriptan (IMITREX) 100 MG tablet Take 100 mg by mouth daily as needed for Migraine      oxyCODONE HCl (OXY-IR) 10 MG immediate release tablet Take 10 mg by mouth every 6 hours.        lisinopril (PRINIVIL;ZESTRIL) 20 MG tablet Take 1 tablet by mouth daily  Qty: 30 tablet, Refills: 0      atorvastatin (LIPITOR) 80 MG tablet Take 80 mg by mouth daily      Insulin Glargine (LANTUS SOLOSTAR SC) Inject 50 Units into the skin nightly       cyclobenzaprine (FLEXERIL) 5 MG tablet Take 5 mg by mouth 3 times daily as needed for Muscle spasms      melatonin 3 MG TABS tablet Take 6 mg by Breath  Qty: 1 Inhaler, Refills: 11    Associated Diagnoses: Simple chronic bronchitis (HCC)      Lancets MISC Testing 2-3 times daily DX Code: E11.9  Qty: 100 each, Refills: 3             ALLERGIES     Neurontin [gabapentin]    FAMILY HISTORY       Family History   Problem Relation Age of Onset    Stroke Mother     Hypertension Mother     Arthritis Father     Substance Abuse Father         Etoh    Cancer Father         esophageal    Hypertension Father     Diabetes Brother     Diabetes Paternal Aunt     Asthma Neg Hx     Emphysema Neg Hx     Heart Failure Neg Hx           SOCIAL HISTORY       Social History     Socioeconomic History    Marital status: Single     Spouse name: None    Number of children: None    Years of education: None    Highest education level: None   Occupational History    Occupation: unemployed   Social Needs    Financial resource strain: None    Food insecurity:     Worry: None     Inability: None    Transportation needs:     Medical: None     Non-medical: None   Tobacco Use    Smoking status: Former Smoker     Packs/day: 0.50     Years: 35.00     Pack years: 17.50     Types: Cigarettes     Last attempt to quit: 2018     Years since quittin.6    Smokeless tobacco: Never Used   Substance and Sexual Activity    Alcohol use: Not Currently    Drug use: No    Sexual activity: Not Currently   Lifestyle    Physical activity:     Days per week: None     Minutes per session: None    Stress: None   Relationships    Social connections:     Talks on phone: None     Gets together: None     Attends [employer] service: None     Active member of club or organization: None     Attends meetings of clubs or organizations: None     Relationship status: None    Intimate partner violence:     Fear of current or ex partner: None     Emotionally abused: None     Physically abused: None     Forced sexual activity: None   Other Topics Concern    None   Social History Narrative    None       SCREENINGS    Con Coma Scale  Eye Opening: Spontaneous  Best Verbal Response: Oriented  Best Motor Response: Obeys commands  Con Coma Scale Score: 15        PHYSICAL EXAM    (up to 7 for level 4, 8 ormore for level 5)     ED Triage Vitals [05/28/19 1536]   BP Temp Temp Source Pulse Resp SpO2 Height Weight   (!) 143/83 97.9 °F (36.6 °C) Oral 86 16 98 % 6' 5\" (1.956 m) 160 lb (72.6 kg)       Physical Exam   Constitutional: He is oriented to person, place, and time. He appears well-developed and well-nourished. He is cooperative. Non-toxic appearance. He does not have a sickly appearance. He does not appear ill. No distress. Sitting in bed comfortably, speaking in full sentences, following verbal commands appropriately. Not in acute distress     HENT:   Head: Normocephalic and atraumatic. Mouth/Throat: Oropharynx is clear and moist.   Eyes: Pupils are equal, round, and reactive to light. Conjunctivae and EOM are normal.   Neck: Normal range of motion. Neck supple. Cardiovascular: Normal rate, regular rhythm, normal heart sounds and intact distal pulses. Exam reveals no gallop and no friction rub. No murmur heard. Pulmonary/Chest: Effort normal and breath sounds normal. No respiratory distress. He has no decreased breath sounds. He has no wheezes. He has no rhonchi. He has no rales. Abdominal: Soft. Normal appearance and bowel sounds are normal. He exhibits no distension. There is no tenderness. There is no rebound and no guarding. Musculoskeletal: Normal range of motion. He exhibits no edema, tenderness or deformity. Arms:       Feet:    Neurological: He is alert and oriented to person, place, and time. GCS eye subscore is 4. GCS verbal subscore is 5. GCS motor subscore is 6. Skin: Skin is warm and dry. No rash noted.                DIAGNOSTIC RESULTS     EKG: All EKG's are interpreted by the Emergency Department Physicianwho either signs or Co-signs this chart in the absence of within normal limits    Narrative:     Performed at:  [facility] - [facility] [address],  [place], [facility]   Phone [phone]   POCT GLUCOSE - Abnormal; Notable for the following components:    POC Glucose 291 (*)     All other components within normal limits    Narrative:     Performed at:  [facility] [address],  [place], [facility]   Phone [phone]   URINE RT REFLEX TO CULTURE   OSMOLALITY, URINE   ELECTROLYTES URINE RANDOM   CREATININE, RANDOM URINE   CBC WITH AUTO DIFFERENTIAL   HEMOGLOBIN A1C   COMPREHENSIVE METABOLIC PANEL W/ REFLEX TO MG FOR LOW K   POCT GLUCOSE    Narrative:     Performed at:  [facility] [address],  [place], [facility]   Phone [phone]       All other labs were within normal range ornot returned as of this dictation. EMERGENCY DEPARTMENT COURSE and DIFFERENTIAL DIAGNOSIS/MDM:   Vitals:    Vitals:    05/28/19 2229 05/28/19 2231 05/29/19 0058 05/29/19 0059   BP:  (!) 179/100 (!) 204/101    Pulse: 79 77 78    Resp: 18 18 18    Temp:   98.2 °F (36.8 °C)    TempSrc:   Oral    SpO2: 97% 98%     Weight:    162 lb 4.8 oz (73.6 kg)   Height:    6' 5\" (1.956 m)         [facility]    ED COURSE/MDM    -Michelle Barroso is a 80 y.o. male presents to ED for right foot wound evaluation  -also reports generalized abdominal pain x 8 months  -left elbow swelling since he fell on it 2 weeks ago. Denies worsening pain or drainage  -Patient seen and evaluated. Oldrecords reviewed. Labs and imaging reviewed and results discussed with patient.  Workup was significant for hyponatremia of 130 and chris with creatinine of 1.6 (usual baseline is ~1)  -KUB: negative  -right foot xray: no acute findings  -xray left elbow: bursitis  -Procedure: removal of remaining skin cover   -given patient has history of dm, covered with clindamycin Iv 600mg.   -repeated bmp after 1.5L with no improvement of

## 2019-05-28 NOTE — ED NOTES
Chief Complaint   Patient presents with    Wound Infection     patient brought in by EMS from [facility] for an infection in his right foot. Pt placed in room 17. Bed in low position. Call light in reach. Will continue to monitor.       Sangita Cerna RN  05/28/19 [id number]

## 2019-05-28 NOTE — ED NOTES
Portable xray at bedside at this time. Will continue to monitor.       Yumiko Orta RN  05/28/19 2828

## 2019-05-28 NOTE — ED NOTES
[place] on the phone for an update on pt at this time.       Vivi Lal RN  05/28/19 [id number]

## 2019-05-29 PROBLEM — E43 SEVERE PROTEIN-CALORIE MALNUTRITION (HCC): Chronic | Status: ACTIVE | Noted: 2017-07-25

## 2019-05-29 LAB
A/G RATIO: 0.7 (ref 1.1–2.2)
ALBUMIN SERPL-MCNC: 3.3 G/DL (ref 3.4–5)
ALP BLD-CCNC: 89 U/L (ref 40–129)
ALT SERPL-CCNC: 7 U/L (ref 10–40)
ANION GAP SERPL CALCULATED.3IONS-SCNC: 11 MMOL/L (ref 3–16)
AST SERPL-CCNC: 15 U/L (ref 15–37)
BACTERIA: ABNORMAL /HPF
BASOPHILS ABSOLUTE: 0 K/UL (ref 0–0.2)
BASOPHILS RELATIVE PERCENT: 0.6 %
BILIRUB SERPL-MCNC: 0.4 MG/DL (ref 0–1)
BILIRUBIN URINE: NEGATIVE
BLOOD, URINE: ABNORMAL
BUN BLDV-MCNC: 28 MG/DL (ref 7–20)
CALCIUM SERPL-MCNC: 9.3 MG/DL (ref 8.3–10.6)
CHLORIDE BLD-SCNC: 100 MMOL/L (ref 99–110)
CHLORIDE URINE RANDOM: 67 MMOL/L
CLARITY: ABNORMAL
CO2: 25 MMOL/L (ref 21–32)
COLOR: ABNORMAL
CREAT SERPL-MCNC: 1.4 MG/DL (ref 0.8–1.3)
CREATININE URINE: 18.4 MG/DL (ref 39–259)
EOSINOPHILS ABSOLUTE: 0.2 K/UL (ref 0–0.6)
EOSINOPHILS RELATIVE PERCENT: 2.2 %
ESTIMATED AVERAGE GLUCOSE: 185.8 MG/DL
GFR AFRICAN AMERICAN: >60
GFR NON-AFRICAN AMERICAN: 50
GLOBULIN: 4.8 G/DL
GLUCOSE BLD-MCNC: 107 MG/DL (ref 70–99)
GLUCOSE BLD-MCNC: 153 MG/DL (ref 70–99)
GLUCOSE BLD-MCNC: 168 MG/DL (ref 70–99)
GLUCOSE BLD-MCNC: 291 MG/DL (ref 70–99)
GLUCOSE BLD-MCNC: 306 MG/DL (ref 70–99)
GLUCOSE BLD-MCNC: 309 MG/DL (ref 70–99)
GLUCOSE URINE: NEGATIVE MG/DL
HBA1C MFR BLD: 8.1 %
HCT VFR BLD CALC: 32 % (ref 40.5–52.5)
HEMOGLOBIN: 10.7 G/DL (ref 13.5–17.5)
KETONES, URINE: NEGATIVE MG/DL
LEUKOCYTE ESTERASE, URINE: ABNORMAL
LYMPHOCYTES ABSOLUTE: 2.3 K/UL (ref 1–5.1)
LYMPHOCYTES RELATIVE PERCENT: 29.2 %
MCH RBC QN AUTO: 28.6 PG (ref 26–34)
MCHC RBC AUTO-ENTMCNC: 33.5 G/DL (ref 31–36)
MCV RBC AUTO: 85.3 FL (ref 80–100)
MICROSCOPIC EXAMINATION: YES
MONOCYTES ABSOLUTE: 1 K/UL (ref 0–1.3)
MONOCYTES RELATIVE PERCENT: 12.8 %
NEUTROPHILS ABSOLUTE: 4.4 K/UL (ref 1.7–7.7)
NEUTROPHILS RELATIVE PERCENT: 55.2 %
NITRITE, URINE: NEGATIVE
PDW BLD-RTO: 13.5 % (ref 12.4–15.4)
PERFORMED ON: ABNORMAL
PH UA: 7 (ref 5–8)
PLATELET # BLD: 284 K/UL (ref 135–450)
PMV BLD AUTO: 7.1 FL (ref 5–10.5)
POTASSIUM REFLEX MAGNESIUM: 4.3 MMOL/L (ref 3.5–5.1)
POTASSIUM, UR: 15.3 MMOL/L
PROTEIN UA: ABNORMAL MG/DL
RBC # BLD: 3.75 M/UL (ref 4.2–5.9)
RBC UA: ABNORMAL /HPF (ref 0–2)
SODIUM BLD-SCNC: 136 MMOL/L (ref 136–145)
SODIUM URINE: 74 MMOL/L
SPECIFIC GRAVITY UA: 1.01 (ref 1–1.03)
TOTAL PROTEIN: 8.1 G/DL (ref 6.4–8.2)
URINE REFLEX TO CULTURE: YES
URINE TYPE: ABNORMAL
UROBILINOGEN, URINE: 0.2 E.U./DL
WBC # BLD: 8 K/UL (ref 4–11)
WBC UA: >100 /HPF (ref 0–5)

## 2019-05-29 PROCEDURE — 6370000000 HC RX 637 (ALT 250 FOR IP): Performed by: INTERNAL MEDICINE

## 2019-05-29 PROCEDURE — 6360000002 HC RX W HCPCS: Performed by: INTERNAL MEDICINE

## 2019-05-29 PROCEDURE — 87077 CULTURE AEROBIC IDENTIFY: CPT

## 2019-05-29 PROCEDURE — 84133 ASSAY OF URINE POTASSIUM: CPT

## 2019-05-29 PROCEDURE — 2580000003 HC RX 258: Performed by: INTERNAL MEDICINE

## 2019-05-29 PROCEDURE — 94761 N-INVAS EAR/PLS OXIMETRY MLT: CPT

## 2019-05-29 PROCEDURE — 99232 SBSQ HOSP IP/OBS MODERATE 35: CPT | Performed by: INTERNAL MEDICINE

## 2019-05-29 PROCEDURE — 81001 URINALYSIS AUTO W/SCOPE: CPT

## 2019-05-29 PROCEDURE — 87186 SC STD MICRODIL/AGAR DIL: CPT

## 2019-05-29 PROCEDURE — 36415 COLL VENOUS BLD VENIPUNCTURE: CPT

## 2019-05-29 PROCEDURE — 86403 PARTICLE AGGLUT ANTBDY SCRN: CPT

## 2019-05-29 PROCEDURE — 84300 ASSAY OF URINE SODIUM: CPT

## 2019-05-29 PROCEDURE — 82570 ASSAY OF URINE CREATININE: CPT

## 2019-05-29 PROCEDURE — 87086 URINE CULTURE/COLONY COUNT: CPT

## 2019-05-29 PROCEDURE — 94640 AIRWAY INHALATION TREATMENT: CPT

## 2019-05-29 PROCEDURE — 80053 COMPREHEN METABOLIC PANEL: CPT

## 2019-05-29 PROCEDURE — 85025 COMPLETE CBC W/AUTO DIFF WBC: CPT

## 2019-05-29 PROCEDURE — 83935 ASSAY OF URINE OSMOLALITY: CPT

## 2019-05-29 PROCEDURE — 1200000000 HC SEMI PRIVATE

## 2019-05-29 PROCEDURE — 82436 ASSAY OF URINE CHLORIDE: CPT

## 2019-05-29 RX ORDER — ATORVASTATIN CALCIUM 40 MG/1
80 TABLET, FILM COATED ORAL DAILY
Status: DISCONTINUED | OUTPATIENT
Start: 2019-05-29 | End: 2019-05-31 | Stop reason: HOSPADM

## 2019-05-29 RX ORDER — OXYCODONE HYDROCHLORIDE 5 MG/1
10 TABLET ORAL EVERY 6 HOURS PRN
Status: DISCONTINUED | OUTPATIENT
Start: 2019-05-29 | End: 2019-05-31 | Stop reason: HOSPADM

## 2019-05-29 RX ORDER — SODIUM CHLORIDE 0.9 % (FLUSH) 0.9 %
10 SYRINGE (ML) INJECTION PRN
Status: DISCONTINUED | OUTPATIENT
Start: 2019-05-29 | End: 2019-05-31 | Stop reason: HOSPADM

## 2019-05-29 RX ORDER — POTASSIUM CHLORIDE 7.45 MG/ML
10 INJECTION INTRAVENOUS PRN
Status: DISCONTINUED | OUTPATIENT
Start: 2019-05-29 | End: 2019-05-31 | Stop reason: HOSPADM

## 2019-05-29 RX ORDER — INSULIN GLARGINE 100 [IU]/ML
50 INJECTION, SOLUTION SUBCUTANEOUS NIGHTLY
Status: DISCONTINUED | OUTPATIENT
Start: 2019-05-29 | End: 2019-05-31 | Stop reason: HOSPADM

## 2019-05-29 RX ORDER — ALBUTEROL SULFATE 90 UG/1
2 AEROSOL, METERED RESPIRATORY (INHALATION) EVERY 4 HOURS PRN
Status: DISCONTINUED | OUTPATIENT
Start: 2019-05-29 | End: 2019-05-31 | Stop reason: HOSPADM

## 2019-05-29 RX ORDER — DEXTROSE MONOHYDRATE 50 MG/ML
100 INJECTION, SOLUTION INTRAVENOUS PRN
Status: DISCONTINUED | OUTPATIENT
Start: 2019-05-29 | End: 2019-05-31 | Stop reason: HOSPADM

## 2019-05-29 RX ORDER — ALBUTEROL SULFATE 90 UG/1
2 AEROSOL, METERED RESPIRATORY (INHALATION) EVERY 4 HOURS PRN
Status: DISCONTINUED | OUTPATIENT
Start: 2019-05-29 | End: 2019-05-29

## 2019-05-29 RX ORDER — FOLIC ACID 1 MG/1
1 TABLET ORAL DAILY
Status: DISCONTINUED | OUTPATIENT
Start: 2019-05-29 | End: 2019-05-31 | Stop reason: HOSPADM

## 2019-05-29 RX ORDER — FLUTICASONE PROPIONATE 110 UG/1
2 AEROSOL, METERED RESPIRATORY (INHALATION) 2 TIMES DAILY
Status: DISCONTINUED | OUTPATIENT
Start: 2019-05-29 | End: 2019-05-29

## 2019-05-29 RX ORDER — SODIUM CHLORIDE 0.9 % (FLUSH) 0.9 %
10 SYRINGE (ML) INJECTION EVERY 12 HOURS SCHEDULED
Status: DISCONTINUED | OUTPATIENT
Start: 2019-05-29 | End: 2019-05-31 | Stop reason: HOSPADM

## 2019-05-29 RX ORDER — ASPIRIN 81 MG/1
81 TABLET, CHEWABLE ORAL DAILY
Status: DISCONTINUED | OUTPATIENT
Start: 2019-05-29 | End: 2019-05-31 | Stop reason: HOSPADM

## 2019-05-29 RX ORDER — SODIUM CHLORIDE 9 MG/ML
INJECTION, SOLUTION INTRAVENOUS CONTINUOUS
Status: DISCONTINUED | OUTPATIENT
Start: 2019-05-29 | End: 2019-05-31 | Stop reason: HOSPADM

## 2019-05-29 RX ORDER — PANTOPRAZOLE SODIUM 40 MG/1
40 GRANULE, DELAYED RELEASE ORAL
Status: DISCONTINUED | OUTPATIENT
Start: 2019-05-29 | End: 2019-05-31 | Stop reason: HOSPADM

## 2019-05-29 RX ORDER — DEXTROSE MONOHYDRATE 25 G/50ML
12.5 INJECTION, SOLUTION INTRAVENOUS PRN
Status: DISCONTINUED | OUTPATIENT
Start: 2019-05-29 | End: 2019-05-31 | Stop reason: HOSPADM

## 2019-05-29 RX ORDER — FINASTERIDE 5 MG/1
5 TABLET, FILM COATED ORAL DAILY
Status: DISCONTINUED | OUTPATIENT
Start: 2019-05-29 | End: 2019-05-31 | Stop reason: HOSPADM

## 2019-05-29 RX ORDER — LANOLIN ALCOHOL/MO/W.PET/CERES
3 CREAM (GRAM) TOPICAL NIGHTLY PRN
Status: DISCONTINUED | OUTPATIENT
Start: 2019-05-29 | End: 2019-05-31 | Stop reason: HOSPADM

## 2019-05-29 RX ORDER — CHOLECALCIFEROL (VITAMIN D3) 125 MCG
1000 CAPSULE ORAL DAILY
Status: DISCONTINUED | OUTPATIENT
Start: 2019-05-29 | End: 2019-05-31 | Stop reason: HOSPADM

## 2019-05-29 RX ORDER — CYCLOBENZAPRINE HCL 10 MG
5 TABLET ORAL 3 TIMES DAILY PRN
Status: DISCONTINUED | OUTPATIENT
Start: 2019-05-29 | End: 2019-05-31 | Stop reason: HOSPADM

## 2019-05-29 RX ORDER — POTASSIUM CHLORIDE 20 MEQ/1
40 TABLET, EXTENDED RELEASE ORAL PRN
Status: DISCONTINUED | OUTPATIENT
Start: 2019-05-29 | End: 2019-05-31 | Stop reason: HOSPADM

## 2019-05-29 RX ORDER — HYDRALAZINE HYDROCHLORIDE 20 MG/ML
10 INJECTION INTRAMUSCULAR; INTRAVENOUS EVERY 6 HOURS PRN
Status: DISCONTINUED | OUTPATIENT
Start: 2019-05-29 | End: 2019-05-31 | Stop reason: HOSPADM

## 2019-05-29 RX ORDER — MAGNESIUM HYDROXIDE/ALUMINUM HYDROXICE/SIMETHICONE 120; 1200; 1200 MG/30ML; MG/30ML; MG/30ML
5 SUSPENSION ORAL EVERY 6 HOURS PRN
Status: DISCONTINUED | OUTPATIENT
Start: 2019-05-29 | End: 2019-05-31 | Stop reason: HOSPADM

## 2019-05-29 RX ORDER — MAGNESIUM SULFATE 1 G/100ML
1 INJECTION INTRAVENOUS PRN
Status: DISCONTINUED | OUTPATIENT
Start: 2019-05-29 | End: 2019-05-31 | Stop reason: HOSPADM

## 2019-05-29 RX ORDER — ONDANSETRON 2 MG/ML
4 INJECTION INTRAMUSCULAR; INTRAVENOUS EVERY 6 HOURS PRN
Status: DISCONTINUED | OUTPATIENT
Start: 2019-05-29 | End: 2019-05-31 | Stop reason: HOSPADM

## 2019-05-29 RX ORDER — THIAMINE MONONITRATE (VIT B1) 100 MG
100 TABLET ORAL DAILY
Status: DISCONTINUED | OUTPATIENT
Start: 2019-05-29 | End: 2019-05-31 | Stop reason: HOSPADM

## 2019-05-29 RX ORDER — NICOTINE POLACRILEX 4 MG
15 LOZENGE BUCCAL PRN
Status: DISCONTINUED | OUTPATIENT
Start: 2019-05-29 | End: 2019-05-31 | Stop reason: HOSPADM

## 2019-05-29 RX ORDER — FLUTICASONE PROPIONATE 110 UG/1
2 AEROSOL, METERED RESPIRATORY (INHALATION) 2 TIMES DAILY
Status: DISCONTINUED | OUTPATIENT
Start: 2019-05-29 | End: 2019-05-31 | Stop reason: HOSPADM

## 2019-05-29 RX ORDER — TAMSULOSIN HYDROCHLORIDE 0.4 MG/1
0.8 CAPSULE ORAL DAILY
Status: DISCONTINUED | OUTPATIENT
Start: 2019-05-29 | End: 2019-05-31 | Stop reason: HOSPADM

## 2019-05-29 RX ADMIN — OXYCODONE HYDROCHLORIDE 10 MG: 5 TABLET ORAL at 02:46

## 2019-05-29 RX ADMIN — Medication 2 PUFF: at 19:35

## 2019-05-29 RX ADMIN — TAMSULOSIN HYDROCHLORIDE 0.8 MG: 0.4 CAPSULE ORAL at 07:43

## 2019-05-29 RX ADMIN — METOPROLOL TARTRATE 25 MG: 25 TABLET ORAL at 20:51

## 2019-05-29 RX ADMIN — METOPROLOL TARTRATE 25 MG: 25 TABLET ORAL at 02:47

## 2019-05-29 RX ADMIN — ASPIRIN 81 MG 81 MG: 81 TABLET ORAL at 07:44

## 2019-05-29 RX ADMIN — MELATONIN 3 MG ORAL TABLET 3 MG: 3 TABLET ORAL at 21:41

## 2019-05-29 RX ADMIN — Medication 1000 MCG: at 07:43

## 2019-05-29 RX ADMIN — MELATONIN 3 MG ORAL TABLET 3 MG: 3 TABLET ORAL at 02:47

## 2019-05-29 RX ADMIN — OXYCODONE HYDROCHLORIDE 10 MG: 5 TABLET ORAL at 15:32

## 2019-05-29 RX ADMIN — CYCLOBENZAPRINE HYDROCHLORIDE 5 MG: 10 TABLET, FILM COATED ORAL at 20:51

## 2019-05-29 RX ADMIN — INSULIN LISPRO 3 UNITS: 100 INJECTION, SOLUTION INTRAVENOUS; SUBCUTANEOUS at 02:49

## 2019-05-29 RX ADMIN — SODIUM CHLORIDE: 900 INJECTION, SOLUTION INTRAVENOUS at 16:10

## 2019-05-29 RX ADMIN — Medication 10 ML: at 07:45

## 2019-05-29 RX ADMIN — OXYCODONE HYDROCHLORIDE 10 MG: 5 TABLET ORAL at 21:42

## 2019-05-29 RX ADMIN — SODIUM CHLORIDE: 900 INJECTION, SOLUTION INTRAVENOUS at 02:47

## 2019-05-29 RX ADMIN — INSULIN LISPRO 4 UNITS: 100 INJECTION, SOLUTION INTRAVENOUS; SUBCUTANEOUS at 20:50

## 2019-05-29 RX ADMIN — ENOXAPARIN SODIUM 40 MG: 100 INJECTION SUBCUTANEOUS at 07:44

## 2019-05-29 RX ADMIN — FINASTERIDE 5 MG: 5 TABLET, FILM COATED ORAL at 07:44

## 2019-05-29 RX ADMIN — INSULIN GLARGINE 50 UNITS: 100 INJECTION, SOLUTION SUBCUTANEOUS at 20:53

## 2019-05-29 RX ADMIN — Medication 100 MG: at 07:43

## 2019-05-29 RX ADMIN — ONDANSETRON HYDROCHLORIDE 4 MG: 2 INJECTION, SOLUTION INTRAVENOUS at 17:29

## 2019-05-29 RX ADMIN — SERTRALINE 150 MG: 100 TABLET, FILM COATED ORAL at 07:44

## 2019-05-29 RX ADMIN — Medication 2 PUFF: at 07:52

## 2019-05-29 RX ADMIN — PANTOPRAZOLE SODIUM 40 MG: 40 GRANULE, DELAYED RELEASE ORAL at 06:13

## 2019-05-29 RX ADMIN — ATORVASTATIN CALCIUM 80 MG: 40 TABLET, FILM COATED ORAL at 07:44

## 2019-05-29 RX ADMIN — METOPROLOL TARTRATE 25 MG: 25 TABLET ORAL at 07:44

## 2019-05-29 RX ADMIN — FOLIC ACID 1 MG: 1 TABLET ORAL at 07:44

## 2019-05-29 RX ADMIN — OXYCODONE HYDROCHLORIDE 10 MG: 5 TABLET ORAL at 09:26

## 2019-05-29 RX ADMIN — INSULIN LISPRO 2 UNITS: 100 INJECTION, SOLUTION INTRAVENOUS; SUBCUTANEOUS at 11:32

## 2019-05-29 RX ADMIN — INSULIN LISPRO 8 UNITS: 100 INJECTION, SOLUTION INTRAVENOUS; SUBCUTANEOUS at 17:23

## 2019-05-29 RX ADMIN — INSULIN GLARGINE 50 UNITS: 100 INJECTION, SOLUTION SUBCUTANEOUS at 04:08

## 2019-05-29 RX ADMIN — Medication 10 ML: at 20:50

## 2019-05-29 ASSESSMENT — PAIN DESCRIPTION - FREQUENCY
FREQUENCY: CONTINUOUS

## 2019-05-29 ASSESSMENT — PAIN SCALES - GENERAL
PAINLEVEL_OUTOF10: 9
PAINLEVEL_OUTOF10: 0
PAINLEVEL_OUTOF10: 6
PAINLEVEL_OUTOF10: 9
PAINLEVEL_OUTOF10: 9
PAINLEVEL_OUTOF10: 0

## 2019-05-29 ASSESSMENT — PAIN DESCRIPTION - PAIN TYPE
TYPE: ACUTE PAIN

## 2019-05-29 ASSESSMENT — PAIN DESCRIPTION - PROGRESSION
CLINICAL_PROGRESSION: GRADUALLY WORSENING
CLINICAL_PROGRESSION: GRADUALLY WORSENING
CLINICAL_PROGRESSION: NOT CHANGED

## 2019-05-29 ASSESSMENT — PAIN DESCRIPTION - DESCRIPTORS
DESCRIPTORS: ACHING
DESCRIPTORS: ACHING
DESCRIPTORS: ACHING;CONSTANT;CRAMPING

## 2019-05-29 ASSESSMENT — PAIN DESCRIPTION - LOCATION
LOCATION: GENERALIZED

## 2019-05-29 ASSESSMENT — PAIN DESCRIPTION - ORIENTATION: ORIENTATION: RIGHT

## 2019-05-29 ASSESSMENT — PAIN - FUNCTIONAL ASSESSMENT
PAIN_FUNCTIONAL_ASSESSMENT: PREVENTS OR INTERFERES WITH MANY ACTIVE NOT PASSIVE ACTIVITIES
PAIN_FUNCTIONAL_ASSESSMENT: PREVENTS OR INTERFERES SOME ACTIVE ACTIVITIES AND ADLS

## 2019-05-29 ASSESSMENT — PAIN DESCRIPTION - ONSET
ONSET: ON-GOING

## 2019-05-29 NOTE — PROGRESS NOTES
regimen except for ACE-I.. 6.  COPD, stable, cont home regimen.       7.  Chronic pain, cont home regimen.      DVT Prophylaxis: Lovenox  Diet: carb control  Code Status: Full Code      Val Art MD 5/29/2019 2:36 PM

## 2019-05-29 NOTE — PROGRESS NOTES
Consult has been called to Dr. Chela Baldwin on 5/29/19. Reyna sharma.  7:54 AM    Olinda Brown  5/29/2019

## 2019-05-29 NOTE — CONSULTS
neuropathy 2006    Pneumonia 10/15/13    Pyogenic granuloma     per derm:Dr. Deandre Pearson Restrictive lung disease     Under care of pulmo(Dr. Calhoun)    S/P colonoscopy 1996    Done secondary to rectal bleed:dx=hemorrhoids    S/P colonoscopy 11/11/10;10/23/2013    Dr. Fanny Coombs 10/2016(3yrs):polyps;diverticulosis. Diverticulosis & polpy(removed). Next in10/2016.  S/P endoscopy 2009    EGD:stomach ulcers.     Sciatica     Superficial phlebitis of left leg 9/30/2013    Tachycardia     Therapeutic drug monitoring 4/8/15    OARRS report is consistent on 4/8/15;7/9/15;10/9/15;1/8/16;4/5/16    Type 1 diabetes mellitus not at goal [facility]) 3/17/14    updated diagnosis as per endo:Dr. Deidre Hernandez       Past Surgical History:        Procedure Laterality Date    DIAGNOSTIC CARDIAC CATH LAB PROCEDURE  2013    FOOT SURGERY Left Hammer toe, 2nd toe    10/9/14    HIP FRACTURE SURGERY      LEG SURGERY      broken leg    OTHER SURGICAL HISTORY Left 11/21/2013    EXCISION 5TH METATRSAL LEFT FOOT          REVISION COLOSTOMY  06/27/2017    COLOSTOMY REVERSAL     TONSILLECTOMY      UPPER GASTROINTESTINAL ENDOSCOPY  7/16/2013    Esophageal Brushing       Current Medications:    Current Facility-Administered Medications: aspirin chewable tablet 81 mg, 81 mg, Oral, Daily  aluminum & magnesium hydroxide-simethicone (MAALOX) 200-200-20 MG/5ML suspension 5 mL, 5 mL, Oral, Q6H PRN  atorvastatin (LIPITOR) tablet 80 mg, 80 mg, Oral, Daily  cyclobenzaprine (FLEXERIL) tablet 5 mg, 5 mg, Oral, TID PRN  finasteride (PROSCAR) tablet 5 mg, 5 mg, Oral, Daily  folic acid (FOLVITE) tablet 1 mg, 1 mg, Oral, Daily  insulin glargine (LANTUS) injection vial 50 Units, 50 Units, Subcutaneous, Nightly  hydrALAZINE (APRESOLINE) injection 10 mg, 10 mg, Intravenous, Q6H PRN  melatonin tablet 3 mg, 3 mg, Oral, Nightly PRN  metoprolol tartrate (LOPRESSOR) tablet 25 mg, 25 mg, Oral, BID  oxyCODONE (ROXICODONE) immediate release tablet 10 mg, 10 mg, Oral, Q6H PRN  sertraline (ZOLOFT) tablet 150 mg, 150 mg, Oral, Daily  pantoprazole sodium (PROTONIX) packet 40 mg, 40 mg, Oral, QAM AC  tamsulosin (FLOMAX) capsule 0.8 mg, 0.8 mg, Oral, Daily  vitamin B-1 (THIAMINE) tablet 100 mg, 100 mg, Oral, Daily  vitamin B-12 (CYANOCOBALAMIN) tablet 1,000 mcg, 1,000 mcg, Oral, Daily  0.9 % sodium chloride infusion, , Intravenous, Continuous  sodium chloride flush 0.9 % injection 10 mL, 10 mL, Intravenous, 2 times per day  sodium chloride flush 0.9 % injection 10 mL, 10 mL, Intravenous, PRN  magnesium hydroxide (MILK OF MAGNESIA) 400 MG/5ML suspension 30 mL, 30 mL, Oral, Daily PRN  ondansetron (ZOFRAN) injection 4 mg, 4 mg, Intravenous, Q6H PRN  enoxaparin (LOVENOX) injection 40 mg, 40 mg, Subcutaneous, Daily  glucose (GLUTOSE) 40 % oral gel 15 g, 15 g, Oral, PRN  dextrose 50 % solution 12.5 g, 12.5 g, Intravenous, PRN  glucagon (rDNA) injection 1 mg, 1 mg, Intramuscular, PRN  dextrose 5 % solution, 100 mL/hr, Intravenous, PRN  potassium chloride (KLOR-CON M) extended release tablet 40 mEq, 40 mEq, Oral, PRN **OR** potassium bicarb-citric acid (EFFER-K) effervescent tablet 40 mEq, 40 mEq, Oral, PRN **OR** potassium chloride 10 mEq/100 mL IVPB (Peripheral Line), 10 mEq, Intravenous, PRN  magnesium sulfate 1 g in dextrose 5% 100 mL IVPB, 1 g, Intravenous, PRN  insulin lispro (HUMALOG) injection vial 0-12 Units, 0-12 Units, Subcutaneous, TID WC  insulin lispro (HUMALOG) injection vial 0-6 Units, 0-6 Units, Subcutaneous, Nightly  fluticasone (FLOVENT HFA) 110 MCG/ACT inhaler 2 puff, 2 puff, Inhalation, BID  albuterol sulfate  (90 Base) MCG/ACT inhaler 2 puff, 2 puff, Inhalation, Q4H PRN    Allergies:  Neurontin [gabapentin]    Social History:    Social History     Tobacco Use    Smoking status: Former Smoker     Packs/day: 0.50     Years: 35.00     Pack years: 17.50     Types: Cigarettes     Last attempt to quit: 2018     Years since quittin.6    Smokeless without deformity, nasal mucosa and turbinates normal without polyps  Neck: supple and non-tender without mass, no thyromegaly or thyroid nodules, no cervical lymphadenopathy  Pulmonary/Chest: clear to auscultation bilaterally- no wheezes, rales or rhonchi, normal air movement, no respiratory distress  Cardiovascular: normal rate, regular rhythm, normal S1 and S2, no murmurs, rubs, clicks, or gallops, distal pulses intact, no carotid bruits  Abdomen: soft, non-tender, non-distended, normal bowel sounds, no masses or organomegaly    DP/PT palpable right  Ulcer on the plantar aspect right 5th MH with red granulation tissue and mild hyperkeratotic rim. Mild serous drainage noted. Probes to soft tissue only. BNo purulence noted. No malodor noted. Mild erythema noted. No increase in skin temperature noted. Bony prominence right 5th MH. Assessment:  Patient Active Problem List   Diagnosis Code    GERD (gastroesophageal reflux disease) K21.9    Peripheral neuropathy of bilateral feet G62.9    COPD (chronic obstructive pulmonary disease) (HCC) J44.9    HLD (hyperlipidemia) E78.5    Hepatic steatosis K76.0    PTSD (post-traumatic stress disorder) F43.10    History of hepatitis C Z86.19    Bilateral knee & hip OA M17.10    Mild-moderate alcohol consumption (beer) F10.20    Chronic pain syndrome G89.4    Multilevel spondylosis M51.36    Moderate episode of recurrent major depressive disorder (HCC) F33.1    S/P left colectomy Z90.49    Hyponatremia E87.1    S/p reversal of colostomy (6/27/17) K55.9    Chronic dCHF (grade 1 LVDD) I50.32    Chronic normocytic anemia D64.9    Acute hyperglycemia R73.9    DM (diabetes mellitus), secondary, uncontrolled, w/neurologic complic (HCC) K72.30, F76.31    HTN (hypertension) I10    Hx DKA (Feb 2018) E11.10, Z79.4    Other insomnia G47.09    Chronic transaminasemia R74.0    Tobacco smoker F17.200    Fall at home W19. XXXA, Y92.009    Hypochloremia E87.8    Hyperkalemia E87.5    Mixed metabolic and respiratory acidosis E87.2    Closed nondisplaced L ankle fracture (lateral malleolus) S82.892A    L 2nd toenail avulsion S91.209A    Skin tear of L leg S81.812A    Head trauma S09.90XA    IDDM (insulin dependent diabetes mellitus) ([facility]) E11.9, Z79.4    Ventral hernia without obstruction or gangrene K43.9    Diabetic ketoacidosis without coma associated with type 2 diabetes mellitus ([facility]) E11.10    DDD (degenerative disc disease), cervical M50.30    Spondylosis of cervical region without myelopathy or radiculopathy M47.812    DDD (degenerative disc disease), lumbar M51.36    Closed nondisplaced fracture of lateral malleolus of left fibula S82.65XA    Lumbar degenerative disc disease M51.36    Protrusion of lumbar intervertebral disc M51.26    Osteoarthritis of both knees M17.0    Osteoarthritis of both hips M16.0    Idiopathic peripheral neuropathy G60.9    Leg pain, anterior, left M79.605    CVA (cerebral vascular accident) ([address] I63.9    DM (diabetes mellitus) ([address] E11.9    HTN (hypertension), benign I10    BPH (benign prostatic hyperplasia) N40.0    Dysarthria R47.1    Left-sided weakness R53.1    TIA (transient ischemic attack) G45.9    Dyslipidemia E78.5    DKA, type 2, not at goal [facility]) E11.10    Agitation requiring sedation protocol R45.1    DKA, type 1, not at goal [facility]) E10.10    Severe malnutrition ([address] E43    CHIP (acute kidney injury) ([facility] [address] N17.9     Exostosis right 5th MH  diabetic foot ulcer right 5th MH  diabetes mellitus uncontrolled      Plan  Patient examined. Reviewed labs and imaging. Discussed treatment options for ulcer and exostosis. Given long standing callus in area with ulcerations I recommended partial resection 5th metatarsal. Patient had similar procedure on left foot in the past with good success and agrees with the plan. Discussed risks, complications, alternatives and benefits with patient.  Understands chance of nonhealing wound, infection, need for further surgery, loss of limb or life. NPO at midnight. Needs to control glucose levels to prevent future complications. Will follow. Thank you for allowing me to participate in the care of your patient.          Electronically signed by Calderon Mccoy DPM on 5/29/2019 at 8:33 AM.

## 2019-05-29 NOTE — PROGRESS NOTES
Nutrition Assessment    Type and Reason for Visit: Initial, Positive Nutrition Screen(+ poor po intake, weight loss, difficulty chewing and/or swallowing, MST = 3, and wounds)    Nutrition Recommendations:   1. Continue CCC-4 diet order. 2. Added Glucerna with meals. 3. Monitor appetite, meal intake, and acceptance/intake of ONS. 4. Monitor for in-patient weight changes. 5. Monitor nutrition-related labs and bowel function/regimen. Nutrition Assessment: patient is nutritionally compromised AEB weight loss PTA and uncontrolled diabetes, however, patient is improving from a nutritional standpoint AEB consuming % of his meals and agreeable to receive Glucerna with meals; will continue CCC-4 diet order and add Glucerna with meals    Malnutrition Assessment:  · Malnutrition Status: Meets the criteria for severe malnutrition  · Context: Acute illness or injury  · Findings of the 6 clinical characteristics of malnutrition (Minimum of 2 out of 6 clinical characteristics is required to make the diagnosis of moderate or severe Protein Calorie Malnutrition based on AND/ASPEN Guidelines):  1. Energy Intake-(> 75% of estimated nutrition needs), Greater than or equal to 7 days    2. Weight Loss-10% loss or greater(- 33# or 16.9% weight loss ), in 6 months  3. Fat Loss-No significant subcutaneous fat loss,    4. Muscle Loss-Severe muscle mass loss, Thigh (quadriceps), Calf (gastrocnemius), Temples (temporalis muscle)(moderate muscle loss in temples)  5. Fluid Accumulation-No significant fluid accumulation,    6.  Strength-Not measured    Nutrition Risk Level:  Moderate    Nutrient Needs:  · Estimated Daily Total Kcal: 1850 - 2220 kcals based on 25-30 kcals/kg/CBW  · Estimated Daily Protein (g): 96 - 111 g protein based on 1.3-1.5 g/kg/CBW  · Estimated Daily Total Fluid (ml/day): 1800 - 2200 ml     Nutrition Diagnosis:   · Problem: Severe malnutrition  · Etiology: related to Endocrine dysfunction     Signs

## 2019-05-29 NOTE — ED NOTES
Report given to BAS[facility], RN at this time.       Sanjay Neal RN  05/28/19 [id number]

## 2019-05-29 NOTE — H&P
Hospital Medicine History & Physical      PCP: Khang New, APRN - CNP    Date of Service: Pt seen/examined on 5/28/19 and admitted on 5/28/19 to Inpatient    Chief Complaint   Patient presents with    Wound Infection     patient brought in by EMS from [facility] for an infection in his right foot. History Of Present Illness: The patient is a 80 y.o. male with PMH below, presents from SNF w/ complaint of R foot wound, elevated BGT, L elbow swelling. He reports that 3 days ago he noticed a callous on his R lateral sole of his foot. He tried to pull it off and is started to bleed. The bleeding has resolved. In ED he was noted to have remainder of the callous hanging off of his foot. This was removed in the ED. He denies seeing podiatry or [facility] for the resulting wound. He was sent to ER from [facility] bc of concern for infection in his foot wound. He says he has been having elevated BGT's up around 1000. He says this is the reason that he was admitted to SNF so they could get control of his BGT. BGT here 108. He notes that he fell about 2 weeks ago. He landed on his L elbow and it has been swollen on the back side since. He says it is somewhat tender when he bumps it but is otherwise unchanged recently. Past Medical History:        Diagnosis Date    Anxiety disorder     BPH (benign prostatic hypertrophy)     Calcified granuloma of lung ([facility] 2014    2:stable per 3/25/14 CT chest    Cataract 1/20/14    OU:Dr. hayward:[facility]    Cerebral artery occlusion with cerebral infarction ([address]     Chronic pain     COPD (chronic obstructive pulmonary disease) (HCC)     Under care of pulmo(Dr. Calhoun)    Degenerative arthritis of hip     Dementia     Depression 3/11/2015    Depression with anxiety     Prior psychiatrist & therapist:Dr. Bright Martinez.      Diabetes mellitus ([address] 2004    Endo Dr. Melodie Oswald type 1 note below in [facility]    Diabetic eye exam ([facility] [address] 1/20/14    [facility]:Dr. STEPHEN Hayward:No retinopathy. Cataract    Diabetic retinopathy ([facility]     under care of [facility]:Dr. Leena Ortiz    Diverticulitis 11/26/2011    Diverticulosis 11/26/2011    DKA (diabetic ketoacidoses) (HCC)     Emphysema     Esophageal candidiasis ([facility] 7/16/13    GI:EGD    Essential hypertension, benign 11/2010    ETOH abuse     Fatty liver 10/9/2012    Foot ulcer:left 9/30/2013    Gastric ulcer, unspecified as acute or chronic, without mention of hemorrhage or perforation     GERD (gastroesophageal reflux disease)     Gout 1997    Right big toe    Hearing decreased     Left ear=60%. Right ear=80%.  Hemangioma 12/2010    Liver as per CT abdo    hepatitis c 7/26/17, 2010    Under care of Liver doc:Dr. Salvador Braswell    Hyperlipidemia LDL goal < 100     Left-sided weakness     Low HDL (under 40) 10/9/2012    Peripheral neuropathy 2006    Pneumonia 10/15/13    Pyogenic granuloma     per derm:Dr. Carl Chamberlain Restrictive lung disease     Under care of pulmo(Dr. Calhoun)    S/P colonoscopy 1996    Done secondary to rectal bleed:dx=hemorrhoids    S/P colonoscopy 11/11/10;10/23/2013    Dr. Yanet Nath 10/2016(3yrs):polyps;diverticulosis. Diverticulosis & polpy(removed). Next in10/2016.  S/P endoscopy 2009    EGD:stomach ulcers.     Sciatica     Superficial phlebitis of left leg 9/30/2013    Tachycardia     Therapeutic drug monitoring 4/8/15    OARRS report is consistent on 4/8/15;7/9/15;10/9/15;1/8/16;4/5/16    Type 1 diabetes mellitus not at goal [facility]) 3/17/14    updated diagnosis as per endo:Dr. Ravi Blood       Past Surgical History:        Procedure Laterality Date    DIAGNOSTIC CARDIAC CATH LAB PROCEDURE  2013    FOOT SURGERY Left Hammer toe, 2nd toe    10/9/14    HIP FRACTURE SURGERY      LEG SURGERY      broken leg    OTHER SURGICAL HISTORY Left 11/21/2013    EXCISION 5TH METATRSAL LEFT FOOT          REVISION COLOSTOMY  06/27/2017    COLOSTOMY REVERSAL     TONSILLECTOMY      UPPER GASTROINTESTINAL ENDOSCOPY 7/16/2013    Esophageal Brushing       Medications Prior to Admission:    Prior to Admission medications    Medication Sig Start Date End Date Taking? Authorizing Provider   mometasone (ASMANEX 120 METERED DOSES) 220 MCG/INH inhaler Inhale 2 puffs into the lungs daily   Yes Historical Provider, MD   aspirin 81 MG tablet Take 81 mg by mouth daily   Yes Historical Provider, MD   finasteride (PROSCAR) 5 MG tablet Take 5 mg by mouth daily   Yes Historical Provider, MD   folic acid (FOLVITE) 1 MG tablet Take 1 mg by mouth daily   Yes Historical Provider, MD   metoprolol tartrate (LOPRESSOR) 25 MG tablet Take 25 mg by mouth 2 times daily   Yes Historical Provider, MD   pantoprazole sodium (PROTONIX) 40 MG PACK packet Take 40 mg by mouth every morning (before breakfast)   Yes Historical Provider, MD   sertraline (ZOLOFT) 100 MG tablet Take 150 mg by mouth daily   Yes Historical Provider, MD   Dulaglutide (TRULICITY) 7.11 EK/5.4JN SOPN Inject 0.75 mg into the skin once a week   Yes Historical Provider, MD   SUMAtriptan (IMITREX) 100 MG tablet Take 100 mg by mouth daily as needed for Migraine   Yes Historical Provider, MD   aluminum & magnesium hydroxide-simethicone (MAALOX) 200-200-20 MG/5ML SUSP suspension Take 5 mLs by mouth every 6 hours as needed for Indigestion   Yes Historical Provider, MD   oxyCODONE HCl (OXY-IR) 10 MG immediate release tablet Take 10 mg by mouth every 6 hours.     Yes Historical Provider, MD   lisinopril (PRINIVIL;ZESTRIL) 20 MG tablet Take 1 tablet by mouth daily 3/9/19 5/28/19 Yes Gutierrez Glover MD   atorvastatin (LIPITOR) 80 MG tablet Take 80 mg by mouth daily   Yes Historical Provider, MD   Insulin Glargine (LANTUS SOLOSTAR SC) Inject 50 Units into the skin nightly    Yes Historical Provider, MD   cyclobenzaprine (FLEXERIL) 5 MG tablet Take 5 mg by mouth 3 times daily as needed for Muscle spasms   Yes Historical Provider, MD   melatonin 3 MG TABS tablet Take 6 mg by mouth nightly as needed   Yes Historical Provider, MD   insulin aspart (NOVOLOG) 100 UNIT/ML injection vial Inject 10 Units into the skin 3 times daily (before meals) +SSI  Patient taking differently: Inject 8 Units into the skin 3 times daily (before meals) +SSI  11/8/18  Yes RICCO Alvarez CNP   vitamin E 1000 units capsule Take 1 capsule by mouth daily 11/5/18  Yes RICCO Alvarez CNP   Continuous Blood Gluc  (FREESTYLE BRIDGET READER) DENIS Use to monitor sugars 8/1/18  Yes Felicia Maier MD   Continuous Blood Gluc Sensor ([address] [place] [address]) MISC Use to monitor sugars 8/1/18  Yes Felicia Maier MD   vitamin B-12 (CYANOCOBALAMIN) 1000 MCG tablet TAKE 1 TABLET BY MOUTH DAILY 7/2/18  Yes RICCO Alvarez CNP   tamsulosin (FLOMAX) 0.4 MG capsule TAKE 2 CAPSULES BY MOUTH DAILY 5/30/18  Yes RICCO Alvarez CNP   AGAMATRIX PRESTO TEST strip USE TO TEST BLOOD SUGAR 3 TIMES A DAY 5/9/18  Yes Felicia Maier MD   umeclidinium-vilanterol (ANORO ELLIPTA) 62.5-25 MCG/INH AEPB inhaler Inhale 1 puff into the lungs daily 3/27/18  Yes Alesia Borjas MD   albuterol sulfate HFA (PROAIR HFA) 108 (90 Base) MCG/ACT inhaler Inhale 2 puffs into the lungs every 4 hours as needed for Wheezing or Shortness of Breath 3/27/18  Yes Alesia Borjas MD   thiamine 100 MG tablet Take 1 tablet by mouth daily 3/6/18  Yes RICCO Prince CNP   Blood Glucose Monitoring Suppl (TRUE METRIX AIR GLUCOSE METER) DENIS 1 meter 3/6/18  Yes RICCO Alvarez CNP   glucagon 1 MG injection Inject 1 kit into the skin as needed   Yes Historical Provider, MD   SUMAtriptan (IMITREX) 100 MG tablet Take 1 tablet by mouth once as needed for Migraine  Patient taking differently: Take 100 mg by mouth daily as needed for Migraine  4/25/18 4/27/19  Monie Headley MD   Tens Unit MISC by Does not apply route Use as directed.  3/30/18   RICCO Hunt CNP   Lancets MISC Testing 2-3 times daily DX Code: E11.9 3/22/18   Johny Estrada MD       Allergies:  Neurontin [gabapentin]    Social History:    TOBACCO:   reports that he quit smoking about 8 months ago. His smoking use included cigarettes. He has a 17.50 pack-year smoking history. He has never used smokeless tobacco.  ETOH:   reports that he drank alcohol. Family History:  Reviewed in detail and negative for DM, Early CAD, Cancer (except as below). Positive as follows:        Problem Relation Age of Onset   Faisal Lux Stroke Mother     Hypertension Mother     Arthritis Father     Substance Abuse Father         Etoh    Cancer Father         esophageal    Hypertension Father     Diabetes Brother     Diabetes Paternal Aunt     Asthma Neg Hx     Emphysema Neg Hx     Heart Failure Neg Hx        REVIEW OF SYSTEMS:   Pertinent positives/negatives as follows: R foot wound, elevated BGT, L elbow swelling, and as discussed in HPI, otherwise a complete ROS performed and all other systems are negative  PHYSICAL EXAM PERFORMED:    BP (!) 179/100   Pulse 77   Temp 97.9 °F (36.6 °C) (Oral)   Resp 18   Ht 6' 5\" (1.956 m)   Wt 160 lb (72.6 kg)   SpO2 98%   BMI 18.97 kg/m²     GEN:  A&Ox3, NAD. Thin. HEENT:  NC/AT,EOMI, dry MM, no erythema/exudates or visible masses. CVS:  Normal S1,S2. RRR. Without M/G/R.   LUNG:   CTA-B. no wheezes, rales or rhonchi  ABD:  Soft, ND/NT, BS+ x4. Without G/R.  EXT: 2+ pulses, no c/c. Soft tissue swelling of post L elbow. Not firm and only minimally ttp. Appears to have normal ROM at L elbow. Brisk cap refill. DM foot foot wound of R foot, approx 1.5 x 1.5 x 0.5 cm. Clean base and margins. Does not appear to be frankly infected. PSY:  Thought process intact, affect appropriate. NEENA:  CN III-XII intact, moves all 4 spontaneously, sensory grossly intact. SKIN: Severl areas of bruising/scabs of visible skin of ext's.       Chart review shows recent radiographs:  Xr Elbow Left (2 Views)    Result Date: 5/28/2019  EXAMINATION: 2 XRAY VIEWS OF THE LEFT ELBOW 5/28/2019 10:23 pm COMPARISON: None. HISTORY: ORDERING SYSTEM PROVIDED HISTORY: swelling, pain TECHNOLOGIST PROVIDED HISTORY: Reason for exam:->swelling, pain Ordering Physician Provided Reason for Exam: fell FINDINGS: Spurring along the medial aspect of the joint. No acute fracture or dislocation. No joint effusion. Soft tissue swelling posteriorly. Soft tissue swelling posteriorly likely representing bursitis. Some degenerative changes medial aspect of the joint     Xr Foot Right (min 3 Views)    Result Date: 5/28/2019  EXAMINATION: 3 XRAY VIEWS OF THE RIGHT FOOT 5/28/2019 4:20 pm COMPARISON: None. HISTORY: ORDERING SYSTEM PROVIDED HISTORY: wound to base of fifth metatarsal TECHNOLOGIST PROVIDED HISTORY: Reason for exam:->wound to base of fifth metatarsal Ordering Physician Provided Reason for Exam: Wound Infection (patient brought in by EMS from [facility] for an infection in his right foot. FINDINGS: An ulcer is evident near the 5th metatarsal head. Osseous structures appear normal.  No evidence of osteomyelitis. No fracture. No evidence of osseous abnormality, including osteomyelitis of the 5th digit or elsewhere. Xr Abdomen (kub) (single Ap View)    Result Date: 5/28/2019  EXAMINATION: ONE SUPINE XRAY VIEW(S) OF THE ABDOMEN 5/28/2019 4:33 pm COMPARISON: None. HISTORY: ORDERING SYSTEM PROVIDED HISTORY: abdominal pain x 8 months TECHNOLOGIST PROVIDED HISTORY: Reason for exam:->abdominal pain x 8 months Ordering Physician Provided Reason for Exam: abdominal pain x 8 months Acuity: Chronic Type of Exam: Initial FINDINGS: Bowel gas pattern is within normal limits. No evidence of obstruction. No evidence of ileus. No radiographic evidence of bowel wall thickening. Negative supine abdomen radiograph examination.      CBC:  Recent Labs     05/28/19  1545   WBC 9.4   HGB 10.2*   HCT 31.5*         RENAL  Recent Labs 05/28/19  1545 05/28/19  2035   * 134*   K 4.6 4.9   CL 95* 98*   CO2 24 24   BUN 31* 30*   CREATININE 1.6* 1.6*   GLUCOSE 108* 256*     Hemoglobin a1c:  Lab Results   Component Value Date    LABA1C 7.8 04/17/2019    LABA1C 7.8 10/17/2018    LABA1C 7.1 08/10/2018     LFT'S:  Recent Labs     05/28/19  1545   AST 17   ALT 9*   BILITOT 0.4   ALKPHOS 90     U/A:  Ordered and pending    PHYSICIAN CERTIFICATION    I certify that Devan Smith is expected to be hospitalized for 2 midnights based on the following assessment and plan:    ASSESSMENT/PLAN:  1. CHIP, Cr 1.6 (baseline 0.7-0.9), suspect pre-renal, IVF and repeat in am.  Defer to DD for nephro c/s if needed. 2. R lateral plantar DM foot ulceration, 2/2 pt attempting to remove callous. ED physician removed rest of way as it was reportedly barely connected. Does not appear acutely infected. Podiatry and WC c/s. Will likely need [facility] f/u on OP. 3. R elbow posterior swelling, minimally tender, stable since fall 2 weeks ago. Possibly representing bursitis. Describes swelling as being stable for last 2 weeks. Does not appear infected. Defer to DD for ortho c/s if needed. 4. DM2, controlled, initial , hold oral Rx, chk A1c, add Mod SSI, continue home Lantus dose. 5. HTN, poorly controlled, possibly contributing to #1. Cont home regimen except for ACE-I.. 6. COPD, stable, cont home regimen. 7. Chronic pain, cont home regimen. DVT Prophylaxis: Lovenox  Diet: carb control  Code Status: Full Code  PT/OT Eval Status: Will order if needed and as patient condition allows  Dispo - Admit to inpatient 2W     Blair Adkins MD    Thank you RICCO Ellis - ROOPA for the opportunity to be involved in this patient's care. If you have any questions or concerns please feel free to contact me via the [employer] at [phone]. This chart was generated using the [address] [place] [address] [address]  [employer] system.  I created this record but it may contain dictation errors given the limitations of this technology.

## 2019-05-29 NOTE — PROGRESS NOTES
RESPIRATORY THERAPY ASSESSMENT    Name:  Addison Allen  Medical Record Number:  [id number]  Age: 80 y.o. Gender: male  : 1950  Today's Date:  2019  Room:  [facility]-01    Assessment     Is the patient being admitted for a COPD or Asthma exacerbation? No   (If yes the patient will be seen every 4 hours for the first 24 hours and then reassessed)    Patient Admission Diagnosis      Allergies  Allergies   Allergen Reactions    Neurontin [Gabapentin]      Gastric side effects       Minimum Predicted Vital Capacity:     1258          Actual Vital Capacity:   1200                Pulmonary History:COPD  Home Oxygen Therapy:  room air  Home Respiratory Therapy:Albuterol and Mometasone/Formoterol   Current Respiratory Therapy:  Flovent 110 2 puff BID, Albuterol 2 puff Q4 PRN, Anoro Ellipta 1 puff Daily          Respiratory Severity Index(RSI)   Patients with orders for inhalation medications, oxygen, or any therapeutic treatment modality will be placed on Respiratory Protocol. They will be assessed with the first treatment and at least every 72 hours thereafter. The following severity scale will be used to determine frequency of treatment intervention.     Smoking History: Pulmonary Disease or Smoking History, Greater than 15 pack year = 2    Social History  Social History     Tobacco Use    Smoking status: Former Smoker     Packs/day: 0.50     Years: 35.00     Pack years: 17.50     Types: Cigarettes     Last attempt to quit: 2018     Years since quittin.6    Smokeless tobacco: Never Used   Substance Use Topics    Alcohol use: Not Currently    Drug use: No       Recent Surgical History: None = 0  Past Surgical History  Past Surgical History:   Procedure Laterality Date    DIAGNOSTIC CARDIAC CATH LAB PROCEDURE      FOOT SURGERY Left Hammer toe, 2nd toe    10/9/14    HIP FRACTURE SURGERY      LEG SURGERY      broken leg    OTHER SURGICAL HISTORY Left 2013    EXCISION 5TH METATRSAL LEFT FOOT          REVISION COLOSTOMY  06/27/2017    COLOSTOMY REVERSAL     TONSILLECTOMY      UPPER GASTROINTESTINAL ENDOSCOPY  7/16/2013    Esophageal Brushing       Level of Consciousness: Alert, Oriented, and Cooperative = 0    Level of Activity: Mostly sedentary, minimal walking = 2    Respiratory Pattern: Regular Pattern; RR 8-20 = 0    Breath Sounds: Clear = 0    Sputum   ,  ,    Cough: Strong, spontaneous, non-productive = 0    Vital Signs   BP (!) 204/101   Pulse 78   Temp 98.2 °F (36.8 °C) (Oral)   Resp 18   Ht 6' 5\" (1.956 m)   Wt 162 lb 4.8 oz (73.6 kg)   SpO2 98%   BMI 19.25 kg/m²   SPO2 (COPD values may differ): Greater than or equal to 92% on room air = 0    Peak Flow (asthma only): not applicable = 0    RSI: 0-4 = See once and convert to home regimen or discontinue        Plan       Goals: medication delivery, mobilize retained secretions, volume expansion and improve oxygenation    Patient/caregiver was educated on the proper method of use for Respiratory Care Devices:  Yes      Level of patient/caregiver understanding able to:   ? Verbalize understanding   ? Demonstrate understanding       ? Teach back        ? Needs reinforcement       ? No available caregiver               ? Other:     Response to education:  Very Good     Is patient being placed on Home Treatment Regimen? Yes     Does the patient have everything they need prior to discharge? NA     Comments: pt assessed & chart reviewed    Plan of Care: maintain home regimen    Electronically signed by Delfin An RCP on 5/29/2019 at 1:17 AM    Respiratory Protocol Guidelines     1. Assessment and treatment by Respiratory Therapy will be initiated for medication and therapeutic interventions upon initiation of aerosolized medication. 2. Physician will be contacted for respiratory rate (RR) greater than 35 breaths per minute.  Therapy will be held for heart rate (HR) greater than 140 beats per minute, pending direction from physician. 3. Bronchodilators will be administered via Metered Dose Inhaler (MDI) with spacer when the following criteria are met:  a. Alert and cooperative     b. HR < 140 bpm  c. RR < 30 bpm                d. Can demonstrate a 2-3 second inspiratory hold  4. Bronchodilators will be administered via Hand Held Nebulizer [facility]) to patients when ANY of the following criteria are met  a. Incognizant or uncooperative          b. Patients treated with HHN at Home        c. Unable to demonstrate proper use of MDI with spacer     d. RR > 30 bpm   5. Bronchodilators will be delivered via Metered Dose Inhaler (MDI), HHN, Aerogen to intubated patients on mechanical ventilation. 6. Inhalation medication orders will be delivered and/or substituted as outlined below. Aerosolized Medications Ordering and Administration Guidelines:    1. All Medications will be ordered by a physician, and their frequency and/or modality will be adjusted as defined by the patients Respiratory Severity Index (RSI) score. 2. If the patient does not have documented COPD, consider discontinuing anticholinergics when RSI is less than 9.  3. If the bronchospasm worsens (increased RSI), then the bronchodilator frequency can be increased to a maximum of every 4 hours. If greater than every 4 hours is required, the physician will be contacted. 4. If the bronchospasm improves, the frequency of the bronchodilator can be decreased, based on the patient's RSI, but not less than home treatment regimen frequency. 5. Bronchodilator(s) will be discontinued if patient has a RSI less than 9 and has received no scheduled or as needed treatment for 72  Hrs. Patients Ordered on a Mucolytic Agent:    1. Must always be administered with a bronchodilator. 2. Discontinue if patient experiences worsened bronchospasm, or secretions have lessened to the point that the patient is able to clear them with a cough.     Anti-inflammatory and Combination Medications:    1. If the patient lacks prior history of lung disease, is not using inhaled anti-inflammatory medication at home, and lacks wheezing by examination or by history for at least 24 hours, contact physician for possible discontinuation.

## 2019-05-29 NOTE — PLAN OF CARE
Nutrition Problem: Severe malnutrition  Intervention: Food and/or Nutrient Delivery: Continue current diet, Continue current ONS  Nutritional Goals: patient will consume > 75% of his meals on CCC-4 diet order x 3 meals per day + > 50% of Glucerna with meals; weight will remain stable during remainder of admission

## 2019-05-29 NOTE — FLOWSHEET NOTE
05/29/19 1203   Wound 05/29/19 Foot Anterior;Right   Date First Assessed/Time First Assessed: 05/29/19 0000   Present on Hospital Admission: Yes  Primary Wound Type: Diabetic Ulcer  Location: Foot  Wound Location Orientation: Anterior;Right   Wound Image   (ED photo)   Wound Diabetic   Dressing Status Clean;Dry; Intact   Dressing/Treatment Foam   Wound Length (cm) 0.2 cm   Wound Width (cm) 0.2 cm   Wound Depth (cm)   (unable to measure depth)   Wound Surface Area (cm^2) 0.04 cm^2   Wound Assessment Red   Drainage Amount Scant   Drainage Description Sanguinous   Afia-wound Assessment Denuded                 Wound Care Consult - request to see patient with a Diabetic Ulcer to the right lateral foot @ 5th metatarsal head. Wound was partially covered with a callous that was peeling off. Callous removed in ED. Dr Sisi Bentley consulted on patient and plans or further surgery to the area. No other skin issues noted. Will reconsult as needed. Thank you.   Zaida Herrera RN, CWOCN

## 2019-05-29 NOTE — CARE COORDINATION
Case Management Assessment  Initial Evaluation    Date/Time of Evaluation: 5/29/2019 1:28 PM  Assessment Completed by: Chepe Boland    Patient Name: Nalini Gonzalez  YOB: 1950  Diagnosis: CHIP (acute kidney injury) [facility]) [N17.9]  Date / Time: 5/28/2019  3:32 PM  Admission status/Date: Inpatient 5/28/2018  Chart Reviewed: Yes      Patient Interviewed: Yes   Family Interviewed:      Hospitalization in the last 30 days:  No    Contacts  :     Relationship to Patient:   Phone Number:    Alternate Contact:     Relationship to Patient:     Phone Number:       Met with: pt    Current PCP: [address] Brian. Alvaro [address] required for SNF : Y     3 night stay required - [address]:    Transportation: EMS transportation    Meal Preparation: per facility    Housing  Home Environment: LTC at [employer] [facility]  Steps: [address] to Return to Present Housing: Yes  Other Identified Issues: [address]  Currently active with [address] : No     Passport/Waiver : No  :                      Phone Number:    Passport/Waiver Services: [address]   DME Provider:   Equipment: Walker__Cane__RTS__ BSC__Shower Chair__  02__ HHN__ CPAP__  BiPap__  Hospital Bed__ W/C___ Other__________      Has Home O2 in place on admit:  No  Informed of need to bring portable home O2 tank on day of discharge for nursing to connect prior to leaving:   No  Verbalized agreement/Understanding:   No    Community Service Affiliation  Dialysis:  No    · Name:  · Location  · Dialysis Schedule:  · Phone:   · Fax: Outpatient PT/OT: No    Cancer Center: No     CHF Clinic: No     Pulmonary Rehab: No  Pain Clinic: No  Community Mental Health: No    Wound Clinic: No     Other: NA    DISCHARGE PLAN: Chart reviewed. Plan: LTC at [facility] and plans return. +bedhold. Explained Case Management role/services. Will need pre-cert if returns skilled. +eCOC, +CM following.

## 2019-05-29 NOTE — PROGRESS NOTES
4 Eyes Skin Assessment     The patient is being assess for   Admission    I agree that 2 RN's have performed a thorough Head to Toe Skin Assessment on the patient. ALL assessment sites listed below have been assessed. Areas assessed by both nurses:   [x]   Head, Face, and Ears   [x]   Shoulders, Back, and Chest, Abdomen  [x]   Arms, Elbows, and Hands   [x]   Coccyx, Sacrum, and Ischium  [x]   Legs, Feet, and Heels        Scattered abrasions to bilateral knees. Diabetic ulcer to right foot. **SHARE this note so that the co-signing nurse is able to place an eSignature**    Co-signer eSignature: Electronically signed by Mikayla Mosley RN on 5/29/19 at 6:58 AM    Does the Patient have Skin Breakdown?   Yes LDA WOUND CARE was Initiated documentation include the Afia-wound, Wound Assessment, Measurements, Dressing Treatment, Drainage, and Color\",          Julián Prevention initiated:  No   Wound Care Orders initiated:  Yes      [id number]  nurse consulted for Pressure Injury (Stage 3,4, Unstageable, DTI, NWPT, Complex wounds)and New or Established Ostomies:  No      Primary Nurse eSignature: Electronically signed by Anju Pinon RN on 5/29/19 at 1:53 AM

## 2019-05-29 NOTE — PROGRESS NOTES
Called jimbo martines to obtain copies of code status and medications.  Electronically signed by Tiera Jeronimo RN on 5/29/2019 at 4:11 AM

## 2019-05-29 NOTE — PROGRESS NOTES
Pt alert and oriented. AM meds complete. Pt tolerated well. VSS and WDL. No s/s of distress. No further needs noted at this time. Will continue to monitor and assess. Complaining of pain-being treated with PRN pain medications. Patient's EF (Ejection Fraction) is greater than 40%    Patient's weights and intake/output reviewed:    Patient's Last Weight: 162 obtained by bed scale. Today's weight is noted to be 2 less than last documented weight. Intake/Output Summary (Last 24 hours) at 5/29/2019 1043  Last data filed at 5/29/2019 0805  Gross per 24 hour   Intake 487 ml   Output 950 ml   Net -463 ml       Patient's current functional capacity:  Slight limitation of physical activity. Comfortable at rest. Ordinary physical activity results in fatigue, palpitation, dyspnea. Pt resting in bed at this time on room air. Pt denies shortness of breath. Pt without lower extremity edema.      Patient and family's stated goal of care: reduce shortness of breath, increase activity tolerance, be more comfortable and reduce lower extremity edema prior to discharge        Patient has a past medical history of Anxiety disorder, BPH (benign prostatic hypertrophy), Calcified granuloma of lung ([address] Cataract, Cerebral artery occlusion with cerebral infarction ([address] Chronic pain, COPD (chronic obstructive pulmonary disease) ([address] Degenerative arthritis of hip, Dementia, Depression, Depression with anxiety, Diabetes mellitus ([address] Diabetic eye exam ([address] Diabetic retinopathy ([address] Diverticulitis, Diverticulosis, DKA (diabetic ketoacidoses) ([address] Emphysema, Esophageal candidiasis ([address] Essential hypertension, benign, ETOH abuse, Fatty liver, Foot ulcer:left, Gastric ulcer, unspecified as acute or chronic, without mention of hemorrhage or perforation, GERD (gastroesophageal reflux disease), Gout, Hearing decreased, Hemangioma, hepatitis c, Hyperlipidemia LDL goal < 100, Left-sided weakness, Low HDL (under 40),

## 2019-05-29 NOTE — PROGRESS NOTES
Pt transported via wheelchair to 223-1. Pt oriented to room. Admission questions complete. Pt is unable to verify medications at this time. Will follow up with patients home nursing care facility.

## 2019-05-30 ENCOUNTER — ANESTHESIA (OUTPATIENT)
Dept: OPERATING ROOM | Age: 69
DRG: 617 | End: 2019-05-30
Payer: COMMERCIAL

## 2019-05-30 ENCOUNTER — ANESTHESIA EVENT (OUTPATIENT)
Dept: OPERATING ROOM | Age: 69
DRG: 617 | End: 2019-05-30
Payer: COMMERCIAL

## 2019-05-30 ENCOUNTER — APPOINTMENT (OUTPATIENT)
Dept: GENERAL RADIOLOGY | Age: 69
DRG: 617 | End: 2019-05-30
Payer: COMMERCIAL

## 2019-05-30 VITALS — DIASTOLIC BLOOD PRESSURE: 80 MMHG | SYSTOLIC BLOOD PRESSURE: 136 MMHG | OXYGEN SATURATION: 99 %

## 2019-05-30 LAB
GLUCOSE BLD-MCNC: 100 MG/DL (ref 70–99)
GLUCOSE BLD-MCNC: 110 MG/DL (ref 70–99)
GLUCOSE BLD-MCNC: 243 MG/DL (ref 70–99)
GLUCOSE BLD-MCNC: 284 MG/DL (ref 70–99)
GLUCOSE BLD-MCNC: 50 MG/DL (ref 70–99)
GLUCOSE BLD-MCNC: 53 MG/DL (ref 70–99)
GLUCOSE BLD-MCNC: 68 MG/DL (ref 70–99)
GLUCOSE BLD-MCNC: 98 MG/DL (ref 70–99)
OSMOLALITY URINE: 242 MOSM/KG (ref 390–1070)
PERFORMED ON: ABNORMAL
PERFORMED ON: NORMAL

## 2019-05-30 PROCEDURE — 7100000010 HC PHASE II RECOVERY - FIRST 15 MIN: Performed by: PODIATRIST

## 2019-05-30 PROCEDURE — 0HRMXJZ REPLACEMENT OF RIGHT FOOT SKIN WITH SYNTHETIC SUBSTITUTE, EXTERNAL APPROACH: ICD-10-PCS | Performed by: PODIATRIST

## 2019-05-30 PROCEDURE — 94640 AIRWAY INHALATION TREATMENT: CPT

## 2019-05-30 PROCEDURE — 2500000003 HC RX 250 WO HCPCS: Performed by: INTERNAL MEDICINE

## 2019-05-30 PROCEDURE — 3600000003 HC SURGERY LEVEL 3 BASE: Performed by: PODIATRIST

## 2019-05-30 PROCEDURE — 2500000003 HC RX 250 WO HCPCS: Performed by: PODIATRIST

## 2019-05-30 PROCEDURE — 2709999900 HC NON-CHARGEABLE SUPPLY: Performed by: PODIATRIST

## 2019-05-30 PROCEDURE — 88305 TISSUE EXAM BY PATHOLOGIST: CPT

## 2019-05-30 PROCEDURE — 73620 X-RAY EXAM OF FOOT: CPT

## 2019-05-30 PROCEDURE — 6370000000 HC RX 637 (ALT 250 FOR IP): Performed by: PODIATRIST

## 2019-05-30 PROCEDURE — 6360000002 HC RX W HCPCS: Performed by: NURSE ANESTHETIST, CERTIFIED REGISTERED

## 2019-05-30 PROCEDURE — 3600000013 HC SURGERY LEVEL 3 ADDTL 15MIN: Performed by: PODIATRIST

## 2019-05-30 PROCEDURE — 6370000000 HC RX 637 (ALT 250 FOR IP): Performed by: INTERNAL MEDICINE

## 2019-05-30 PROCEDURE — 6360000002 HC RX W HCPCS: Performed by: PODIATRIST

## 2019-05-30 PROCEDURE — 1200000000 HC SEMI PRIVATE

## 2019-05-30 PROCEDURE — 94761 N-INVAS EAR/PLS OXIMETRY MLT: CPT

## 2019-05-30 PROCEDURE — 99232 SBSQ HOSP IP/OBS MODERATE 35: CPT | Performed by: INTERNAL MEDICINE

## 2019-05-30 PROCEDURE — 0JBQ0ZZ EXCISION OF RIGHT FOOT SUBCUTANEOUS TISSUE AND FASCIA, OPEN APPROACH: ICD-10-PCS | Performed by: PODIATRIST

## 2019-05-30 PROCEDURE — 2580000003 HC RX 258: Performed by: INTERNAL MEDICINE

## 2019-05-30 PROCEDURE — 0Y6M0ZF DETACHMENT AT RIGHT FOOT, PARTIAL 5TH RAY, OPEN APPROACH: ICD-10-PCS | Performed by: PODIATRIST

## 2019-05-30 PROCEDURE — 3700000001 HC ADD 15 MINUTES (ANESTHESIA): Performed by: PODIATRIST

## 2019-05-30 PROCEDURE — 7100000011 HC PHASE II RECOVERY - ADDTL 15 MIN: Performed by: PODIATRIST

## 2019-05-30 PROCEDURE — 2580000003 HC RX 258: Performed by: PODIATRIST

## 2019-05-30 PROCEDURE — 88311 DECALCIFY TISSUE: CPT

## 2019-05-30 PROCEDURE — 3700000000 HC ANESTHESIA ATTENDED CARE: Performed by: PODIATRIST

## 2019-05-30 DEVICE — PATCH AMNION 2 LAYR PROTCT 4 X 4CM STERISHIELD II: Type: IMPLANTABLE DEVICE | Site: FOOT | Status: FUNCTIONAL

## 2019-05-30 RX ORDER — HYDROMORPHONE HCL 110MG/55ML
0.5 PATIENT CONTROLLED ANALGESIA SYRINGE INTRAVENOUS EVERY 5 MIN PRN
Status: DISCONTINUED | OUTPATIENT
Start: 2019-05-30 | End: 2019-05-30

## 2019-05-30 RX ORDER — PROMETHAZINE HYDROCHLORIDE 25 MG/ML
6.25 INJECTION, SOLUTION INTRAMUSCULAR; INTRAVENOUS
Status: DISCONTINUED | OUTPATIENT
Start: 2019-05-30 | End: 2019-05-30

## 2019-05-30 RX ORDER — BUPIVACAINE HYDROCHLORIDE 5 MG/ML
INJECTION, SOLUTION EPIDURAL; INTRACAUDAL PRN
Status: DISCONTINUED | OUTPATIENT
Start: 2019-05-30 | End: 2019-05-30 | Stop reason: ALTCHOICE

## 2019-05-30 RX ORDER — OXYCODONE HYDROCHLORIDE AND ACETAMINOPHEN 5; 325 MG/1; MG/1
2 TABLET ORAL PRN
Status: DISCONTINUED | OUTPATIENT
Start: 2019-05-30 | End: 2019-05-30

## 2019-05-30 RX ORDER — HYDROMORPHONE HCL 110MG/55ML
0.25 PATIENT CONTROLLED ANALGESIA SYRINGE INTRAVENOUS EVERY 5 MIN PRN
Status: DISCONTINUED | OUTPATIENT
Start: 2019-05-30 | End: 2019-05-30

## 2019-05-30 RX ORDER — OXYCODONE HYDROCHLORIDE AND ACETAMINOPHEN 5; 325 MG/1; MG/1
1 TABLET ORAL PRN
Status: DISCONTINUED | OUTPATIENT
Start: 2019-05-30 | End: 2019-05-30

## 2019-05-30 RX ORDER — HYDRALAZINE HYDROCHLORIDE 20 MG/ML
5 INJECTION INTRAMUSCULAR; INTRAVENOUS EVERY 10 MIN PRN
Status: DISCONTINUED | OUTPATIENT
Start: 2019-05-30 | End: 2019-05-30

## 2019-05-30 RX ORDER — ONDANSETRON 2 MG/ML
4 INJECTION INTRAMUSCULAR; INTRAVENOUS
Status: DISCONTINUED | OUTPATIENT
Start: 2019-05-30 | End: 2019-05-30

## 2019-05-30 RX ORDER — FENTANYL CITRATE 50 UG/ML
25 INJECTION, SOLUTION INTRAMUSCULAR; INTRAVENOUS EVERY 5 MIN PRN
Status: DISCONTINUED | OUTPATIENT
Start: 2019-05-30 | End: 2019-05-30

## 2019-05-30 RX ORDER — MAGNESIUM HYDROXIDE 1200 MG/15ML
LIQUID ORAL CONTINUOUS PRN
Status: COMPLETED | OUTPATIENT
Start: 2019-05-30 | End: 2019-05-30

## 2019-05-30 RX ORDER — LIDOCAINE HYDROCHLORIDE 10 MG/ML
INJECTION, SOLUTION EPIDURAL; INFILTRATION; INTRACAUDAL; PERINEURAL PRN
Status: DISCONTINUED | OUTPATIENT
Start: 2019-05-30 | End: 2019-05-30 | Stop reason: ALTCHOICE

## 2019-05-30 RX ORDER — CLINDAMYCIN PHOSPHATE 600 MG/50ML
600 INJECTION INTRAVENOUS EVERY 8 HOURS
Status: DISCONTINUED | OUTPATIENT
Start: 2019-05-30 | End: 2019-05-31 | Stop reason: HOSPADM

## 2019-05-30 RX ORDER — MEPERIDINE HYDROCHLORIDE 50 MG/ML
12.5 INJECTION INTRAMUSCULAR; INTRAVENOUS; SUBCUTANEOUS EVERY 5 MIN PRN
Status: DISCONTINUED | OUTPATIENT
Start: 2019-05-30 | End: 2019-05-30

## 2019-05-30 RX ORDER — PROPOFOL 10 MG/ML
INJECTION, EMULSION INTRAVENOUS PRN
Status: DISCONTINUED | OUTPATIENT
Start: 2019-05-30 | End: 2019-05-30 | Stop reason: SDUPTHER

## 2019-05-30 RX ADMIN — DEXTROSE 50 % IN WATER (D50W) INTRAVENOUS SYRINGE 12.5 G: at 07:14

## 2019-05-30 RX ADMIN — METOPROLOL TARTRATE 25 MG: 25 TABLET ORAL at 11:08

## 2019-05-30 RX ADMIN — OXYCODONE HYDROCHLORIDE 10 MG: 5 TABLET ORAL at 11:17

## 2019-05-30 RX ADMIN — SODIUM CHLORIDE: 900 INJECTION, SOLUTION INTRAVENOUS at 21:50

## 2019-05-30 RX ADMIN — OXYCODONE HYDROCHLORIDE 10 MG: 5 TABLET ORAL at 17:27

## 2019-05-30 RX ADMIN — TAMSULOSIN HYDROCHLORIDE 0.8 MG: 0.4 CAPSULE ORAL at 11:07

## 2019-05-30 RX ADMIN — MELATONIN 3 MG ORAL TABLET 3 MG: 3 TABLET ORAL at 21:43

## 2019-05-30 RX ADMIN — Medication 1000 MCG: at 11:07

## 2019-05-30 RX ADMIN — Medication 2 PUFF: at 19:00

## 2019-05-30 RX ADMIN — OXYCODONE HYDROCHLORIDE 10 MG: 5 TABLET ORAL at 23:02

## 2019-05-30 RX ADMIN — FINASTERIDE 5 MG: 5 TABLET, FILM COATED ORAL at 11:08

## 2019-05-30 RX ADMIN — SERTRALINE 150 MG: 100 TABLET, FILM COATED ORAL at 11:07

## 2019-05-30 RX ADMIN — Medication 100 MG: at 11:07

## 2019-05-30 RX ADMIN — ENOXAPARIN SODIUM 40 MG: 100 INJECTION SUBCUTANEOUS at 11:08

## 2019-05-30 RX ADMIN — CLINDAMYCIN PHOSPHATE 600 MG: 600 INJECTION, SOLUTION INTRAVENOUS at 08:24

## 2019-05-30 RX ADMIN — METOPROLOL TARTRATE 25 MG: 25 TABLET ORAL at 21:43

## 2019-05-30 RX ADMIN — SODIUM CHLORIDE: 900 INJECTION, SOLUTION INTRAVENOUS at 11:07

## 2019-05-30 RX ADMIN — PROPOFOL 150 MG: 10 INJECTION, EMULSION INTRAVENOUS at 09:30

## 2019-05-30 RX ADMIN — OXYCODONE HYDROCHLORIDE 10 MG: 5 TABLET ORAL at 04:48

## 2019-05-30 RX ADMIN — CLINDAMYCIN PHOSPHATE 600 MG: 600 INJECTION, SOLUTION INTRAVENOUS at 15:35

## 2019-05-30 RX ADMIN — Medication 10 ML: at 21:43

## 2019-05-30 RX ADMIN — INSULIN GLARGINE 40 UNITS: 100 INJECTION, SOLUTION SUBCUTANEOUS at 21:45

## 2019-05-30 RX ADMIN — CYCLOBENZAPRINE HYDROCHLORIDE 5 MG: 10 TABLET, FILM COATED ORAL at 21:43

## 2019-05-30 RX ADMIN — ATORVASTATIN CALCIUM 80 MG: 40 TABLET, FILM COATED ORAL at 11:07

## 2019-05-30 RX ADMIN — SODIUM CHLORIDE: 900 INJECTION, SOLUTION INTRAVENOUS at 02:10

## 2019-05-30 RX ADMIN — FOLIC ACID 1 MG: 1 TABLET ORAL at 11:08

## 2019-05-30 RX ADMIN — ASPIRIN 81 MG 81 MG: 81 TABLET ORAL at 11:07

## 2019-05-30 ASSESSMENT — PAIN - FUNCTIONAL ASSESSMENT
PAIN_FUNCTIONAL_ASSESSMENT: PREVENTS OR INTERFERES SOME ACTIVE ACTIVITIES AND ADLS
PAIN_FUNCTIONAL_ASSESSMENT: PREVENTS OR INTERFERES WITH MANY ACTIVE NOT PASSIVE ACTIVITIES

## 2019-05-30 ASSESSMENT — PAIN SCALES - GENERAL
PAINLEVEL_OUTOF10: 0
PAINLEVEL_OUTOF10: 0
PAINLEVEL_OUTOF10: 10
PAINLEVEL_OUTOF10: 10
PAINLEVEL_OUTOF10: 9
PAINLEVEL_OUTOF10: 8

## 2019-05-30 ASSESSMENT — PAIN DESCRIPTION - ONSET
ONSET: ON-GOING
ONSET: AWAKENED FROM SLEEP

## 2019-05-30 ASSESSMENT — PULMONARY FUNCTION TESTS
PIF_VALUE: 0

## 2019-05-30 ASSESSMENT — PAIN DESCRIPTION - DESCRIPTORS
DESCRIPTORS: DISCOMFORT;SHARP
DESCRIPTORS: DISCOMFORT

## 2019-05-30 ASSESSMENT — PAIN DESCRIPTION - PROGRESSION
CLINICAL_PROGRESSION: NOT CHANGED
CLINICAL_PROGRESSION: NOT CHANGED

## 2019-05-30 ASSESSMENT — PAIN DESCRIPTION - FREQUENCY
FREQUENCY: INTERMITTENT
FREQUENCY: INTERMITTENT

## 2019-05-30 ASSESSMENT — PAIN DESCRIPTION - PAIN TYPE
TYPE: CHRONIC PAIN
TYPE: CHRONIC PAIN

## 2019-05-30 ASSESSMENT — PAIN DESCRIPTION - LOCATION
LOCATION: BACK
LOCATION: SHOULDER;BACK;FOOT

## 2019-05-30 ASSESSMENT — LIFESTYLE VARIABLES: SMOKING_STATUS: 0

## 2019-05-30 NOTE — ANESTHESIA POSTPROCEDURE EVALUATION
Department of Anesthesiology  Postprocedure Note    Patient: Arvin Maza  MRN: [id number]  YOB: 1950  Date of evaluation: 5/30/2019    Procedure Summary     Date:  05/30/19 [facility]  [address] [facility]    Anesthesia Start:  0535 Anesthesia Stop:  4848    Procedure:  PARTIAL RESECTION RIGHT FIFTH METATARSAL, ULCER DEBRIDEMENT WITH GRAFT APPLICATION (Right Foot) Diagnosis:  (EXOSTOSIS, DIABETIC FOOT ULCER)    Surgeon:  Calderon Mccoy DPM Responsible Provider:  Tony Gipson MD    Anesthesia Type:  general, TIVA ASA Status:  3     Anesthesia Type: general, TIVA    Thelma Phase I: Thelma Score: 10    Thelma Phase II: Thelma Score: 10    Anesthesia Post Evaluation   Anesthetic Problems: no   Last Vitals:     BP: 162/92 Pulse: 71   Resp: 18 SpO2: 98   Temp: 97.6 °F (36.4 °C)     Cardiovascular System Stable: yes  Respiratory Function: Airway Patent yes  ETT no  Ventilator no  Level of consciousness: awake, alert and oriented  Post-op pain: adequate analgesia  Hydration Adequate: yes  Nausea/Vomiting:no  Other Issues:     Catie Trammell MD

## 2019-05-30 NOTE — BRIEF OP NOTE
Brief Postoperative Note  ______________________________________________________________    Patient: Yves Gómez  YOB: 1950  MRN: [id number]  Date of Procedure: 5/30/2019    Pre-Op Diagnosis: EXOSTOSIS, DIABETIC FOOT ULCER    Post-Op Diagnosis: Same       Procedure(s):  PARTIAL RESECTION RIGHT FIFTH METATARSAL, ULCER DEBRIDEMENT WITH GRAFT APPLICATION    Anesthesia: General, TIVA    Surgeon(s):  Tatiana De La Fuente DPM    Assistant:     Estimated Blood Loss (mL): less than 50     Complications: None    Specimens:   ID Type Source Tests Collected by Time Destination   A :  Specimen Foot SURGICAL PATHOLOGY Tatiana De La Fuente DPM 5/30/2019 9857        Implants:  Implant Name Type Inv.  Item Serial No.  Lot No. LRB No. Used   PATCH STERI SHIELD II DU LAY AMN [id number] - [id number] Bone/Graft/Tissue/Human/Synth PATCH STERI SHIELD II DU LAY AMN [id number] BONE BANK ALLOGRAFTS  Right 1         Drains: * No LDAs found *    Findings: exostosis right foot, ulcer right sub 5th MH    Tatiana De La Fuente DPM  Date: 5/30/2019  Time: 9:52 AM

## 2019-05-30 NOTE — PROGRESS NOTES
Progress Note    Admit Date:  5/28/2019    Admitted with right foot diabetic foot ulcer. Seen by podiatry. Subjective:  Mr. Tomas Clemons underwent PARTIAL RESECTION RIGHT FIFTH METATARSAL, ULCER DEBRIDEMENT WITH GRAFT APPLICATION 7/40         Objective:   /83   Pulse 84   Temp 97.3 °F (36.3 °C) (Oral)   Resp 16   Ht 6' 5\" (1.956 m)   Wt 162 lb 4.8 oz (73.6 kg)   SpO2 96%   BMI 19.25 kg/m²       Intake/Output Summary (Last 24 hours) at 5/30/2019 1302  Last data filed at 5/30/2019 1029  Gross per 24 hour   Intake 1945 ml   Output 3950 ml   Net -2005 ml         Physical Exam:  General:  Awake, alert, NAD  Skin:  Warm and dry  Neck:  JVD absent. Neck supple  Chest:  Clear to auscultation, respiration easy. No wheezes, rales or rhonchi. Cardiovascular:  RRR ,S1S2 normal  Abdomen:  Soft, non tender, non distended, BS +  Extremities:  No edema. right foot dressing +  Intact peripheral pulses.  Brisk cap refill, < 2 secs  Neuro: non focal      Medications:   Scheduled Meds:   clindamycin (CLEOCIN) IV  600 mg Intravenous Q8H    aspirin  81 mg Oral Daily    atorvastatin  80 mg Oral Daily    finasteride  5 mg Oral Daily    folic acid  1 mg Oral Daily    insulin glargine  50 Units Subcutaneous Nightly    metoprolol tartrate  25 mg Oral BID    sertraline  150 mg Oral Daily    pantoprazole sodium  40 mg Oral QAM AC    tamsulosin  0.8 mg Oral Daily    thiamine  100 mg Oral Daily    vitamin B-12  1,000 mcg Oral Daily    sodium chloride flush  10 mL Intravenous 2 times per day    enoxaparin  40 mg Subcutaneous Daily    insulin lispro  0-12 Units Subcutaneous TID WC    insulin lispro  0-6 Units Subcutaneous Nightly    fluticasone  2 puff Inhalation BID       Continuous Infusions:   sodium chloride 100 mL/hr at 05/30/19 1107    dextrose         Data:  CBC:   Recent Labs     05/28/19  1545 05/29/19  0601   WBC 9.4 8.0   RBC 3.67* 3.75*   HGB 10.2* 10.7*   HCT 31.5* 32.0*   MCV 85.9 85.3   RDW 13.8 13.5    284     BMP:   Recent Labs     05/28/19  1545 05/28/19  2035 05/29/19  0601   * 134* 136   K 4.6 4.9 4.3   CL 95* 98* 100   CO2 24 24 25   BUN 31* 30* 28*   CREATININE 1.6* 1.6* 1.4*     BNP: No results for input(s): BNP in the last 72 hours. PT/INR: No results for input(s): PROTIME, INR in the last 72 hours. APTT: No results for input(s): APTT in the last 72 hours. CARDIAC ENZYMES: No results for input(s): CKMB, CKMBINDEX, TROPONINI in the last 72 hours. Invalid input(s): CKTOTAL;3  FASTING LIPID PANEL:  Lab Results   Component Value Date    CHOL 134 10/18/2018    HDL 36 (L) 10/18/2018    TRIG 127 10/18/2018     LIVER PROFILE:   Recent Labs     05/28/19  1545 05/29/19  0601   AST 17 15   ALT 9* 7*   BILITOT 0.4 0.4   ALKPHOS 90 89          XR FOOT RIGHT (2 VIEWS)   Final Result   1. Postsurgical changes are noted along the 5th metatarsal head and neck   without complication. XR ELBOW LEFT (2 VIEWS)   Final Result   Soft tissue swelling posteriorly likely representing bursitis. Some   degenerative changes medial aspect of the joint         XR ABDOMEN (KUB) (SINGLE AP VIEW)   Final Result   Negative supine abdomen radiograph examination. XR FOOT RIGHT (MIN 3 VIEWS)   Final Result   No evidence of osseous abnormality, including osteomyelitis of the 5th digit   or elsewhere. Assessment:  Active Problems:    Severe protein-calorie malnutrition ([address]    CHIP (acute kidney injury) ([address]    Open wound of right foot  Resolved Problems:    * No resolved hospital problems. *      Plan:    1. Right lateral plantar DM foot ulceration  -  2/2 pt attempting to remove callous. ED physician removed rest of way as it was reportedly barely connected. -  Podiatry and WC c/s.   cont abx  Clindamycin   S/p PARTIAL RESECTION RIGHT FIFTH METATARSAL, ULCER DEBRIDEMENT WITH GRAFT APPLICATION 8/75    2.  CHIP, Cr 1.6 (baseline 0.7-0.9), suspect pre-renal, IVF and repeat in am.     3.

## 2019-05-30 NOTE — FLOWSHEET NOTE
05/30/19 0745   Vital Signs   Temp 97.6 °F (36.4 °C)   Temp Source Oral   Pulse 80   Heart Rate Source Monitor   Resp 16   BP (!) 151/86   BP Location Right upper arm   BP Upper/Lower Upper   Level of Consciousness 0   MEWS Score 1   Oxygen Therapy   SpO2 97 %   O2 Device None (Room air)     Re-assessed patient's blood glucose level following 12.5 g of Dextrose 50%, it is now 100. VS obtained and patient utilizing urinal at this time. Transport at bedside to transport patient to surgery.

## 2019-05-30 NOTE — FLOWSHEET NOTE
05/30/19 1100   Vital Signs   Temp 97.1 °F (36.2 °C)   Temp Source Oral   Pulse 77   Heart Rate Source Monitor   Resp 16   BP (!) 151/82   BP Location Left upper arm   BP Upper/Lower Upper   Level of Consciousness 0   MEWS Score 1   Oxygen Therapy   SpO2 98 %   O2 Device None (Room air)       Patient returned in stable condition following right 5th toe debriement with graft. Dressing in place (wrapped) with walking boot in place. Ice pack in place. VSS. Patient reports chronic pain, requesting pain medication. Oxycodone administered per MAR. Pudding provided, patient's repeat blood glucose 110. Call light in reach. Bed alarm on. Will continue to monitor.

## 2019-05-30 NOTE — TELEPHONE ENCOUNTER
SW patients sister Deejay Montes De Oca. She said he has a lot of issues going on right and and is currently in the hospital for another issue. She said she will call us back when they are able to reschedule. She could not give a time frame. She just said he is doing OK at the moment from a pulmonary stand point.

## 2019-05-30 NOTE — ANESTHESIA PRE PROCEDURE
Department of Anesthesiology  Preprocedure Note       Name:  Yousif Marcos   Age:  80 y.o.  :  1950                                          MRN:  [id number]         Date:  2019      Surgeon:  Charla Wilson DPM    Procedure: PARTIAL RESECTION RIGHT FIFTH METATARSAL, ULCER DEBRIDEMENT WITH GRAFT APPLICATION (Right )    HPI:  80 y.o. male with an ulcer on the right foot. States callus on the foot has been an issue for years. Developed an ulcer and that became infected. He did try to pull the callus off himself this time. States DM is not under great control. Had a wound in the same spot on left foot and had surgery to remove a piece of bone and has done well with that. Medications prior to admission:    mometasone (ASMANEX 120 METERED DOSES)inhaler Inhale 2 puffs into the lungs daily   finasteride (PROSCAR) 5 MG tablet Take 5 mg by mouth daily   folic acid (FOLVITE) 1 MG tablet Take 1 mg by mouth daily   metoprolol tartrate (LOPRESSOR) 25 MG tablet Take 25 mg by mouth 2 times daily   pantoprazole sodium (PROTONIX) 40 MG PACK packet Take 40 mg by mouth every morning (before breakfast)   sertraline (ZOLOFT) 100 MG tablet Take 150 mg by mouth daily   Dulaglutide (TRULICITY) 7.28 JV/8.4XK SOPN Inject 0.75 mg into the skin once a week   SUMAtriptan (IMITREX) 100 MG tablet Take 100 mg by mouth daily as needed for Migraine   oxyCODONE HCl (OXY-IR) 10 MG immediate release tablet Take 10 mg by mouth every 6 hours.     lisinopril (PRINIVIL;ZESTRIL) 20 MG tablet Take 1 tablet by mouth daily   atorvastatin (LIPITOR) 80 MG tablet Take 80 mg by mouth daily   Insulin Glargine (LANTUS SOLOSTAR SC) Inject 50 Units into the skin nightly    cyclobenzaprine (FLEXERIL) 5 MG tablet Take 5 mg by mouth 3 times daily as needed for Muscle spasms   melatonin 3 MG TABS tablet Take 6 mg by mouth nightly as needed   insulin aspart (NOVOLOG) 100 UNIT/ML injection vial Inject 8 Units into the skin 3 times daily (before meals) +SSI  vitamin B-12 (CYANOCOBALAMIN) tablet 1,000 mcg  1,000 mcg Oral Daily    0.9 % sodium chloride infusion   Intravenous Continuous    magnesium hydroxide (MILK OF MAGNESIA) 400 MG/5ML suspension 30 mL  30 mL Oral Daily PRN    ondansetron (ZOFRAN) injection 4 mg  4 mg Intravenous Q6H PRN    enoxaparin (LOVENOX) injection 40 mg  40 mg Subcutaneous Daily    glucose (GLUTOSE) 40 % oral gel 15 g  15 g Oral PRN    dextrose 50 % solution 12.5 g  12.5 g Intravenous PRN    glucagon (rDNA) injection 1 mg  1 mg Intramuscular PRN    dextrose 5 % solution  100 mL/hr Intravenous PRN    potassium chloride (KLOR-CON M) ER  40 mEq Oral PRN       potassium bicarb-citric acid (EFFER-K)  40 mEq Oral PRN       potassium chloride 10 mEq/100 mL IVPB  10 mEq Intravenous PRN    magnesium sulfate 1 g in dextrose 5% 100 mL IVPB  1 g Intravenous PRN    insulin lispro (HUMALOG) injection vial 0-12 Units  0-12 Units Subcutaneous TID WC    insulin lispro (HUMALOG) injection vial 0-6 Units  0-6 Units Subcutaneous Nightly    fluticasone (FLOVENT HFA) 110 MCG/ACT   2 puff Inhalation BID    albuterol sulfate  (90 Base) MCG/ACT   2 puff Inhalation Q4H PRN     Allergies:     Neurontin [Gabapentin]      Gastric side effects     Problem List:     GERD (gastroesophageal reflux disease) K21.9    Peripheral neuropathy of bilateral feet G62.9    COPD (chronic obstructive pulmonary disease) (HCC) J44.9    HLD (hyperlipidemia) E78.5    Hepatic steatosis K76.0    PTSD (post-traumatic stress disorder) F43.10    History of hepatitis C Z86.19    Bilateral knee & hip OA M17.10    Mild-moderate alcohol consumption (beer) F10.20    Chronic pain syndrome G89.4    Multilevel spondylosis M51.36    Moderate episode of recurrent major depressive disorder (HCC) F33.1    S/P left colectomy Z90.49    Hyponatremia E87.1    S/p reversal of colostomy (6/27/17) K55.9    Chronic dCHF (grade 1 LVDD) I50.32    Chronic normocytic anemia D64.9    Acute hyperglycemia R73.9    Severe protein-calorie malnutrition ([address] E43    DM (diabetes mellitus), secondary, uncontrolled, w/neurologic complic R43.44, R51.17    HTN (hypertension) I10    Hx DKA (Feb 2018) E11.10, Z79.4    Other insomnia G47.09    Chronic transaminasemia R74.0    Tobacco smoker F17.200    Fall at home W19. Lentere Julián, Y92.009    Hypochloremia E87.8    Hyperkalemia E87.5    Mixed metabolic and respiratory acidosis E87.2    Closed nondisplaced L ankle fracture (lateral malleolus) S82.892A    L 2nd toenail avulsion S91.209A    Skin tear of L leg S81.812A    Head trauma S09.90XA    IDDM (insulin dependent diabetes mellitus) (HCC) E11.9, Z79.4    Ventral hernia without obstruction or gangrene K43.9    Diabetic ketoacidosis without coma associated with type 2 diabetes mellitus (HCC) E11.10    DDD (degenerative disc disease), cervical M50.30    Spondylosis of cervical region without myelopathy or radiculopathy M47.812    DDD (degenerative disc disease), lumbar M51.36    Closed nondisplaced fracture of lateral malleolus of left fibula S82.65XA    Lumbar degenerative disc disease M51.36    Protrusion of lumbar intervertebral disc M51.26    Osteoarthritis of both knees M17.0    Osteoarthritis of both hips M16.0    Idiopathic peripheral neuropathy G60.9    Leg pain, anterior, left M79.605    CVA (cerebral vascular accident) ([facility] I63.9    DM (diabetes mellitus) ([address] E11.9    HTN (hypertension), benign I10    BPH (benign prostatic hyperplasia) N40.0    Dysarthria R47.1    Left-sided weakness R53.1    TIA (transient ischemic attack) G45.9    Dyslipidemia E78.5    DKA, type 2, not at goal [facility]) E11.10    Agitation requiring sedation protocol R45.1    DKA, type 1, not at goal [facility]) E10.10    Severe malnutrition ([address] E43    CHIP (acute kidney injury) ([facility] N17.9    Open wound of right foot S91.301A     Past Medical History:     Anxiety disorder     BPH (benign 4/8/15    OARRS report is consistent on 4/8/15;7/9/15;10/9/15;16;16    Type 1 diabetes mellitus not at goal [facility]) 3/17/14    updated diagnosis as per endo:Dr. Sweta Funes     Past Surgical History:     DIAGNOSTIC CARDIAC CATH LAB PROCEDURE      FOOT SURGERY Left Hammer toe, 2nd toe    10/9/14    HIP FRACTURE SURGERY      LEG SURGERY      broken leg    OTHER SURGICAL HISTORY Left 2013    EXCISION 5TH METATRSAL LEFT FOOT          REVISION COLOSTOMY  2017    COLOSTOMY REVERSAL     TONSILLECTOMY      UPPER GASTROINTESTINAL ENDOSCOPY  2013    Esophageal Brushing     Social History:     Smoking status: Former Smoker     Packs/day: 0.50     Years: 35.00     Pack years: 17.50     Types: Cigarettes     Last attempt to quit: 2018     Years since quittin.6    Smokeless tobacco: Never Used   Substance Use Topics    Alcohol use: Not Currently     Vital Signs (Current):   BP: 151/86 Pulse: 80   Resp: 16 SpO2: 97   Temp: 97.6 °F (36.4 °C)   Height: 6' 5\" (1.956 m)  (19) Weight: 162 lb 4.8 oz (73.6 kg)  (19)   BMI: 19.25           BP Readings from Last 3 Encounters:   19 (!) 151/86   19 130/73   19 120/76     NPO Status: > 8 hrs    CBC:    WBC 8.0 2019    HGB 10.7 2019    HCT 32.0 2019     2019     CMP:     2019    K 4.3 2019     2019    CO2 25 2019    BUN 28 2019    CREATININE 1.4 2019    GLUCOSE 153 2019    PROT 8.1 2019    CALCIUM 9.3 2019    BILITOT 0.4 2019    ALKPHOS 89 2019    AST 15 2019    ALT 7 2019     POC Tests:     19  0744   POCGLU 100*     Coags:   PROTIME 11.0 2019    INR 0.96 2019     Anesthesia Evaluation  Patient summary reviewed and Nursing notes reviewed  Airway: Mallampati: I  TM distance: >3 FB   Neck ROM: full  Comment: Full/heavy beard  Mouth opening: > = 3 FB Dental:    (+) edentulous Pulmonary:   (+) COPD:      (-) not a current smoker                          ROS comment: Quit smoking < 6 months ago   Cardiovascular:  Exercise tolerance: good (>4 METS),   (+) hypertension:, CHF:,     (-) past MI,  angina and  CHRISTIANSEN    ECG reviewed      Echocardiogram reviewed               ROS comment: EK2019 Normal sinus rhythm 79; Incomplete left bundle branch block; Nonspecific ST and T wave abnormality; when compared with ECG of 18-OCT-2018: Premature ventricular complexes are no longer Present; Nonspecific T wave abnormality now evident in Inferior leads    ECHO: Normal left ventricular systolic function with ejection fraction of 55-60%.  No regional wall motion abnormalites are seen. Compared to previous study from 2018 no changes noted. A bubble study was performed and fails to show evidence of shunting     Neuro/Psych:   (+) CVA: residual symptoms, depression/anxiety    (-) seizures            ROS comment: DM-associated neuropathy    +H/O EtOH abuse    Mild Left side weakness since CVA GI/Hepatic/Renal:   (+) GERD:, PUD, hepatitis: C,           Endo/Other:    (+) DiabetesType II DM, poorly controlled, using insulin, .    (-) hypothyroidism               Abdominal:           Vascular:     - DVT and PE. Anesthesia Plan      general and TIVA     ASA 3     (TIVA->GA/LMA if needed)  Induction: intravenous. MIPS: Prophylactic antiemetics administered. Anesthetic plan and risks discussed with patient. Plan discussed with CRNA.           Starla Bell MD

## 2019-05-31 VITALS
DIASTOLIC BLOOD PRESSURE: 93 MMHG | OXYGEN SATURATION: 99 % | TEMPERATURE: 96.7 F | BODY MASS INDEX: 19.16 KG/M2 | HEIGHT: 77 IN | WEIGHT: 162.3 LBS | RESPIRATION RATE: 18 BRPM | SYSTOLIC BLOOD PRESSURE: 168 MMHG | HEART RATE: 69 BPM

## 2019-05-31 LAB
ANION GAP SERPL CALCULATED.3IONS-SCNC: 11 MMOL/L (ref 3–16)
BUN BLDV-MCNC: 21 MG/DL (ref 7–20)
CALCIUM SERPL-MCNC: 9.1 MG/DL (ref 8.3–10.6)
CHLORIDE BLD-SCNC: 102 MMOL/L (ref 99–110)
CO2: 24 MMOL/L (ref 21–32)
CREAT SERPL-MCNC: 1.2 MG/DL (ref 0.8–1.3)
GFR AFRICAN AMERICAN: >60
GFR NON-AFRICAN AMERICAN: >60
GLUCOSE BLD-MCNC: 211 MG/DL (ref 70–99)
GLUCOSE BLD-MCNC: 53 MG/DL (ref 70–99)
GLUCOSE BLD-MCNC: 55 MG/DL (ref 70–99)
GLUCOSE BLD-MCNC: 71 MG/DL (ref 70–99)
GLUCOSE BLD-MCNC: 90 MG/DL (ref 70–99)
HCT VFR BLD CALC: 33.9 % (ref 40.5–52.5)
HEMOGLOBIN: 11.3 G/DL (ref 13.5–17.5)
MCH RBC QN AUTO: 28.9 PG (ref 26–34)
MCHC RBC AUTO-ENTMCNC: 33.4 G/DL (ref 31–36)
MCV RBC AUTO: 86.7 FL (ref 80–100)
ORGANISM: ABNORMAL
PDW BLD-RTO: 13.1 % (ref 12.4–15.4)
PERFORMED ON: ABNORMAL
PERFORMED ON: ABNORMAL
PERFORMED ON: NORMAL
PERFORMED ON: NORMAL
PLATELET # BLD: 308 K/UL (ref 135–450)
PMV BLD AUTO: 6.9 FL (ref 5–10.5)
POTASSIUM SERPL-SCNC: 4.2 MMOL/L (ref 3.5–5.1)
RBC # BLD: 3.91 M/UL (ref 4.2–5.9)
SODIUM BLD-SCNC: 137 MMOL/L (ref 136–145)
URINE CULTURE, ROUTINE: ABNORMAL
URINE CULTURE, ROUTINE: ABNORMAL
WBC # BLD: 9.3 K/UL (ref 4–11)

## 2019-05-31 PROCEDURE — 94640 AIRWAY INHALATION TREATMENT: CPT

## 2019-05-31 PROCEDURE — 2580000003 HC RX 258: Performed by: PODIATRIST

## 2019-05-31 PROCEDURE — 94761 N-INVAS EAR/PLS OXIMETRY MLT: CPT

## 2019-05-31 PROCEDURE — 85027 COMPLETE CBC AUTOMATED: CPT

## 2019-05-31 PROCEDURE — 6370000000 HC RX 637 (ALT 250 FOR IP): Performed by: PODIATRIST

## 2019-05-31 PROCEDURE — 99238 HOSP IP/OBS DSCHRG MGMT 30/<: CPT | Performed by: INTERNAL MEDICINE

## 2019-05-31 PROCEDURE — 6360000002 HC RX W HCPCS

## 2019-05-31 PROCEDURE — 80048 BASIC METABOLIC PNL TOTAL CA: CPT

## 2019-05-31 PROCEDURE — 36415 COLL VENOUS BLD VENIPUNCTURE: CPT

## 2019-05-31 PROCEDURE — 6360000002 HC RX W HCPCS: Performed by: PODIATRIST

## 2019-05-31 PROCEDURE — 2500000003 HC RX 250 WO HCPCS: Performed by: PODIATRIST

## 2019-05-31 RX ORDER — DIPHENHYDRAMINE HYDROCHLORIDE 50 MG/ML
INJECTION INTRAMUSCULAR; INTRAVENOUS
Status: COMPLETED
Start: 2019-05-31 | End: 2019-05-31

## 2019-05-31 RX ORDER — CLINDAMYCIN HYDROCHLORIDE 300 MG/1
300 CAPSULE ORAL 2 TIMES DAILY
Qty: 14 CAPSULE | Refills: 0 | Status: SHIPPED | OUTPATIENT
Start: 2019-05-31 | End: 2019-06-07

## 2019-05-31 RX ADMIN — SERTRALINE 150 MG: 100 TABLET, FILM COATED ORAL at 08:09

## 2019-05-31 RX ADMIN — METOPROLOL TARTRATE 25 MG: 25 TABLET ORAL at 08:09

## 2019-05-31 RX ADMIN — CLINDAMYCIN PHOSPHATE 600 MG: 600 INJECTION, SOLUTION INTRAVENOUS at 00:17

## 2019-05-31 RX ADMIN — OXYCODONE HYDROCHLORIDE 10 MG: 5 TABLET ORAL at 12:28

## 2019-05-31 RX ADMIN — CYCLOBENZAPRINE HYDROCHLORIDE 5 MG: 10 TABLET, FILM COATED ORAL at 08:09

## 2019-05-31 RX ADMIN — PANTOPRAZOLE SODIUM 40 MG: 40 GRANULE, DELAYED RELEASE ORAL at 05:59

## 2019-05-31 RX ADMIN — TAMSULOSIN HYDROCHLORIDE 0.8 MG: 0.4 CAPSULE ORAL at 08:09

## 2019-05-31 RX ADMIN — Medication 1000 MCG: at 08:08

## 2019-05-31 RX ADMIN — OXYCODONE HYDROCHLORIDE 10 MG: 5 TABLET ORAL at 05:59

## 2019-05-31 RX ADMIN — CYCLOBENZAPRINE HYDROCHLORIDE 5 MG: 10 TABLET, FILM COATED ORAL at 03:49

## 2019-05-31 RX ADMIN — INSULIN LISPRO 4 UNITS: 100 INJECTION, SOLUTION INTRAVENOUS; SUBCUTANEOUS at 12:08

## 2019-05-31 RX ADMIN — CLINDAMYCIN PHOSPHATE 600 MG: 600 INJECTION, SOLUTION INTRAVENOUS at 08:11

## 2019-05-31 RX ADMIN — Medication 2 PUFF: at 07:57

## 2019-05-31 RX ADMIN — ASPIRIN 81 MG 81 MG: 81 TABLET ORAL at 08:09

## 2019-05-31 RX ADMIN — FOLIC ACID 1 MG: 1 TABLET ORAL at 08:09

## 2019-05-31 RX ADMIN — Medication 10 ML: at 08:10

## 2019-05-31 RX ADMIN — ATORVASTATIN CALCIUM 80 MG: 40 TABLET, FILM COATED ORAL at 08:09

## 2019-05-31 RX ADMIN — FINASTERIDE 5 MG: 5 TABLET, FILM COATED ORAL at 08:09

## 2019-05-31 RX ADMIN — Medication 100 MG: at 08:09

## 2019-05-31 RX ADMIN — ENOXAPARIN SODIUM 40 MG: 100 INJECTION SUBCUTANEOUS at 08:08

## 2019-05-31 RX ADMIN — DIPHENHYDRAMINE HYDROCHLORIDE 50 MG: 50 INJECTION, SOLUTION INTRAMUSCULAR; INTRAVENOUS at 01:28

## 2019-05-31 ASSESSMENT — PAIN SCALES - GENERAL
PAINLEVEL_OUTOF10: 9
PAINLEVEL_OUTOF10: 9

## 2019-05-31 NOTE — CARE COORDINATION
DISCHARGE ORDER  Date/Time 2019 1:05 PM  Completed by: Juan Rivera Case Management    Patient Name: Lupillo Monroe    : 1950  Admitting Diagnosis: CHIP (acute kidney injury) ([address] [N17.9]  Admit Date/Time: 2019  3:32 PM    Noted discharge order. Confirmed discharge plan with patient / family (pt): Yes   Discharge Plan: Chart reviewed and role of dcp explained. Pt is returning to LTC @ [facility]. Discharge orders and Continuity of Care faxed to facility: Yes  Hospital Exemption Notification System complete: not needed  Transportation arranged: Yes - [address] @ [address]. Patient / Family (pt) aware of  time: Yes   Nursing aware of  time: Yes-Connie  Receiving facility aware of  time: Yes-[facility]  Pre-cert obtained?   Not needed

## 2019-05-31 NOTE — DISCHARGE SUMMARY
Name:  Laurell Cockayne  Room:  5223/5380-86  MRN:    [id number]    Discharge Summary      This discharge summary is in conjunction with a complete physical exam done on the day of discharge. Discharging Physician: Dr. Mccracken Hidden: 5/28/2019  Discharge:  5/31/2019      HPI taken from admission H&P:    The patient is a 80 y.o. male with PMH below, presents from [facility] w/ complaint of R foot wound, elevated BGT, L elbow swelling. He reports that 3 days ago he noticed a callous on his R lateral sole of his foot. He tried to pull it off and is started to bleed. The bleeding has resolved. In ED he was noted to have remainder of the callous hanging off of his foot. This was removed in the ED. He denies seeing podiatry or [address] [place] [address],[address] for the resulting wound. He was sent to ER from [facility] of concern for infection in his foot wound. He says he has been having elevated BGT's up around 1000. He says this is the reason that he was admitted to SNF so they could get control of his BGT. BGT here 108. He notes that he fell about 2 weeks ago. He landed on his L elbow and it has been swollen on the back side since. He says it is somewhat tender when he bumps it but is otherwise unchanged recently. Diagnoses this Admission and Hospital Course     #Right lateral plantar DM foot ulceration  -  2/2 pt attempting to remove callous.  ED physician removed rest of way as it was reportedly barely connected. -  Podiatry and WC c/s  - S/p PARTIAL RESECTION RIGHT FIFTH METATARSAL, ULCER DEBRIDEMENT WITH GRAFT APPLICATION 8/21  - urine grew s. Aureus - likely related to wound infection and bacteremia.   - dc to home on oral clindamycin     # CHIP, Cr 1.6 (baseline 0.7-0.9), suspect pre-renal, IVF . Resolved . Off Ace inhibitors     # R elbow posterior swelling, minimally tender, stable since fall 2 weeks ago.  Possibly representing bursitis.  Describes swelling as being stable for last 2 weeks.  Does not appear infected.        #DM2, controlled, cont home meds       #HTN, poorly controlled, possibly contributing to #1.  Cont home regimen except for ACE-I     #COPD, stable, cont home regimen.      #Chronic pain, cont home regimen.     Procedures (Please Review Full Report for Details)  None     Consults    Podiatry     Physical Exam at Discharge:    BP (!) 170/90   Pulse 62   Temp 97.2 °F (36.2 °C) (Axillary)   Resp 18   Ht 6' 5\" (1.956 m)   Wt 162 lb 4.8 oz (73.6 kg)   SpO2 98%   BMI 19.25 kg/m²   General:  Awake, alert, NAD  Skin:  Warm and dry  Neck:  JVD absent. Neck supple  Chest:  Clear to auscultation, respiration easy. No wheezes, rales or rhonchi. Cardiovascular:  RRR ,S1S2 normal  Abdomen:  Soft, non tender, non distended, BS +  Extremities:  No edema. right foot dressing +  Intact peripheral pulses. Brisk cap refill, < 2 secs  Neuro: non focal      CBC:   Recent Labs     05/28/19  1545 05/29/19  0601 05/31/19  0609   WBC 9.4 8.0 9.3   HGB 10.2* 10.7* 11.3*   HCT 31.5* 32.0* 33.9*   MCV 85.9 85.3 86.7    284 308     BMP:   Recent Labs     05/28/19  2035 05/29/19  0601 05/31/19  0609   * 136 137   K 4.9 4.3 4.2   CL 98* 100 102   CO2 24 25 24   BUN 30* 28* 21*   CREATININE 1.6* 1.4* 1.2     LIVER PROFILE:   Recent Labs     05/28/19  1545 05/29/19  0601   AST 17 15   ALT 9* 7*   BILITOT 0.4 0.4   ALKPHOS 90 89     PT/INR: No results for input(s): PROTIME, INR in the last 72 hours. APTT: No results for input(s): APTT in the last 72 hours.   UA:  Recent Labs     05/29/19  0800   COLORU Straw   PHUR 7.0   WBCUA >100*   RBCUA 10-20*   BACTERIA Rare*   CLARITYU SL CLOUDY*   SPECGRAV 1.010   LEUKOCYTESUR LARGE*   UROBILINOGEN 0.2   BILIRUBINUR Negative   BLOODU MODERATE*   GLUCOSEU Negative           CULTURES  Urine    Staphylococcus aureus (1)   Antibiotic Interpretation CORAZON Status    ceFAZolin Sensitive <=2 mcg/mL     nitrofurantoin Sensitive <=16 mcg/mL     oxacillin Sensitive 0.5 mcg/mL     tetracycline Sensitive <=0.5 mcg/mL     trimethoprim-sulfamethoxazole Sensitive <=0.5/9.5 mcg/mL         RADIOLOGY  XR FOOT RIGHT (2 VIEWS)   Final Result   1. Postsurgical changes are noted along the 5th metatarsal head and neck   without complication. XR ELBOW LEFT (2 VIEWS)   Final Result   Soft tissue swelling posteriorly likely representing bursitis. Some   degenerative changes medial aspect of the joint         XR ABDOMEN (KUB) (SINGLE AP VIEW)   Final Result   Negative supine abdomen radiograph examination. XR FOOT RIGHT (MIN 3 VIEWS)   Final Result   No evidence of osseous abnormality, including osteomyelitis of the 5th digit   or elsewhere. Discharge Medications      Medication List      START taking these medications    clindamycin 300 MG capsule  Commonly known as:  CLEOCIN  Take 1 capsule by mouth 2 times daily for 7 days        CHANGE how you take these medications    NOVOLOG 100 UNIT/ML injection vial  Generic drug:  insulin aspart  What changed:  how much to take     * SUMAtriptan 100 MG tablet  Commonly known as:  IMITREX  Take 1 tablet by mouth once as needed for Migraine  What changed:  when to take this     * SUMAtriptan 100 MG tablet  Commonly known as:  IMITREX  What changed:  Another medication with the same name was changed. Make sure you understand how and when to take each. * This list has 2 medication(s) that are the same as other medications prescribed for you. Read the directions carefully, and ask your doctor or other care provider to review them with you.             CONTINUE taking these medications    AGAMATRIX PRESTO TEST strip  Generic drug:  blood glucose test strips  USE TO TEST BLOOD SUGAR 3 TIMES A DAY     albuterol sulfate  (90 Base) MCG/ACT inhaler  Commonly known as:  PROAIR HFA  Inhale 2 puffs into the lungs every 4 hours as needed for Wheezing or Shortness of Breath     aluminum & magnesium hydroxide-simethicone 200-200-20 MG/5ML Susp

## 2019-05-31 NOTE — DISCHARGE INSTR - COC
Continuity of Care Form    Patient Name: Jesica Mccullough   :  1950  MRN:  [id number]    Admit date:  2019  Discharge date:  2019    Code Status Order: Full Code   Advance Directives:   Advance Care Flowsheet Documentation     Date/Time Healthcare Directive Type of Healthcare Directive Copy in [address] Agent's Name Healthcare Agent's Phone Number    195  No, patient does not have an advance directive for healthcare treatment -- -- -- -- --    19 06  No, patient does not have an advance directive for healthcare treatment -- -- -- -- --          Admitting Physician:  Ana Lilia Roblero MD  PCP: RICCO Alvarado CNP    [facility]21/4103-41  Discharging Unit Phone Number: [phone]    Emergency Contact:   Extended Emergency Contact Information  Primary Emergency Contact: Mallika [address] Phone: [phone]  Mobile Phone: [phone]  Relation: Brother/Sister  Secondary Emergency Contact: Devika Mcbride  Address: [address] Phone: [phone]  Mobile Phone: [phone]  Relation: Brother/Sister    Past Surgical History:  Past Surgical History:   Procedure Laterality Date    DIAGNOSTIC CARDIAC CATH LAB PROCEDURE      FOOT SURGERY Left Hammer toe, 2nd toe    10/9/14    HIP FRACTURE SURGERY      LEG SURGERY      broken leg    OTHER SURGICAL HISTORY Left 2013    EXCISION 5TH METATRSAL LEFT FOOT          OTHER SURGICAL HISTORY Right 2019    Procedure: PARTIAL RESECTION RIGHT FIFTH METATARSAL, ULCER DEBRIDEMENT WITH GRAFT APPLICATION    REVISION COLOSTOMY  2017    COLOSTOMY REVERSAL     TONSILLECTOMY      UPPER GASTROINTESTINAL ENDOSCOPY  2013    Esophageal Brushing       Immunization History:   Immunization History   Administered Date(s) Administered   [facility] Hepatitis A 02/05/2010, 08/18/2016    Hepatitis A Adult (Havarix) 08/18/2016    Hepatitis B, unspecified formulation 02/05/2010, 08/18/2016    Influenza Vaccine, unspecified formulation 10/16/2013, 11/03/2016    Influenza Virus Vaccine 10/20/2010, 11/29/2011, 10/09/2012, 10/17/2013, 10/06/2014, 10/09/2015, 11/03/2016    Influenza, High Dose (Fluzone 65 yrs and older) 09/27/2017    Influenza, [place], 6 mo and older, IM, PF (Flulaval, Fluarix) 10/19/2018    Pneumococcal 13-valent Conjugate ([id number]) 11/10/2015    Pneumococcal Polysaccharide ([id number]) 09/27/2012, 10/16/2013, 09/22/2015    Pneumococcal Vaccine, unspecified formulation 11/10/2015    Tdap (Boostrix, Adacel) 02/05/2010       Active Problems:  Patient Active Problem List   Diagnosis Code    GERD (gastroesophageal reflux disease) K21.9    Peripheral neuropathy of bilateral feet G62.9    COPD (chronic obstructive pulmonary disease) ([facility] [address] J44.9    HLD (hyperlipidemia) E78.5    Hepatic steatosis K76.0    PTSD (post-traumatic stress disorder) F43.10    History of hepatitis C Z86.19    Bilateral knee & hip OA M17.10    Mild-moderate alcohol consumption (beer) F10.20    Chronic pain syndrome G89.4    Multilevel spondylosis M51.36    Moderate episode of recurrent major depressive disorder (HCC) F33.1    S/P left colectomy Z90.49    Hyponatremia E87.1    S/p reversal of colostomy (6/27/17) K55.9    Chronic dCHF (grade 1 LVDD) I50.32    Chronic normocytic anemia D64.9    Acute hyperglycemia R73.9    Severe protein-calorie malnutrition ([facility] [address] E43    DM (diabetes mellitus), secondary, uncontrolled, w/neurologic complic (HCC) O63.03, F37.47    HTN (hypertension) I10    Hx DKA (Feb 2018) E11.10, Z79.4    Other insomnia G47.09    Chronic transaminasemia R74.0    Tobacco smoker F17.200    Fall at home W19. Susan Rodgers, Y92.009    Hypochloremia E87.8    Hyperkalemia E87.5    Mixed metabolic and respiratory acidosis E87.2    Closed nondisplaced L ankle fracture (lateral malleolus) S82.892A    L 2nd toenail avulsion S91.209A    Skin tear of L leg S81.812A    Head trauma S09.90XA    IDDM (insulin dependent diabetes mellitus) (HCC) E11.9, Z79.4    Ventral hernia without obstruction or gangrene K43.9    Diabetic ketoacidosis without coma associated with type 2 diabetes mellitus (HCC) E11.10    DDD (degenerative disc disease), cervical M50.30    Spondylosis of cervical region without myelopathy or radiculopathy M47.812    DDD (degenerative disc disease), lumbar M51.36    Closed nondisplaced fracture of lateral malleolus of left fibula S82.65XA    Lumbar degenerative disc disease M51.36    Protrusion of lumbar intervertebral disc M51.26    Osteoarthritis of both knees M17.0    Osteoarthritis of both hips M16.0    Idiopathic peripheral neuropathy G60.9    Leg pain, anterior, left M79.605    CVA (cerebral vascular accident) ([facility] [address] I63.9    DM (diabetes mellitus) ([facility] [address] E11.9    HTN (hypertension), benign I10    BPH (benign prostatic hyperplasia) N40.0    Dysarthria R47.1    Left-sided weakness R53.1    TIA (transient ischemic attack) G45.9    Dyslipidemia E78.5    DKA, type 2, not at goal [facility]) E11.10    Agitation requiring sedation protocol R45.1    DKA, type 1, not at goal [facility]) E10.10    Severe malnutrition ([facility] [address] E43    CHIP (acute kidney injury) ([facility] [address] N17.9    Open wound of right foot S91.301A       Isolation/Infection:   Isolation          No Isolation            Nurse Assessment:  Last Vital Signs: BP (!) 168/93   Pulse 69   Temp 96.7 °F (35.9 °C) (Oral)   Resp 18   Ht 6' 5\" (1.956 m)   Wt 162 lb 4.8 oz (73.6 kg)   SpO2 99%   BMI 19.25 kg/m²     Last documented pain score (0-10 scale): Pain Level: 9  Last Weight:   Wt Readings from Last 1 Encounters:   05/29/19 162 lb 4.8 oz (73.6 kg)     Mental Status:  oriented and alert    IV Access:  - None    Nursing Mobility/ADLs:  Walking   Assisted  Transfer patient):  Dentures upper and lower    RN SIGNATURE:  Electronically signed by Myrtice Carrel, RN on 5/31/19 at 1:48 PM    CASE MANAGEMENT/SOCIAL WORK SECTION    Inpatient Status Date: 5/28/2019    Readmission Risk Assessment Score:  Readmission Risk              Risk of Unplanned Readmission:        44           Discharging to Facility/ Agency     · Name: PATIENTS' [facility]  · Address: [address], [facility]  · Phone:[phone]  · [employer]:[phone]      Dialysis Facility (if applicable)   · Name:  · Address:  · Dialysis Schedule:  · Phone:  · Fax:    / signature: Electronically signed by Angelina Campbell RN on 5/31/19 at 12:55 PM    PHYSICIAN SECTION    Prognosis: Good    Condition at Discharge: Stable    Rehab Potential (if transferring to Rehab): Good    Recommended Labs or Other Treatments After Discharge: Wound Care   Please call [phone] to schedule appointment with Dr. Lilliam Weston in 2 weeks. OK to leave dressing intact right foot until 6/3/19. Then remove and apply betadine to the incision site and cover ulcer and right foot with a dry dressing to be changed every 2-3 days. Physician Certification: I certify the above information and transfer of Antonio Beltrán  is necessary for the continuing treatment of the diagnosis listed and that he requires [address] for less 30 days.      Update Admission H&P: No change in H&P    PHYSICIAN SIGNATURE:  Electronically signed by Chan Miner MD on 5/31/19 at 12:04 PM

## 2019-05-31 NOTE — PROGRESS NOTES
Patient glucose 53, patient sitting up in bed, alert and oriented, asked for orange juice and a pepsi to bring sugar up, states this is what he would do at home. Given, breakfast served. FSBS rechecked.  WNL

## 2019-05-31 NOTE — PROGRESS NOTES
Transport here to  patient via stretcher for transport to [facility] for discharge.  Patient belongings sent with transport team.

## 2019-05-31 NOTE — OP NOTE
[address]                 [address] [facility] [address]                                OPERATIVE REPORT    PATIENT NAME: Hector Atkinson                   :        1950  MED REC NO:   [id number]                          ROOM:       7699  ACCOUNT NO:   [de-identified]                           ADMIT DATE: 2019  PROVIDER:     David Yanez DPM    DATE OF PROCEDURE:  2019    PREOPERATIVE DIAGNOSES:  1. Exostosis, right foot. 2.  Diabetic foot ulceration, right foot. 3.  Diabetes mellitus, uncontrolled. POSTOPERATIVE DIAGNOSES:  1. Exostosis, right foot. 2.  Diabetic foot ulceration, right foot. 3.  Diabetes mellitus, uncontrolled. NAME OF PROCEDURES:  1. Partial resection of right fifth metatarsal.  2.  Ulcer debridement of right foot with graft application. SURGEON:  David Yanez DPM    ANESTHESIA:  Local with MAC. HEMOSTASIS:  Ankle tourniquet to 250 mmHg. ESTIMATED BLOOD LOSS:  Less than 50 mL. REPORT OF OPERATION:  The patient was brought in the operating room and  placed on operating table in supine position. Under mild IV sedation,  the right fifth ray was anesthetized with 10 mL of 1:1 mixture of 1%  lidocaine plain and 0.5% Marcaine plain. The foot was then scrubbed,  prepped, and draped in usual sterile manner. Esmarch was then utilized  to exsanguinate the right foot. Ankle tourniquet was then inflated to  250 mmHg. Attention was then directed to the right fifth ray, where the bony  prominence of the fifth metatarsal head was identified. At this time,  approximately 1.5-2 cm linear longitudinal incision was made overlying  the distal aspect of the fifth metatarsal.  Dissection was carried down  in order to expose the underlying extensor tendon and capsule. At this  time, transection of the extensor tendon and fifth metatarsophalangeal  joint was then performed.   Dissection of the distal portion of the soft  tissues of the fifth metatarsal were also continued. At this time, a  sagittal saw was then utilized to transect the fifth metatarsal.  The  distal fragment was then passed off the operative field in toto. All  rough edges were smoothed with a bone rasp. No sign of infected or  necrotic tissue was noted. Attention was then directed to the plantar aspect of the fifth ray where  the ulceration was identified. At this time, a scalpel and curette were  then utilized to excise all fibrotic, necrotic, non-viable, and viable  tissues excising this portion of the skin and subcutaneous tissue. The  ulceration post debridement measured approximately 1 cm in diameter. Surgical sites both dorsal and plantar were then copiously irrigated  with sterile saline via the pulse lavage. All bleeders were cauterized  as necessary. No signs of infected or necrotic tissue was noted in  either surgical site. At this time, the SteriShield graft was then  trimmed and a portion was inserted into the dorsal surgical site and  remained over the plantar ulceration. The plantar ulcer was covered  with Xeroform to protect the graft itself. The SteriShield graft was  inserted and applied in order to stimulate wound healing in a high-risk  diabetic patient. The dorsal incision was then reapproximated with 3-0  nylon in a horizontal mattress suture technique. The surgical site was  then covered with Xeroform gauze, fluffs, Kerlix, ABD, and Coban. Tourniquet was then deflated. The patient tolerated the procedure and anesthesia well, transferred to  recovery room with vital signs stable and vascular status intact. There  was evidence for prompt hyperemic response to all digits of the right  foot. Following a brief period of postoperative monitoring, the patient  will be transferred back to the floor under the care of the medical  service.         GRETA CHAN DPM    D: 05/30/2019 15:50:50       T: 05/31/2019 2:25:49 [email or web address]  Job#: [id number]     Doc#: [id number]    CC:

## 2019-05-31 NOTE — PROGRESS NOTES
Progress Note    Admit Date:  5/28/2019    Subjective:  80 y.o. male who is seen for evaluation of ulcer on the right foot. S/P partial resection 5th metatarsal on 5/30/19. States doing well. Pain is controlled. Past Medical History:        Diagnosis Date    Anxiety disorder     BPH (benign prostatic hypertrophy)     Calcified granuloma of lung ([address] 2014    2:stable per 3/25/14 CT chest    Cataract 1/20/14    OU:Dr. hayward:[facility]    Cerebral artery occlusion with cerebral infarction ([address]     Chronic pain     COPD (chronic obstructive pulmonary disease) (HCC)     Under care of pulmo(Dr. Calhoun)    Degenerative arthritis of hip     Dementia     Depression 3/11/2015    Depression with anxiety     Prior psychiatrist & therapist:Dr. Leann Padgett.  Diabetes mellitus ([address] 2004    Endo Dr. Sherrie Mcwilliams type 1 note below in [facility]    Diabetic eye exam ([facility] 1/20/14    [facility]:Dr. STEPHEN Hayward:No retinopathy. Cataract    Diabetic retinopathy ([facility]     under care of [facility]:Dr. Wilfred Mata    Diverticulitis 11/26/2011    Diverticulosis 11/26/2011    DKA (diabetic ketoacidoses) (HCC)     Emphysema     Esophageal candidiasis ([address] 7/16/13    GI:EGD    Essential hypertension, benign 11/2010    ETOH abuse     Fatty liver 10/9/2012    Foot ulcer:left 9/30/2013    Gastric ulcer, unspecified as acute or chronic, without mention of hemorrhage or perforation     GERD (gastroesophageal reflux disease)     Gout 1997    Right big toe    Hearing decreased     Left ear=60%. Right ear=80%.     Hemangioma 12/2010    Liver as per CT abdo    hepatitis c 7/26/17, 2010    Under care of Liver doc:Dr. Sp Lopez    Hyperlipidemia LDL goal < 100     Left-sided weakness     Low HDL (under 40) 10/9/2012    Peripheral neuropathy 2006    Pneumonia 10/15/13    Pyogenic granuloma     per derm:Dr. Niecy Ashford Restrictive lung disease     Under care of pulmo(Dr. Calhoun)    S/P colonoscopy 1996    Done secondary to rectal bleed:dx=hemorrhoids    S/P colonoscopy 11/11/10;10/23/2013    Dr. Fan Mccoy 10/2016(3yrs):polyps;diverticulosis. Diverticulosis & polpy(removed). Next in10/2016.  S/P endoscopy 2009    EGD:stomach ulcers.     Sciatica     Superficial phlebitis of left leg 9/30/2013    Tachycardia     Therapeutic drug monitoring 4/8/15    OARRS report is consistent on 4/8/15;7/9/15;10/9/15;1/8/16;4/5/16    Type 1 diabetes mellitus not at goal [facility]) 3/17/14    updated diagnosis as per endo:Dr. Radha Merlos       Past Surgical History:        Procedure Laterality Date    DIAGNOSTIC CARDIAC CATH LAB PROCEDURE  2013    FOOT SURGERY Left Hammer toe, 2nd toe    10/9/14    HIP FRACTURE SURGERY      LEG SURGERY      broken leg    OTHER SURGICAL HISTORY Left 11/21/2013    EXCISION 5TH METATRSAL LEFT FOOT          OTHER SURGICAL HISTORY Right 05/30/2019    Procedure: PARTIAL RESECTION RIGHT FIFTH METATARSAL, ULCER DEBRIDEMENT WITH GRAFT APPLICATION    REVISION COLOSTOMY  06/27/2017    COLOSTOMY REVERSAL     TONSILLECTOMY      UPPER GASTROINTESTINAL ENDOSCOPY  7/16/2013    Esophageal Brushing       Current Medications:    Current Facility-Administered Medications: clindamycin (CLEOCIN) 600 mg in dextrose 5 % 50 mL IVPB, 600 mg, Intravenous, Q8H  aspirin chewable tablet 81 mg, 81 mg, Oral, Daily  aluminum & magnesium hydroxide-simethicone (MAALOX) 200-200-20 MG/5ML suspension 5 mL, 5 mL, Oral, Q6H PRN  atorvastatin (LIPITOR) tablet 80 mg, 80 mg, Oral, Daily  cyclobenzaprine (FLEXERIL) tablet 5 mg, 5 mg, Oral, TID PRN  finasteride (PROSCAR) tablet 5 mg, 5 mg, Oral, Daily  folic acid (FOLVITE) tablet 1 mg, 1 mg, Oral, Daily  insulin glargine (LANTUS) injection vial 50 Units, 50 Units, Subcutaneous, Nightly  hydrALAZINE (APRESOLINE) injection 10 mg, 10 mg, Intravenous, Q6H PRN  melatonin tablet 3 mg, 3 mg, Oral, Nightly PRN  metoprolol tartrate (LOPRESSOR) tablet 25 mg, 25 mg, Oral, BID  oxyCODONE (ROXICODONE) immediate release tablet 10 mg, 10 mg, Oral, Q6H PRN  sertraline (ZOLOFT) tablet 150 mg, 150 mg, Oral, Daily  pantoprazole sodium (PROTONIX) packet 40 mg, 40 mg, Oral, QAM AC  tamsulosin (FLOMAX) capsule 0.8 mg, 0.8 mg, Oral, Daily  vitamin B-1 (THIAMINE) tablet 100 mg, 100 mg, Oral, Daily  vitamin B-12 (CYANOCOBALAMIN) tablet 1,000 mcg, 1,000 mcg, Oral, Daily  0.9 % sodium chloride infusion, , Intravenous, Continuous  sodium chloride flush 0.9 % injection 10 mL, 10 mL, Intravenous, 2 times per day  sodium chloride flush 0.9 % injection 10 mL, 10 mL, Intravenous, PRN  magnesium hydroxide (MILK OF MAGNESIA) 400 MG/5ML suspension 30 mL, 30 mL, Oral, Daily PRN  ondansetron (ZOFRAN) injection 4 mg, 4 mg, Intravenous, Q6H PRN  enoxaparin (LOVENOX) injection 40 mg, 40 mg, Subcutaneous, Daily  glucose (GLUTOSE) 40 % oral gel 15 g, 15 g, Oral, PRN  dextrose 50 % solution 12.5 g, 12.5 g, Intravenous, PRN  glucagon (rDNA) injection 1 mg, 1 mg, Intramuscular, PRN  dextrose 5 % solution, 100 mL/hr, Intravenous, PRN  potassium chloride (KLOR-CON M) extended release tablet 40 mEq, 40 mEq, Oral, PRN **OR** potassium bicarb-citric acid (EFFER-K) effervescent tablet 40 mEq, 40 mEq, Oral, PRN **OR** potassium chloride 10 mEq/100 mL IVPB (Peripheral Line), 10 mEq, Intravenous, PRN  magnesium sulfate 1 g in dextrose 5% 100 mL IVPB, 1 g, Intravenous, PRN  insulin lispro (HUMALOG) injection vial 0-12 Units, 0-12 Units, Subcutaneous, TID WC  insulin lispro (HUMALOG) injection vial 0-6 Units, 0-6 Units, Subcutaneous, Nightly  fluticasone (FLOVENT HFA) 110 MCG/ACT inhaler 2 puff, 2 puff, Inhalation, BID  albuterol sulfate  (90 Base) MCG/ACT inhaler 2 puff, 2 puff, Inhalation, Q4H PRN    Allergies:  Neurontin [gabapentin]    Social History:    Social History     Tobacco Use    Smoking status: Former Smoker     Packs/day: 0.50     Years: 35.00     Pack years: 17.50     Types: Cigarettes     Last attempt to quit: 2018     Years since quittin.6    Smokeless tobacco: Never Used   Substance Use Topics    Alcohol use: Not Currently    Drug use: No       Family History:       Problem Relation Age of Onset    Stroke Mother     Hypertension Mother     Arthritis Father     Substance Abuse Father         Etoh    Cancer Father         esophageal    Hypertension Father     Diabetes Brother     Diabetes Paternal Aunt     Asthma Neg Hx     Emphysema Neg Hx     Heart Failure Neg Hx        Review of Systems    CONSTITUTIONAL:  negative  EYES:  negative  HEENT:  negative  RESPIRATORY:  negative  CARDIOVASCULAR:  negative  GASTROINTESTINAL:  negative  GENITOURINARY:  negative  INTEGUMENT/BREAST:  positive for ulcer  MUSCULOSKELETAL:  positive for  pain  NEUROLOGICAL:  positive for numbness      Objective:   BP (!) 168/93   Pulse 69   Temp 96.7 °F (35.9 °C) (Oral)   Resp 18   Ht 6' 5\" (1.956 m)   Wt 162 lb 4.8 oz (73.6 kg)   SpO2 99%   BMI 19.25 kg/m²     Data:  CBC:   Recent Labs     05/28/19  1545 05/29/19  0601 05/31/19  0609   WBC 9.4 8.0 9.3   HGB 10.2* 10.7* 11.3*   HCT 31.5* 32.0* 33.9*   MCV 85.9 85.3 86.7    284 308     BMP:   Recent Labs     05/28/19  2035 05/29/19  0601 05/31/19  0609   * 136 137   K 4.9 4.3 4.2   CL 98* 100 102   CO2 24 25 24   BUN 30* 28* 21*   CREATININE 1.6* 1.4* 1.2     LIVER PROFILE:   Recent Labs     05/28/19  1545 05/29/19  0601   AST 17 15   ALT 9* 7*   BILITOT 0.4 0.4   ALKPHOS 90 89     PT/INR:   Recent Labs     05/28/19  1545 05/29/19  0601   PROT 8.0 8.1     HgBA1c:  Lab Results   Component Value Date    LABA1C 8.1 05/28/2019       Cultures:     Imaging:     Physical Exam:    General Appearance: alert and oriented to person, place and time, well developed and well- nourished, in no acute distress  Skin: warm and dry, no rash or erythema  Head: normocephalic and atraumatic  Eyes: pupils equal, round, and reactive to light, extraocular eye movements intact, conjunctivae normal  ENT: tympanic membrane, external ear and ear

## 2019-05-31 NOTE — PROGRESS NOTES
Assessment complete. Lena is alert and oriented. Vital signs are per flowsheet. Respirations are easy and even at rest.  Lena complains of generalized pain at 9/10. Non-medication interventions were refused. Lena is up in bed watching television, 2/4 side rails up, bed in lowest position, call light in easy reach, bed alarm on, and he denies any further needs. No other complications are noted, will continue to monitor throughout shift.

## 2019-06-02 ENCOUNTER — TELEPHONE (OUTPATIENT)
Dept: ENDOCRINOLOGY | Age: 69
End: 2019-06-02

## 2019-06-02 NOTE — TELEPHONE ENCOUNTER
I was called by the nurse from [facility] that patient's blood glucoses are 418, 425, 440. Recently came back from the hospital.  Was He has been on antibiotics, but not on steroids. He is currently getting Lantus 50 units at bedtime and Novolog 8 units before meals. I added correction doses of insulin for every 50 of BG above 150-2 units of insulin qac.

## 2019-06-17 ENCOUNTER — HOSPITAL ENCOUNTER (OUTPATIENT)
Dept: WOUND CARE | Age: 69
Discharge: HOME | End: 2019-06-17
Payer: COMMERCIAL

## 2019-06-17 VITALS
SYSTOLIC BLOOD PRESSURE: 108 MMHG | WEIGHT: 164.6 LBS | RESPIRATION RATE: 18 BRPM | BODY MASS INDEX: 19.43 KG/M2 | DIASTOLIC BLOOD PRESSURE: 68 MMHG | TEMPERATURE: 97.1 F | HEIGHT: 77 IN | HEART RATE: 85 BPM

## 2019-06-17 PROCEDURE — 99212 OFFICE O/P EST SF 10 MIN: CPT

## 2019-06-17 RX ORDER — LIDOCAINE 40 MG/G
CREAM TOPICAL ONCE
Status: DISCONTINUED | OUTPATIENT
Start: 2019-06-17 | End: 2019-06-18 | Stop reason: HOSPADM

## 2019-06-17 ASSESSMENT — PAIN DESCRIPTION - DESCRIPTORS: DESCRIPTORS: DULL;CONSTANT

## 2019-06-17 ASSESSMENT — PAIN DESCRIPTION - ORIENTATION: ORIENTATION: RIGHT

## 2019-06-17 ASSESSMENT — PAIN DESCRIPTION - PAIN TYPE: TYPE: ACUTE PAIN

## 2019-06-17 ASSESSMENT — PAIN SCALES - GENERAL: PAINLEVEL_OUTOF10: 4

## 2019-06-17 ASSESSMENT — PAIN DESCRIPTION - LOCATION: LOCATION: FOOT

## 2019-06-17 ASSESSMENT — PAIN - FUNCTIONAL ASSESSMENT: PAIN_FUNCTIONAL_ASSESSMENT: PREVENTS OR INTERFERES SOME ACTIVE ACTIVITIES AND ADLS

## 2019-06-17 ASSESSMENT — PAIN DESCRIPTION - ONSET: ONSET: ON-GOING

## 2019-06-17 ASSESSMENT — PAIN DESCRIPTION - PROGRESSION: CLINICAL_PROGRESSION: NOT CHANGED

## 2019-06-17 ASSESSMENT — PAIN DESCRIPTION - FREQUENCY: FREQUENCY: CONTINUOUS

## 2019-06-17 NOTE — PROGRESS NOTES
[facility] Progress Note      Jesica Mccullough     : 1950    DATE OF VISIT:  2019    Subjective:     Jesica Mccullough is a 80 y.o. male who has a chief complaint of a diabetic ulcer located on the right foot. No pain in the foot. Mr. Tomas Clemons has a past medical history of Anxiety disorder, BPH (benign prostatic hypertrophy), Calcified granuloma of lung ([facility] [address] Cataract, Cerebral artery occlusion with cerebral infarction [facility]), Chronic pain, COPD (chronic obstructive pulmonary disease) ([facility] [address] Degenerative arthritis of hip, Dementia, Depression, Depression with anxiety, Diabetes mellitus ([facility] [address] Diabetic eye exam ([facility] [address] Diabetic retinopathy ([facility] [address] Diverticulitis, Diverticulosis, DKA (diabetic ketoacidoses) ([facility] [address] Emphysema, Esophageal candidiasis ([facility] Essential hypertension, benign, ETOH abuse, Fatty liver, Foot ulcer:left, Gastric ulcer, unspecified as acute or chronic, without mention of hemorrhage or perforation, GERD (gastroesophageal reflux disease), Gout, Hearing decreased, Hemangioma, hepatitis c, Hyperlipidemia LDL goal < 100, Left-sided weakness, Low HDL (under 40), Peripheral neuropathy, Pneumonia, Pyogenic granuloma, Restrictive lung disease, S/P colonoscopy, S/P colonoscopy, S/P endoscopy, Sciatica, Superficial phlebitis of left leg, Tachycardia, Therapeutic drug monitoring, and Type 1 diabetes mellitus not at goal [facility]). He has a past surgical history that includes Leg Surgery; Hip fracture surgery; Tonsillectomy; Upper gastrointestinal endoscopy (2013); Diagnostic Cardiac Cath Lab Procedure (); other surgical history (Left, 2013); Foot surgery (Left, Hammer toe, 2nd toe); Revision Colostomy (2017); other surgical history (Right, 2019); Pressure ulcer debridement (Right, 2019); Colonoscopy; fracture surgery; and Dilatation, esophagus.     His family history includes Arthritis in his father; Cancer in his father; Diabetes in his brother and paternal aunt; Hypertension in his father and mother; Stroke in his mother; Substance Abuse in his father. Mr. Alyx Hartman reports that he quit smoking about 8 months ago. His smoking use included cigarettes. He has a 17.50 pack-year smoking history. He has never used smokeless tobacco. He reports that he drank alcohol. He reports that he does not use drugs. His current medication list consists of Dulaglutide, FREESTYLE BRIDGET READER, FREESTYLE BRIDGET SENSOR SYSTEM, Insulin Glargine, Lancets, SUMAtriptan, TRUE METRIX AIR GLUCOSE METER, albuterol sulfate HFA, aluminum & magnesium hydroxide-simethicone, aspirin, atorvastatin, blood glucose test strips, cyclobenzaprine, finasteride, folic acid, glucagon, insulin aspart, melatonin, metoprolol tartrate, mometasone, oxyCODONE HCl, pantoprazole sodium, sertraline, tamsulosin, thiamine, umeclidinium-vilanterol, vitamin B-12, and vitamin E. Allergies: Neurontin [gabapentin]    Pertinent items from the review of systems are discussed in the HPI; the remainder of the ROS was reviewed and is negative.        Objective:     /68   Pulse 85   Temp 97.1 °F (36.2 °C) (Oral)   Resp 18   Ht 6' 5\" (1.956 m)   Wt 164 lb 9.6 oz (74.7 kg)   BMI 19.52 kg/m²   General Appearance: alert and oriented to person, place and time, well developed and well- nourished, in no acute distress  Skin: warm and dry, no rash or erythema  Head: normocephalic and atraumatic  Eyes: pupils equal, round, and reactive to light, extraocular eye movements intact, conjunctivae normal  ENT: tympanic membrane, external ear and ear canal normal bilaterally, nose without deformity, nasal mucosa and turbinates normal without polyps  Neck: supple and non-tender without mass, no thyromegaly or thyroid nodules, no cervical lymphadenopathy  Pulmonary/Chest: clear to auscultation bilaterally- no wheezes, rales or rhonchi, normal air movement, no respiratory distress  Cardiovascular: normal rate, regular rhythm, normal S1 and S2, no murmurs, rubs, clicks, or gallops, distal pulses intact, no carotid bruits  Abdomen: soft, non-tender, non-distended, normal bowel sounds, no masses or organomegaly. Dorsalis pedis pulse left palpable  Posterior tibial pulse left palpable  Dorsalis pedis pulse right palpable  Posterior tibial pulse right palpable  Protective sensation absent bilateral LE      Ulcer on the plantar aspect right 5th MH epithelialized. No drainage noted. Sutures intact dorsal aspect right 5th MH with no drainage noted. No signs of infection noted. Today's ulcer measurements are in the wound documentation flowsheet.      Wound measurements:  Incision 06/17/19-Wound Length (cm): 1.3 cm    Incision 06/17/19-Wound Width (cm): 0.1 cm    Incision 06/17/19-Wound Depth (cm): 0.1 cm    LABS  Lab Results   Component Value Date    LABA1C 8.1 05/28/2019         Assessment:     Patient Active Problem List   Diagnosis Code    GERD (gastroesophageal reflux disease) K21.9    Peripheral neuropathy of bilateral feet G62.9    COPD (chronic obstructive pulmonary disease) (HCC) J44.9    HLD (hyperlipidemia) E78.5    Hepatic steatosis K76.0    PTSD (post-traumatic stress disorder) F43.10    History of hepatitis C Z86.19    Bilateral knee & hip OA M17.10    Mild-moderate alcohol consumption (beer) F10.20    Chronic pain syndrome G89.4    Multilevel spondylosis M51.36    Moderate episode of recurrent major depressive disorder (HCC) F33.1    S/P left colectomy Z90.49    Hyponatremia E87.1    S/p reversal of colostomy (6/27/17) K55.9    Chronic dCHF (grade 1 LVDD) I50.32    Chronic normocytic anemia D64.9    Acute hyperglycemia R73.9    Severe protein-calorie malnutrition ([facility] [address] E43    DM (diabetes mellitus), secondary, uncontrolled, w/neurologic complic (HCC) Q78.32, Y79.21    HTN (hypertension) I10    Hx DKA (Feb 2018) E11.10, Z79.4    Other insomnia the ulcer(s) above. Discharge plan:     Treatment in the wound care center today: Incision 06/17/19-Dressing/Treatment: Other (comment)(mepilex). Home treatment: good handwashing before and after any dressing changes. Cleanse ulcer with saline or soap & water before dressing change. May use Vaseline (petrolatum), Aquaphor, Aveeno, CeraVe, Cetaphil, Eucerin, Lubriderm, etc for dry skin. Dressing type for home: Dry dressing applied. Written discharge instructions given to patient. Offload ulcer(s) as directed. Elevate leg(s) as directed. Follow up in [facility] as needed. Control glucose levels to prevent future complications.               Electronically signed by Dudley Brower DPM on 6/17/2019 at 12:28 PM.

## 2019-06-27 ENCOUNTER — TELEPHONE (OUTPATIENT)
Dept: ENDOCRINOLOGY | Age: 69
End: 2019-06-27

## 2019-06-28 NOTE — TELEPHONE ENCOUNTER
Staff called again in the evening  Sugar > 500    Nurse says he was given half dose of Lantus the night before as he was going for surgery    She again mentioned multiple episodes of hypoglycemia and asked if his lantus can be made BID    -Advised to change lantus to 20 units BID    Monitor sugars every 2 hrs till sugars improve.
was called by the nurse from [facility] that patient's blood glucose. Sugar was 500. Was just discharged from hospital  Was given 20 units for coverage. On lantus 50 units QHS  Novolog 8 units TID with meals + SSI    Has been experiencing multiple episodes of fasting hypoglycemia  Fasting sugars     -Advised to reduce lantus to 40 units QHS  -Continue novolog 8 units TID with meals +SSI    Repeat sugars in 2 hrs    -Schedule a follow-up visit    I have not seen him since 08/18    Saw Tad Rosenberg in 04/19    No follow-ups scheduled. Fax sugars every 2 weeks.
0

## 2019-09-06 ENCOUNTER — HOSPITAL ENCOUNTER (EMERGENCY)
Age: 69
Discharge: NURSING, REHAB OR LONG-TERM CARE | End: 2019-09-06
Attending: EMERGENCY MEDICINE
Payer: COMMERCIAL

## 2019-09-06 ENCOUNTER — APPOINTMENT (OUTPATIENT)
Dept: GENERAL RADIOLOGY | Age: 69
End: 2019-09-06
Payer: COMMERCIAL

## 2019-09-06 ENCOUNTER — APPOINTMENT (OUTPATIENT)
Dept: CT IMAGING | Age: 69
End: 2019-09-06
Payer: COMMERCIAL

## 2019-09-06 VITALS
TEMPERATURE: 97.8 F | WEIGHT: 165 LBS | DIASTOLIC BLOOD PRESSURE: 91 MMHG | OXYGEN SATURATION: 99 % | BODY MASS INDEX: 19.48 KG/M2 | SYSTOLIC BLOOD PRESSURE: 163 MMHG | HEIGHT: 77 IN | RESPIRATION RATE: 16 BRPM | HEART RATE: 79 BPM

## 2019-09-06 DIAGNOSIS — Z98.890 H/O NECK SURGERY: ICD-10-CM

## 2019-09-06 DIAGNOSIS — Z23 NEED FOR DIPHTHERIA-TETANUS-PERTUSSIS (TDAP) VACCINE: ICD-10-CM

## 2019-09-06 DIAGNOSIS — S81.812A LACERATION OF LEFT LOWER LEG, INITIAL ENCOUNTER: ICD-10-CM

## 2019-09-06 DIAGNOSIS — Z86.39 HX OF TYPE 2 DIABETES MELLITUS: ICD-10-CM
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
## 2019-09-06 DIAGNOSIS — I10 ESSENTIAL HYPERTENSION: ICD-10-CM

## 2019-09-06 DIAGNOSIS — S09.90XA CLOSED HEAD INJURY, INITIAL ENCOUNTER: ICD-10-CM

## 2019-09-06 LAB
GLUCOSE BLD-MCNC: 194 MG/DL (ref 70–99)
PERFORMED ON: ABNORMAL

## 2019-09-06 PROCEDURE — 90471 IMMUNIZATION ADMIN: CPT | Performed by: EMERGENCY MEDICINE

## 2019-09-06 PROCEDURE — 70450 CT HEAD/BRAIN W/O DYE: CPT

## 2019-09-06 PROCEDURE — 99284 EMERGENCY DEPT VISIT MOD MDM: CPT

## 2019-09-06 PROCEDURE — 6360000002 HC RX W HCPCS: Performed by: EMERGENCY MEDICINE

## 2019-09-06 PROCEDURE — 72125 CT NECK SPINE W/O DYE: CPT

## 2019-09-06 PROCEDURE — 4500000024 HC ED LEVEL 4 PROCEDURE

## 2019-09-06 PROCEDURE — 73590 X-RAY EXAM OF LOWER LEG: CPT

## 2019-09-06 PROCEDURE — 90715 TDAP VACCINE 7 YRS/> IM: CPT | Performed by: EMERGENCY MEDICINE

## 2019-09-06 PROCEDURE — 6370000000 HC RX 637 (ALT 250 FOR IP): Performed by: EMERGENCY MEDICINE

## 2019-09-06 RX ORDER — ACETAMINOPHEN 325 MG/1
650 TABLET ORAL EVERY 4 HOURS PRN
COMMUNITY

## 2019-09-06 RX ORDER — POLYETHYLENE GLYCOL 3350 17 G/17G
17 POWDER, FOR SOLUTION ORAL DAILY
COMMUNITY

## 2019-09-06 RX ORDER — LOPERAMIDE HYDROCHLORIDE 2 MG/1
2 CAPSULE ORAL EVERY 6 HOURS PRN
COMMUNITY

## 2019-09-06 RX ORDER — BISACODYL 10 MG
10 SUPPOSITORY, RECTAL RECTAL DAILY PRN
COMMUNITY
End: 2021-03-29 | Stop reason: ALTCHOICE

## 2019-09-06 RX ORDER — FLUTICASONE PROPIONATE 44 UG/1
2 AEROSOL, METERED RESPIRATORY (INHALATION) 2 TIMES DAILY
Status: ON HOLD | COMMUNITY
End: 2021-10-07

## 2019-09-06 RX ORDER — OXYCODONE HYDROCHLORIDE AND ACETAMINOPHEN 5; 325 MG/1; MG/1
2 TABLET ORAL ONCE
Status: COMPLETED | OUTPATIENT
Start: 2019-09-06 | End: 2019-09-06

## 2019-09-06 RX ORDER — CYCLOBENZAPRINE HCL 10 MG
10 TABLET ORAL ONCE
Status: COMPLETED | OUTPATIENT
Start: 2019-09-06 | End: 2019-09-06

## 2019-09-06 RX ADMIN — TETANUS TOXOID, REDUCED DIPHTHERIA TOXOID AND ACELLULAR PERTUSSIS VACCINE, ADSORBED 0.5 ML: 5; 2.5; 8; 8; 2.5 SUSPENSION INTRAMUSCULAR at 07:55

## 2019-09-06 RX ADMIN — OXYCODONE HYDROCHLORIDE AND ACETAMINOPHEN 2 TABLET: 5; 325 TABLET ORAL at 07:53

## 2019-09-06 RX ADMIN — CYCLOBENZAPRINE HYDROCHLORIDE 10 MG: 10 TABLET, FILM COATED ORAL at 07:54

## 2019-09-06 ASSESSMENT — PAIN SCALES - GENERAL: PAINLEVEL_OUTOF10: 9

## 2019-09-06 ASSESSMENT — PAIN DESCRIPTION - LOCATION: LOCATION: BACK

## 2019-09-06 ASSESSMENT — PAIN DESCRIPTION - PAIN TYPE: TYPE: CHRONIC PAIN

## 2019-09-06 NOTE — ED NOTES
Pt transported back to PATIENTS' [facility] via [facility].      Nicholas Butler RN  09/06/19 [id number]

## 2019-09-06 NOTE — ED PROVIDER NOTES
Topics Concern    Not on file   Social History Narrative    Not on file     No current facility-administered medications for this encounter. Current Outpatient Medications   Medication Sig Dispense Refill    polyethylene glycol (GLYCOLAX) powder Take 17 g by mouth daily      fluticasone (FLOVENT HFA) 44 MCG/ACT inhaler Inhale 2 puffs into the lungs 2 times daily      acetaminophen (TYLENOL) 325 MG tablet Take 650 mg by mouth every 4 hours as needed for Pain      bisacodyl (DULCOLAX) 10 MG suppository Place 10 mg rectally daily as needed for Constipation      loperamide (IMODIUM) 2 MG capsule Take 2 mg by mouth every 4 hours as needed for Diarrhea      insulin aspart (NOVOLOG) 100 UNIT/ML injection vial Inject 8 Units into the skin 3 times daily (before meals) +SSI      aspirin 81 MG tablet Take 81 mg by mouth daily      finasteride (PROSCAR) 5 MG tablet Take 5 mg by mouth daily      folic acid (FOLVITE) 1 MG tablet Take 1 mg by mouth daily      metoprolol tartrate (LOPRESSOR) 25 MG tablet Take 25 mg by mouth 2 times daily      pantoprazole sodium (PROTONIX) 40 MG PACK packet Take 40 mg by mouth every morning (before breakfast)      sertraline (ZOLOFT) 100 MG tablet Take 150 mg by mouth daily      Dulaglutide (TRULICITY) 8.37 ML/2.2YS SOPN Inject 0.75 mg into the skin once a week      SUMAtriptan (IMITREX) 100 MG tablet Take 100 mg by mouth daily as needed for Migraine      oxyCODONE HCl (OXY-IR) 10 MG immediate release tablet Take 10 mg by mouth every 6 hours.        atorvastatin (LIPITOR) 80 MG tablet Take 80 mg by mouth daily      Insulin Glargine (LANTUS SOLOSTAR SC) Inject 15 Units into the skin 2 times daily       cyclobenzaprine (FLEXERIL) 5 MG tablet Take 10 mg by mouth 3 times daily       melatonin 3 MG TABS tablet Take 6 mg by mouth nightly       vitamin E 1000 units capsule Take 1 capsule by mouth daily 30 capsule 3    vitamin B-12 (CYANOCOBALAMIN) 1000 MCG tablet TAKE 1 TABLET BY MOUTH DAILY 30 tablet 3    tamsulosin (FLOMAX) 0.4 MG capsule TAKE 2 CAPSULES BY MOUTH DAILY 180 capsule 0    umeclidinium-vilanterol (ANORO ELLIPTA) 62.5-25 MCG/INH AEPB inhaler Inhale 1 puff into the lungs daily 1 each 11    thiamine 100 MG tablet Take 1 tablet by mouth daily 30 tablet 3    glucagon 1 MG injection Inject 1 kit into the skin as needed      mometasone (ASMANEX 120 METERED DOSES) 220 MCG/INH inhaler Inhale 2 puffs into the lungs daily      aluminum & magnesium hydroxide-simethicone (MAALOX) 200-200-20 MG/5ML SUSP suspension Take 5 mLs by mouth every 6 hours as needed for Indigestion      Continuous Blood Gluc  (FREESTYLE BRIDGET READER) DENIS Use to monitor sugars 1 Device 0    Continuous Blood Gluc Sensor (FREESTYLE BRIDGET SENSOR SYSTEM) MISC Use to monitor sugars 3 each 2    AGAMATRIX PRESTO TEST strip USE TO TEST BLOOD SUGAR 3 TIMES A  each 3    albuterol sulfate HFA (PROAIR HFA) 108 (90 Base) MCG/ACT inhaler Inhale 2 puffs into the lungs every 4 hours as needed for Wheezing or Shortness of Breath 1 Inhaler 11    Lancets MISC Testing 2-3 times daily DX Code: E11.9 100 each 3    Blood Glucose Monitoring Suppl (TRUE METRIX AIR GLUCOSE METER) DENIS 1 meter 1 Device 0     Allergies   Allergen Reactions    Neurontin [Gabapentin]      Gastric side effects       REVIEW OF SYSTEMS  10 systems reviewed, pertinent positives per HPI otherwise noted to be negative. PHYSICAL EXAM  BP (!) 163/91   Pulse 79   Temp 97.8 °F (36.6 °C) (Oral)   Resp 16   Ht 6' 5\" (1.956 m)   Wt 165 lb (74.8 kg)   SpO2 100%   BMI 19.57 kg/m²   GENERAL APPEARANCE: Awake and alert. Cooperative. No acute distress  hEAD: Normocephalic. Atraumatic. EYES: PERRL. EOM's grossly intact. ENT: Mucous membranes are moist.   NECK: Supple. HEART: RRR. No murmurs  LUNGS: Respirations nonlabored. Lungs are with coarse breath sounds but no notable wheezes crackles or rhonchi. ABDOMEN: Soft. Non-distended.

## 2019-12-05 ENCOUNTER — ANESTHESIA (OUTPATIENT)
Dept: OPERATING ROOM | Age: 69
End: 2019-12-05
Payer: COMMERCIAL

## 2019-12-05 ENCOUNTER — HOSPITAL ENCOUNTER (OUTPATIENT)
Age: 69
Setting detail: OUTPATIENT SURGERY
Discharge: HOME | End: 2019-12-05
Attending: PODIATRIST | Admitting: PODIATRIST
Payer: COMMERCIAL

## 2019-12-05 ENCOUNTER — ANESTHESIA EVENT (OUTPATIENT)
Dept: OPERATING ROOM | Age: 69
End: 2019-12-05
Payer: COMMERCIAL

## 2019-12-05 ENCOUNTER — APPOINTMENT (OUTPATIENT)
Dept: GENERAL RADIOLOGY | Age: 69
End: 2019-12-05
Attending: PODIATRIST
Payer: COMMERCIAL

## 2019-12-05 VITALS
DIASTOLIC BLOOD PRESSURE: 77 MMHG | SYSTOLIC BLOOD PRESSURE: 161 MMHG | HEIGHT: 77 IN | WEIGHT: 168 LBS | OXYGEN SATURATION: 97 % | TEMPERATURE: 97.8 F | RESPIRATION RATE: 18 BRPM | BODY MASS INDEX: 19.84 KG/M2 | HEART RATE: 76 BPM

## 2019-12-05 VITALS
DIASTOLIC BLOOD PRESSURE: 63 MMHG | OXYGEN SATURATION: 100 % | SYSTOLIC BLOOD PRESSURE: 103 MMHG | RESPIRATION RATE: 2 BRPM

## 2019-12-05 LAB
GLUCOSE BLD-MCNC: 188 MG/DL (ref 70–99)
GLUCOSE BLD-MCNC: 229 MG/DL (ref 70–99)
PERFORMED ON: ABNORMAL
PERFORMED ON: ABNORMAL

## 2019-12-05 PROCEDURE — 6370000000 HC RX 637 (ALT 250 FOR IP): Performed by: ANESTHESIOLOGY

## 2019-12-05 PROCEDURE — 3600000003 HC SURGERY LEVEL 3 BASE: Performed by: PODIATRIST

## 2019-12-05 PROCEDURE — 7100000010 HC PHASE II RECOVERY - FIRST 15 MIN: Performed by: PODIATRIST

## 2019-12-05 PROCEDURE — 2709999900 HC NON-CHARGEABLE SUPPLY: Performed by: PODIATRIST

## 2019-12-05 PROCEDURE — 3700000000 HC ANESTHESIA ATTENDED CARE: Performed by: PODIATRIST

## 2019-12-05 PROCEDURE — 2580000003 HC RX 258: Performed by: ANESTHESIOLOGY

## 2019-12-05 PROCEDURE — 3700000001 HC ADD 15 MINUTES (ANESTHESIA): Performed by: PODIATRIST

## 2019-12-05 PROCEDURE — 73620 X-RAY EXAM OF FOOT: CPT

## 2019-12-05 PROCEDURE — 6360000002 HC RX W HCPCS: Performed by: NURSE ANESTHETIST, CERTIFIED REGISTERED

## 2019-12-05 PROCEDURE — 2500000003 HC RX 250 WO HCPCS: Performed by: NURSE ANESTHETIST, CERTIFIED REGISTERED

## 2019-12-05 PROCEDURE — 2500000003 HC RX 250 WO HCPCS: Performed by: ANESTHESIOLOGY

## 2019-12-05 PROCEDURE — 2500000003 HC RX 250 WO HCPCS: Performed by: PODIATRIST

## 2019-12-05 PROCEDURE — 6360000002 HC RX W HCPCS: Performed by: PODIATRIST

## 2019-12-05 PROCEDURE — 3600000013 HC SURGERY LEVEL 3 ADDTL 15MIN: Performed by: PODIATRIST

## 2019-12-05 PROCEDURE — 7100000011 HC PHASE II RECOVERY - ADDTL 15 MIN: Performed by: PODIATRIST

## 2019-12-05 RX ORDER — ONDANSETRON 2 MG/ML
4 INJECTION INTRAMUSCULAR; INTRAVENOUS PRN
Status: DISCONTINUED | OUTPATIENT
Start: 2019-12-05 | End: 2019-12-05 | Stop reason: HOSPADM

## 2019-12-05 RX ORDER — OXYCODONE HYDROCHLORIDE AND ACETAMINOPHEN 5; 325 MG/1; MG/1
1 TABLET ORAL PRN
Status: COMPLETED | OUTPATIENT
Start: 2019-12-05 | End: 2019-12-05

## 2019-12-05 RX ORDER — PROPOFOL 10 MG/ML
INJECTION, EMULSION INTRAVENOUS PRN
Status: DISCONTINUED | OUTPATIENT
Start: 2019-12-05 | End: 2019-12-05 | Stop reason: SDUPTHER

## 2019-12-05 RX ORDER — FENTANYL CITRATE 50 UG/ML
INJECTION, SOLUTION INTRAMUSCULAR; INTRAVENOUS PRN
Status: DISCONTINUED | OUTPATIENT
Start: 2019-12-05 | End: 2019-12-05 | Stop reason: SDUPTHER

## 2019-12-05 RX ORDER — OXYCODONE HYDROCHLORIDE AND ACETAMINOPHEN 5; 325 MG/1; MG/1
2 TABLET ORAL PRN
Status: COMPLETED | OUTPATIENT
Start: 2019-12-05 | End: 2019-12-05

## 2019-12-05 RX ORDER — MIDAZOLAM HYDROCHLORIDE 1 MG/ML
INJECTION INTRAMUSCULAR; INTRAVENOUS PRN
Status: DISCONTINUED | OUTPATIENT
Start: 2019-12-05 | End: 2019-12-05 | Stop reason: SDUPTHER

## 2019-12-05 RX ORDER — MORPHINE SULFATE 10 MG/ML
1 INJECTION, SOLUTION INTRAMUSCULAR; INTRAVENOUS EVERY 5 MIN PRN
Status: DISCONTINUED | OUTPATIENT
Start: 2019-12-05 | End: 2019-12-05 | Stop reason: HOSPADM

## 2019-12-05 RX ORDER — DIPHENHYDRAMINE HYDROCHLORIDE 50 MG/ML
12.5 INJECTION INTRAMUSCULAR; INTRAVENOUS
Status: DISCONTINUED | OUTPATIENT
Start: 2019-12-05 | End: 2019-12-05 | Stop reason: HOSPADM

## 2019-12-05 RX ORDER — LABETALOL HYDROCHLORIDE 5 MG/ML
5 INJECTION, SOLUTION INTRAVENOUS EVERY 10 MIN PRN
Status: DISCONTINUED | OUTPATIENT
Start: 2019-12-05 | End: 2019-12-05 | Stop reason: HOSPADM

## 2019-12-05 RX ORDER — PROMETHAZINE HYDROCHLORIDE 25 MG/ML
6.25 INJECTION, SOLUTION INTRAMUSCULAR; INTRAVENOUS
Status: DISCONTINUED | OUTPATIENT
Start: 2019-12-05 | End: 2019-12-05 | Stop reason: HOSPADM

## 2019-12-05 RX ORDER — HYDRALAZINE HYDROCHLORIDE 20 MG/ML
5 INJECTION INTRAMUSCULAR; INTRAVENOUS EVERY 10 MIN PRN
Status: DISCONTINUED | OUTPATIENT
Start: 2019-12-05 | End: 2019-12-05 | Stop reason: HOSPADM

## 2019-12-05 RX ORDER — SODIUM CHLORIDE, SODIUM LACTATE, POTASSIUM CHLORIDE, CALCIUM CHLORIDE 600; 310; 30; 20 MG/100ML; MG/100ML; MG/100ML; MG/100ML
INJECTION, SOLUTION INTRAVENOUS CONTINUOUS
Status: DISCONTINUED | OUTPATIENT
Start: 2019-12-05 | End: 2019-12-05 | Stop reason: HOSPADM

## 2019-12-05 RX ORDER — MORPHINE SULFATE 10 MG/ML
2 INJECTION, SOLUTION INTRAMUSCULAR; INTRAVENOUS EVERY 5 MIN PRN
Status: DISCONTINUED | OUTPATIENT
Start: 2019-12-05 | End: 2019-12-05 | Stop reason: HOSPADM

## 2019-12-05 RX ADMIN — FENTANYL CITRATE 50 MCG: 50 INJECTION INTRAMUSCULAR; INTRAVENOUS at 10:16

## 2019-12-05 RX ADMIN — PROPOFOL 30 MG: 10 INJECTION, EMULSION INTRAVENOUS at 10:30

## 2019-12-05 RX ADMIN — PROPOFOL 30 MG: 10 INJECTION, EMULSION INTRAVENOUS at 10:37

## 2019-12-05 RX ADMIN — Medication 2 G: at 10:14

## 2019-12-05 RX ADMIN — PROPOFOL 100 MG: 10 INJECTION, EMULSION INTRAVENOUS at 10:18

## 2019-12-05 RX ADMIN — SODIUM CHLORIDE, POTASSIUM CHLORIDE, SODIUM LACTATE AND CALCIUM CHLORIDE: 600; 310; 30; 20 INJECTION, SOLUTION INTRAVENOUS at 10:03

## 2019-12-05 RX ADMIN — LIDOCAINE HYDROCHLORIDE 40 MG: 10 INJECTION, SOLUTION INFILTRATION; PERINEURAL at 10:18

## 2019-12-05 RX ADMIN — OXYCODONE HYDROCHLORIDE AND ACETAMINOPHEN 2 TABLET: 5; 325 TABLET ORAL at 11:48

## 2019-12-05 RX ADMIN — LIDOCAINE HYDROCHLORIDE 2 ML: 10 INJECTION, SOLUTION EPIDURAL; INFILTRATION; INTRACAUDAL; PERINEURAL at 10:03

## 2019-12-05 RX ADMIN — MIDAZOLAM 1 MG: 1 INJECTION INTRAMUSCULAR; INTRAVENOUS at 10:16

## 2019-12-05 RX ADMIN — PROPOFOL 20 MG: 10 INJECTION, EMULSION INTRAVENOUS at 10:23

## 2019-12-05 ASSESSMENT — PAIN SCALES - GENERAL
PAINLEVEL_OUTOF10: 0
PAINLEVEL_OUTOF10: 7
PAINLEVEL_OUTOF10: 7

## 2019-12-05 ASSESSMENT — PULMONARY FUNCTION TESTS
PIF_VALUE: 0

## 2019-12-05 ASSESSMENT — PAIN DESCRIPTION - DESCRIPTORS: DESCRIPTORS: ACHING;BURNING

## 2019-12-05 ASSESSMENT — PAIN DESCRIPTION - ORIENTATION: ORIENTATION: LEFT

## 2019-12-05 ASSESSMENT — PAIN DESCRIPTION - FREQUENCY: FREQUENCY: INTERMITTENT

## 2019-12-05 ASSESSMENT — PAIN DESCRIPTION - ONSET: ONSET: ON-GOING

## 2019-12-05 ASSESSMENT — PAIN - FUNCTIONAL ASSESSMENT: PAIN_FUNCTIONAL_ASSESSMENT: 0-10

## 2019-12-05 ASSESSMENT — PAIN DESCRIPTION - LOCATION: LOCATION: FOOT

## 2019-12-05 ASSESSMENT — PAIN DESCRIPTION - PAIN TYPE: TYPE: ACUTE PAIN;CHRONIC PAIN

## 2020-01-15 ENCOUNTER — OFFICE VISIT (OUTPATIENT)
Dept: ORTHOPEDIC SURGERY | Age: 70
End: 2020-01-15
Payer: COMMERCIAL

## 2020-01-15 VITALS — HEIGHT: 77 IN | BODY MASS INDEX: 22.43 KG/M2 | WEIGHT: 190 LBS

## 2020-01-15 PROCEDURE — 99213 OFFICE O/P EST LOW 20 MIN: CPT | Performed by: PHYSICIAN ASSISTANT

## 2020-01-15 NOTE — PROGRESS NOTES
Chief Complaint    Hip Pain (lt hip ogoing lateral and groin pain for several years)      History of Present Illness:  Elian Bower is a 77 y.o. male who comes in today for evaluation treatment of left hip groin and lateral hip pain. Referred in today for orthopedic consultation by his PCP, Roberta CHACKO. The patient reports ongoing hip pain is been present for about 3 to 4 years. He has an extremely long, complicated past medical history. He has had several heart attacks and strokes and is diabetic. He currently resides in a long-term care facility. Reports the pain is getting progressively worse during this period of time and states that the pain is in the groin and lateral aspect of the hip. It does not seem to radiate down his leg but does bother him with movement and activity. He is largely nonambulatory and is currently on chronic pain medication. Alexa Fontenot He denies any injury or trauma to the affected area. Pain Assessment  Location of Pain: Pelvis  Location Modifiers: Left  Severity of Pain: 8  Frequency of Pain: Intermittent  Aggravating Factors: Standing, Walking  Limiting Behavior: Yes  Work-Related Injury: No  Are there other pain locations you wish to document?: No]    Medical History:  Past Medical History:   Diagnosis Date    Anxiety disorder     BPH (benign prostatic hypertrophy)     Calcified granuloma of lung ([address] 2014    2:stable per 3/25/14 CT chest    Cataract 1/20/14    OU:Dr. hayward:[facility]    Cerebral artery occlusion with cerebral infarction ([address]     Chronic pain     COPD [facility]chronic obstructive pulmonary disease) [facility]     Under care of pulmo(Dr. Calhoun)    Degenerative arthritis of hip     Dementia ([address]     Depression 3/11/2015    Depression with anxiety     Prior psychiatrist & therapist:Dr. Mary Ricketts.  Diabetes mellitus ([address] 2004    Endo Dr. Lay Butler type 1 note below in [facility]    Diabetic eye exam ([address] 1/20/14    [facility]:Dr. STEPHEN Hayward:No retinopathy.  Cataract    (cerebral vascular accident) ([address] 10/17/2018    DM (diabetes mellitus) ([address] 10/17/2018    HTN (hypertension), benign 10/17/2018    BPH (benign prostatic hyperplasia) 10/17/2018    Leg pain, anterior, left 10/03/2018    Closed nondisplaced fracture of lateral malleolus of left fibula 09/17/2018    Lumbar degenerative disc disease 09/17/2018    Protrusion of lumbar intervertebral disc 09/17/2018    Osteoarthritis of both knees 09/17/2018    Osteoarthritis of both hips 09/17/2018    Idiopathic peripheral neuropathy 09/17/2018    DDD (degenerative disc disease), cervical 08/20/2018    Spondylosis of cervical region without myelopathy or radiculopathy 08/20/2018    DDD (degenerative disc disease), lumbar 08/20/2018    Chronic transaminasemia 08/10/2018    Tobacco smoker 08/10/2018    Fall at home 08/10/2018    Hypochloremia 08/10/2018    Hyperkalemia 08/10/2018    Mixed metabolic and respiratory acidosis 08/10/2018    Closed nondisplaced L ankle fracture (lateral malleolus) 08/10/2018    L 2nd toenail avulsion 08/10/2018    Skin tear of L leg 08/10/2018    Head trauma 08/10/2018    IDDM (insulin dependent diabetes mellitus) ([address] 08/10/2018    Ventral hernia without obstruction or gangrene 08/10/2018    Diabetic ketoacidosis without coma associated with type 2 diabetes mellitus ([address]     Other insomnia 07/16/2018    Hx DKA (Feb 2018) 02/24/2018    DM (diabetes mellitus), secondary, uncontrolled, w/neurologic complic ([address]     HTN (hypertension)     Severe protein-calorie malnutrition ([address] 07/25/2017    Chronic normocytic anemia 07/18/2017    Acute hyperglycemia 07/18/2017    Chronic dCHF (grade 1 LVDD) 07/02/2017    S/p reversal of colostomy (6/27/17)     Hyponatremia 04/05/2017    S/P left colectomy 03/10/2017    Moderate episode of recurrent major depressive disorder ([address] 09/08/2015    Multilevel spondylosis 10/17/2014    Chronic pain syndrome 08/26/2013    Mild-moderate puffs into the lungs 2 times daily      acetaminophen (TYLENOL) 325 MG tablet Take 650 mg by mouth every 4 hours as needed for Pain      bisacodyl (DULCOLAX) 10 MG suppository Place 10 mg rectally daily as needed for Constipation      loperamide (IMODIUM) 2 MG capsule Take 2 mg by mouth every 4 hours as needed for Diarrhea      insulin aspart (NOVOLOG) 100 UNIT/ML injection vial Inject 8 Units into the skin 3 times daily (before meals) +SSI      mometasone (ASMANEX 120 METERED DOSES) 220 MCG/INH inhaler Inhale 2 puffs into the lungs daily      aspirin 81 MG tablet Take 81 mg by mouth daily      finasteride (PROSCAR) 5 MG tablet Take 5 mg by mouth daily      folic acid (FOLVITE) 1 MG tablet Take 1 mg by mouth daily      metoprolol tartrate (LOPRESSOR) 25 MG tablet Take 25 mg by mouth 2 times daily      sertraline (ZOLOFT) 100 MG tablet Take 150 mg by mouth daily      Dulaglutide (TRULICITY) 3.87 BA/0.6DT SOPN Inject 0.75 mg into the skin once a week      SUMAtriptan (IMITREX) 100 MG tablet Take 100 mg by mouth daily as needed for Migraine      aluminum & magnesium hydroxide-simethicone (MAALOX) 200-200-20 MG/5ML SUSP suspension Take 5 mLs by mouth every 6 hours as needed for Indigestion      oxyCODONE HCl (OXY-IR) 10 MG immediate release tablet Take 10 mg by mouth every 6 hours.        atorvastatin (LIPITOR) 80 MG tablet Take 80 mg by mouth daily      Insulin Glargine (LANTUS SOLOSTAR SC) Inject 15 Units into the skin 2 times daily       cyclobenzaprine (FLEXERIL) 5 MG tablet Take 10 mg by mouth 3 times daily       melatonin 3 MG TABS tablet Take 6 mg by mouth nightly       vitamin E 1000 units capsule Take 1 capsule by mouth daily 30 capsule 3    Continuous Blood Gluc  (FREESTYLE BRIDGET READER) DENIS Use to monitor sugars 1 Device 0    Continuous Blood Gluc Sensor (FREESTYLE BRIDGET SENSOR SYSTEM) [facility] Use to monitor sugars 3 each 2    vitamin B-12 (CYANOCOBALAMIN) 1000 MCG tablet TAKE 1 TABLET BY MOUTH DAILY 30 tablet 3    tamsulosin (FLOMAX) 0.4 MG capsule TAKE 2 CAPSULES BY MOUTH DAILY 180 capsule 0    AGAMATRIX PRESTO TEST strip USE TO TEST BLOOD SUGAR 3 TIMES A  each 3    umeclidinium-vilanterol (ANORO ELLIPTA) 62.5-25 MCG/INH AEPB inhaler Inhale 1 puff into the lungs daily 1 each 11    albuterol sulfate HFA (PROAIR HFA) 108 (90 Base) MCG/ACT inhaler Inhale 2 puffs into the lungs every 4 hours as needed for Wheezing or Shortness of Breath 1 Inhaler 11    Lancets MISC Testing 2-3 times daily DX Code: E11.9 100 each 3    thiamine 100 MG tablet Take 1 tablet by mouth daily 30 tablet 3    Blood Glucose Monitoring Suppl (TRUE METRIX AIR GLUCOSE METER) DENIS 1 meter 1 Device 0    glucagon 1 MG injection Inject 1 kit into the skin as needed       No current facility-administered medications for this visit. Review of Systems:  Relevant review of systems reviewed and available in the patient's chart    Vital Signs: There were no vitals filed for this visit. General Exam:   Constitutional: Patient is adequately groomed with no evidence of malnutrition  DTRs: Deep tendon reflexes are intact  Mental Status: The patient is oriented to time, place and person. The patient's mood and affect are appropriate. Lymphatic: The lymphatic examination bilaterally reveals all areas to be without enlargement or induration. Vascular: Examination reveals no swelling or calf tenderness. Peripheral pulses are palpable and 2+. Neurological: The patient has good coordination. There is no weakness or sensory deficit. Body mass index is 22.53 kg/m². Left hip Examination:    Inspection:  No erythema or signs of infection. There are no cutaneous lesions. Palpation: Moderate tenderness to palpation over the greater trochanteric region. Range of Motion:  Painful limited range of motion of the hip particularly with flexion and external rotation causing reproducible groin pain.     Strength: 4/5 strength in flexion and abduction limited by pain. Special Tests: There is a positive log roll maneuver. Positive straight leg raise against resistance. Negative Yinka's test.  Negative Homans test.    Skin: There are no rashes, ulcerations or lesions. Gait: Brought in via wheelchair. Reflex 2+ patellar    Additional Comments:       Additional Examinations:         Contralateral Exam: Examination of the right hip reveals intact skin. The patient demonstrates full painless range of motion with regards to flexion, abduction, internal and external rotation. There is no tenderness about the greater trochanter. There is a negative straight leg raise against resistance. Strength is 5/5 throughout all planes. Radiology:     X-rays obtained and reviewed in office:  Views 2 views including AP pelvis and lateral  Location left hip  Impression no fractures or malalignment identified. There is moderate osteoarthritis of the femoral acetabular joint space narrowing with subchondral cysts, sclerosis and osteophyte formation. Impression:  Encounter Diagnoses   Name Primary?  Pain of left hip joint Yes    Primary osteoarthritis of left hip     Greater trochanteric bursitis of left hip        Office Procedures:  Orders Placed This Encounter   Procedures    XR HIP LEFT (2-3 VIEWS)     Standing Status:   Future     Number of Occurrences:   1     Standing Expiration Date:   1/15/2021       Treatment Plan:  I discussed with the patient the nature of osteoarthritis of the hip. We talked about treatment of arthritis and the various options that are involved with this. The patient understands that the treatments can vary from essentially doing nothing to a total joint replacement arthroplasty for arthritis. However, I do not believe that he is a good surgical candidate.   Unfortunately we are limited in treatment options as he is already on chronic pain medication, should not take NSAIDs or steroids, and

## 2020-04-10 ENCOUNTER — HOSPITAL ENCOUNTER (INPATIENT)
Age: 70
LOS: 1 days | Discharge: NURSING, REHAB OR LONG-TERM CARE | DRG: 312 | End: 2020-04-12
Attending: EMERGENCY MEDICINE | Admitting: INTERNAL MEDICINE
Payer: MEDICARE

## 2020-04-10 ENCOUNTER — APPOINTMENT (OUTPATIENT)
Dept: CT IMAGING | Age: 70
DRG: 312 | End: 2020-04-10
Payer: MEDICARE

## 2020-04-10 PROBLEM — R55 SYNCOPE AND COLLAPSE: Status: ACTIVE | Noted: 2020-04-10

## 2020-04-10 PROBLEM — F02.80 DEMENTIA ASSOCIATED WITH OTHER UNDERLYING DISEASE WITHOUT BEHAVIORAL DISTURBANCE (HCC): Status: ACTIVE | Noted: 2020-04-10

## 2020-04-10 LAB
ANION GAP SERPL CALCULATED.3IONS-SCNC: 11 MMOL/L (ref 3–16)
BASOPHILS ABSOLUTE: 0 K/UL (ref 0–0.2)
BASOPHILS RELATIVE PERCENT: 0.5 %
BUN BLDV-MCNC: 27 MG/DL (ref 7–20)
CALCIUM SERPL-MCNC: 9 MG/DL (ref 8.3–10.6)
CHLORIDE BLD-SCNC: 94 MMOL/L (ref 99–110)
CO2: 26 MMOL/L (ref 21–32)
CREAT SERPL-MCNC: 1.2 MG/DL (ref 0.8–1.3)
EKG ATRIAL RATE: 72 BPM
EKG DIAGNOSIS: NORMAL
EKG P AXIS: 81 DEGREES
EKG P-R INTERVAL: 184 MS
EKG Q-T INTERVAL: 462 MS
EKG QRS DURATION: 126 MS
EKG QTC CALCULATION (BAZETT): 505 MS
EKG R AXIS: 5 DEGREES
EKG T AXIS: 43 DEGREES
EKG VENTRICULAR RATE: 72 BPM
EOSINOPHILS ABSOLUTE: 0.3 K/UL (ref 0–0.6)
EOSINOPHILS RELATIVE PERCENT: 2.9 %
GFR AFRICAN AMERICAN: >60
GFR NON-AFRICAN AMERICAN: >60
GLUCOSE BLD-MCNC: 118 MG/DL (ref 70–99)
GLUCOSE BLD-MCNC: 169 MG/DL (ref 70–99)
GLUCOSE BLD-MCNC: 207 MG/DL (ref 70–99)
GLUCOSE BLD-MCNC: 296 MG/DL (ref 70–99)
HCT VFR BLD CALC: 37.8 % (ref 40.5–52.5)
HEMOGLOBIN: 12.5 G/DL (ref 13.5–17.5)
LYMPHOCYTES ABSOLUTE: 2.4 K/UL (ref 1–5.1)
LYMPHOCYTES RELATIVE PERCENT: 26 %
MCH RBC QN AUTO: 30.3 PG (ref 26–34)
MCHC RBC AUTO-ENTMCNC: 33.2 G/DL (ref 31–36)
MCV RBC AUTO: 91.3 FL (ref 80–100)
MONOCYTES ABSOLUTE: 1 K/UL (ref 0–1.3)
MONOCYTES RELATIVE PERCENT: 10.4 %
NEUTROPHILS ABSOLUTE: 5.6 K/UL (ref 1.7–7.7)
NEUTROPHILS RELATIVE PERCENT: 60.2 %
PDW BLD-RTO: 13.1 % (ref 12.4–15.4)
PERFORMED ON: ABNORMAL
PLATELET # BLD: 219 K/UL (ref 135–450)
PMV BLD AUTO: 8 FL (ref 5–10.5)
POTASSIUM REFLEX MAGNESIUM: 4.1 MMOL/L (ref 3.5–5.1)
RBC # BLD: 4.14 M/UL (ref 4.2–5.9)
SODIUM BLD-SCNC: 131 MMOL/L (ref 136–145)
TROPONIN: <0.01 NG/ML
WBC # BLD: 9.3 K/UL (ref 4–11)

## 2020-04-10 PROCEDURE — G0378 HOSPITAL OBSERVATION PER HR: HCPCS

## 2020-04-10 PROCEDURE — 99285 EMERGENCY DEPT VISIT HI MDM: CPT

## 2020-04-10 PROCEDURE — 6370000000 HC RX 637 (ALT 250 FOR IP): Performed by: PHYSICIAN ASSISTANT

## 2020-04-10 PROCEDURE — 83036 HEMOGLOBIN GLYCOSYLATED A1C: CPT

## 2020-04-10 PROCEDURE — 80048 BASIC METABOLIC PNL TOTAL CA: CPT

## 2020-04-10 PROCEDURE — 36415 COLL VENOUS BLD VENIPUNCTURE: CPT

## 2020-04-10 PROCEDURE — 84484 ASSAY OF TROPONIN QUANT: CPT

## 2020-04-10 PROCEDURE — 85025 COMPLETE CBC W/AUTO DIFF WBC: CPT

## 2020-04-10 PROCEDURE — 6360000002 HC RX W HCPCS: Performed by: EMERGENCY MEDICINE

## 2020-04-10 PROCEDURE — 93005 ELECTROCARDIOGRAM TRACING: CPT | Performed by: EMERGENCY MEDICINE

## 2020-04-10 PROCEDURE — 72125 CT NECK SPINE W/O DYE: CPT

## 2020-04-10 PROCEDURE — 0HQ1XZZ REPAIR FACE SKIN, EXTERNAL APPROACH: ICD-10-PCS | Performed by: EMERGENCY MEDICINE

## 2020-04-10 PROCEDURE — 94761 N-INVAS EAR/PLS OXIMETRY MLT: CPT

## 2020-04-10 PROCEDURE — 94640 AIRWAY INHALATION TREATMENT: CPT

## 2020-04-10 PROCEDURE — 2580000003 HC RX 258: Performed by: PHYSICIAN ASSISTANT

## 2020-04-10 PROCEDURE — 96374 THER/PROPH/DIAG INJ IV PUSH: CPT

## 2020-04-10 PROCEDURE — 70450 CT HEAD/BRAIN W/O DYE: CPT

## 2020-04-10 PROCEDURE — 6370000000 HC RX 637 (ALT 250 FOR IP): Performed by: INTERNAL MEDICINE

## 2020-04-10 PROCEDURE — 2580000003 HC RX 258: Performed by: EMERGENCY MEDICINE

## 2020-04-10 PROCEDURE — 93010 ELECTROCARDIOGRAM REPORT: CPT | Performed by: INTERNAL MEDICINE

## 2020-04-10 PROCEDURE — 99223 1ST HOSP IP/OBS HIGH 75: CPT | Performed by: INTERNAL MEDICINE

## 2020-04-10 PROCEDURE — 6370000000 HC RX 637 (ALT 250 FOR IP): Performed by: EMERGENCY MEDICINE

## 2020-04-10 PROCEDURE — 6360000002 HC RX W HCPCS: Performed by: PHYSICIAN ASSISTANT

## 2020-04-10 RX ORDER — THIAMINE MONONITRATE (VIT B1) 100 MG
100 TABLET ORAL DAILY
Status: DISCONTINUED | OUTPATIENT
Start: 2020-04-10 | End: 2020-04-12 | Stop reason: HOSPADM

## 2020-04-10 RX ORDER — LANOLIN ALCOHOL/MO/W.PET/CERES
6 CREAM (GRAM) TOPICAL NIGHTLY PRN
Status: DISCONTINUED | OUTPATIENT
Start: 2020-04-10 | End: 2020-04-12 | Stop reason: HOSPADM

## 2020-04-10 RX ORDER — FLUTICASONE PROPIONATE 44 UG/1
2 AEROSOL, METERED RESPIRATORY (INHALATION) 2 TIMES DAILY
Status: DISCONTINUED | OUTPATIENT
Start: 2020-04-10 | End: 2020-04-12 | Stop reason: HOSPADM

## 2020-04-10 RX ORDER — 0.9 % SODIUM CHLORIDE 0.9 %
1000 INTRAVENOUS SOLUTION INTRAVENOUS ONCE
Status: COMPLETED | OUTPATIENT
Start: 2020-04-10 | End: 2020-04-10

## 2020-04-10 RX ORDER — ACETAMINOPHEN 500 MG
1000 TABLET ORAL ONCE
Status: COMPLETED | OUTPATIENT
Start: 2020-04-10 | End: 2020-04-10

## 2020-04-10 RX ORDER — SODIUM CHLORIDE 0.9 % (FLUSH) 0.9 %
10 SYRINGE (ML) INJECTION EVERY 12 HOURS SCHEDULED
Status: DISCONTINUED | OUTPATIENT
Start: 2020-04-10 | End: 2020-04-12 | Stop reason: HOSPADM

## 2020-04-10 RX ORDER — ONDANSETRON 2 MG/ML
4 INJECTION INTRAMUSCULAR; INTRAVENOUS ONCE
Status: COMPLETED | OUTPATIENT
Start: 2020-04-10 | End: 2020-04-10

## 2020-04-10 RX ORDER — NICOTINE POLACRILEX 4 MG
15 LOZENGE BUCCAL PRN
Status: DISCONTINUED | OUTPATIENT
Start: 2020-04-10 | End: 2020-04-12 | Stop reason: HOSPADM

## 2020-04-10 RX ORDER — FLUTICASONE PROPIONATE 44 UG/1
2 AEROSOL, METERED RESPIRATORY (INHALATION) 2 TIMES DAILY
Status: DISCONTINUED | OUTPATIENT
Start: 2020-04-10 | End: 2020-04-10

## 2020-04-10 RX ORDER — ASPIRIN 81 MG/1
81 TABLET, CHEWABLE ORAL DAILY
Status: DISCONTINUED | OUTPATIENT
Start: 2020-04-10 | End: 2020-04-12 | Stop reason: HOSPADM

## 2020-04-10 RX ORDER — POLYETHYLENE GLYCOL 3350 17 G/17G
17 POWDER, FOR SOLUTION ORAL DAILY PRN
Status: DISCONTINUED | OUTPATIENT
Start: 2020-04-10 | End: 2020-04-12 | Stop reason: HOSPADM

## 2020-04-10 RX ORDER — CHOLECALCIFEROL (VITAMIN D3) 125 MCG
1000 CAPSULE ORAL DAILY
Status: DISCONTINUED | OUTPATIENT
Start: 2020-04-10 | End: 2020-04-12 | Stop reason: HOSPADM

## 2020-04-10 RX ORDER — MEMANTINE HYDROCHLORIDE 5 MG/1
10 TABLET ORAL 2 TIMES DAILY
Status: DISCONTINUED | OUTPATIENT
Start: 2020-04-10 | End: 2020-04-12 | Stop reason: HOSPADM

## 2020-04-10 RX ORDER — MAGNESIUM HYDROXIDE/ALUMINUM HYDROXICE/SIMETHICONE 120; 1200; 1200 MG/30ML; MG/30ML; MG/30ML
5 SUSPENSION ORAL EVERY 6 HOURS PRN
Status: DISCONTINUED | OUTPATIENT
Start: 2020-04-10 | End: 2020-04-12 | Stop reason: HOSPADM

## 2020-04-10 RX ORDER — ONDANSETRON 2 MG/ML
4 INJECTION INTRAMUSCULAR; INTRAVENOUS EVERY 6 HOURS PRN
Status: DISCONTINUED | OUTPATIENT
Start: 2020-04-10 | End: 2020-04-10

## 2020-04-10 RX ORDER — BISACODYL 10 MG
10 SUPPOSITORY, RECTAL RECTAL DAILY PRN
Status: DISCONTINUED | OUTPATIENT
Start: 2020-04-10 | End: 2020-04-12 | Stop reason: HOSPADM

## 2020-04-10 RX ORDER — CYCLOBENZAPRINE HCL 10 MG
10 TABLET ORAL 3 TIMES DAILY PRN
Status: DISCONTINUED | OUTPATIENT
Start: 2020-04-10 | End: 2020-04-12 | Stop reason: HOSPADM

## 2020-04-10 RX ORDER — DEXTROSE MONOHYDRATE 50 MG/ML
100 INJECTION, SOLUTION INTRAVENOUS PRN
Status: DISCONTINUED | OUTPATIENT
Start: 2020-04-10 | End: 2020-04-12 | Stop reason: HOSPADM

## 2020-04-10 RX ORDER — MAGNESIUM HYDROXIDE/ALUMINUM HYDROXICE/SIMETHICONE 120; 1200; 1200 MG/30ML; MG/30ML; MG/30ML
5 SUSPENSION ORAL EVERY 6 HOURS PRN
COMMUNITY

## 2020-04-10 RX ORDER — LOPERAMIDE HYDROCHLORIDE 2 MG/1
2 CAPSULE ORAL EVERY 4 HOURS PRN
Status: DISCONTINUED | OUTPATIENT
Start: 2020-04-10 | End: 2020-04-12 | Stop reason: HOSPADM

## 2020-04-10 RX ORDER — ACETAMINOPHEN 325 MG/1
650 TABLET ORAL EVERY 4 HOURS PRN
Status: DISCONTINUED | OUTPATIENT
Start: 2020-04-10 | End: 2020-04-12 | Stop reason: HOSPADM

## 2020-04-10 RX ORDER — DEXTROSE MONOHYDRATE 25 G/50ML
12.5 INJECTION, SOLUTION INTRAVENOUS PRN
Status: DISCONTINUED | OUTPATIENT
Start: 2020-04-10 | End: 2020-04-12 | Stop reason: HOSPADM

## 2020-04-10 RX ORDER — MEMANTINE HYDROCHLORIDE 10 MG/1
10 TABLET ORAL 2 TIMES DAILY
COMMUNITY

## 2020-04-10 RX ORDER — SODIUM CHLORIDE 9 MG/ML
INJECTION, SOLUTION INTRAVENOUS CONTINUOUS
Status: DISCONTINUED | OUTPATIENT
Start: 2020-04-10 | End: 2020-04-10 | Stop reason: SDUPTHER

## 2020-04-10 RX ORDER — SODIUM CHLORIDE 9 MG/ML
1000 INJECTION, SOLUTION INTRAVENOUS CONTINUOUS
Status: DISCONTINUED | OUTPATIENT
Start: 2020-04-10 | End: 2020-04-11

## 2020-04-10 RX ORDER — ALBUTEROL SULFATE 90 UG/1
2 AEROSOL, METERED RESPIRATORY (INHALATION) EVERY 4 HOURS PRN
Status: DISCONTINUED | OUTPATIENT
Start: 2020-04-10 | End: 2020-04-12 | Stop reason: HOSPADM

## 2020-04-10 RX ORDER — ATORVASTATIN CALCIUM 40 MG/1
80 TABLET, FILM COATED ORAL NIGHTLY
Status: DISCONTINUED | OUTPATIENT
Start: 2020-04-10 | End: 2020-04-12 | Stop reason: HOSPADM

## 2020-04-10 RX ORDER — INSULIN GLARGINE 100 [IU]/ML
15 INJECTION, SOLUTION SUBCUTANEOUS 2 TIMES DAILY
Status: DISCONTINUED | OUTPATIENT
Start: 2020-04-10 | End: 2020-04-12 | Stop reason: HOSPADM

## 2020-04-10 RX ORDER — SODIUM CHLORIDE 0.9 % (FLUSH) 0.9 %
10 SYRINGE (ML) INJECTION PRN
Status: DISCONTINUED | OUTPATIENT
Start: 2020-04-10 | End: 2020-04-12 | Stop reason: HOSPADM

## 2020-04-10 RX ORDER — FOLIC ACID 1 MG/1
1 TABLET ORAL DAILY
Status: DISCONTINUED | OUTPATIENT
Start: 2020-04-10 | End: 2020-04-12 | Stop reason: HOSPADM

## 2020-04-10 RX ORDER — PROMETHAZINE HYDROCHLORIDE 25 MG/1
12.5 TABLET ORAL EVERY 6 HOURS PRN
Status: DISCONTINUED | OUTPATIENT
Start: 2020-04-10 | End: 2020-04-12 | Stop reason: HOSPADM

## 2020-04-10 RX ORDER — OXYCODONE HYDROCHLORIDE 5 MG/1
10 TABLET ORAL EVERY 6 HOURS
Status: DISCONTINUED | OUTPATIENT
Start: 2020-04-10 | End: 2020-04-12 | Stop reason: HOSPADM

## 2020-04-10 RX ADMIN — INSULIN LISPRO 3 UNITS: 100 INJECTION, SOLUTION INTRAVENOUS; SUBCUTANEOUS at 17:10

## 2020-04-10 RX ADMIN — ACETAMINOPHEN 1000 MG: 500 TABLET ORAL at 10:35

## 2020-04-10 RX ADMIN — INSULIN LISPRO 1 UNITS: 100 INJECTION, SOLUTION INTRAVENOUS; SUBCUTANEOUS at 14:26

## 2020-04-10 RX ADMIN — CYCLOBENZAPRINE HYDROCHLORIDE 10 MG: 10 TABLET, FILM COATED ORAL at 18:52

## 2020-04-10 RX ADMIN — MEMANTINE HYDROCHLORIDE 10 MG: 5 TABLET ORAL at 14:25

## 2020-04-10 RX ADMIN — OXYCODONE HYDROCHLORIDE 10 MG: 5 TABLET ORAL at 20:35

## 2020-04-10 RX ADMIN — INSULIN GLARGINE 15 UNITS: 100 INJECTION, SOLUTION SUBCUTANEOUS at 20:37

## 2020-04-10 RX ADMIN — ALUMINUM HYDROXIDE, MAGNESIUM HYDROXIDE, AND SIMETHICONE 5 ML: 200; 200; 20 SUSPENSION ORAL at 22:27

## 2020-04-10 RX ADMIN — FOLIC ACID 1 MG: 1 TABLET ORAL at 14:26

## 2020-04-10 RX ADMIN — CYANOCOBALAMIN TAB 500 MCG 1000 MCG: 500 TAB at 14:25

## 2020-04-10 RX ADMIN — INSULIN LISPRO 8 UNITS: 100 INJECTION, SOLUTION INTRAVENOUS; SUBCUTANEOUS at 17:10

## 2020-04-10 RX ADMIN — Medication 100 MG: at 14:26

## 2020-04-10 RX ADMIN — ALUMINUM HYDROXIDE, MAGNESIUM HYDROXIDE, AND SIMETHICONE 5 ML: 200; 200; 20 SUSPENSION ORAL at 14:25

## 2020-04-10 RX ADMIN — Medication 2 PUFF: at 19:59

## 2020-04-10 RX ADMIN — INSULIN LISPRO 1 UNITS: 100 INJECTION, SOLUTION INTRAVENOUS; SUBCUTANEOUS at 20:36

## 2020-04-10 RX ADMIN — GLYCOPYRROLATE AND FORMOTEROL FUMARATE 2 PUFF: 9; 4.8 AEROSOL, METERED RESPIRATORY (INHALATION) at 19:59

## 2020-04-10 RX ADMIN — SODIUM CHLORIDE 1000 ML: 9 INJECTION, SOLUTION INTRAVENOUS at 08:27

## 2020-04-10 RX ADMIN — ONDANSETRON HYDROCHLORIDE 4 MG: 2 INJECTION, SOLUTION INTRAMUSCULAR; INTRAVENOUS at 08:28

## 2020-04-10 RX ADMIN — ENOXAPARIN SODIUM 40 MG: 40 INJECTION SUBCUTANEOUS at 14:26

## 2020-04-10 RX ADMIN — ACETAMINOPHEN 650 MG: 325 TABLET ORAL at 22:23

## 2020-04-10 RX ADMIN — OXYCODONE HYDROCHLORIDE 10 MG: 5 TABLET ORAL at 14:25

## 2020-04-10 RX ADMIN — ATORVASTATIN CALCIUM 80 MG: 40 TABLET, FILM COATED ORAL at 20:36

## 2020-04-10 RX ADMIN — MEMANTINE HYDROCHLORIDE 10 MG: 5 TABLET ORAL at 20:36

## 2020-04-10 RX ADMIN — ASPIRIN 81 MG 81 MG: 81 TABLET ORAL at 14:25

## 2020-04-10 RX ADMIN — SODIUM CHLORIDE 1000 ML: 9 INJECTION, SOLUTION INTRAVENOUS at 14:33

## 2020-04-10 RX ADMIN — MELATONIN 3 MG ORAL TABLET 6 MG: 3 TABLET ORAL at 22:22

## 2020-04-10 RX ADMIN — SODIUM CHLORIDE 1000 ML: 9 INJECTION, SOLUTION INTRAVENOUS at 10:33

## 2020-04-10 ASSESSMENT — ENCOUNTER SYMPTOMS
NAUSEA: 0
DIARRHEA: 0
COUGH: 0
ABDOMINAL PAIN: 0
WHEEZING: 0
BACK PAIN: 0
PHOTOPHOBIA: 0
RHINORRHEA: 0
VOMITING: 0
SHORTNESS OF BREATH: 0

## 2020-04-10 ASSESSMENT — PAIN DESCRIPTION - ORIENTATION: ORIENTATION: MID

## 2020-04-10 ASSESSMENT — PAIN DESCRIPTION - LOCATION
LOCATION: NECK;HEAD;KNEE
LOCATION: BACK

## 2020-04-10 ASSESSMENT — PAIN SCALES - GENERAL
PAINLEVEL_OUTOF10: 8
PAINLEVEL_OUTOF10: 9
PAINLEVEL_OUTOF10: 8
PAINLEVEL_OUTOF10: 3
PAINLEVEL_OUTOF10: 9

## 2020-04-10 ASSESSMENT — PAIN - FUNCTIONAL ASSESSMENT: PAIN_FUNCTIONAL_ASSESSMENT: 0-10

## 2020-04-10 ASSESSMENT — PAIN DESCRIPTION - PAIN TYPE: TYPE: CHRONIC PAIN

## 2020-04-10 ASSESSMENT — PAIN DESCRIPTION - ONSET: ONSET: GRADUAL

## 2020-04-10 ASSESSMENT — PAIN DESCRIPTION - FREQUENCY: FREQUENCY: INTERMITTENT

## 2020-04-10 ASSESSMENT — PAIN DESCRIPTION - PROGRESSION: CLINICAL_PROGRESSION: GRADUALLY WORSENING

## 2020-04-10 NOTE — ED PROVIDER NOTES
Emergency Department Provider Note  Location: [place] [address] ED  4/10/2020     Patient Identification  Juanis De La Torre is a 77 y.o. male    Chief Complaint  Head Injury (pt got up this AM and while on the toilet; pt fell off the toilet and hit his head; has a knot and cut on left side and wound on the right side; also c/o of left knee; pt also c/o of sore neck)          HPI  (History provided by patient)  Patient is a 78-year-old male multiple comorbidities including insulin-dependent diabetes who presents after a ?syncopal episode and fall with head injury that occurred this morning. Patient reports to sit on the toilet and went to get up and \"must just fell forward and hit my head\". Poor historian. Believes he hit his head on ceramic tile. Is able to get up afterward. He denies any significant neck pain back pain chest pain. Has 2 wounds and bruising on his forehead. Last tetanus was fall 2019. Reports general fatigue \"lately\". Reports he has had syncopal episodes in the past.  Was seen within the last year for similar episode. Denies any chest pain back pain diaphoresis shortness of breath leg pain leg swelling no history of DVT or PE. Reportedly not on blood thinners. I have reviewed the following nursing documentation:  Allergies:    Allergies   Allergen Reactions    Neurontin [Gabapentin]      Gastric side effects       Past medical history:  has a past medical history of Anxiety disorder, BPH (benign prostatic hypertrophy), Calcified granuloma of lung ([address] (2014), Cataract (1/20/14), Cerebral artery occlusion with cerebral infarction [facility]), Chronic pain, COPD (chronic obstructive pulmonary disease) ([address] Degenerative arthritis of hip, Dementia ([address] Depression (3/11/2015), Depression with anxiety, Diabetes mellitus ([address] (2004), Diabetic eye exam ([address] (1/20/14), Diabetic retinopathy ([address] Diverticulitis (11/26/2011), Diverticulosis (11/26/2011), DKA (diabetic ketoacidoses) ([facility] Emphysema, Esophageal candidiasis ([facility] (7/16/13), Essential hypertension, benign (11/2010), ETOH abuse, Fatty liver (10/9/2012), Foot ulcer:left (9/30/2013), Gastric ulcer, unspecified as acute or chronic, without mention of hemorrhage or perforation, GERD (gastroesophageal reflux disease), Gout (1997), Hearing decreased, Hemangioma (12/2010), hepatitis c (7/26/17, 2010), Hyperlipidemia LDL goal < 100, Left-sided weakness, Low HDL (under 40) (10/9/2012), Peripheral neuropathy (2006), Pneumonia (10/15/13), Pyogenic granuloma, Restrictive lung disease, S/P colonoscopy (1996), S/P colonoscopy (11/11/10;10/23/2013), S/P endoscopy (2009), Sciatica, Superficial phlebitis of left leg (9/30/2013), Tachycardia, Therapeutic drug monitoring (4/8/15), and Type 1 diabetes mellitus not at goal [facility]) (3/17/14). Past surgical history:  has a past surgical history that includes Leg Surgery; Hip fracture surgery; Tonsillectomy; Upper gastrointestinal endoscopy (7/16/2013); Diagnostic Cardiac Cath Lab Procedure (2013); other surgical history (Left, 11/21/2013); Foot surgery (Left, Hammer toe, 2nd toe); Revision Colostomy (06/27/2017); other surgical history (Right, 05/30/2019); Pressure ulcer debridement (Right, 5/30/2019); Colonoscopy; fracture surgery; Dilatation, esophagus; Hardware Removal (Left, 12/03/2019); and Foot surgery (Left, 12/5/2019). Home medications:   Prior to Admission medications    Medication Sig Start Date End Date Taking?  Authorizing Provider   memantine (NAMENDA) 10 MG tablet Take 10 mg by mouth 2 times daily   Yes Historical Provider, MD   bisacodyl (DULCOLAX) 5 MG EC tablet Take 10 mg by mouth daily as needed for Constipation   Yes Historical Provider, MD   polyethylene glycol (GLYCOLAX) powder Take 17 g by mouth daily   Yes Historical Provider, MD   fluticasone (FLOVENT HFA) 44 MCG/ACT inhaler Inhale 2 puffs into the lungs 2 times daily   Yes Historical Provider, MD   acetaminophen (TYLENOL) 325 APRN - CNP   tamsulosin (FLOMAX) 0.4 MG capsule TAKE 2 CAPSULES BY MOUTH DAILY 5/30/18  Yes Agatha Quant, APRN - CNP   umeclidinium-vilanterol (ANORO ELLIPTA) 62.5-25 MCG/INH AEPB inhaler Inhale 1 puff into the lungs daily 3/27/18  Yes Ramyundo Fontaine MD   albuterol sulfate HFA (PROAIR HFA) 108 (90 Base) MCG/ACT inhaler Inhale 2 puffs into the lungs every 4 hours as needed for Wheezing or Shortness of Breath 3/27/18  Yes Raymundo Fontaine MD   thiamine 100 MG tablet Take 1 tablet by mouth daily 3/6/18  Yes Agatha Quant, APRN - CNP   glucagon 1 MG injection Inject 1 kit into the skin as needed   Yes Historical Provider, MD   mometasone (ASMANEX 120 METERED DOSES) 220 MCG/INH inhaler Inhale 2 puffs into the lungs daily    Historical Provider, MD   aluminum & magnesium hydroxide-simethicone (MAALOX) 200-200-20 MG/5ML SUSP suspension Take 5 mLs by mouth every 6 hours as needed for Indigestion    Historical Provider, MD   Continuous Blood Gluc  (FREESTYLE BRIDGET READER) DENIS Use to monitor sugars 8/1/18   Wei Degroot MD   Continuous Blood Gluc Sensor ([address] [place] [address]) MISC Use to monitor sugars 8/1/18   Wei Degroot MD   AGAMATRIX PRESTO TEST strip USE TO TEST BLOOD SUGAR 3 TIMES A DAY 5/9/18   Wei Degroot MD   Lancets MISC Testing 2-3 times daily DX Code: E11.9 3/22/18   Wei Degroot MD   Blood Glucose Monitoring Suppl (TRUE METRIX AIR GLUCOSE METER) DENIS 1 meter 3/6/18   Agatha Quant, APRN - CNP       Social history:  reports that he quit smoking about 18 months ago. His smoking use included cigarettes. He has a 17.50 pack-year smoking history. He has never used smokeless tobacco. He reports previous alcohol use. He reports that he does not use drugs.     Family history:    Family History   Problem Relation Age of Onset   Estanislado Floor Stroke Mother     Hypertension Mother     Arthritis Father     Substance Abuse Father         Vassie [place] Cancer Father overall similar in appearance to prior EKG February 2019. No STEMI criteria, . Radiology  Ct Head Wo Contrast    Result Date: 4/10/2020  EXAMINATION: CT OF THE HEAD WITHOUT CONTRAST  4/10/2020 7:47 am TECHNIQUE: CT of the head was performed without the administration of intravenous contrast. Dose modulation, iterative reconstruction, and/or weight based adjustment of the mA/kV was utilized to reduce the radiation dose to as low as reasonably achievable. COMPARISON: 09/06/2019 HISTORY: ORDERING SYSTEM PROVIDED HISTORY: fall TECHNOLOGIST PROVIDED HISTORY: Reason for exam:->fall Has a \"code stroke\" or \"stroke alert\" been called? ->No Reason for Exam: lacerations to both sides of his forehead. fell Acuity: Acute Type of Exam: Initial Mechanism of Injury: fall FINDINGS: BRAIN/VENTRICLES: There is no acute infarct or acute intracranial hemorrhage present. There is no mass effect or midline shift present. There is a remote right occipital infarct. Lacunar infarcts are present within the basal ganglia and thalamus. Periventricular hypoattenuation is unchanged. There is no abnormal extra-axial fluid collection. ORBITS: Limited evaluation of the orbits is unremarkable. SINUSES: There is a fluid level within the left maxillary sinus. Circumferential mucosal thickening is present within the left maxillary sinus. The walls of the left maxillary sinus are thickened and sclerotic suggesting chronic sinusitis. The paranasal sinuses and mastoid air cells are otherwise clear. SOFT TISSUES/SKULL:  There is a left frontal scalp soft tissue swelling. There is no skull fracture identified. 1. No acute intracranial process identified. 2. Left frontal scalp soft tissue swelling. 3. Remote right occipital infarct. 4. Multiple remote lacunar infarcts within the basal ganglia and thalamus. 5. Suspected acute on chronic left maxillary sinusitis.      Ct Cervical Spine Wo Contrast    Result Date: 4/10/2020  EXAMINATION: CT high probability of clinically significant life-threatening deterioration in the patient's condition, which required my urgent intervention. This chart was generated in part by using Dragon Dictation system and may contain errors related to that system including errors in grammar, punctuation, and spelling, as well as words and phrases that may be inappropriate. If there are any questions or concerns please feel free to contact the dictating provider for clarification.      MD Moose Peña MD  04/10/20 [address] Ja Busch MD  04/10/20 1024

## 2020-04-10 NOTE — PROGRESS NOTES
Patient admitted to room 326-1 from ED. Patient oriented to room, call light, bed rails, phone, lights and bathroom. Patient instructed about the schedule of the day including: vital sign frequency, lab draws, possible tests, frequency of MD and staff rounds, daily weights, I &O's and prescribed diet. Bed alarm in place, patient aware of placement and reason. Telemetry box in place, patient aware of placement and reason. Bed locked, in lowest position, side rails up 2/4, call light within reach. Recliner Assessment  Patient is not able to demonstrated the ability to move from a reclining position to an upright position within the recliner. however patient is alert, oriented and able to provide informed consent    4 Eyes Skin Assessment     The patient is being assess for   Admission    I agree that 2 RN's have performed a thorough Head to Toe Skin Assessment on the patient. ALL assessment sites listed below have been assessed. Areas assessed by both nurses: Zhanna/Krissy  [x]   Head, Face, and Ears   [x]   Shoulders, Back, and Chest, Abdomen  [x]   Arms, Elbows, and Hands   [x]   Coccyx, Sacrum, and Ischium  [x]   Legs, Feet, and Heels        Legs dry flaky skin, Left knee abrasion, Left and Right forehead abrasion with steri strips in place    Co-signer eSignature: Electronically signed by Clarice Aguila RN on 4/10/20 at 7:24 PM EDT    Does the Patient have Skin Breakdown?   No          Julián Prevention initiated:  No   Wound Care Orders initiated:  No      [facility] nurse consulted for Pressure Injury (Stage 3,4, Unstageable, DTI, NWPT, Complex wounds)and New or Established Ostomies:  No      Primary Nurse eSignature: Electronically signed by Berkley Barros RN on 4/10/20 at 1:27 PM EDT

## 2020-04-10 NOTE — PROGRESS NOTES
RESPIRATORY THERAPY ASSESSMENT    Name:  Roge Kapadia  Medical Record Number:  [id number]  Age: 77 y.o. Gender: male  : 1950  Today's Date:  4/10/2020  Room:  /0326-01    Assessment     Is the patient being admitted for a COPD or Asthma exacerbation? No   (If yes the patient will be seen every 4 hours for the first 24 hours and then reassessed)    Patient Admission Diagnosis      Allergies  Allergies   Allergen Reactions    Neurontin [Gabapentin] Other (See Comments)     Gastric side effects       Minimum Predicted Vital Capacity:               Actual Vital Capacity:                    Pulmonary History:COPD  Home Oxygen Therapy:  2-3 PRN liters/min via nasal cannula  Home Respiratory Therapy:  Anora Ellipta Daily, Albuterol MDI PRN  Current Respiratory Therapy:   Flovent and Bevepsi MDI BID, Albuterol MDI Q6 PRN          Respiratory Severity Index(RSI)   Patients with orders for inhalation medications, oxygen, or any therapeutic treatment modality will be placed on Respiratory Protocol. They will be assessed with the first treatment and at least every 72 hours thereafter. The following severity scale will be used to determine frequency of treatment intervention.     Smoking History: Pulmonary Disease or Smoking History, Greater than 15 pack year = 2    Social History  Social History     Tobacco Use    Smoking status: Former Smoker     Packs/day: 0.50     Years: 35.00     Pack years: 17.50     Types: Cigarettes     Last attempt to quit: 2018     Years since quittin.5    Smokeless tobacco: Never Used   Substance Use Topics    Alcohol use: Not Currently    Drug use: No       Recent Surgical History: None = 0  Past Surgical History  Past Surgical History:   Procedure Laterality Date    COLONOSCOPY      DIAGNOSTIC CARDIAC CATH LAB PROCEDURE      DILATATION, ESOPHAGUS      FOOT SURGERY Left Hammer toe, 2nd toe    10/9/14    FOOT SURGERY Left 2019    REMOVAL HARDWARE LEFT improves, the frequency of the bronchodilator can be decreased, based on the patient's RSI, but not less than home treatment regimen frequency. 5. Bronchodilator(s) will be discontinued if patient has a RSI less than 9 and has received no scheduled or as needed treatment for 72  Hrs. Patients Ordered on a Mucolytic Agent:    1. Must always be administered with a bronchodilator. 2. Discontinue if patient experiences worsened bronchospasm, or secretions have lessened to the point that the patient is able to clear them with a cough. Anti-inflammatory and Combination Medications:    1. If the patient lacks prior history of lung disease, is not using inhaled anti-inflammatory medication at home, and lacks wheezing by examination or by history for at least 24 hours, contact physician for possible discontinuation.

## 2020-04-10 NOTE — H&P
 HIP FRACTURE SURGERY      LEG SURGERY      broken leg    OTHER SURGICAL HISTORY Left 11/21/2013    EXCISION 5TH METATRSAL LEFT FOOT          OTHER SURGICAL HISTORY Right 05/30/2019    Procedure: PARTIAL RESECTION RIGHT FIFTH METATARSAL, ULCER DEBRIDEMENT WITH GRAFT APPLICATION    PRESSURE ULCER DEBRIDEMENT Right 5/30/2019    PARTIAL RESECTION RIGHT FIFTH METATARSAL, ULCER DEBRIDEMENT WITH GRAFT APPLICATION performed by Obdulio Arcos DPM at [place] [address]  06/27/2017    COLOSTOMY REVERSAL     TONSILLECTOMY      UPPER GASTROINTESTINAL ENDOSCOPY  7/16/2013    Esophageal Brushing       Medications Prior to Admission:    Prior to Admission medications    Medication Sig Start Date End Date Taking?  Authorizing Provider   memantine (NAMENDA) 10 MG tablet Take 10 mg by mouth 2 times daily   Yes Historical Provider, MD   bisacodyl (DULCOLAX) 5 MG EC tablet Take 10 mg by mouth daily as needed for Constipation   Yes Historical Provider, MD   polyethylene glycol (GLYCOLAX) powder Take 17 g by mouth daily   Yes Historical Provider, MD   fluticasone (FLOVENT HFA) 44 MCG/ACT inhaler Inhale 2 puffs into the lungs 2 times daily   Yes Historical Provider, MD   acetaminophen (TYLENOL) 325 MG tablet Take 650 mg by mouth every 4 hours as needed for Pain   Yes Historical Provider, MD   bisacodyl (DULCOLAX) 10 MG suppository Place 10 mg rectally daily as needed for Constipation   Yes Historical Provider, MD   loperamide (IMODIUM) 2 MG capsule Take 2 mg by mouth every 4 hours as needed for Diarrhea   Yes Historical Provider, MD   insulin aspart (NOVOLOG) 100 UNIT/ML injection vial Inject 8 Units into the skin 3 times daily (before meals) +SSI 5/31/19  Yes Sabine Claros MD   aspirin 81 MG tablet Take 81 mg by mouth daily   Yes Historical Provider, MD   finasteride (PROSCAR) 5 MG tablet Take 5 mg by mouth daily   Yes Historical Provider, MD   folic acid (FOLVITE) 1 MG tablet Take 1 mg by mouth daily   Yes Historical Historical Provider, MD   aluminum & magnesium hydroxide-simethicone (MAALOX) 200-200-20 MG/5ML SUSP suspension Take 5 mLs by mouth every 6 hours as needed for Indigestion    Historical Provider, MD   Continuous Blood Gluc  (FREESTYLE BRIDGET READER) DENIS Use to monitor sugars 8/1/18   Keyla Norwood MD   Continuous Blood Gluc Sensor ([address] [place] [address]) MISC Use to monitor sugars 8/1/18   Keyla Norwood MD   AGAMATRIX PRESTO TEST strip USE TO TEST BLOOD SUGAR 3 TIMES A DAY 5/9/18   Keyla Norwood MD   Lancets MISC Testing 2-3 times daily DX Code: E11.9 3/22/18   Keyla Norwood MD   Blood Glucose Monitoring Suppl (TRUE METRIX AIR GLUCOSE METER) DENIS 1 meter 3/6/18   RICCO Abreu - CNP       Allergies:  Neurontin [gabapentin]    Social History:  The patient currently lives at [address] [place] [address]:   reports that he quit smoking about 18 months ago. His smoking use included cigarettes. He has a 17.50 pack-year smoking history. He has never used smokeless tobacco.  ETOH:   reports previous alcohol use.       Family History:   Positive as follows:        Problem Relation Age of Onset   [facility] Stroke Mother     Hypertension Mother     Arthritis Father     Substance Abuse Father         Etoh    Cancer Father         esophageal    Hypertension Father     Diabetes Brother     Diabetes Paternal Aunt     Asthma Neg Hx     Emphysema Neg Hx     Heart Failure Neg Hx        REVIEW OF SYSTEMS:       Constitutional: Negative for fever   HENT: Negative for sore throat   Eyes: Negative for redness   Respiratory: Negative  for dyspnea, cough   Cardiovascular: Negative for chest pain   Gastrointestinal: Negative for vomiting, diarrhea   Genitourinary: Negative for hematuria   Musculoskeletal: Negative for arthralgias   Skin: Negative for rash   Neurological: positive for syncope   Hematological: Negative for adenopathy   Psychiatric/Behavorial: Negative for anxiety    PHYSICAL EXAM:    BP and Allergies. Physical exam:    /84   Pulse 78   Temp 95.5 °F (35.3 °C) (Oral)   Resp 17   Ht 6' 5\" (1.956 m)   Wt 177 lb 14.4 oz (80.7 kg)   SpO2 97%   BMI 21.10 kg/m²     Gen: No distress. Alert. He looks older than his stated age. He is very hard of hearing. He has abrasions to his forehead with Steri-Strips in place. He has a hematoma over the left eye. Eyes: PERRL. No sclera icterus. No conjunctival injection. ENT: No discharge. Pharynx clear. Neck: Trachea midline. Normal thyroid. Resp: No accessory muscle use. No crackles. No wheezes. No rhonchi. No dullness on percussion. CV: Regular rate. Regular rhythm. No murmur or rub. No edema. He is oriented to time place and person. ASSESSMENT/PLAN:    #Syncope  #Orthostatic hypotension  - Hold flomax, proscar, and lopressor  - sp 2L NS boluses in the ER, cont IV NS at 75 cc/hr now   - add compression hose  - monitor orthostatic vitals  - monitor on tele. - EKG noted. Some T wave changes are seen. He denies any chest pain. .  Troponin trend is negative.     - check echo    #Remote CVA  - cont ASA and statin     #Forehead hematoma and abrasions   - supportive measures  - steri strips placed in ER    #DM  - cont insulin lantus and humalog as at NH, add low dose SSI    #Dementia   - seems to be at baseline mentation  - cont namenda     #COPD  - without AE, cont inhalers    #Prolonged QT  - avoid QT prolonging agents, monitor on tele    DVT Prophylaxis: Lovenox   Diet: DIET CARB CONTROL;  Code Status: Full Code    Renu Waite PA-C  4/10/2020 2:15 PM        Kalpana Frederick 4/10/2020 3:59 PM

## 2020-04-11 LAB
ANION GAP SERPL CALCULATED.3IONS-SCNC: 11 MMOL/L (ref 3–16)
BASOPHILS ABSOLUTE: 0 K/UL (ref 0–0.2)
BASOPHILS RELATIVE PERCENT: 0.4 %
BUN BLDV-MCNC: 19 MG/DL (ref 7–20)
CALCIUM SERPL-MCNC: 8.4 MG/DL (ref 8.3–10.6)
CHLORIDE BLD-SCNC: 101 MMOL/L (ref 99–110)
CO2: 22 MMOL/L (ref 21–32)
CREAT SERPL-MCNC: 1 MG/DL (ref 0.8–1.3)
EOSINOPHILS ABSOLUTE: 0.2 K/UL (ref 0–0.6)
EOSINOPHILS RELATIVE PERCENT: 2.9 %
ESTIMATED AVERAGE GLUCOSE: 180 MG/DL
GFR AFRICAN AMERICAN: >60
GFR NON-AFRICAN AMERICAN: >60
GLUCOSE BLD-MCNC: 153 MG/DL (ref 70–99)
GLUCOSE BLD-MCNC: 162 MG/DL (ref 70–99)
GLUCOSE BLD-MCNC: 168 MG/DL (ref 70–99)
GLUCOSE BLD-MCNC: 199 MG/DL (ref 70–99)
GLUCOSE BLD-MCNC: 233 MG/DL (ref 70–99)
GLUCOSE BLD-MCNC: 91 MG/DL (ref 70–99)
HBA1C MFR BLD: 7.9 %
HCT VFR BLD CALC: 34.1 % (ref 40.5–52.5)
HEMOGLOBIN: 11.3 G/DL (ref 13.5–17.5)
LV EF: 45 %
LVEF MODALITY: NORMAL
LYMPHOCYTES ABSOLUTE: 2.2 K/UL (ref 1–5.1)
LYMPHOCYTES RELATIVE PERCENT: 30.8 %
MCH RBC QN AUTO: 30.3 PG (ref 26–34)
MCHC RBC AUTO-ENTMCNC: 33.1 G/DL (ref 31–36)
MCV RBC AUTO: 91.7 FL (ref 80–100)
MONOCYTES ABSOLUTE: 0.7 K/UL (ref 0–1.3)
MONOCYTES RELATIVE PERCENT: 10.5 %
NEUTROPHILS ABSOLUTE: 3.9 K/UL (ref 1.7–7.7)
NEUTROPHILS RELATIVE PERCENT: 55.4 %
PDW BLD-RTO: 13.2 % (ref 12.4–15.4)
PERFORMED ON: ABNORMAL
PERFORMED ON: NORMAL
PLATELET # BLD: 177 K/UL (ref 135–450)
PMV BLD AUTO: 7.5 FL (ref 5–10.5)
POTASSIUM REFLEX MAGNESIUM: 4.2 MMOL/L (ref 3.5–5.1)
RBC # BLD: 3.72 M/UL (ref 4.2–5.9)
SODIUM BLD-SCNC: 134 MMOL/L (ref 136–145)
WBC # BLD: 7 K/UL (ref 4–11)

## 2020-04-11 PROCEDURE — 36415 COLL VENOUS BLD VENIPUNCTURE: CPT

## 2020-04-11 PROCEDURE — 6360000002 HC RX W HCPCS: Performed by: PHYSICIAN ASSISTANT

## 2020-04-11 PROCEDURE — 93306 TTE W/DOPPLER COMPLETE: CPT

## 2020-04-11 PROCEDURE — 94761 N-INVAS EAR/PLS OXIMETRY MLT: CPT

## 2020-04-11 PROCEDURE — 1200000000 HC SEMI PRIVATE

## 2020-04-11 PROCEDURE — G0378 HOSPITAL OBSERVATION PER HR: HCPCS

## 2020-04-11 PROCEDURE — 85025 COMPLETE CBC W/AUTO DIFF WBC: CPT

## 2020-04-11 PROCEDURE — 97166 OT EVAL MOD COMPLEX 45 MIN: CPT

## 2020-04-11 PROCEDURE — 6370000000 HC RX 637 (ALT 250 FOR IP): Performed by: PHYSICIAN ASSISTANT

## 2020-04-11 PROCEDURE — 99232 SBSQ HOSP IP/OBS MODERATE 35: CPT | Performed by: INTERNAL MEDICINE

## 2020-04-11 PROCEDURE — 94640 AIRWAY INHALATION TREATMENT: CPT

## 2020-04-11 PROCEDURE — 97162 PT EVAL MOD COMPLEX 30 MIN: CPT

## 2020-04-11 PROCEDURE — 80048 BASIC METABOLIC PNL TOTAL CA: CPT

## 2020-04-11 PROCEDURE — 2580000003 HC RX 258: Performed by: PHYSICIAN ASSISTANT

## 2020-04-11 PROCEDURE — 6370000000 HC RX 637 (ALT 250 FOR IP): Performed by: INTERNAL MEDICINE

## 2020-04-11 RX ORDER — SODIUM CHLORIDE 9 MG/ML
1000 INJECTION, SOLUTION INTRAVENOUS CONTINUOUS
Status: DISCONTINUED | OUTPATIENT
Start: 2020-04-11 | End: 2020-04-12 | Stop reason: HOSPADM

## 2020-04-11 RX ORDER — 0.9 % SODIUM CHLORIDE 0.9 %
1000 INTRAVENOUS SOLUTION INTRAVENOUS ONCE
Status: COMPLETED | OUTPATIENT
Start: 2020-04-11 | End: 2020-04-11

## 2020-04-11 RX ORDER — MIDODRINE HYDROCHLORIDE 5 MG/1
10 TABLET ORAL
Status: DISCONTINUED | OUTPATIENT
Start: 2020-04-11 | End: 2020-04-12 | Stop reason: HOSPADM

## 2020-04-11 RX ADMIN — ATORVASTATIN CALCIUM 80 MG: 40 TABLET, FILM COATED ORAL at 20:38

## 2020-04-11 RX ADMIN — CYANOCOBALAMIN TAB 500 MCG 1000 MCG: 500 TAB at 08:25

## 2020-04-11 RX ADMIN — INSULIN GLARGINE 15 UNITS: 100 INJECTION, SOLUTION SUBCUTANEOUS at 20:41

## 2020-04-11 RX ADMIN — OXYCODONE HYDROCHLORIDE 10 MG: 5 TABLET ORAL at 20:38

## 2020-04-11 RX ADMIN — Medication 10 ML: at 08:26

## 2020-04-11 RX ADMIN — SERTRALINE 150 MG: 100 TABLET, FILM COATED ORAL at 08:25

## 2020-04-11 RX ADMIN — INSULIN GLARGINE 15 UNITS: 100 INJECTION, SOLUTION SUBCUTANEOUS at 08:28

## 2020-04-11 RX ADMIN — MIDODRINE HYDROCHLORIDE 10 MG: 5 TABLET ORAL at 16:53

## 2020-04-11 RX ADMIN — OXYCODONE HYDROCHLORIDE 10 MG: 5 TABLET ORAL at 14:00

## 2020-04-11 RX ADMIN — ASPIRIN 81 MG 81 MG: 81 TABLET ORAL at 08:25

## 2020-04-11 RX ADMIN — ACETAMINOPHEN 650 MG: 325 TABLET ORAL at 16:53

## 2020-04-11 RX ADMIN — Medication 1000 UNITS: at 08:25

## 2020-04-11 RX ADMIN — MIDODRINE HYDROCHLORIDE 10 MG: 5 TABLET ORAL at 12:14

## 2020-04-11 RX ADMIN — Medication 2 PUFF: at 07:19

## 2020-04-11 RX ADMIN — INSULIN LISPRO 8 UNITS: 100 INJECTION, SOLUTION INTRAVENOUS; SUBCUTANEOUS at 11:34

## 2020-04-11 RX ADMIN — INSULIN LISPRO 2 UNITS: 100 INJECTION, SOLUTION INTRAVENOUS; SUBCUTANEOUS at 08:28

## 2020-04-11 RX ADMIN — Medication 10 ML: at 20:38

## 2020-04-11 RX ADMIN — FOLIC ACID 1 MG: 1 TABLET ORAL at 08:25

## 2020-04-11 RX ADMIN — GLYCOPYRROLATE AND FORMOTEROL FUMARATE 2 PUFF: 9; 4.8 AEROSOL, METERED RESPIRATORY (INHALATION) at 07:19

## 2020-04-11 RX ADMIN — OXYCODONE HYDROCHLORIDE 10 MG: 5 TABLET ORAL at 08:25

## 2020-04-11 RX ADMIN — Medication 2 PUFF: at 20:48

## 2020-04-11 RX ADMIN — SODIUM CHLORIDE 1000 ML: 9 INJECTION, SOLUTION INTRAVENOUS at 09:23

## 2020-04-11 RX ADMIN — INSULIN LISPRO 8 UNITS: 100 INJECTION, SOLUTION INTRAVENOUS; SUBCUTANEOUS at 16:53

## 2020-04-11 RX ADMIN — OXYCODONE HYDROCHLORIDE 10 MG: 5 TABLET ORAL at 02:33

## 2020-04-11 RX ADMIN — INSULIN LISPRO 1 UNITS: 100 INJECTION, SOLUTION INTRAVENOUS; SUBCUTANEOUS at 20:41

## 2020-04-11 RX ADMIN — SODIUM CHLORIDE 1000 ML: 9 INJECTION, SOLUTION INTRAVENOUS at 02:46

## 2020-04-11 RX ADMIN — ENOXAPARIN SODIUM 40 MG: 40 INJECTION SUBCUTANEOUS at 08:25

## 2020-04-11 RX ADMIN — MEMANTINE HYDROCHLORIDE 10 MG: 5 TABLET ORAL at 20:38

## 2020-04-11 RX ADMIN — MEMANTINE HYDROCHLORIDE 10 MG: 5 TABLET ORAL at 08:25

## 2020-04-11 RX ADMIN — GLYCOPYRROLATE AND FORMOTEROL FUMARATE 2 PUFF: 9; 4.8 AEROSOL, METERED RESPIRATORY (INHALATION) at 20:48

## 2020-04-11 RX ADMIN — INSULIN LISPRO 1 UNITS: 100 INJECTION, SOLUTION INTRAVENOUS; SUBCUTANEOUS at 11:34

## 2020-04-11 RX ADMIN — Medication 100 MG: at 08:25

## 2020-04-11 RX ADMIN — INSULIN LISPRO 8 UNITS: 100 INJECTION, SOLUTION INTRAVENOUS; SUBCUTANEOUS at 08:28

## 2020-04-11 RX ADMIN — ALUMINUM HYDROXIDE, MAGNESIUM HYDROXIDE, AND SIMETHICONE 5 ML: 200; 200; 20 SUSPENSION ORAL at 20:51

## 2020-04-11 RX ADMIN — SODIUM CHLORIDE 1000 ML: 9 INJECTION, SOLUTION INTRAVENOUS at 20:39

## 2020-04-11 ASSESSMENT — PAIN DESCRIPTION - DESCRIPTORS
DESCRIPTORS: CONSTANT
DESCRIPTORS: ACHING;CONSTANT

## 2020-04-11 ASSESSMENT — PAIN DESCRIPTION - ONSET
ONSET: ON-GOING

## 2020-04-11 ASSESSMENT — PAIN DESCRIPTION - LOCATION
LOCATION: GENERALIZED
LOCATION: NECK

## 2020-04-11 ASSESSMENT — PAIN DESCRIPTION - PAIN TYPE
TYPE: CHRONIC PAIN

## 2020-04-11 ASSESSMENT — PAIN SCALES - GENERAL
PAINLEVEL_OUTOF10: 8
PAINLEVEL_OUTOF10: 6
PAINLEVEL_OUTOF10: 9
PAINLEVEL_OUTOF10: 8
PAINLEVEL_OUTOF10: 9
PAINLEVEL_OUTOF10: 0

## 2020-04-11 ASSESSMENT — PAIN DESCRIPTION - FREQUENCY
FREQUENCY: CONTINUOUS

## 2020-04-11 ASSESSMENT — PAIN DESCRIPTION - ORIENTATION
ORIENTATION: MID
ORIENTATION: MID

## 2020-04-11 ASSESSMENT — PAIN DESCRIPTION - PROGRESSION
CLINICAL_PROGRESSION: NOT CHANGED

## 2020-04-11 ASSESSMENT — PAIN - FUNCTIONAL ASSESSMENT
PAIN_FUNCTIONAL_ASSESSMENT: ACTIVITIES ARE NOT PREVENTED

## 2020-04-11 NOTE — PROGRESS NOTES
Inpatient Occupational Therapy  Evaluation and Treatment    Unit: PCU  Date:  4/11/2020  Patient Name:    Yuriy Kim  Admitting diagnosis:  Syncope and collapse [R55]  Admit Date:  4/10/2020  Precautions/Restrictions/WB Status/ Lines/ Wounds/ Oxygen: fall risk, IV, bed/chair alarm and telemetry, Left knee abrasion, Left and Right forehead abrasion with steri strips in place, YORDNA hose    Treatment Time:  922-956  (minutes split with PT related to observation status)   Treatment Number: 1   Billable Treatment Time: 7 minutes   Total Treatment Time:   34   minutes    Patient Goals for Therapy:  \" to go home \"      Discharge Recommendations: LTC with OT   DME needs for discharge: defer to facility       Therapy recommendations for staff:   Assist of 1 (minimal assist) with use of rolling walker (RW) and gait belt for all transfers to/from BSC/chair    History of Present Illness: 78 yo M presents after a ?syncopal episode and fall with head injury that occurred this morning. Patient reports to sit on the toilet and went to get up and \"must just fell forward and hit my head\".      PMHx:  Anxiety disorder, BPH (benign prostatic hypertrophy), Calcified granuloma of lung ([address] (2014), Cataract (1/20/14), Cerebral artery occlusion with cerebral infarction [facility]), Chronic pain, COPD (chronic obstructive pulmonary disease) ([address] Degenerative arthritis of hip, Dementia ([address] Depression (3/11/2015), Depression with anxiety, Diabetes mellitus ([address] (2004), Diabetic eye exam ([address] (1/20/14), Diabetic retinopathy ([address] Diverticulitis (11/26/2011), Diverticulosis (11/26/2011), DKA (diabetic ketoacidoses) ([address] Emphysema, Esophageal candidiasis ([address] (7/16/13), Essential hypertension, benign (11/2010), ETOH abuse, Fatty liver (10/9/2012), Foot ulcer:left (9/30/2013), Gastric ulcer, unspecified as acute or chronic, without mention of hemorrhage or perforation, GERD (gastroesophageal reflux disease), Gout (1997), Hearing

## 2020-04-11 NOTE — PROGRESS NOTES
Home medications verified with patient and updated per list sent from [employer].

## 2020-04-11 NOTE — PROGRESS NOTES
Progress Note    Admit Date:  4/10/2020    Subjective:  Mr. Wilfredo Lai denies complaints  Remains profoundly orthostatic- although denies symptoms with positional changes   Confused this AM  Tele monitor: SR with PVC     Objective:   BP (!) 192/83   Pulse 90   Temp 97.7 °F (36.5 °C) (Oral)   Resp 16   Ht 6' 5\" (1.956 m)   Wt 176 lb 6.4 oz (80 kg)   SpO2 97%   BMI 20.92 kg/m²       Intake/Output Summary (Last 24 hours) at 4/11/2020 1258  Last data filed at 4/11/2020 1255  Gross per 24 hour   Intake 2950 ml   Output 6925 ml   Net -3975 ml       Physical Exam:    Gen: Elderly male. No distress. Alert. Very [place]  Abrasions to forehead, steri strips in place   Hematoma above left eye   Eyes: PERRL. No sclera icterus. No conjunctival injection. Bruising around left eye   ENT: No discharge. Pharynx clear. Neck: No JVD. Trachea midline. Resp: No accessory muscle use. No crackles. No wheezes. No rhonchi. CV: Regular rate. Regular rhythm. No murmur. No rub. No edema. GI: Non-tender. Non-distended. No masses. No organomegaly. Normal bowel sounds. Incisional hernia   Skin: Warm and dry. No nodule on exposed extremities. No rash on exposed extremities. M/S: No cyanosis. No joint deformity. No clubbing. Neuro: Awake. Grossly nonfocal    Psych: Oriented to person, place, year, president. No anxiety or agitation.      Scheduled Meds:   sodium chloride  1,000 mL Intravenous Once    midodrine  10 mg Oral TID WC    aspirin  81 mg Oral Daily    atorvastatin  80 mg Oral Nightly    folic acid  1 mg Oral Daily    insulin lispro  8 Units Subcutaneous TID AC    insulin glargine  15 Units Subcutaneous BID    memantine  10 mg Oral BID    oxyCODONE HCl  10 mg Oral Q6H    sertraline  150 mg Oral Daily    thiamine  100 mg Oral Daily    vitamin B-12  1,000 mcg Oral Daily    vitamin E  1,000 Units Oral Daily    sodium chloride flush  10 mL Intravenous 2 times per day    enoxaparin  40 mg Subcutaneous Daily    strips placed in ER     #DM  - cont insulin lantus and humalog as at NH, add low dose SSI     #Dementia   - seems to be at baseline mentation  - cont namenda      #COPD  - without AE, cont inhalers     #Prolonged QT  - avoid QT prolonging agents, monitor on tele    DVT Prophylaxis: Lovenox   Diet: DIET CARB CONTROL;  Code Status: Full Code    Nano BOWMANC  4/11/2020 12:58 PM      MEIR Harper 4/11/2020 3:52 PM

## 2020-04-11 NOTE — PROGRESS NOTES
dizziness reported  Dynamic Sitting:  Good -   Static Standing: Fair    Tolerance: CGA-min A standing with moderate use of UEs on RW for balance, increased sway and dizziness with standing   Significant increased with sway with R UE on walker only  Dynamic Standing: Not tested    Bed Mobility   Supine to Sit:   Supervision with HOB elevated  Sit to Supine:  Supervision  Rolling:   Not Tested  Scooting at EOB: Supervision  Scooting to [facility]:  Independent    Transfer Training     Sit to stand:   CGA  Stand to sit:   Min A to control descent due to increased dizziness and LLE fatigue  Bed to Chair:  Not Tested with use of N/A    Gait gait deferred due to dizziness and orthostatic with standing; pt ambulated 0 ft. Stair Training deferred, pt unsafe/not appropriate to complete stairs at this time and does not have stairs in home environment    Activity Tolerance   Pt completed therapy session with Dizziness noted with sit>stand  Supine (at rest) SpO2: 98% on RA  HR: 90 bpm  BP: 132/81  Sitting EOB   HR:  90 bpm  BP: 140/79  After standing   BP: 83/51  -  Pt reports dizziness with standing that did not improve over time. Pt requested to sit back down. Sitting EOB SpO2: 98%   HR:  105 bpm  BP: 97/67  Supine (end of session)   BP: 131/78     Positioning Needs   Pt instructed and educated on use of call light to call for assist if desiring to get up or change position, call light handed to pt and needs within reach, Pt returned to bed, alarm set and pillows placed for support/comfort    Exercises Initiated    N/A    Other  None. Patient/Family Education   Pt educated on role of inpatient PT, POC, importance of continued activity, DC recommendations, safety awareness, transfer techniques and calling for assist with mobility. Assessment  Pt seen for Physical Therapy evaluation in acute care setting.   Pt demonstrated decreased Activity tolerance, Balance, ROM, Safety and Strength and decreased independence with

## 2020-04-12 VITALS
WEIGHT: 174.3 LBS | HEIGHT: 77 IN | RESPIRATION RATE: 18 BRPM | TEMPERATURE: 97.8 F | OXYGEN SATURATION: 97 % | HEART RATE: 92 BPM | BODY MASS INDEX: 20.58 KG/M2 | DIASTOLIC BLOOD PRESSURE: 95 MMHG | SYSTOLIC BLOOD PRESSURE: 170 MMHG

## 2020-04-12 PROBLEM — R55 SYNCOPE AND COLLAPSE: Status: RESOLVED | Noted: 2020-04-10 | Resolved: 2020-04-12

## 2020-04-12 LAB
ANION GAP SERPL CALCULATED.3IONS-SCNC: 11 MMOL/L (ref 3–16)
BASOPHILS ABSOLUTE: 0 K/UL (ref 0–0.2)
BASOPHILS RELATIVE PERCENT: 0.6 %
BUN BLDV-MCNC: 14 MG/DL (ref 7–20)
CALCIUM SERPL-MCNC: 8.3 MG/DL (ref 8.3–10.6)
CHLORIDE BLD-SCNC: 105 MMOL/L (ref 99–110)
CO2: 23 MMOL/L (ref 21–32)
CREAT SERPL-MCNC: 1.1 MG/DL (ref 0.8–1.3)
EOSINOPHILS ABSOLUTE: 0.1 K/UL (ref 0–0.6)
EOSINOPHILS RELATIVE PERCENT: 1.8 %
GFR AFRICAN AMERICAN: >60
GFR NON-AFRICAN AMERICAN: >60
GLUCOSE BLD-MCNC: 134 MG/DL (ref 70–99)
GLUCOSE BLD-MCNC: 183 MG/DL (ref 70–99)
GLUCOSE BLD-MCNC: 371 MG/DL (ref 70–99)
HCT VFR BLD CALC: 35.8 % (ref 40.5–52.5)
HEMOGLOBIN: 11.6 G/DL (ref 13.5–17.5)
LYMPHOCYTES ABSOLUTE: 2.2 K/UL (ref 1–5.1)
LYMPHOCYTES RELATIVE PERCENT: 29.3 %
MCH RBC QN AUTO: 29.3 PG (ref 26–34)
MCHC RBC AUTO-ENTMCNC: 32.4 G/DL (ref 31–36)
MCV RBC AUTO: 90.4 FL (ref 80–100)
MONOCYTES ABSOLUTE: 0.7 K/UL (ref 0–1.3)
MONOCYTES RELATIVE PERCENT: 9.4 %
NEUTROPHILS ABSOLUTE: 4.4 K/UL (ref 1.7–7.7)
NEUTROPHILS RELATIVE PERCENT: 58.9 %
PDW BLD-RTO: 13.4 % (ref 12.4–15.4)
PERFORMED ON: ABNORMAL
PERFORMED ON: ABNORMAL
PLATELET # BLD: 193 K/UL (ref 135–450)
PMV BLD AUTO: 7.6 FL (ref 5–10.5)
POTASSIUM REFLEX MAGNESIUM: 3.9 MMOL/L (ref 3.5–5.1)
RBC # BLD: 3.97 M/UL (ref 4.2–5.9)
SODIUM BLD-SCNC: 139 MMOL/L (ref 136–145)
WBC # BLD: 7.5 K/UL (ref 4–11)

## 2020-04-12 PROCEDURE — 99239 HOSP IP/OBS DSCHRG MGMT >30: CPT | Performed by: INTERNAL MEDICINE

## 2020-04-12 PROCEDURE — 6370000000 HC RX 637 (ALT 250 FOR IP): Performed by: PHYSICIAN ASSISTANT

## 2020-04-12 PROCEDURE — 94640 AIRWAY INHALATION TREATMENT: CPT

## 2020-04-12 PROCEDURE — 6360000002 HC RX W HCPCS: Performed by: PHYSICIAN ASSISTANT

## 2020-04-12 PROCEDURE — 36415 COLL VENOUS BLD VENIPUNCTURE: CPT

## 2020-04-12 PROCEDURE — 6370000000 HC RX 637 (ALT 250 FOR IP): Performed by: INTERNAL MEDICINE

## 2020-04-12 PROCEDURE — 85025 COMPLETE CBC W/AUTO DIFF WBC: CPT

## 2020-04-12 PROCEDURE — 2580000003 HC RX 258: Performed by: PHYSICIAN ASSISTANT

## 2020-04-12 PROCEDURE — 80048 BASIC METABOLIC PNL TOTAL CA: CPT

## 2020-04-12 RX ORDER — MIDODRINE HYDROCHLORIDE 10 MG/1
10 TABLET ORAL
Qty: 90 TABLET | Refills: 0 | Status: ON HOLD
Start: 2020-04-12 | End: 2020-10-26 | Stop reason: HOSPADM

## 2020-04-12 RX ORDER — FINASTERIDE 5 MG/1
5 TABLET, FILM COATED ORAL DAILY
Status: DISCONTINUED | OUTPATIENT
Start: 2020-04-12 | End: 2020-04-12 | Stop reason: HOSPADM

## 2020-04-12 RX ORDER — OXYCODONE HYDROCHLORIDE 10 MG/1
10 TABLET ORAL EVERY 6 HOURS PRN
Qty: 8 TABLET | Refills: 0 | Status: SHIPPED | OUTPATIENT
Start: 2020-04-12 | End: 2020-04-14

## 2020-04-12 RX ADMIN — OXYCODONE HYDROCHLORIDE 10 MG: 5 TABLET ORAL at 13:52

## 2020-04-12 RX ADMIN — CYANOCOBALAMIN TAB 500 MCG 1000 MCG: 500 TAB at 09:25

## 2020-04-12 RX ADMIN — Medication 100 MG: at 09:25

## 2020-04-12 RX ADMIN — METOPROLOL TARTRATE 25 MG: 25 TABLET, FILM COATED ORAL at 10:55

## 2020-04-12 RX ADMIN — INSULIN LISPRO 4 UNITS: 100 INJECTION, SOLUTION INTRAVENOUS; SUBCUTANEOUS at 12:18

## 2020-04-12 RX ADMIN — GLYCOPYRROLATE AND FORMOTEROL FUMARATE 2 PUFF: 9; 4.8 AEROSOL, METERED RESPIRATORY (INHALATION) at 06:55

## 2020-04-12 RX ADMIN — INSULIN LISPRO 8 UNITS: 100 INJECTION, SOLUTION INTRAVENOUS; SUBCUTANEOUS at 12:18

## 2020-04-12 RX ADMIN — Medication 1000 UNITS: at 10:55

## 2020-04-12 RX ADMIN — ACETAMINOPHEN 650 MG: 325 TABLET ORAL at 00:13

## 2020-04-12 RX ADMIN — INSULIN GLARGINE 15 UNITS: 100 INJECTION, SOLUTION SUBCUTANEOUS at 09:30

## 2020-04-12 RX ADMIN — ENOXAPARIN SODIUM 40 MG: 40 INJECTION SUBCUTANEOUS at 09:29

## 2020-04-12 RX ADMIN — FINASTERIDE 5 MG: 5 TABLET, FILM COATED ORAL at 10:55

## 2020-04-12 RX ADMIN — ACETAMINOPHEN 650 MG: 325 TABLET ORAL at 10:31

## 2020-04-12 RX ADMIN — OXYCODONE HYDROCHLORIDE 10 MG: 5 TABLET ORAL at 09:25

## 2020-04-12 RX ADMIN — MIDODRINE HYDROCHLORIDE 10 MG: 5 TABLET ORAL at 13:52

## 2020-04-12 RX ADMIN — SERTRALINE 150 MG: 100 TABLET, FILM COATED ORAL at 09:25

## 2020-04-12 RX ADMIN — OXYCODONE HYDROCHLORIDE 10 MG: 5 TABLET ORAL at 02:00

## 2020-04-12 RX ADMIN — MIDODRINE HYDROCHLORIDE 10 MG: 5 TABLET ORAL at 10:31

## 2020-04-12 RX ADMIN — Medication 10 ML: at 09:26

## 2020-04-12 RX ADMIN — MEMANTINE HYDROCHLORIDE 10 MG: 5 TABLET ORAL at 09:25

## 2020-04-12 RX ADMIN — ASPIRIN 81 MG 81 MG: 81 TABLET ORAL at 09:25

## 2020-04-12 RX ADMIN — FOLIC ACID 1 MG: 1 TABLET ORAL at 09:25

## 2020-04-12 ASSESSMENT — PAIN DESCRIPTION - ONSET
ONSET: ON-GOING
ONSET: ON-GOING

## 2020-04-12 ASSESSMENT — PAIN DESCRIPTION - DESCRIPTORS
DESCRIPTORS: CONSTANT
DESCRIPTORS: CONSTANT

## 2020-04-12 ASSESSMENT — PAIN SCALES - GENERAL
PAINLEVEL_OUTOF10: 9
PAINLEVEL_OUTOF10: 10
PAINLEVEL_OUTOF10: 6
PAINLEVEL_OUTOF10: 8
PAINLEVEL_OUTOF10: 9

## 2020-04-12 ASSESSMENT — PAIN DESCRIPTION - PROGRESSION
CLINICAL_PROGRESSION: NOT CHANGED
CLINICAL_PROGRESSION: NOT CHANGED

## 2020-04-12 ASSESSMENT — PAIN - FUNCTIONAL ASSESSMENT
PAIN_FUNCTIONAL_ASSESSMENT: ACTIVITIES ARE NOT PREVENTED
PAIN_FUNCTIONAL_ASSESSMENT: ACTIVITIES ARE NOT PREVENTED

## 2020-04-12 ASSESSMENT — PAIN DESCRIPTION - PAIN TYPE
TYPE: CHRONIC PAIN
TYPE: CHRONIC PAIN

## 2020-04-12 ASSESSMENT — PAIN DESCRIPTION - FREQUENCY
FREQUENCY: CONTINUOUS
FREQUENCY: CONTINUOUS

## 2020-04-12 ASSESSMENT — PAIN DESCRIPTION - LOCATION
LOCATION: GENERALIZED
LOCATION: GENERALIZED

## 2020-04-12 NOTE — CARE COORDINATION
DISCHARGE ORDER  Date/Time 2020 11:46 AM  Completed by: Britany Lopez, Case Management    Patient Name: Mychal Hawley    : 1950  Admitting Diagnosis: Syncope and collapse [R55]  Orthostatic hypotension [I95.1]      Admit order Date and Status: INPT 2020  (verify MD's last order for status of admission)      Noted discharge order. Confirmed discharge plan  (pt and pt brother, Pramod Northern): Yes    If pt confirmed DC plan does family need to be contacted by CM Yes if yes who_pt brotherNicholas_____  Discharge Plan: pt to return to [facility]. Reviewed chart. Role of discharge planner explained and patient verbalized understanding. Discharge order is noted. Has Home O2 in place on admit:  Per facility  Informed of need to bring portable home O2 tank on day of discharge for nursing to connect prior to leaving:   Not Indicated  Verbalized agreement/Understanding:   Not Indicated  Pt is being d/c'd to to [facility] today. Pt's O2 sats are 96% on roomair. Discharge timeout done with Lorrie Lou RN. All discharge needs and concerns addressed.

## 2020-04-12 NOTE — CARE COORDINATION
Case Management Assessment  Initial Evaluation    Date/Time of Evaluation: 4/12/2020 11:27 AM  Assessment Completed by: Destini Sanderson    Patient Name: Beena Dobbins  YOB: 1950  Diagnosis: Syncope and collapse [R55]  Orthostatic hypotension [I95.1]  Date / Time: 4/10/2020  6:40 AM  Admission status/Date:INPT 4/11/2020  Chart Reviewed: Yes      Patient Interviewed: Yes   Family Interviewed:  No      Hospitalization in the last 30 days:  No    Contacts  :     Relationship to Patient:   Phone Number:    Alternate Contact:     Relationship to Patient:     Phone Number:    Met with:    Current PCP Shelby    Financial  Medicare  Precert required for SNF : Misael Walsh        3 night stay required - Y, N    ADLS  Support Systems: Family Members  Transportation: EMS transportation    Meal Preparation: per [employer]    Housing  Home Environment: [facility]  Steps: n/a  Plans to Return to Present Housing: Yes  Other Identified Issues: -    Melba Norris  Currently active with [address] : No  Type of Home Care Services: None  Passport/Waiver : No  :                      Phone Number:    Passport/Waiver Services: -          Durable Medical Equipment   DME Provider: per snf  Equipment: per snf  Walker___Cane___RTS___ BSC___Shower Chair___  02__ at ____Liter(s)---Uses________HHN___ CPAP___ BiPap___ Hospital Bed___W/C____Other________      Has Home O2 in place on admit:  Per facility  Informed of need to bring portable home O2 tank on day of discharge for nursing to connect prior to leaving:   Not Indicated  Verbalized agreement/Understanding:   Not Indicated    Community Service Affiliation  Dialysis:  No    · Name:  · Location  · Dialysis Schedule:  · Phone:   · Fax:     Outpatient PT/OT: No    Cancer Center: No     CHF Clinic: No     Pulmonary Rehab: No  Pain Clinic: No  Community Mental Health: No    Wound Clinic: No     Other: -    The Plan for Transition of Care is related to the following treatment goals: return to LTC at [facility]. DISCHARGE PLAN: pt from [facility]. Plan is to return per pt brother, Ramiro Lozada. Can return today per Leatha Ni with [facility]. Explained Case Management role/services.

## 2020-04-12 NOTE — PROGRESS NOTES
Pt assessed. Diminished to auscultation in bilateral lobes. Scheduled medications given at this time. Pt in stable condition. Pt denies any needs. Call light and bedside table with-in easy reach. Teagan Ibrahim

## 2020-04-12 NOTE — DISCHARGE INSTR - COC
PARTIAL RESECTION RIGHT FIFTH METATARSAL, ULCER DEBRIDEMENT WITH GRAFT APPLICATION performed by Yan Eason DPM at [place] [address]  06/27/2017    COLOSTOMY REVERSAL     TONSILLECTOMY      UPPER GASTROINTESTINAL ENDOSCOPY  7/16/2013    Esophageal Brushing       Immunization History:   Immunization History   Administered Date(s) Administered    Hepatitis A 02/05/2010, 08/18/2016    Hepatitis A Adult (Havrix, Vaqta) 08/18/2016    Hepatitis B 02/05/2010, 08/18/2016    Influenza 10/20/2010, 11/29/2011, 10/09/2012    Influenza Vaccine, unspecified formulation 10/16/2013, 11/03/2016    Influenza Virus Vaccine 10/17/2013, 10/06/2014, 10/09/2015, 11/03/2016    Influenza, High Dose (Fluzone 65 yrs and older) 09/27/2017    Influenza, Avila Trinh, 6 mo and older, IM, PF (Flulaval, Fluarix) 10/19/2018    Pneumococcal Conjugate 13-valent ([id number]) 11/10/2015    Pneumococcal Conjugate Vaccine 11/10/2015    Pneumococcal Polysaccharide ([id number]) 09/27/2012, 10/16/2013, 09/22/2015    Tdap (Boostrix, Adacel) 02/05/2010, 09/06/2019       Active Problems:  Patient Active Problem List   Diagnosis Code    GERD (gastroesophageal reflux disease) K21.9    Peripheral neuropathy of bilateral feet G62.9    COPD (chronic obstructive pulmonary disease) ([facility] J44.9    HLD (hyperlipidemia) E78.5    Hepatic steatosis K76.0    PTSD (post-traumatic stress disorder) F43.10    History of hepatitis C Z86.19    Bilateral knee & hip OA M17.10    Mild-moderate alcohol consumption (beer) F10.20    Chronic pain syndrome G89.4    Moderate episode of recurrent major depressive disorder (HCC) F33.1    Orthostatic hypotension I95.1    S/P left colectomy Z90.49    Hyponatremia E87.1    S/p reversal of colostomy (6/27/17) K55.9    Chronic dCHF (grade 1 LVDD) I50.32    Chronic normocytic anemia D64.9    Acute hyperglycemia R73.9    Severe protein-calorie malnutrition ([facility] E43    DM (diabetes mellitus), secondary,

## 2020-04-12 NOTE — PROGRESS NOTES
Report called to Elian Chance at [facility]. All questions answered. Teagan Waters Pill

## 2020-04-13 ENCOUNTER — TELEPHONE (OUTPATIENT)
Dept: INTERNAL MEDICINE CLINIC | Age: 70
End: 2020-04-13

## 2020-10-22 ENCOUNTER — APPOINTMENT (OUTPATIENT)
Dept: GENERAL RADIOLOGY | Age: 70
DRG: 292 | End: 2020-10-22
Payer: MEDICARE

## 2020-10-22 ENCOUNTER — HOSPITAL ENCOUNTER (INPATIENT)
Age: 70
LOS: 2 days | Discharge: NURSING, REHAB OR LONG-TERM CARE | DRG: 292 | End: 2020-10-27
Attending: EMERGENCY MEDICINE | Admitting: INTERNAL MEDICINE
Payer: MEDICARE

## 2020-10-22 PROBLEM — R07.9 CHEST PAIN: Status: ACTIVE | Noted: 2020-10-22

## 2020-10-22 PROBLEM — R06.02 SOB (SHORTNESS OF BREATH): Status: ACTIVE | Noted: 2020-10-22

## 2020-10-22 PROBLEM — I50.23 ACUTE ON CHRONIC SYSTOLIC (CONGESTIVE) HEART FAILURE (HCC): Status: ACTIVE | Noted: 2020-10-22

## 2020-10-22 LAB
ANION GAP SERPL CALCULATED.3IONS-SCNC: 9 MMOL/L (ref 3–16)
BASE EXCESS VENOUS: -0.4 MMOL/L (ref -3–3)
BASOPHILS ABSOLUTE: 0 K/UL (ref 0–0.2)
BASOPHILS ABSOLUTE: 0.1 K/UL (ref 0–0.2)
BASOPHILS RELATIVE PERCENT: 0.2 %
BASOPHILS RELATIVE PERCENT: 0.8 %
BUN BLDV-MCNC: 20 MG/DL (ref 7–20)
CALCIUM SERPL-MCNC: 8.8 MG/DL (ref 8.3–10.6)
CARBOXYHEMOGLOBIN: 2.8 % (ref 0–1.5)
CHLORIDE BLD-SCNC: 104 MMOL/L (ref 99–110)
CO2: 26 MMOL/L (ref 21–32)
CREAT SERPL-MCNC: 1.1 MG/DL (ref 0.8–1.3)
D DIMER: 265 NG/ML DDU (ref 0–229)
EKG ATRIAL RATE: 77 BPM
EKG DIAGNOSIS: NORMAL
EKG P AXIS: 66 DEGREES
EKG P-R INTERVAL: 180 MS
EKG Q-T INTERVAL: 432 MS
EKG QRS DURATION: 130 MS
EKG QTC CALCULATION (BAZETT): 488 MS
EKG R AXIS: -20 DEGREES
EKG T AXIS: 105 DEGREES
EKG VENTRICULAR RATE: 77 BPM
EOSINOPHILS ABSOLUTE: 0 K/UL (ref 0–0.6)
EOSINOPHILS ABSOLUTE: 0.1 K/UL (ref 0–0.6)
EOSINOPHILS RELATIVE PERCENT: 0.1 %
EOSINOPHILS RELATIVE PERCENT: 1.3 %
GFR AFRICAN AMERICAN: >60
GFR NON-AFRICAN AMERICAN: >60
GLUCOSE BLD-MCNC: 131 MG/DL (ref 70–99)
GLUCOSE BLD-MCNC: 232 MG/DL (ref 70–99)
GLUCOSE BLD-MCNC: 471 MG/DL (ref 70–99)
GLUCOSE BLD-MCNC: 94 MG/DL (ref 70–99)
HCO3 VENOUS: 28.1 MMOL/L (ref 23–29)
HCT VFR BLD CALC: 38.7 % (ref 40.5–52.5)
HCT VFR BLD CALC: 40.1 % (ref 40.5–52.5)
HEMOGLOBIN: 12.6 G/DL (ref 13.5–17.5)
HEMOGLOBIN: 13.2 G/DL (ref 13.5–17.5)
LACTATE DEHYDROGENASE: 231 U/L (ref 100–190)
LACTIC ACID: 0.9 MMOL/L (ref 0.4–2)
LYMPHOCYTES ABSOLUTE: 0.8 K/UL (ref 1–5.1)
LYMPHOCYTES ABSOLUTE: 1.9 K/UL (ref 1–5.1)
LYMPHOCYTES RELATIVE PERCENT: 10.8 %
LYMPHOCYTES RELATIVE PERCENT: 24.5 %
MCH RBC QN AUTO: 30.1 PG (ref 26–34)
MCH RBC QN AUTO: 30.4 PG (ref 26–34)
MCHC RBC AUTO-ENTMCNC: 32.7 G/DL (ref 31–36)
MCHC RBC AUTO-ENTMCNC: 32.8 G/DL (ref 31–36)
MCV RBC AUTO: 91.7 FL (ref 80–100)
MCV RBC AUTO: 93 FL (ref 80–100)
METHEMOGLOBIN VENOUS: 0.1 %
MONOCYTES ABSOLUTE: 0.1 K/UL (ref 0–1.3)
MONOCYTES ABSOLUTE: 0.7 K/UL (ref 0–1.3)
MONOCYTES RELATIVE PERCENT: 1.1 %
MONOCYTES RELATIVE PERCENT: 9.6 %
NEUTROPHILS ABSOLUTE: 4.9 K/UL (ref 1.7–7.7)
NEUTROPHILS ABSOLUTE: 6.6 K/UL (ref 1.7–7.7)
NEUTROPHILS RELATIVE PERCENT: 63.8 %
NEUTROPHILS RELATIVE PERCENT: 87.8 %
O2 CONTENT, VEN: 13 VOL %
O2 SAT, VEN: 70 %
O2 THERAPY: ABNORMAL
PCO2, VEN: 64.1 MMHG (ref 40–50)
PDW BLD-RTO: 13.8 % (ref 12.4–15.4)
PDW BLD-RTO: 13.9 % (ref 12.4–15.4)
PERFORMED ON: ABNORMAL
PH VENOUS: 7.26 (ref 7.35–7.45)
PLATELET # BLD: 187 K/UL (ref 135–450)
PLATELET # BLD: 211 K/UL (ref 135–450)
PMV BLD AUTO: 8.2 FL (ref 5–10.5)
PMV BLD AUTO: 8.2 FL (ref 5–10.5)
PO2, VEN: 39 MMHG (ref 25–40)
POTASSIUM REFLEX MAGNESIUM: 4.4 MMOL/L (ref 3.5–5.1)
PRO-BNP: 6454 PG/ML (ref 0–124)
PROCALCITONIN: 0.04 NG/ML (ref 0–0.15)
RBC # BLD: 4.16 M/UL (ref 4.2–5.9)
RBC # BLD: 4.38 M/UL (ref 4.2–5.9)
SARS-COV-2, NAAT: NOT DETECTED
SODIUM BLD-SCNC: 139 MMOL/L (ref 136–145)
TCO2 CALC VENOUS: 30 MMOL/L
TROPONIN: <0.01 NG/ML
WBC # BLD: 7.5 K/UL (ref 4–11)
WBC # BLD: 7.7 K/UL (ref 4–11)

## 2020-10-22 PROCEDURE — 93010 ELECTROCARDIOGRAM REPORT: CPT | Performed by: INTERNAL MEDICINE

## 2020-10-22 PROCEDURE — 6370000000 HC RX 637 (ALT 250 FOR IP): Performed by: EMERGENCY MEDICINE

## 2020-10-22 PROCEDURE — 6360000002 HC RX W HCPCS: Performed by: INTERNAL MEDICINE

## 2020-10-22 PROCEDURE — 96372 THER/PROPH/DIAG INJ SC/IM: CPT

## 2020-10-22 PROCEDURE — 83615 LACTATE (LD) (LDH) ENZYME: CPT

## 2020-10-22 PROCEDURE — 82728 ASSAY OF FERRITIN: CPT

## 2020-10-22 PROCEDURE — 6370000000 HC RX 637 (ALT 250 FOR IP): Performed by: INTERNAL MEDICINE

## 2020-10-22 PROCEDURE — 84145 PROCALCITONIN (PCT): CPT

## 2020-10-22 PROCEDURE — 83880 ASSAY OF NATRIURETIC PEPTIDE: CPT

## 2020-10-22 PROCEDURE — 80048 BASIC METABOLIC PNL TOTAL CA: CPT

## 2020-10-22 PROCEDURE — 84484 ASSAY OF TROPONIN QUANT: CPT

## 2020-10-22 PROCEDURE — U0003 INFECTIOUS AGENT DETECTION BY NUCLEIC ACID (DNA OR RNA); SEVERE ACUTE RESPIRATORY SYNDROME CORONAVIRUS 2 (SARS-COV-2) (CORONAVIRUS DISEASE [COVID-19]), AMPLIFIED PROBE TECHNIQUE, MAKING USE OF HIGH THROUGHPUT TECHNOLOGIES AS DESCRIBED BY CMS-2020-01-R: HCPCS

## 2020-10-22 PROCEDURE — U0002 COVID-19 LAB TEST NON-CDC: HCPCS

## 2020-10-22 PROCEDURE — 96376 TX/PRO/DX INJ SAME DRUG ADON: CPT

## 2020-10-22 PROCEDURE — G0378 HOSPITAL OBSERVATION PER HR: HCPCS

## 2020-10-22 PROCEDURE — 85379 FIBRIN DEGRADATION QUANT: CPT

## 2020-10-22 PROCEDURE — 99222 1ST HOSP IP/OBS MODERATE 55: CPT | Performed by: INTERNAL MEDICINE

## 2020-10-22 PROCEDURE — 94640 AIRWAY INHALATION TREATMENT: CPT

## 2020-10-22 PROCEDURE — 99285 EMERGENCY DEPT VISIT HI MDM: CPT

## 2020-10-22 PROCEDURE — 87040 BLOOD CULTURE FOR BACTERIA: CPT

## 2020-10-22 PROCEDURE — 85025 COMPLETE CBC W/AUTO DIFF WBC: CPT

## 2020-10-22 PROCEDURE — 82803 BLOOD GASES ANY COMBINATION: CPT

## 2020-10-22 PROCEDURE — 96375 TX/PRO/DX INJ NEW DRUG ADDON: CPT

## 2020-10-22 PROCEDURE — 96374 THER/PROPH/DIAG INJ IV PUSH: CPT

## 2020-10-22 PROCEDURE — 71045 X-RAY EXAM CHEST 1 VIEW: CPT

## 2020-10-22 PROCEDURE — 36415 COLL VENOUS BLD VENIPUNCTURE: CPT

## 2020-10-22 PROCEDURE — 83605 ASSAY OF LACTIC ACID: CPT

## 2020-10-22 PROCEDURE — 93005 ELECTROCARDIOGRAM TRACING: CPT | Performed by: EMERGENCY MEDICINE

## 2020-10-22 PROCEDURE — 6360000002 HC RX W HCPCS: Performed by: EMERGENCY MEDICINE

## 2020-10-22 RX ORDER — OXYCODONE HYDROCHLORIDE 5 MG/1
10 TABLET ORAL EVERY 6 HOURS PRN
Status: ON HOLD | COMMUNITY
End: 2020-10-27 | Stop reason: SDUPTHER

## 2020-10-22 RX ORDER — INSULIN GLARGINE 100 [IU]/ML
20 INJECTION, SOLUTION SUBCUTANEOUS 2 TIMES DAILY
Status: DISCONTINUED | OUTPATIENT
Start: 2020-10-22 | End: 2020-10-25

## 2020-10-22 RX ORDER — ATORVASTATIN CALCIUM 80 MG/1
80 TABLET, FILM COATED ORAL NIGHTLY
Status: DISCONTINUED | OUTPATIENT
Start: 2020-10-22 | End: 2020-10-27 | Stop reason: HOSPADM

## 2020-10-22 RX ORDER — POLYETHYLENE GLYCOL 3350 17 G/17G
17 POWDER, FOR SOLUTION ORAL DAILY PRN
Status: DISCONTINUED | OUTPATIENT
Start: 2020-10-22 | End: 2020-10-27 | Stop reason: HOSPADM

## 2020-10-22 RX ORDER — SODIUM CHLORIDE 0.9 % (FLUSH) 0.9 %
10 SYRINGE (ML) INJECTION EVERY 12 HOURS SCHEDULED
Status: DISCONTINUED | OUTPATIENT
Start: 2020-10-22 | End: 2020-10-27 | Stop reason: HOSPADM

## 2020-10-22 RX ORDER — ACETAMINOPHEN 650 MG/1
650 SUPPOSITORY RECTAL EVERY 6 HOURS PRN
Status: DISCONTINUED | OUTPATIENT
Start: 2020-10-22 | End: 2020-10-22 | Stop reason: SDUPTHER

## 2020-10-22 RX ORDER — CHOLECALCIFEROL (VITAMIN D3) 125 MCG
1000 CAPSULE ORAL DAILY
Status: DISCONTINUED | OUTPATIENT
Start: 2020-10-23 | End: 2020-10-27 | Stop reason: HOSPADM

## 2020-10-22 RX ORDER — CETIRIZINE HYDROCHLORIDE 10 MG/1
10 TABLET ORAL DAILY
Status: DISCONTINUED | OUTPATIENT
Start: 2020-10-23 | End: 2020-10-27 | Stop reason: HOSPADM

## 2020-10-22 RX ORDER — MEMANTINE HYDROCHLORIDE 5 MG/1
10 TABLET ORAL 2 TIMES DAILY
Status: DISCONTINUED | OUTPATIENT
Start: 2020-10-22 | End: 2020-10-27 | Stop reason: HOSPADM

## 2020-10-22 RX ORDER — FOLIC ACID 1 MG/1
1 TABLET ORAL DAILY
Status: DISCONTINUED | OUTPATIENT
Start: 2020-10-23 | End: 2020-10-27 | Stop reason: HOSPADM

## 2020-10-22 RX ORDER — ACETAMINOPHEN 325 MG/1
650 TABLET ORAL EVERY 6 HOURS PRN
Status: DISCONTINUED | OUTPATIENT
Start: 2020-10-22 | End: 2020-10-22 | Stop reason: SDUPTHER

## 2020-10-22 RX ORDER — DEXAMETHASONE SODIUM PHOSPHATE 10 MG/ML
10 INJECTION INTRAMUSCULAR; INTRAVENOUS ONCE
Status: COMPLETED | OUTPATIENT
Start: 2020-10-22 | End: 2020-10-22

## 2020-10-22 RX ORDER — METOPROLOL TARTRATE 5 MG/5ML
5 INJECTION INTRAVENOUS EVERY 6 HOURS PRN
Status: DISCONTINUED | OUTPATIENT
Start: 2020-10-22 | End: 2020-10-27 | Stop reason: HOSPADM

## 2020-10-22 RX ORDER — FUROSEMIDE 10 MG/ML
60 INJECTION INTRAMUSCULAR; INTRAVENOUS ONCE
Status: COMPLETED | OUTPATIENT
Start: 2020-10-22 | End: 2020-10-22

## 2020-10-22 RX ORDER — POLYETHYLENE GLYCOL 3350 17 G/17G
17 POWDER, FOR SOLUTION ORAL DAILY PRN
Status: DISCONTINUED | OUTPATIENT
Start: 2020-10-22 | End: 2020-10-22 | Stop reason: SDUPTHER

## 2020-10-22 RX ORDER — ASPIRIN 81 MG/1
81 TABLET, CHEWABLE ORAL DAILY
Status: DISCONTINUED | OUTPATIENT
Start: 2020-10-23 | End: 2020-10-27 | Stop reason: HOSPADM

## 2020-10-22 RX ORDER — FINASTERIDE 5 MG/1
5 TABLET, FILM COATED ORAL DAILY
Status: DISCONTINUED | OUTPATIENT
Start: 2020-10-23 | End: 2020-10-27 | Stop reason: HOSPADM

## 2020-10-22 RX ORDER — FUROSEMIDE 10 MG/ML
40 INJECTION INTRAMUSCULAR; INTRAVENOUS 2 TIMES DAILY
Status: DISCONTINUED | OUTPATIENT
Start: 2020-10-22 | End: 2020-10-23

## 2020-10-22 RX ORDER — ONDANSETRON 2 MG/ML
4 INJECTION INTRAMUSCULAR; INTRAVENOUS EVERY 6 HOURS PRN
Status: DISCONTINUED | OUTPATIENT
Start: 2020-10-22 | End: 2020-10-27 | Stop reason: HOSPADM

## 2020-10-22 RX ORDER — VITAMIN E 268 MG
800 CAPSULE ORAL DAILY
Status: DISCONTINUED | OUTPATIENT
Start: 2020-10-23 | End: 2020-10-27 | Stop reason: HOSPADM

## 2020-10-22 RX ORDER — BISACODYL 10 MG
10 SUPPOSITORY, RECTAL RECTAL DAILY PRN
Status: DISCONTINUED | OUTPATIENT
Start: 2020-10-22 | End: 2020-10-27 | Stop reason: HOSPADM

## 2020-10-22 RX ORDER — ACETAMINOPHEN 325 MG/1
650 TABLET ORAL EVERY 6 HOURS PRN
Status: DISCONTINUED | OUTPATIENT
Start: 2020-10-22 | End: 2020-10-27 | Stop reason: HOSPADM

## 2020-10-22 RX ORDER — OXYCODONE HYDROCHLORIDE 5 MG/1
10 TABLET ORAL EVERY 6 HOURS PRN
Status: DISCONTINUED | OUTPATIENT
Start: 2020-10-22 | End: 2020-10-27 | Stop reason: HOSPADM

## 2020-10-22 RX ORDER — MAGNESIUM HYDROXIDE/ALUMINUM HYDROXICE/SIMETHICONE 120; 1200; 1200 MG/30ML; MG/30ML; MG/30ML
30 SUSPENSION ORAL EVERY 6 HOURS PRN
Status: DISCONTINUED | OUTPATIENT
Start: 2020-10-22 | End: 2020-10-27 | Stop reason: HOSPADM

## 2020-10-22 RX ORDER — LANOLIN ALCOHOL/MO/W.PET/CERES
6 CREAM (GRAM) TOPICAL NIGHTLY
Status: DISCONTINUED | OUTPATIENT
Start: 2020-10-22 | End: 2020-10-22 | Stop reason: CLARIF

## 2020-10-22 RX ORDER — DEXTROSE MONOHYDRATE 50 MG/ML
100 INJECTION, SOLUTION INTRAVENOUS PRN
Status: DISCONTINUED | OUTPATIENT
Start: 2020-10-22 | End: 2020-10-27 | Stop reason: HOSPADM

## 2020-10-22 RX ORDER — PROMETHAZINE HYDROCHLORIDE 25 MG/1
12.5 TABLET ORAL EVERY 6 HOURS PRN
Status: DISCONTINUED | OUTPATIENT
Start: 2020-10-22 | End: 2020-10-27 | Stop reason: HOSPADM

## 2020-10-22 RX ORDER — LORATADINE 10 MG/1
10 TABLET ORAL DAILY
Status: ON HOLD | COMMUNITY
End: 2022-02-21 | Stop reason: HOSPADM

## 2020-10-22 RX ORDER — CHOLECALCIFEROL (VITAMIN D3) 125 MCG
5 CAPSULE ORAL NIGHTLY
Status: DISCONTINUED | OUTPATIENT
Start: 2020-10-22 | End: 2020-10-27 | Stop reason: HOSPADM

## 2020-10-22 RX ORDER — IPRATROPIUM BROMIDE AND ALBUTEROL SULFATE 2.5; .5 MG/3ML; MG/3ML
1 SOLUTION RESPIRATORY (INHALATION)
Status: DISCONTINUED | OUTPATIENT
Start: 2020-10-22 | End: 2020-10-26

## 2020-10-22 RX ORDER — DEXTROSE MONOHYDRATE 25 G/50ML
12.5 INJECTION, SOLUTION INTRAVENOUS PRN
Status: DISCONTINUED | OUTPATIENT
Start: 2020-10-22 | End: 2020-10-27 | Stop reason: HOSPADM

## 2020-10-22 RX ORDER — SODIUM CHLORIDE 0.9 % (FLUSH) 0.9 %
10 SYRINGE (ML) INJECTION PRN
Status: DISCONTINUED | OUTPATIENT
Start: 2020-10-22 | End: 2020-10-27 | Stop reason: HOSPADM

## 2020-10-22 RX ORDER — NICOTINE POLACRILEX 4 MG
15 LOZENGE BUCCAL PRN
Status: DISCONTINUED | OUTPATIENT
Start: 2020-10-22 | End: 2020-10-27 | Stop reason: HOSPADM

## 2020-10-22 RX ORDER — THIAMINE MONONITRATE (VIT B1) 100 MG
100 TABLET ORAL DAILY
Status: DISCONTINUED | OUTPATIENT
Start: 2020-10-23 | End: 2020-10-27 | Stop reason: HOSPADM

## 2020-10-22 RX ORDER — ACETAMINOPHEN 650 MG/1
650 SUPPOSITORY RECTAL EVERY 6 HOURS PRN
Status: DISCONTINUED | OUTPATIENT
Start: 2020-10-22 | End: 2020-10-27 | Stop reason: HOSPADM

## 2020-10-22 RX ADMIN — ATORVASTATIN CALCIUM 80 MG: 80 TABLET, FILM COATED ORAL at 20:53

## 2020-10-22 RX ADMIN — ENOXAPARIN SODIUM 30 MG: 30 INJECTION SUBCUTANEOUS at 15:58

## 2020-10-22 RX ADMIN — METOPROLOL TARTRATE 25 MG: 25 TABLET, FILM COATED ORAL at 20:53

## 2020-10-22 RX ADMIN — OXYCODONE HYDROCHLORIDE 10 MG: 5 TABLET ORAL at 15:57

## 2020-10-22 RX ADMIN — FUROSEMIDE 60 MG: 10 INJECTION, SOLUTION INTRAMUSCULAR; INTRAVENOUS at 10:05

## 2020-10-22 RX ADMIN — IPRATROPIUM BROMIDE AND ALBUTEROL SULFATE 1 AMPULE: .5; 3 SOLUTION RESPIRATORY (INHALATION) at 08:53

## 2020-10-22 RX ADMIN — IPRATROPIUM BROMIDE AND ALBUTEROL SULFATE 1 AMPULE: .5; 3 SOLUTION RESPIRATORY (INHALATION) at 16:53

## 2020-10-22 RX ADMIN — DEXAMETHASONE SODIUM PHOSPHATE 10 MG: 10 INJECTION INTRAMUSCULAR; INTRAVENOUS at 08:45

## 2020-10-22 RX ADMIN — ENOXAPARIN SODIUM 30 MG: 30 INJECTION SUBCUTANEOUS at 20:53

## 2020-10-22 RX ADMIN — MELATONIN TAB 5 MG 5 MG: 5 TAB at 20:53

## 2020-10-22 RX ADMIN — INSULIN LISPRO 12 UNITS: 100 INJECTION, SOLUTION INTRAVENOUS; SUBCUTANEOUS at 20:01

## 2020-10-22 RX ADMIN — NITROGLYCERIN 0.5 INCH: 20 OINTMENT TOPICAL at 08:45

## 2020-10-22 RX ADMIN — FUROSEMIDE 40 MG: 10 INJECTION, SOLUTION INTRAMUSCULAR; INTRAVENOUS at 20:00

## 2020-10-22 RX ADMIN — INSULIN GLARGINE 20 UNITS: 100 INJECTION, SOLUTION SUBCUTANEOUS at 21:04

## 2020-10-22 RX ADMIN — MEMANTINE 10 MG: 5 TABLET ORAL at 20:53

## 2020-10-22 RX ADMIN — ACETAMINOPHEN 650 MG: 325 TABLET ORAL at 11:15

## 2020-10-22 ASSESSMENT — PAIN SCALES - GENERAL
PAINLEVEL_OUTOF10: 8

## 2020-10-22 ASSESSMENT — PAIN DESCRIPTION - DESCRIPTORS: DESCRIPTORS: PRESSURE

## 2020-10-22 NOTE — ED PROVIDER NOTES
Emergency Department Encounter    Patient: Roberto Abdul  MRN: [id number]  : 1950  Date of Evaluation: 10/23/2020  ED Provider:  Janes Dyson    Triage Chief Complaint:   Chest Pain (woke with chest pain . Martha Errol bp high. . squad transmitted ekg. . asa given no nitro. . negative chest pain on arrival)    [place]:  Roberto Abdul is a 77 y.o. male that presents ER for evaluation of positive chest pressure heaviness and discomfort he has a history of dementia positive smoker, no recent cardiac stress test, afebrile. Positive shortness of breath and cough. Unknown if COVID-19 exposure, however from extended care facility. Afebrile. Positive chest discomfort intermittent pressure. Positive cough shortness of breath. No calf tenderness. No productive sputum. No hemoptysis. No syncope.     ROS - see HPI, below listed is current ROS at time of my eval:  General:  No fevers, no weakness  Eyes:  no discharge  ENT:  No sore throat, no nasal congestion  Cardiovascular:  + chest pain, no palpitations  Respiratory:  = shortness of breath, + cough, + wheezing  Gastrointestinal:  No pain, no nausea, no vomiting, no diarrhea  Musculoskeletal:  No muscle pain, no joint pain  Skin:  No rash, no pruritis  Neurologic:  No speech problems, no headache, no extremity numbness, no extremity tingling, no extremity weakness  Psychiatric:  No anxiety  Genitourinary:  No dysuria, no hematuria  Endocrine:  No unexpected weight gain, no unexpected weight loss  Extremities:  no edema, no pain    Past Medical History:   Diagnosis Date    Anxiety disorder     BPH (benign prostatic hypertrophy)     Calcified granuloma of lung ([address]     2:stable per 3/25/14 CT chest    Cataract 14    OU:Dr. mcclain:[facility]    Cerebral artery occlusion with cerebral infarction ([address]     Chronic pain     COPD (chronic obstructive pulmonary disease) (HCC)     Under care of pulmo(Dr. Calhoun)    Degenerative arthritis of hip     Dementia ([address]  Depression 3/11/2015    Depression with anxiety     Prior psychiatrist & therapist:Dr. Darrell Harvey.  Diabetes mellitus ([facility] 2004    Endo Dr. Magali Vega type 1 note below in [facility]    Diabetic eye exam ([facility] 1/20/14    [facility]:Dr. STEPHEN Hayward:No retinopathy. Cataract    Diabetic retinopathy ([facility]     under care of [facility]:Dr. An Sousa    Diverticulitis 11/26/2011    Diverticulosis 11/26/2011    DKA (diabetic ketoacidoses) (HCC)     Emphysema     Esophageal candidiasis ([address] 7/16/13    GI:EGD    Essential hypertension, benign 11/2010    ETOH abuse     Fatty liver 10/9/2012    Foot ulcer:left 9/30/2013    Gastric ulcer, unspecified as acute or chronic, without mention of hemorrhage or perforation     GERD (gastroesophageal reflux disease)     Gout 1997    Right big toe    Hearing decreased     Left ear=60%. Right ear=80%.  Hemangioma 12/2010    Liver as per CT abdo    hepatitis c 7/26/17, 2010    Under care of Liver doc:Dr. Michelle Sewell    Hyperlipidemia LDL goal < 100     Left-sided weakness     Low HDL (under 40) 10/9/2012    Peripheral neuropathy 2006    Pneumonia 10/15/13    Pyogenic granuloma     per derm:Dr. Emy Staples Restrictive lung disease     Under care of pulmo(Dr. Calhoun)    S/P colonoscopy 1996    Done secondary to rectal bleed:dx=hemorrhoids    S/P colonoscopy 11/11/10;10/23/2013    Dr. Aarti Bolanos 10/2016(3yrs):polyps;diverticulosis. Diverticulosis & polpy(removed). Next in10/2016.  S/P endoscopy 2009    EGD:stomach ulcers.     Sciatica     Superficial phlebitis of left leg 9/30/2013    Tachycardia     Therapeutic drug monitoring 4/8/15    OARRS report is consistent on 4/8/15;7/9/15;10/9/15;1/8/16;4/5/16    Type 1 diabetes mellitus not at goal [facility]) 3/17/14    updated diagnosis as per endo:Dr. Jaxson Prather     Past Surgical History:   Procedure Laterality Date    COLONOSCOPY      DIAGNOSTIC CARDIAC CATH LAB PROCEDURE  2013    DILATATION, ESOPHAGUS      FOOT SURGERY Left Hammer toe, 2nd toe    10/9/14    FOOT SURGERY Left 2019    REMOVAL HARDWARE LEFT FOOT performed by Jana Velazco DPM at [address] [place] [address] [address] Left 2019    REMOVAL HARDWARE LEFT FOOT w. Dr Deretha Habermann 19    [facility]      broken leg    OTHER SURGICAL HISTORY Left 2013    EXCISION 5TH METATRSAL LEFT FOOT          OTHER SURGICAL HISTORY Right 2019    Procedure: PARTIAL RESECTION RIGHT FIFTH METATARSAL, ULCER DEBRIDEMENT WITH GRAFT APPLICATION    PRESSURE ULCER DEBRIDEMENT Right 2019    PARTIAL RESECTION RIGHT FIFTH METATARSAL, ULCER DEBRIDEMENT WITH GRAFT APPLICATION performed by Jana Velazco DPM at [place] [address]  2017    COLOSTOMY REVERSAL     TONSILLECTOMY      UPPER GASTROINTESTINAL ENDOSCOPY  2013    Esophageal Brushing     Family History   Problem Relation Age of Onset    Stroke Mother     Hypertension Mother     Arthritis Father     Substance Abuse Father         Etoh    Cancer Father         esophageal    Hypertension Father     Diabetes Brother     Diabetes Paternal Aunt     Asthma Neg Hx     Emphysema Neg Hx     Heart Failure Neg Hx      Social History     Socioeconomic History    Marital status: Single     Spouse name: Not on file    Number of children: 2    Years of education: Not on file    Highest education level: Not on file   Occupational History    Occupation: unemployed   Social Needs    Financial resource strain: Not on file    Food insecurity     Worry: Not on file     Inability: Not on file   [employer] needs     Medical: Not on file     Non-medical: Not on file   Tobacco Use    Smoking status: Former Smoker     Packs/day: 0.50     Years: 35.00     Pack years: 17.50     Types: Cigarettes     Last attempt to quit: 2018     Years since quittin.0    Smokeless tobacco: Never Used   Substance and Sexual Activity    Alcohol use: Not Currently    Drug use: No    Sexual activity: Not Currently   Lifestyle    Physical activity     Days per week: Not on file     Minutes per session: Not on file    Stress: Not on file   Relationships    Social connections     Talks on phone: Not on file     Gets together: Not on file     Attends [employer] service: Not on file     Active member of club or organization: Not on file     Attends meetings of clubs or organizations: Not on file     Relationship status: Not on file    Intimate partner violence     Fear of current or ex partner: Not on file     Emotionally abused: Not on file     Physically abused: Not on file     Forced sexual activity: Not on file   Other Topics Concern    Not on file   Social History Narrative    Not on file     Current Facility-Administered Medications   Medication Dose Route Frequency Provider Last Rate Last Dose    ipratropium-albuterol (DUONEB) nebulizer solution 1 ampule  1 ampule Inhalation Q4H WA Janny Bruno MD   1 ampule at 10/22/20 1653    atorvastatin (LIPITOR) tablet 80 mg  80 mg Oral Nightly Janny Bruno MD   80 mg at 10/22/20 2053    bisacodyl (DULCOLAX) suppository 10 mg  10 mg Rectal Daily PRN Janny Bruno MD        finasteride (PROSCAR) tablet 5 mg  5 mg Oral Daily Janny Bruno MD        folic acid (FOLVITE) tablet 1 mg  1 mg Oral Daily Janny Bruno MD        insulin glargine (LANTUS) injection vial 20 Units  20 Units Subcutaneous BID Janny Bruno MD   20 Units at 10/22/20 2104    cetirizine (ZYRTEC) tablet 10 mg  10 mg Oral Daily Janny Bruno MD        memantine [facility]) tablet 10 mg  10 mg Oral BID Janny Bruno MD   10 mg at 10/22/20 2053    metoprolol tartrate (LOPRESSOR) tablet 25 mg  25 mg Oral BID Janny Bruno MD   25 mg at 10/22/20 2053    polyethylene glycol (GLYCOLAX) packet 17 g  17 g Oral Daily PRN Janny Bruno MD        sertraline (ZOLOFT) tablet 150 mg  150 mg Oral Daily Janny Bruno MD        vitamin B-1 (THIAMINE) tablet 100 mg  100 mg Oral Daily Jacob Carlson MD        glycopyrrolate-formoterol (BEVESPI) 9-4.8 MCG/ACT inhaler 2 puff  2 puff Inhalation BID Jacob Carlson MD        vitamin B-12 (CYANOCOBALAMIN) tablet 1,000 mcg  1,000 mcg Oral Daily Jacob Carlson MD        vitamin E capsule 800 Units  800 Units Oral Daily Jacob Carlson MD        sodium chloride flush 0.9 % injection 10 mL  10 mL Intravenous 2 times per day Jacob Carlson MD        sodium chloride flush 0.9 % injection 10 mL  10 mL Intravenous PRN Jacob Carlson MD        acetaminophen (TYLENOL) tablet 650 mg  650 mg Oral Q6H PRN Jacob Carlson MD   650 mg at 10/22/20 1115    Or    acetaminophen (TYLENOL) suppository 650 mg  650 mg Rectal Q6H PRN Jacob Carlson MD        promethazine (PHENERGAN) tablet 12.5 mg  12.5 mg Oral Q6H PRN Jacob Carlson MD        Or    ondansetron [facility]) injection 4 mg  4 mg Intravenous Q6H PRN Jacob Carlson MD        glucose (GLUTOSE) 40 % oral gel 15 g  15 g Oral PRN Jacob Carlson MD        dextrose 50 % IV solution  12.5 g Intravenous PRN Jacob Carlson MD        glucagon (rDNA) injection 1 mg  1 mg Intramuscular PRN Jacob Carlson MD        dextrose 5 % solution  100 mL/hr Intravenous PRN Jacob Carlson MD        aspirin chewable tablet 81 mg  81 mg Oral Daily Jacob Carlson MD        perflutren lipid microspheres (DEFINITY) injection 1.65 mg  1.5 mL Intravenous ONCE PRN Jacob Carlson MD        insulin lispro (HUMALOG) injection vial 0-12 Units  0-12 Units Subcutaneous TID WC Jacob Carlson MD   12 Units at 10/22/20 2001    insulin lispro (HUMALOG) injection vial 0-6 Units  0-6 Units Subcutaneous Nightly Jacob Carlson MD        furosemide (LASIX) injection 40 mg  40 mg Intravenous BID Jacob Carlson MD   40 mg at 10/22/20 2000    enoxaparin (LOVENOX) injection 30 mg  30 mg Subcutaneous BID Jacob Carlson MD   30 mg at 10/22/20 2053 MCV 91.7 80.0 - 100.0 fL    MCH 30.1 26.0 - 34.0 pg    MCHC 32.8 31.0 - 36.0 g/dL    RDW 13.9 12.4 - 15.4 %    Platelets 834 232 - 985 K/uL    MPV 8.2 5.0 - 10.5 fL    Neutrophils % 87.8 %    Lymphocytes % 10.8 %    Monocytes % 1.1 %    Eosinophils % 0.1 %    Basophils % 0.2 %    Neutrophils Absolute 6.6 1.7 - 7.7 K/uL    Lymphocytes Absolute 0.8 (L) 1.0 - 5.1 K/uL    Monocytes Absolute 0.1 0.0 - 1.3 K/uL    Eosinophils Absolute 0.0 0.0 - 0.6 K/uL    Basophils Absolute 0.0 0.0 - 0.2 K/uL   Lactate Dehydrogenase   Result Value Ref Range     (H) 100 - 190 U/L   D-Dimer, Quantitative   Result Value Ref Range    D-Dimer, Quant 265 (H) 0 - 229 ng/mL DDU   Procalcitonin   Result Value Ref Range    Procalcitonin 0.04 0.00 - 0.15 ng/mL   COVID-19   Result Value Ref Range    SARS-CoV-2, PCR Not Detected Not Detected   Basic Metabolic Panel w/ Reflex to MG   Result Value Ref Range    Sodium 131 (L) 136 - 145 mmol/L    Potassium reflex Magnesium 4.0 3.5 - 5.1 mmol/L    Chloride 96 (L) 99 - 110 mmol/L    CO2 24 21 - 32 mmol/L    Anion Gap 11 3 - 16    Glucose 400 (H) 70 - 99 mg/dL    BUN 43 (H) 7 - 20 mg/dL    CREATININE 1.4 (H) 0.8 - 1.3 mg/dL    GFR Non-African American 50 (A) >60    GFR African American >60 >60    Calcium 9.1 8.3 - 10.6 mg/dL   CBC Auto Differential   Result Value Ref Range    WBC 12.3 (H) 4.0 - 11.0 K/uL    RBC 3.97 (L) 4.20 - 5.90 M/uL    Hemoglobin 11.9 (L) 13.5 - 17.5 g/dL    Hematocrit 36.0 (L) 40.5 - 52.5 %    MCV 90.6 80.0 - 100.0 fL    MCH 29.9 26.0 - 34.0 pg    MCHC 33.0 31.0 - 36.0 g/dL    RDW 13.7 12.4 - 15.4 %    Platelets 876 743 - 531 K/uL    MPV 8.3 5.0 - 10.5 fL    Neutrophils % 71.6 %    Lymphocytes % 17.5 %    Monocytes % 10.7 %    Eosinophils % 0.0 %    Basophils % 0.2 %    Neutrophils Absolute 8.8 (H) 1.7 - 7.7 K/uL    Lymphocytes Absolute 2.2 1.0 - 5.1 K/uL    Monocytes Absolute 1.3 0.0 - 1.3 K/uL    Eosinophils Absolute 0.0 0.0 - 0.6 K/uL    Basophils Absolute 0.0 0.0 - 0.2 K/uL   POCT Glucose   Result Value Ref Range    POC Glucose 131 (H) 70 - 99 mg/dl    Performed on ACCU-CHEK    POCT Glucose   Result Value Ref Range    POC Glucose 232 (H) 70 - 99 mg/dl    Performed on ACCU-CHEK    POCT Glucose   Result Value Ref Range    POC Glucose 471 (H) 70 - 99 mg/dl    Performed on ACCU-CHEK    POCT Glucose   Result Value Ref Range    POC Glucose 451 (H) 70 - 99 mg/dl    Performed on ACCU-CHEK    POCT Glucose   Result Value Ref Range    POC Glucose 353 (H) 70 - 99 mg/dl    Performed on ACCU-CHEK    POCT Glucose   Result Value Ref Range    POC Glucose 374 (H) 70 - 99 mg/dl    Performed on ACCU-CHEK    EKG 12 Lead   Result Value Ref Range    Ventricular Rate 77 BPM    Atrial Rate 77 BPM    P-R Interval 180 ms    QRS Duration 130 ms    Q-T Interval 432 ms    QTc Calculation (Bazett) 488 ms    P Axis 66 degrees    R Axis -20 degrees    T Axis 105 degrees    Diagnosis       Normal sinus rhythmLeft atrial enlargementLeft ventricular hypertrophy with QRS widening and repolarization abnormalityCannot rule out Septal infarct (cited on or before 10-APR-2020)Abnormal ECGWhen compared with ECG of 10-APR-2020 08:02,Nonspecific T wave abnormality no longer evident in Inferior leadsConfirmed by VAL VASQUEZ, [address] ([id number]) on 10/22/2020 5:45:38 PM      Radiographs (if obtained):  Radiologist's Report Reviewed:  No results found. EKG (if obtained): (All EKG's are interpreted by myself in the absence of a cardiologist)  Sinus rhythm, LVH, ST segment depression laterally, no marked change from prior EKG from 10 April 2020    MDM:  Patient presented with chest pain. Given history and presentation cardiac workup initiated. Patient had labs, EKG, x-ray and troponin ordered. Patient was placed on monitor. Patient's pulse ox is normal.      Patient resents ER for evaluation acute precordial chest pain, COVID-19 was sent, his chest x-ray demonstrates no pneumonia no pulmonary edema.   His EKG is abnormal but this is unchanged from prior from April. Multiple cardiovascular risk factors including hypertension, smoker, no recent cardiac stress test.  Positive mild acidemia with hypercarbia with no severe hypoxia. Aspirin was administered by EMS nitroglycerin paste IV steroids. Underlying COPD exacerbation with cardiovascular rule out, for ACS will be admitted for this. Chart reviewed. Chronic dementia. Clinical Impression:  1. COPD with acute bronchitis ([address]    2. Diastolic CHF, acute on chronic (HCC)    3. Precordial chest pain    4. Abnormal EKG      Disposition referral (if applicable):  No follow-up provider specified. Disposition medications (if applicable):  Current Discharge Medication List          Comment: Please note this report has been produced using speech recognition software and may contain errors related to that system including errors in grammar, punctuation, and spelling, as well as words and phrases that may be inappropriate. Efforts were made to edit the dictations.       Terese Ricic MD  76/76/42 1400

## 2020-10-22 NOTE — ED NOTES

## 2020-10-22 NOTE — PROGRESS NOTES
RESPIRATORY THERAPY ASSESSMENT    Name:  Dodie Hensley  Medical Record Number:  [id number]  Age: 77 y.o. Gender: male  : 1950  Today's Date:  10/22/2020  Room:  0336/0336-01    Assessment     Is the patient being admitted for a COPD or Asthma exacerbation? No   (If yes the patient will be seen every 4 hours for the first 24 hours and then reassessed)    Patient Admission Diagnosis      Allergies  Allergies   Allergen Reactions    Neurontin [Gabapentin] Other (See Comments)     Gastric side effects       Minimum Predicted Vital Capacity:     000          Actual Vital Capacity:      000              Pulmonary History:COPD  Home Oxygen Therapy:  room air  Home Respiratory Therapy:Flovent 44  2p BID, Anoro 1p Daily, Albuterol 2p PRN   Current Respiratory Therapy:  Duoneb Q4 w/a, Bevespi 2p BID  Treatment Type: HHN  Medications: Albuterol/Ipratropium    Respiratory Severity Index(RSI)   Patients with orders for inhalation medications, oxygen, or any therapeutic treatment modality will be placed on Respiratory Protocol. They will be assessed with the first treatment and at least every 72 hours thereafter. The following severity scale will be used to determine frequency of treatment intervention.     Smoking History: Pulmonary Disease or Smoking History, Greater than 15 pack year = 2    Social History  Social History     Tobacco Use    Smoking status: Former Smoker     Packs/day: 0.50     Years: 35.00     Pack years: 17.50     Types: Cigarettes     Last attempt to quit: 2018     Years since quittin.0    Smokeless tobacco: Never Used   Substance Use Topics    Alcohol use: Not Currently    Drug use: No       Recent Surgical History: None = 0  Past Surgical History  Past Surgical History:   Procedure Laterality Date    COLONOSCOPY      DIAGNOSTIC CARDIAC CATH LAB PROCEDURE      DILATATION, ESOPHAGUS      FOOT SURGERY Left Hammer toe, 2nd toe    10/9/14    FOOT SURGERY Left 2019 patient being placed on Home Treatment Regimen? Yes     Does the patient have everything they need prior to discharge? NA     Comments: Pt and chart reviewed    Plan of Care: Continue as ordered. Electronically signed by Hilda Ordonez RCP on 10/22/2020 at 6:14 PM    Respiratory Protocol Guidelines     1. Assessment and treatment by Respiratory Therapy will be initiated for medication and therapeutic interventions upon initiation of aerosolized medication. 2. Physician will be contacted for respiratory rate (RR) greater than 35 breaths per minute. Therapy will be held for heart rate (HR) greater than 140 beats per minute, pending direction from physician. 3. Bronchodilators will be administered via Metered Dose Inhaler (MDI) with spacer when the following criteria are met:  a. Alert and cooperative     b. HR < 140 bpm  c. RR < 30 bpm                d. Can demonstrate a 2-3 second inspiratory hold  4. Bronchodilators will be administered via Hand Held Nebulizer ALICIA [facility]) to patients when ANY of the following criteria are met  a. Incognizant or uncooperative          b. Patients treated with HHN at Home        c. Unable to demonstrate proper use of MDI with spacer     d. RR > 30 bpm   5. Bronchodilators will be delivered via Metered Dose Inhaler (MDI), HHN, Aerogen to intubated patients on mechanical ventilation. 6. Inhalation medication orders will be delivered and/or substituted as outlined below. Aerosolized Medications Ordering and Administration Guidelines:    1. All Medications will be ordered by a physician, and their frequency and/or modality will be adjusted as defined by the patients Respiratory Severity Index (RSI) score. 2. If the patient does not have documented COPD, consider discontinuing anticholinergics when RSI is less than 9.  3. If the bronchospasm worsens (increased RSI), then the bronchodilator frequency can be increased to a maximum of every 4 hours.   If greater than every 4 hours is required, the physician will be contacted. 4. If the bronchospasm improves, the frequency of the bronchodilator can be decreased, based on the patient's RSI, but not less than home treatment regimen frequency. 5. Bronchodilator(s) will be discontinued if patient has a RSI less than 9 and has received no scheduled or as needed treatment for 72  Hrs. Patients Ordered on a Mucolytic Agent:    1. Must always be administered with a bronchodilator. 2. Discontinue if patient experiences worsened bronchospasm, or secretions have lessened to the point that the patient is able to clear them with a cough. Anti-inflammatory and Combination Medications:    1. If the patient lacks prior history of lung disease, is not using inhaled anti-inflammatory medication at home, and lacks wheezing by examination or by history for at least 24 hours, contact physician for possible discontinuation.

## 2020-10-22 NOTE — CONSULTS
CARDIOLOGY CONSULTATION        Patient Name: Philip Terry  Date of admission: 10/22/2020  8:04 AM  Admission Dx: Chest pain [R07.9]  Chest pain [R07.9]  Requesting Physician: Sumeet Kidd MD  Primary Care physician: RICCO Apodaca CNP    Reason for Consultation/Chief Complaint: chest/abdominal pain     History of Present Illness:     Philip Terry is a 77 y.o. patient with prior history notable for cardiomyopathy, diabetes, hypertension, hyperlipidemia, prior CVA, dementia, who presented to the hospital with complaints of chest pain. In discussion with the RN, the patient had an episode of discomfort in his abdomen. She reports he indicates no discomfort of the chest area. No nausea/diaphoresis/dyspnea with the discomfort. BP's remain elevated. No tele events during this time. Denies shortness of breath at this time. No cough. Subjective fevers endorsed. No LE edema per RN. In the ED, the patient vitals were notable for for significant hypertension 179/99, afebrile, HR 76, 95% on room air. BMP WNL. Pro BNP 6454. Trop negative. COVID-19 negative, but awaiting PCR confirmation. CXR negative. Of note, the patient was admitted 4/2020 at [facility] for syncope. He was noted to have prolonged QTc. Treated for orthostatic hypotension. Midodrine was added to his medications. It was noted that he remained profoundly orthostatic at discharge. EF found mildly decreased by echo in setting of irregular rhythm. No ischemic evaluation at that time.      Past Medical History:   has a past medical history of Anxiety disorder, BPH (benign prostatic hypertrophy), Calcified granuloma of lung ([facility] Cataract, Cerebral artery occlusion with cerebral infarction [facility]), Chronic pain, COPD (chronic obstructive pulmonary disease) ([facility] [facility] Degenerative arthritis of hip, Dementia ([facility] [address] Depression, Depression with anxiety, Diabetes mellitus ([facility] [address] [facility] Diabetic retinopathy ([facility] Diverticulitis, Diverticulosis, DKA (diabetic ketoacidoses) ([facility] [facility] Emphysema, Esophageal candidiasis ([facility] [facility] Essential hypertension, benign, ETOH abuse, Fatty liver, Foot ulcer:left, Gastric ulcer, unspecified as acute or chronic, without mention of hemorrhage or perforation, GERD (gastroesophageal reflux disease), Gout, Hearing decreased, Hemangioma, hepatitis c, Hyperlipidemia LDL goal < 100, Left-sided weakness, Low HDL (under 40), Peripheral neuropathy, Pneumonia, Pyogenic granuloma, Restrictive lung disease, S/P colonoscopy, S/P colonoscopy, S/P endoscopy, Sciatica, Superficial phlebitis of left leg, Tachycardia, Therapeutic drug monitoring, and Type 1 diabetes mellitus not at goal [facility]). Surgical History:   has a past surgical history that includes Leg Surgery; Hip fracture surgery; Tonsillectomy; Upper gastrointestinal endoscopy (7/16/2013); Diagnostic Cardiac Cath Lab Procedure (2013); other surgical history (Left, 11/21/2013); Foot surgery (Left, Hammer toe, 2nd toe); Revision Colostomy (06/27/2017); other surgical history (Right, 05/30/2019); Pressure ulcer debridement (Right, 5/30/2019); Colonoscopy; fracture surgery; Dilatation, esophagus; Hardware Removal (Left, 12/03/2019); and Foot surgery (Left, 12/5/2019). Social History:   reports that he quit smoking about 2 years ago. His smoking use included cigarettes. He has a 17.50 pack-year smoking history. He has never used smokeless tobacco. He reports previous alcohol use. He reports that he does not use drugs. Family History:  family history includes Arthritis in his father; Cancer in his father; Diabetes in his brother and paternal aunt; Hypertension in his father and mother; Stroke in his mother; Substance Abuse in his father. Home Medications:  Were reviewed and are listed in nursing record and/or below  Prior to Admission medications    Medication Sig Start Date End Date Taking?  Authorizing Provider   loratadine (CLARITIN) 10 MG tablet Take 10 mg by mouth daily   Yes Historical Provider, MD   oxyCODONE (ROXICODONE) 5 MG immediate release tablet Take 10 mg by mouth every 6 hours as needed for Pain.    Yes Historical Provider, MD   midodrine (PROAMATINE) 10 MG tablet Take 1 tablet by mouth 3 times daily (with meals) 4/12/20  Yes Km Small PA-C   memantine (NAMENDA) 10 MG tablet Take 10 mg by mouth 2 times daily   Yes Historical Provider, MD   hypromellose 0.4 % SOLN ophthalmic solution Place 1 drop into both eyes every 4 hours as needed   Yes Historical Provider, MD   polyethylene glycol (GLYCOLAX) powder Take 17 g by mouth daily   Yes Historical Provider, MD   fluticasone (FLOVENT HFA) 44 MCG/ACT inhaler Inhale 2 puffs into the lungs 2 times daily   Yes Historical Provider, MD   acetaminophen (TYLENOL) 325 MG tablet Take 650 mg by mouth every 4 hours as needed for Pain   Yes Historical Provider, MD   bisacodyl (DULCOLAX) 10 MG suppository Place 10 mg rectally daily as needed for Constipation   Yes Historical Provider, MD   insulin aspart (NOVOLOG) 100 UNIT/ML injection vial Inject 10 Units into the skin 3 times daily (before meals) +SSI  5/31/19  Yes Frances Alfredo MD   aspirin 81 MG tablet Take 81 mg by mouth daily   Yes Historical Provider, MD   finasteride (PROSCAR) 5 MG tablet Take 5 mg by mouth daily   Yes Historical Provider, MD   folic acid (FOLVITE) 1 MG tablet Take 1 mg by mouth daily   Yes Historical Provider, MD   metoprolol tartrate (LOPRESSOR) 25 MG tablet Take 25 mg by mouth 2 times daily   Yes Historical Provider, MD   sertraline (ZOLOFT) 100 MG tablet Take 150 mg by mouth daily   Yes Historical Provider, MD   SUMAtriptan (IMITREX) 100 MG tablet Take 100 mg by mouth daily as needed for Migraine   Yes Historical Provider, MD   atorvastatin (LIPITOR) 80 MG tablet Take 80 mg by mouth nightly    Yes Historical Provider, MD   Insulin Glargine (LANTUS SOLOSTAR SC) Inject 20 Units into the skin 2 times daily Yes Historical Provider, MD   cyclobenzaprine (FLEXERIL) 5 MG tablet Take 10 mg by mouth 3 times daily    Yes Historical Provider, MD   melatonin 3 MG TABS tablet Take 6 mg by mouth nightly    Yes Historical Provider, MD   vitamin E 1000 units capsule Take 1 capsule by mouth daily 11/5/18  Yes RICCO Moreno CNP   vitamin B-12 (CYANOCOBALAMIN) 1000 MCG tablet TAKE 1 TABLET BY MOUTH DAILY 7/2/18  Yes RICCO Moreno CNP   umeclidinium-vilanterol (ANORO ELLIPTA) 62.5-25 MCG/INH AEPB inhaler Inhale 1 puff into the lungs daily 3/27/18  Yes Jorge Vanessa MD   albuterol sulfate HFA (PROAIR HFA) 108 (90 Base) MCG/ACT inhaler Inhale 2 puffs into the lungs every 4 hours as needed for Wheezing or Shortness of Breath 3/27/18  Yes Jorge Vanessa MD   thiamine 100 MG tablet Take 1 tablet by mouth daily 3/6/18  Yes RICCO Moreno CNP   glucagon 1 MG injection Inject 1 kit into the skin as needed   Yes Historical Provider, MD   aluminum & magnesium hydroxide-simethicone (MAALOX) 200-200-20 MG/5ML SUSP suspension Take 5 mLs by mouth every 6 hours as needed for Indigestion Takes mylanta 30 ml Q4 hours PRN    Historical Provider, MD   loperamide (IMODIUM) 2 MG capsule Take 2 mg by mouth every 4 hours as needed for Diarrhea    Historical Provider, MD   Dulaglutide (TRULICITY) 4.98 OE/3.1RU SOPN Inject 0.75 mg into the skin once a week On Saturday    Historical Provider, MD   Continuous Blood Gluc  (FREESTYLE BRIDGET READER) DENIS Use to monitor sugars 8/1/18   Johan Calles MD   Continuous Blood Gluc Sensor (FREESTYLE BRIDGET SENSOR SYSTEM) MISC Use to monitor sugars 8/1/18   Johan Calles MD   AGAMATRIX PRESTO TEST strip USE TO TEST BLOOD SUGAR 3 TIMES A DAY 5/9/18   Johan Calles MD   Lancets MISC Testing 2-3 times daily DX Code: E11.9 3/22/18   Johan Calles MD   Blood Glucose Monitoring Suppl (TRUE METRIX AIR GLUCOSE METER) DENIS 1 meter 3/6/18   Dave Jewell 14 point review of symptoms completed. Pertinent positives identified in the HPI, all other review of symptoms negative as below. Objective:     Vitals:    10/22/20 1108 10/22/20 1116 10/22/20 1118 10/22/20 1430   BP:  (!) 146/102  (!) 167/96   Pulse: 66 84 84 96   Resp: 16 16  16   Temp:    98 °F (36.7 °C)   TempSrc:       SpO2: 99% 99%     Weight:       Height:          Weight: 175 lb (79.4 kg)       PHYSICAL EXAM:    Due to the current efforts to prevent transmission of COVID-19 and also the need to preserve PPE for other caregivers, a face-to-face encounter with the patient was not performed. That being said, all relevant records and diagnostic tests were reviewed, including laboratory results and imaging. Please reference any relevant documentation elsewhere. Care will be coordinated with the primary service. Labs:   CBC:   Lab Results   Component Value Date    WBC 7.5 10/22/2020    RBC 4.38 10/22/2020    HGB 13.2 10/22/2020    HCT 40.1 10/22/2020    MCV 91.7 10/22/2020    RDW 13.9 10/22/2020     10/22/2020     CMP:  Lab Results   Component Value Date     10/22/2020    K 4.4 10/22/2020     10/22/2020    CO2 26 10/22/2020    BUN 20 10/22/2020    CREATININE 1.1 10/22/2020    GFRAA >60 10/22/2020    GFRAA >60 06/15/2013    AGRATIO 0.7 05/29/2019    LABGLOM >60 10/22/2020    GLUCOSE 94 10/22/2020    PROT 8.1 05/29/2019    PROT 8.2 03/12/2013    CALCIUM 8.8 10/22/2020    BILITOT 0.4 05/29/2019    ALKPHOS 89 05/29/2019    AST 15 05/29/2019    ALT 7 05/29/2019     PT/INR:  No results found for: PTINR  HgBA1c:  Lab Results   Component Value Date    LABA1C 7.9 04/10/2020     Lab Results   Component Value Date    ZKSKCPS 440 08/10/2018    TROPONINI <0.01 10/22/2020         Cardiac Data:     EKG: Normal sinus rhythm, LVH with QRS widening, with ST/T changes 2/2 hypertrophy.      Echo: 4/10/20  Summary   Left ventricular systolic function is low normal to mildly reduced LVEF   40-50%, difficult to evaluate LVEF due to irregular heart beat. Technically difficult examination. There is mild-moderate left ventricular hypertrophy. Left ventricle size is normal.   The aortic root is moderately dilated. Aortic valve leaflets appear mildly thickened. Mitral annular calcification is present. Mitral valve leaflets appear mildly thickened. Mild mitral and tricuspid regurgitation. Inadequate tricuspid regurgitation to estimate systolic pulmonary artery   pressure. IVC is normal in size (< 2.1 cm) and collapses > 50% with respiration   consistent with normal RA pressure (3 mmHg). Previous echo done 10/2018 - EF 55-60%     10/2018  Summary   Limited only f/u for LVEF and bubble study. Normal left ventricular systolic function with ejection fraction of 55-60%. No regional wall motion abnormalites are seen. Compared to previous study from 8- no changes noted. A bubble study was performed and fails to show evidence of shunting.     8/2018   Summary   Normal systolic function with an estimated ejection fraction of 60-65%. Moderate concentric left ventricular hypertrophy. No regional wall motion abnormalities are seen. Grade I diastolic dysfunction with normal filing pressure. Mild mitral and pulmonic regurgitation. Stress Test:  1/2014  After the injection of radiopharmaceutical, routine imaging was    performed. Karthik Holmantz is no stress-induced perfusion defect.  No wall    motion abnormality.  Ejection fraction calculates to 54 %. There    is a fixed defect within the inferior wall compatible with remote    infarct although this may be artifactual as there is adjacent    subdiaphragmatic activity    1. No stress-induced perfusion defect. Cardiac catheterization:  ANGIOGRAPHY:    1. The left main coronary artery is short but angiographically normal.    2.  The left anterior descending artery is widely patent.   There is very  minimal middistal tapering but the vessel is otherwise normal.   3.  There is a ramus intermedius branch/first obtuse marginal branch which is  angiographically normal.   4.  The circumflex itself is a large vessel that is angiographically normal.   5.  The right coronary artery is tortuous, dominant and with minor proximal  irregularity. 5.  A left ventriculogram in the DYER projection shows uniform wall motion,  normal left ventricular function. LVEF is estimated at 55%. There is no  gradient on pullback across the aortic valve. LVEDP is measured at 7 mmHg. There is no mitral insufficiency. CONCLUSION:    1. Essentially normal coronary arteries with right dominant system. 2.  Well-preserved left ventricular systolic function. PLAN:  Continued risk factor management with no evidence for underlying CAD. His chest pain is likely noncardiac and his stress study is a false positive. Additional studies:   CXR personally reviewed. Impression and Plan:     Carlos Muniz is a 77 y.o. patient with prior history notable for cardiomyopathy, diabetes, hypertension, hyperlipidemia, prior CVA, dementia, who presented to the hospital with complaints of chest discomfort. Per my discussion with RN today at bedside he is having abdominal pain primarily. No dyspnea. No EKG changes. Trop negative. Recent decline in LVEF by echo 4/2020 with no ischemic evaluation at that time. This study was performed in setting of irregular heart beat at that time. 1. Chest/abdominal pain  2. Cardiomyopathy, new onset, by echo 9/2020   3. Hypertension - uncontrolled. 4. Hyperlipidemia  5. Aortic root dilitation  6. Prior CVA  7. Orthostatic hypotension  8. Diabetes   9. Tobacco use disorder   10. Dementia    Plan:  1. Rule out for MI with serial cardiac enzymes, trend EKG's   2. No need for repeat echocardiogram at this point  3.  We will evaluate at bedside once COVID-19 testing returns, consider stress evaluation given drop in LVEF despite atypical symptoms by history  4. BP elevation may preclude midodrine use - consider holding and re-assessing his orthostasis and BP's off drug therapy while in house  5. Continue metoprolol, atorvastatin  6. Load aspirin  7. Lasix IV BID per primary team. Will assess volume status in follow up    We will follow.        Patient Active Problem List   Diagnosis    GERD (gastroesophageal reflux disease)    Peripheral neuropathy of bilateral feet    COPD (chronic obstructive pulmonary disease) (HCC)    HLD (hyperlipidemia)    Hepatic steatosis    PTSD (post-traumatic stress disorder)    History of hepatitis C    Bilateral knee & hip OA    Mild-moderate alcohol consumption (beer)    Chronic pain syndrome    Moderate episode of recurrent major depressive disorder (HCC)    Orthostatic hypotension    S/P left colectomy    Hyponatremia    S/p reversal of colostomy (6/27/17)    Chronic dCHF (grade 1 LVDD)    Chronic normocytic anemia    Acute hyperglycemia    Severe protein-calorie malnutrition (HCC)    DM (diabetes mellitus), secondary, uncontrolled, w/neurologic complic ([address]    HTN (hypertension)    Hx DKA (Feb 2018)    Other insomnia    Chronic transaminasemia    Tobacco smoker    Fall at home    Hypochloremia    Hyperkalemia    Mixed metabolic and respiratory acidosis    Closed nondisplaced L ankle fracture (lateral malleolus)    L 2nd toenail avulsion    Skin tear of L leg    Head trauma    IDDM (insulin dependent diabetes mellitus)    Ventral hernia without obstruction or gangrene    Diabetic ketoacidosis without coma associated with type 2 diabetes mellitus ([address]    DDD (degenerative disc disease), cervical    Spondylosis of cervical region without myelopathy or radiculopathy    DDD (degenerative disc disease), lumbar    Closed nondisplaced fracture of lateral malleolus of left fibula    Lumbar degenerative disc disease    Protrusion of lumbar intervertebral disc    Osteoarthritis of both knees    Osteoarthritis of both hips    Idiopathic peripheral neuropathy    Leg pain, anterior, left    CVA (cerebral vascular accident) ([facility]    DM (diabetes mellitus) ([facility]    HTN (hypertension), benign    BPH (benign prostatic hyperplasia)    Dysarthria    Left-sided weakness    TIA (transient ischemic attack)    Dyslipidemia    DKA, type 2, not at goal [facility])    Agitation requiring sedation protocol    DKA, type 1, not at goal [facility])    Severe malnutrition ([facility]    [facility]    Open wound of right foot    Dementia associated with other underlying disease without behavioral disturbance ([facility]    Closed head injury    Facial hematoma    Concussion with no loss of consciousness    Chest pain         I will address the patient's cardiac risk factors and adjusted pharmacologic treatment as needed. In addition, I have reinforced the need for patient directed risk factor modification. All questions and concerns were addressed to the patient/family. Alternatives to my treatment were discussed. Thank you for allowing us to participate in the care of [facility]. Please call me with any questions [phone].     Darshana Wynn MD   Cardiovascular Disease  [address]  [phone] [address] [facility]  [phone] [address] [place] [address]  10/22/2020 3:05 PM

## 2020-10-22 NOTE — H&P
retinopathy ([facility]     under care of [facility]:Dr. Guillermina Oneill    Diverticulitis 11/26/2011    Diverticulosis 11/26/2011    DKA (diabetic ketoacidoses) ([facility]     Emphysema     Esophageal candidiasis ([facility] 7/16/13    GI:EGD    Essential hypertension, benign 11/2010    ETOH abuse     Fatty liver 10/9/2012    Foot ulcer:left 9/30/2013    Gastric ulcer, unspecified as acute or chronic, without mention of hemorrhage or perforation     GERD (gastroesophageal reflux disease)     Gout 1997    Right big toe    Hearing decreased     Left ear=60%. Right ear=80%.  Hemangioma 12/2010    Liver as per CT abdo    hepatitis c 7/26/17, 2010    Under care of Liver doc:Dr. Blanca Delgado    Hyperlipidemia LDL goal < 100     Left-sided weakness     Low HDL (under 40) 10/9/2012    Peripheral neuropathy 2006    Pneumonia 10/15/13    Pyogenic granuloma     per derm:Dr. Anu Saldivar Restrictive lung disease     Under care of pulmo(Dr. Calhoun)    S/P colonoscopy 1996    Done secondary to rectal bleed:dx=hemorrhoids    S/P colonoscopy 11/11/10;10/23/2013    Dr. Denise Martinez 10/2016(3yrs):polyps;diverticulosis. Diverticulosis & polpy(removed). Next in10/2016.  S/P endoscopy 2009    EGD:stomach ulcers.     Sciatica     Superficial phlebitis of left leg 9/30/2013    Tachycardia     Therapeutic drug monitoring 4/8/15    OARRS report is consistent on 4/8/15;7/9/15;10/9/15;1/8/16;4/5/16    Type 1 diabetes mellitus not at goal [facility]) 3/17/14    updated diagnosis as per endo:Dr. Avril Sr       Past Surgical History:          Procedure Laterality Date    COLONOSCOPY      DIAGNOSTIC CARDIAC CATH LAB PROCEDURE  2013    DILATATION, ESOPHAGUS      FOOT SURGERY Left Hammer toe, 2nd toe    10/9/14    FOOT SURGERY Left 12/5/2019    REMOVAL HARDWARE LEFT FOOT performed by Bautista Guadarrama DPM at [address] [facility] [address] Left 12/03/2019    REMOVAL HARDWARE LEFT FOOT w. Dr Haley Locke 12/5/19    Midtvollen 130 by mouth daily   Yes Historical Provider, MD   finasteride (PROSCAR) 5 MG tablet Take 5 mg by mouth daily   Yes Historical Provider, MD   folic acid (FOLVITE) 1 MG tablet Take 1 mg by mouth daily   Yes Historical Provider, MD   metoprolol tartrate (LOPRESSOR) 25 MG tablet Take 25 mg by mouth 2 times daily   Yes Historical Provider, MD   sertraline (ZOLOFT) 100 MG tablet Take 150 mg by mouth daily   Yes Historical Provider, MD   SUMAtriptan (IMITREX) 100 MG tablet Take 100 mg by mouth daily as needed for Migraine   Yes Historical Provider, MD   atorvastatin (LIPITOR) 80 MG tablet Take 80 mg by mouth nightly    Yes Historical Provider, MD   Insulin Glargine (LANTUS SOLOSTAR SC) Inject 20 Units into the skin 2 times daily    Yes Historical Provider, MD   cyclobenzaprine (FLEXERIL) 5 MG tablet Take 10 mg by mouth 3 times daily    Yes Historical Provider, MD   melatonin 3 MG TABS tablet Take 6 mg by mouth nightly    Yes Historical Provider, MD   vitamin E 1000 units capsule Take 1 capsule by mouth daily 11/5/18  Yes Víctor Pena APRN - CNP   vitamin B-12 (CYANOCOBALAMIN) 1000 MCG tablet TAKE 1 TABLET BY MOUTH DAILY 7/2/18  Yes Víctor Pena APRN - CNP   umeclidinium-vilanterol (ANORO ELLIPTA) 62.5-25 MCG/INH AEPB inhaler Inhale 1 puff into the lungs daily 3/27/18  Yes Tammy Beltrán MD   albuterol sulfate HFA (PROAIR HFA) 108 (90 Base) MCG/ACT inhaler Inhale 2 puffs into the lungs every 4 hours as needed for Wheezing or Shortness of Breath 3/27/18  Yes Tammy Betlrán MD   thiamine 100 MG tablet Take 1 tablet by mouth daily 3/6/18  Yes Barb Morin APRN - CNP   glucagon 1 MG injection Inject 1 kit into the skin as needed   Yes Historical Provider, MD   aluminum & magnesium hydroxide-simethicone (MAALOX) 200-200-20 MG/5ML SUSP suspension Take 5 mLs by mouth every 6 hours as needed for Indigestion Takes mylanta 30 ml Q4 hours PRN    Historical Provider, MD   loperamide (IMODIUM) 2 MG capsule Take 2 mg by mouth every 4 hours as needed for Diarrhea    Historical Provider, MD   Dulaglutide (TRULICITY) 9.56 ZI/6.0PB SOPN Inject 0.75 mg into the skin once a week On Saturday    Historical Provider, MD   Continuous Blood Gluc  (FREESTYLE BRIDGET READER) DENIS Use to monitor sugars 8/1/18   Siobhan Herrera MD   Continuous Blood Gluc Sensor ([address] [place] [address]) MISC Use to monitor sugars 8/1/18   Siobhan Herrera MD   AGAMATRIX PRESTO TEST strip USE TO TEST BLOOD SUGAR 3 TIMES A DAY 5/9/18   Siobhan Herrera MD   Lancets MISC Testing 2-3 times daily DX Code: E11.9 3/22/18   Siobhan Herrera MD   Blood Glucose Monitoring Suppl (TRUE METRIX AIR GLUCOSE METER) DENIS 1 meter 3/6/18   Josee Vu APRN - CNP       Allergies:  Neurontin [gabapentin]    Social History:    TOBACCO:   reports that he quit smoking about 2 years ago. His smoking use included cigarettes. He has a 17.50 pack-year smoking history. He has never used smokeless tobacco.  ETOH:   reports previous alcohol use. E-Cigarettes Vaping or Juuling     Questions Responses    Vaping Use Never User    Start Date     Does device contain nicotine? Never    Quit Date     Vaping Type             Family History:          Problem Relation Age of Onset    Stroke Mother     Hypertension Mother     Arthritis Father     Substance Abuse Father         Etoh    Cancer Father         esophageal    Hypertension Father     Diabetes Brother     Diabetes Paternal Aunt     Asthma Neg Hx     Emphysema Neg Hx     Heart Failure Neg Hx        REVIEW OF SYSTEMS:   Pertinent positives as noted in the HPI. All other systems reviewed and negative. Physical Exam Performed:    BP (!) 167/96   Pulse 96   Temp 98 °F (36.7 °C)   Resp 16   Ht 6' 5\" (1.956 m)   Wt 175 lb (79.4 kg)   SpO2 99%   BMI 20.75 kg/m²     General appearance: No apparent distress, appears stated age and cooperative.   HEENT:  Normal cephalic, atraumatic without obvious deformity. Pupils equal, round, and reactive to light. Extra ocular muscles intact. Conjunctivae/corneas clear. Neck: Supple, no jugular venous distention. Trachea midline with full range of motion. Respiratory:  Normal respiratory effort. Clear to auscultation, bilaterally without Rales/Wheezes/Rhonchi. Cardiovascular: Regular rate and rhythm with normal S1/S2 without murmurs, rubs or gallops. Abdomen: Soft, non-tender, non-distended with normal bowel sounds. Musculoskelatal: No clubbing, cyanosis or edema bilaterally. Full range of motion without deformity. Neurologic:  Neurovascularly intact without any focal sensory/motor deficits. Cranial nerves: II-XII intact, grossly non-focal.  Psychiatric: Alert and oriented, limited insight  Skin: Skin color, texture, turgor normal.  No rashes or lesions. Capillary Refill: Brisk,< 3 seconds   Peripheral Pulses: +2 palpable, equal bilaterally       Labs:     Recent Labs     10/22/20  0815 10/22/20  1326   WBC 7.7 7.5   HGB 12.6* 13.2*   HCT 38.7* 40.1*    211     Recent Labs     10/22/20  0815      K 4.4      CO2 26   BUN 20   CREATININE 1.1   CALCIUM 8.8     No results for input(s): AST, ALT, BILIDIR, BILITOT, ALKPHOS in the last 72 hours. No results for input(s): INR in the last 72 hours. Recent Labs     10/22/20  0815 10/22/20  1326   TROPONINI <0.01 <0.01       Radiology:     CXR: I have reviewed the CXR with the following interpretation: Clear  EKG:  I have reviewed the EKG with the following interpretation: NSR, LVH    Xr Chest Portable    Result Date: 10/22/2020  EXAMINATION: ONE XRAY VIEW OF THE CHEST 10/22/2020 8:17 am COMPARISON: March 2, 2019 HISTORY: ORDERING SYSTEM PROVIDED HISTORY: sob TECHNOLOGIST PROVIDED HISTORY: Reason for exam:->sob Reason for Exam: chest pian high BP Acuity: Acute Type of Exam: Initial FINDINGS: The lungs are without acute focal process. There is no effusion or pneumothorax.  The cardiomediastinal silhouette Monitor blood sugar and adjust regimen as needed. Diabetic (Carb-controlled) diet when able. Dementia: Stable. Continue medical management and supportive care.        DVT Prophylaxis: Lovenox  Diet: DIET CARB CONTROL; No Caffeine  Code Status: Full Code    PT/OT Eval Status: NA    Dispo - 1-2 days     Giovani Eddy MD

## 2020-10-22 NOTE — CARE COORDINATION
CASE MANAGEMENT INITIAL ASSESSMENT      Reviewed chart and completed assessment with: David Nicolas at [facility] and sister Vimal Jackson     Primary contact information: Mallory Rodriguez [id number] Decision Maker:   Who do you trust or have selected to make healthcare decisions for you? Name:  Jeimy Evangelista- sister  Phone  Number: [phone]  Can this person be reached and be able to respond quickly, such as within a few minutes or hours? Yes  Who would be your back-up decision maker? Name: Hetal Christians- friend   Phone [phone]    Admit date/status:10/22/2020- ED    Is this a Readmission?:  No      Insurance:Medicare     Precert required for SNF: No       3 night stay required: Yes    Living arrangements, Adls, care needs, prior to admission:Living LTC at [facility] extend care facility      Transportation:Will need transport back to [facility] at home:  Walker_x_Cane__RTS__ BSC__Shower Chair__  02__ HHN__ CPAP__  BiPap__  Hospital Bed__ W/C___ Other__________    Services in the home and/or outpatient, prior to admission: Extended care     PT/OT recs: will follow for [facility] [place] Exemption Notification (HEN):NA    Barriers to discharge:Denies     Plan/comments: Patient was living at [facility] extended care facility. Patient with dx dementia. Patient prior level of function walking at walker level with staff assistance re unsteady on his feet per primary nurse at [facility]. Per patient sister-Mallory Mohan no concerns with return to [facility] [place]. SW will follow for DCP needs.  LELO Larry      [facility]OC on chart for MD signature

## 2020-10-22 NOTE — ED NOTES
[email or web address]  Re: admit.  CP, SOB, CHF per   [id number] recalled   1016 Dr.Forton called back     Liyah [place]  10/22/20 1022

## 2020-10-23 LAB
ANION GAP SERPL CALCULATED.3IONS-SCNC: 11 MMOL/L (ref 3–16)
ANION GAP SERPL CALCULATED.3IONS-SCNC: 12 MMOL/L (ref 3–16)
BASOPHILS ABSOLUTE: 0 K/UL (ref 0–0.2)
BASOPHILS RELATIVE PERCENT: 0.2 %
BUN BLDV-MCNC: 43 MG/DL (ref 7–20)
BUN BLDV-MCNC: 47 MG/DL (ref 7–20)
CALCIUM SERPL-MCNC: 8.8 MG/DL (ref 8.3–10.6)
CALCIUM SERPL-MCNC: 9.1 MG/DL (ref 8.3–10.6)
CHLORIDE BLD-SCNC: 96 MMOL/L (ref 99–110)
CHLORIDE BLD-SCNC: 97 MMOL/L (ref 99–110)
CHOLESTEROL, TOTAL: 110 MG/DL (ref 0–199)
CO2: 24 MMOL/L (ref 21–32)
CO2: 26 MMOL/L (ref 21–32)
CREAT SERPL-MCNC: 1.4 MG/DL (ref 0.8–1.3)
CREAT SERPL-MCNC: 1.6 MG/DL (ref 0.8–1.3)
EOSINOPHILS ABSOLUTE: 0 K/UL (ref 0–0.6)
EOSINOPHILS RELATIVE PERCENT: 0 %
FERRITIN: 60.8 NG/ML (ref 30–400)
GFR AFRICAN AMERICAN: 52
GFR AFRICAN AMERICAN: >60
GFR NON-AFRICAN AMERICAN: 43
GFR NON-AFRICAN AMERICAN: 50
GLUCOSE BLD-MCNC: 124 MG/DL (ref 70–99)
GLUCOSE BLD-MCNC: 157 MG/DL (ref 70–99)
GLUCOSE BLD-MCNC: 215 MG/DL (ref 70–99)
GLUCOSE BLD-MCNC: 353 MG/DL (ref 70–99)
GLUCOSE BLD-MCNC: 374 MG/DL (ref 70–99)
GLUCOSE BLD-MCNC: 400 MG/DL (ref 70–99)
GLUCOSE BLD-MCNC: 451 MG/DL (ref 70–99)
GLUCOSE BLD-MCNC: 479 MG/DL (ref 70–99)
GLUCOSE BLD-MCNC: 547 MG/DL (ref 70–99)
HCT VFR BLD CALC: 36 % (ref 40.5–52.5)
HDLC SERPL-MCNC: 51 MG/DL (ref 40–60)
HEMOGLOBIN: 11.9 G/DL (ref 13.5–17.5)
LDL CHOLESTEROL CALCULATED: 42 MG/DL
LYMPHOCYTES ABSOLUTE: 2.2 K/UL (ref 1–5.1)
LYMPHOCYTES RELATIVE PERCENT: 17.5 %
MCH RBC QN AUTO: 29.9 PG (ref 26–34)
MCHC RBC AUTO-ENTMCNC: 33 G/DL (ref 31–36)
MCV RBC AUTO: 90.6 FL (ref 80–100)
MONOCYTES ABSOLUTE: 1.3 K/UL (ref 0–1.3)
MONOCYTES RELATIVE PERCENT: 10.7 %
NEUTROPHILS ABSOLUTE: 8.8 K/UL (ref 1.7–7.7)
NEUTROPHILS RELATIVE PERCENT: 71.6 %
PDW BLD-RTO: 13.7 % (ref 12.4–15.4)
PERFORMED ON: ABNORMAL
PLATELET # BLD: 194 K/UL (ref 135–450)
PMV BLD AUTO: 8.3 FL (ref 5–10.5)
POTASSIUM REFLEX MAGNESIUM: 4 MMOL/L (ref 3.5–5.1)
POTASSIUM SERPL-SCNC: 3.7 MMOL/L (ref 3.5–5.1)
RBC # BLD: 3.97 M/UL (ref 4.2–5.9)
SARS-COV-2, PCR: NOT DETECTED
SODIUM BLD-SCNC: 131 MMOL/L (ref 136–145)
SODIUM BLD-SCNC: 135 MMOL/L (ref 136–145)
TRIGL SERPL-MCNC: 84 MG/DL (ref 0–150)
VLDLC SERPL CALC-MCNC: 17 MG/DL
WBC # BLD: 12.3 K/UL (ref 4–11)

## 2020-10-23 PROCEDURE — 85025 COMPLETE CBC W/AUTO DIFF WBC: CPT

## 2020-10-23 PROCEDURE — 80061 LIPID PANEL: CPT

## 2020-10-23 PROCEDURE — 96375 TX/PRO/DX INJ NEW DRUG ADDON: CPT

## 2020-10-23 PROCEDURE — 80048 BASIC METABOLIC PNL TOTAL CA: CPT

## 2020-10-23 PROCEDURE — 94640 AIRWAY INHALATION TREATMENT: CPT

## 2020-10-23 PROCEDURE — 96376 TX/PRO/DX INJ SAME DRUG ADON: CPT

## 2020-10-23 PROCEDURE — 2580000003 HC RX 258: Performed by: INTERNAL MEDICINE

## 2020-10-23 PROCEDURE — 6370000000 HC RX 637 (ALT 250 FOR IP): Performed by: NURSE PRACTITIONER

## 2020-10-23 PROCEDURE — 6360000002 HC RX W HCPCS: Performed by: INTERNAL MEDICINE

## 2020-10-23 PROCEDURE — G0378 HOSPITAL OBSERVATION PER HR: HCPCS

## 2020-10-23 PROCEDURE — 96372 THER/PROPH/DIAG INJ SC/IM: CPT

## 2020-10-23 PROCEDURE — 99233 SBSQ HOSP IP/OBS HIGH 50: CPT | Performed by: INTERNAL MEDICINE

## 2020-10-23 PROCEDURE — 6370000000 HC RX 637 (ALT 250 FOR IP)

## 2020-10-23 PROCEDURE — 6370000000 HC RX 637 (ALT 250 FOR IP): Performed by: INTERNAL MEDICINE

## 2020-10-23 PROCEDURE — 36415 COLL VENOUS BLD VENIPUNCTURE: CPT

## 2020-10-23 RX ORDER — GUAIFENESIN 600 MG/1
600 TABLET, EXTENDED RELEASE ORAL 2 TIMES DAILY
Status: DISCONTINUED | OUTPATIENT
Start: 2020-10-23 | End: 2020-10-27 | Stop reason: HOSPADM

## 2020-10-23 RX ORDER — SODIUM CHLORIDE 9 MG/ML
INJECTION, SOLUTION INTRAVENOUS CONTINUOUS
Status: ACTIVE | OUTPATIENT
Start: 2020-10-23 | End: 2020-10-24

## 2020-10-23 RX ADMIN — IPRATROPIUM BROMIDE AND ALBUTEROL SULFATE 1 AMPULE: .5; 3 SOLUTION RESPIRATORY (INHALATION) at 09:03

## 2020-10-23 RX ADMIN — INSULIN GLARGINE 20 UNITS: 100 INJECTION, SOLUTION SUBCUTANEOUS at 09:41

## 2020-10-23 RX ADMIN — MEMANTINE 10 MG: 5 TABLET ORAL at 20:14

## 2020-10-23 RX ADMIN — MELATONIN TAB 5 MG 5 MG: 5 TAB at 20:14

## 2020-10-23 RX ADMIN — OXYCODONE HYDROCHLORIDE 10 MG: 5 TABLET ORAL at 20:14

## 2020-10-23 RX ADMIN — IPRATROPIUM BROMIDE AND ALBUTEROL SULFATE 1 AMPULE: .5; 3 SOLUTION RESPIRATORY (INHALATION) at 12:23

## 2020-10-23 RX ADMIN — Medication 100 MG: at 09:32

## 2020-10-23 RX ADMIN — IPRATROPIUM BROMIDE AND ALBUTEROL SULFATE 1 AMPULE: .5; 3 SOLUTION RESPIRATORY (INHALATION) at 16:15

## 2020-10-23 RX ADMIN — Medication 800 UNITS: at 09:32

## 2020-10-23 RX ADMIN — SODIUM CHLORIDE, PRESERVATIVE FREE 10 ML: 5 INJECTION INTRAVENOUS at 09:33

## 2020-10-23 RX ADMIN — ATORVASTATIN CALCIUM 80 MG: 80 TABLET, FILM COATED ORAL at 20:14

## 2020-10-23 RX ADMIN — MEMANTINE 10 MG: 5 TABLET ORAL at 09:32

## 2020-10-23 RX ADMIN — INSULIN GLARGINE 20 UNITS: 100 INJECTION, SOLUTION SUBCUTANEOUS at 20:20

## 2020-10-23 RX ADMIN — MAGNESIUM HYDROXIDE/ALUMINUM HYDROXICE/SIMETHICONE 30 ML: 120; 1200; 1200 SUSPENSION ORAL at 09:30

## 2020-10-23 RX ADMIN — FUROSEMIDE 40 MG: 10 INJECTION, SOLUTION INTRAMUSCULAR; INTRAVENOUS at 09:32

## 2020-10-23 RX ADMIN — INSULIN LISPRO 2 UNITS: 100 INJECTION, SOLUTION INTRAVENOUS; SUBCUTANEOUS at 17:08

## 2020-10-23 RX ADMIN — IPRATROPIUM BROMIDE AND ALBUTEROL SULFATE 1 AMPULE: .5; 3 SOLUTION RESPIRATORY (INHALATION) at 20:06

## 2020-10-23 RX ADMIN — Medication: at 21:00

## 2020-10-23 RX ADMIN — METOPROLOL TARTRATE 25 MG: 25 TABLET, FILM COATED ORAL at 09:32

## 2020-10-23 RX ADMIN — GUAIFENESIN 600 MG: 600 TABLET, EXTENDED RELEASE ORAL at 20:14

## 2020-10-23 RX ADMIN — CETIRIZINE HYDROCHLORIDE 10 MG: 10 TABLET, FILM COATED ORAL at 09:32

## 2020-10-23 RX ADMIN — METOPROLOL TARTRATE 25 MG: 25 TABLET, FILM COATED ORAL at 20:14

## 2020-10-23 RX ADMIN — INSULIN LISPRO 12 UNITS: 100 INJECTION, SOLUTION INTRAVENOUS; SUBCUTANEOUS at 12:50

## 2020-10-23 RX ADMIN — INSULIN HUMAN 5 UNITS: 100 INJECTION, SOLUTION PARENTERAL at 01:58

## 2020-10-23 RX ADMIN — FOLIC ACID 1 MG: 1 TABLET ORAL at 09:32

## 2020-10-23 RX ADMIN — OXYCODONE HYDROCHLORIDE 10 MG: 5 TABLET ORAL at 05:41

## 2020-10-23 RX ADMIN — CYANOCOBALAMIN TAB 500 MCG 1000 MCG: 500 TAB at 09:31

## 2020-10-23 RX ADMIN — INSULIN LISPRO 5 UNITS: 100 INJECTION, SOLUTION INTRAVENOUS; SUBCUTANEOUS at 17:08

## 2020-10-23 RX ADMIN — SODIUM CHLORIDE: 9 INJECTION, SOLUTION INTRAVENOUS at 17:42

## 2020-10-23 RX ADMIN — FINASTERIDE 5 MG: 5 TABLET, FILM COATED ORAL at 09:32

## 2020-10-23 RX ADMIN — ENOXAPARIN SODIUM 30 MG: 30 INJECTION SUBCUTANEOUS at 09:32

## 2020-10-23 RX ADMIN — OXYCODONE HYDROCHLORIDE 10 MG: 5 TABLET ORAL at 13:51

## 2020-10-23 RX ADMIN — SERTRALINE HYDROCHLORIDE 150 MG: 50 TABLET, FILM COATED ORAL at 09:32

## 2020-10-23 RX ADMIN — INSULIN LISPRO 10 UNITS: 100 INJECTION, SOLUTION INTRAVENOUS; SUBCUTANEOUS at 09:42

## 2020-10-23 RX ADMIN — ASPIRIN 81 MG: 81 TABLET, CHEWABLE ORAL at 09:32

## 2020-10-23 ASSESSMENT — PAIN SCALES - GENERAL
PAINLEVEL_OUTOF10: 8
PAINLEVEL_OUTOF10: 3
PAINLEVEL_OUTOF10: 9

## 2020-10-23 ASSESSMENT — PAIN DESCRIPTION - LOCATION: LOCATION: BACK;NECK

## 2020-10-23 NOTE — PROGRESS NOTES
Laying /73, HR 79    Sitting BP 81/58, HR 82    Standing BP 61/??,  HR 75   (Pt was not able to stand more than a minute and it did not give a diastolic reading.

## 2020-10-23 NOTE — PROGRESS NOTES
CARDIOLOGY PROGRESS NOTE      Patient Name: Fátima Amaral  Date of admission: 10/22/2020  8:04 AM  Admission Dx: Chest pain [R07.9]  Chest pain [R07.9]  Reason for Consult:  Chest/abdominal pain   Requesting Physician: Kingston Rosenthal MD  Primary Care physician: Daren Ayala, RICCO - ROOPA    Subjective:     Fátima Amaral is a 77 y.o. patient with prior history notable for cardiomyopathy, normal [facility] 2012, diabetes, hypertension, hyperlipidemia, prior CVA, dementia, who presented to the hospital with complaints of chest and abdominal pain. Hypertensive at presentation. He was tested for COVID-19 including PCR which was negative. BNP elevated. Serial cardiac enzymes negative. Diuresed yesterday with 60 IV x 1 in AM and 40 IV in PM. Scr slightly up today with rise in BUN 13 >43. Received additional 40 IV lasix this AM.     On yesterday's assessment in discussion with RN, he was having acute bouts of abdominal pain. He had more of this overnight. He reports chest discomfort across the bilateral upper chest but no recurrence since prior to admission. No abdominal nor chest pain at present. No dyspnea at rest. I/O seem inaccurate as no output charted yesterday. Weights unlikely 10lb wt gain 24 hrs. He appears dry. Home Medications:  Were reviewed and are listed in nursing record and/or below  Prior to Admission medications    Medication Sig Start Date End Date Taking? Authorizing Provider   loratadine (CLARITIN) 10 MG tablet Take 10 mg by mouth daily   Yes Historical Provider, MD   oxyCODONE (ROXICODONE) 5 MG immediate release tablet Take 10 mg by mouth every 6 hours as needed for Pain.    Yes Historical Provider, MD   midodrine (PROAMATINE) 10 MG tablet Take 1 tablet by mouth 3 times daily (with meals) 4/12/20  Yes Alejandro Small PA-C   memantine (NAMENDA) 10 MG tablet Take 10 mg by mouth 2 times daily   Yes Historical Provider, MD   hypromellose 0.4 % SOLN ophthalmic solution Place 1 drop into both eyes every 4 hours as needed   Yes Historical Provider, MD   polyethylene glycol (GLYCOLAX) powder Take 17 g by mouth daily   Yes Historical Provider, MD   fluticasone (FLOVENT HFA) 44 MCG/ACT inhaler Inhale 2 puffs into the lungs 2 times daily   Yes Historical Provider, MD   acetaminophen (TYLENOL) 325 MG tablet Take 650 mg by mouth every 4 hours as needed for Pain   Yes Historical Provider, MD   bisacodyl (DULCOLAX) 10 MG suppository Place 10 mg rectally daily as needed for Constipation   Yes Historical Provider, MD   insulin aspart (NOVOLOG) 100 UNIT/ML injection vial Inject 10 Units into the skin 3 times daily (before meals) +SSI  5/31/19  Yes Dg Edward MD   aspirin 81 MG tablet Take 81 mg by mouth daily   Yes Historical Provider, MD   finasteride (PROSCAR) 5 MG tablet Take 5 mg by mouth daily   Yes Historical Provider, MD   folic acid (FOLVITE) 1 MG tablet Take 1 mg by mouth daily   Yes Historical Provider, MD   metoprolol tartrate (LOPRESSOR) 25 MG tablet Take 25 mg by mouth 2 times daily   Yes Historical Provider, MD   sertraline (ZOLOFT) 100 MG tablet Take 150 mg by mouth daily   Yes Historical Provider, MD   SUMAtriptan (IMITREX) 100 MG tablet Take 100 mg by mouth daily as needed for Migraine   Yes Historical Provider, MD   atorvastatin (LIPITOR) 80 MG tablet Take 80 mg by mouth nightly    Yes Historical Provider, MD   Insulin Glargine (LANTUS SOLOSTAR SC) Inject 20 Units into the skin 2 times daily    Yes Historical Provider, MD   cyclobenzaprine (FLEXERIL) 5 MG tablet Take 10 mg by mouth 3 times daily    Yes Historical Provider, MD   melatonin 3 MG TABS tablet Take 6 mg by mouth nightly    Yes Historical Provider, MD   vitamin E 1000 units capsule Take 1 capsule by mouth daily 11/5/18  Yes RICCO Ramos - CNP   vitamin B-12 (CYANOCOBALAMIN) 1000 MCG tablet TAKE 1 TABLET BY MOUTH DAILY 7/2/18  Yes RICCO Ramos CNP   umeclidinium-vilanterol (ANORO ELLIPTA) 62.5-25 MCG/INH AEPB inhaler Inhale 1 puff into the lungs daily 3/27/18  Yes Sommer Mccoy MD   albuterol sulfate HFA (PROAIR HFA) 108 (90 Base) MCG/ACT inhaler Inhale 2 puffs into the lungs every 4 hours as needed for Wheezing or Shortness of Breath 3/27/18  Yes Sommer Mccoy MD   thiamine 100 MG tablet Take 1 tablet by mouth daily 3/6/18  Yes RICCO Plunkett CNP   glucagon 1 MG injection Inject 1 kit into the skin as needed   Yes Historical Provider, MD   aluminum & magnesium hydroxide-simethicone (MAALOX) 200-200-20 MG/5ML SUSP suspension Take 5 mLs by mouth every 6 hours as needed for Indigestion Takes mylanta 30 ml Q4 hours PRN    Historical Provider, MD   loperamide (IMODIUM) 2 MG capsule Take 2 mg by mouth every 4 hours as needed for Diarrhea    Historical Provider, MD   Dulaglutide (TRULICITY) 0.93 CB/9.8PZ SOPN Inject 0.75 mg into the skin once a week On Saturday    Historical Provider, MD   Continuous Blood Gluc  (FREESTYLE BRIDGET READER) DENIS Use to monitor sugars 8/1/18   Denisse Romero MD   Continuous Blood Gluc Sensor (FREESTYLE BRIDGET SENSOR SYSTEM) MISC Use to monitor sugars 8/1/18   Denisse Romero MD   AGAMATRIX PRESTO TEST strip USE TO TEST BLOOD SUGAR 3 TIMES A DAY 5/9/18   Denisse Romero MD   Lancets MISC Testing 2-3 times daily DX Code: E11.9 3/22/18   Denisse Romero MD   Blood Glucose Monitoring Suppl (TRUE METRIX AIR GLUCOSE METER) DENIS 1 meter 3/6/18   RICCO Plunkett CNP        CURRENT Medications:  ipratropium-albuterol (DUONEB) nebulizer solution 1 ampule, Q4H WA  atorvastatin (LIPITOR) tablet 80 mg, Nightly  bisacodyl (DULCOLAX) suppository 10 mg, Daily PRN  finasteride (PROSCAR) tablet 5 mg, Daily  folic acid (FOLVITE) tablet 1 mg, Daily  insulin glargine (LANTUS) injection vial 20 Units, BID  cetirizine (ZYRTEC) tablet 10 mg, Daily  memantine (NAMENDA) tablet 10 mg, BID  metoprolol tartrate (LOPRESSOR) tablet 25 mg, BID  polyethylene glycol (GLYCOLAX) packet 17 g, Daily PRN  sertraline (ZOLOFT) tablet 150 mg, Daily  vitamin B-1 (THIAMINE) tablet 100 mg, Daily  glycopyrrolate-formoterol (BEVESPI) 9-4.8 MCG/ACT inhaler 2 puff, BID  vitamin B-12 (CYANOCOBALAMIN) tablet 1,000 mcg, Daily  vitamin E capsule 800 Units, Daily  sodium chloride flush 0.9 % injection 10 mL, 2 times per day  sodium chloride flush 0.9 % injection 10 mL, PRN  acetaminophen (TYLENOL) tablet 650 mg, Q6H PRN    Or  acetaminophen (TYLENOL) suppository 650 mg, Q6H PRN  promethazine (PHENERGAN) tablet 12.5 mg, Q6H PRN    Or  ondansetron (ZOFRAN) injection 4 mg, Q6H PRN  glucose (GLUTOSE) 40 % oral gel 15 g, PRN  dextrose 50 % IV solution, PRN  glucagon (rDNA) injection 1 mg, PRN  dextrose 5 % solution, PRN  aspirin chewable tablet 81 mg, Daily  perflutren lipid microspheres (DEFINITY) injection 1.65 mg, ONCE PRN  insulin lispro (HUMALOG) injection vial 0-12 Units, TID WC  insulin lispro (HUMALOG) injection vial 0-6 Units, Nightly  furosemide (LASIX) injection 40 mg, BID  enoxaparin (LOVENOX) injection 30 mg, BID  melatonin tablet 5 mg, Nightly  aluminum & magnesium hydroxide-simethicone (MAALOX) 200-200-20 MG/5ML suspension 30 mL, Q6H PRN  oxyCODONE (ROXICODONE) immediate release tablet 10 mg, Q6H PRN  metoprolol (LOPRESSOR) injection 5 mg, Q6H PRN        Allergies:  Neurontin [gabapentin]     Review of Systems:   A 14 point review of symptoms completed. Pertinent positives identified in the HPI, all other review of symptoms negative.        Objective:     Vitals:    10/23/20 0350 10/23/20 0904 10/23/20 0915 10/23/20 1043   BP:   (!) 164/78 (!) 153/82   Pulse:   92 87   Resp:  16 16 16   Temp:   98.1 °F (36.7 °C) 98.3 °F (36.8 °C)   TempSrc:   Oral Oral   SpO2:  97% 94% 98%   Weight: 185 lb 3.2 oz (84 kg)      Height:          Weight: 185 lb 3.2 oz (84 kg)       PHYSICAL EXAM:    General:  Alert, cooperative, no distress, appears stated age   Head:  Normocephalic, atraumatic   Eyes: Conjunctiva/corneas clear, anicteric sclerae    Nose: Nares normal, no drainage or sinus tenderness   Throat: No abnormalities of the lips, oral mucosa or tongue. Dry membranes. Neck: Trachea midline. Neck supple with no lymphadenopathy, thyroid not enlarged, symmetric, no tenderness/mass/nodules, JVP just above clavicle with no HPJ   Lungs:   Clear to auscultation bilaterally, no wheezes, no rales, no respiratory distress   Chest Wall:  No deformity or tenderness to palpation   Heart:  Regular rate and rhythm, normal S1, normal S2, no murmur, no rub, no S3/S4, PMI non-palpable   Abdomen:   Soft, non-tender, with normoactive bowel sounds. No masses, no hepatosplenomegaly   Extremities: No cyanosis, clubbing. No edema. Vascular: 2+ radial, brachial,dorsalis pedis and posterior tibial pulses bilaterally. Brisk carotid upstrokes without carotid bruit. Skin: Skin color, texture, turgor are normal with no rashes or ulceration. Very dry lower extremities. Pysch: Euthymic mood, appropriate affect   Neurologic: Oriented to person, place and time. No slurred speech or facial asymmetry. No motor or sensory deficits on gross examination.          Labs:   CBC:   Lab Results   Component Value Date    WBC 12.3 10/23/2020    RBC 3.97 10/23/2020    HGB 11.9 10/23/2020    HCT 36.0 10/23/2020    MCV 90.6 10/23/2020    RDW 13.7 10/23/2020     10/23/2020     CMP:  Lab Results   Component Value Date     10/23/2020    K 4.0 10/23/2020    CL 96 10/23/2020    CO2 24 10/23/2020    BUN 43 10/23/2020    CREATININE 1.4 10/23/2020    GFRAA >60 10/23/2020    GFRAA >60 06/15/2013    AGRATIO 0.7 05/29/2019    LABGLOM 50 10/23/2020    GLUCOSE 400 10/23/2020    PROT 8.1 05/29/2019    PROT 8.2 03/12/2013    CALCIUM 9.1 10/23/2020    BILITOT 0.4 05/29/2019    ALKPHOS 89 05/29/2019    AST 15 05/29/2019    ALT 7 05/29/2019     PT/INR:  No results found for: PTINR  HgBA1c:  Lab Results   Component Value Date    LABA1C 7.9 04/10/2020     Lab Results   Component Value Date    CKTOTAL 242 08/10/2018    TROPONINI <0.01 10/22/2020         Interval Testing/Data:     Echo pending. Impression and Plan   Roberto Abdul is a 77 y.o. patient with prior history notable for cardiomyopathy, diabetes, hypertension, hyperlipidemia, prior CVA, dementia, who presented to the hospital with complaints of chest discomfort. Per my discussion with RN today at bedside he is having abdominal pain primarily. No dyspnea. No EKG changes. Trop negative. Recent decline in LVEF by echo 4/2020 with no ischemic evaluation at that time. This study was performed in setting of irregular heart beat at that time. Ruled out for MI.      1. Chest/abdominal pain  2. Cardiomyopathy, new onset, by echo 9/2020   3. Hypertension - uncontrolled. 4. Hyperlipidemia  5. Aortic root dilitation  6. Prior CVA  7. Orthostatic hypotension  8. Diabetes   9. Tobacco use disorder   10. Dementia       Plan:  1. Stop lasix as he appears dry, BUN/creatinine rising  2. Limited echo today for LVEF as now he's not in irregular rhythm as previous. Measure aortic root and image IVC as well for volume status, but suspect he's dry. 3. Will need lexiscan - ate this AM given chest discomfort and EF down 4/2020, suspect tomorrow. NPO at midnight, no caffeine  4. Change metoprolol to XL formulation tomorrow if persistent LV dysfunction by echo today. 5. Continue aspirin, statin  6. Check orthostatics today; likely to need fluid resuscitation; BMP this PM ordered.      We will continue to follow    Patient Active Problem List   Diagnosis    GERD (gastroesophageal reflux disease)    Peripheral neuropathy of bilateral feet    COPD (chronic obstructive pulmonary disease) (HCC)    HLD (hyperlipidemia)    Hepatic steatosis    PTSD (post-traumatic stress disorder)    History of hepatitis C    Bilateral knee & hip OA    Mild-moderate alcohol consumption (beer)    Chronic pain syndrome    Moderate episode of recurrent major depressive disorder (HCC)    Orthostatic hypotension    S/P left colectomy    Hyponatremia    S/p reversal of colostomy (6/27/17)    Chronic dCHF (grade 1 LVDD)    Chronic normocytic anemia    Acute hyperglycemia    Severe protein-calorie malnutrition (HCC)    DM (diabetes mellitus), secondary, uncontrolled, w/neurologic complic ([address]    HTN (hypertension)    Hx DKA (Feb 2018)    Other insomnia    Chronic transaminasemia    Tobacco smoker    Fall at home    Hypochloremia    Hyperkalemia    Mixed metabolic and respiratory acidosis    Closed nondisplaced L ankle fracture (lateral malleolus)    L 2nd toenail avulsion    Skin tear of L leg    Head trauma    IDDM (insulin dependent diabetes mellitus)    Ventral hernia without obstruction or gangrene    Diabetic ketoacidosis without coma associated with type 2 diabetes mellitus ([address]    DDD (degenerative disc disease), cervical    Spondylosis of cervical region without myelopathy or radiculopathy    DDD (degenerative disc disease), lumbar    Closed nondisplaced fracture of lateral malleolus of left fibula    Lumbar degenerative disc disease    Protrusion of lumbar intervertebral disc    Osteoarthritis of both knees    Osteoarthritis of both hips    Idiopathic peripheral neuropathy    Leg pain, anterior, left    CVA (cerebral vascular accident) ([address]    DM (diabetes mellitus) ([address]    HTN (hypertension), benign    BPH (benign prostatic hyperplasia)    Dysarthria    Left-sided weakness    TIA (transient ischemic attack)    Dyslipidemia    DKA, type 2, not at goal [facility])    Agitation requiring sedation protocol    DKA, type 1, not at goal [facility])    Severe malnutrition ([address]    CHIP (acute kidney injury) ([address]    Open wound of right foot    Dementia associated with other underlying disease without behavioral disturbance ([address]    Closed head injury    Facial hematoma    Concussion with no loss of consciousness    Chest pain    SOB (shortness of breath)    Acute on chronic systolic (congestive) heart failure (HCC)           I will address the patient's cardiac risk factors and adjusted pharmacologic treatment as needed. In addition, I have reinforced the need for patient directed risk factor modification. All questions and concerns were addressed to the patient/family. Alternatives to my treatment were discussed. Thank you for allowing us to participate in the care of Lauren Dodge. Please call me with any questions [phone].     Naida Sanders MD   Cardiovascular Disease  [facility]  [phone] [facility]  [phone] [address] [place] [address]  10/23/2020 11:12 AM

## 2020-10-23 NOTE — PROGRESS NOTES
Assessment completed and documented. VSS. A/ox2, disoriented to time (thinking it was 2019 and surprised that it was 2020) and disoriented to situation, stating that he is here because \"had a heart attack. \" c/o of \"chest pain\", but points to his lower abd and epigastic area. Requests valium several times and when asking why, patient states Morales Montiel gave it to me last time, so I must need it. \" this RN explained what the medication was for and patient said, \"well I think I need it for sleep. \" this RN told patient that the doctor ordered him something for sleep and we will keep an eye on him. Patient currently sleeping. Urinal at bedside. Denies further needs at this time. Bed locked and in lowest position. Bedside table and call light within reach. Will continue to monitor.

## 2020-10-23 NOTE — PROGRESS NOTES
Hospitalist Progress Note    Date of Admission: 10/22/2020    Chief Complaint: Chest pain    Hospital Course: 77 y.o. male who presented to [facility] with chest pain. PMHx significant for diabetes, hypertension, chronic systolic CHF with LVEF 82-60%, COPD, BPH, chronic pain and dementia. He chronically resides at [facility]. He reportedly presented with acute episode of chest pain associate with hypertension. Symptoms started the morning of presentation. Initially the pain was reported as heaviness and chest discomfort. Later the patient reported this to be more of a sharp stabbing pain. Symptoms were associated with some cough and shortness of breath. By report from the nursing home it was stated that he had a fever as well. During ED evaluation he was afebrile however. He had no leukocytosis. He was hypertensive with systolic blood pressures greater than 180. BNP was markedly elevated greater than 6000. Initial EKG and troponin were negative. He was admitted for ACS rule out, congestive heart failure, and rule out Covid-19. Subjective: No further chest pain or SOB. Afebrile. Orthostatic. Labs:   Recent Labs     10/22/20  0815 10/22/20  1326 10/23/20  0659   WBC 7.7 7.5 12.3*   HGB 12.6* 13.2* 11.9*   HCT 38.7* 40.1* 36.0*    211 194     Recent Labs     10/22/20  0815 10/23/20  0659 10/23/20  1549    131* 135*   K 4.4 4.0 3.7    96* 97*   CO2 26 24 26   BUN 20 43* 47*   CREATININE 1.1 1.4* 1.6*   CALCIUM 8.8 9.1 8.8     No results for input(s): AST, ALT, BILIDIR, BILITOT, ALKPHOS in the last 72 hours. No results for input(s): INR in the last 72 hours. Physical Exam Performed:    BP (!) 163/69   Pulse 84   Temp 97.6 °F (36.4 °C) (Oral)   Resp 16   Ht 6' 5\" (1.956 m)   Wt 185 lb 3.2 oz (84 kg)   SpO2 99%   BMI 21.96 kg/m²     General appearance: No apparent distress, appears stated age and cooperative.   HEENT:  Normal cephalic, atraumatic without obvious deformity. Pupils equal, round, and reactive to light. Extra ocular muscles intact. Conjunctivae/corneas clear. Neck: Supple, no jugular venous distention. Trachea midline with full range of motion. Respiratory:  Normal respiratory effort. Clear to auscultation, bilaterally without Rales/Wheezes/Rhonchi. Cardiovascular: Regular rate and rhythm with normal S1/S2 without murmurs, rubs or gallops. Abdomen: Soft, non-tender, non-distended with normal bowel sounds. Musculoskelatal: No clubbing, cyanosis or edema bilaterally. Full range of motion without deformity. Neurologic:  Neurovascularly intact without any focal sensory/motor deficits. Cranial nerves: II-XII intact, grossly non-focal.  Psychiatric: Alert and oriented, limited insight  Skin: Skin color, texture, turgor normal.  No rashes or lesions. Capillary Refill: Brisk,< 3 seconds   Peripheral Pulses: +2 palpable, equal bilaterally     Assessment/Plan:    Active Hospital Problems    Diagnosis    Chest pain [R07.9]    SOB (shortness of breath) [R06.02]    Acute on chronic systolic (congestive) heart failure (HCC) [I50.23]    Dementia associated with other underlying disease without behavioral disturbance (HCC) [F02.80]    HTN (hypertension), benign [I10]    DM (diabetes mellitus) [facility] [E11.9]    COPD (chronic obstructive pulmonary disease) ([facility] [J44.9]     Chest pain: Initial EKG and troponin are negative. Will follow serial troponins to rule out ACS. Cardiology consulted for input. Chest pain is atypical and associated with some shortness of breath, cough. Chest pain could be related to CHF and uncontrolled hypertension. Although not clearly documented there was apparently report of some fevers at the nursing home. No definite pneumonia. Will need to rule out COVID-19. Shortness of breath: While this is most likely related to CHF, HTN, COPD, there was report of fevers and cough. He is from congregate living.   Rapid Covid testing was negative. Recommend Covid RT-PCR. Continue droplet plus isolation. Acute on chronic systolic CHF: Most recent echocardiogram 4/20/2020 demonstrated LVEF of 45 to 50%. BNP is markedly significant elevated from baseline. Poorly controlled hypertension. No definite CHF on chest x-ray however had no signs of fluid overload on exam.  Received gentle diuresis; will hold off on further diuresis given resolved symptoms. Ruled out ACS. Cardiology recommends NM Stress testing. Monitor. Hypertension, uncontrolled: Blood pressure above goal.  Resumed home regimen along with IV diuresis. Difficult to control due to Orthostasis. Monitor and adjust as needed. COPD (chronic obstructive pulmonary disease): Stable without signs of exacerbation. Continue bronchodilators and inhaled steroids. Diabetes Mellitus, Type 2: Controlled. Continue basal-bolus Insulin regimen. Monitor blood sugar and adjust regimen as needed. Diabetic (Carb-controlled) diet when able. Dementia: Stable. Continue medical management and supportive care.        DVT Prophylaxis: Lovenox  Diet: DIET CARB CONTROL; No Caffeine  Code Status: Full Code  PT/OT Eval Status: NA    Dispo - 1-2 days      Dave Chatterjee MD

## 2020-10-23 NOTE — CARE COORDINATION
Case Management/Follow up:    Chart reviewed for length of stay. Hospital day #  1     Unit:  C-3    Diagnosis and current status as per MD progress: Chest pain- planning stress test tomorrow and then likely d/c back to [facility]. Anticipated d/c date: tomorrow 10/24.     Expected plan for discharge: return to [address] [place] [address] barriers:none identified    Precert required/date initiated:n/a    Confirmed plan with patient and/or family:yes, prior

## 2020-10-23 NOTE — PROGRESS NOTES
Paged NP Gavino, \"patient /95 , PRN please\"  Ordered metoprolol Q6 PRN for >180    When reassessing patient BP was 172/90    Will continue to monitor.

## 2020-10-24 LAB
ANION GAP SERPL CALCULATED.3IONS-SCNC: 10 MMOL/L (ref 3–16)
BUN BLDV-MCNC: 35 MG/DL (ref 7–20)
CALCIUM SERPL-MCNC: 8.7 MG/DL (ref 8.3–10.6)
CHLORIDE BLD-SCNC: 104 MMOL/L (ref 99–110)
CO2: 27 MMOL/L (ref 21–32)
CREAT SERPL-MCNC: 1.2 MG/DL (ref 0.8–1.3)
GFR AFRICAN AMERICAN: >60
GFR NON-AFRICAN AMERICAN: >60
GLUCOSE BLD-MCNC: 107 MG/DL (ref 70–99)
GLUCOSE BLD-MCNC: 115 MG/DL (ref 70–99)
GLUCOSE BLD-MCNC: 136 MG/DL (ref 70–99)
GLUCOSE BLD-MCNC: 217 MG/DL (ref 70–99)
GLUCOSE BLD-MCNC: 347 MG/DL (ref 70–99)
GLUCOSE BLD-MCNC: 92 MG/DL (ref 70–99)
PERFORMED ON: ABNORMAL
PERFORMED ON: NORMAL
POTASSIUM REFLEX MAGNESIUM: 3.6 MMOL/L (ref 3.5–5.1)
SODIUM BLD-SCNC: 141 MMOL/L (ref 136–145)

## 2020-10-24 PROCEDURE — 6370000000 HC RX 637 (ALT 250 FOR IP): Performed by: INTERNAL MEDICINE

## 2020-10-24 PROCEDURE — 6370000000 HC RX 637 (ALT 250 FOR IP): Performed by: NURSE PRACTITIONER

## 2020-10-24 PROCEDURE — 80048 BASIC METABOLIC PNL TOTAL CA: CPT

## 2020-10-24 PROCEDURE — G0378 HOSPITAL OBSERVATION PER HR: HCPCS

## 2020-10-24 PROCEDURE — 94640 AIRWAY INHALATION TREATMENT: CPT

## 2020-10-24 PROCEDURE — 99233 SBSQ HOSP IP/OBS HIGH 50: CPT | Performed by: NURSE PRACTITIONER

## 2020-10-24 PROCEDURE — 2580000003 HC RX 258: Performed by: INTERNAL MEDICINE

## 2020-10-24 RX ADMIN — ACETAMINOPHEN 650 MG: 325 TABLET ORAL at 03:56

## 2020-10-24 RX ADMIN — IPRATROPIUM BROMIDE AND ALBUTEROL SULFATE 1 AMPULE: .5; 3 SOLUTION RESPIRATORY (INHALATION) at 21:07

## 2020-10-24 RX ADMIN — GUAIFENESIN 600 MG: 600 TABLET, EXTENDED RELEASE ORAL at 12:08

## 2020-10-24 RX ADMIN — CYANOCOBALAMIN TAB 500 MCG 1000 MCG: 500 TAB at 12:07

## 2020-10-24 RX ADMIN — METOPROLOL TARTRATE 25 MG: 25 TABLET, FILM COATED ORAL at 12:06

## 2020-10-24 RX ADMIN — ASPIRIN 81 MG: 81 TABLET, CHEWABLE ORAL at 12:08

## 2020-10-24 RX ADMIN — OXYCODONE HYDROCHLORIDE 10 MG: 5 TABLET ORAL at 02:15

## 2020-10-24 RX ADMIN — GUAIFENESIN 600 MG: 600 TABLET, EXTENDED RELEASE ORAL at 20:36

## 2020-10-24 RX ADMIN — INSULIN LISPRO 8 UNITS: 100 INJECTION, SOLUTION INTRAVENOUS; SUBCUTANEOUS at 12:25

## 2020-10-24 RX ADMIN — FINASTERIDE 5 MG: 5 TABLET, FILM COATED ORAL at 12:09

## 2020-10-24 RX ADMIN — OXYCODONE HYDROCHLORIDE 10 MG: 5 TABLET ORAL at 12:20

## 2020-10-24 RX ADMIN — Medication 800 UNITS: at 15:30

## 2020-10-24 RX ADMIN — INSULIN LISPRO 5 UNITS: 100 INJECTION, SOLUTION INTRAVENOUS; SUBCUTANEOUS at 12:25

## 2020-10-24 RX ADMIN — SODIUM CHLORIDE, PRESERVATIVE FREE 10 ML: 5 INJECTION INTRAVENOUS at 20:48

## 2020-10-24 RX ADMIN — OXYCODONE HYDROCHLORIDE 10 MG: 5 TABLET ORAL at 18:56

## 2020-10-24 RX ADMIN — Medication 100 MG: at 12:08

## 2020-10-24 RX ADMIN — SERTRALINE HYDROCHLORIDE 150 MG: 50 TABLET, FILM COATED ORAL at 12:20

## 2020-10-24 RX ADMIN — MELATONIN TAB 5 MG 5 MG: 5 TAB at 20:37

## 2020-10-24 RX ADMIN — IPRATROPIUM BROMIDE AND ALBUTEROL SULFATE 1 AMPULE: .5; 3 SOLUTION RESPIRATORY (INHALATION) at 16:02

## 2020-10-24 RX ADMIN — INSULIN GLARGINE 20 UNITS: 100 INJECTION, SOLUTION SUBCUTANEOUS at 12:28

## 2020-10-24 RX ADMIN — ACETAMINOPHEN 650 MG: 325 TABLET ORAL at 23:40

## 2020-10-24 RX ADMIN — MEMANTINE 10 MG: 5 TABLET ORAL at 20:36

## 2020-10-24 RX ADMIN — CETIRIZINE HYDROCHLORIDE 10 MG: 10 TABLET, FILM COATED ORAL at 12:09

## 2020-10-24 RX ADMIN — METOPROLOL TARTRATE 25 MG: 25 TABLET, FILM COATED ORAL at 20:36

## 2020-10-24 RX ADMIN — FOLIC ACID 1 MG: 1 TABLET ORAL at 12:09

## 2020-10-24 RX ADMIN — IPRATROPIUM BROMIDE AND ALBUTEROL SULFATE 1 AMPULE: .5; 3 SOLUTION RESPIRATORY (INHALATION) at 12:23

## 2020-10-24 RX ADMIN — MEMANTINE 10 MG: 5 TABLET ORAL at 12:08

## 2020-10-24 RX ADMIN — INSULIN GLARGINE 20 UNITS: 100 INJECTION, SOLUTION SUBCUTANEOUS at 20:46

## 2020-10-24 RX ADMIN — SODIUM CHLORIDE, PRESERVATIVE FREE 10 ML: 5 INJECTION INTRAVENOUS at 12:12

## 2020-10-24 RX ADMIN — GLYCOPYRROLATE AND FORMOTEROL FUMARATE 2 PUFF: 9; 4.8 AEROSOL, METERED RESPIRATORY (INHALATION) at 21:15

## 2020-10-24 RX ADMIN — ATORVASTATIN CALCIUM 80 MG: 80 TABLET, FILM COATED ORAL at 20:36

## 2020-10-24 ASSESSMENT — PAIN SCALES - GENERAL
PAINLEVEL_OUTOF10: 9
PAINLEVEL_OUTOF10: 8
PAINLEVEL_OUTOF10: 9
PAINLEVEL_OUTOF10: 4
PAINLEVEL_OUTOF10: 8
PAINLEVEL_OUTOF10: 3
PAINLEVEL_OUTOF10: 9

## 2020-10-24 ASSESSMENT — PAIN DESCRIPTION - LOCATION
LOCATION: BACK
LOCATION: BACK
LOCATION: BACK;NECK

## 2020-10-24 ASSESSMENT — PAIN DESCRIPTION - FREQUENCY
FREQUENCY: CONTINUOUS
FREQUENCY: CONTINUOUS

## 2020-10-24 ASSESSMENT — PAIN DESCRIPTION - DESCRIPTORS
DESCRIPTORS: ACHING
DESCRIPTORS: ACHING

## 2020-10-24 ASSESSMENT — PAIN DESCRIPTION - ONSET
ONSET: ON-GOING
ONSET: ON-GOING

## 2020-10-24 ASSESSMENT — PAIN DESCRIPTION - PAIN TYPE
TYPE: CHRONIC PAIN
TYPE: CHRONIC PAIN

## 2020-10-24 ASSESSMENT — PAIN DESCRIPTION - ORIENTATION
ORIENTATION: MID
ORIENTATION: MID

## 2020-10-24 NOTE — PROGRESS NOTES
Hospitalist Progress Note    Date of Admission: 10/22/2020    Chief Complaint: Chest pain    Hospital Course: 77 y.o. male who presented to [facility] with chest pain. PMHx significant for diabetes, hypertension, chronic systolic CHF with LVEF 66-90%, COPD, BPH, chronic pain and dementia. He chronically resides at [facility]. He reportedly presented with acute episode of chest pain associate with hypertension. Symptoms started the morning of presentation. Initially the pain was reported as heaviness and chest discomfort. Later the patient reported this to be more of a sharp stabbing pain. Symptoms were associated with some cough and shortness of breath. By report from the nursing home it was stated that he had a fever as well. During ED evaluation he was afebrile however. He had no leukocytosis. He was hypertensive with systolic blood pressures greater than 180. BNP was markedly elevated greater than 6000. Initial EKG and troponin were negative. He was admitted for ACS rule out, congestive heart failure, and rule out Covid-19. Subjective:  Denies any chest pain today   No c/o n/v      Labs:   Recent Labs     10/22/20  0815 10/22/20  1326 10/23/20  0659   WBC 7.7 7.5 12.3*   HGB 12.6* 13.2* 11.9*   HCT 38.7* 40.1* 36.0*    211 194     Recent Labs     10/23/20  0659 10/23/20  1549 10/24/20  0616   * 135* 141   K 4.0 3.7 3.6   CL 96* 97* 104   CO2 24 26 27   BUN 43* 47* 35*   CREATININE 1.4* 1.6* 1.2   CALCIUM 9.1 8.8 8.7     No results for input(s): AST, ALT, BILIDIR, BILITOT, ALKPHOS in the last 72 hours. No results for input(s): INR in the last 72 hours. Physical Exam Performed:    BP (!) 175/84   Pulse 82   Temp 97.7 °F (36.5 °C) (Oral)   Resp 20   Ht 6' 5\" (1.956 m)   Wt 183 lb 6 oz (83.2 kg)   SpO2 94%   BMI 21.75 kg/m²     General appearance: No apparent distress, appears stated age and cooperative.   HEENT:  Normal cephalic, atraumatic without obvious deformity. Pupils equal, round, and reactive to light. Extra ocular muscles intact. Conjunctivae/corneas clear. Neck: Supple, no jugular venous distention. Trachea midline with full range of motion. Respiratory:  Normal respiratory effort. Clear to auscultation, bilaterally without Rales/Wheezes/Rhonchi. Cardiovascular: Regular rate and rhythm with normal S1/S2 without murmurs, rubs or gallops. Abdomen: Soft, non-tender, non-distended with normal bowel sounds. Musculoskelatal: No clubbing, cyanosis or edema bilaterally. Full range of motion without deformity. Neurologic:  Neurovascularly intact without any focal sensory/motor deficits. Cranial nerves: II-XII intact, grossly non-focal.  Psychiatric: Alert and oriented, limited insight  Skin: Skin color, texture, turgor normal.  No rashes or lesions. Capillary Refill: Brisk,< 3 seconds   Peripheral Pulses: +2 palpable, equal bilaterally     Assessment/Plan:    Active Hospital Problems    Diagnosis    Cardiomyopathy ([facility] [I42.9]    Chest pain [R07.9]    SOB (shortness of breath) [R06.02]    Acute on chronic systolic (congestive) heart failure (HCC) [I50.23]    Dementia associated with other underlying disease without behavioral disturbance (HCC) [F02.80]    HTN (hypertension), benign [I10]    DM (diabetes mellitus) ([facility] [E11.9]    COPD with acute bronchitis ([facility] [J44.0, J20.9]     Chest pain: Initial EKG and troponin are negative. Will follow serial troponins to rule out ACS. Cardiology consulted for input. Chest pain is atypical and associated with some shortness of breath, cough. Chest pain could be related to CHF and uncontrolled hypertension. Although not clearly documented there was apparently report of some fevers at the nursing home. No definite pneumonia. Will need to rule out COVID-19. Shortness of breath: While this is most likely related to CHF, HTN, COPD, there was report of fevers and cough. He is from congregate living. Rapid Covid testing was negative. Acute on chronic systolic CHF: Most recent echocardiogram 4/20/2020 demonstrated LVEF of 45 to 50%. BNP is markedly significant elevated from baseline. Poorly controlled hypertension. No definite CHF on chest x-ray however had no signs of fluid overload on exam.  Received gentle diuresis; will hold off on further diuresis given resolved symptoms. Ruled out ACS. Cardiology recommends NM Stress testing ? To be done 10/26,  Monitor. Hypertension, uncontrolled: Blood pressure above goal.  Resumed home regimen along with IV diuresis. Difficult to control due to Orthostasis. Monitor and adjust as needed. COPD (chronic obstructive pulmonary disease): Stable without signs of exacerbation. Continue bronchodilators and inhaled steroids. Diabetes Mellitus, Type 2: Controlled. Continue basal-bolus Insulin regimen. Monitor blood sugar and adjust regimen as needed. Diabetic (Carb-controlled) diet when able. Dementia: Stable. Continue medical management and supportive care.        DVT Prophylaxis: Lovenox  Diet: DIET CARB CONTROL; No Caffeine  Diet NPO, After Midnight  Code Status: Full Code  PT/OT Eval Status: NA    Dispo - 3  days      Taylor Larson MD

## 2020-10-24 NOTE — PROGRESS NOTES
[facility]   Daily Progress Note    Admit Date:  10/22/2020  HPI:    Chief Complaint   Patient presents with    Chest Pain     woke with chest pain . [place] bp high. . squad transmitted ekg. . asa given no nitro. . negative chest pain on arrival      Roberto Abdul resented with symptoms of chest discomfort as well as abdominal discomfort. He denied any nausea vomiting diaphoresis or shortness of breath. He has a past medical history of cardiomyopathy with normal left heart catheterization in 2012, hypertension, hyperlipidemia, diabetes mellitus, history of prior CVA, dementia. Initial work-up notable for significant hypertension, elevated BNP, negative troponin, negative COVID-19. Subjective:  Mr. Terra Gould lying flat in bed. complains of headache and tiredness. Falls asleep during conversation but awakens easily. Denies chest pain or shortness of breath. Objective:   BP (!) 154/73   Pulse 74   Temp 98.2 °F (36.8 °C) (Oral)   Resp 16   Ht 6' 5\" (1.956 m)   Wt 183 lb 6 oz (83.2 kg)   SpO2 97%   BMI 21.75 kg/m²       Intake/Output Summary (Last 24 hours) at 10/24/2020 0754  Last data filed at 10/24/2020 0700  Gross per 24 hour   Intake 2388.35 ml   Output 1800 ml   Net 588.35 ml     Wt Readings from Last 3 Encounters:   10/24/20 183 lb 6 oz (83.2 kg)   04/12/20 174 lb 4.8 oz (79.1 kg)   01/15/20 190 lb (86.2 kg)         ASSESSMENT:   1. Chest pain - neg ECG and troponins for ACS. Stress test today  2. Cardiomyopathy, unknown etiology - drop in EF by echo in April 2020, repeat echo and stress test   3. HTN - sub optimal, had been on midodrine prior to admission, monitor  4. CHIP - resolved with holding diuretic and IV fluids  5. HLD - LDL 42, on statin  6. Hx CVA  7. DM  8. Dementia      PLAN:  1. Stress test today  2. Echo  3.  Monitor BP    RICCO Brothers - CNP, 10/24/2020, 7:54 AM  [facility]   [phone]       Telemetry: SR, LBBB, 70-80's  NYHA: III    Physical Exam:  General:  Awake, alert, NAD  Skin:  Warm and dry  Neck:  JVP 8 cm flat  Chest:  Clear to auscultation   Cardiovascular:  RRR, normal S1S2, no m/g/r  Abdomen:  Soft, nontender, +bowel sounds  Extremities:  No BLE edema      Medications:    insulin lispro  5 Units Subcutaneous TID WC    guaiFENesin  600 mg Oral BID    ipratropium-albuterol  1 ampule Inhalation Q4H WA    atorvastatin  80 mg Oral Nightly    finasteride  5 mg Oral Daily    folic acid  1 mg Oral Daily    insulin glargine  20 Units Subcutaneous BID    cetirizine  10 mg Oral Daily    memantine  10 mg Oral BID    metoprolol tartrate  25 mg Oral BID    sertraline  150 mg Oral Daily    thiamine  100 mg Oral Daily    glycopyrrolate-formoterol  2 puff Inhalation BID    vitamin B-12  1,000 mcg Oral Daily    vitamin E  800 Units Oral Daily    sodium chloride flush  10 mL Intravenous 2 times per day    aspirin  81 mg Oral Daily    insulin lispro  0-12 Units Subcutaneous TID     insulin lispro  0-6 Units Subcutaneous Nightly    melatonin  5 mg Oral Nightly      dextrose         Lab Data: Lab results independently reviewed by myself 10/24/20   CBC:   Recent Labs     10/22/20  0815 10/22/20  1326 10/23/20  0659   WBC 7.7 7.5 12.3*   HGB 12.6* 13.2* 11.9*    211 194     BMP:    Recent Labs     10/23/20  0659 10/23/20  1549 10/24/20  0616   * 135* 141   K 4.0 3.7 3.6   CO2 24 26 27   BUN 43* 47* 35*   CREATININE 1.4* 1.6* 1.2     INR:  No results for input(s): INR in the last 72 hours.   BNP:    Recent Labs     10/22/20  0815   PROBNP 6,454*     Cardiac Enzymes:   Recent Labs     10/22/20  0815 10/22/20  1326 10/22/20  1615   TROPONINI <0.01 <0.01 <0.01     Lipids:   Lab Results   Component Value Date    TRIG 84 10/23/2020    TRIG 127 10/18/2018    HDL 51 10/23/2020    HDL 36 10/18/2018    HDL 59 05/30/2012    LDLCALC 42 10/23/2020    LDLCALC 73 10/18/2018       Cardiac Imaging:    ECHO 4/10/20:    Summary   Left ventricular systolic function is low normal to mildly reduced LVEF 40-50%, difficult to evaluate LVEF due to irregular heart beat. Technically difficult examination. There is mild-moderate left ventricular hypertrophy. Left ventricle size is normal.   The aortic root is moderately dilated. Aortic valve leaflets appear mildly thickened. Mitral annular calcification is present. Mitral valve leaflets appear mildly thickened. Mild mitral and tricuspid regurgitation. Inadequate tricuspid regurgitation to estimate systolic pulmonary artery pressure. IVC is normal in size (< 2.1 cm) and collapses > 50% with respiration consistent with  normal RA pressure (3 mmHg).    Previous echo done 10/2018 - EF 55-60%

## 2020-10-24 NOTE — PLAN OF CARE
Pt remained free of falls. Call light within reach. Bed alarm on. Patient with positive orthostatic BP, dangle feet and sit before standing up to ambulate. Non skid footwear in place. Bed locked and in lowest position. Will continue to monitor.

## 2020-10-24 NOTE — PLAN OF CARE
Problem: Falls - Risk of:  Goal: Will remain free from falls  Description: Will remain free from falls  10/24/2020 1406 by Catherine Dejesus RN  Outcome: Ongoing  Note: Bed in lowest position, wheels locked, 2/4 side rails up, nonskid footwear on. Bed/ chair check alarm in place, call light within reach. Pt instructed to call out when needing assistance. Pt stated understanding. Nurse will continue to monitor. Problem: Falls - Risk of:  Goal: Absence of physical injury  Description: Absence of physical injury  Outcome: Ongoing     Problem: Pain:  Goal: Control of acute pain  Description: Control of acute pain  Outcome: Ongoing  Note: Pt scoring pain on 0-10 scale. Pain medications given per MAR. Pt instructed to call out when pain level increasing. Call light within reach. Nurse will continue to reassess and monitor.      Problem: Skin Integrity:  Goal: Absence of new skin breakdown  Description: Absence of new skin breakdown  Outcome: Ongoing

## 2020-10-24 NOTE — PROGRESS NOTES
Assessment completed and documented. VSS. A/ox4. C/o 8/10 pain, given prn pain medication per mar. Frequently asking this RN for valium or flexeril. x1 assist with ambulation. IV fluids running. Denies further needs at this time. Bed locked and in lowest position. Bedside table and call light within reach. Will continue to monitor.

## 2020-10-24 NOTE — PROGRESS NOTES
Pt transferred to 538 in stable condition. [address] notified. Pt's MEL Arauz called and notified of pt transfer to room  and notified of visitor status.

## 2020-10-25 LAB
ANION GAP SERPL CALCULATED.3IONS-SCNC: 8 MMOL/L (ref 3–16)
BUN BLDV-MCNC: 35 MG/DL (ref 7–20)
CALCIUM SERPL-MCNC: 9.1 MG/DL (ref 8.3–10.6)
CHLORIDE BLD-SCNC: 104 MMOL/L (ref 99–110)
CO2: 27 MMOL/L (ref 21–32)
CREAT SERPL-MCNC: 1.4 MG/DL (ref 0.8–1.3)
GFR AFRICAN AMERICAN: >60
GFR NON-AFRICAN AMERICAN: 50
GLUCOSE BLD-MCNC: 114 MG/DL (ref 70–99)
GLUCOSE BLD-MCNC: 223 MG/DL (ref 70–99)
GLUCOSE BLD-MCNC: 349 MG/DL (ref 70–99)
GLUCOSE BLD-MCNC: 87 MG/DL (ref 70–99)
GLUCOSE BLD-MCNC: 91 MG/DL (ref 70–99)
PERFORMED ON: ABNORMAL
PERFORMED ON: NORMAL
POTASSIUM REFLEX MAGNESIUM: 3.6 MMOL/L (ref 3.5–5.1)
SODIUM BLD-SCNC: 139 MMOL/L (ref 136–145)

## 2020-10-25 PROCEDURE — 99232 SBSQ HOSP IP/OBS MODERATE 35: CPT | Performed by: NURSE PRACTITIONER

## 2020-10-25 PROCEDURE — 6370000000 HC RX 637 (ALT 250 FOR IP): Performed by: INTERNAL MEDICINE

## 2020-10-25 PROCEDURE — G0378 HOSPITAL OBSERVATION PER HR: HCPCS

## 2020-10-25 PROCEDURE — 6370000000 HC RX 637 (ALT 250 FOR IP): Performed by: NURSE PRACTITIONER

## 2020-10-25 PROCEDURE — 6370000000 HC RX 637 (ALT 250 FOR IP): Performed by: FAMILY MEDICINE

## 2020-10-25 PROCEDURE — 36415 COLL VENOUS BLD VENIPUNCTURE: CPT

## 2020-10-25 PROCEDURE — 94640 AIRWAY INHALATION TREATMENT: CPT

## 2020-10-25 PROCEDURE — 2580000003 HC RX 258: Performed by: INTERNAL MEDICINE

## 2020-10-25 PROCEDURE — 80048 BASIC METABOLIC PNL TOTAL CA: CPT

## 2020-10-25 PROCEDURE — 1200000000 HC SEMI PRIVATE

## 2020-10-25 RX ORDER — SUMATRIPTAN 25 MG/1
50 TABLET, FILM COATED ORAL ONCE
Status: COMPLETED | OUTPATIENT
Start: 2020-10-25 | End: 2020-10-25

## 2020-10-25 RX ORDER — LISINOPRIL 5 MG/1
5 TABLET ORAL DAILY
Status: DISCONTINUED | OUTPATIENT
Start: 2020-10-25 | End: 2020-10-26

## 2020-10-25 RX ORDER — INSULIN GLARGINE 100 [IU]/ML
10 INJECTION, SOLUTION SUBCUTANEOUS NIGHTLY
Status: DISCONTINUED | OUTPATIENT
Start: 2020-10-25 | End: 2020-10-27 | Stop reason: HOSPADM

## 2020-10-25 RX ADMIN — OXYCODONE HYDROCHLORIDE 10 MG: 5 TABLET ORAL at 07:12

## 2020-10-25 RX ADMIN — SODIUM CHLORIDE, PRESERVATIVE FREE 10 ML: 5 INJECTION INTRAVENOUS at 08:07

## 2020-10-25 RX ADMIN — INSULIN GLARGINE 10 UNITS: 100 INJECTION, SOLUTION SUBCUTANEOUS at 20:50

## 2020-10-25 RX ADMIN — ACETAMINOPHEN 650 MG: 325 TABLET ORAL at 05:49

## 2020-10-25 RX ADMIN — MEMANTINE 10 MG: 5 TABLET ORAL at 20:01

## 2020-10-25 RX ADMIN — SUMATRIPTAN SUCCINATE 50 MG: 25 TABLET ORAL at 20:01

## 2020-10-25 RX ADMIN — OXYCODONE HYDROCHLORIDE 10 MG: 5 TABLET ORAL at 12:16

## 2020-10-25 RX ADMIN — Medication 100 MG: at 08:07

## 2020-10-25 RX ADMIN — GLYCOPYRROLATE AND FORMOTEROL FUMARATE 2 PUFF: 9; 4.8 AEROSOL, METERED RESPIRATORY (INHALATION) at 19:05

## 2020-10-25 RX ADMIN — INSULIN LISPRO 4 UNITS: 100 INJECTION, SOLUTION INTRAVENOUS; SUBCUTANEOUS at 12:17

## 2020-10-25 RX ADMIN — IPRATROPIUM BROMIDE AND ALBUTEROL SULFATE 1 AMPULE: .5; 3 SOLUTION RESPIRATORY (INHALATION) at 07:38

## 2020-10-25 RX ADMIN — OXYCODONE HYDROCHLORIDE 10 MG: 5 TABLET ORAL at 01:01

## 2020-10-25 RX ADMIN — CYANOCOBALAMIN TAB 500 MCG 1000 MCG: 500 TAB at 08:07

## 2020-10-25 RX ADMIN — OXYCODONE HYDROCHLORIDE 10 MG: 5 TABLET ORAL at 19:15

## 2020-10-25 RX ADMIN — Medication 800 UNITS: at 08:07

## 2020-10-25 RX ADMIN — IPRATROPIUM BROMIDE AND ALBUTEROL SULFATE 1 AMPULE: .5; 3 SOLUTION RESPIRATORY (INHALATION) at 15:22

## 2020-10-25 RX ADMIN — MEMANTINE 10 MG: 5 TABLET ORAL at 08:07

## 2020-10-25 RX ADMIN — SODIUM CHLORIDE, PRESERVATIVE FREE 10 ML: 5 INJECTION INTRAVENOUS at 20:02

## 2020-10-25 RX ADMIN — GUAIFENESIN 600 MG: 600 TABLET, EXTENDED RELEASE ORAL at 20:01

## 2020-10-25 RX ADMIN — CETIRIZINE HYDROCHLORIDE 10 MG: 10 TABLET, FILM COATED ORAL at 08:07

## 2020-10-25 RX ADMIN — GLYCOPYRROLATE AND FORMOTEROL FUMARATE 2 PUFF: 9; 4.8 AEROSOL, METERED RESPIRATORY (INHALATION) at 07:38

## 2020-10-25 RX ADMIN — ASPIRIN 81 MG: 81 TABLET, CHEWABLE ORAL at 08:07

## 2020-10-25 RX ADMIN — METOPROLOL TARTRATE 25 MG: 25 TABLET, FILM COATED ORAL at 20:01

## 2020-10-25 RX ADMIN — GUAIFENESIN 600 MG: 600 TABLET, EXTENDED RELEASE ORAL at 08:07

## 2020-10-25 RX ADMIN — INSULIN LISPRO 5 UNITS: 100 INJECTION, SOLUTION INTRAVENOUS; SUBCUTANEOUS at 12:17

## 2020-10-25 RX ADMIN — FOLIC ACID 1 MG: 1 TABLET ORAL at 08:07

## 2020-10-25 RX ADMIN — FINASTERIDE 5 MG: 5 TABLET, FILM COATED ORAL at 08:07

## 2020-10-25 RX ADMIN — IPRATROPIUM BROMIDE AND ALBUTEROL SULFATE 1 AMPULE: .5; 3 SOLUTION RESPIRATORY (INHALATION) at 19:05

## 2020-10-25 RX ADMIN — ATORVASTATIN CALCIUM 80 MG: 80 TABLET, FILM COATED ORAL at 20:01

## 2020-10-25 RX ADMIN — INSULIN LISPRO 4 UNITS: 100 INJECTION, SOLUTION INTRAVENOUS; SUBCUTANEOUS at 20:50

## 2020-10-25 RX ADMIN — ACETAMINOPHEN 650 MG: 325 TABLET ORAL at 19:15

## 2020-10-25 RX ADMIN — MELATONIN TAB 5 MG 5 MG: 5 TAB at 20:01

## 2020-10-25 RX ADMIN — SERTRALINE HYDROCHLORIDE 150 MG: 50 TABLET, FILM COATED ORAL at 08:07

## 2020-10-25 RX ADMIN — LISINOPRIL 5 MG: 5 TABLET ORAL at 09:53

## 2020-10-25 RX ADMIN — METOPROLOL TARTRATE 25 MG: 25 TABLET, FILM COATED ORAL at 09:53

## 2020-10-25 ASSESSMENT — PAIN DESCRIPTION - PAIN TYPE
TYPE: CHRONIC PAIN

## 2020-10-25 ASSESSMENT — PAIN DESCRIPTION - ONSET
ONSET: ON-GOING
ONSET: ON-GOING

## 2020-10-25 ASSESSMENT — PAIN DESCRIPTION - DESCRIPTORS
DESCRIPTORS: ACHING
DESCRIPTORS: ACHING

## 2020-10-25 ASSESSMENT — PAIN DESCRIPTION - FREQUENCY
FREQUENCY: CONTINUOUS
FREQUENCY: CONTINUOUS

## 2020-10-25 ASSESSMENT — PAIN SCALES - GENERAL
PAINLEVEL_OUTOF10: 8
PAINLEVEL_OUTOF10: 7
PAINLEVEL_OUTOF10: 8
PAINLEVEL_OUTOF10: 9
PAINLEVEL_OUTOF10: 9
PAINLEVEL_OUTOF10: 8
PAINLEVEL_OUTOF10: 0
PAINLEVEL_OUTOF10: 7

## 2020-10-25 ASSESSMENT — PAIN DESCRIPTION - LOCATION
LOCATION: BACK
LOCATION: BACK

## 2020-10-25 ASSESSMENT — PAIN DESCRIPTION - ORIENTATION
ORIENTATION: MID
ORIENTATION: MID

## 2020-10-25 NOTE — PLAN OF CARE
Pt educated on the need to turn every 2 hours to prevent any skin integrity breakdown. Pt verbalizes understanding. Pt refuses repositions. See documentation.

## 2020-10-25 NOTE — PROGRESS NOTES
[facility]   Daily Progress Note    Admit Date:  10/22/2020  HPI:    Chief Complaint   Patient presents with    Chest Pain     woke with chest pain . [place] bp high. . squad transmitted ekg. . asa given no nitro. . negative chest pain on arrival      Gabriella Hussein resented with symptoms of chest discomfort as well as abdominal discomfort. He denied any nausea vomiting diaphoresis or shortness of breath. He has a past medical history of cardiomyopathy with normal left heart catheterization in 2012, hypertension, hyperlipidemia, diabetes mellitus, history of prior CVA, dementia. Initial work-up notable for significant hypertension, elevated BNP, negative troponin, negative COVID-19. Subjective:  Mr. Phelan Angles awake, alert, denies any further chest pain. Breathing is stable. Hungry. Objective:   BP (!) 192/93   Pulse 78   Temp 97.4 °F (36.3 °C) (Oral)   Resp 16   Ht 6' 5\" (1.956 m)   Wt 183 lb 6 oz (83.2 kg)   SpO2 97%   BMI 21.75 kg/m²       Intake/Output Summary (Last 24 hours) at 10/25/2020 0911  Last data filed at 10/24/2020 1857  Gross per 24 hour   Intake 858 ml   Output --   Net 858 ml     Wt Readings from Last 3 Encounters:   10/24/20 183 lb 6 oz (83.2 kg)   04/12/20 174 lb 4.8 oz (79.1 kg)   01/15/20 190 lb (86.2 kg)         ASSESSMENT:   1. Chest pain - neg ECG and troponins for ACS. Stress test tomorrow AM  2. Cardiomyopathy, unknown etiology - drop in EF by echo in April 2020, repeat echo and stress test pending  3. HTN - sub optimal, had been on midodrine prior to admission - holding; has been on hydralazine, lisinopril and hydrochlorothiazide in past  4. CHIP - resolved with holding diuretic and IV fluids  5. HLD - LDL 42, on statin  6. Hx CVA  7. DM  8. Dementia      PLAN:  1. Stress test and Echo Monday  2. Start lisinopril 5 mg - monitor BP and renal panel  3.  Okay to eat today - no caffeine    Maykel Townsend, APRN - CNP, 10/25/2020, 9:11 AM  [facility] [phone]       Telemetry: SR, LBBB, PVC's, 70-80's  NYHA: III    Physical Exam:  General:  Awake, alert, NAD  Skin:  Warm and dry  Neck:  JVP 8 cm flat  Chest:  Clear to auscultation   Cardiovascular:  RRR, normal S1S2, no m/g/r  Abdomen:  Soft, nontender, +bowel sounds  Extremities:  No BLE edema      Medications:    insulin lispro  5 Units Subcutaneous TID     guaiFENesin  600 mg Oral BID    ipratropium-albuterol  1 ampule Inhalation Q4H WA    atorvastatin  80 mg Oral Nightly    finasteride  5 mg Oral Daily    folic acid  1 mg Oral Daily    insulin glargine  20 Units Subcutaneous BID    cetirizine  10 mg Oral Daily    memantine  10 mg Oral BID    metoprolol tartrate  25 mg Oral BID    sertraline  150 mg Oral Daily    thiamine  100 mg Oral Daily    glycopyrrolate-formoterol  2 puff Inhalation BID    vitamin B-12  1,000 mcg Oral Daily    vitamin E  800 Units Oral Daily    sodium chloride flush  10 mL Intravenous 2 times per day    aspirin  81 mg Oral Daily    insulin lispro  0-12 Units Subcutaneous TID     insulin lispro  0-6 Units Subcutaneous Nightly    melatonin  5 mg Oral Nightly      dextrose         Lab Data: Lab results independently reviewed by myself 10/24/20   CBC:   Recent Labs     10/22/20  1326 10/23/20  0659   WBC 7.5 12.3*   HGB 13.2* 11.9*    194     BMP:    Recent Labs     10/23/20  0659 10/23/20  1549 10/24/20  0616   * 135* 141   K 4.0 3.7 3.6   CO2 24 26 27   BUN 43* 47* 35*   CREATININE 1.4* 1.6* 1.2     INR:  No results for input(s): INR in the last 72 hours. BNP:    No results for input(s): PROBNP in the last 72 hours.   Cardiac Enzymes:   Recent Labs     10/22/20  1326 10/22/20  1615   TROPONINI <0.01 <0.01     Lipids:   Lab Results   Component Value Date    TRIG 84 10/23/2020    TRIG 127 10/18/2018    HDL 51 10/23/2020    HDL 36 10/18/2018    HDL 59 05/30/2012    LDLCALC 42 10/23/2020    LDLCALC 73 10/18/2018       Cardiac Imaging:    ECHO 4/10/20: Summary   Left ventricular systolic function is low normal to mildly reduced LVEF 40-50%, difficult to evaluate LVEF due to irregular heart beat. Technically difficult examination. There is mild-moderate left ventricular hypertrophy. Left ventricle size is normal.   The aortic root is moderately dilated. Aortic valve leaflets appear mildly thickened. Mitral annular calcification is present. Mitral valve leaflets appear mildly thickened. Mild mitral and tricuspid regurgitation. Inadequate tricuspid regurgitation to estimate systolic pulmonary artery pressure. IVC is normal in size (< 2.1 cm) and collapses > 50% with respiration consistent with  normal RA pressure (3 mmHg).    Previous echo done 10/2018 - EF 55-60%

## 2020-10-25 NOTE — PROGRESS NOTES
Hospitalist Progress Note    Date of Admission: 10/22/2020    Chief Complaint: Chest pain    Hospital Course: 77 y.o. male who presented to [facility] with chest pain. PMHx significant for diabetes, hypertension, chronic systolic CHF with LVEF 43-19%, COPD, BPH, chronic pain and dementia. He chronically resides at [facility]. He reportedly presented with acute episode of chest pain associate with hypertension. Symptoms started the morning of presentation. Initially the pain was reported as heaviness and chest discomfort. Later the patient reported this to be more of a sharp stabbing pain. Symptoms were associated with some cough and shortness of breath. By report from the nursing home it was stated that he had a fever as well. During ED evaluation he was afebrile however. He had no leukocytosis. He was hypertensive with systolic blood pressures greater than 180. BNP was markedly elevated greater than 6000. Initial EKG and troponin were negative. He was admitted for ACS rule out, congestive heart failure, and rule out Covid-19. Subjective:  Denies any chest pain , SOB    No c/o n/v      Labs:   Recent Labs     10/23/20  0659   WBC 12.3*   HGB 11.9*   HCT 36.0*        Recent Labs     10/23/20  1549 10/24/20  0616 10/25/20  0736   * 141 139   K 3.7 3.6 3.6   CL 97* 104 104   CO2 26 27 27   BUN 47* 35* 35*   CREATININE 1.6* 1.2 1.4*   CALCIUM 8.8 8.7 9.1     No results for input(s): AST, ALT, BILIDIR, BILITOT, ALKPHOS in the last 72 hours. No results for input(s): INR in the last 72 hours. Physical Exam Performed:    BP (!) 192/93   Pulse 78   Temp 97.4 °F (36.3 °C) (Oral)   Resp 16   Ht 6' 5\" (1.956 m)   Wt 183 lb 6 oz (83.2 kg)   SpO2 97%   BMI 21.75 kg/m²     General appearance: No apparent distress, appears stated age and cooperative. HEENT:  Normal cephalic, atraumatic without obvious deformity. Pupils equal, round, and reactive to light.   Extra ocular muscles intact. Conjunctivae/corneas clear. Neck: Supple, no jugular venous distention. Trachea midline with full range of motion. Respiratory:  Normal respiratory effort. Clear to auscultation, bilaterally without Rales/Wheezes/Rhonchi. Cardiovascular: Regular rate and rhythm with normal S1/S2 without murmurs, rubs or gallops. Abdomen: Soft, non-tender, non-distended with normal bowel sounds. Musculoskelatal: No clubbing, cyanosis or edema bilaterally. Full range of motion without deformity. Neurologic:  Neurovascularly intact without any focal sensory/motor deficits. Cranial nerves: II-XII intact, grossly non-focal.  Psychiatric: Alert and oriented, limited insight  Skin: Skin color, texture, turgor normal.  No rashes or lesions. Capillary Refill: Brisk,< 3 seconds   Peripheral Pulses: +2 palpable, equal bilaterally     Assessment/Plan:    Active Hospital Problems    Diagnosis    [facility]omyopathy ([facility] [I42.9]    Chest pain [R07.9]    SOB (shortness of breath) [R06.02]    Acute on chronic systolic (congestive) heart failure (HCC) [I50.23]    Dementia associated with other underlying disease without behavioral disturbance (HCC) [F02.80]    HTN (hypertension), benign [I10]    DM (diabetes mellitus) ([facility] [E11.9]    COPD with acute bronchitis ([facility] [J44.0, J20.9]     Chest pain: Initial EKG and troponin are negative. Will follow serial troponins to rule out ACS. Cardiology consulted for input. Chest pain is atypical and associated with some shortness of breath, cough. Chest pain could be related to CHF and uncontrolled hypertension. Although not clearly documented there was apparently report of some fevers at the nursing home. No definite pneumonia. Will need to rule out COVID-19. Shortness of breath: While this is most likely related to CHF, HTN, COPD, there was report of fevers and cough. He is from congregate living. Rapid Covid testing was negative.       Acute on chronic systolic CHF: Most recent echocardiogram 4/20/2020 demonstrated LVEF of 45 to 50%. BNP is markedly significant elevated from baseline. Poorly controlled hypertension. No definite CHF on chest x-ray however had no signs of fluid overload on exam.  Received gentle diuresis; will hold off on further diuresis given resolved symptoms. Ruled out ACS. Cardiology recommends NM Stress testing ? To be done 10/26,  Monitor. Hypertension, uncontrolled: Blood pressure above goal.  Resumed home regimen  Difficult to control due to Orthostasis. Monitor and adjust as needed. lisinopril added 10/25    COPD (chronic obstructive pulmonary disease): Stable without signs of exacerbation. Continue bronchodilators and inhaled steroids. Diabetes Mellitus, Type 2: Controlled. Continue basal-bolus Insulin regimen. Monitor blood sugar and adjust regimen as needed. Diabetic (Carb-controlled) diet when able. lantus dose dec due to NPO status from this midnight 10/25    Dementia: Stable. Continue medical management and supportive care. DVT Prophylaxis: Lovenox  Diet: DIET CARDIAC; No Caffeine  Diet NPO, After Midnight  Code Status: Full Code  PT/OT Eval Status: NA    Dispo - 2   days  .  Pending stress test in am 10/26    Pilar Noguera MD

## 2020-10-26 ENCOUNTER — APPOINTMENT (OUTPATIENT)
Dept: NUCLEAR MEDICINE | Age: 70
DRG: 292 | End: 2020-10-26
Payer: MEDICARE

## 2020-10-26 LAB
ANION GAP SERPL CALCULATED.3IONS-SCNC: 8 MMOL/L (ref 3–16)
BLOOD CULTURE, ROUTINE: NORMAL
BUN BLDV-MCNC: 30 MG/DL (ref 7–20)
CALCIUM SERPL-MCNC: 9.3 MG/DL (ref 8.3–10.6)
CHLORIDE BLD-SCNC: 105 MMOL/L (ref 99–110)
CO2: 25 MMOL/L (ref 21–32)
CREAT SERPL-MCNC: 1.1 MG/DL (ref 0.8–1.3)
CULTURE, BLOOD 2: NORMAL
GFR AFRICAN AMERICAN: >60
GFR NON-AFRICAN AMERICAN: >60
GLUCOSE BLD-MCNC: 112 MG/DL (ref 70–99)
GLUCOSE BLD-MCNC: 133 MG/DL (ref 70–99)
GLUCOSE BLD-MCNC: 163 MG/DL (ref 70–99)
GLUCOSE BLD-MCNC: 208 MG/DL (ref 70–99)
GLUCOSE BLD-MCNC: 241 MG/DL (ref 70–99)
GLUCOSE BLD-MCNC: 61 MG/DL (ref 70–99)
GLUCOSE BLD-MCNC: 76 MG/DL (ref 70–99)
HCT VFR BLD CALC: 36.7 % (ref 40.5–52.5)
HEMOGLOBIN: 12.1 G/DL (ref 13.5–17.5)
LV EF: 40 %
LV EF: 47 %
LVEF MODALITY: NORMAL
LVEF MODALITY: NORMAL
MCH RBC QN AUTO: 30.1 PG (ref 26–34)
MCHC RBC AUTO-ENTMCNC: 33 G/DL (ref 31–36)
MCV RBC AUTO: 91.1 FL (ref 80–100)
PDW BLD-RTO: 13.9 % (ref 12.4–15.4)
PERFORMED ON: ABNORMAL
PERFORMED ON: NORMAL
PLATELET # BLD: 178 K/UL (ref 135–450)
PMV BLD AUTO: 8.2 FL (ref 5–10.5)
POTASSIUM REFLEX MAGNESIUM: 3.8 MMOL/L (ref 3.5–5.1)
RBC # BLD: 4.03 M/UL (ref 4.2–5.9)
SODIUM BLD-SCNC: 138 MMOL/L (ref 136–145)
WBC # BLD: 7.1 K/UL (ref 4–11)

## 2020-10-26 PROCEDURE — C8929 TTE W OR WO FOL WCON,DOPPLER: HCPCS

## 2020-10-26 PROCEDURE — 6370000000 HC RX 637 (ALT 250 FOR IP): Performed by: NURSE PRACTITIONER

## 2020-10-26 PROCEDURE — 93017 CV STRESS TEST TRACING ONLY: CPT

## 2020-10-26 PROCEDURE — 1200000000 HC SEMI PRIVATE

## 2020-10-26 PROCEDURE — 6370000000 HC RX 637 (ALT 250 FOR IP): Performed by: INTERNAL MEDICINE

## 2020-10-26 PROCEDURE — 6360000002 HC RX W HCPCS: Performed by: NURSE PRACTITIONER

## 2020-10-26 PROCEDURE — 78452 HT MUSCLE IMAGE SPECT MULT: CPT

## 2020-10-26 PROCEDURE — 6370000000 HC RX 637 (ALT 250 FOR IP): Performed by: FAMILY MEDICINE

## 2020-10-26 PROCEDURE — 80048 BASIC METABOLIC PNL TOTAL CA: CPT

## 2020-10-26 PROCEDURE — 99233 SBSQ HOSP IP/OBS HIGH 50: CPT | Performed by: NURSE PRACTITIONER

## 2020-10-26 PROCEDURE — A9502 TC99M TETROFOSMIN: HCPCS | Performed by: INTERNAL MEDICINE

## 2020-10-26 PROCEDURE — 3430000000 HC RX DIAGNOSTIC RADIOPHARMACEUTICAL: Performed by: INTERNAL MEDICINE

## 2020-10-26 PROCEDURE — 6360000004 HC RX CONTRAST MEDICATION: Performed by: INTERNAL MEDICINE

## 2020-10-26 PROCEDURE — 36415 COLL VENOUS BLD VENIPUNCTURE: CPT

## 2020-10-26 PROCEDURE — 3430000000 HC RX DIAGNOSTIC RADIOPHARMACEUTICAL: Performed by: NURSE PRACTITIONER

## 2020-10-26 PROCEDURE — 85027 COMPLETE CBC AUTOMATED: CPT

## 2020-10-26 PROCEDURE — A9502 TC99M TETROFOSMIN: HCPCS | Performed by: NURSE PRACTITIONER

## 2020-10-26 PROCEDURE — 2580000003 HC RX 258: Performed by: INTERNAL MEDICINE

## 2020-10-26 PROCEDURE — 94640 AIRWAY INHALATION TREATMENT: CPT

## 2020-10-26 RX ORDER — LISINOPRIL 10 MG/1
10 TABLET ORAL DAILY
Qty: 30 TABLET | Refills: 3 | Status: ON HOLD
Start: 2020-10-27 | End: 2021-04-19 | Stop reason: HOSPADM

## 2020-10-26 RX ORDER — METOPROLOL SUCCINATE 50 MG/1
50 TABLET, EXTENDED RELEASE ORAL NIGHTLY
Status: DISCONTINUED | OUTPATIENT
Start: 2020-10-26 | End: 2020-10-27 | Stop reason: HOSPADM

## 2020-10-26 RX ORDER — IPRATROPIUM BROMIDE AND ALBUTEROL SULFATE 2.5; .5 MG/3ML; MG/3ML
1 SOLUTION RESPIRATORY (INHALATION) EVERY 4 HOURS PRN
Status: DISCONTINUED | OUTPATIENT
Start: 2020-10-26 | End: 2020-10-27 | Stop reason: HOSPADM

## 2020-10-26 RX ORDER — LISINOPRIL 10 MG/1
10 TABLET ORAL DAILY
Status: DISCONTINUED | OUTPATIENT
Start: 2020-10-27 | End: 2020-10-27

## 2020-10-26 RX ORDER — METOPROLOL SUCCINATE 50 MG/1
50 TABLET, EXTENDED RELEASE ORAL NIGHTLY
Qty: 30 TABLET | Refills: 3
Start: 2020-10-26

## 2020-10-26 RX ADMIN — METOPROLOL SUCCINATE 50 MG: 50 TABLET, EXTENDED RELEASE ORAL at 21:02

## 2020-10-26 RX ADMIN — ACETAMINOPHEN 650 MG: 325 TABLET ORAL at 02:41

## 2020-10-26 RX ADMIN — METOPROLOL TARTRATE 25 MG: 25 TABLET, FILM COATED ORAL at 11:02

## 2020-10-26 RX ADMIN — CYANOCOBALAMIN TAB 500 MCG 1000 MCG: 500 TAB at 13:29

## 2020-10-26 RX ADMIN — LISINOPRIL 5 MG: 5 TABLET ORAL at 13:29

## 2020-10-26 RX ADMIN — TETROFOSMIN 33.7 MILLICURIE: 1.38 INJECTION, POWDER, LYOPHILIZED, FOR SOLUTION INTRAVENOUS at 09:29

## 2020-10-26 RX ADMIN — INSULIN LISPRO 5 UNITS: 100 INJECTION, SOLUTION INTRAVENOUS; SUBCUTANEOUS at 17:01

## 2020-10-26 RX ADMIN — GUAIFENESIN 600 MG: 600 TABLET, EXTENDED RELEASE ORAL at 13:31

## 2020-10-26 RX ADMIN — INSULIN LISPRO 4 UNITS: 100 INJECTION, SOLUTION INTRAVENOUS; SUBCUTANEOUS at 13:33

## 2020-10-26 RX ADMIN — PERFLUTREN 1.65 MG: 6.52 INJECTION, SUSPENSION INTRAVENOUS at 07:40

## 2020-10-26 RX ADMIN — OXYCODONE HYDROCHLORIDE 10 MG: 5 TABLET ORAL at 01:12

## 2020-10-26 RX ADMIN — MEMANTINE 10 MG: 5 TABLET ORAL at 21:01

## 2020-10-26 RX ADMIN — INSULIN LISPRO 4 UNITS: 100 INJECTION, SOLUTION INTRAVENOUS; SUBCUTANEOUS at 17:00

## 2020-10-26 RX ADMIN — IPRATROPIUM BROMIDE AND ALBUTEROL SULFATE 1 AMPULE: .5; 3 SOLUTION RESPIRATORY (INHALATION) at 12:21

## 2020-10-26 RX ADMIN — OXYCODONE HYDROCHLORIDE 10 MG: 5 TABLET ORAL at 11:02

## 2020-10-26 RX ADMIN — OXYCODONE HYDROCHLORIDE 10 MG: 5 TABLET ORAL at 16:59

## 2020-10-26 RX ADMIN — SODIUM CHLORIDE, PRESERVATIVE FREE 10 ML: 5 INJECTION INTRAVENOUS at 13:32

## 2020-10-26 RX ADMIN — ACETAMINOPHEN 650 MG: 325 TABLET ORAL at 16:59

## 2020-10-26 RX ADMIN — SODIUM CHLORIDE, PRESERVATIVE FREE 10 ML: 5 INJECTION INTRAVENOUS at 21:03

## 2020-10-26 RX ADMIN — MELATONIN TAB 5 MG 5 MG: 5 TAB at 21:01

## 2020-10-26 RX ADMIN — CETIRIZINE HYDROCHLORIDE 10 MG: 10 TABLET, FILM COATED ORAL at 13:30

## 2020-10-26 RX ADMIN — MEMANTINE 10 MG: 5 TABLET ORAL at 13:29

## 2020-10-26 RX ADMIN — IPRATROPIUM BROMIDE AND ALBUTEROL SULFATE 1 AMPULE: .5; 3 SOLUTION RESPIRATORY (INHALATION) at 15:58

## 2020-10-26 RX ADMIN — INSULIN GLARGINE 10 UNITS: 100 INJECTION, SOLUTION SUBCUTANEOUS at 21:04

## 2020-10-26 RX ADMIN — FOLIC ACID 1 MG: 1 TABLET ORAL at 13:30

## 2020-10-26 RX ADMIN — ATORVASTATIN CALCIUM 80 MG: 80 TABLET, FILM COATED ORAL at 21:01

## 2020-10-26 RX ADMIN — ASPIRIN 81 MG: 81 TABLET, CHEWABLE ORAL at 13:30

## 2020-10-26 RX ADMIN — Medication 100 MG: at 13:29

## 2020-10-26 RX ADMIN — TETROFOSMIN 11 MILLICURIE: 1.38 INJECTION, POWDER, LYOPHILIZED, FOR SOLUTION INTRAVENOUS at 08:00

## 2020-10-26 RX ADMIN — SERTRALINE HYDROCHLORIDE 150 MG: 50 TABLET, FILM COATED ORAL at 13:30

## 2020-10-26 RX ADMIN — REGADENOSON 0.4 MG: 0.08 INJECTION, SOLUTION INTRAVENOUS at 09:30

## 2020-10-26 RX ADMIN — INSULIN LISPRO 5 UNITS: 100 INJECTION, SOLUTION INTRAVENOUS; SUBCUTANEOUS at 13:33

## 2020-10-26 RX ADMIN — GUAIFENESIN 600 MG: 600 TABLET, EXTENDED RELEASE ORAL at 21:03

## 2020-10-26 RX ADMIN — GLYCOPYRROLATE AND FORMOTEROL FUMARATE 2 PUFF: 9; 4.8 AEROSOL, METERED RESPIRATORY (INHALATION) at 21:13

## 2020-10-26 RX ADMIN — FINASTERIDE 5 MG: 5 TABLET, FILM COATED ORAL at 13:30

## 2020-10-26 RX ADMIN — Medication 800 UNITS: at 13:30

## 2020-10-26 ASSESSMENT — PAIN SCALES - GENERAL
PAINLEVEL_OUTOF10: 0
PAINLEVEL_OUTOF10: 9
PAINLEVEL_OUTOF10: 0
PAINLEVEL_OUTOF10: 8
PAINLEVEL_OUTOF10: 9
PAINLEVEL_OUTOF10: 9
PAINLEVEL_OUTOF10: 6
PAINLEVEL_OUTOF10: 9
PAINLEVEL_OUTOF10: 5

## 2020-10-26 ASSESSMENT — PAIN DESCRIPTION - ONSET
ONSET: ON-GOING
ONSET: ON-GOING

## 2020-10-26 ASSESSMENT — PAIN DESCRIPTION - DESCRIPTORS
DESCRIPTORS: ACHING
DESCRIPTORS: ACHING

## 2020-10-26 ASSESSMENT — PAIN DESCRIPTION - ORIENTATION
ORIENTATION: RIGHT;LEFT
ORIENTATION: RIGHT;LEFT

## 2020-10-26 ASSESSMENT — PAIN DESCRIPTION - LOCATION
LOCATION: BACK
LOCATION: GENERALIZED
LOCATION: GENERALIZED
LOCATION: BACK;NECK
LOCATION: GENERALIZED;HEAD
LOCATION: GENERALIZED
LOCATION: GENERALIZED

## 2020-10-26 ASSESSMENT — PAIN DESCRIPTION - PAIN TYPE
TYPE: CHRONIC PAIN

## 2020-10-26 ASSESSMENT — PAIN DESCRIPTION - FREQUENCY
FREQUENCY: CONTINUOUS
FREQUENCY: CONTINUOUS

## 2020-10-26 NOTE — PROGRESS NOTES
[facility] 82   Daily Progress Note    Admit Date:  10/22/2020  HPI:    Chief Complaint   Patient presents with    Chest Pain     woke with chest pain . Meg Askew bp high. . squad transmitted ekg. . asa given no nitro. . negative chest pain on arrival      Olivia Zambrano resented with symptoms of chest discomfort as well as abdominal discomfort. He denied any nausea vomiting diaphoresis or shortness of breath. He has a past medical history of cardiomyopathy with normal left heart catheterization in 2012, hypertension, hyperlipidemia, diabetes mellitus, history of prior CVA, dementia. Initial work-up notable for significant hypertension, elevated BNP, negative troponin, negative COVID-19. Subjective:. Mr. Stanley Last completed echocardiogram and stress test today. Denies any further chest pain. Appetite is good. He is much more alert and interactive today asking appropriate questions which were addressed. Reviewed results of stress test and echocardiogram.    Objective:   BP (!) 184/95   Pulse 81   Temp 97.6 °F (36.4 °C) (Axillary)   Resp 18   Ht 6' 5\" (1.956 m)   Wt 182 lb 9.6 oz (82.8 kg)   SpO2 97%   BMI 21.65 kg/m²     No intake or output data in the 24 hours ending 10/26/20 1519  Wt Readings from Last 3 Encounters:   10/26/20 182 lb 9.6 oz (82.8 kg)   04/12/20 174 lb 4.8 oz (79.1 kg)   01/15/20 190 lb (86.2 kg)         ASSESSMENT:   1. Chest pain - neg ECG and troponins for ACS. Stress test negative for ischemia, fixed inferior defect which was seen in 2014 as well  2. Cardiomyopathy, unknown etiology - drop in EF by echo in April 2020, EF 40% by echocardiogram, 47% by stress imaging. Has been started on metoprolol and lisinopril  3. HTN -uncontrolled, had been on midodrine prior to admission, also was orthostatic on admission following IV diuretic. Has been initiated back on metoprolol lisinopril with continued hypertension  4. CHIP - resolved  5. HLD - LDL 42, on statin  6.  Hx CVA  7. DM  8. Dementia      PLAN:  1. Increase lisinopril to 10 mg  2. Change metoprolol to Toprol-XL 50 mg nightly  3. Repeat orthostatic blood pressure check  4. Continue aspirin and statin for presumptive CAD  5. Would continue to hold on any diuretics  6. If orthostatic blood pressure is stable, okay for discharge from a cardiac perspective. Will arrange follow-up appointment in office with Dr. Sharon Campbell in 1 to 2 weeks.     Francesca Jocelyne, RICCO - CNP, 10/26/2020, 3:19 PM  [facility]   [phone]       Telemetry: SR, LBBB, 70-80's  NYHA: III    Physical Exam:  General:  Awake, alert, NAD  Skin:  Warm and dry  Neck:  JVP 8 cm   Chest:  Clear to auscultation posteriorly  Cardiovascular:  RRR, normal S1S2, no m/g/r  Abdomen:  Soft, nontender, +bowel sounds  Extremities:  No BLE edema      Medications:    lisinopril  5 mg Oral Daily    insulin glargine  10 Units Subcutaneous Nightly    insulin lispro  5 Units Subcutaneous TID WC    guaiFENesin  600 mg Oral BID    ipratropium-albuterol  1 ampule Inhalation Q4H WA    atorvastatin  80 mg Oral Nightly    finasteride  5 mg Oral Daily    folic acid  1 mg Oral Daily    cetirizine  10 mg Oral Daily    memantine  10 mg Oral BID    metoprolol tartrate  25 mg Oral BID    sertraline  150 mg Oral Daily    thiamine  100 mg Oral Daily    glycopyrrolate-formoterol  2 puff Inhalation BID    vitamin B-12  1,000 mcg Oral Daily    vitamin E  800 Units Oral Daily    sodium chloride flush  10 mL Intravenous 2 times per day    aspirin  81 mg Oral Daily    insulin lispro  0-12 Units Subcutaneous TID     insulin lispro  0-6 Units Subcutaneous Nightly    melatonin  5 mg Oral Nightly      dextrose         Lab Data: Lab results independently reviewed by myself 10/24/20   CBC:   Recent Labs     10/26/20  0718   WBC 7.1   HGB 12.1*        BMP:    Recent Labs     10/24/20  0616 10/25/20  0736 10/26/20  0718    139 138   K 3.6 3.6 3.8   CO2 27 27 25 BUN 35* 35* 30*   CREATININE 1.2 1.4* 1.1     INR:  No results for input(s): INR in the last 72 hours. BNP:    No results for input(s): PROBNP in the last 72 hours. Cardiac Enzymes:   No results for input(s): TROPONINI in the last 72 hours. Lipids:   Lab Results   Component Value Date    TRIG 84 10/23/2020    TRIG 127 10/18/2018    HDL 51 10/23/2020    HDL 36 10/18/2018    HDL 59 05/30/2012    LDLCALC 42 10/23/2020    LDLCALC 73 10/18/2018       Cardiac Imaging:   LEXISCAN STRESS MPI 10/26/20:      Summary     Mildly depressed LVEF 47%     Global hypokinesis with regional variation, more pronounced apical/inferior     hypokinesis     Inferior scar     No ischemia      ECHO 10/26/20:   Summary   Limited only f/u for LVEF, RWMA, IVC, Aortic root. Ejection fraction is visually estimated to be 40 %. Global hypokinesis with regional variation, more prominent inferoapical   hypokinesis. Changes noted from previous echo on 4- in left ventricular function. The aortic root is mildly dilated. The right ventricle is low normal (to mildly depressed) in function. IVC size is normal (<2.1cm) and collapses > 50% with respiration consistent   with normal RA pressure (3mmHg). ECHO 4/10/20:    Summary   Left ventricular systolic function is low normal to mildly reduced LVEF 40-50%, difficult to evaluate LVEF due to irregular heart beat. Technically difficult examination. There is mild-moderate left ventricular hypertrophy. Left ventricle size is normal.   The aortic root is moderately dilated. Aortic valve leaflets appear mildly thickened. Mitral annular calcification is present. Mitral valve leaflets appear mildly thickened. Mild mitral and tricuspid regurgitation. Inadequate tricuspid regurgitation to estimate systolic pulmonary artery pressure. IVC is normal in size (< 2.1 cm) and collapses > 50% with respiration consistent with  normal RA pressure (3 mmHg).    Previous echo done 10/2018 - EF 55-60%        MYOCARDIAL STRESS AND REST PERFUSION STUDY WITH SPECT IMAGING     January 2014   Indication: Chest pain         Findings: The patient received LexiScan for the stress portion of    the test.         After the injection of radiopharmaceutical, routine imaging was    performed. Suann Pillar is no stress-induced perfusion defect.  No wall    motion abnormality.  Ejection fraction calculates to 54 %.  There    is a fixed defect within the inferior wall compatible with remote    infarct although this may be artifactual as there is adjacent    subdiaphragmatic activity

## 2020-10-26 NOTE — PLAN OF CARE
Pt A&Ox4, lying in bed. Pt orientated to room and call light. Bed is in lowest position with wheels locked. 2/4 siderails raised. Non-slip socks are on. Room is well lit. Call light within reach, will continue to monitor.

## 2020-10-26 NOTE — PROGRESS NOTES
demonstrated LVEF of 45 to 50%. BNP is markedly significant elevated from baseline. Poorly controlled hypertension. No definite CHF on chest x-ray however had no signs of fluid overload on exam.  Received gentle diuresis; will hold off on further diuresis given resolved symptoms. Ruled out ACS. Cardiology recommends NM Stress testing  To be done 10/26,  Monitor. Hypertension, uncontrolled: Blood pressure above goal.  Resumed home regimen  Difficult to control due to Orthostasis. Monitor and adjust as needed. lisinopril added 10/25    COPD (chronic obstructive pulmonary disease): Stable without signs of exacerbation. Continue bronchodilators and inhaled steroids. Diabetes Mellitus, Type 2: Controlled. Continue basal-bolus Insulin regimen. Monitor blood sugar and adjust regimen as needed. Diabetic (Carb-controlled) diet when able. lantus dose dec due to NPO status from this midnight 10/25    Dementia: Stable. Continue medical management and supportive care. DVT Prophylaxis: Lovenox  Diet: Diet NPO, After Midnight  Code Status: Full Code  PT/OT Eval Status: NA    Dispo - 2   days  .  Pending stress test in am 10/26    Marcela Smith MD

## 2020-10-26 NOTE — CARE COORDINATION
YUNIEL notes that pt lives LTC at [facility]. Spoke to Mohini Jamison in admissions and he can return skilled with no precert and negative COVID resulted on 10/23 is good to return also, pt does NOT need swabbed again. PT/OT ordered and secure eileen VASQUEZ with updates. LVM with sister to confirm pt returning back to [facility]. YUNIEL Gardner RN

## 2020-10-27 VITALS
HEART RATE: 77 BPM | WEIGHT: 182.6 LBS | TEMPERATURE: 97.9 F | HEIGHT: 77 IN | RESPIRATION RATE: 16 BRPM | BODY MASS INDEX: 21.56 KG/M2 | SYSTOLIC BLOOD PRESSURE: 147 MMHG | DIASTOLIC BLOOD PRESSURE: 70 MMHG | OXYGEN SATURATION: 97 %

## 2020-10-27 LAB
ANION GAP SERPL CALCULATED.3IONS-SCNC: 8 MMOL/L (ref 3–16)
BUN BLDV-MCNC: 25 MG/DL (ref 7–20)
CALCIUM SERPL-MCNC: 9.2 MG/DL (ref 8.3–10.6)
CHLORIDE BLD-SCNC: 104 MMOL/L (ref 99–110)
CO2: 27 MMOL/L (ref 21–32)
CREAT SERPL-MCNC: 1.2 MG/DL (ref 0.8–1.3)
GFR AFRICAN AMERICAN: >60
GFR NON-AFRICAN AMERICAN: >60
GLUCOSE BLD-MCNC: 119 MG/DL (ref 70–99)
GLUCOSE BLD-MCNC: 148 MG/DL (ref 70–99)
GLUCOSE BLD-MCNC: 153 MG/DL (ref 70–99)
GLUCOSE BLD-MCNC: 162 MG/DL (ref 70–99)
GLUCOSE BLD-MCNC: 244 MG/DL (ref 70–99)
GLUCOSE BLD-MCNC: 55 MG/DL (ref 70–99)
GLUCOSE BLD-MCNC: 91 MG/DL (ref 70–99)
PERFORMED ON: ABNORMAL
PERFORMED ON: NORMAL
POTASSIUM REFLEX MAGNESIUM: 4.8 MMOL/L (ref 3.5–5.1)
SODIUM BLD-SCNC: 139 MMOL/L (ref 136–145)

## 2020-10-27 PROCEDURE — 97530 THERAPEUTIC ACTIVITIES: CPT

## 2020-10-27 PROCEDURE — 97535 SELF CARE MNGMENT TRAINING: CPT

## 2020-10-27 PROCEDURE — 97166 OT EVAL MOD COMPLEX 45 MIN: CPT

## 2020-10-27 PROCEDURE — 80048 BASIC METABOLIC PNL TOTAL CA: CPT

## 2020-10-27 PROCEDURE — 97116 GAIT TRAINING THERAPY: CPT

## 2020-10-27 PROCEDURE — 36415 COLL VENOUS BLD VENIPUNCTURE: CPT

## 2020-10-27 PROCEDURE — 6370000000 HC RX 637 (ALT 250 FOR IP): Performed by: NURSE PRACTITIONER

## 2020-10-27 PROCEDURE — 97162 PT EVAL MOD COMPLEX 30 MIN: CPT

## 2020-10-27 PROCEDURE — 6370000000 HC RX 637 (ALT 250 FOR IP): Performed by: INTERNAL MEDICINE

## 2020-10-27 PROCEDURE — 2580000003 HC RX 258: Performed by: INTERNAL MEDICINE

## 2020-10-27 RX ORDER — OXYCODONE HYDROCHLORIDE 5 MG/1
10 TABLET ORAL EVERY 6 HOURS PRN
Qty: 12 TABLET | Refills: 0 | Status: SHIPPED | OUTPATIENT
Start: 2020-10-27 | End: 2020-10-30

## 2020-10-27 RX ORDER — LISINOPRIL 20 MG/1
20 TABLET ORAL DAILY
Status: DISCONTINUED | OUTPATIENT
Start: 2020-10-28 | End: 2020-10-27 | Stop reason: HOSPADM

## 2020-10-27 RX ADMIN — INSULIN LISPRO 4 UNITS: 100 INJECTION, SOLUTION INTRAVENOUS; SUBCUTANEOUS at 12:33

## 2020-10-27 RX ADMIN — LISINOPRIL 10 MG: 10 TABLET ORAL at 09:26

## 2020-10-27 RX ADMIN — ASPIRIN 81 MG: 81 TABLET, CHEWABLE ORAL at 09:26

## 2020-10-27 RX ADMIN — INSULIN LISPRO 5 UNITS: 100 INJECTION, SOLUTION INTRAVENOUS; SUBCUTANEOUS at 09:29

## 2020-10-27 RX ADMIN — GUAIFENESIN 600 MG: 600 TABLET, EXTENDED RELEASE ORAL at 09:26

## 2020-10-27 RX ADMIN — OXYCODONE HYDROCHLORIDE 10 MG: 5 TABLET ORAL at 01:28

## 2020-10-27 RX ADMIN — MEMANTINE 10 MG: 5 TABLET ORAL at 09:26

## 2020-10-27 RX ADMIN — CYANOCOBALAMIN TAB 500 MCG 1000 MCG: 500 TAB at 09:26

## 2020-10-27 RX ADMIN — FINASTERIDE 5 MG: 5 TABLET, FILM COATED ORAL at 09:26

## 2020-10-27 RX ADMIN — CETIRIZINE HYDROCHLORIDE 10 MG: 10 TABLET, FILM COATED ORAL at 09:26

## 2020-10-27 RX ADMIN — SODIUM CHLORIDE, PRESERVATIVE FREE 10 ML: 5 INJECTION INTRAVENOUS at 09:27

## 2020-10-27 RX ADMIN — OXYCODONE HYDROCHLORIDE 10 MG: 5 TABLET ORAL at 09:26

## 2020-10-27 RX ADMIN — INSULIN LISPRO 2 UNITS: 100 INJECTION, SOLUTION INTRAVENOUS; SUBCUTANEOUS at 09:29

## 2020-10-27 RX ADMIN — FOLIC ACID 1 MG: 1 TABLET ORAL at 09:26

## 2020-10-27 RX ADMIN — Medication 100 MG: at 09:26

## 2020-10-27 RX ADMIN — SERTRALINE HYDROCHLORIDE 150 MG: 50 TABLET, FILM COATED ORAL at 09:26

## 2020-10-27 RX ADMIN — INSULIN LISPRO 5 UNITS: 100 INJECTION, SOLUTION INTRAVENOUS; SUBCUTANEOUS at 12:33

## 2020-10-27 RX ADMIN — OXYCODONE HYDROCHLORIDE 10 MG: 5 TABLET ORAL at 15:53

## 2020-10-27 RX ADMIN — Medication 800 UNITS: at 09:32

## 2020-10-27 RX ADMIN — ACETAMINOPHEN 650 MG: 325 TABLET ORAL at 05:33

## 2020-10-27 ASSESSMENT — PAIN DESCRIPTION - FREQUENCY: FREQUENCY: INTERMITTENT

## 2020-10-27 ASSESSMENT — PAIN SCALES - GENERAL
PAINLEVEL_OUTOF10: 0
PAINLEVEL_OUTOF10: 9
PAINLEVEL_OUTOF10: 8
PAINLEVEL_OUTOF10: 8
PAINLEVEL_OUTOF10: 9
PAINLEVEL_OUTOF10: 8
PAINLEVEL_OUTOF10: 8

## 2020-10-27 ASSESSMENT — PAIN DESCRIPTION - LOCATION
LOCATION: BACK
LOCATION: NECK;KNEE

## 2020-10-27 ASSESSMENT — PAIN DESCRIPTION - ORIENTATION: ORIENTATION: LEFT

## 2020-10-27 ASSESSMENT — PAIN DESCRIPTION - PAIN TYPE
TYPE: CHRONIC PAIN
TYPE: CHRONIC PAIN

## 2020-10-27 ASSESSMENT — PAIN DESCRIPTION - DESCRIPTORS: DESCRIPTORS: ACHING

## 2020-10-27 NOTE — PROGRESS NOTES
Called report to Tristian Guadalupe at [facility], as he will be returning back to that LTC. IV and tele removed with no complications, patient ready for pickup at 1630.

## 2020-10-27 NOTE — DISCHARGE SUMMARY
Cardiology input noted, medical management, plan for further management as outpatient.     Hypertension, uncontrolled: Blood pressure above goal.  Resumed home regimen  Difficult to control due to Orthostasis. Monitor and adjust as needed. lisinopril added 10/25     COPD (chronic obstructive pulmonary disease): Stable without signs of exacerbation. Continue bronchodilators and inhaled steroids.     Diabetes Mellitus, Type 2: Controlled. Continue basal-bolus Insulin regimen. Monitor blood sugar and adjust regimen as needed. Diabetic (Carb-controlled) diet when able. lantus dose dec due to NPO status from this midnight 10/25     Dementia: Stable. Continue medical management and supportive care. Physical Exam Performed:     BP (!) 184/105   Pulse 70   Temp 97.8 °F (36.6 °C) (Oral)   Resp 16   Ht 6' 5\" (1.956 m)   Wt 182 lb 9.6 oz (82.8 kg)   SpO2 98%   BMI 21.65 kg/m²       General appearance:  No apparent distress, appears stated age and cooperative. HEENT:  Normal cephalic, atraumatic without obvious deformity. Pupils equal, round, and reactive to light. Extra ocular muscles intact. Conjunctivae/corneas clear. Neck: Supple, with full range of motion. No jugular venous distention. Trachea midline. Respiratory:  Normal respiratory effort. Clear to auscultation, bilaterally without Rales/Wheezes/Rhonchi. Cardiovascular:  Regular rate and rhythm with normal S1/S2 without murmurs, rubs or gallops. Abdomen: Soft, non-tender, non-distended with normal bowel sounds. Musculoskeletal:  No clubbing, cyanosis or edema bilaterally. Full range of motion without deformity. Skin: Skin color, texture, turgor normal.  No rashes or lesions. Neurologic:  Neurovascularly intact without any focal sensory/motor deficits.  Cranial nerves: II-XII intact, grossly non-focal.  Psychiatric:  Alert and oriented, thought content appropriate, normal insight  Capillary Refill: Brisk,< 3 seconds   Peripheral Pulses: +2 palpable, equal bilaterally       Labs: For convenience and continuity at follow-up the following most recent labs are provided:      CBC:    Lab Results   Component Value Date    WBC 7.1 10/26/2020    HGB 12.1 10/26/2020    HCT 36.7 10/26/2020     10/26/2020       Renal:    Lab Results   Component Value Date     10/27/2020    K 4.8 10/27/2020     10/27/2020    CO2 27 10/27/2020    BUN 25 10/27/2020    CREATININE 1.2 10/27/2020    CALCIUM 9.2 10/27/2020    PHOS 4.0 03/08/2019         Significant Diagnostic Studies    Radiology:   NM Cardiac Stress Test Nuclear Imaging   Final Result      XR CHEST PORTABLE   Final Result   No acute process. Consults:     IP CONSULT TO HOSPITALIST  IP CONSULT TO CARDIOLOGY    Disposition:  SNF     Condition at Discharge: Stable    Discharge Instructions/Follow-up:  Cardiology    Code Status:  Full Code     Activity: activity as tolerated    Diet: cardiac diet and diabetic diet      Discharge Medications:     Current Discharge Medication List           Details   lisinopril (PRINIVIL;ZESTRIL) 10 MG tablet Take 1 tablet by mouth daily  Qty: 30 tablet, Refills: 3      metoprolol succinate (TOPROL XL) 50 MG extended release tablet Take 1 tablet by mouth nightly  Qty: 30 tablet, Refills: 3              Details   loratadine (CLARITIN) 10 MG tablet Take 10 mg by mouth daily      oxyCODONE (ROXICODONE) 5 MG immediate release tablet Take 10 mg by mouth every 6 hours as needed for Pain.       memantine (NAMENDA) 10 MG tablet Take 10 mg by mouth 2 times daily      hypromellose 0.4 % SOLN ophthalmic solution Place 1 drop into both eyes every 4 hours as needed      polyethylene glycol (GLYCOLAX) powder Take 17 g by mouth daily      fluticasone (FLOVENT HFA) 44 MCG/ACT inhaler Inhale 2 puffs into the lungs 2 times daily      acetaminophen (TYLENOL) 325 MG tablet Take 650 mg by mouth every 4 hours as needed for Pain      bisacodyl (DULCOLAX) 10 MG suppository Place 10 mg rectally daily as needed for Constipation      insulin aspart (NOVOLOG) 100 UNIT/ML injection vial Inject 10 Units into the skin 3 times daily (before meals) +SSI       aspirin 81 MG tablet Take 81 mg by mouth daily      finasteride (PROSCAR) 5 MG tablet Take 5 mg by mouth daily      folic acid (FOLVITE) 1 MG tablet Take 1 mg by mouth daily      sertraline (ZOLOFT) 100 MG tablet Take 150 mg by mouth daily      SUMAtriptan (IMITREX) 100 MG tablet Take 100 mg by mouth daily as needed for Migraine      atorvastatin (LIPITOR) 80 MG tablet Take 80 mg by mouth nightly       Insulin Glargine (LANTUS SOLOSTAR SC) Inject 20 Units into the skin 2 times daily       cyclobenzaprine (FLEXERIL) 5 MG tablet Take 10 mg by mouth 3 times daily       melatonin 3 MG TABS tablet Take 6 mg by mouth nightly       vitamin E 1000 units capsule Take 1 capsule by mouth daily  Qty: 30 capsule, Refills: 3      vitamin B-12 (CYANOCOBALAMIN) 1000 MCG tablet TAKE 1 TABLET BY MOUTH DAILY  Qty: 30 tablet, Refills: 3      umeclidinium-vilanterol (ANORO ELLIPTA) 62.5-25 MCG/INH AEPB inhaler Inhale 1 puff into the lungs daily  Qty: 1 each, Refills: 11      albuterol sulfate HFA (PROAIR HFA) 108 (90 Base) MCG/ACT inhaler Inhale 2 puffs into the lungs every 4 hours as needed for Wheezing or Shortness of Breath  Qty: 1 Inhaler, Refills: 11    Associated Diagnoses: Simple chronic bronchitis (HCC)      thiamine 100 MG tablet Take 1 tablet by mouth daily  Qty: 30 tablet, Refills: 3      glucagon 1 MG injection Inject 1 kit into the skin as needed      aluminum & magnesium hydroxide-simethicone (MAALOX) 200-200-20 MG/5ML SUSP suspension Take 5 mLs by mouth every 6 hours as needed for Indigestion Takes mylanta 30 ml Q4 hours PRN      loperamide (IMODIUM) 2 MG capsule Take 2 mg by mouth every 4 hours as needed for Diarrhea      Dulaglutide (TRULICITY) 2.33 SC/7.9IP SOPN Inject 0.75 mg into the skin once a week On Saturday      Continuous Blood Gluc  (FREESTYLE BRIDGET READER) DENIS Use to monitor sugars  Qty: 1 Device, Refills: 0      Continuous Blood Gluc Sensor (FREESTYLE BRIDGET SENSOR SYSTEM) MISC Use to monitor sugars  Qty: 3 each, Refills: 2      AGAMATRIX PRESTO TEST strip USE TO TEST BLOOD SUGAR 3 TIMES A DAY  Qty: 300 each, Refills: 3      Lancets MISC Testing 2-3 times daily DX Code: E11.9  Qty: 100 each, Refills: 3      Blood Glucose Monitoring Suppl (TRUE METRIX AIR GLUCOSE METER) DENIS 1 meter  Qty: 1 Device, Refills: 0             Time Spent on discharge is more than 30 minutes in the examination, evaluation, counseling and review of medications and discharge plan. Signed:    Mary Ledezma MD   10/27/2020      Thank you RICCO Salgado CNP for the opportunity to be involved in this patient's care. If you have any questions or concerns please feel free to contact me at [phone].

## 2020-10-27 NOTE — PROGRESS NOTES
Physical Therapy    Facility/Department: [facility] C5 - MED SURG/ORTHO  Initial Assessment    NAME: Olivia Zambrano  : 1950  MRN: [id number]    Date of Service: 10/27/2020    Discharge Recommendations:  ECF with PT   PT Equipment Recommendations  Equipment Needed: No    Assessment   Body structures, Functions, Activity limitations: Decreased functional mobility ; Decreased strength;Decreased endurance;Decreased balance; Increased pain;Decreased posture  Assessment: Pt referred for PT evaluation during current hospital stay with dx of chest pain. Pt currently functioning slightly below his baseline, requiring min/CGA x 1 with use of RW for safe transfers/amb. Amb distance limited to ~25 feet at a time secondary to weakness and pain -- mostly in LLE and L knee. Recommend pt return to ECF at D/C with \"Part B\" PT services to continue to regain strength and improve functional mobility. Treatment Diagnosis: Difficulty walking  Specific instructions for Next Treatment: Progress ther ex and mobility as tolerated  Prognosis: Good  Decision Making: Medium Complexity  PT Education: Goals; General Safety;Gait Training;PT Role;Plan of Care;Disease Specific Education; Functional Mobility Training;Equipment;Precautions;Transfer Training  Patient Education: Disease-specific education: Pt educated on compensatory transfer/gait strategies with RW in light of LE weakness; pt verbalizes understanding. Barriers to Learning: [place]  REQUIRES PT FOLLOW UP: Yes  Activity Tolerance: Patient Tolerated treatment well;Patient limited by pain; Patient limited by endurance  Activity Tolerance: O2 sat = 87% immediately post-amb on room air, rebounding to 95% within ~1.5 minutes of seated rest.  HR = 85 bpm post-amb. Patient Diagnosis(es): The primary encounter diagnosis was COPD with acute bronchitis ([facility]  Diagnoses of Diastolic CHF, acute on chronic (HCC), Precordial chest pain, Abnormal EKG, and Primary osteoarthritis of both knees were also pertinent to this visit. has a past medical history of Anxiety disorder, BPH (benign prostatic hypertrophy), Calcified granuloma of lung ([facility] [address] Cataract, Cerebral artery occlusion with cerebral infarction [facility]), Chronic pain, COPD (chronic obstructive pulmonary disease) ([facility] [address] Degenerative arthritis of hip, Dementia ([facility] [address] Depression, Depression with anxiety, Diabetes mellitus ([facility] [address] [facility] exam ([facility] Diabetic retinopathy ([facility] Diverticulitis, Diverticulosis, DKA (diabetic ketoacidoses) ([facility] Emphysema, Esophageal candidiasis ([facility] Essential hypertension, benign, ETOH abuse, Fatty liver, Foot ulcer:left, Gastric ulcer, unspecified as acute or chronic, without mention of hemorrhage or perforation, GERD (gastroesophageal reflux disease), Gout, Hearing decreased, Hemangioma, hepatitis c, Hyperlipidemia LDL goal < 100, Left-sided weakness, Low HDL (under 40), Peripheral neuropathy, Pneumonia, Pyogenic granuloma, Restrictive lung disease, S/P colonoscopy, S/P colonoscopy, S/P endoscopy, Sciatica, Superficial phlebitis of left leg, Tachycardia, Therapeutic drug monitoring, and Type 1 diabetes mellitus not at goal [facility]). has a past surgical history that includes Leg Surgery; Hip fracture surgery; Tonsillectomy; Upper gastrointestinal endoscopy (7/16/2013); Diagnostic Cardiac Cath Lab Procedure (2013); other surgical history (Left, 11/21/2013); Foot surgery (Left, Hammer toe, 2nd toe); Revision Colostomy (06/27/2017); other surgical history (Right, 05/30/2019); Pressure ulcer debridement (Right, 5/30/2019); Colonoscopy; fracture surgery; Dilatation, esophagus; Hardware Removal (Left, 12/03/2019); and Foot surgery (Left, 12/5/2019).     Restrictions  Restrictions/Precautions  Restrictions/Precautions: General Precautions, Fall Risk, Up as Tolerated  Position Activity Restriction  Other position/activity restrictions: High fall risk per nursing assessment, telemetry  Vision/Hearing  Vision: Impaired  Vision Exceptions: Wears glasses for reading  Hearing: Exceptions to [facility]  Hearing Exceptions: Hard of hearing/hearing concerns     Subjective  General  Chart Reviewed: Yes  Patient assessed for rehabilitation services?: Yes  Additional Pertinent Hx: Hx L hip dislocation with foot drop (per pt report), hx CVA with L-sided weakness & N/T  Family / Caregiver Present: No  Referring Practitioner: Yulissa German. Dillon Garay MD  Referral Date : 10/26/20  Diagnosis: Chest pain  Follows Commands: Within Functional Limits  General Comment  Comments: Pt resting in bed upon entry of therapy staff  Subjective  Subjective: Pt agreeable to work with PT/OT this morning, pleasant and cooperative. States he's willing to get OOB \"if I've got to. \"  Pain Screening  Patient Currently in Pain: Yes  Pain Assessment  Pain Assessment: 0-10  Pain Level: 9  Pain Type: Chronic pain  Pain Location: Neck;Knee  Pain Orientation: Left(neck and L knee, pain intermittent in nature (increases with movement))  Pain Descriptors: Aching  Pain Frequency: Intermittent  Non-Pharmaceutical Pain Intervention(s): Ambulation/Increased Activity; Heat applied;Repositioned; Emotional support  Intervention List: Patient able to continue with treatment    Orientation  Orientation  Overall Orientation Status: Within Functional Limits     Social/Functional History  Social/Functional History  Type of Home: Facility(LTC resident of [facility])  Home Equipment: 4 wheeled walker  Receives Help From: Personal care attendant(LTC staff at [facility])  ADL Assistance: Needs assistance  Bath: Minimal assistance(from staff for safety)  Dressing: Modified independent  Grooming: Independent  Feeding: Independent  Toileting: Independent(pt reports staff occasionally stands by for safety)  Homemaking Responsibilities: No  Ambulation Assistance: Needs assistance(using 4WW, usually has SBA from staff for safety to prevent falls)  Transfer Assistance: Needs assistance(usually has SBA from staff for safety to prevent falls)  Active : No  Occupation: Retired    Objective  Observation/Palpation  Posture: Fair    RLE AROM: WFL  LLE General AROM: WFL hip & knee, decreased ~75% in ankle DF due to baseline foot drop  Strength RLE: WFL  Strength LLE: 3+/5 to 4-/5 in hip & knee, 2-/5 in ankle DF (due to baseline foot drop)     Sensation  Overall Sensation Status: Impaired  Light Touch: Partial deficits in the LLE;Partial deficits in the LUE(pt c/o N/T in L hand & anterior aspect of LLE (from old CVA per pt report))     Bed mobility  Supine to Sit: Supervision(moving toward R side, HOB elevated ~15 degrees)  Scooting: Supervision(to scoot forward to EOB)     Transfers  Sit to Stand: Contact guard assistance  Stand to sit: Contact guard assistance  Bed to Chair: Minimal assistance;Contact guard assistance(using RW, moving from bed>toilet>chair)     Ambulation  Surface: level tile  Device: Rolling Walker  Assistance: Contact guard assistance;Minimal assistance(CGA x 1 increasing to Maren x 1 with repetition (as pt fatigued))  Quality of Gait: Pt amb with slow trevon with decreased stride length. Stride length, foot clearance, and stance time all decreased on L compared to R side, decreasing further with repetition (as pt fatigued). Mildly unsteady with L knee observing to \"give\" slightly with [address] -- especially toward end of amb.   Gait Deviations: Slow Trevon;Decreased step length;Decreased step height  Distance: x 15 feet, x 25 feet     Balance  Posture: Fair  Sitting - Static: Good  Sitting - Dynamic: Fair;+  Standing - Static: Fair;+  Standing - Dynamic: Fair;-    Plan   Times per week: 3-5x/week in acute care  Times per day: Daily  Specific instructions for Next Treatment: Progress ther ex and mobility as tolerated  Current Treatment Recommendations: Strengthening, Balance Training, Endurance Training, Functional Mobility Training, Transfer Training, Gait Training, Safety Education & Training, Equipment Evaluation, Education, & procurement, [employer]  Safety Devices: All fall risk precautions in place, Left in chair, Call light within reach, Chair alarm in place, Nurse notified, Gait belt, Patient at risk for falls    AM-PAC Score  AM-PAC Inpatient Mobility without Stair Climbing Raw Score : 17 (10/27/20 1355)  AM-PAC Inpatient without Stair Climbing T-Scale Score : 48.47 (10/27/20 1355)  Mobility Inpatient CMS 0-100% Score: 32.72 (10/27/20 1355)  Mobility Inpatient without Stair CMS G-Code Modifier : Neoma Ruffing (10/27/20 1355)    Goals  Short term goals  Time Frame for Short term goals: 1 week, 11/03/20 (unless otherwise specified)  Short term goal 1: Pt will transfer supine <-> sit with modified(I)  Short term goal 2: Pt will transfer sit <-> stand and bed>chair using RW with SBA  Short term goal 3: Pt will ambulate x 40 feet using RW with CGA x 1  Short term goal 4: By 10/29/20: Pt will tolerate 12-15 reps BLE exercise for ROM/strengthening, balance, and endurance  Patient Goals   Patient goals : \"To get my legs stronger\"       Therapy Time   Individual Concurrent Group Co-treatment   Time In 0825         Time Out 0900         Minutes 35         Timed Code Treatment Minutes: [address] [place] #[id number]    If pt is unable to be seen after this session, please let this note serve as discharge summary. Please see case management note for discharge disposition. Thank you.

## 2020-10-27 NOTE — PROGRESS NOTES
Accucheck blood sugar 112 Patient skin warm and dry. He finished a full meal with a soft drink. Pudding given at present. No signs of distress, Bed check engaged, bed in low locked position, call light in reach.

## 2020-10-27 NOTE — DISCHARGE INSTR - COC
COLOSTOMY REVERSAL     TONSILLECTOMY      UPPER GASTROINTESTINAL ENDOSCOPY  7/16/2013    Esophageal Brushing       Immunization History:   Immunization History   Administered Date(s) Administered    Hepatitis A 02/05/2010, 08/18/2016    Hepatitis A Adult (Havrix, Vaqta) 08/18/2016    Hepatitis B 02/05/2010, 08/18/2016    Influenza 10/20/2010, 11/29/2011, 10/09/2012    Influenza Vaccine, unspecified formulation 10/16/2013, 11/03/2016    Influenza Virus Vaccine 10/17/2013, 10/06/2014, 10/09/2015, 11/03/2016    Influenza, High Dose (Fluzone 65 yrs and older) 09/27/2017    Influenza, Eulice Teri, 6 mo and older, IM, PF (Flulaval, Fluarix) 10/19/2018    Pneumococcal Conjugate 13-valent ([id number]) 11/10/2015    Pneumococcal Conjugate Vaccine 11/10/2015    Pneumococcal Polysaccharide ([id number]) 09/27/2012, 10/16/2013, 09/22/2015    Tdap (Boostrix, Adacel) 02/05/2010, 09/06/2019       Active Problems:  Patient Active Problem List   Diagnosis Code    GERD (gastroesophageal reflux disease) K21.9    Peripheral neuropathy of bilateral feet G62.9    COPD with acute bronchitis ([facility] J44.0, J20.9    HLD (hyperlipidemia) E78.5    Hepatic steatosis K76.0    PTSD (post-traumatic stress disorder) F43.10    History of hepatitis C Z86.19    Bilateral knee & hip OA M17.10    Mild-moderate alcohol consumption (beer) F10.20    Chronic pain syndrome G89.4    Moderate episode of recurrent major depressive disorder (HCC) F33.1    Orthostatic hypotension I95.1    S/P left colectomy Z90.49    Hyponatremia E87.1    S/p reversal of colostomy (6/27/17) K55.9    Chronic dCHF (grade 1 LVDD) I50.32    Chronic normocytic anemia D64.9    Acute hyperglycemia R73.9    Severe protein-calorie malnutrition ([facility] E43    DM (diabetes mellitus), secondary, uncontrolled, w/neurologic complic (HCC) G09.14, Y23.88    HTN (hypertension) I10    Hx DKA (Feb 2018) E11.10, Z79.4    Other insomnia G47.09    Chronic transaminasemia R74.01    Tobacco smoker F17.200    Fall at home W19. Ali Smoker, Y92.009    Hypochloremia E87.8    Hyperkalemia E87.5    Mixed metabolic and respiratory acidosis E87.2    Closed nondisplaced L ankle fracture (lateral malleolus) S82.892A    L 2nd toenail avulsion S91.209A    Skin tear of L leg S81.812A    Head trauma S09.90XA    IDDM (insulin dependent diabetes mellitus) [id number]    Ventral hernia without obstruction or gangrene K43.9    Diabetic ketoacidosis without coma associated with type 2 diabetes mellitus (HCC) E11.10    DDD (degenerative disc disease), cervical M50.30    Spondylosis of cervical region without myelopathy or radiculopathy M47.812    DDD (degenerative disc disease), lumbar M51.36    Closed nondisplaced fracture of lateral malleolus of left fibula S82.65XA    Lumbar degenerative disc disease M51.36    Protrusion of lumbar intervertebral disc M51.26    Osteoarthritis of both knees M17.0    Osteoarthritis of both hips M16.0    Idiopathic peripheral neuropathy G60.9    Leg pain, anterior, left M79.605    CVA (cerebral vascular accident) ([address] I63.9    DM (diabetes mellitus) ([address] E11.9    Essential hypertension I10    BPH (benign prostatic hyperplasia) N40.0    Dysarthria R47.1    Left-sided weakness R53.1    TIA (transient ischemic attack) G45.9    Dyslipidemia E78.5    DKA, type 2, not at goal [facility]) E11.10    Agitation requiring sedation protocol R45.1    DKA, type 1, not at goal [facility]) E10.10    Severe malnutrition ([address] E43    CHIP (acute kidney injury) ([address] N17.9    Open wound of right foot S91.301A    Dementia associated with other underlying disease without behavioral disturbance ([address] F02.80    Closed head injury S09.90XA    Facial hematoma S00.83XA    Concussion with no loss of consciousness S06.0X0A    Chest pain R07.9    SOB (shortness of breath) R06.02    Acute on chronic systolic (congestive) heart failure (HCC) I50.23    Cardiomyopathy (HCC) I42.9 Isolation/Infection:   Isolation            No Isolation          Patient Infection Status       Infection Onset Added Last Indicated Last Indicated By Review Planned Expiration Resolved Resolved By    None active    Resolved    COVID-19 Rule Out 10/22/20 10/22/20 10/22/20 COVID-19 (Ordered)   10/23/20 Rule-Out Test Resulted    COVID-19 Rule Out 10/22/20 10/22/20 10/22/20 COVID-19 (Ordered)   10/22/20 Rule-Out Test Resulted            Nurse Assessment:  Last Vital Signs: BP (!) 184/105   Pulse 70   Temp 97.8 °F (36.6 °C) (Oral)   Resp 16   Ht 6' 5\" (1.956 m)   Wt 182 lb 9.6 oz (82.8 kg)   SpO2 98%   BMI 21.65 kg/m²     Last documented pain score (0-10 scale): Pain Level: 9  Last Weight:   Wt Readings from Last 1 Encounters:   10/26/20 182 lb 9.6 oz (82.8 kg)     Mental Status:  oriented and alert    IV Access:  - None    Nursing Mobility/ADLs:  Walking   Assisted  Transfer  Assisted  Bathing  Assisted  Dressing  Assisted  Toileting  Assisted  Feeding  Independent  Med Admin  Assisted  Med Delivery   whole    Wound Care Documentation and Therapy:        Elimination:  Continence:   · Bowel: Yes  · Bladder: Yes  Urinary Catheter: None   Colostomy/Ileostomy/Ileal Conduit: No       Date of Last BM:     Intake/Output Summary (Last 24 hours) at 10/27/2020 1221  Last data filed at 10/26/2020 2045  Gross per 24 hour   Intake 450 ml   Output --   Net 450 ml     I/O last 3 completed shifts: In: 450 [P.O.:450]  Out: -     Safety Concerns: At Risk for Falls    Impairments/Disabilities:      Hearing    Nutrition Therapy:  Current Nutrition Therapy:   - Oral Diet:  Cardiac    Routes of Feeding: Oral  Liquids: No Restrictions  Daily Fluid Restriction: no  Last Modified Barium Swallow with Video (Video Swallowing Test): not done    Treatments at the Time of Hospital Discharge:   Respiratory Treatments:   Oxygen Therapy:  is not on home oxygen therapy.   Ventilator:    - No ventilator support    Rehab Therapies: Physical Therapy and Occupational Therapy  Weight Bearing Status/Restrictions: No weight bearing restirctions  Other Medical Equipment (for information only, NOT a DME order):  walker  Other Treatments:     Patient's personal belongings (please select all that are sent with patient):  None    RN SIGNATURE:  Electronically signed by Argentina Henry RN on 10/27/20 at 2:29 PM EDT    CASE MANAGEMENT/SOCIAL WORK SECTION    Inpatient Status Date:     Readmission Risk Assessment Score:  Readmission Risk              Risk of Unplanned Readmission:        26           Discharging to Facility/ [address] [place] [address] [address] [facility]   [phone]       / signature: Electronically signed by Umu Knight RN on 10/27/20 at 2:50 PM EDT    PHYSICIAN SECTION    Prognosis: Fair    Condition at Discharge: Stable    Rehab Potential (if transferring to Rehab): Fair    Recommended Labs or Other Treatments After Discharge: BMP in 5 days    Physician Certification: I certify the above information and transfer of Mimi Estrada  is necessary for the continuing treatment of the diagnosis listed and that he requires [facility] for less 30 days.      Update Admission H&P: No change in H&P    PHYSICIAN SIGNATURE:  Electronically signed by Arjun Deng MD on 10/27/20 at 12:21 PM EDT

## 2020-10-27 NOTE — PROGRESS NOTES
Hospitalist Progress Note    Date of Admission: 10/22/2020    Chief Complaint: Chest pain    Hospital Course: 77 y.o. male who presented to [facility] with chest pain. PMHx significant for diabetes, hypertension, chronic systolic CHF with LVEF 99-51%, COPD, BPH, chronic pain and dementia. He chronically resides at [facility]. He reportedly presented with acute episode of chest pain associate with hypertension. Symptoms started the morning of presentation. Initially the pain was reported as heaviness and chest discomfort. Later the patient reported this to be more of a sharp stabbing pain. Symptoms were associated with some cough and shortness of breath. By report from the nursing home it was stated that he had a fever as well. During ED evaluation he was afebrile however. He had no leukocytosis. He was hypertensive with systolic blood pressures greater than 180. BNP was markedly elevated greater than 6000. Initial EKG and troponin were negative. He was admitted for ACS rule out, congestive heart failure, and rule out Covid-19. Subjective:  Denies any chest pain , SOB    No c/o n/v      Labs:   Recent Labs     10/26/20  0718   WBC 7.1   HGB 12.1*   HCT 36.7*        Recent Labs     10/25/20  0736 10/26/20  0718 10/27/20  0659    138 139   K 3.6 3.8 4.8    105 104   CO2 27 25 27   BUN 35* 30* 25*   CREATININE 1.4* 1.1 1.2   CALCIUM 9.1 9.3 9.2     No results for input(s): AST, ALT, BILIDIR, BILITOT, ALKPHOS in the last 72 hours. No results for input(s): INR in the last 72 hours. Physical Exam Performed:    BP (!) 184/105   Pulse 70   Temp 97.8 °F (36.6 °C) (Oral)   Resp 16   Ht 6' 5\" (1.956 m)   Wt 182 lb 9.6 oz (82.8 kg)   SpO2 98%   BMI 21.65 kg/m²     General appearance: No apparent distress, appears stated age and cooperative. HEENT:  Normal cephalic, atraumatic without obvious deformity. Pupils equal, round, and reactive to light.   Extra ocular muscles intact. Conjunctivae/corneas clear. Neck: Supple, no jugular venous distention. Trachea midline with full range of motion. Respiratory:  Normal respiratory effort. Clear to auscultation, bilaterally without Rales/Wheezes/Rhonchi. Cardiovascular: Regular rate and rhythm with normal S1/S2 without murmurs, rubs or gallops. Abdomen: Soft, non-tender, non-distended with normal bowel sounds. Musculoskelatal: No clubbing, cyanosis or edema bilaterally. Full range of motion without deformity. Neurologic:  Neurovascularly intact without any focal sensory/motor deficits. Cranial nerves: II-XII intact, grossly non-focal.  Psychiatric: Alert and oriented, limited insight  Skin: Skin color, texture, turgor normal.  No rashes or lesions. Capillary Refill: Brisk,< 3 seconds   Peripheral Pulses: +2 palpable, equal bilaterally     Assessment/Plan:    Active Hospital Problems    Diagnosis    Cardiomy[facility] [I42.9]    Chest pain [R07.9]    SOB (shortness of breath) [R06.02]    Acute on chronic systolic (congestive) heart failure (HCC) [I50.23]    Dementia associated with other underlying disease without behavioral disturbance (HCC) [F02.80]    Essential hypertension [I10]    DM (diabetes mellitus) ([facility] [E11.9]    COPD with acute bronchitis ([facility] [J44.0, J20.9]     Chest pain: Initial EKG and troponin are negative. Will follow serial troponins to rule out ACS. Cardiology consulted for input. Chest pain is atypical and associated with some shortness of breath, cough. Chest pain could be related to CHF and uncontrolled hypertension. Although not clearly documented there was apparently report of some fevers at the nursing home. COVID-19 was negative. Shortness of breath: While this is most likely related to CHF, HTN, COPD, there was report of fevers and cough. He is from congregate living. Rapid Covid testing was negative.       Acute on chronic systolic CHF: Most recent echocardiogram 4/20/2020

## 2020-10-27 NOTE — PROGRESS NOTES
Occupational Therapy   Occupational Therapy Initial Assessment/Treatment  Date: 10/27/2020   Patient Name: Navin Rizzo  MRN: [id number]     : 1950    Date of Service: 10/27/2020    Discharge Recommendations:  ECF with OT       Assessment   Performance deficits / Impairments: Decreased functional mobility ; Decreased ADL status; Decreased balance;Decreased strength;Decreased endurance  Patient admitted with chest pain from [address] [place] [address]. After evaluation, pt found to be presenting with the above mentioned occupational performance deficits which are affecting participation in daily living skills. Pt CGA to [place] for functional mobility/transfers using RW with 1LOB d/t LLE giving out. Roshan Jeffrey for LE dressing. Pt would benefit from continued skilled occupational therapy to address ADLs, functional mobility, and safety while in acute care. Prognosis: Good  Decision Making: Medium Complexity  OT Education: OT Role;Plan of Care;ADL Adaptive Strategies;Transfer Training  Patient Education: disease specific: Pt educated on benefits of OOB activity to decrease risks associated with prolonged bedrest while in hospital.  REQUIRES OT FOLLOW UP: Yes  Activity Tolerance  Activity Tolerance: Patient Tolerated treatment well  Activity Tolerance: O2 92% after activity, no CP reported. Safety Devices  Safety Devices in place: Yes  Type of devices: Left in chair;Nurse notified;Call light within reach; Chair alarm in place;Gait belt   Patient Diagnosis(es): The primary encounter diagnosis was COPD with acute bronchitis ([facility] Diagnoses of Diastolic CHF, acute on chronic (HCC), Precordial chest pain, and Abnormal EKG were also pertinent to this visit.      has a past medical history of Anxiety disorder, BPH (benign prostatic hypertrophy), Calcified granuloma of lung ([facility] Cataract, Cerebral artery occlusion with cerebral infarction [facility]), Chronic pain, COPD (chronic obstructive pulmonary disease) ([facility] Degenerative arthritis of hip, Dementia ([facility] Depression, Depression with anxiety, Diabetes mellitus ([facility] Diabetic eye exam ([facility] Diabetic retinopathy ([facility] Diverticulitis, Diverticulosis, DKA (diabetic ketoacidoses) ([facility] Emphysema, Esophageal candidiasis ([facility] Essential hypertension, benign, ETOH abuse, Fatty liver, Foot ulcer:left, Gastric ulcer, unspecified as acute or chronic, without mention of hemorrhage or perforation, GERD (gastroesophageal reflux disease), Gout, Hearing decreased, Hemangioma, hepatitis c, Hyperlipidemia LDL goal < 100, Left-sided weakness, Low HDL (under 40), Peripheral neuropathy, Pneumonia, Pyogenic granuloma, Restrictive lung disease, S/P colonoscopy, S/P colonoscopy, S/P endoscopy, Sciatica, Superficial phlebitis of left leg, Tachycardia, Therapeutic drug monitoring, and Type 1 diabetes mellitus not at goal [facility]). has a past surgical history that includes Leg Surgery; Hip fracture surgery; Tonsillectomy; Upper gastrointestinal endoscopy (7/16/2013); Diagnostic Cardiac Cath Lab Procedure (2013); other surgical history (Left, 11/21/2013); Foot surgery (Left, Hammer toe, 2nd toe); Revision Colostomy (06/27/2017); other surgical history (Right, 05/30/2019); Pressure ulcer debridement (Right, 5/30/2019); Colonoscopy; fracture surgery; Dilatation, esophagus; Hardware Removal (Left, 12/03/2019); and Foot surgery (Left, 12/5/2019). Restrictions  Restrictions/Precautions  Restrictions/Precautions: General Precautions, Fall Risk, Up as Tolerated  Position Activity Restriction  Other position/activity restrictions: High fall risk per nursing assessment, telemetry    Subjective   General  Chart Reviewed: Yes  Patient assessed for rehabilitation services?: Yes  Family / Caregiver Present: No  Referring Practitioner: Arjun Deng MD 10/26/2020  Diagnosis: chest pain  Subjective  Subjective: Pt pleasant and agreeable to OT evaluation, states feeling okay today, no chest pain.  \"I guess if I'm Normotonic  Tone LUE  LUE Tone: Normotonic  Coordination  Movements Are Fluid And Coordinated: Yes     Bed mobility  Supine to Sit: Supervision(to R with HOB elevated)  Sit to Supine: Unable to assess(up to chair at end of session)  Transfers  Sit to stand: Contact guard assistance(up to RW)  Stand to sit: Contact guard assistance      Sensation  Overall Sensation Status: Impaired  Light Touch: Partial deficits in the LLE;Partial deficits in the LUE(pt c/o N/T in L hand & anterior aspect of LLE (from old CVA per pt report))        LUE AROM (degrees)  LUE AROM : WFL  RUE AROM (degrees)  RUE AROM : WFL  LUE Strength  Gross LUE Strength: WFL  RUE Strength  Gross RUE Strength: WFL        Plan   Plan  Times per week: 3-5x's a week while in acute care    AM-PAC Score        AM-[facility] Inpatient Daily Activity Raw Score: 18 (10/27/20 0923)  AM-PAC Inpatient ADL T-Scale Score : 38.66 (10/27/20 0923)  ADL Inpatient CMS 0-100% Score: 46.65 (10/27/20 0923)  ADL Inpatient CMS G-Code Modifier : CK (10/27/20 1924)    Goals  Short term goals  Time Frame for Short term goals: 1 week unless otherwise specified (11/3/2020)  Short term goal 1: Pt will complete LE dressing with CGA  Short term goal 2: Pt will complete toilet transfers with SBA by 110/1  Short term goal 3: Pt will complete standing level ADLs with SBA for balance  Patient Goals   Patient goals : \"to get a little stronger so  I don't fall\"       Therapy Time   Individual Concurrent Group Co-treatment   Time In 0825         Time Out 0900         Minutes 35         Timed Code Treatment Minutes: 25 Minutes       ROBEL Ramos/L  If pt is unable to be seen after this session, please let this note serve as discharge summary. Please see case management note for discharge disposition. Thank you.

## 2020-10-27 NOTE — CARE COORDINATION
CASE MANAGEMENT DISCHARGE SUMMARY      Discharge to:  [id number]. Precertification completed: N/A  Hospital Exemption Notification (HENS) completed: N/A    New Durable Medical Equipment ordered/agency: None  Transportation:    Family/car: [employer]   Medical Transport explained to [place]. Pt/family voice no agency preference. Agency used: [address] [place] [address] Place up time: 1630   Ambulance form completed: Yes    Confirmed discharge plan with: Met with pt at bedside and spoke to sister Deanna Daily on the phone on this day. Facility/Agency, name:  [facility] faxed- Yes, confirmed with Matthew   Phone number for report to facility: [phone]. RN, name:  Marda Cowden    Note: Discharging nurse to complete [facility], reconcile [facility]S, and place final copy with patient's discharge packet. RN to ensure that written prescriptions for  Level II medications are sent with patient to the facility as per protocol.

## 2020-11-03 PROBLEM — I10 HTN (HYPERTENSION): Status: RESOLVED | Noted: 2020-11-03 | Resolved: 2020-11-03

## 2021-02-16 NOTE — PROGRESS NOTES
CARDIOLOGY FOLLOW-UP VISIT        Patient Name: Malvin Boone  Primary Care physician: Ally Andrews, APRN - CNP  Reason for Referral/Chief complaint: chest pain, and dyspnea    Subjective:     Malvin Boone is a 72 y.o. patient with prior history notable for cardiomyopathy, diabetes, hypertension, hyperlipidemia, prior CVA, and dementia, who presents today for follow up evaluation. The patient was last evaluated 10/23/2020 at which time he was admitted for chest and abdominal pain. Hypertensive at presentation. BNP elevated. Serial cardiac enzymes negative. Diuresed but did not require diuretic on discharge. Patient returns today for re-evaluation. Today, he states that he is feeling alright. He presents today in wheelchair with caregiver. He has been residing at [facility]. He states that he has a fleeting chest pain once in a while, left chest, localized with one finger, last seconds only before dissipating and is non-exertional.  He reports that his breathing has improved since hospitalization. Denies dyspnea at rest. Denies palpitations, dizziness, near-syncope or jairo syncope. Denies paroxysmal nocturnal dyspnea, orthopnea, bendopnea, increasing lower extremity edema or weight gain. The patient endorses highest level of activity as he walks around his room. He is in wheelchair today due to braces on both of his knees. He reports having diarrhea for \"forever\". She denies any evidence of hematemesis, hemoptysis, melena, hematochezia or hematuria with blood thinner. The patient is compliant with medications. Cost of medications is affordable. No endorsed side effects. Home Medications:  Were reviewed and are listed in nursing record and/or below  Prior to Admission medications    Medication Sig Start Date End Date Taking?  Authorizing Provider   tiZANidine (ZANAFLEX) 4 MG tablet Take 4 mg by mouth every 8 hours as needed   Yes Historical Provider, MD lisinopril (PRINIVIL;ZESTRIL) 10 MG tablet Take 1 tablet by mouth daily 10/27/20  Yes Sunni Walters, APRN - CNP   metoprolol succinate (TOPROL XL) 50 MG extended release tablet Take 1 tablet by mouth nightly 10/26/20  Yes Amanda Mahoney, APRN - CNP   loratadine (CLARITIN) 10 MG tablet Take 10 mg by mouth daily   Yes Historical Provider, MD   memantine (NAMENDA) 10 MG tablet Take 10 mg by mouth 2 times daily   Yes Historical Provider, MD   aluminum & magnesium hydroxide-simethicone (MAALOX) 200-200-20 MG/5ML SUSP suspension Take 5 mLs by mouth every 6 hours as needed for Indigestion Takes mylanta 30 ml Q4 hours PRN   Yes Historical Provider, MD   polyethylene glycol (GLYCOLAX) powder Take 17 g by mouth daily   Yes Historical Provider, MD   fluticasone (FLOVENT HFA) 44 MCG/ACT inhaler Inhale 2 puffs into the lungs 2 times daily   Yes Historical Provider, MD   acetaminophen (TYLENOL) 325 MG tablet Take 650 mg by mouth every 4 hours as needed for Pain   Yes Historical Provider, MD   loperamide (IMODIUM) 2 MG capsule Take 2 mg by mouth every 4 hours as needed for Diarrhea   Yes Historical Provider, MD   insulin aspart (NOVOLOG) 100 UNIT/ML injection vial Inject 10 Units into the skin 3 times daily (before meals) +SSI  5/31/19  Yes Juan Covington MD   aspirin 81 MG tablet Take 81 mg by mouth daily   Yes Historical Provider, MD   finasteride (PROSCAR) 5 MG tablet Take 5 mg by mouth daily   Yes Historical Provider, MD   folic acid (FOLVITE) 1 MG tablet Take 1 mg by mouth daily   Yes Historical Provider, MD   sertraline (ZOLOFT) 100 MG tablet Take 150 mg by mouth daily   Yes Historical Provider, MD   Dulaglutide (TRULICITY) 2.88 EM/4.0CL SOPN Inject 0.75 mg into the skin once a week On Saturday   Yes Historical Provider, MD   atorvastatin (LIPITOR) 80 MG tablet Take 80 mg by mouth nightly    Yes Historical Provider, MD   Insulin Glargine (LANTUS SOLOSTAR SC) Inject 20 Units into the skin 2 times daily    Yes Historical Provider, MD   cyclobenzaprine (FLEXERIL) 5 MG tablet Take 10 mg by mouth 3 times daily    Yes Historical Provider, MD   melatonin 3 MG TABS tablet Take 6 mg by mouth nightly    Yes Historical Provider, MD   vitamin E 1000 units capsule Take 1 capsule by mouth daily 11/5/18  Yes RICCO Ashby CNP   Continuous Blood Gluc  (FREESTYLE BRIDGET READER) DENIS Use to monitor sugars 8/1/18  Yes Juan David Freitas MD   Continuous Blood Gluc Sensor ([address] [place] [address]) MISC Use to monitor sugars 8/1/18  Yes Juan David Freitas MD   vitamin B-12 (CYANOCOBALAMIN) 1000 MCG tablet TAKE 1 TABLET BY MOUTH DAILY 7/2/18  Yes RICCO Ashby CNP   AGAMATRIX PRESTO TEST strip USE TO TEST BLOOD SUGAR 3 TIMES A DAY 5/9/18  Yes Juan David Freitas MD   umeclidinium-vilanterol (ANORO ELLIPTA) 62.5-25 MCG/INH AEPB inhaler Inhale 1 puff into the lungs daily 3/27/18  Yes Emily Augustine MD   albuterol sulfate HFA (PROAIR HFA) 108 (90 Base) MCG/ACT inhaler Inhale 2 puffs into the lungs every 4 hours as needed for Wheezing or Shortness of Breath 3/27/18  Yes Emily Augustine MD   Lancets MISC Testing 2-3 times daily DX Code: E11.9 3/22/18  Yes Juan David Freitas MD   Blood Glucose Monitoring Suppl (TRUE METRIX AIR GLUCOSE METER) DENIS 1 meter 3/6/18  Yes RICCO Child CNP   glucagon 1 MG injection Inject 1 kit into the skin as needed   Yes Historical Provider, MD   hypromellose 0.4 % SOLN ophthalmic solution Place 1 drop into both eyes every 4 hours as needed    Historical Provider, MD   bisacodyl (DULCOLAX) 10 MG suppository Place 10 mg rectally daily as needed for Constipation    Historical Provider, MD   SUMAtriptan (IMITREX) 100 MG tablet Take 100 mg by mouth daily as needed for Migraine    Historical Provider, MD   thiamine 100 MG tablet Take 1 tablet by mouth daily  Patient not taking: Reported on 2/18/2021 3/6/18   RICCO Ashby CNP        CURRENT Medications:  No current facility-administered medications for this visit. Allergies:  Neurontin [gabapentin]     Review of Systems:   A 14 point review of symptoms completed. Pertinent positives identified in the HPI, all other review of symptoms negative. Objective:     Vitals:    02/18/21 1024   BP: (!) 132/54   Pulse: 69   SpO2: 97%    Weight: 189 lb (85.7 kg)       PHYSICAL EXAM:    General:  Alert, cooperative, no distress, appears stated age   Head:  Normocephalic, atraumatic   Eyes:  Conjunctiva/corneas clear, anicteric sclerae    Nose: Nares normal, no drainage or sinus tenderness   Throat: No abnormalities of the lips, oral mucosa or tongue. Neck: Trachea midline. Neck supple with no lymphadenopathy, thyroid not enlarged, symmetric, no tenderness/mass/nodules, flat neck veins   Lungs:   Clear to auscultation bilaterally, no wheezes, no rales, no respiratory distress   Chest Wall:  No deformity or tenderness to palpation   Heart:  Regular rate and rhythm, normal S1, normal S2, no murmur, no rub, no S3/S4, PMI non-palpable. No heave. Abdomen:   Soft, non-tender, with normoactive bowel sounds. No masses, no hepatosplenomegaly   Extremities: No cyanosis, clubbing or pitting edema. Vascular: 2+ radial, diminished dorsalis pedis and posterior tibial pulses bilaterally. Brisk carotid upstrokes without carotid bruit. Skin: Skin color, texture, turgor are normal with no rashes or ulceration. Pysch: Euthymic mood, appropriate affect   Neurologic: Oriented to person, place and time. No slurred speech or facial asymmetry. No motor or sensory deficits on gross examination.          Labs:   CBC:   Lab Results   Component Value Date    WBC 7.1 10/26/2020    RBC 4.03 10/26/2020    HGB 12.1 10/26/2020    HCT 36.7 10/26/2020    MCV 91.1 10/26/2020    RDW 13.9 10/26/2020     10/26/2020     CMP:  Lab Results   Component Value Date     10/27/2020    K 4.8 10/27/2020     10/27/2020 CO2 27 10/27/2020    BUN 25 10/27/2020    CREATININE 1.2 10/27/2020    GFRAA >60 10/27/2020    GFRAA >60 06/15/2013    AGRATIO 0.7 05/29/2019    LABGLOM >60 10/27/2020    GLUCOSE 162 10/27/2020    PROT 8.1 05/29/2019    PROT 8.2 03/12/2013    CALCIUM 9.2 10/27/2020    BILITOT 0.4 05/29/2019    ALKPHOS 89 05/29/2019    AST 15 05/29/2019    ALT 7 05/29/2019     PT/INR:  No results found for: PTINR  HgBA1c:  Lab Results   Component Value Date    LABA1C 7.9 04/10/2020     Lab Results   Component Value Date    CKTOTAL 242 08/10/2018    TROPONINI <0.01 10/22/2020         Interval Testing/Data:     Stress test 10/22/2020  Summary    Mildly depressed LVEF 47%    Global hypokinesis with regional variation, more pronounced apical/inferior    hypokinesis    Inferior scar    No ischemia     Echo 10/22/2020   Summary   Limited only f/u for LVEF, RWMA, IVC, Aortic root. Ejection fraction is visually estimated to be 40 %. Global hypokinesis with regional variation, more prominent inferoapical   hypokinesis. Changes noted from previous echo on 4- in left ventricular function. The aortic root is mildly dilated. The right ventricle is low normal (to mildly depressed) in function. IVC size is normal (<2.1cm) and collapses > 50% with respiration consistent   with normal RA pressure (3mmHg). Echo 4/10/2020   Summary   Left ventricular systolic function is low normal to mildly reduced LVEF   40-50%, difficult to evaluate LVEF due to irregular heart beat. Technically difficult examination. There is mild-moderate left ventricular hypertrophy. Left ventricle size is normal.   The aortic root is moderately dilated. Aortic valve leaflets appear mildly thickened. Mitral annular calcification is present. Mitral valve leaflets appear mildly thickened. Mild mitral and tricuspid regurgitation. Inadequate tricuspid regurgitation to estimate systolic pulmonary artery   pressure.    IVC is normal in size (< 2.1 cm) and collapses > 50% with respiration   consistent with normal RA pressure (3 mmHg). Previous echo done 10/2018 - EF 55-60%    Cardiac catheterization: 9/26/2012  ANGIOGRAPHY:    1.  The left main coronary artery is short but angiographically normal.    2.  The left anterior descending artery is widely patent.  There is very  minimal middistal tapering but the vessel is otherwise normal.   3.  There is a ramus intermedius branch/first obtuse marginal branch which is  angiographically normal.   4.  The circumflex itself is a large vessel that is angiographically normal.   5.  The right coronary artery is tortuous, dominant and with minor proximal  irregularity. 5.  A left ventriculogram in the DYER projection shows uniform wall motion,  normal left ventricular function.  LVEF is estimated at 55%.  There is no  gradient on pullback across the aortic valve.  LVEDP is measured at 7 mmHg. There is no mitral insufficiency.       CONCLUSION:    1.  Essentially normal coronary arteries with right dominant system. 2.  Well-preserved left ventricular systolic function.      PLAN:  Continued risk factor management with no evidence for underlying CAD. His chest pain is likely noncardiac and his stress study is a false positive.        Additional studies:   CXR personally reviewed    Impression and Plan     Laine Shepard is a 72 y.o. patient with prior history notable for cardiomyopathy, diabetes, hypertension, hyperlipidemia, prior CVA, and dementia, who presents today for follow up evaluation. The patient's last stress test had inferior scar with EF 47%. Last echo with LVEF40%, mild aortic root dilatation and no significant valvular disease. 1. Cardiomyopathy, possibly ischemic with prior scar by stress (new since 2014 study). Last [facility] 2012 with no CAD  2. Mild LV dysfunction, first noted 4/2020  3. Hypertension - near goal with low diastolic.    4. Hyperlipidemia - LDL at goal.   5. Aortic root dilatation - 3.7 by most recent echo. 4.1 by 4/2020 study. 6. Prior CVA  7. Atypical chest pain  8. Diabetes   9. Tobacco use disorder   10. Dementia  11.  Orthostatic hypotension    Patient Active Problem List   Diagnosis    GERD (gastroesophageal reflux disease)    Peripheral neuropathy of bilateral feet    COPD with acute bronchitis (HCC)    HLD (hyperlipidemia)    Hepatic steatosis    PTSD (post-traumatic stress disorder)    History of hepatitis C    Bilateral knee & hip OA    Mild-moderate alcohol consumption (beer)    Chronic pain syndrome    Moderate episode of recurrent major depressive disorder (HCC)    Orthostatic hypotension    S/P left colectomy    Hyponatremia    S/p reversal of colostomy (6/27/17)    Chronic dCHF (grade 1 LVDD)    Chronic normocytic anemia    Acute hyperglycemia    Severe protein-calorie malnutrition (HCC)    DM (diabetes mellitus), secondary, uncontrolled, w/neurologic complic ([address]    Hx DKA (Feb 2018)    Other insomnia    Chronic transaminasemia    Tobacco smoker    Fall at home    Hypochloremia    Hyperkalemia    Mixed metabolic and respiratory acidosis    Closed nondisplaced L ankle fracture (lateral malleolus)    L 2nd toenail avulsion    Skin tear of L leg    Head trauma    IDDM (insulin dependent diabetes mellitus)    Ventral hernia without obstruction or gangrene    Diabetic ketoacidosis without coma associated with type 2 diabetes mellitus ([address]    DDD (degenerative disc disease), cervical    Spondylosis of cervical region without myelopathy or radiculopathy    DDD (degenerative disc disease), lumbar    Closed nondisplaced fracture of lateral malleolus of left fibula    Lumbar degenerative disc disease    Protrusion of lumbar intervertebral disc    Osteoarthritis of both knees    Osteoarthritis of both hips    Idiopathic peripheral neuropathy    Leg pain, anterior, left    CVA (cerebral vascular accident) ([address]    DM (diabetes mellitus) ([facility]    Essential hypertension    BPH (benign prostatic hyperplasia)    Dysarthria    Left-sided weakness    TIA (transient ischemic attack)    Dyslipidemia    DKA, type 2, not at goal [facility])    Agitation requiring sedation protocol    DKA, type 1, not at goal [facility])    Severe malnutrition ([facility]    CHIP (acute kidney injury) ([facility]    Open wound of right foot    Dementia associated with other underlying disease without behavioral disturbance ([facility]    Closed head injury    Facial hematoma    Concussion with no loss of consciousness    Chest pain    SOB (shortness of breath)    Acute on chronic systolic (congestive) heart failure (HCC)    Cardiomyopathy ([facility]       PLAN:  1. Recheck fasting lipids on next lab draw at facility. If LDL <30 recommend decrease atorvastatin to 40 mg nightly. 2. Continue all other cardiac medications. Renal function tolerating ACEI, stable ~1.3. Follow up in 6 months. I will address the patient's cardiac risk factors and adjusted pharmacologic treatment as needed. In addition, I have reinforced the need for patient directed risk factor modification. All questions and concerns were addressed to the patient/family. Alternatives to my treatment were discussed. This note was scribed in the presence of Lester Marin MD by Nolvia Pichardo RN. The scribes documentation has been prepared under my direction and personally reviewed by me in its entirety. I confirm that the note above accurately reflects all work, treatment, procedures, and medical decision making performed by me. Coleman Hale MD, personally performed the services described in this documentation as scribed by Nolvia Pichardo RN in my presence, and it is both accurate and complete to the best of our ability. Thank you for allowing us to participate in the care of Eleonora Russell. Please call me with any questions [phone].     Murriel Merlin, MD, [facility]  Cardiovascular Disease  [facility]  [phone] [facility]  [phone] [address] [place] [address]  2/18/2021 10:29 AM

## 2021-02-18 ENCOUNTER — OFFICE VISIT (OUTPATIENT)
Dept: CARDIOLOGY CLINIC | Age: 71
End: 2021-02-18
Payer: MEDICARE

## 2021-02-18 VITALS
WEIGHT: 189 LBS | HEART RATE: 69 BPM | HEIGHT: 77 IN | OXYGEN SATURATION: 97 % | DIASTOLIC BLOOD PRESSURE: 54 MMHG | BODY MASS INDEX: 22.32 KG/M2 | SYSTOLIC BLOOD PRESSURE: 132 MMHG

## 2021-02-18 DIAGNOSIS — I95.1 ORTHOSTATIC HYPOTENSION: ICD-10-CM

## 2021-02-18 DIAGNOSIS — I50.32 CHRONIC DIASTOLIC CHF (CONGESTIVE HEART FAILURE) (HCC): Primary | Chronic | ICD-10-CM

## 2021-02-18 DIAGNOSIS — I42.9 CARDIOMYOPATHY, UNSPECIFIED TYPE (HCC): ICD-10-CM

## 2021-02-18 DIAGNOSIS — R07.9 CHEST PAIN, UNSPECIFIED TYPE: ICD-10-CM

## 2021-02-18 DIAGNOSIS — E78.2 MIXED HYPERLIPIDEMIA: Chronic | ICD-10-CM

## 2021-02-18 PROCEDURE — 99214 OFFICE O/P EST MOD 30 MIN: CPT | Performed by: INTERNAL MEDICINE

## 2021-02-18 PROCEDURE — G8427 DOCREV CUR MEDS BY ELIG CLIN: HCPCS | Performed by: INTERNAL MEDICINE

## 2021-02-18 PROCEDURE — G8420 CALC BMI NORM PARAMETERS: HCPCS | Performed by: INTERNAL MEDICINE

## 2021-02-18 PROCEDURE — 1036F TOBACCO NON-USER: CPT | Performed by: INTERNAL MEDICINE

## 2021-02-18 PROCEDURE — 4040F PNEUMOC VAC/ADMIN/RCVD: CPT | Performed by: INTERNAL MEDICINE

## 2021-02-18 PROCEDURE — G8484 FLU IMMUNIZE NO ADMIN: HCPCS | Performed by: INTERNAL MEDICINE

## 2021-02-18 PROCEDURE — 1123F ACP DISCUSS/DSCN MKR DOCD: CPT | Performed by: INTERNAL MEDICINE

## 2021-02-18 PROCEDURE — 3017F COLORECTAL CA SCREEN DOC REV: CPT | Performed by: INTERNAL MEDICINE

## 2021-02-18 RX ORDER — TIZANIDINE 4 MG/1
4 TABLET ORAL EVERY 8 HOURS PRN
Status: ON HOLD | COMMUNITY
End: 2022-02-21 | Stop reason: HOSPADM

## 2021-02-18 NOTE — LETTER
February 23, 2021            CARDIOLOGY FOLLOW-UP VISIT        Patient Name: Teodora Monroe  Primary Care physician: RICCO Meza - CNP  Reason for Referral/Chief complaint: chest pain, and dyspnea    Subjective:     Teodora Monroe is a 72 y.o. patient with prior history notable for cardiomyopathy, diabetes, hypertension, hyperlipidemia, prior CVA, and dementia, who presents today for follow up evaluation. The patient was last evaluated 10/23/2020 at which time he was admitted for chest and abdominal pain. Hypertensive at presentation. BNP elevated. Serial cardiac enzymes negative. Diuresed but did not require diuretic on discharge. Patient returns today for re-evaluation. Today, he states that he is feeling alright. He presents today in wheelchair with caregiver. He has been residing at [facility]. He states that he has a fleeting chest pain once in a while, left chest, localized with one finger, last seconds only before dissipating and is non-exertional.  He reports that his breathing has improved since hospitalization. Denies dyspnea at rest. Denies palpitations, dizziness, near-syncope or jairo syncope. Denies paroxysmal nocturnal dyspnea, orthopnea, bendopnea, increasing lower extremity edema or weight gain. The patient endorses highest level of activity as he walks around his room. He is in wheelchair today due to braces on both of his knees. He reports having diarrhea for \"forever\". She denies any evidence of hematemesis, hemoptysis, melena, hematochezia or hematuria with blood thinner. The patient is compliant with medications. Cost of medications is affordable. No endorsed side effects. Home Medications:  Were reviewed and are listed in nursing record and/or below  Prior to Admission medications    Medication Sig Start Date End Date Taking?  Authorizing Provider tiZANidine (ZANAFLEX) 4 MG tablet Take 4 mg by mouth every 8 hours as needed   Yes Historical Provider, MD   lisinopril (PRINIVIL;ZESTRIL) 10 MG tablet Take 1 tablet by mouth daily 10/27/20  Yes Yovannymasha Holdenloly, APRN - CNP   metoprolol succinate (TOPROL XL) 50 MG extended release tablet Take 1 tablet by mouth nightly 10/26/20  Yes Amanda Willis Luba, APRN - CNP   loratadine (CLARITIN) 10 MG tablet Take 10 mg by mouth daily   Yes Historical Provider, MD   memantine (NAMENDA) 10 MG tablet Take 10 mg by mouth 2 times daily   Yes Historical Provider, MD   aluminum & magnesium hydroxide-simethicone (MAALOX) 200-200-20 MG/5ML SUSP suspension Take 5 mLs by mouth every 6 hours as needed for Indigestion Takes mylanta 30 ml Q4 hours PRN   Yes Historical Provider, MD   polyethylene glycol (GLYCOLAX) powder Take 17 g by mouth daily   Yes Historical Provider, MD   fluticasone (FLOVENT HFA) 44 MCG/ACT inhaler Inhale 2 puffs into the lungs 2 times daily   Yes Historical Provider, MD   acetaminophen (TYLENOL) 325 MG tablet Take 650 mg by mouth every 4 hours as needed for Pain   Yes Historical Provider, MD   loperamide (IMODIUM) 2 MG capsule Take 2 mg by mouth every 4 hours as needed for Diarrhea   Yes Historical Provider, MD   insulin aspart (NOVOLOG) 100 UNIT/ML injection vial Inject 10 Units into the skin 3 times daily (before meals) +SSI  5/31/19  Yes Woo Castillo MD   aspirin 81 MG tablet Take 81 mg by mouth daily   Yes Historical Provider, MD   finasteride (PROSCAR) 5 MG tablet Take 5 mg by mouth daily   Yes Historical Provider, MD   folic acid (FOLVITE) 1 MG tablet Take 1 mg by mouth daily   Yes Historical Provider, MD   sertraline (ZOLOFT) 100 MG tablet Take 150 mg by mouth daily   Yes Historical Provider, MD   Dulaglutide (TRULICITY) 5.25 LD/0.7XG SOPN Inject 0.75 mg into the skin once a week On Saturday   Yes Historical Provider, MD atorvastatin (LIPITOR) 80 MG tablet Take 80 mg by mouth nightly    Yes Historical Provider, MD   Insulin Glargine (LANTUS SOLOSTAR SC) Inject 20 Units into the skin 2 times daily    Yes Historical Provider, MD   cyclobenzaprine (FLEXERIL) 5 MG tablet Take 10 mg by mouth 3 times daily    Yes Historical Provider, MD   melatonin 3 MG TABS tablet Take 6 mg by mouth nightly    Yes Historical Provider, MD   vitamin E 1000 units capsule Take 1 capsule by mouth daily 11/5/18  Yes RICCO Webb CNP   Continuous Blood Gluc  (FREESTYLE BRIDGET READER) DENIS Use to monitor sugars 8/1/18  Yes Craig Harley MD   Continuous Blood Gluc Sensor ([address] [place] [address]) MIS Use to monitor sugars 8/1/18  Yes Craig Harley MD   vitamin B-12 (CYANOCOBALAMIN) 1000 MCG tablet TAKE 1 TABLET BY MOUTH DAILY 7/2/18  Yes RICCO Webb CNP   AGAMATRIX PRESTO TEST strip USE TO TEST BLOOD SUGAR 3 TIMES A DAY 5/9/18  Yes Craig Harley MD   umeclidinium-vilanterol (ANORO ELLIPTA) 62.5-25 MCG/INH AEPB inhaler Inhale 1 puff into the lungs daily 3/27/18  Yes Efrain Gutierrez MD   albuterol sulfate HFA (PROAIR HFA) 108 (90 Base) MCG/ACT inhaler Inhale 2 puffs into the lungs every 4 hours as needed for Wheezing or Shortness of Breath 3/27/18  Yes Efrain Gutierrez MD   Lancets MISC Testing 2-3 times daily DX Code: E11.9 3/22/18  Yes Craig Harley MD   Blood Glucose Monitoring Suppl (TRUE METRIX AIR GLUCOSE METER) DENIS 1 meter 3/6/18  Yes RICCO Garcia CNP   glucagon 1 MG injection Inject 1 kit into the skin as needed   Yes Historical Provider, MD   hypromellose 0.4 % SOLN ophthalmic solution Place 1 drop into both eyes every 4 hours as needed    Historical Provider, MD   bisacodyl (DULCOLAX) 10 MG suppository Place 10 mg rectally daily as needed for Constipation    Historical Provider, MD SUMAtriptan (IMITREX) 100 MG tablet Take 100 mg by mouth daily as needed for Migraine    Historical Provider, MD   thiamine 100 MG tablet Take 1 tablet by mouth daily  Patient not taking: Reported on 2/18/2021 3/6/18   RICCO Ashby CNP        CURRENT Medications:  No current facility-administered medications for this visit. Allergies:  Neurontin [gabapentin]     Review of Systems:   A 14 point review of symptoms completed. Pertinent positives identified in the HPI, all other review of symptoms negative. Objective:     Vitals:    02/18/21 1024   BP: (!) 132/54   Pulse: 69   SpO2: 97%    Weight: 189 lb (85.7 kg)       PHYSICAL EXAM:    General:  Alert, cooperative, no distress, appears stated age   Head:  Normocephalic, atraumatic   Eyes:  Conjunctiva/corneas clear, anicteric sclerae    Nose: Nares normal, no drainage or sinus tenderness   Throat: No abnormalities of the lips, oral mucosa or tongue. Neck: Trachea midline. Neck supple with no lymphadenopathy, thyroid not enlarged, symmetric, no tenderness/mass/nodules, flat neck veins   Lungs:   Clear to auscultation bilaterally, no wheezes, no rales, no respiratory distress   Chest Wall:  No deformity or tenderness to palpation   Heart:  Regular rate and rhythm, normal S1, normal S2, no murmur, no rub, no S3/S4, PMI non-palpable. No heave. Abdomen:   Soft, non-tender, with normoactive bowel sounds. No masses, no hepatosplenomegaly   Extremities: No cyanosis, clubbing or pitting edema. Vascular: 2+ radial, diminished dorsalis pedis and posterior tibial pulses bilaterally. Brisk carotid upstrokes without carotid bruit. Skin: Skin color, texture, turgor are normal with no rashes or ulceration. Pysch: Euthymic mood, appropriate affect   Neurologic: Oriented to person, place and time. No slurred speech or facial asymmetry. No motor or sensory deficits on gross examination.          Labs:   CBC:   Lab Results Component Value Date    WBC 7.1 10/26/2020    RBC 4.03 10/26/2020    HGB 12.1 10/26/2020    HCT 36.7 10/26/2020    MCV 91.1 10/26/2020    RDW 13.9 10/26/2020     10/26/2020     CMP:  Lab Results   Component Value Date     10/27/2020    K 4.8 10/27/2020     10/27/2020    CO2 27 10/27/2020    BUN 25 10/27/2020    CREATININE 1.2 10/27/2020    GFRAA >60 10/27/2020    GFRAA >60 06/15/2013    AGRATIO 0.7 05/29/2019    LABGLOM >60 10/27/2020    GLUCOSE 162 10/27/2020    PROT 8.1 05/29/2019    PROT 8.2 03/12/2013    CALCIUM 9.2 10/27/2020    BILITOT 0.4 05/29/2019    ALKPHOS 89 05/29/2019    AST 15 05/29/2019    ALT 7 05/29/2019     PT/INR:  No results found for: PTINR  HgBA1c:  Lab Results   Component Value Date    LABA1C 7.9 04/10/2020     Lab Results   Component Value Date    CKTOTAL 242 08/10/2018    TROPONINI <0.01 10/22/2020         Interval Testing/Data:     Stress test 10/22/2020  Summary    Mildly depressed LVEF 47%    Global hypokinesis with regional variation, more pronounced apical/inferior    hypokinesis    Inferior scar    No ischemia     Echo 10/22/2020   Summary   Limited only f/u for LVEF, RWMA, IVC, Aortic root. Ejection fraction is visually estimated to be 40 %. Global hypokinesis with regional variation, more prominent inferoapical   hypokinesis. Changes noted from previous echo on 4- in left ventricular function. The aortic root is mildly dilated. The right ventricle is low normal (to mildly depressed) in function. IVC size is normal (<2.1cm) and collapses > 50% with respiration consistent   with normal RA pressure (3mmHg). Echo 4/10/2020   Summary   Left ventricular systolic function is low normal to mildly reduced LVEF   40-50%, difficult to evaluate LVEF due to irregular heart beat. Technically difficult examination. There is mild-moderate left ventricular hypertrophy. Left ventricle size is normal.   The aortic root is moderately dilated. Aortic valve leaflets appear mildly thickened. Mitral annular calcification is present. Mitral valve leaflets appear mildly thickened. Mild mitral and tricuspid regurgitation. Inadequate tricuspid regurgitation to estimate systolic pulmonary artery   pressure. IVC is normal in size (< 2.1 cm) and collapses > 50% with respiration   consistent with normal RA pressure (3 mmHg). Previous echo done 10/2018 - EF 55-60%    Cardiac catheterization: 9/26/2012  ANGIOGRAPHY:    1.  The left main coronary artery is short but angiographically normal.    2.  The left anterior descending artery is widely patent.  There is very  minimal middistal tapering but the vessel is otherwise normal.   3.  There is a ramus intermedius branch/first obtuse marginal branch which is  angiographically normal.   4.  The circumflex itself is a large vessel that is angiographically normal.   5.  The right coronary artery is tortuous, dominant and with minor proximal  irregularity. 5.  A left ventriculogram in the DYER projection shows uniform wall motion,  normal left ventricular function.  LVEF is estimated at 55%.  There is no  gradient on pullback across the aortic valve.  LVEDP is measured at 7 mmHg. There is no mitral insufficiency.       CONCLUSION:    1.  Essentially normal coronary arteries with right dominant system. 2.  Well-preserved left ventricular systolic function.      PLAN:  Continued risk factor management with no evidence for underlying CAD. His chest pain is likely noncardiac and his stress study is a false positive.        Additional studies:   CXR personally reviewed    Impression and Plan     Chirag Plata is a 72 y.o. patient with prior history notable for cardiomyopathy, diabetes, hypertension, hyperlipidemia, prior CVA, and dementia, who presents today for follow up evaluation. The patient's last stress test had inferior scar with EF 47%. Last echo with LVEF40%, mild aortic root dilatation and no significant valvular disease. 1. Cardiomyopathy, possibly ischemic with prior scar by stress (new since 2014 study). Last [facility] 2012 with no CAD  2. Mild LV dysfunction, first noted 4/2020  3. Hypertension - near goal with low diastolic. 4. Hyperlipidemia - LDL at goal.   5. Aortic root dilatation - 3.7 by most recent echo. 4.1 by 4/2020 study. 6. Prior CVA  7. Atypical chest pain  8. Diabetes   9. Tobacco use disorder   10. Dementia  11.  Orthostatic hypotension    Patient Active Problem List   Diagnosis    GERD (gastroesophageal reflux disease)    Peripheral neuropathy of bilateral feet    COPD with acute bronchitis (HCC)    HLD (hyperlipidemia)    Hepatic steatosis    PTSD (post-traumatic stress disorder)    History of hepatitis C    Bilateral knee & hip OA    Mild-moderate alcohol consumption (beer)    Chronic pain syndrome    Moderate episode of recurrent major depressive disorder (HCC)    Orthostatic hypotension    S/P left colectomy    Hyponatremia    S/p reversal of colostomy (6/27/17)    Chronic dCHF (grade 1 LVDD)    Chronic normocytic anemia    Acute hyperglycemia    Severe protein-calorie malnutrition (HCC)    DM (diabetes mellitus), secondary, uncontrolled, w/neurologic complic ([address]    Hx DKA (Feb 2018)    Other insomnia    Chronic transaminasemia    Tobacco smoker    Fall at home    Hypochloremia    Hyperkalemia    Mixed metabolic and respiratory acidosis    Closed nondisplaced L ankle fracture (lateral malleolus)    L 2nd toenail avulsion    Skin tear of L leg    Head trauma    IDDM (insulin dependent diabetes mellitus)    Ventral hernia without obstruction or gangrene    Diabetic ketoacidosis without coma associated with type 2 diabetes mellitus ([address]    DDD (degenerative disc disease), cervical  Spondylosis of cervical region without myelopathy or radiculopathy    DDD (degenerative disc disease), lumbar    Closed nondisplaced fracture of lateral malleolus of left fibula    Lumbar degenerative disc disease    Protrusion of lumbar intervertebral disc    Osteoarthritis of both knees    Osteoarthritis of both hips    Idiopathic peripheral neuropathy    Leg pain, anterior, left    CVA (cerebral vascular accident) [facility]    DM (diabetes mellitus) ([facility]    Essential hypertension    BPH (benign prostatic hyperplasia)    Dysarthria    Left-sided weakness    TIA (transient ischemic attack)    Dyslipidemia    DKA, type 2, not at goal [facility])    Agitation requiring sedation protocol    DKA, type 1, not at goal [facility])    Severe malnutrition ([facility]    CHIP (acute kidney injury) ([facility]    Open wound of right foot    Dementia associated with other underlying disease without behavioral disturbance ([facility]    Closed head injury    Facial hematoma    Concussion with no loss of consciousness    Chest pain    SOB (shortness of breath)    Acute on chronic systolic (congestive) heart failure (HCC)    Cardiomyopathy ([facility]       PLAN:  1. Recheck fasting lipids on next lab draw at facility. If LDL <30 recommend decrease atorvastatin to 40 mg nightly. 2. Continue all other cardiac medications. Renal function tolerating ACEI, stable ~1.3. Follow up in 6 months. I will address the patient's cardiac risk factors and adjusted pharmacologic treatment as needed. In addition, I have reinforced the need for patient directed risk factor modification. All questions and concerns were addressed to the patient/family. Alternatives to my treatment were discussed. This note was scribed in the presence of Anu Hendrickson MD by Osiel Celis RN. The scribes documentation has been prepared under my direction and personally reviewed by me in its entirety. I confirm that the note above accurately reflects all work, treatment, procedures, and medical decision making performed by me. Jena Willis MD, personally performed the services described in this documentation as scribed by Coleman Guerrero RN in my presence, and it is both accurate and complete to the best of our ability. Thank you for allowing us to participate in the care of Laine Shepard. Please call me with any questions [phone].     Katiana Seals MD, [facility]  Cardiovascular Disease  [address]  [phone] [facility]  [phone] [address] [place] [address]  2/18/2021 10:29 AM

## 2021-02-18 NOTE — PATIENT INSTRUCTIONS
Recheck fasting lipids again. If LDL is less that 30 then cut atorvastatin in half. This would be Lipitor 40 mg nightly.    Continue rest of medications   Follow up in 6 months

## 2021-03-29 ENCOUNTER — HOSPITAL ENCOUNTER (EMERGENCY)
Age: 71
Discharge: NURSING, REHAB OR LONG-TERM CARE | End: 2021-03-29
Attending: EMERGENCY MEDICINE
Payer: MEDICARE

## 2021-03-29 ENCOUNTER — APPOINTMENT (OUTPATIENT)
Dept: GENERAL RADIOLOGY | Age: 71
End: 2021-03-29
Payer: MEDICARE

## 2021-03-29 ENCOUNTER — APPOINTMENT (OUTPATIENT)
Dept: CT IMAGING | Age: 71
End: 2021-03-29
Payer: MEDICARE

## 2021-03-29 DIAGNOSIS — R41.0 CONFUSION: Primary | ICD-10-CM

## 2021-03-29 DIAGNOSIS — N39.0 URINARY TRACT INFECTION WITHOUT HEMATURIA, SITE UNSPECIFIED: ICD-10-CM

## 2021-03-29 DIAGNOSIS — N17.9 AKI (ACUTE KIDNEY INJURY) (HCC): ICD-10-CM

## 2021-03-29 LAB
A/G RATIO: 1.1 (ref 1.1–2.2)
ALBUMIN SERPL-MCNC: 3.7 G/DL (ref 3.4–5)
ALP BLD-CCNC: 85 U/L (ref 40–129)
ALT SERPL-CCNC: 10 U/L (ref 10–40)
ANION GAP SERPL CALCULATED.3IONS-SCNC: 9 MMOL/L (ref 3–16)
AST SERPL-CCNC: 15 U/L (ref 15–37)
BACTERIA: ABNORMAL /HPF
BASOPHILS ABSOLUTE: 0.1 K/UL (ref 0–0.2)
BASOPHILS RELATIVE PERCENT: 0.6 %
BILIRUB SERPL-MCNC: 0.5 MG/DL (ref 0–1)
BILIRUBIN URINE: NEGATIVE
BLOOD, URINE: ABNORMAL
BUN BLDV-MCNC: 24 MG/DL (ref 7–20)
CALCIUM SERPL-MCNC: 9.2 MG/DL (ref 8.3–10.6)
CHLORIDE BLD-SCNC: 100 MMOL/L (ref 99–110)
CHP ED QC CHECK: YES
CLARITY: ABNORMAL
CO2: 25 MMOL/L (ref 21–32)
COLOR: YELLOW
CREAT SERPL-MCNC: 1.5 MG/DL (ref 0.8–1.3)
EKG ATRIAL RATE: 62 BPM
EKG DIAGNOSIS: NORMAL
EKG P AXIS: 47 DEGREES
EKG P-R INTERVAL: 174 MS
EKG Q-T INTERVAL: 456 MS
EKG QRS DURATION: 124 MS
EKG QTC CALCULATION (BAZETT): 462 MS
EKG R AXIS: -22 DEGREES
EKG T AXIS: 161 DEGREES
EKG VENTRICULAR RATE: 62 BPM
EOSINOPHILS ABSOLUTE: 0.3 K/UL (ref 0–0.6)
EOSINOPHILS RELATIVE PERCENT: 2.9 %
EPITHELIAL CELLS, UA: ABNORMAL /HPF (ref 0–5)
GFR AFRICAN AMERICAN: 56
GFR NON-AFRICAN AMERICAN: 46
GLOBULIN: 3.5 G/DL
GLUCOSE BLD-MCNC: 240 MG/DL (ref 70–99)
GLUCOSE BLD-MCNC: 88 MG/DL
GLUCOSE BLD-MCNC: 88 MG/DL (ref 70–99)
GLUCOSE URINE: 250 MG/DL
HCT VFR BLD CALC: 36.1 % (ref 40.5–52.5)
HEMOGLOBIN: 12 G/DL (ref 13.5–17.5)
KETONES, URINE: NEGATIVE MG/DL
LACTIC ACID: 1 MMOL/L (ref 0.4–2)
LEUKOCYTE ESTERASE, URINE: ABNORMAL
LYMPHOCYTES ABSOLUTE: 2.1 K/UL (ref 1–5.1)
LYMPHOCYTES RELATIVE PERCENT: 23.1 %
MCH RBC QN AUTO: 30.6 PG (ref 26–34)
MCHC RBC AUTO-ENTMCNC: 33.2 G/DL (ref 31–36)
MCV RBC AUTO: 92 FL (ref 80–100)
MICROSCOPIC EXAMINATION: YES
MONOCYTES ABSOLUTE: 0.9 K/UL (ref 0–1.3)
MONOCYTES RELATIVE PERCENT: 10.1 %
NEUTROPHILS ABSOLUTE: 5.8 K/UL (ref 1.7–7.7)
NEUTROPHILS RELATIVE PERCENT: 63.3 %
NITRITE, URINE: POSITIVE
PDW BLD-RTO: 13.2 % (ref 12.4–15.4)
PERFORMED ON: NORMAL
PH UA: 6 (ref 5–8)
PLATELET # BLD: 211 K/UL (ref 135–450)
PMV BLD AUTO: 8.2 FL (ref 5–10.5)
POTASSIUM REFLEX MAGNESIUM: 4.2 MMOL/L (ref 3.5–5.1)
PRO-BNP: 5824 PG/ML (ref 0–124)
PROTEIN UA: 100 MG/DL
RBC # BLD: 3.92 M/UL (ref 4.2–5.9)
RBC UA: ABNORMAL /HPF (ref 0–4)
SODIUM BLD-SCNC: 134 MMOL/L (ref 136–145)
SPECIFIC GRAVITY UA: 1.01 (ref 1–1.03)
TOTAL PROTEIN: 7.2 G/DL (ref 6.4–8.2)
TROPONIN: <0.01 NG/ML
URINE REFLEX TO CULTURE: YES
URINE TYPE: ABNORMAL
UROBILINOGEN, URINE: 0.2 E.U./DL
WBC # BLD: 9.1 K/UL (ref 4–11)
WBC UA: ABNORMAL /HPF (ref 0–5)

## 2021-03-29 PROCEDURE — 84484 ASSAY OF TROPONIN QUANT: CPT

## 2021-03-29 PROCEDURE — 93005 ELECTROCARDIOGRAM TRACING: CPT | Performed by: EMERGENCY MEDICINE

## 2021-03-29 PROCEDURE — 6360000002 HC RX W HCPCS: Performed by: EMERGENCY MEDICINE

## 2021-03-29 PROCEDURE — 99284 EMERGENCY DEPT VISIT MOD MDM: CPT

## 2021-03-29 PROCEDURE — 6370000000 HC RX 637 (ALT 250 FOR IP): Performed by: EMERGENCY MEDICINE

## 2021-03-29 PROCEDURE — 83605 ASSAY OF LACTIC ACID: CPT

## 2021-03-29 PROCEDURE — 87077 CULTURE AEROBIC IDENTIFY: CPT

## 2021-03-29 PROCEDURE — 87186 SC STD MICRODIL/AGAR DIL: CPT

## 2021-03-29 PROCEDURE — 96365 THER/PROPH/DIAG IV INF INIT: CPT

## 2021-03-29 PROCEDURE — 80053 COMPREHEN METABOLIC PANEL: CPT

## 2021-03-29 PROCEDURE — 99285 EMERGENCY DEPT VISIT HI MDM: CPT

## 2021-03-29 PROCEDURE — 93010 ELECTROCARDIOGRAM REPORT: CPT | Performed by: INTERNAL MEDICINE

## 2021-03-29 PROCEDURE — 81001 URINALYSIS AUTO W/SCOPE: CPT

## 2021-03-29 PROCEDURE — 70450 CT HEAD/BRAIN W/O DYE: CPT

## 2021-03-29 PROCEDURE — 71046 X-RAY EXAM CHEST 2 VIEWS: CPT

## 2021-03-29 PROCEDURE — 83880 ASSAY OF NATRIURETIC PEPTIDE: CPT

## 2021-03-29 PROCEDURE — 36415 COLL VENOUS BLD VENIPUNCTURE: CPT

## 2021-03-29 PROCEDURE — 85025 COMPLETE CBC W/AUTO DIFF WBC: CPT

## 2021-03-29 PROCEDURE — 2580000003 HC RX 258: Performed by: EMERGENCY MEDICINE

## 2021-03-29 PROCEDURE — 87086 URINE CULTURE/COLONY COUNT: CPT

## 2021-03-29 RX ORDER — OXYCODONE HYDROCHLORIDE 10 MG/1
10 TABLET ORAL EVERY 6 HOURS PRN
Status: ON HOLD | COMMUNITY
End: 2021-04-19 | Stop reason: SDUPTHER

## 2021-03-29 RX ORDER — OXYCODONE HYDROCHLORIDE 5 MG/1
10 TABLET ORAL ONCE
Status: COMPLETED | OUTPATIENT
Start: 2021-03-29 | End: 2021-03-29

## 2021-03-29 RX ORDER — 0.9 % SODIUM CHLORIDE 0.9 %
1000 INTRAVENOUS SOLUTION INTRAVENOUS ONCE
Status: COMPLETED | OUTPATIENT
Start: 2021-03-29 | End: 2021-03-29

## 2021-03-29 RX ORDER — SERTRALINE HYDROCHLORIDE 100 MG/1
100 TABLET, FILM COATED ORAL DAILY
COMMUNITY

## 2021-03-29 RX ORDER — ACETAMINOPHEN 325 MG/1
650 TABLET ORAL ONCE
Status: COMPLETED | OUTPATIENT
Start: 2021-03-29 | End: 2021-03-29

## 2021-03-29 RX ORDER — CEFUROXIME AXETIL 250 MG/1
250 TABLET ORAL 2 TIMES DAILY
Qty: 14 TABLET | Refills: 0 | Status: SHIPPED | OUTPATIENT
Start: 2021-03-29 | End: 2021-03-29 | Stop reason: SDUPTHER

## 2021-03-29 RX ORDER — MELATONIN 10 MG
10 CAPSULE ORAL NIGHTLY
COMMUNITY

## 2021-03-29 RX ORDER — ERGOCALCIFEROL 1.25 MG/1
50000 CAPSULE ORAL WEEKLY
COMMUNITY

## 2021-03-29 RX ORDER — CEFUROXIME AXETIL 250 MG/1
250 TABLET ORAL 2 TIMES DAILY
Qty: 14 TABLET | Refills: 0 | Status: ON HOLD | OUTPATIENT
Start: 2021-03-29 | End: 2021-04-03 | Stop reason: HOSPADM

## 2021-03-29 RX ADMIN — ACETAMINOPHEN 650 MG: 325 TABLET ORAL at 17:16

## 2021-03-29 RX ADMIN — CEFTRIAXONE 1000 MG: 1 INJECTION, POWDER, FOR SOLUTION INTRAMUSCULAR; INTRAVENOUS at 17:46

## 2021-03-29 RX ADMIN — OXYCODONE 10 MG: 5 TABLET ORAL at 18:39

## 2021-03-29 RX ADMIN — SODIUM CHLORIDE 1000 ML: 9 INJECTION, SOLUTION INTRAVENOUS at 16:40

## 2021-03-29 ASSESSMENT — ENCOUNTER SYMPTOMS
CHEST TIGHTNESS: 0
BACK PAIN: 0
CONSTIPATION: 0
DIARRHEA: 0
VOMITING: 0
ABDOMINAL PAIN: 0
SHORTNESS OF BREATH: 0
COUGH: 0
NAUSEA: 0

## 2021-03-29 ASSESSMENT — PAIN DESCRIPTION - PAIN TYPE: TYPE: ACUTE PAIN

## 2021-03-29 ASSESSMENT — PAIN SCALES - GENERAL: PAINLEVEL_OUTOF10: 9

## 2021-03-29 ASSESSMENT — PAIN DESCRIPTION - DESCRIPTORS: DESCRIPTORS: ACHING

## 2021-03-29 NOTE — ED NOTES
[address]  access center and spoke to Neftali De Leon. Neftalichristine De Leon states access center doesn't arrange ambulance transportation back to a nursing home-wouldn't arrange ambulance transportation. This RN called [facility] and then [facility] care ambulance companies. [facility] care ambulance will transport pt at 2100 back to [address] [place] [address],[address] home. Pt updated    Report called to Alyse Sandhu RN at [employer].   Reviewed discharge instructions with her.   ceftin rx needs to be a paper rx-will updated EMD     Eduardo Presley RN  03/29/21 [id number]

## 2021-03-29 NOTE — ED NOTES
Pt states lives at [address] [place] [address],[address] [facility] home in [place]. Pt talkative.         Sebastian Romero RN  03/29/21 [id number]

## 2021-03-29 NOTE — ED NOTES
Asking for something for a headache like tylenol. Remains in sinus rhythm on monitor.      Dejuan Edwards, FINN  03/29/21 [id number]

## 2021-03-29 NOTE — ED NOTES
[id number] brought by [facility] (private ambulance company). Pt was being taken to pain management appt. When pt was taken out of ambulance and put in wheelchair, the staff noticed a change in mental status, pt was breathing hard, and was pale. Ambulance staff state pt was awake and alert, followed commands, but couldn't recall earlier conversation. Didn't know president or where he was or why he was there. Pt was oriented to self (pt wasn't asked month, year). Squad glucose was 270's. Squad initial VS after change in mental status: 112/57 and pulse 60's O2 sat 100%. During transport, VS were 88/55 pulse 60's and o2 sat 100%    Now pt awake and oriented x 3. Knows he is at the hospital.  Blood glucose here in . Speech clear. Extremities strong and equal.   No shortness of breath. Skin color natural.  Skin warm and dry.        Sebastian Romero RN  03/29/21 0219

## 2021-03-29 NOTE — ED PROVIDER NOTES
[id number] [facility]  EMERGENCYDEPARTMENT ENCOUNTER      Pt Name: Josiah Turner  MRN: [id number]  Terrigfmary 1950  Date of evaluation: 3/29/2021  Olegario Lewis MD    CHIEF COMPLAINT     Altered mental status    HISTORY OF PRESENT ILLNESS   (Location/Symptom, Timing/Onset,Context/Setting, Quality, Duration, Modifying Factors, Severity)  Note limiting factors. Josiah Turner is a 72 y.o. male who presents to the emergency department for AMS. When asked patient he is not sure why he's here, thinks his 'blood pressure was out of whack, blood sugar was out of whack, nothing was going right.'  Currently complains of pain \"all over. \" Per EMS they were transporting him to his pain management appointment when he appeared confused. He knew his name though did not know where he was at.  -Per nursing home, patient gets pain medication every 6 hours and received one at noon. He was on his way to his first appointment with pain management. She states that it is not uncommon for him to be disoriented afterwards and when describing his presentation states that sounds like patient is at his baseline. HPI    Nursing Notes were reviewed. REVIEW OF SYSTEMS    (2-9 systems for level 4, 10 or more for level 5)     Review of Systems   Constitutional: Negative for activity change, appetite change, chills, diaphoresis and fever. Respiratory: Negative for cough, chest tightness and shortness of breath. Cardiovascular: Negative for chest pain and palpitations. Gastrointestinal: Negative for abdominal pain, constipation, diarrhea, nausea and vomiting. Genitourinary: Negative for dysuria, flank pain and hematuria. Musculoskeletal: Negative for back pain, gait problem and myalgias. Skin: Negative for rash and wound. Neurological: Negative for dizziness, weakness and numbness. Psychiatric/Behavioral: Positive for confusion.        Except as noted above the remainder of the review of systems was reviewedand negative. PAST MEDICAL HISTORY     Past Medical History:   Diagnosis Date    Acute on chronic systolic (congestive) heart failure (HCC)     Anxiety disorder     BPH (benign prostatic hypertrophy)     Calcified granuloma of lung ([address] 2014    2:stable per 3/25/14 CT chest    Cataract 1/20/14    OU:Dr. hayward:[facility]    Cerebral artery occlusion with cerebral infarction ([address]     Chronic diastolic heart failure (HCC)     Chronic pain     Chronic viral hepatitis (HCC)     Cognitive communication deficit     COPD (chronic obstructive pulmonary disease) ([address]     Under care of pulmo(Dr. Calhoun)    Degenerative arthritis of hip     Dementia ([address]     Depression 3/11/2015    Depression with anxiety     Prior psychiatrist & therapist:Dr. Geneva Lopez.  Diabetes mellitus ([address] 2004    Endo Dr. Álvaro Branham type 1 note below in [facility]    Diabetic eye exam ([facility] 1/20/14    [facility]:Dr. STEPHEN Hayward:No retinopathy. Cataract    Diabetic retinopathy ([address]     under care of [facility]:Dr. Jose Alfredo Benjamin    Diverticulitis 11/26/2011    Diverticulosis 11/26/2011    DKA (diabetic ketoacidoses) (HCC)     Emphysema     Esophageal candidiasis ([address] 7/16/13    GI:EGD    Essential hypertension, benign 11/2010    ETOH abuse     Fatty liver 10/9/2012    Foot ulcer:left 9/30/2013    Gastric ulcer, unspecified as acute or chronic, without mention of hemorrhage or perforation     Generalized anxiety disorder     GERD (gastroesophageal reflux disease)     Gout 1997    Right big toe    Hearing decreased     Left ear=60%. Right ear=80%.     Hemangioma 12/2010    Liver as per CT abdo    hepatitis c 7/26/17, 2010    Under care of Liver doc:Dr. Milli Ortez    Hyperlipidemia LDL goal < 100     Hypertensive heart disease with heart failure ([address]     Left-sided weakness     Low HDL (under 40) 10/9/2012    Myocardial infarction ([address]     Orthostatic hypotension     Peripheral neuropathy 2006    Pneumonia 10/15/13    Protein calorie malnutrition ([facility]     PTSD (post-traumatic stress disorder)     Pyogenic granuloma     per derm:Dr. Nuno Dodd Restrictive lung disease     Under care of pulmo(Dr. Calhoun)    S/P colonoscopy 1996    Done secondary to rectal bleed:dx=hemorrhoids    S/P colonoscopy 11/11/10;10/23/2013    Dr. Bonnie Newsome 10/2016(3yrs):polyps;diverticulosis. Diverticulosis & polpy(removed). Next in10/2016.  S/P endoscopy 2009    EGD:stomach ulcers.     Sciatica     Superficial phlebitis of left leg 9/30/2013    Tachycardia     Therapeutic drug monitoring 4/8/15    OARRS report is consistent on 4/8/15;7/9/15;10/9/15;1/8/16;4/5/16    Type 1 diabetes mellitus not at goal [facility]) 3/17/14    updated diagnosis as per endo:Dr. Jorge Reeves         SURGICAL HISTORY       Past Surgical History:   Procedure Laterality Date    COLONOSCOPY      DIAGNOSTIC CARDIAC CATH LAB PROCEDURE  2013    DILATATION, ESOPHAGUS      FOOT SURGERY Left Hammer toe, 2nd toe    10/9/14    FOOT SURGERY Left 12/5/2019    REMOVAL HARDWARE LEFT FOOT performed by [facility]a Lawson DPM at [facility] [address] Left 12/03/2019    REMOVAL HARDWARE LEFT FOOT w. Dr Vince Kuhn 12/5/19    [address] [place] [address]      broken leg    OTHER SURGICAL HISTORY Left 11/21/2013    EXCISION 5TH METATRSAL LEFT FOOT          OTHER SURGICAL HISTORY Right 05/30/2019    Procedure: PARTIAL RESECTION RIGHT FIFTH METATARSAL, ULCER DEBRIDEMENT WITH GRAFT APPLICATION    PRESSURE ULCER DEBRIDEMENT Right 5/30/2019    PARTIAL RESECTION RIGHT FIFTH METATARSAL, ULCER DEBRIDEMENT WITH GRAFT APPLICATION performed by Michaela Lawson DPM at [place] [address]  06/27/2017    COLOSTOMY REVERSAL     TONSILLECTOMY      UPPER GASTROINTESTINAL ENDOSCOPY  7/16/2013    Esophageal Brushing         CURRENT MEDICATIONS       Previous Medications    ACETAMINOPHEN (TYLENOL) 325 MG TABLET    Take 650 mg by mouth every 4 hours as needed for Pain AGAMATRIX PRESTO TEST STRIP    USE TO TEST BLOOD SUGAR 3 TIMES A DAY    ALBUTEROL SULFATE HFA (PROAIR HFA) 108 (90 BASE) MCG/ACT INHALER    Inhale 2 puffs into the lungs every 4 hours as needed for Wheezing or Shortness of Breath    ALUMINUM & MAGNESIUM HYDROXIDE-SIMETHICONE (MAALOX) 200-200-20 MG/5ML SUSP SUSPENSION    Take 5 mLs by mouth every 6 hours as needed for Indigestion Takes mylanta 30 ml Q4 hours PRN    ASPIRIN 81 MG TABLET    Take 81 mg by mouth daily chewable    ATORVASTATIN (LIPITOR) 80 MG TABLET    Take 80 mg by mouth nightly     BLOOD GLUCOSE MONITORING SUPPL (TRUE METRIX AIR GLUCOSE METER) DENIS    1 meter    CONTINUOUS BLOOD GLUC  (FREESTYLE BRIDGET READER) DENIS    Use to monitor sugars    CONTINUOUS BLOOD GLUC SENSOR ([address] [place] [address]) [facility]    Use to monitor sugars    DULAGLUTIDE (TRULICITY) 0.13 ANTWAN/5.2GT SOPN    Inject 0.75 mg into the skin once a week On Saturday    FINASTERIDE (PROSCAR) 5 MG TABLET    Take 5 mg by mouth daily    FLUTICASONE (FLOVENT HFA) 44 MCG/ACT INHALER    Inhale 2 puffs into the lungs 2 times daily    FOLIC ACID (FOLVITE) 1 MG TABLET    Take 1 mg by mouth daily    GLUCAGON 1 MG INJECTION    Inject 1 kit into the skin as needed    INSULIN GLARGINE (LANTUS SOLOSTAR SC)    Inject 20 Units into the skin 2 times daily     INSULIN LISPRO (HUMALOG SC)    Inject 6 Units into the skin 3 times daily (before meals) And sliding scale PRN    LANCETS MISC    Testing 2-3 times daily DX Code: E11.9    LISINOPRIL (PRINIVIL;ZESTRIL) 10 MG TABLET    Take 1 tablet by mouth daily    LOPERAMIDE (IMODIUM) 2 MG CAPSULE    Take 2 mg by mouth every 6 hours as needed for Diarrhea     LORATADINE (CLARITIN) 10 MG TABLET    Take 10 mg by mouth daily    MELATONIN 10 MG CAPS CAPSULE    Take 10 mg by mouth nightly    MEMANTINE (NAMENDA) 10 MG TABLET    Take 10 mg by mouth 2 times daily    METOPROLOL SUCCINATE (TOPROL XL) 50 MG EXTENDED RELEASE TABLET    Take 1 tablet by mouth nightly OXYCODONE HCL (OXY-IR) 10 MG IMMEDIATE RELEASE TABLET    Take 10 mg by mouth every 6 hours as needed for Pain.  Takes scheduled 12-6-12-6    POLYETHYLENE GLYCOL (GLYCOLAX) POWDER    Take 17 g by mouth daily    SERTRALINE (ZOLOFT) 100 MG TABLET    Take 100 mg by mouth daily    SUMATRIPTAN (IMITREX) 100 MG TABLET    Take 100 mg by mouth daily as needed for Migraine Give one additional dose if needed after 2 hours if 1st dose is ineffective (NTE 200mg/24 hrs)    THIAMINE 100 MG TABLET    Take 1 tablet by mouth daily    TIZANIDINE (ZANAFLEX) 4 MG TABLET    Take 4 mg by mouth every 8 hours as needed    UMECLIDINIUM-VILANTEROL (ANORO ELLIPTA) 62.5-25 MCG/INH AEPB INHALER    Inhale 1 puff into the lungs daily    VITAMIN B-12 (CYANOCOBALAMIN) 1000 MCG TABLET    TAKE 1 TABLET BY MOUTH DAILY    VITAMIN D (ERGOCALCIFEROL) 1.25 MG (68569 UT) CAPS CAPSULE    Take 50,000 Units by mouth once a week thursday    VITAMIN E 1000 UNITS CAPSULE    Take 1 capsule by mouth daily       ALLERGIES     Neurontin [gabapentin]    FAMILY HISTORY       Family History   Problem Relation Age of Onset    Stroke Mother     Hypertension Mother     Arthritis Father     Substance Abuse Father         Etoh    Cancer Father         esophageal    Hypertension Father     Diabetes Brother     Diabetes Paternal Aunt     Asthma Neg Hx     Emphysema Neg Hx     Heart Failure Neg Hx           SOCIAL HISTORY       Social History     Socioeconomic History    Marital status: Single     Spouse name: None    Number of children: 2    Years of education: None    Highest education level: None   Occupational History    Occupation: unemployed   Social Needs    Financial resource strain: None    Food insecurity     Worry: None     Inability: None    Transportation needs     Medical: None     Non-medical: None   Tobacco Use    Smoking status: Former Smoker     Packs/day: 0.50     Years: 35.00     Pack years: 17.50     Types: Cigarettes     Quit date: 2018     Years since quittin.5    Smokeless tobacco: Never Used   Substance and Sexual Activity    Alcohol use: Not Currently    Drug use: No    Sexual activity: Not Currently   Lifestyle    Physical activity     Days per week: None     Minutes per session: None    Stress: None   Relationships    Social connections     Talks on phone: None     Gets together: None     Attends Protestant service: None     Active member of club or organization: None     Attends meetings of clubs or organizations: None     Relationship status: None    Intimate partner violence     Fear of current or ex partner: None     Emotionally abused: None     Physically abused: None     Forced sexual activity: None   Other Topics Concern    None   Social History Narrative    None       SCREENINGS             PHYSICAL EXAM    (up to 7 for level 4, 8 ormore for level 5)     ED Triage Vitals   BP Temp Temp Source Pulse Resp SpO2 Height Weight   21 1323 21 1323 21 1323 21 1323 21 1323 21 1323 21 1328 21 1328   (!) 95/59 97.7 °F (36.5 °C) Oral 66 20 97 % 6' 5\" (1.956 m) 185 lb 10 oz (84.2 kg)       Physical Exam  Constitutional:       General: He is not in acute distress. Appearance: Normal appearance. He is well-developed. He is not ill-appearing or toxic-appearing. Comments: Sitting in bed comfortably, speaking in full sentences, following verbal commands appropriately. Not in acute distress     HENT:      Head: Normocephalic and atraumatic. Eyes:      Conjunctiva/sclera: Conjunctivae normal.      Pupils: Pupils are equal, round, and reactive to light. Neck:      Musculoskeletal: Normal range of motion and neck supple. Cardiovascular:      Rate and Rhythm: Normal rate and regular rhythm. Heart sounds: Normal heart sounds. No murmur. No friction rub. No gallop. Pulmonary:      Effort: Pulmonary effort is normal. No respiratory distress.       Breath sounds: Normal breath sounds. No decreased breath sounds, wheezing, rhonchi or rales. Abdominal:      General: Bowel sounds are normal. There is no distension. Palpations: Abdomen is soft. Tenderness: There is no abdominal tenderness. There is no guarding or rebound. Musculoskeletal: Normal range of motion. General: No tenderness or deformity. Skin:     General: Skin is warm and dry. Findings: No rash. Neurological:      General: No focal deficit present. Mental Status: He is alert and oriented to person, place, and time. GCS: GCS eye subscore is 4. GCS verbal subscore is 5. GCS motor subscore is 6. Psychiatric:         Behavior: Behavior is cooperative. DIAGNOSTIC RESULTS     EKG: All EKG's are interpreted by the Emergency Department Physicianwho either signs or Co-signs this chart in the absence of a cardiologist.    The Ekg interpreted by me shows  normal sinus rhythm with a rate of 62  Axis is   Normal  QTc is  462  Intervals and Durations are unremarkable. ST Segments: no acute change  No significant change from prior EKG dated 10/22/2020    RADIOLOGY:   Non-plain film images such as CT, Ultrasound and MRI are read by the radiologist. Plain radiographic images are visualized and preliminarily interpreted by the emergency physician with the below findings:      Interpretation per the Radiologist below, if available at the time of this note:    CT HEAD WO CONTRAST   Final Result   No acute intracranial abnormality. XR CHEST (2 VW)   Final Result   No acute process.                ED BEDSIDE ULTRASOUND:   Performed by ED Physician - none    LABS:  Labs Reviewed   CBC WITH AUTO DIFFERENTIAL - Abnormal; Notable for the following components:       Result Value    RBC 3.92 (*)     Hemoglobin 12.0 (*)     Hematocrit 36.1 (*)     All other components within normal limits    Narrative:     Performed at:  [facility]  [address]  [place], [place]   Phone [phone]   COMPREHENSIVE METABOLIC PANEL W/ REFLEX TO MG FOR LOW K - Abnormal; Notable for the following components:    Sodium 134 (*)     Glucose 240 (*)     BUN 24 (*)     CREATININE 1.5 (*)     GFR Non- 46 (*)     GFR  56 (*)     All other components within normal limits    Narrative:     Performed at:  [facility]  [address] [place], [place]   Phone [phone]   BRAIN NATRIURETIC PEPTIDE - Abnormal; Notable for the following components:    Pro-BNP 5,824 (*)     All other components within normal limits    Narrative:     Performed at:  [facility]  [address] [place], [place]   Phone [phone]   URINE RT REFLEX TO CULTURE - Abnormal; Notable for the following components:    Clarity, UA CLOUDY (*)     Glucose, Ur 250 (*)     Blood, Urine SMALL (*)     Protein,  (*)     Nitrite, Urine POSITIVE (*)     Leukocyte Esterase, Urine SMALL (*)     All other components within normal limits    Narrative:     Performed at:  [facility]  [address] [place], [place]   Phone [phone]   MICROSCOPIC URINALYSIS - Abnormal; Notable for the following components:    WBC, UA  (*)     Bacteria, UA Rare (*)     All other components within normal limits    Narrative:     Performed at:  [facility]  [address] [place], [place]   Phone [phone]   CULTURE, URINE   TROPONIN    Narrative:     Performed at:  [address] [facility] [address] [facility]  [address] [place], [place]   Phone [phone]   LACTIC ACID, PLASMA    Narrative:     Performed at:  [facility]  [address] [place], [place]   Phone [phone]       All other labs were within normal range ornot returned as of this dictation. EMERGENCY DEPARTMENT COURSE and DIFFERENTIAL DIAGNOSIS/MDM:   Vitals:    Vitals:    03/29/21 1430 03/29/21 1445 03/29/21 1500 03/29/21 1515   BP: 124/70 122/79 132/73 (!) 144/82   Pulse: 62 61 61 62   Resp: 18 18 20 18   Temp:       TempSrc:       SpO2: 97% 97% 97% 98%   Weight:       Height:             MDM    ED COURSE/MDM    -Froylan De La Garza is a 72 y.o. male with a history of CHF, hyperlipidemia, COPD, diabetes who presents ED for altered mental status. Patient was on his way from nursing home to his first visit to pain management when EMS states that he appeared confused did not know where he was. Upon my evaluation, patient alert and oriented x4, does not recall what happened. He complains of pain all over though that appears to be baseline for patient.  -Initial vitals upon arrival was systolic in the 02U however subsequent blood pressures have improved without intervention and remained stable in the 646N systolic. He was given 1 L IV fluid bolus.  -Lab work significant for elevated BUN of 24, creatinine of 1.5 and glucose of 240. proBNP of 5800 however this is improved from October of last year when it was 6000. -CT shows no acute intracranial abnormality  -Chest x-ray shows no acute process  -UA is positive for nitrite,  WBC, 0 RBC 0-1 epithelial and rare bacteria. -Patient was given 1 g ceftriaxone for UTI  -Patient remained alert and oriented, with stable vitals throughout ED stay. There were no acute findings on lab work-up or imaging. Patient was given Tylenol for headache. Period of confusion may be explained by recent narcotic administration prior to transportation to pain management appointment. He is back at baseline and has been so throughout entire ED stay. UTI was found on UA, course of antibiotics given here in ED and will be discharged with prescription.   I do feel that he is safe for discharge back to nursing facility at this time. Strict ED return precautions given for new/worsending symptoms. Patient in agreement withplan, verbally confirm understanding and have no further questions/concerns. REASSESSMENT      Well appearing, non toxic, alert, oriented speaking in full sentences and hemodynamically stable upon discharge      CRITICAL CARE TIME   Total Critical Care time was 0 minutes, excluding separately reportableprocedures. There was a high probability of clinicallysignificant/life threatening deterioration in the patient's condition which required my urgent intervention. CONSULTS:  None    PROCEDURES:  Unless otherwise noted below, none     Procedures    FINAL IMPRESSION      1. Confusion    2. Urinary tract infection without hematuria, site unspecified    3. CHIP (acute kidney injury) ([facility]          DISPOSITION/PLAN   DISPOSITION        PATIENT REFERREDTO:  No follow-up provider specified.     DISCHARGE MEDICATIONS:  New Prescriptions    CEFUROXIME (CEFTIN) 250 MG TABLET    Take 1 tablet by mouth 2 times daily for 7 days          (Please note that portions of this note were completed with a voice recognition program.  Efforts were made to edit the dictations but occasionally wordsare mis-transcribed.)    Aftab Winter MD (electronically signed)  Attending Emergency Physician            Aftab Winter MD  03/29/21 1462

## 2021-03-30 VITALS
BODY MASS INDEX: 21.92 KG/M2 | HEART RATE: 84 BPM | RESPIRATION RATE: 18 BRPM | HEIGHT: 77 IN | DIASTOLIC BLOOD PRESSURE: 100 MMHG | WEIGHT: 185.63 LBS | TEMPERATURE: 98 F | SYSTOLIC BLOOD PRESSURE: 178 MMHG | OXYGEN SATURATION: 98 %

## 2021-03-30 NOTE — ED NOTES
Remains cooperative and talks with staff. Explaining all orders to pt. Now IV being placed. Pt complains of pain everywhere-back, neck, head, legs, knees, hips, arms, shoulders.    Rates pain at [address]  03/30/21 8558

## 2021-03-30 NOTE — ED NOTES
Up to side of bed. Voided 750cc clear light yellow urine. Good appetite for snacks given (ate 1 package regular pudding, 1 package diet pudding, 3 packs angel crackers with peanut butter, 1 soft granola bar, and glass of crystal light lemonade.        Tyler Hurst RN  03/29/21 2014

## 2021-03-30 NOTE — ED NOTES
Ready to go back to [facility]. Belongings/clothing with pt. Generalized pain at 6. Awake and alert. Oriented x3. Speech clear. Moves all extremities and follows commands. Remains in sinus rhythm while here in ED. Report to first care ambulance staff who will transport him back to [facility].        Sharron Zurita RN  03/30/21 9768

## 2021-03-30 NOTE — ED NOTES
8670-8253 hx of diabetes. Fingerstick glucose 88. EMD updated-will not give any insulin and will give snacks/drinks now to prevent hypoglycemia. Pleasant, cooperative. Watches a lot of tv. Talkative with nurse when rounding on pt.         Teodoro Bright RN  03/29/21 1732

## 2021-03-30 NOTE — ED NOTES
Pt puts call light on and requests his oxycodone 10 mg that was due at 1800 (pt takes it around the clock every 6 hours.)   EMD updated     Dejuan Edwards RN  03/30/21 2328

## 2021-03-30 NOTE — ED NOTES
Remains oriented . Alert. Watches tv. Awaiting test results. Remains in sinus rhythm.   Chronic pain all over at [address] [place] [address]  03/30/21 0722

## 2021-03-30 NOTE — ED NOTES
HA and generalized body pain down to 6. Good spirits. Eating another soft granola bar and drinking diet ginger ale. Remains in sinus rhythm. Getting clothes on to return to F.      Bozena García RN  03/29/21 2036

## 2021-03-30 NOTE — ED NOTES
EMD gave ok to have something to drink. Remains awake and alert.   Sinus rhythm on monitor     Cesar Adams RN  03/30/21 4224

## 2021-03-31 LAB
GLUCOSE BLD-MCNC: 240 MG/DL (ref 70–99)
PERFORMED ON: ABNORMAL

## 2021-04-01 ENCOUNTER — APPOINTMENT (OUTPATIENT)
Dept: GENERAL RADIOLOGY | Age: 71
DRG: 178 | End: 2021-04-01
Payer: MEDICARE

## 2021-04-01 ENCOUNTER — HOSPITAL ENCOUNTER (INPATIENT)
Age: 71
LOS: 2 days | Discharge: NURSING, REHAB OR LONG-TERM CARE | DRG: 178 | End: 2021-04-03
Attending: STUDENT IN AN ORGANIZED HEALTH CARE EDUCATION/TRAINING PROGRAM | Admitting: INTERNAL MEDICINE
Payer: MEDICARE

## 2021-04-01 DIAGNOSIS — N18.9 CHRONIC KIDNEY DISEASE, UNSPECIFIED CKD STAGE: ICD-10-CM

## 2021-04-01 DIAGNOSIS — A41.9 SEPTICEMIA (HCC): ICD-10-CM

## 2021-04-01 DIAGNOSIS — J18.9 PNEUMONIA DUE TO ORGANISM: Primary | ICD-10-CM

## 2021-04-01 DIAGNOSIS — Z87.440 HISTORY OF UTI: ICD-10-CM

## 2021-04-01 LAB
A/G RATIO: 1.2 (ref 1.1–2.2)
ALBUMIN SERPL-MCNC: 3.7 G/DL (ref 3.4–5)
ALP BLD-CCNC: 92 U/L (ref 40–129)
ALT SERPL-CCNC: 11 U/L (ref 10–40)
ANION GAP SERPL CALCULATED.3IONS-SCNC: 9 MMOL/L (ref 3–16)
AST SERPL-CCNC: 14 U/L (ref 15–37)
BASOPHILS ABSOLUTE: 0.1 K/UL (ref 0–0.2)
BASOPHILS RELATIVE PERCENT: 0.8 %
BILIRUB SERPL-MCNC: 0.4 MG/DL (ref 0–1)
BUN BLDV-MCNC: 23 MG/DL (ref 7–20)
CALCIUM SERPL-MCNC: 8.8 MG/DL (ref 8.3–10.6)
CHLORIDE BLD-SCNC: 96 MMOL/L (ref 99–110)
CO2: 28 MMOL/L (ref 21–32)
CREAT SERPL-MCNC: 1.6 MG/DL (ref 0.8–1.3)
EKG ATRIAL RATE: 62 BPM
EKG DIAGNOSIS: NORMAL
EKG P AXIS: 55 DEGREES
EKG P-R INTERVAL: 184 MS
EKG Q-T INTERVAL: 460 MS
EKG QRS DURATION: 126 MS
EKG QTC CALCULATION (BAZETT): 466 MS
EKG R AXIS: -21 DEGREES
EKG T AXIS: 217 DEGREES
EKG VENTRICULAR RATE: 62 BPM
EOSINOPHILS ABSOLUTE: 0.3 K/UL (ref 0–0.6)
EOSINOPHILS RELATIVE PERCENT: 3.6 %
GFR AFRICAN AMERICAN: 52
GFR NON-AFRICAN AMERICAN: 43
GLOBULIN: 3.2 G/DL
GLUCOSE BLD-MCNC: 160 MG/DL (ref 70–99)
GLUCOSE BLD-MCNC: 300 MG/DL (ref 70–99)
HCT VFR BLD CALC: 34 % (ref 40.5–52.5)
HEMOGLOBIN: 11.2 G/DL (ref 13.5–17.5)
LACTIC ACID, SEPSIS: 1.3 MMOL/L (ref 0.4–1.9)
LYMPHOCYTES ABSOLUTE: 2.4 K/UL (ref 1–5.1)
LYMPHOCYTES RELATIVE PERCENT: 31.1 %
MCH RBC QN AUTO: 30.7 PG (ref 26–34)
MCHC RBC AUTO-ENTMCNC: 32.9 G/DL (ref 31–36)
MCV RBC AUTO: 93.4 FL (ref 80–100)
MONOCYTES ABSOLUTE: 0.8 K/UL (ref 0–1.3)
MONOCYTES RELATIVE PERCENT: 9.9 %
NEUTROPHILS ABSOLUTE: 4.2 K/UL (ref 1.7–7.7)
NEUTROPHILS RELATIVE PERCENT: 54.6 %
ORGANISM: ABNORMAL
PDW BLD-RTO: 13 % (ref 12.4–15.4)
PERFORMED ON: ABNORMAL
PLATELET # BLD: 213 K/UL (ref 135–450)
PMV BLD AUTO: 8.4 FL (ref 5–10.5)
POTASSIUM REFLEX MAGNESIUM: 4.4 MMOL/L (ref 3.5–5.1)
PRO-BNP: 5675 PG/ML (ref 0–124)
PROCALCITONIN: 0.06 NG/ML (ref 0–0.15)
RBC # BLD: 3.64 M/UL (ref 4.2–5.9)
SARS-COV-2, NAAT: NOT DETECTED
SODIUM BLD-SCNC: 133 MMOL/L (ref 136–145)
TOTAL PROTEIN: 6.9 G/DL (ref 6.4–8.2)
TROPONIN: <0.01 NG/ML
URINE CULTURE, ROUTINE: ABNORMAL
WBC # BLD: 7.7 K/UL (ref 4–11)

## 2021-04-01 PROCEDURE — 6370000000 HC RX 637 (ALT 250 FOR IP): Performed by: PHYSICIAN ASSISTANT

## 2021-04-01 PROCEDURE — 6360000002 HC RX W HCPCS: Performed by: NURSE PRACTITIONER

## 2021-04-01 PROCEDURE — 94761 N-INVAS EAR/PLS OXIMETRY MLT: CPT

## 2021-04-01 PROCEDURE — 96365 THER/PROPH/DIAG IV INF INIT: CPT

## 2021-04-01 PROCEDURE — 96361 HYDRATE IV INFUSION ADD-ON: CPT

## 2021-04-01 PROCEDURE — 83036 HEMOGLOBIN GLYCOSYLATED A1C: CPT

## 2021-04-01 PROCEDURE — 93005 ELECTROCARDIOGRAM TRACING: CPT | Performed by: STUDENT IN AN ORGANIZED HEALTH CARE EDUCATION/TRAINING PROGRAM

## 2021-04-01 PROCEDURE — 93010 ELECTROCARDIOGRAM REPORT: CPT | Performed by: INTERNAL MEDICINE

## 2021-04-01 PROCEDURE — 71045 X-RAY EXAM CHEST 1 VIEW: CPT

## 2021-04-01 PROCEDURE — 94640 AIRWAY INHALATION TREATMENT: CPT

## 2021-04-01 PROCEDURE — 1200000000 HC SEMI PRIVATE

## 2021-04-01 PROCEDURE — 96374 THER/PROPH/DIAG INJ IV PUSH: CPT

## 2021-04-01 PROCEDURE — 83880 ASSAY OF NATRIURETIC PEPTIDE: CPT

## 2021-04-01 PROCEDURE — 85025 COMPLETE CBC W/AUTO DIFF WBC: CPT

## 2021-04-01 PROCEDURE — 96367 TX/PROPH/DG ADDL SEQ IV INF: CPT

## 2021-04-01 PROCEDURE — 93005 ELECTROCARDIOGRAM TRACING: CPT | Performed by: NURSE PRACTITIONER

## 2021-04-01 PROCEDURE — 99284 EMERGENCY DEPT VISIT MOD MDM: CPT

## 2021-04-01 PROCEDURE — 87040 BLOOD CULTURE FOR BACTERIA: CPT

## 2021-04-01 PROCEDURE — 2580000003 HC RX 258: Performed by: NURSE PRACTITIONER

## 2021-04-01 PROCEDURE — 83605 ASSAY OF LACTIC ACID: CPT

## 2021-04-01 PROCEDURE — 87635 SARS-COV-2 COVID-19 AMP PRB: CPT

## 2021-04-01 PROCEDURE — 84145 PROCALCITONIN (PCT): CPT

## 2021-04-01 PROCEDURE — 6360000002 HC RX W HCPCS: Performed by: PHYSICIAN ASSISTANT

## 2021-04-01 PROCEDURE — 80053 COMPREHEN METABOLIC PANEL: CPT

## 2021-04-01 PROCEDURE — 84484 ASSAY OF TROPONIN QUANT: CPT

## 2021-04-01 PROCEDURE — 36415 COLL VENOUS BLD VENIPUNCTURE: CPT

## 2021-04-01 PROCEDURE — 99222 1ST HOSP IP/OBS MODERATE 55: CPT | Performed by: PHYSICIAN ASSISTANT

## 2021-04-01 RX ORDER — MEMANTINE HYDROCHLORIDE 5 MG/1
10 TABLET ORAL 2 TIMES DAILY
Status: DISCONTINUED | OUTPATIENT
Start: 2021-04-01 | End: 2021-04-03 | Stop reason: HOSPADM

## 2021-04-01 RX ORDER — FOLIC ACID 1 MG/1
1 TABLET ORAL DAILY
Status: DISCONTINUED | OUTPATIENT
Start: 2021-04-02 | End: 2021-04-03 | Stop reason: HOSPADM

## 2021-04-01 RX ORDER — DEXTROSE MONOHYDRATE 50 MG/ML
100 INJECTION, SOLUTION INTRAVENOUS PRN
Status: DISCONTINUED | OUTPATIENT
Start: 2021-04-01 | End: 2021-04-03 | Stop reason: HOSPADM

## 2021-04-01 RX ORDER — FLUTICASONE PROPIONATE 44 UG/1
2 AEROSOL, METERED RESPIRATORY (INHALATION) 2 TIMES DAILY
Status: DISCONTINUED | OUTPATIENT
Start: 2021-04-01 | End: 2021-04-03 | Stop reason: HOSPADM

## 2021-04-01 RX ORDER — SODIUM CHLORIDE 9 MG/ML
25 INJECTION, SOLUTION INTRAVENOUS PRN
Status: DISCONTINUED | OUTPATIENT
Start: 2021-04-01 | End: 2021-04-01

## 2021-04-01 RX ORDER — HYDRALAZINE HYDROCHLORIDE 20 MG/ML
10 INJECTION INTRAMUSCULAR; INTRAVENOUS EVERY 6 HOURS PRN
Status: DISCONTINUED | OUTPATIENT
Start: 2021-04-01 | End: 2021-04-03 | Stop reason: HOSPADM

## 2021-04-01 RX ORDER — OXYCODONE HYDROCHLORIDE 5 MG/1
10 TABLET ORAL EVERY 6 HOURS PRN
Status: DISCONTINUED | OUTPATIENT
Start: 2021-04-01 | End: 2021-04-03 | Stop reason: HOSPADM

## 2021-04-01 RX ORDER — INSULIN GLARGINE 100 [IU]/ML
20 INJECTION, SOLUTION SUBCUTANEOUS 2 TIMES DAILY
Status: DISCONTINUED | OUTPATIENT
Start: 2021-04-01 | End: 2021-04-03 | Stop reason: HOSPADM

## 2021-04-01 RX ORDER — LANOLIN ALCOHOL/MO/W.PET/CERES
100 CREAM (GRAM) TOPICAL DAILY
Status: DISCONTINUED | OUTPATIENT
Start: 2021-04-01 | End: 2021-04-03 | Stop reason: HOSPADM

## 2021-04-01 RX ORDER — SERTRALINE HYDROCHLORIDE 100 MG/1
100 TABLET, FILM COATED ORAL DAILY
Status: DISCONTINUED | OUTPATIENT
Start: 2021-04-02 | End: 2021-04-03 | Stop reason: HOSPADM

## 2021-04-01 RX ORDER — TIZANIDINE 4 MG/1
4 TABLET ORAL EVERY 8 HOURS PRN
Status: DISCONTINUED | OUTPATIENT
Start: 2021-04-01 | End: 2021-04-03 | Stop reason: HOSPADM

## 2021-04-01 RX ORDER — ACETAMINOPHEN 325 MG/1
650 TABLET ORAL EVERY 6 HOURS PRN
Status: DISCONTINUED | OUTPATIENT
Start: 2021-04-01 | End: 2021-04-03 | Stop reason: HOSPADM

## 2021-04-01 RX ORDER — PROMETHAZINE HYDROCHLORIDE 25 MG/1
12.5 TABLET ORAL EVERY 6 HOURS PRN
Status: DISCONTINUED | OUTPATIENT
Start: 2021-04-01 | End: 2021-04-03 | Stop reason: HOSPADM

## 2021-04-01 RX ORDER — CHOLECALCIFEROL (VITAMIN D3) 125 MCG
1000 CAPSULE ORAL DAILY
Status: DISCONTINUED | OUTPATIENT
Start: 2021-04-02 | End: 2021-04-03 | Stop reason: HOSPADM

## 2021-04-01 RX ORDER — KETOROLAC TROMETHAMINE 30 MG/ML
15 INJECTION, SOLUTION INTRAMUSCULAR; INTRAVENOUS ONCE
Status: COMPLETED | OUTPATIENT
Start: 2021-04-01 | End: 2021-04-01

## 2021-04-01 RX ORDER — ATORVASTATIN CALCIUM 40 MG/1
80 TABLET, FILM COATED ORAL NIGHTLY
Status: DISCONTINUED | OUTPATIENT
Start: 2021-04-01 | End: 2021-04-03 | Stop reason: HOSPADM

## 2021-04-01 RX ORDER — LISINOPRIL 10 MG/1
10 TABLET ORAL DAILY
Status: DISCONTINUED | OUTPATIENT
Start: 2021-04-02 | End: 2021-04-03 | Stop reason: HOSPADM

## 2021-04-01 RX ORDER — ASPIRIN 81 MG/1
81 TABLET, CHEWABLE ORAL DAILY
Status: DISCONTINUED | OUTPATIENT
Start: 2021-04-02 | End: 2021-04-03 | Stop reason: HOSPADM

## 2021-04-01 RX ORDER — SODIUM CHLORIDE 0.9 % (FLUSH) 0.9 %
10 SYRINGE (ML) INJECTION EVERY 12 HOURS SCHEDULED
Status: DISCONTINUED | OUTPATIENT
Start: 2021-04-01 | End: 2021-04-03 | Stop reason: HOSPADM

## 2021-04-01 RX ORDER — POLYETHYLENE GLYCOL 3350 17 G/17G
17 POWDER, FOR SOLUTION ORAL DAILY PRN
Status: DISCONTINUED | OUTPATIENT
Start: 2021-04-01 | End: 2021-04-03 | Stop reason: HOSPADM

## 2021-04-01 RX ORDER — SODIUM CHLORIDE 0.9 % (FLUSH) 0.9 %
10 SYRINGE (ML) INJECTION PRN
Status: DISCONTINUED | OUTPATIENT
Start: 2021-04-01 | End: 2021-04-01

## 2021-04-01 RX ORDER — 0.9 % SODIUM CHLORIDE 0.9 %
30 INTRAVENOUS SOLUTION INTRAVENOUS ONCE
Status: COMPLETED | OUTPATIENT
Start: 2021-04-01 | End: 2021-04-01

## 2021-04-01 RX ORDER — SODIUM CHLORIDE 0.9 % (FLUSH) 0.9 %
10 SYRINGE (ML) INJECTION EVERY 12 HOURS SCHEDULED
Status: DISCONTINUED | OUTPATIENT
Start: 2021-04-01 | End: 2021-04-01

## 2021-04-01 RX ORDER — ACETAMINOPHEN 650 MG/1
650 SUPPOSITORY RECTAL EVERY 6 HOURS PRN
Status: DISCONTINUED | OUTPATIENT
Start: 2021-04-01 | End: 2021-04-03 | Stop reason: HOSPADM

## 2021-04-01 RX ORDER — DEXTROSE MONOHYDRATE 25 G/50ML
12.5 INJECTION, SOLUTION INTRAVENOUS PRN
Status: DISCONTINUED | OUTPATIENT
Start: 2021-04-01 | End: 2021-04-03 | Stop reason: HOSPADM

## 2021-04-01 RX ORDER — MECOBALAMIN 5000 MCG
10 TABLET,DISINTEGRATING ORAL NIGHTLY
Status: DISCONTINUED | OUTPATIENT
Start: 2021-04-01 | End: 2021-04-03 | Stop reason: HOSPADM

## 2021-04-01 RX ORDER — IPRATROPIUM BROMIDE AND ALBUTEROL SULFATE 2.5; .5 MG/3ML; MG/3ML
1 SOLUTION RESPIRATORY (INHALATION) 2 TIMES DAILY
Status: DISCONTINUED | OUTPATIENT
Start: 2021-04-02 | End: 2021-04-03 | Stop reason: HOSPADM

## 2021-04-01 RX ORDER — ALBUTEROL SULFATE 2.5 MG/3ML
2.5 SOLUTION RESPIRATORY (INHALATION) EVERY 4 HOURS PRN
Status: DISCONTINUED | OUTPATIENT
Start: 2021-04-01 | End: 2021-04-03 | Stop reason: HOSPADM

## 2021-04-01 RX ORDER — SODIUM CHLORIDE 9 MG/ML
25 INJECTION, SOLUTION INTRAVENOUS PRN
Status: DISCONTINUED | OUTPATIENT
Start: 2021-04-01 | End: 2021-04-03 | Stop reason: HOSPADM

## 2021-04-01 RX ORDER — METOPROLOL SUCCINATE 50 MG/1
50 TABLET, EXTENDED RELEASE ORAL NIGHTLY
Status: DISCONTINUED | OUTPATIENT
Start: 2021-04-01 | End: 2021-04-03 | Stop reason: HOSPADM

## 2021-04-01 RX ORDER — SODIUM CHLORIDE 0.9 % (FLUSH) 0.9 %
10 SYRINGE (ML) INJECTION PRN
Status: DISCONTINUED | OUTPATIENT
Start: 2021-04-01 | End: 2021-04-03 | Stop reason: HOSPADM

## 2021-04-01 RX ORDER — NICOTINE POLACRILEX 4 MG
15 LOZENGE BUCCAL PRN
Status: DISCONTINUED | OUTPATIENT
Start: 2021-04-01 | End: 2021-04-03 | Stop reason: HOSPADM

## 2021-04-01 RX ORDER — POLYETHYLENE GLYCOL 3350 17 G/17G
17 POWDER, FOR SOLUTION ORAL DAILY
Status: DISCONTINUED | OUTPATIENT
Start: 2021-04-02 | End: 2021-04-03 | Stop reason: HOSPADM

## 2021-04-01 RX ORDER — ONDANSETRON 2 MG/ML
4 INJECTION INTRAMUSCULAR; INTRAVENOUS EVERY 6 HOURS PRN
Status: DISCONTINUED | OUTPATIENT
Start: 2021-04-01 | End: 2021-04-03 | Stop reason: HOSPADM

## 2021-04-01 RX ORDER — IPRATROPIUM BROMIDE AND ALBUTEROL SULFATE 2.5; .5 MG/3ML; MG/3ML
1 SOLUTION RESPIRATORY (INHALATION)
Status: DISCONTINUED | OUTPATIENT
Start: 2021-04-01 | End: 2021-04-01

## 2021-04-01 RX ORDER — FINASTERIDE 5 MG/1
5 TABLET, FILM COATED ORAL DAILY
Status: DISCONTINUED | OUTPATIENT
Start: 2021-04-02 | End: 2021-04-03 | Stop reason: HOSPADM

## 2021-04-01 RX ADMIN — INSULIN LISPRO 1 UNITS: 100 INJECTION, SOLUTION INTRAVENOUS; SUBCUTANEOUS at 20:59

## 2021-04-01 RX ADMIN — METOPROLOL SUCCINATE 50 MG: 50 TABLET, EXTENDED RELEASE ORAL at 20:41

## 2021-04-01 RX ADMIN — KETOROLAC TROMETHAMINE 15 MG: 30 INJECTION, SOLUTION INTRAMUSCULAR; INTRAVENOUS at 16:48

## 2021-04-01 RX ADMIN — IPRATROPIUM BROMIDE AND ALBUTEROL SULFATE 1 AMPULE: .5; 3 SOLUTION RESPIRATORY (INHALATION) at 19:43

## 2021-04-01 RX ADMIN — HYDRALAZINE HYDROCHLORIDE 10 MG: 20 INJECTION INTRAMUSCULAR; INTRAVENOUS at 20:42

## 2021-04-01 RX ADMIN — INSULIN GLARGINE 20 UNITS: 100 INJECTION, SOLUTION SUBCUTANEOUS at 20:58

## 2021-04-01 RX ADMIN — SODIUM CHLORIDE 2673 ML: 9 INJECTION, SOLUTION INTRAVENOUS at 14:47

## 2021-04-01 RX ADMIN — ENOXAPARIN SODIUM 40 MG: 40 INJECTION SUBCUTANEOUS at 20:53

## 2021-04-01 RX ADMIN — ATORVASTATIN CALCIUM 80 MG: 40 TABLET, FILM COATED ORAL at 20:41

## 2021-04-01 RX ADMIN — MEMANTINE HYDROCHLORIDE 10 MG: 5 TABLET ORAL at 20:41

## 2021-04-01 RX ADMIN — OXYCODONE HYDROCHLORIDE 10 MG: 5 TABLET ORAL at 20:52

## 2021-04-01 RX ADMIN — TIZANIDINE 4 MG: 4 TABLET ORAL at 20:53

## 2021-04-01 RX ADMIN — CEFEPIME 2000 MG: 2 INJECTION, POWDER, FOR SOLUTION INTRAVENOUS at 15:04

## 2021-04-01 RX ADMIN — Medication 100 MG: at 20:53

## 2021-04-01 RX ADMIN — Medication 10 MG: at 20:41

## 2021-04-01 RX ADMIN — VANCOMYCIN HYDROCHLORIDE 2000 MG: 10 INJECTION, POWDER, LYOPHILIZED, FOR SOLUTION INTRAVENOUS at 15:35

## 2021-04-01 RX ADMIN — ACETAMINOPHEN 650 MG: 325 TABLET ORAL at 23:19

## 2021-04-01 ASSESSMENT — PAIN - FUNCTIONAL ASSESSMENT: PAIN_FUNCTIONAL_ASSESSMENT: ACTIVITIES ARE NOT PREVENTED

## 2021-04-01 ASSESSMENT — PAIN SCALES - GENERAL
PAINLEVEL_OUTOF10: 0
PAINLEVEL_OUTOF10: 9
PAINLEVEL_OUTOF10: 9
PAINLEVEL_OUTOF10: 3
PAINLEVEL_OUTOF10: 9
PAINLEVEL_OUTOF10: 9

## 2021-04-01 ASSESSMENT — PAIN DESCRIPTION - ONSET: ONSET: GRADUAL

## 2021-04-01 ASSESSMENT — PAIN DESCRIPTION - LOCATION
LOCATION: GENERALIZED

## 2021-04-01 ASSESSMENT — PAIN DESCRIPTION - DESCRIPTORS: DESCRIPTORS: CONSTANT;DISCOMFORT

## 2021-04-01 NOTE — PROGRESS NOTES
Patient admitted to room _209___ from ER. Patient oriented to room, call light, bed rails, phone, lights and bathroom. Patient instructed about the schedule of the day including: vital sign frequency, lab draws, possible tests, frequency of MD and staff rounds, daily weights, I &O's and prescribed diet. Bed alarm turned on. Bed locked, in lowest position, side rails up 2/4, call light within reach. Recliner Assessment:     Patient is not able to demonstrated the ability to move from a reclining position to an upright position within the recliner. However patient is alert, oriented and able to provide informed consent. 4 Eyes Skin Assessment:     The patient is being assess for   Admission    I agree that 2 RN's have performed a thorough Head to Toe Skin Assessment on the patient. ALL assessment sites listed below have been assessed. Areas assessed by both nurses:   [x]   Head, Face, and Ears   [x]   Shoulders, Back, and Chest, Abdomen  [x]   Arms, Elbows, and Hands   [x]   Coccyx, Sacrum, and Ischium  [x]   Legs, Feet, and Heels        No wounds noted. **SHARE this note so that the co-signing nurse is able to place an eSignature**    Co-signer eSignature: Electronically signed by Cesar Mondragon RN on 4/1/21 at 7:38 PM EDT    Does the Patient have Skin Breakdown?   No          Julián Prevention initiated:  No   Wound Care Orders initiated:  No      [facility] nurse consulted for Pressure Injury (Stage 3,4, Unstageable, DTI, NWPT, Complex wounds)and New or Established Ostomies:  No      Primary Nurse eSignature: Electronically signed by Neelima Le RN on 4/1/21 at 7:06 PM EDT

## 2021-04-01 NOTE — ED NOTES
Report given to [facility], RN to be admitted to floor.       Felipe Guerin RN  04/01/21 5487

## 2021-04-01 NOTE — ED NOTES
6315- Perfect serve sent to Dr. Foreign Aleman for admission     Call was returned at [phone] by Ricco Romero and spoke to [facility] WILMAR Escobar  04/01/21 6909

## 2021-04-01 NOTE — ED PROVIDER NOTES
[address] 51        Pt Name: Kenia Stubbs  MRN: [id number]  [place] 1950  Dateof evaluation: 4/1/2021  Provider: RICCO Ignacio CNP  PCP: RICCO Silvestre CNP  ED Attending: No att. providers found    [address] [place] [address]       Chief Complaint   Patient presents with    Hypotension       HISTORY OF PRESENTILLNESS   (Location/Symptom, Timing/Onset, Context/Setting, Quality, Duration, Modifying Factors, Severity)  Note limiting factors. Kenia Stubbs is a 72 y.o. male for sick person. Onset was today. Context includes patient was sent in from the nursing home for complaints of being sick. Patient reportedly had hypotension at the nursing home and confusion. Patient was currently being treated for UTI. He was seen here recently and started on antibiotics on 3/29/2021. Alleviating factors include nothing. Aggravating factors include nothing. Pain is 5/10. Nothing has been used for pain today. Nursing Notes were all reviewed and agreed with or any disagreements were addressed  in the HPI. REVIEW OF SYSTEMS    (2-9 systems for level 4, 10 or more for level 5)     Review of Systems   Unable to perform ROS: Other (Patient is not sure why he is here. Patient was brought in after he was found to have a low blood pressure at the nursing home today patient is being treated for UTI at the nursing home)       Positives and Pertinent negatives as per HPI. Except as noted above in the ROS, all other systems were reviewed and negative.        PAST MEDICAL HISTORY     Past Medical History:   Diagnosis Date    Acute on chronic systolic (congestive) heart failure (HCC)     Anxiety disorder     BPH (benign prostatic hypertrophy)     Calcified granuloma of lung ([facility] 2014    2:stable per 3/25/14 CT chest    Cataract 1/20/14    OU:Dr. mcclain:[facility]    Cerebral artery occlusion with cerebral infarction ([facility]     Chronic diastolic heart failure (HCC)     Chronic pain     Chronic viral hepatitis (HCC)     Cognitive communication deficit     COPD (chronic obstructive pulmonary disease) ([facility]     Under care of pulmo(Dr. Calhoun)    Degenerative arthritis of hip     Dementia ([facility]     Depression 3/11/2015    Depression with anxiety     Prior psychiatrist & therapist:Dr. Zach Mas.  Diabetes mellitus ([address] 2004    Endo Dr. Coretta Ordonez type 1 note below in [facility]    Diabetic eye exam ([facility] [facility] 1/20/14    [facility]:Dr. STEPHEN Hayward:No retinopathy. Cataract    Diabetic retinopathy ([address]     under care of [facility]:Dr. Heather Cabral    Diverticulitis 11/26/2011    Diverticulosis 11/26/2011    DKA (diabetic ketoacidoses) (HCC)     Emphysema     Esophageal candidiasis ([address] 7/16/13    GI:EGD    Essential hypertension, benign 11/2010    ETOH abuse     Fatty liver 10/9/2012    Foot ulcer:left 9/30/2013    Gastric ulcer, unspecified as acute or chronic, without mention of hemorrhage or perforation     Generalized anxiety disorder     GERD (gastroesophageal reflux disease)     Gout 1997    Right big toe    Hearing decreased     Left ear=60%. Right ear=80%.  Hemangioma 12/2010    Liver as per CT abdo    hepatitis c 7/26/17, 2010    Under care of Liver doc:Dr. Sonia Ferguson    Hyperlipidemia LDL goal < 100     Hypertensive heart disease with heart failure ([facility]     Left-sided weakness     Low HDL (under 40) 10/9/2012    Myocardial infarction ([facility] [facility]     Orthostatic hypotension     Peripheral neuropathy 2006    Pneumonia 10/15/13    Protein calorie malnutrition (HCC)     PTSD (post-traumatic stress disorder)     Pyogenic granuloma     per derm:Dr. Kati Cruz Restrictive lung disease     Under care of pulmo(Dr. Calhoun)    S/P colonoscopy 1996    Done secondary to rectal bleed:dx=hemorrhoids    S/P colonoscopy 11/11/10;10/23/2013    Dr. Corinna Tavares 10/2016(3yrs):polyps;diverticulosis. Diverticulosis & polpy(removed). Next in10/2016.     S/P hours as needed for Pain.  Takes scheduled 12-6-12-6      cefUROXime (CEFTIN) 250 MG tablet Take 1 tablet by mouth 2 times daily for 7 days  Qty: 14 tablet, Refills: 0      lisinopril (PRINIVIL;ZESTRIL) 10 MG tablet Take 1 tablet by mouth daily  Qty: 30 tablet, Refills: 3      metoprolol succinate (TOPROL XL) 50 MG extended release tablet Take 1 tablet by mouth nightly  Qty: 30 tablet, Refills: 3      loratadine (CLARITIN) 10 MG tablet Take 10 mg by mouth daily      memantine (NAMENDA) 10 MG tablet Take 10 mg by mouth 2 times daily      polyethylene glycol (GLYCOLAX) powder Take 17 g by mouth daily      fluticasone (FLOVENT HFA) 44 MCG/ACT inhaler Inhale 2 puffs into the lungs 2 times daily      loperamide (IMODIUM) 2 MG capsule Take 2 mg by mouth every 6 hours as needed for Diarrhea       aspirin 81 MG tablet Take 81 mg by mouth daily chewable      finasteride (PROSCAR) 5 MG tablet Take 5 mg by mouth daily      folic acid (FOLVITE) 1 MG tablet Take 1 mg by mouth daily      Dulaglutide (TRULICITY) 9.81 MQ/1.6XH SOPN Inject 0.75 mg into the skin once a week On Saturday      atorvastatin (LIPITOR) 80 MG tablet Take 80 mg by mouth nightly       Insulin Glargine (LANTUS SOLOSTAR SC) Inject 20 Units into the skin 2 times daily       vitamin E 1000 units capsule Take 1 capsule by mouth daily  Qty: 30 capsule, Refills: 3      Continuous Blood Gluc  (FREESTYLE BRIDGET READER) DENIS Use to monitor sugars  Qty: 1 Device, Refills: 0      Continuous Blood Gluc Sensor (FREESTYLE BRIDGET SENSOR SYSTEM) [facility] Use to monitor sugars  Qty: 3 each, Refills: 2      vitamin B-12 (CYANOCOBALAMIN) 1000 MCG tablet TAKE 1 TABLET BY MOUTH DAILY  Qty: 30 tablet, Refills: 3      umeclidinium-vilanterol (ANORO ELLIPTA) 62.5-25 MCG/INH AEPB inhaler Inhale 1 puff into the lungs daily  Qty: 1 each, Refills: 11      albuterol sulfate HFA (PROAIR HFA) 108 (90 Base) MCG/ACT inhaler Inhale 2 puffs into the lungs every 4 hours as needed for Wheezing or Shortness of Breath  Qty: 1 Inhaler, Refills: 11    Associated Diagnoses: Simple chronic bronchitis (HCC)      Lancets MISC Testing 2-3 times daily DX Code: E11.9  Qty: 100 each, Refills: 3      Blood Glucose Monitoring Suppl (TRUE METRIX AIR GLUCOSE METER) DENIS 1 meter  Qty: 1 Device, Refills: 0      glucagon 1 MG injection Inject 1 kit into the skin as needed      tiZANidine (ZANAFLEX) 4 MG tablet Take 4 mg by mouth every 8 hours as needed      aluminum & magnesium hydroxide-simethicone (MAALOX) 200-200-20 MG/5ML SUSP suspension Take 5 mLs by mouth every 6 hours as needed for Indigestion Takes mylanta 30 ml Q4 hours PRN      acetaminophen (TYLENOL) 325 MG tablet Take 650 mg by mouth every 4 hours as needed for Pain      SUMAtriptan (IMITREX) 100 MG tablet Take 100 mg by mouth daily as needed for Migraine Give one additional dose if needed after 2 hours if 1st dose is ineffective (NTE 200mg/24 hrs)      AGAMATRIX PRESTO TEST strip USE TO TEST BLOOD SUGAR 3 TIMES A DAY  Qty: 300 each, Refills: 3      thiamine 100 MG tablet Take 1 tablet by mouth daily  Qty: 30 tablet, Refills: 3               ALLERGIES     Neurontin [gabapentin]    FAMILY HISTORY       Family History   Problem Relation Age of Onset    Stroke Mother     Hypertension Mother     Arthritis Father     Substance Abuse Father         Etoh    Cancer Father         esophageal    Hypertension Father     Diabetes Brother     Diabetes Paternal Aunt     Asthma Neg Hx     Emphysema Neg Hx     Heart Failure Neg Hx           SOCIAL HISTORY       Social History     Socioeconomic History    Marital status: Single     Spouse name: None    Number of children: 2    Years of education: None    Highest education level: None   Occupational History    Occupation: unemployed   Social Needs    Financial resource strain: None    Food insecurity     Worry: None     Inability: None    Transportation needs     Medical: None     Non-medical: None   Tobacco Use    Smoking status: Former Smoker     Packs/day: 0.50     Years: 35.00     Pack years: 17.50     Types: Cigarettes     Quit date: 2018     Years since quittin.5    Smokeless tobacco: Never Used   Substance and Sexual Activity    Alcohol use: Not Currently    Drug use: No    Sexual activity: Not Currently   Lifestyle    Physical activity     Days per week: None     Minutes per session: None    Stress: None   Relationships    Social connections     Talks on phone: None     Gets together: None     Attends Jain service: None     Active member of club or organization: None     Attends meetings of clubs or organizations: None     Relationship status: None    Intimate partner violence     Fear of current or ex partner: None     Emotionally abused: None     Physically abused: None     Forced sexual activity: None   Other Topics Concern    None   Social History Narrative    None       SCREENINGS             PHYSICAL EXAM  (up to 7 for level 4, 8 or more for level 5)     ED Triage Vitals [21 1422]   BP Temp Temp src Pulse Resp SpO2 Height Weight   (!) 74/55 96 °F (35.6 °C) -- 62 16 99 % 6' 5\" (1.956 m) 185 lb (83.9 kg)       Physical Exam  Constitutional:       Appearance: He is well-developed. HENT:      Head: Normocephalic and atraumatic. Eyes:      Extraocular Movements: Extraocular movements intact. Pupils: Pupils are equal, round, and reactive to light. Neck:      Musculoskeletal: Normal range of motion. Cardiovascular:      Rate and Rhythm: Normal rate. Pulmonary:      Effort: Pulmonary effort is normal. No respiratory distress. Abdominal:      General: There is no distension. Palpations: Abdomen is soft. Tenderness: There is no abdominal tenderness. Musculoskeletal: Normal range of motion. Skin:     General: Skin is warm and dry. Neurological:      Mental Status: He is alert.       Comments: Oriented to person and place         DIAGNOSTIC RESULTS   LABS: Labs Reviewed   CBC WITH AUTO DIFFERENTIAL - Abnormal; Notable for the following components:       Result Value    RBC 3.64 (*)     Hemoglobin 11.2 (*)     Hematocrit 34.0 (*)     All other components within normal limits    Narrative:     Performed at:  [facility],  [employer], [employer]   Phone [phone]   COMPREHENSIVE METABOLIC PANEL W/ REFLEX TO MG FOR LOW K - Abnormal; Notable for the following components:    Sodium 133 (*)     Chloride 96 (*)     Glucose 300 (*)     BUN 23 (*)     CREATININE 1.6 (*)     GFR Non- 43 (*)     GFR  52 (*)     AST 14 (*)     All other components within normal limits    Narrative:     Performed at:  [facility]) [facility] [address],  [address], [place]   Phone [phone]   BRAIN NATRIURETIC PEPTIDE - Abnormal; Notable for the following components:    Pro-BNP 5,675 (*)     All other components within normal limits    Narrative:     Performed at:  [place] [address],  [employer], [employer]   Phone [phone] RAPID    Narrative:     Performed at:  [facility]) [facility],  [employer], [employer]   Phone [phone]   CULTURE, BLOOD 1   CULTURE, BLOOD 2   CULTURE, RESPIRATORY   LACTATE, SEPSIS    Narrative:     Performed at:  [place] [address],  [employer], [employer]   Phone [phone]   PROCALCITONIN    Narrative:     Performed at:  [facility] [address],  [employer]   Phone [phone]   TROPONIN    Narrative:     Performed at:  [facility]) [facility],  [employer], [place]   Phone [phone]   BASIC METABOLIC PANEL W/ REFLEX TO MG FOR LOW K   CBC WITH AUTO DIFFERENTIAL   HEMOGLOBIN A1C   POCT GLUCOSE   POCT GLUCOSE       All other labs werewithin normal range or not returned as of this dictation. EKG: All EKG's are interpreted by the Emergency Department Physician who either signs or Co-signs this chart in the absence of a cardiologist.  Please see their note for interpretation of EKG. RADIOLOGY:   Portable chest x-ray interpreted by radiologist for  FINDINGS:   Small amount of faint new right lower lobe airspace disease.  No evidence of   pleural effusion or pneumothorax.  No acute findings of the cardiac or   mediastinal structures       Interpretation per the Radiologist below, if available at the time of this note:    XR CHEST PORTABLE   Final Result   New infiltrate right lower lobe concerning for pneumonia. No results found.       PROCEDURES   Unless otherwise noted below, none     Procedures     CRITICAL CARE TIME   N/A    CONSULTS:  IP CONSULT TO HOSPITALIST  IP CONSULT TO PHARMACY      EMERGENCYDEPARTMENT COURSE and DIFFERENTIAL DIAGNOSIS/MDM:   Vitals:    Vitals:    04/01/21 1646 04/01/21 1708 04/01/21 1711 04/01/21 1800   BP:  (!) 189/89 (!) 186/96 (!) 227/98   Pulse: 61 63 61 62   Resp:    18   Temp:    97.1 °F (36.2 °C)   TempSrc:    Oral   SpO2: (!) 78% 100% 100% 100%   Weight:    185 lb (83.9 kg)   Height:    6' 5\" (1.956 m)       Patient was given the following medications:  Medications   albuterol (PROVENTIL) nebulizer solution 2.5 mg (has no administration in time range)   aspirin chewable tablet 81 mg (has no administration in time range)   atorvastatin (LIPITOR) tablet 80 mg (has no administration in time range)   finasteride (PROSCAR) tablet 5 mg (has no administration in time range)   fluticasone (FLOVENT HFA) 44 MCG/ACT inhaler 2 puff (has no administration in time range)   folic acid (FOLVITE) tablet 1 mg (has no administration in time range)   insulin glargine (LANTUS) injection vial 20 Units (has no administration in time range)   insulin lispro (HUMALOG) injection vial 6 Units (has no administration in time range)   lisinopril (PRINIVIL;ZESTRIL) tablet 10 mg (has no administration in time range)   melatonin disintegrating tablet 10 mg (has no administration in time range)   memantine (NAMENDA) tablet 10 mg (has no administration in time range)   metoprolol succinate (TOPROL XL) extended release tablet 50 mg (has no administration in time range)   oxyCODONE (ROXICODONE) immediate release tablet 10 mg (has no administration in time range)   polyethylene glycol (GLYCOLAX) packet 17 g (has no administration in time range)   sertraline (ZOLOFT) tablet 100 mg (has no administration in time range)   thiamine tablet 100 mg (has no administration in time range)   tiZANidine (ZANAFLEX) tablet 4 mg (has no administration in time range)   vitamin B-12 (CYANOCOBALAMIN) tablet 1,000 mcg (has no administration in time range)   sodium chloride flush 0.9 % injection 10 mL (has no administration in time range)   sodium chloride flush 0.9 % injection 10 mL (has no administration in time range)   0.9 % sodium chloride infusion (has no administration in time range)   enoxaparin (LOVENOX) injection 40 mg (has no administration in time range)   promethazine (PHENERGAN) tablet 12.5 mg (has no administration in time range)     Or   ondansetron (ZOFRAN) injection 4 mg (has no administration in time range)   polyethylene glycol (GLYCOLAX) packet 17 g (has no administration in time range)   acetaminophen (TYLENOL) tablet 650 mg (has no administration in time range)     Or   acetaminophen (TYLENOL) suppository 650 mg (has no administration in time range)   ipratropium-albuterol (DUONEB) nebulizer solution 1 ampule (has no administration in time range)   glucose (GLUTOSE) 40 % oral gel 15 g (has no administration in time range)   dextrose 50 % IV solution (has no administration in time range)   glucagon (rDNA) injection 1 mg (has no administration in time range)   dextrose 5 % solution (has no administration in time range) insulin lispro (HUMALOG) injection vial 0-12 Units (has no administration in time range)   insulin lispro (HUMALOG) injection vial 0-6 Units (has no administration in time range)   cefepime (MAXIPIME) 2000 mg IVPB minibag (has no administration in time range)   hydrALAZINE (APRESOLINE) injection 10 mg (has no administration in time range)   0.9 % sodium chloride IV bolus 2,673 mL (0 mLs Intravenous Stopped 4/1/21 1645)   cefepime (MAXIPIME) 2000 mg IVPB minibag (0 mg Intravenous Stopped 4/1/21 1535)   vancomycin (VANCOCIN) 2,000 mg in dextrose 5 % 500 mL IVPB (0 mg Intravenous Stopped 4/1/21 1740)   ketorolac (TORADOL) injection 15 mg (15 mg Intravenous Given 4/1/21 1648)       Patient was seen and evaluated by myself and . Patient here today for concerns for low blood pressure. Patient was sent into the ED from the nursing home for concerns for low blood pressure. On arrival to the ED the patient is awake to person however did have hypotension. Sepsis protocol was initiated in the ED and he was given IV fluids. Patient's blood pressure has improved. Chest x-ray was concerning for a new infiltrate in the right lower lobe. Patient was given IV fluids and IV antibiotics. After reviewing the patient's chart he did have a history of a UTI was seen on 3/29/2021 and was given antibiotics. He was discharged back to the nursing home. Consult was placed to the hospitalist for admission for pneumonia and sepsis. Hospitalist is accepted. Patient's care was transferred to the inpatient unit. CRITICAL CARE NOTE:  There was a high probability of clinically significant life-threatening deterioration of the patient's condition requiring my urgent intervention. Total critical care time is 35 minutes.     This includes vital sign monitoring, pulse oximetry monitoring, telemetry monitoring, clinical response to the IV medications, reviewing the nursing notes, consultation time, dictation/documentation time, and interpretation of the labwork. The patient tolerated their visit well. I have evaluated this patient. My supervising physician was available for consultation. The patient and / or the family were informed of the results of any tests, a time was given to answer questions, a plan was proposed and they agreed with plan. FINAL IMPRESSION      1. Pneumonia due to organism    2. Septicemia ([facility]    3. History of UTI    4. Chronic kidney disease, unspecified CKD stage          DISPOSITION/PLAN   DISPOSITION Admitted 04/01/2021 04:49:07 PM      PATIENT REFERRED TO:  No follow-up provider specified.     DISCHARGE MEDICATIONS:  Current Discharge Medication List          DISCONTINUED MEDICATIONS:  Current Discharge Medication List                 (Please note that portions of this note were completed with a voice recognition program.  Efforts were made to edit the dictations but occasionally words are mis-transcribed.)    RICCO Blair CNP (electronically signed)         RICCO Blair CNP  04/01/21 2540

## 2021-04-01 NOTE — H&P
Hospital Medicine History & Physical      PCP: Aime Loya, APRN - CNP    Date of Admission: 4/1/2021    Date of Service: Pt seen/examined on 4/1/2021     Chief Complaint:    Chief Complaint   Patient presents with    Hypotension         History Of Present Illness: The patient is a 72 y.o. male with dementia resides in a nursing home, COPD, chronic pain, diabetes mellitus, hypertension, congestive heart failure, coronary artery disease who presented to [facility] ED with complaint of hypotension from the nursing home. Patient is a relatively poor historian secondary to dementia. He states he was told that his blood pressure was out of whack and his blood sugar was out of whack and he was therefore sent to the ER. He admits to feeling mildly short of breath but denies any cough or chest pain. He denies any fever or chills. He denies abdominal pain nausea vomiting or diarrhea. He denies trouble swallowing. He was sent to the ER on 3/29/2021 with a similar chief complaint-patient told the ER provider at that time that his blood sugar was out of whack and his blood pressure was out of whack. At that time his ER work-up was relatively unremarkable with exception of what appeared to be a urinary tract infection. His urine culture grew MSSA. He returns today and his ER work-up was interestingly positive for significant hypotension on arrival to the ER with initial blood pressure 74/55 and 80/49. This quickly improved very shortly after IVF were hung. He had no lactic acid elevation, no leukocytosis, no procalcitonin. His CXR showed a new right lower lobe infiltrate concerning for pneumonia.   He will be admitted for further care    Past Medical History:        Diagnosis Date    Acute on chronic systolic (congestive) heart failure (HCC)     Anxiety disorder     BPH (benign prostatic hypertrophy)     Calcified granuloma of lung ([facility] 2014    2:stable per 3/25/14 CT chest    Cataract 1/20/14    OU:Dr. hayward:[facility]    Cerebral artery occlusion with cerebral infarction ([facility]     Chronic diastolic heart failure (HCC)     Chronic pain     Chronic viral hepatitis (HCC)     Cognitive communication deficit     COPD (chronic obstructive pulmonary disease) ([facility]     Under care of pulmo(Dr. Calhoun)    Degenerative arthritis of hip     Dementia ([facility]     Depression 3/11/2015    Depression with anxiety     Prior psychiatrist & therapist:Dr. Melisa Padilla.  Diabetes mellitus ([facility] 2004    Endo Dr. Park Pro type 1 note below in [address]    Diabetic eye exam ([facility] 1/20/14    [facility]:Dr. STEPHEN Hayward:No retinopathy. Cataract    Diabetic retinopathy ([facility]     under care of [facility]:Dr. Darlene Matamoros    Diverticulitis 11/26/2011    Diverticulosis 11/26/2011    DKA (diabetic ketoacidoses) (HCC)     Emphysema     Esophageal candidiasis ([facility] 7/16/13    GI:EGD    Essential hypertension, benign 11/2010    ETOH abuse     Fatty liver 10/9/2012    Foot ulcer:left 9/30/2013    Gastric ulcer, unspecified as acute or chronic, without mention of hemorrhage or perforation     Generalized anxiety disorder     GERD (gastroesophageal reflux disease)     Gout 1997    Right big toe    Hearing decreased     Left ear=60%. Right ear=80%.     Hemangioma 12/2010    Liver as per CT abdo    hepatitis c 7/26/17, 2010    Under care of Liver doc:Dr. Taty Reid    Hyperlipidemia LDL goal < 100     Hypertensive heart disease with heart failure ([facility]     Left-sided weakness     Low HDL (under 40) 10/9/2012    Myocardial infarction ([facility]     Orthostatic hypotension     Peripheral neuropathy 2006    Pneumonia 10/15/13    Protein calorie malnutrition (HCC)     PTSD (post-traumatic stress disorder)     Pyogenic granuloma     per derm:Dr. Lv Moore Restrictive lung disease     Under care of pulmo(Dr. Calhoun)    S/P colonoscopy 1996    Done secondary to rectal bleed:dx=hemorrhoids    S/P colonoscopy 11/11/10;10/23/2013     (ERGOCALCIFEROL) 1.25 MG (42541 UT) CAPS capsule Take 50,000 Units by mouth once a week thursday   Yes Historical Provider, MD   oxyCODONE HCl (OXY-IR) 10 MG immediate release tablet Take 10 mg by mouth every 6 hours as needed for Pain. Takes scheduled 12-6-12-6   Yes Historical Provider, MD   cefUROXime (CEFTIN) 250 MG tablet Take 1 tablet by mouth 2 times daily for 7 days  Patient taking differently: Take 250 mg by mouth 2 times daily For UTI.  Started 3/30 and will go through 4/6. 3/29/21 4/5/21 Yes Wilda Murphy MD   lisinopril (PRINIVIL;ZESTRIL) 10 MG tablet Take 1 tablet by mouth daily 10/27/20  Yes RICCO Jackson CNP   metoprolol succinate (TOPROL XL) 50 MG extended release tablet Take 1 tablet by mouth nightly 10/26/20  Yes RICCO Jackson CNP   loratadine (CLARITIN) 10 MG tablet Take 10 mg by mouth daily   Yes Historical Provider, MD   memantine (NAMENDA) 10 MG tablet Take 10 mg by mouth 2 times daily   Yes Historical Provider, MD   polyethylene glycol (GLYCOLAX) powder Take 17 g by mouth daily   Yes Historical Provider, MD   fluticasone (FLOVENT HFA) 44 MCG/ACT inhaler Inhale 2 puffs into the lungs 2 times daily   Yes Historical Provider, MD   loperamide (IMODIUM) 2 MG capsule Take 2 mg by mouth every 6 hours as needed for Diarrhea    Yes Historical Provider, MD   aspirin 81 MG tablet Take 81 mg by mouth daily chewable   Yes Historical Provider, MD   finasteride (PROSCAR) 5 MG tablet Take 5 mg by mouth daily   Yes Historical Provider, MD   folic acid (FOLVITE) 1 MG tablet Take 1 mg by mouth daily   Yes Historical Provider, MD   Dulaglutide (TRULICITY) 3.93 LJ/1.6CI SOPN Inject 0.75 mg into the skin once a week On Saturday   Yes Historical Provider, MD   atorvastatin (LIPITOR) 80 MG tablet Take 80 mg by mouth nightly    Yes Historical Provider, MD   Insulin Glargine (LANTUS SOLOSTAR SC) Inject 20 Units into the skin 2 times daily    Yes Historical Provider, MD   vitamin E 1000 units capsule Take 1 capsule by mouth daily 11/5/18  Yes Aime Pod, APRN - [place]   Continuous Blood Gluc  (FREESTYLE BRIDGET READER) DENIS Use to monitor sugars 8/1/18  Yes Xochilt Virk MD   Continuous Blood Gluc Sensor ([address] [place] [address]) MISC Use to monitor sugars 8/1/18  Yes Xochilt Virk MD   vitamin B-12 (CYANOCOBALAMIN) 1000 MCG tablet TAKE 1 TABLET BY MOUTH DAILY 7/2/18  Yes Aime Pod, APRN - [place]   umeclidinium-vilanterol (ANORO ELLIPTA) 62.5-25 MCG/INH AEPB inhaler Inhale 1 puff into the lungs daily 3/27/18  Yes Everardo Dinero MD   albuterol sulfate HFA (PROAIR HFA) 108 (90 Base) MCG/ACT inhaler Inhale 2 puffs into the lungs every 4 hours as needed for Wheezing or Shortness of Breath 3/27/18  Yes Everardo Dinero MD   Lancets MISC Testing 2-3 times daily DX Code: E11.9 3/22/18  Yes Xochilt Virk MD   Blood Glucose Monitoring Suppl (TRUE METRIX AIR GLUCOSE METER) DENIS 1 meter 3/6/18  Yes Aime Pod, APRN - CNP   glucagon 1 MG injection Inject 1 kit into the skin as needed   Yes Historical Provider, MD   tiZANidine (ZANAFLEX) 4 MG tablet Take 4 mg by mouth every 8 hours as needed    Historical Provider, MD   aluminum & magnesium hydroxide-simethicone (MAALOX) 200-200-20 MG/5ML SUSP suspension Take 5 mLs by mouth every 6 hours as needed for Indigestion Takes mylanta 30 ml Q4 hours PRN    Historical Provider, MD   acetaminophen (TYLENOL) 325 MG tablet Take 650 mg by mouth every 4 hours as needed for Pain    Historical Provider, MD   SUMAtriptan (IMITREX) 100 MG tablet Take 100 mg by mouth daily as needed for Migraine Give one additional dose if needed after 2 hours if 1st dose is ineffective (NTE 200mg/24 hrs)    Historical Provider, MD   AGAMATRIX PRESTO TEST strip USE TO TEST BLOOD SUGAR 3 TIMES A DAY 5/9/18   Xochilt Virk MD   thiamine 100 MG tablet Take 1 tablet by mouth daily 3/6/18   Aime Pod, APRN - CNP       Allergies:  Neurontin [gabapentin] person, place, month, year, president. No anxiety or agitation. CBC:   Recent Labs     04/01/21  1445   WBC 7.7   HGB 11.2*   HCT 34.0*   MCV 93.4        BMP:   Recent Labs     04/01/21  1445   *   K 4.4   CL 96*   CO2 28   BUN 23*   CREATININE 1.6*     LIVER PROFILE:   Recent Labs     04/01/21  1445   AST 14*   ALT 11   BILITOT 0.4   ALKPHOS 92     PT/INR: No results for input(s): PROTIME, INR in the last 72 hours. APTT: No results for input(s): APTT in the last 72 hours. UA:No results for input(s): NITRITE, COLORU, PHUR, LABCAST, WBCUA, RBCUA, MUCUS, TRICHOMONAS, YEAST, BACTERIA, CLARITYU, SPECGRAV, LEUKOCYTESUR, UROBILINOGEN, BILIRUBINUR, BLOODU, GLUCOSEU, AMORPHOUS in the last 72 hours. Invalid input(s): Elias Croak       CARDIAC ENZYMES  Recent Labs     04/01/21  1445   TROPONINI <0.01       U/A:    Lab Results   Component Value Date    NITRITE neg 01/08/2013    COLORU Yellow 03/29/2021    WBCUA  03/29/2021    RBCUA None seen 03/29/2021    BACTERIA Rare 03/29/2021    CLARITYU CLOUDY 03/29/2021    SPECGRAV 1.015 03/29/2021    LEUKOCYTESUR SMALL 03/29/2021    BLOODU SMALL 03/29/2021    GLUCOSEU 250 03/29/2021    AMORPHOUS Rare 10/04/2018       ABG    Lab Results   Component Value Date    VXX6VTQ 22.3 03/03/2019    BEART -3 03/03/2019    F6KDVAXQ 97 03/03/2019    PHART 7.367 03/03/2019    BIY1UOL 38.8 03/03/2019    PO2ART 88.2 03/03/2019    SOT2CWE 24 03/03/2019       CULTURES  Sputum: Ordered  Blood: Ordered  COVID-19, rapid: Not detected    EKG:  I have reviewed the EKG with the following interpretation:   Normal sinus rhythm  Non-specific intra-ventricular conduction block  T wave abnormality, consider inferolateral ischemia  Abnormal ECG  When compared with ECG of 29-MAR-2021 14:48,  No significant change was found  Confirmed by VAL VASQUEZ, [facility]      RADIOLOGY  XR CHEST PORTABLE   Final Result   New infiltrate right lower lobe concerning for pneumonia.            ASSESSMENT/PLAN: #Hypotension in patient with history of hypertension  -Presenting complaint to the ER with hypotension at his nursing home.   His initial blood pressure in the ER was low 74/55, but this quickly improved and then patient had elevated blood pressure readings thereafter  -He did not appear to be septic  -Stop IV normal saline  -Continue Toprol, lisinopril, Proscar  -Use hydralazine as needed    #Possible Pneumonia - RLL  - Health care acquired: suspect a gram negative organism, also risk for MRSA  - Treat with antibiotics vanc and cefepime D#1  - check cultures   - SLP eval    #MSSA UTI  - cefepime as above   - he does not have an indwelling jennings, f/u blood cx     #CKD 3  - Crt mildly higher than normal, closely monitor    #COPD  - no ae, cont inhalers    #CAD  -EKG with inferolateral T wave changes with are not new compared to prior  -He denies any chest pain  -Continue home meds aspirin, statin, metoprolol    #Cardiomyopathy  -EF 45 to 50% on echo from April 2020  - appears compensated  -Continue home meds Toprol-XL, lisinopril  -No diuretic on home med list    #Diabetes mellitus type 2  -Continue Lantus and use sliding scale insulin    #Dementia  -Appears to be at baseline, continue Namenda    DVT Prophylaxis: Lovenox   Diet: DIET CARB CONTROL;  Code Status: Full Code    Elian Gallegos PA-C  4/1/2021 6:48 PM

## 2021-04-01 NOTE — ED PROVIDER NOTES
[facility],  [place], [facility]   Phone [phone], RAPID    Narrative:     Performed at:  [facility] ([facility],  [place], [facility]   Phone [phone]   CULTURE, BLOOD 1   CULTURE, BLOOD 2   CULTURE, RESPIRATORY   LACTATE, SEPSIS    Narrative:     Performed at:  [facility],  [place], [facility]   Phone [phone]   PROCALCITONIN    Narrative:     Performed at:  [facility],  [place], [facility]   Phone [phone]   TROPONIN    Narrative:     Performed at:  [facility],  [place], [facility]   Phone [phone]   BASIC METABOLIC PANEL W/ REFLEX TO MG FOR LOW K   CBC WITH AUTO DIFFERENTIAL   HEMOGLOBIN A1C   POCT GLUCOSE   POCT GLUCOSE     XR CHEST PORTABLE   Final Result   New infiltrate right lower lobe concerning for pneumonia. ED course: Patient 67-year-old male brought in from nursing home with concerns for hypotension and confusion. Full HPI as detailed above. Upon arrival in the ED, vitals remarkable for hypertension. Otherwise reassuring. Patient was treated with IV fluids. Work-up revealed a new infiltrate in the right lower lobe concerning for pneumonia. Patient was started on antibiotics. He was seen in conjunction with the midlevel practitioner, please refer to her note for further details of the patient's work-up and treatment. He will be hospitalized. All diagnostic, treatment, and disposition decisions were made by myself in conjunction with the advanced practice provider. For all further details of the patient's emergency department visit, please see the advanced practice provider's documentation.     The Ekg interpreted by me shows  normal sinus rhythm with a rate of 62  Axis is   Left axis deviation  QTc is within an acceptable range  Intervals and Durations are unremarkable. ST Segments: nonspecific changes, T wave inversions in leads I, II, aVL, and V4 through V6 which are similar compared to previous performed 3/29/2021    1. Pneumonia due to organism    2. Septicemia ([address]    3. History of UTI    4. Chronic kidney disease, unspecified CKD stage      Comment: Please note this report has been produced using speech recognition software and may contain errors related to that system including errors in grammar, punctuation, and spelling, as well as words and phrases that may be inappropriate. If there are any questions or concerns please feel free to contact the dictating provider for clarification.        Cira Lopez MD  04/01/21 0575

## 2021-04-02 ENCOUNTER — APPOINTMENT (OUTPATIENT)
Dept: CT IMAGING | Age: 71
DRG: 178 | End: 2021-04-02
Payer: MEDICARE

## 2021-04-02 LAB
ANION GAP SERPL CALCULATED.3IONS-SCNC: 8 MMOL/L (ref 3–16)
BASOPHILS ABSOLUTE: 0.1 K/UL (ref 0–0.2)
BASOPHILS RELATIVE PERCENT: 0.7 %
BUN BLDV-MCNC: 16 MG/DL (ref 7–20)
CALCIUM SERPL-MCNC: 9.2 MG/DL (ref 8.3–10.6)
CHLORIDE BLD-SCNC: 107 MMOL/L (ref 99–110)
CO2: 25 MMOL/L (ref 21–32)
CREAT SERPL-MCNC: 1 MG/DL (ref 0.8–1.3)
EKG ATRIAL RATE: 62 BPM
EKG DIAGNOSIS: NORMAL
EKG P AXIS: 68 DEGREES
EKG P-R INTERVAL: 174 MS
EKG Q-T INTERVAL: 462 MS
EKG QRS DURATION: 124 MS
EKG QTC CALCULATION (BAZETT): 468 MS
EKG R AXIS: -13 DEGREES
EKG T AXIS: 143 DEGREES
EKG VENTRICULAR RATE: 62 BPM
EOSINOPHILS ABSOLUTE: 0.2 K/UL (ref 0–0.6)
EOSINOPHILS RELATIVE PERCENT: 1.8 %
ESTIMATED AVERAGE GLUCOSE: 200.1 MG/DL
GFR AFRICAN AMERICAN: >60
GFR NON-AFRICAN AMERICAN: >60
GLUCOSE BLD-MCNC: 111 MG/DL (ref 70–99)
GLUCOSE BLD-MCNC: 120 MG/DL (ref 70–99)
GLUCOSE BLD-MCNC: 131 MG/DL (ref 70–99)
GLUCOSE BLD-MCNC: 188 MG/DL (ref 70–99)
GLUCOSE BLD-MCNC: 211 MG/DL (ref 70–99)
GLUCOSE BLD-MCNC: 300 MG/DL (ref 70–99)
HBA1C MFR BLD: 8.6 %
HCT VFR BLD CALC: 37.8 % (ref 40.5–52.5)
HEMOGLOBIN: 12.7 G/DL (ref 13.5–17.5)
LYMPHOCYTES ABSOLUTE: 2.1 K/UL (ref 1–5.1)
LYMPHOCYTES RELATIVE PERCENT: 20.2 %
MCH RBC QN AUTO: 30.9 PG (ref 26–34)
MCHC RBC AUTO-ENTMCNC: 33.5 G/DL (ref 31–36)
MCV RBC AUTO: 92.1 FL (ref 80–100)
MONOCYTES ABSOLUTE: 0.9 K/UL (ref 0–1.3)
MONOCYTES RELATIVE PERCENT: 8.6 %
NEUTROPHILS ABSOLUTE: 7.1 K/UL (ref 1.7–7.7)
NEUTROPHILS RELATIVE PERCENT: 68.7 %
PDW BLD-RTO: 13.2 % (ref 12.4–15.4)
PERFORMED ON: ABNORMAL
PLATELET # BLD: 214 K/UL (ref 135–450)
PMV BLD AUTO: 7.9 FL (ref 5–10.5)
POTASSIUM REFLEX MAGNESIUM: 3.9 MMOL/L (ref 3.5–5.1)
RBC # BLD: 4.11 M/UL (ref 4.2–5.9)
SODIUM BLD-SCNC: 140 MMOL/L (ref 136–145)
WBC # BLD: 10.3 K/UL (ref 4–11)

## 2021-04-02 PROCEDURE — 6360000002 HC RX W HCPCS: Performed by: PHYSICIAN ASSISTANT

## 2021-04-02 PROCEDURE — 36415 COLL VENOUS BLD VENIPUNCTURE: CPT

## 2021-04-02 PROCEDURE — 1200000000 HC SEMI PRIVATE

## 2021-04-02 PROCEDURE — 85025 COMPLETE CBC W/AUTO DIFF WBC: CPT

## 2021-04-02 PROCEDURE — 74176 CT ABD & PELVIS W/O CONTRAST: CPT

## 2021-04-02 PROCEDURE — 93010 ELECTROCARDIOGRAM REPORT: CPT | Performed by: INTERNAL MEDICINE

## 2021-04-02 PROCEDURE — 6370000000 HC RX 637 (ALT 250 FOR IP): Performed by: INTERNAL MEDICINE

## 2021-04-02 PROCEDURE — 2580000003 HC RX 258: Performed by: PHYSICIAN ASSISTANT

## 2021-04-02 PROCEDURE — 6360000002 HC RX W HCPCS: Performed by: NURSE PRACTITIONER

## 2021-04-02 PROCEDURE — 94640 AIRWAY INHALATION TREATMENT: CPT

## 2021-04-02 PROCEDURE — 94761 N-INVAS EAR/PLS OXIMETRY MLT: CPT

## 2021-04-02 PROCEDURE — 80048 BASIC METABOLIC PNL TOTAL CA: CPT

## 2021-04-02 PROCEDURE — 6370000000 HC RX 637 (ALT 250 FOR IP)

## 2021-04-02 PROCEDURE — 92610 EVALUATE SWALLOWING FUNCTION: CPT

## 2021-04-02 PROCEDURE — 92526 ORAL FUNCTION THERAPY: CPT

## 2021-04-02 PROCEDURE — 6360000002 HC RX W HCPCS: Performed by: INTERNAL MEDICINE

## 2021-04-02 PROCEDURE — 6370000000 HC RX 637 (ALT 250 FOR IP): Performed by: PHYSICIAN ASSISTANT

## 2021-04-02 PROCEDURE — 99233 SBSQ HOSP IP/OBS HIGH 50: CPT | Performed by: INTERNAL MEDICINE

## 2021-04-02 RX ORDER — MORPHINE SULFATE 4 MG/ML
4 INJECTION, SOLUTION INTRAMUSCULAR; INTRAVENOUS
Status: DISCONTINUED | OUTPATIENT
Start: 2021-04-02 | End: 2021-04-03 | Stop reason: HOSPADM

## 2021-04-02 RX ORDER — MORPHINE SULFATE 2 MG/ML
2 INJECTION, SOLUTION INTRAMUSCULAR; INTRAVENOUS ONCE
Status: COMPLETED | OUTPATIENT
Start: 2021-04-02 | End: 2021-04-02

## 2021-04-02 RX ORDER — LACTULOSE 10 G/15ML
30 SOLUTION ORAL ONCE
Status: COMPLETED | OUTPATIENT
Start: 2021-04-02 | End: 2021-04-02

## 2021-04-02 RX ORDER — DIMETHICONE, OXYBENZONE, AND PADIMATE O 2; 2.5; 6.6 G/100G; G/100G; G/100G
STICK TOPICAL
Status: COMPLETED
Start: 2021-04-02 | End: 2021-04-02

## 2021-04-02 RX ORDER — BISACODYL 10 MG
10 SUPPOSITORY, RECTAL RECTAL DAILY PRN
Status: DISCONTINUED | OUTPATIENT
Start: 2021-04-02 | End: 2021-04-03 | Stop reason: HOSPADM

## 2021-04-02 RX ADMIN — MORPHINE SULFATE 2 MG: 2 INJECTION, SOLUTION INTRAMUSCULAR; INTRAVENOUS at 09:59

## 2021-04-02 RX ADMIN — Medication 10 MG: at 20:33

## 2021-04-02 RX ADMIN — Medication 10 ML: at 09:31

## 2021-04-02 RX ADMIN — Medication 10 ML: at 20:37

## 2021-04-02 RX ADMIN — CEFEPIME 2000 MG: 2 INJECTION, POWDER, FOR SOLUTION INTRAVENOUS at 03:09

## 2021-04-02 RX ADMIN — CEFEPIME 2000 MG: 2 INJECTION, POWDER, FOR SOLUTION INTRAVENOUS at 14:58

## 2021-04-02 RX ADMIN — METOPROLOL SUCCINATE 50 MG: 50 TABLET, EXTENDED RELEASE ORAL at 20:33

## 2021-04-02 RX ADMIN — ENOXAPARIN SODIUM 40 MG: 40 INJECTION SUBCUTANEOUS at 08:50

## 2021-04-02 RX ADMIN — SODIUM CHLORIDE 25 ML: 9 INJECTION, SOLUTION INTRAVENOUS at 14:58

## 2021-04-02 RX ADMIN — HYDRALAZINE HYDROCHLORIDE 10 MG: 20 INJECTION INTRAMUSCULAR; INTRAVENOUS at 09:29

## 2021-04-02 RX ADMIN — INSULIN LISPRO 4 UNITS: 100 INJECTION, SOLUTION INTRAVENOUS; SUBCUTANEOUS at 20:39

## 2021-04-02 RX ADMIN — FOLIC ACID 1 MG: 1 TABLET ORAL at 08:50

## 2021-04-02 RX ADMIN — HYDRALAZINE HYDROCHLORIDE 10 MG: 20 INJECTION INTRAMUSCULAR; INTRAVENOUS at 03:17

## 2021-04-02 RX ADMIN — ASPIRIN 81 MG: 81 TABLET, CHEWABLE ORAL at 08:49

## 2021-04-02 RX ADMIN — INSULIN GLARGINE 20 UNITS: 100 INJECTION, SOLUTION SUBCUTANEOUS at 20:39

## 2021-04-02 RX ADMIN — ACETAMINOPHEN 650 MG: 325 TABLET ORAL at 05:13

## 2021-04-02 RX ADMIN — LACTULOSE 30 G: 10 SOLUTION ORAL at 16:27

## 2021-04-02 RX ADMIN — INSULIN GLARGINE 20 UNITS: 100 INJECTION, SOLUTION SUBCUTANEOUS at 08:51

## 2021-04-02 RX ADMIN — MORPHINE SULFATE 4 MG: 4 INJECTION INTRAVENOUS at 13:01

## 2021-04-02 RX ADMIN — POLYETHYLENE GLYCOL (3350) 17 G: 17 POWDER, FOR SOLUTION ORAL at 08:50

## 2021-04-02 RX ADMIN — INSULIN LISPRO 2 UNITS: 100 INJECTION, SOLUTION INTRAVENOUS; SUBCUTANEOUS at 08:51

## 2021-04-02 RX ADMIN — Medication 10 ML: at 19:26

## 2021-04-02 RX ADMIN — ATORVASTATIN CALCIUM 80 MG: 40 TABLET, FILM COATED ORAL at 20:33

## 2021-04-02 RX ADMIN — FINASTERIDE 5 MG: 5 TABLET, FILM COATED ORAL at 08:50

## 2021-04-02 RX ADMIN — ONDANSETRON 4 MG: 2 INJECTION INTRAMUSCULAR; INTRAVENOUS at 19:25

## 2021-04-02 RX ADMIN — CYANOCOBALAMIN TAB 500 MCG 1000 MCG: 500 TAB at 08:49

## 2021-04-02 RX ADMIN — MEMANTINE HYDROCHLORIDE 10 MG: 5 TABLET ORAL at 08:50

## 2021-04-02 RX ADMIN — IPRATROPIUM BROMIDE AND ALBUTEROL SULFATE 1 AMPULE: .5; 3 SOLUTION RESPIRATORY (INHALATION) at 18:59

## 2021-04-02 RX ADMIN — OXYCODONE HYDROCHLORIDE 10 MG: 5 TABLET ORAL at 03:09

## 2021-04-02 RX ADMIN — LISINOPRIL 10 MG: 10 TABLET ORAL at 08:49

## 2021-04-02 RX ADMIN — BISACODYL 10 MG: 10 SUPPOSITORY RECTAL at 16:27

## 2021-04-02 RX ADMIN — ACETAMINOPHEN 650 MG: 325 TABLET ORAL at 16:32

## 2021-04-02 RX ADMIN — SODIUM CHLORIDE 25 ML: 9 INJECTION, SOLUTION INTRAVENOUS at 19:20

## 2021-04-02 RX ADMIN — VANCOMYCIN HYDROCHLORIDE 1250 MG: 10 INJECTION, POWDER, LYOPHILIZED, FOR SOLUTION INTRAVENOUS at 16:30

## 2021-04-02 RX ADMIN — Medication 100 MG: at 08:50

## 2021-04-02 RX ADMIN — SERTRALINE 100 MG: 100 TABLET, FILM COATED ORAL at 08:50

## 2021-04-02 RX ADMIN — Medication: at 08:58

## 2021-04-02 RX ADMIN — MEMANTINE HYDROCHLORIDE 10 MG: 5 TABLET ORAL at 20:33

## 2021-04-02 RX ADMIN — HYDRALAZINE HYDROCHLORIDE 10 MG: 20 INJECTION INTRAMUSCULAR; INTRAVENOUS at 16:39

## 2021-04-02 ASSESSMENT — PAIN DESCRIPTION - PROGRESSION
CLINICAL_PROGRESSION: GRADUALLY WORSENING

## 2021-04-02 ASSESSMENT — PAIN DESCRIPTION - FREQUENCY
FREQUENCY: INTERMITTENT
FREQUENCY: CONTINUOUS
FREQUENCY: CONTINUOUS
FREQUENCY: INTERMITTENT

## 2021-04-02 ASSESSMENT — PAIN DESCRIPTION - ONSET
ONSET: GRADUAL
ONSET: GRADUAL

## 2021-04-02 ASSESSMENT — PAIN SCALES - GENERAL
PAINLEVEL_OUTOF10: 3
PAINLEVEL_OUTOF10: 9
PAINLEVEL_OUTOF10: 10
PAINLEVEL_OUTOF10: 9
PAINLEVEL_OUTOF10: 0

## 2021-04-02 ASSESSMENT — PAIN DESCRIPTION - LOCATION
LOCATION: GENERALIZED
LOCATION: GENERALIZED

## 2021-04-02 ASSESSMENT — PAIN DESCRIPTION - DESCRIPTORS
DESCRIPTORS: SORE
DESCRIPTORS: CRAMPING

## 2021-04-02 ASSESSMENT — PAIN - FUNCTIONAL ASSESSMENT: PAIN_FUNCTIONAL_ASSESSMENT: ACTIVITIES ARE NOT PREVENTED

## 2021-04-02 NOTE — PROGRESS NOTES
Pt called out to use the restroom. Ambulate without difficulty with walker to and from restroom. Emesis x2 when back in bed. Primary nurses into room for bedside report and updated. POC reviewed and pt medicated by writer with Zofran per PRN order. Primary RN to follow up. Call light use and bed alarm (high fall risk 60) reviewed with verbal understanding received and call light in reach, bed alarm activated.  Kay Staples Clinical

## 2021-04-02 NOTE — FLOWSHEET NOTE
04/01/21 2305   Vital Signs   Pulse 75   Heart Rate Source Monitor   Resp 17   /67   BP Location Left upper arm   BP rechecked at this time. VSS. Will continue to monitor.  Kevin Lai RN

## 2021-04-02 NOTE — PROGRESS NOTES
Pt. C/o constipation and headache. Pt. Given tylenol, ducolax, and lactulose. Pt. Also given PRN hydralazine for bp of 202/101. Will continue to monitor.

## 2021-04-02 NOTE — PROGRESS NOTES
Pt resting in bed quietly at this time. Pt. C/o pain in the abdomen, PRN pain med given, see the MAR. All needs and call light within reach.

## 2021-04-02 NOTE — ACP (ADVANCE CARE PLANNING)
Advance Care Planning     General Advance Care Planning (ACP) Conversation    Date of Conversation: 4/1/2021  Conducted with: Patient with Decision Making Capacity   Healthcare Decision Maker: Next of Kin by law (only applies in absence of above) (name) Sneha Daniels Decision Maker:      Primary Decision Maker: Chrislane Shay - Brother/Sister - [phone]    Secondary Decision Maker: Blanca Norwood - Other - [phone]      Today we documented Decision Maker(s) consistent with ACP documents on file. Content/Action Overview:   Has ACP document(s) on file - reflects the patient's care preferences  Reviewed DNR/DNI and patient elects Full Code (Attempt Resuscitation)      Length of Voluntary ACP Conversation in minutes:  <16 minutes (Non-Billable)    Kacy Mckeon

## 2021-04-02 NOTE — CONSULTS
Pharmacy Note  Vancomycin Consult    Kenia Stubbs is a 72 y.o. male started on Vancomycin for Pneumonia (HAP); consult received from Arjun Estrada PA-C to manage therapy. Also receiving the following antibiotics: Cefepime IV.     Patient Active Problem List   Diagnosis    GERD (gastroesophageal reflux disease)    Peripheral neuropathy of bilateral feet    COPD with acute bronchitis (HCC)    HLD (hyperlipidemia)    Hepatic steatosis    PTSD (post-traumatic stress disorder)    History of hepatitis C    Bilateral knee & hip OA    Mild-moderate alcohol consumption (beer)    Chronic pain syndrome    Moderate episode of recurrent major depressive disorder (HCC)    Orthostatic hypotension    S/P left colectomy    Hyponatremia    S/p reversal of colostomy (6/27/17)    Chronic dCHF (grade 1 LVDD)    Chronic normocytic anemia    Acute hyperglycemia    Severe protein-calorie malnutrition (HCC)    DM (diabetes mellitus), secondary, uncontrolled, w/neurologic complic ([address]    Hx DKA (Feb 2018)    Other insomnia    Chronic transaminasemia    Tobacco smoker    Fall at home    Hypochloremia    Hyperkalemia    Mixed metabolic and respiratory acidosis    Closed nondisplaced L ankle fracture (lateral malleolus)    L 2nd toenail avulsion    Skin tear of L leg    Head trauma    IDDM (insulin dependent diabetes mellitus)    Ventral hernia without obstruction or gangrene    Diabetic ketoacidosis without coma associated with type 2 diabetes mellitus (HCC)    DDD (degenerative disc disease), cervical    Spondylosis of cervical region without myelopathy or radiculopathy    DDD (degenerative disc disease), lumbar    Closed nondisplaced fracture of lateral malleolus of left fibula    Lumbar degenerative disc disease    Protrusion of lumbar intervertebral disc    Osteoarthritis of both knees    Osteoarthritis of both hips    Idiopathic peripheral neuropathy    Leg pain, anterior, left    CVA (cerebral vascular accident) ([address]    DM (diabetes mellitus) ([facility]    Essential hypertension    BPH (benign prostatic hyperplasia)    Dysarthria    Left-sided weakness    TIA (transient ischemic attack)    Dyslipidemia    DKA, type 2, not at goal [facility])    Agitation requiring sedation protocol    DKA, type 1, not at goal [facility])    Severe malnutrition ([facility]    CHIP (acute kidney injury) ([facility]    Open wound of right foot    Dementia associated with other underlying disease without behavioral disturbance ([facility]    Closed head injury    Facial hematoma    Concussion with no loss of consciousness    Chest pain    SOB (shortness of breath)    Acute on chronic systolic (congestive) heart failure (HCC)    Cardiomyopathy (HCC)    Pneumonia due to organism    Acute cystitis without hematuria     Allergies:  Neurontin [gabapentin]     Temp max: 97.2    Recent Labs     04/01/21  1445   BUN 23*   CREATININE 1.6*   WBC 7.7       Intake/Output Summary (Last 24 hours) at 4/1/2021 2229  Last data filed at 4/1/2021 1850  Gross per 24 hour   Intake 3000 ml   Output 2600 ml   Net 400 ml       Ht Readings from Last 1 Encounters:   04/01/21 6' 5\" (1.956 m)        Wt Readings from Last 1 Encounters:   04/01/21 185 lb (83.9 kg)       Body mass index is 21.94 kg/m². Estimated Creatinine Clearance: 52 mL/min (A) (based on SCr of 1.6 mg/dL (H)). Assessment/Plan:    Patient received vancomycin 2000 mg IVPB x one dose in the ED at [facility]. Based on patient's age, weight, and renal function will start vancomycin 1250 mg IVPB [id number]. Will draw trough level prior to the 4th dose (at 1430 on 4/4/21). Hold dose if trough level is >/= 20 mcg/ml. Hold parameters on sticky note on chart. Pharmacy will continue to monitor and adjust as necessary. Thank you for the consult. Will continue to follow.

## 2021-04-02 NOTE — PROGRESS NOTES
Speech Language Pathology  Facility/Department: [facility] WEST MEDICAL-SURGICAL   CLINICAL BEDSIDE SWALLOW EVALUATION    Instrumentation: Unable to determine need for nstrumental swallow study at this time. Will monitor. Diet recommendation: IDDSI 7 Regular Solids Per MD; IDDSI 0 Thin Liquids Per MD; Meds PO as tolerated  Risk management: upright for all intake, stay upright for at least 30 mins after intake, small bites/sips, 1:1 supervision with intake, oral care q4 hrs to reduce adverse affects in the event of aspiration, increase physical mobility as able, slow rate of intake, general GERD precautions, general aspiration precautions and hold PO and contact SLP if s/s of aspiration or worsening respiratory status develop. NAME: Eric King  : 1950  MRN: [id number]    ADMISSION DATE: 2021  ADMITTING DIAGNOSIS: has GERD (gastroesophageal reflux disease); Peripheral neuropathy of bilateral feet; COPD with acute bronchitis ([address] HLD (hyperlipidemia); Hepatic steatosis; PTSD (post-traumatic stress disorder); History of hepatitis C; Bilateral knee & hip OA; Mild-moderate alcohol consumption (beer); Chronic pain syndrome; Moderate episode of recurrent major depressive disorder ([address] Orthostatic hypotension; S/P left colectomy; Hyponatremia; S/p reversal of colostomy (17); Chronic dCHF (grade 1 LVDD); Chronic normocytic anemia; Acute hyperglycemia; Severe protein-calorie malnutrition ([address] DM (diabetes mellitus), secondary, uncontrolled, w/neurologic complic ([address] Hx DKA (2018); Other insomnia; Chronic transaminasemia; Tobacco smoker; Fall at home; Hypochloremia; Hyperkalemia; Mixed metabolic and respiratory acidosis; Closed nondisplaced L ankle fracture (lateral malleolus); L 2nd toenail avulsion; Skin tear of L leg; Head trauma; IDDM (insulin dependent diabetes mellitus);  Ventral hernia without obstruction or gangrene; Diabetic ketoacidosis without coma associated with type 2 diabetes mellitus ([facility] DDD (degenerative disc disease), cervical; Spondylosis of cervical region without myelopathy or radiculopathy; DDD (degenerative disc disease), lumbar; Closed nondisplaced fracture of lateral malleolus of left fibula; Lumbar degenerative disc disease; Protrusion of lumbar intervertebral disc; Osteoarthritis of both knees; Osteoarthritis of both hips; Idiopathic peripheral neuropathy; Leg pain, anterior, left; CVA (cerebral vascular accident) ([facility] DM (diabetes mellitus) ([facility] Essential hypertension; BPH (benign prostatic hyperplasia); Dysarthria; Left-sided weakness; TIA (transient ischemic attack); Dyslipidemia; DKA, type 2, not at goal [facility]); Agitation requiring sedation protocol; DKA, type 1, not at goal [facility]); Severe malnutrition ([facility] CHIP (acute kidney injury) ([facility] Open wound of right foot; Dementia associated with other underlying disease without behavioral disturbance ([facility] Closed head injury; Facial hematoma; Concussion with no loss of consciousness; Chest pain; SOB (shortness of breath); Acute on chronic systolic (congestive) heart failure ([facility] Cardiomyopathy ([facility] Pneumonia due to organism; and Acute cystitis without hematuria on their problem list.  ONSET DATE: 04/01/2021    Recent Chest Xray/CT of Chest: 04/01/2021  Impression   New infiltrate right lower lobe concerning for pneumonia.        Date of Eval: 4/2/2021  Evaluating Therapist: Darlene Sanders    Current Diet level:  Current Diet : Regular  Current Liquid Diet : Thin    Pain:  Pain Assessment  Pain Assessment: 0-10  Pain Level: 10  Patient's Stated Pain Goal: No pain  Pain Type: Acute pain  Pain Location: Abdomen  Pain Orientation: Left, Lower  Pain Descriptors: Cramping  Pain Frequency: Continuous  Pain Onset: Gradual  Clinical Progression: Gradually worsening  Functional Pain Assessment: Activities are not prevented  Non-Pharmaceutical Pain Intervention(s): Repositioned  Response to Pain Intervention: Asleep with RR greater than 10    Reason for Referral  Lisa Perez was referred for a bedside swallow evaluation to assess the efficiency of his swallow function, identify signs and symptoms of aspiration and make recommendations regarding safe dietary consistencies, effective compensatory strategies, and safe eating environment. Primary Complaint  Patient Complaint and Admission Information: The patient is a 71 y/o male that presented to [facility] ED last night from SNF for hypotension. Of note, the patient was sent to ED on 03/29 for similar complaints but ED w/u was unremarkable aside from UTI. Upon arrival yesterday the pt was significant;y hypotensive with BP at 74/55 and then 80/49. This was improved with IVF's. New CXR revealed RLL PNA and pt was admitted to [facility] on 04/01 with hypotension, RLL PNA, MSSA UTI, CKD 3, COPD, CAD, cardiomyopathy, DM2, and dementia (takes nemenda). Pt has extensive medical hx including dementia, acute on chronic CHF, BPH, CVA, chronic diastolic HF, chronic pain, Hep C, chronic viral hepatitis, COPD, depression, DM, DKA, emphysema, esophageal cadidiasis, HTN, ETOH abuse, fatty liver, gastric ulcer, GERD, anxiety, HLD, L side weakness, MI, neuropathy, PNA, malnutrition, PTSD, restrictive lung disease, former smoker. Pt had cervical spine stenosis and is s/p C5-C6 ACDF  (2019). Past esophagram revealed presnyesophagus. Pt's WBC's are WNL at this time. H&H is low. Medical record review/interview: CVA, L side weakness, COPD, GERD, dementia (takes nemenda), from SNF, ETOH abuse, hypotensive, on RA, BP elevated significantly at time of assessment (RN aware), RLL PNA, c/o abdominal discomfort-per RN the pt is constipated and meds have been given.   Predisposing dysphagia risk factors: Hx of CVA, COPD, Cognitive Deficit, GERD and Hx of dysphagia  Clinical signs of possible chronic dysphagia: recurrent PNA, hx of dysphagia and hx of malnutrition  Precipitating dysphagia risk factors: reduced physical mobility, RLL PNA    Vitals/labs:   Temp: 97.8  SpO2: 99% RA  RR: 16  BP: 206/103- RN in room and IV meds given  HR: 72  WBCs: 10.3- WNL    Cranial nerve exam:   CN V: ophthalmic, maxillary, and mandibular facial sensation- WNL  CN VII: WNL b/l  CN IX/X: MPT: Impaired; pitch range: WNL; vocal quality: WNL; cough: Strong-perceptually and Congested  CN XII: WNL b/l    Laryngeal function exam:   Secretions: Oral mucosa pink and moist, congested cough but not-productive. Appears to manage secretions  Vocal quality: WFL  MPT: Impaired  S/Z ratio: Impaired  Pitch range: Adequate  Cough: Strong-perceptually and Congested    PO trials:   Ice: Grossly functional oral phase suspected. Pt is noted with reflexive immediate throat clear post swallow. IDDSI 0 (thin):   - 5cc bolus (tsp): DNT  - Cup: suspected premature bolus loss, no anterior bolus loss, vitals stable, wet vocal quality and delayed throat clearing after the swallow  - Straw: DNT  IDDSI 2 (mildly thick): not clinically indicated  IDDSI 3 (moderately thick): not clinically indicated  IDDSI 4 (puree): pt declined  IDDSI 5 (minced and moist): pt declined  IDDSI 6 (soft and bite sized): pt declined  IDDSI 7 (regular): pt declined  3 oz water: VANI    Limited assessment as the patient refused several trials despite encouragement and extensive education regarding need to complete eval. Pt reports he is unable to eat 2/2 abdominal pain. Of note, immediately following reflexive; delayed throat with sip of thin liquid, the patient was noted with immediate belching and requested emesis bag. Did appear to gag and/or dry heave, however, no emesis noted. Stayed with pt to ensure safety and comfort. Question potential GI component given severe abdominal pain that is reported. Could also be r/t nausea. Difficult to discern given pt's limited ability to elaborate on exact issues. Pt will needed repeat BSE.  SLP did spend time with pt educating him on safe-swallow safe diet advancement: fair  Barriers to reach goals: cognitive deficits;time post onset  Individuals consulted  Consulted and agree with results and recommendations: Patient;RN    Education  Patient Education: Role of SLP, rationale for completion of BSE, rseults, recommendations, and POC  Patient Education Response: Needs reinforcement;Verbalizes understanding  Safety Devices in place: Yes  Type of devices: All fall risk precautions in place; Left in bed;Bed alarm in place;Call light within reach;Nurse notified       Therapy Time  SLP Individual Minutes  Time In: 4787  Time Out: 2461  Minutes: Rito Barrera  4/2/2021 11:37 AM   Radha Ruelas M.A., [address] [facility]  Speech-Language Pathologist  Phone: [phone]

## 2021-04-02 NOTE — PROGRESS NOTES
Vancomycin Day: 2    Patient's labs, cultures, vitals, and vancomycin regimen reviewed. No changes today.   Trough due on 4/4  [email or web address] DIAMANTE 4/2/202111:16 AM  .

## 2021-04-02 NOTE — PROGRESS NOTES
RESPIRATORY THERAPY ASSESSMENT    Name:  Fili Terrell  Medical Record Number:  [id number]  Age: 72 y.o. Gender: male  : 1950  Today's Date:  2021  Room:  [facility]0209-02    Assessment     Is the patient being admitted for a COPD or Asthma exacerbation? No   (If yes the patient will be seen every 4 hours for the first 24 hours and then reassessed)    Patient Admission Diagnosis      Allergies  Allergies   Allergen Reactions    Neurontin [Gabapentin] Other (See Comments)     Gastric side effects       Minimum Predicted Vital Capacity:               Actual Vital Capacity:                    Pulmonary History:COPD and CHF/Pulmonary Edema  Home Oxygen Therapy:  room air  Home Respiratory Therapy:Albuterol, Fluticasone Furoate  and Umeclidinium Bromide/Vilanterol   Current Respiratory Therapy:  duoneb q4wa, albuterol q4 prn  Treatment Type: HHN  Medications: Albuterol/Ipratropium    Respiratory Severity Index(RSI)   Patients with orders for inhalation medications, oxygen, or any therapeutic treatment modality will be placed on Respiratory Protocol. They will be assessed with the first treatment and at least every 72 hours thereafter. The following severity scale will be used to determine frequency of treatment intervention.     Smoking History: Pulmonary Disease or Smoking History, Greater than 15 pack year = 2    Social History  Social History     Tobacco Use    Smoking status: Former Smoker     Packs/day: 0.50     Years: 35.00     Pack years: 17.50     Types: Cigarettes     Quit date: 2018     Years since quittin.5    Smokeless tobacco: Never Used   Substance Use Topics    Alcohol use: Not Currently    Drug use: No       Recent Surgical History: None = 0  Past Surgical History  Past Surgical History:   Procedure Laterality Date    COLONOSCOPY      DIAGNOSTIC CARDIAC CATH LAB PROCEDURE      DILATATION, ESOPHAGUS      FOOT SURGERY Left Hammer toe, 2nd toe    10/9/14    FOOT SURGERY Left 12/5/2019    REMOVAL HARDWARE LEFT FOOT performed by Quin Hastings DPM at [address] [place] [address] [address] Left 12/03/2019    REMOVAL HARDWARE LEFT FOOT w. Dr Jarod العراقي 12/5/19    [address] [facility]      broken leg    OTHER SURGICAL HISTORY Left 11/21/2013    EXCISION 5TH METATRSAL LEFT FOOT          OTHER SURGICAL HISTORY Right 05/30/2019    Procedure: PARTIAL RESECTION RIGHT FIFTH METATARSAL, ULCER DEBRIDEMENT WITH GRAFT APPLICATION    PRESSURE ULCER DEBRIDEMENT Right 5/30/2019    PARTIAL RESECTION RIGHT FIFTH METATARSAL, ULCER DEBRIDEMENT WITH GRAFT APPLICATION performed by Quin Hastings DPM at [place] [address]  06/27/2017    COLOSTOMY REVERSAL     TONSILLECTOMY      UPPER GASTROINTESTINAL ENDOSCOPY  7/16/2013    Esophageal Brushing       Level of Consciousness: Alert, Oriented, and Cooperative = 0    Level of Activity: Walking with assistance = 1    Respiratory Pattern: Regular Pattern; RR 8-20 = 0    Breath Sounds: Diminshed bilaterally and/or crackles = 2    Sputum   ,  ,    Cough: Strong, spontaneous, non-productive = 0    Vital Signs   BP (!) 227/98   Pulse 62   Temp 97.1 °F (36.2 °C) (Oral)   Resp 18   Ht 6' 5\" (1.956 m)   Wt 185 lb (83.9 kg)   SpO2 98%   BMI 21.94 kg/m²   SPO2 (COPD values may differ): Greater than or equal to 92% on room air = 0    Peak Flow (asthma only): not applicable = 0    RSI: 5-6 = Q4hr PRN (every four hours as needed) for dyspnea        Plan       Goals: mobilize retained secretions, volume expansion and improve oxygenation    Patient/caregiver was educated on the proper method of use for Respiratory Care Devices:  Yes      Level of patient/caregiver understanding able to:   ? Verbalize understanding   ? Demonstrate understanding       ? Teach back        ? Needs reinforcement       ? No available caregiver               ?   Other:     Response to education:  Good     Is patient being placed on Home Treatment Regimen? Yes     Does the patient have everything they need prior to discharge? NA     Comments: pt assessed and chart reviewed    Plan of Care: duoneb bid(regimen he takes at home) albuterol q4 prn    Electronically signed by Abner Valente RCP on 4/1/2021 at 8:18 PM    Respiratory Protocol Guidelines     1. Assessment and treatment by Respiratory Therapy will be initiated for medication and therapeutic interventions upon initiation of aerosolized medication. 2. Physician will be contacted for respiratory rate (RR) greater than 35 breaths per minute. Therapy will be held for heart rate (HR) greater than 140 beats per minute, pending direction from physician. 3. Bronchodilators will be administered via Metered Dose Inhaler (MDI) with spacer when the following criteria are met:  a. Alert and cooperative     b. HR < 140 bpm  c. RR < 30 bpm                d. Can demonstrate a 23 second inspiratory hold  4. Bronchodilators will be administered via Hand Held Nebulizer [facility]) to patients when ANY of the following criteria are met  a. Incognizant or uncooperative          b. Patients treated with HHN at Home        c. Unable to demonstrate proper use of MDI with spacer     d. RR > 30 bpm   5. Bronchodilators will be delivered via Metered Dose Inhaler (MDI), HHN, Aerogen to intubated patients on mechanical ventilation. 6. Inhalation medication orders will be delivered and/or substituted as outlined below. Aerosolized Medications Ordering and Administration Guidelines:    1. All Medications will be ordered by a physician, and their frequency and/or modality will be adjusted as defined by the patients Respiratory Severity Index (RSI) score. 2. If the patient does not have documented COPD, consider discontinuing anticholinergics when RSI is less than 9.  3. If the bronchospasm worsens (increased RSI), then the bronchodilator frequency can be increased to a maximum of every 4 hours.   If greater than every 4 hours is

## 2021-04-02 NOTE — PROGRESS NOTES
Progress Note    Admit Date:  4/1/2021    72 y.o. male with dementia resides in a nursing home, COPD, chronic pain, diabetes mellitus, hypertension, congestive heart failure, coronary artery disease who presented to [facility] ED with complaint of hypotension from the nursing home. Patient is a relatively poor historian secondary to dementia. Recent ED Visit on 3/29. He was found to have UTI due to MSSA. He returns today and his ER work-up was interestingly positive for significant hypotension on arrival to the ER with initial blood pressure 74/55 and 80/49. This quickly improved very shortly after IVF were hung. He had no lactic acid elevation, no leukocytosis, no procalcitonin. His CXR showed a new right lower lobe infiltrate concerning for pneumonia. He was admitted for further care    Subjective:  Mr. Swetha Cordova is in pain. States having severe pain to his abdomen. Points to his lower abdomen. Objective:   Patient Vitals for the past 4 hrs:   BP Temp Temp src Pulse Resp SpO2   04/02/21 1000 (!) 190/94        04/02/21 0926 (!) 222/96        04/02/21 0830 (!) 206/103 97.8 °F (36.6 °C) Oral 76 18 99 %            Intake/Output Summary (Last 24 hours) at 4/2/2021 1035  Last data filed at 4/2/2021 1630  Gross per 24 hour   Intake 3120 ml   Output 2600 ml   Net 520 ml       Physical Exam:  Gen: Thin elderly male.  + distress due to pain. Alert. Eyes: PERRL. No sclera icterus. No conjunctival injection. ENT: No discharge. Pharynx clear. Neck: No JVD. No Carotid Bruit. Trachea midline. Resp: No accessory muscle use. No crackles. No wheezes. No rhonchi. CV: Regular rate. Regular rhythm. No murmur. No rub. No edema. GI: lower abdomen tender. LLQ> Non-distended. Normal bowel sounds. Skin: Warm and dry. No nodule on exposed extremities. No rash on exposed extremities. M/S: No cyanosis. No joint deformity. No clubbing. Muscle wasting   Neuro: Awake.  Grossly nonfocal    Psych: Oriented to person, place, month, year, president. No anxiety or agitation. Data:  CBC:   Recent Labs     04/01/21  1445 04/02/21  0509   WBC 7.7 10.3   HGB 11.2* 12.7*   HCT 34.0* 37.8*   MCV 93.4 92.1    214     BMP:   Recent Labs     04/01/21  1445 04/02/21  0509   * 140   K 4.4 3.9   CL 96* 107   CO2 28 25   BUN 23* 16   CREATININE 1.6* 1.0     LIVER PROFILE:   Recent Labs     04/01/21  1445   AST 14*   ALT 11   BILITOT 0.4   ALKPHOS 92     PT/INR: No results for input(s): PROTIME, INR in the last 72 hours. CULTURES  Sputum: Ordered  Blood: Ordered  COVID-19, rapid: Not detected    RADIOLOGY  XR CHEST PORTABLE   Final Result   New infiltrate right lower lobe concerning for pneumonia. Pedro Hernandez have reviewed the chart on [facility] and personally interviewed and examined patient, reviewed the data (labs and imaging) and after discussion with my NP formulated the plan. Agree with note with the following edits. HPI:   61-year-old male admitted with pneumonia. Blood pressures were initially low . Now running high . 199 to 221 over 90's. Home medications have been resumed. Patient complaining of a headache. He also  complains of severe pain in his  Abdomen, left lower quadrant  I reviewed the patient's Past Medical History, Past Surgical History, Medications, and Allergies. Physical exam:    BP (!) 190/94   Pulse 76   Temp 97.8 °F (36.6 °C) (Oral)   Resp 18   Ht 6' 5\" (1.956 m)   Wt 185 lb (83.9 kg)   SpO2 99%   BMI 21.94 kg/m²   Gen: mild distress. Alert. Eyes: PERRL. No sclera icterus. No conjunctival injection. ENT: No discharge. Pharynx clear. Neck: Trachea midline. Normal thyroid. Resp: No accessory muscle use. No crackles. No wheezes. No rhonchi. No dullness on percussion. CV: Regular rate. Regular rhythm. No murmur or rub. No edema. GI:  LLQ tender. Mildly distended. No masses. No organomegaly. Normal bowel sounds. No hernia. Skin: Warm and dry. No nodule on exposed extremities. No rash on exposed extremities. Lymph: No cervical LAD. No supraclavicular LAD. M/S: No cyanosis. No joint deformity. No clubbing. Neuro: Awake. Reflexes 2+ symmetric bilaterally. Moves all 4 extremities, non focal  Psych: Oriented x 3. Mild anxiety due to pain. Electronically signed by Zachary Fulton MD on 4/2/2021 12:24 PM     Assessment/Plan:     #Hypotension in patient with history of hypertension  -Presenting complaint to the ER with hypotension at his nursing home. His initial blood pressure in the ER was low 74/55, but this quickly improved and then patient had elevated blood pressure readings thereafter  -He did not appear to be septic  -Stopped IV normal saline  -Continue Toprol, lisinopril, Proscar  -Use hydralazine as needed  BP is elevated now. #Abdominal pain  - severe. Morphine as needed for pain  - consider CT or Abdominal x-ray.      #Possible Pneumonia - RLL  - Health care acquired: suspect a gram negative organism, also risk for MRSA  - Treat with antibiotics vanc and cefepime D#2  - check cultures   - SLP eval     #MSSA UTI  - cefepime as above   - he does not have an indwelling jennings, f/u blood cx      #CKD 3  - Crt mildly higher than normal, closely monitor     #COPD  - no ae, cont inhalers     #CAD  -EKG with inferolateral T wave changes with are not new compared to prior  -He denies any chest pain  -Continue home meds aspirin, statin, metoprolol     #Cardiomyopathy  -EF 45 to 50% on echo from April 2020  - appears compensated  -Continue home meds Toprol-XL, lisinopril  -No diuretic on home med list     #Diabetes mellitus type 2  -Continue Lantus and use sliding scale insulin     #Dementia  -Appears to be at baseline, continue Namenda     DVT Prophylaxis: Lovenox     Diet: DIET CARB CONTROL;  Code Status: Full Code    Radha Medina FNP-C  4/2/2021    Agree with plan of care as above . Pt seen and examined.  D/W my NP. D/W his nurse . Abdominal pain -> keep n.p.o., IVF . we will check a CT scan of the abdomen . continue morphine as needed for pain. BP significantly elevated,  Resumed lisinopril, Toprol. Prn hydralazine  Added. addendum   CT abd reviewed -+ constiaption.  Add dulcolax suppository, lactulose PO    Electronically signed by Zachary Fulton MD on 4/2/2021 12:24 PM

## 2021-04-02 NOTE — CARE COORDINATION
Case Management Assessment  Initial Evaluation      Patient Name: Lora Smiley  YOB: 1950  Diagnosis: Pneumonia [J18.9]  Date / Time: 4/1/2021  2:13 PM    Admission status/Date: IP 04/02/2021  Chart Reviewed: Yes      Patient Interviewed: Dementia  Family Interviewed:  YES- Sister/POA     Hospitalization in the last 30 days:  No      Health Care Decision Maker :   Primary Decision Maker: Bo Mak - Brother/Sister - [phone]    Secondary Decision Maker: Manohar Estrada - Other - [phone]       Met with: Toño denis conducted  (bedside/phone): [phone]    Current PCP: Daysi [address] required for SNF : NA         3 night stay required - WAIVED    ADLS  Support Systems/Care Needs: ECF/Assisted Living  Transportation: EMS transportation    Meal Preparation: per facility    Housing  Living Arrangements: LTC at [facility]  Steps: NA  Intent for return to present living arrangements: Yes  Identified Issues:           Durable Medical Equiptment   DME Provider: per facility      Home O2 Use :  No  99% RA    Informed of need for care provider to bring portable home O2 tank on day of discharge for nursing to connect prior to leaving:   Not Indicated  Verbalized agreement/Understanding:   Not Indicated    Community Service Affiliation  Dialysis:  No    · Agency:  · Location:  · Dialysis Schedule:  · Phone:   · Fax: Other Community Services: (ex:PT/OT,Mental Health,Wound Clinic, Cardio/Pul [address])    DISCHARGE PLAN: Explained Case Management role/services. LTC at [facility] and will return . VM left w/ Mahn[facility] admissions) to confirm bed hold and identify any barriers to return. Await call back.  Spoke w/ sister/POA, Latrell Rios who confirms return to [facility]. +CM following

## 2021-04-03 VITALS
TEMPERATURE: 97.5 F | OXYGEN SATURATION: 95 % | HEART RATE: 72 BPM | SYSTOLIC BLOOD PRESSURE: 137 MMHG | BODY MASS INDEX: 21.84 KG/M2 | RESPIRATION RATE: 17 BRPM | DIASTOLIC BLOOD PRESSURE: 84 MMHG | WEIGHT: 185 LBS | HEIGHT: 77 IN

## 2021-04-03 PROBLEM — I10 ESSENTIAL HYPERTENSION: Status: RESOLVED | Noted: 2018-10-17 | Resolved: 2021-04-03

## 2021-04-03 PROBLEM — J18.9 PNEUMONIA DUE TO ORGANISM: Status: RESOLVED | Noted: 2021-04-01 | Resolved: 2021-04-03

## 2021-04-03 LAB
ANION GAP SERPL CALCULATED.3IONS-SCNC: 9 MMOL/L (ref 3–16)
BASOPHILS ABSOLUTE: 0 K/UL (ref 0–0.2)
BASOPHILS RELATIVE PERCENT: 0.3 %
BUN BLDV-MCNC: 21 MG/DL (ref 7–20)
CALCIUM SERPL-MCNC: 9.1 MG/DL (ref 8.3–10.6)
CHLORIDE BLD-SCNC: 100 MMOL/L (ref 99–110)
CO2: 23 MMOL/L (ref 21–32)
CREAT SERPL-MCNC: 1.5 MG/DL (ref 0.8–1.3)
EOSINOPHILS ABSOLUTE: 0 K/UL (ref 0–0.6)
EOSINOPHILS RELATIVE PERCENT: 0 %
GFR AFRICAN AMERICAN: 56
GFR NON-AFRICAN AMERICAN: 46
GLUCOSE BLD-MCNC: 146 MG/DL (ref 70–99)
GLUCOSE BLD-MCNC: 163 MG/DL (ref 70–99)
GLUCOSE BLD-MCNC: 206 MG/DL (ref 70–99)
GLUCOSE BLD-MCNC: 231 MG/DL (ref 70–99)
GLUCOSE BLD-MCNC: 253 MG/DL (ref 70–99)
HCT VFR BLD CALC: 35.1 % (ref 40.5–52.5)
HEMOGLOBIN: 11.6 G/DL (ref 13.5–17.5)
LYMPHOCYTES ABSOLUTE: 1.8 K/UL (ref 1–5.1)
LYMPHOCYTES RELATIVE PERCENT: 14.5 %
MCH RBC QN AUTO: 30.8 PG (ref 26–34)
MCHC RBC AUTO-ENTMCNC: 33.1 G/DL (ref 31–36)
MCV RBC AUTO: 93.3 FL (ref 80–100)
MONOCYTES ABSOLUTE: 1.2 K/UL (ref 0–1.3)
MONOCYTES RELATIVE PERCENT: 9.1 %
NEUTROPHILS ABSOLUTE: 9.7 K/UL (ref 1.7–7.7)
NEUTROPHILS RELATIVE PERCENT: 76.1 %
PDW BLD-RTO: 13.5 % (ref 12.4–15.4)
PERFORMED ON: ABNORMAL
PLATELET # BLD: 226 K/UL (ref 135–450)
PMV BLD AUTO: 8.4 FL (ref 5–10.5)
POTASSIUM REFLEX MAGNESIUM: 3.9 MMOL/L (ref 3.5–5.1)
RBC # BLD: 3.76 M/UL (ref 4.2–5.9)
SODIUM BLD-SCNC: 132 MMOL/L (ref 136–145)
WBC # BLD: 12.7 K/UL (ref 4–11)

## 2021-04-03 PROCEDURE — 99239 HOSP IP/OBS DSCHRG MGMT >30: CPT | Performed by: INTERNAL MEDICINE

## 2021-04-03 PROCEDURE — 6360000002 HC RX W HCPCS: Performed by: PHYSICIAN ASSISTANT

## 2021-04-03 PROCEDURE — 2580000003 HC RX 258: Performed by: PHYSICIAN ASSISTANT

## 2021-04-03 PROCEDURE — 92526 ORAL FUNCTION THERAPY: CPT

## 2021-04-03 PROCEDURE — 36415 COLL VENOUS BLD VENIPUNCTURE: CPT

## 2021-04-03 PROCEDURE — 85025 COMPLETE CBC W/AUTO DIFF WBC: CPT

## 2021-04-03 PROCEDURE — 80048 BASIC METABOLIC PNL TOTAL CA: CPT

## 2021-04-03 PROCEDURE — 6370000000 HC RX 637 (ALT 250 FOR IP): Performed by: PHYSICIAN ASSISTANT

## 2021-04-03 RX ORDER — DOCUSATE SODIUM 100 MG/1
100 CAPSULE, LIQUID FILLED ORAL 2 TIMES DAILY
Qty: 28 CAPSULE | Refills: 0 | Status: ON HOLD | DISCHARGE
Start: 2021-04-03 | End: 2021-04-19 | Stop reason: HOSPADM

## 2021-04-03 RX ORDER — AZITHROMYCIN 250 MG/1
250 TABLET, FILM COATED ORAL SEE ADMIN INSTRUCTIONS
Qty: 6 TABLET | Refills: 0 | DISCHARGE
Start: 2021-04-03 | End: 2021-04-08

## 2021-04-03 RX ORDER — AMOXICILLIN AND CLAVULANATE POTASSIUM 875; 125 MG/1; MG/1
1 TABLET, FILM COATED ORAL 2 TIMES DAILY
Qty: 10 TABLET | Refills: 0 | DISCHARGE
Start: 2021-04-03 | End: 2021-04-08

## 2021-04-03 RX ADMIN — OXYCODONE HYDROCHLORIDE 10 MG: 5 TABLET ORAL at 12:09

## 2021-04-03 RX ADMIN — ACETAMINOPHEN 650 MG: 325 TABLET ORAL at 03:12

## 2021-04-03 RX ADMIN — INSULIN GLARGINE 20 UNITS: 100 INJECTION, SOLUTION SUBCUTANEOUS at 08:10

## 2021-04-03 RX ADMIN — SERTRALINE 100 MG: 100 TABLET, FILM COATED ORAL at 08:10

## 2021-04-03 RX ADMIN — FOLIC ACID 1 MG: 1 TABLET ORAL at 08:10

## 2021-04-03 RX ADMIN — Medication 100 MG: at 08:09

## 2021-04-03 RX ADMIN — OXYCODONE HYDROCHLORIDE 10 MG: 5 TABLET ORAL at 02:36

## 2021-04-03 RX ADMIN — TIZANIDINE 4 MG: 4 TABLET ORAL at 02:36

## 2021-04-03 RX ADMIN — INSULIN LISPRO 4 UNITS: 100 INJECTION, SOLUTION INTRAVENOUS; SUBCUTANEOUS at 08:17

## 2021-04-03 RX ADMIN — CYANOCOBALAMIN TAB 500 MCG 1000 MCG: 500 TAB at 08:10

## 2021-04-03 RX ADMIN — ASPIRIN 81 MG: 81 TABLET, CHEWABLE ORAL at 08:09

## 2021-04-03 RX ADMIN — Medication 10 ML: at 08:09

## 2021-04-03 RX ADMIN — POLYETHYLENE GLYCOL (3350) 17 G: 17 POWDER, FOR SOLUTION ORAL at 08:10

## 2021-04-03 RX ADMIN — MEMANTINE HYDROCHLORIDE 10 MG: 5 TABLET ORAL at 08:09

## 2021-04-03 RX ADMIN — ENOXAPARIN SODIUM 40 MG: 40 INJECTION SUBCUTANEOUS at 08:09

## 2021-04-03 RX ADMIN — CEFEPIME 2000 MG: 2 INJECTION, POWDER, FOR SOLUTION INTRAVENOUS at 02:35

## 2021-04-03 RX ADMIN — FINASTERIDE 5 MG: 5 TABLET, FILM COATED ORAL at 08:09

## 2021-04-03 RX ADMIN — LISINOPRIL 10 MG: 10 TABLET ORAL at 08:09

## 2021-04-03 RX ADMIN — INSULIN LISPRO 2 UNITS: 100 INJECTION, SOLUTION INTRAVENOUS; SUBCUTANEOUS at 12:05

## 2021-04-03 ASSESSMENT — PAIN DESCRIPTION - PROGRESSION
CLINICAL_PROGRESSION: GRADUALLY WORSENING

## 2021-04-03 ASSESSMENT — PAIN - FUNCTIONAL ASSESSMENT: PAIN_FUNCTIONAL_ASSESSMENT: ACTIVITIES ARE NOT PREVENTED

## 2021-04-03 ASSESSMENT — PAIN DESCRIPTION - DESCRIPTORS: DESCRIPTORS: ACHING

## 2021-04-03 ASSESSMENT — PAIN DESCRIPTION - PAIN TYPE: TYPE: ACUTE PAIN

## 2021-04-03 ASSESSMENT — PAIN SCALES - GENERAL
PAINLEVEL_OUTOF10: 3
PAINLEVEL_OUTOF10: 7
PAINLEVEL_OUTOF10: 0

## 2021-04-03 NOTE — FLOWSHEET NOTE
04/03/21 0815   Vital Signs   Temp 97.5 °F (36.4 °C)   Temp Source Oral   Pulse 72   Heart Rate Source Monitor   Resp 17   BP (!) 174/81   BP Location Left upper arm   Patient Position Semi fowlers   Level of Consciousness Alert (0)   MEWS Score 1   Patient Currently in Pain Denies   Pain Assessment   Pain Assessment 0-10   Pain Level 0   Oxygen Therapy   SpO2 95 %   O2 Device None (Room air)     Alert and resting in bed. Skin w/d. resp e/e unlabored, no cough. Denies pain. No s/s distress noted. Call light in easy reach. Whiteboard update.

## 2021-04-03 NOTE — PROGRESS NOTES
Discharge paperwork and NINA completed. Patient to be discharged back to [facility]. Report given to charge nurse. Awaiting squad p/u. Patient also aware of transfer.

## 2021-04-03 NOTE — CARE COORDINATION
DISCHARGE ORDER  Date/Time 4/3/2021 11:48 AM  Completed by: Ankur Fay Case Management    Patient Name: Sharen Osgood    : 1950      Admit order Date and Status:2021 inpt  Noted discharge order. (verify MD's last order for status of admission/Traditional Medicare 3 [place] Inpatient qualifying stay required for SNF)    Confirmed discharge plan with:              Patient:  Yes              When pt confirms DC plan does any support person need to be contacted by CM Yes if yes who___Pat ([phone])_SWUA HIPAA secure VM__                      Discharge to Facility: [address] phone number for staff giving report:   · [facility]  · Address: [address], [facility]  · UFJ:[phone]  · OHR:[phone]         Pre-certification completed: not needed   Hospital Exemption Notification (HENS) completed: not needed--returning   Discharge orders and Continuity of Care faxed to facility:  yes to [phone]      Transportation:               Medical Transport explained with choice list offered to pt/family. Choice:Yes(no preference)  Agency used: [address] [facility] [address] up time:   1330      Pt/family/Nursing/Facility aware of  time: yes  Yes Names: pt, Left HIPAA secure VM for Tone [place] [phone], LM for Mahnaz with Christen Ray RN  Ambulance form completed:  yes:      Comments:faxed NINA/AVS/ and neg covid result from 2021 [phone]. [facility]. Pt's O2 sats are 95% on RA. Discharge timeout done with Rony Villavicencio RN. All discharge needs and concerns addressed. Discharging nurse to complete NINA, reconcile AVS, and place final copy with patient's discharge packet. Discharging RN to ensure that written prescriptions for  Level II medications are sent with patient to the facility as per protocol.

## 2021-04-03 NOTE — DISCHARGE INSTR - COC
Continuity of Care Form    Patient Name: Maria Del Carmen Singer   :  1950  MRN:  [id number]    Admit date:  2021  Discharge date:  4/3/2021    Code Status Order: Full Code   Advance Directives:   Advance Care Flowsheet Documentation       Date/Time Healthcare Directive Type of Healthcare Directive Copy in [address] Agent's Name Healthcare Agent's Phone Number    21 1849  No, patient does not have an advance directive for healthcare treatment -- -- -- -- --            Admitting Physician:  Jaswinder Galindo MD  PCP: Joce Avendaño, APRN - CNP    Discharging Nurse: [facility] Unit/Room#: 0209/0209-02  Discharging Unit Phone Number: ***    Emergency Contact:   Extended Emergency Contact Information  Primary Emergency Contact: Natty Paz [address] Phone: [phone]  Mobile Phone: [phone]  Relation: Brother/Sister  Secondary Emergency Contact: Peter Harris, [address] Phone: [phone]  Relation: Other    Past Surgical History:  Past Surgical History:   Procedure Laterality Date    COLONOSCOPY      DIAGNOSTIC CARDIAC CATH LAB PROCEDURE      DILATATION, ESOPHAGUS      FOOT SURGERY Left Hammer toe, 2nd toe    10/9/14    FOOT SURGERY Left 2019    REMOVAL HARDWARE LEFT FOOT performed by Frank Valera DPM at [address] [place] [address] [address] Left 2019    REMOVAL HARDWARE LEFT FOOT w. Dr Mercedes Peterson 19    [address] [place] [address]      broken leg    OTHER SURGICAL HISTORY Left 2013    EXCISION 5TH METATRSAL LEFT FOOT          OTHER SURGICAL HISTORY Right 2019    Procedure: PARTIAL RESECTION RIGHT FIFTH METATARSAL, ULCER DEBRIDEMENT WITH GRAFT APPLICATION    PRESSURE ULCER DEBRIDEMENT Right 2019    PARTIAL RESECTION RIGHT FIFTH METATARSAL, ULCER DEBRIDEMENT WITH GRAFT APPLICATION performed by Frank Valera DPM at [place] [address]  2017 COLOSTOMY REVERSAL     TONSILLECTOMY      UPPER GASTROINTESTINAL ENDOSCOPY  7/16/2013    Esophageal Brushing       Immunization History:   Immunization History   Administered Date(s) Administered    Hepatitis A 02/05/2010, 08/18/2016    Hepatitis A Adult (Havrix, Vaqta) 08/18/2016    Hepatitis B 02/05/2010, 08/18/2016    Influenza 10/20/2010, 11/29/2011, 10/09/2012    Influenza Vaccine, unspecified formulation 10/16/2013, 11/03/2016    Influenza Virus Vaccine 10/17/2013, 10/06/2014, 10/09/2015, 11/03/2016    Influenza, High Dose (Fluzone 65 yrs and older) 09/27/2017    Influenza, Joseph Ramirez, 6 mo and older, IM, PF (Flulaval, Fluarix) 10/19/2018    Pneumococcal Conjugate 13-valent ([id number]) 11/10/2015    Pneumococcal Conjugate Vaccine 11/10/2015    Pneumococcal Polysaccharide ([id number]) 09/27/2012, 10/16/2013, 09/22/2015    Tdap (Boostrix, Adacel) 02/05/2010, 09/06/2019       Active Problems:  Patient Active Problem List   Diagnosis Code    GERD (gastroesophageal reflux disease) K21.9    Peripheral neuropathy of bilateral feet G62.9    COPD with acute bronchitis ([facility] [address] J44.0, J20.9    HLD (hyperlipidemia) E78.5    Hepatic steatosis K76.0    PTSD (post-traumatic stress disorder) F43.10    History of hepatitis C Z86.19    Bilateral knee & hip OA M17.10    Mild-moderate alcohol consumption (beer) F10.20    Chronic pain syndrome G89.4    Moderate episode of recurrent major depressive disorder (HCC) F33.1    Orthostatic hypotension I95.1    S/P left colectomy Z90.49    Hyponatremia E87.1    S/p reversal of colostomy (6/27/17) K55.9    Chronic dCHF (grade 1 LVDD) I50.32    Chronic normocytic anemia D64.9    Acute hyperglycemia R73.9    Severe protein-calorie malnutrition ([facility] E43    DM (diabetes mellitus), secondary, uncontrolled, w/neurologic complic (HCC) X75.94, D84.45    Hx DKA (Feb 2018) E11.10, Z79.4    Other insomnia G47.09    Chronic transaminasemia R74.01    Tobacco smoker N18.9       Isolation/Infection:   Isolation            No Isolation          Patient Infection Status       Infection Onset Added Last Indicated Last Indicated By Review Planned Expiration Resolved Resolved By    None active    Resolved    COVID-19 Rule Out 04/01/21 04/01/21 04/01/21 COVID-19, Rapid (Ordered)   04/01/21 Rule-Out Test Resulted    COVID-19 Rule Out 10/22/20 10/22/20 10/22/20 COVID-19 (Ordered)   10/23/20 Rule-Out Test Resulted    COVID-19 Rule Out 10/22/20 10/22/20 10/22/20 COVID-19 (Ordered)   10/22/20 Rule-Out Test Resulted            Nurse Assessment:  Last Vital Signs: /84   Pulse 72   Temp 97.5 °F (36.4 °C) (Oral)   Resp 17   Ht 6' 5\" (1.956 m)   Wt 185 lb (83.9 kg)   SpO2 95%   BMI 21.94 kg/m²     Last documented pain score (0-10 scale): Pain Level: 0  Last Weight:   Wt Readings from Last 1 Encounters:   04/01/21 185 lb (83.9 kg)     Mental Status:  alert    IV Access:  - None    Nursing Mobility/ADLs:  Walking   Independent  Transfer  Independent  Bathing  Assisted  Dressing  Independent  Toileting  Independent  Feeding  Independent  Med Admin  Assisted  Med Delivery   whole    Wound Care Documentation and Therapy:        Elimination:  Continence:   · Bowel: Yes  · Bladder: Yes  Urinary Catheter: None   Colostomy/Ileostomy/Ileal Conduit: No       Date of Last BM: 4/2/2021    Intake/Output Summary (Last 24 hours) at 4/3/2021 1134  Last data filed at 4/2/2021 2037  Gross per 24 hour   Intake 10 ml   Output 700 ml   Net -690 ml     I/O last 3 completed shifts: In: 130 [P.O.:120; I.V.:10]  Out: 700 [Urine:700]    Safety Concerns: At Risk for Falls    Impairments/Disabilities:      Vision    Nutrition Therapy:  Current Nutrition Therapy:   Carb control, regular, thin liq  Remain upright 30-45 min after eating. Small sips and bites     Routes of Feeding: Oral  Liquids:  Thin Liquids  Daily Fluid Restriction: no  Last Modified Barium Swallow with Video (Video Swallowing Test): not done    Treatments at the Time of Hospital Discharge:   Respiratory Treatments: ***  Oxygen Therapy:  is not on home oxygen therapy. Ventilator:    - No ventilator support    Rehab Therapies: Physical Therapy, Occupational Therapy and SN  Weight Bearing Status/Restrictions: No weight bearing restirctions  Other Medical Equipment (for information only, NOT a DME order):  walker  Other Treatments: ***    Patient's personal belongings (please select all that are sent with patient):  None    RN SIGNATURE:  Electronically signed by Dano Bazzi RN on 4/3/21 at 11:41 AM EDT    CASE MANAGEMENT/SOCIAL WORK SECTION    Inpatient Status Date: 4/1/2021    Readmission Risk Assessment Score:  Readmission Risk              Risk of Unplanned Readmission:        39           Discharging to Facility/ Agency     Name: PATIENTS' [facility]  Address[place] [address] [facility], [facility]  Phone:[phone]  IXA:[phone]    ·     / signature: Electronically signed by Prakash Cannon RN on 4/3/21 at 12:13 PM EDT    PHYSICIAN SECTION    Prognosis: Good    Condition at Discharge: Stable    Rehab Potential (if transferring to Rehab): Good    Recommended Labs or Other Treatments After Discharge: OP eye appointment with Dr Sirena Cortés    Physician Certification: I certify the above information and transfer of Varghese Hartley  is necessary for the continuing treatment of the diagnosis listed and that he requires [facility] for greater 30 days.      Update Admission H&P: No change in H&P    PHYSICIAN SIGNATURE:  Electronically signed by Laron Silver MD on 4/3/21 at 11:35 AM EDT

## 2021-04-03 NOTE — PROGRESS NOTES
Shift assessment complete - See flow sheet. Nightly medications given - See [facility]      Patient with no complaints of pain at this time. Patient denies any further needs. Bed alarm is set for patient safety. Call light explained and in reach      /79 at this time, pt feeling better after prn zofran given.

## 2021-04-03 NOTE — PROGRESS NOTES
Pt with 2 IV's, both infiltrated. New IV placed in R hand. Pt tolerated well, denies further needs at this time.

## 2021-04-03 NOTE — PROGRESS NOTES
Pt resting with eyes closed, respirs witnessed as e/e, no signs of distress. SR up x2, bed in lowest  position, wheels locked,bed alarm on, Call light and bedside table in easy reach.    Snow Brownlee RN

## 2021-04-03 NOTE — RESULT ENCOUNTER NOTE
Patient's positive result has been appropriately evaluated by the provider pool. This patient was seen and admitted to [facility], I called [phone] and spoke to Phyllis Bell results given to her.         [facility]   [facility]  [address] [facility] [address] [facility] [place]  Phone: [phone] Fax: [phone], [address] [facility] [phone] [facility] [phone] [address] [facility] [address]  Phone: [phone] Fax: [phone] 415 [address] [phone] - f [phone]  [address] [place] [address]  Phone: [phone] Fax: [phone]    Phone number:  Pharmacist receiving the prescription:

## 2021-04-03 NOTE — DISCHARGE SUMMARY
Name:  Alfonso Aleman  Room:  0209/0209-02  MRN:    [id number]    Discharge Summary      This discharge summary is in conjunction with a complete physical exam done on the day of discharge. Discharging Physician: Dr. Lynne Pozo: 4/1/2021  Discharge:   4/3/2021     HPI taken from admission H&P:    The patient is a 72 y.o. male with dementia resides in a nursing home, COPD, chronic pain, diabetes mellitus, hypertension, congestive heart failure, coronary artery disease who presented to [facility] ED with complaint of hypotension from the nursing home. Patient is a relatively poor historian secondary to dementia. He states he was told that his blood pressure was out of whack and his blood sugar was out of whack and he was therefore sent to the ER. He admits to feeling mildly short of breath but denies any cough or chest pain. He denies any fever or chills. He denies abdominal pain nausea vomiting or diarrhea. He denies trouble swallowing. He was sent to the ER on 3/29/2021 with a similar chief complaint-patient told the ER provider at that time that his blood sugar was out of whack and his blood pressure was out of whack. At that time his ER work-up was relatively unremarkable with exception of what appeared to be a urinary tract infection. His urine culture grew MSSA. He returns today and his ER work-up was interestingly positive for significant hypotension on arrival to the ER with initial blood pressure 74/55 and 80/49. This quickly improved very shortly after IVF were hung. He had no lactic acid elevation, no leukocytosis, no procalcitonin. His CXR showed a new right lower lobe infiltrate concerning for pneumonia. He will be admitted for further care    Diagnoses this Admission and Hospital Course     #Hypotension in patient with history of hypertension  -Presenting complaint to the ER with hypotension at his nursing home.   His initial blood pressure in the ER was low 74/55, but this quickly improved and then patient had elevated blood pressure readings thereafter  -He did not appear to be septic  -sp IV normal saline- stopped when BP normalized   -Continued Toprol, lisinopril, Proscar  - BP stable at discharge      #Possible Pneumonia - RLL  - Health care acquired: suspect a gram negative organism, also risk for MRSA  - Treat with antibiotics vanc and cefepime D#2. No hypoxia. Blood cultures NG. Seen by SLP and diet recs reviewed. DC to NH on Augmentin and azithromycin to complete course    #MSSA UTI  - cefepime as above -> Augmentin on discharge   - he does not have an indwelling jennings, blood cx NG      #Abdominal pain 2/2 constipation  - CT abd as below, treated constipation and he had 2 BMs with resolution of pain. Start scheduled colace on discharge    #CKD 3  - Crt stable       #COPD  - no ae, cont inhalers     #CAD  -EKG with inferolateral T wave changes with are not new compared to prior  -He denies any chest pain  -Continue home meds aspirin, statin, metoprolol     #Cardiomyopathy  - EF 45 to 50% on echo from April 2020  - appears compensated  - Continue home meds Toprol-XL, lisinopril  - No diuretic on home med list     #Diabetes mellitus type 2  - Continue home regimen on discharge      #Dementia  -Appears to be at baseline, continue Namenda    #patient reports a few weeks of vision issues with bilateral decreased visual acuity. He can see ophthalmologist as OP. Referral placed on dc info       Procedures (Please Review Full Report for Details)  None       Consults    None       Physical Exam at Discharge:    /84   Pulse 72   Temp 97.5 °F (36.4 °C) (Oral)   Resp 17   Ht 6' 5\" (1.956 m)   Wt 185 lb (83.9 kg)   SpO2 95%   BMI 21.94 kg/m²     Gen: Thin elderly male. No distress. Alert. Eyes: PERRL. No sclera icterus. No conjunctival injection. ENT: No discharge. Pharynx clear. Neck: No JVD. No Carotid Bruit. Trachea midline.   Resp: No accessory muscle use. No crackles. No wheezes. No rhonchi. CV: Regular rate. Regular rhythm. No murmur. No rub. No edema. GI: Non-tender. Non-distended. No masses. No organomegaly. Normal bowel sounds. No hernia. Old surgical scars noted   Skin: Warm and dry. No nodule on exposed extremities. No rash on exposed extremities. M/S: No cyanosis. No joint deformity. No clubbing. Muscle wasting   Neuro: Awake. Grossly nonfocal    Psych: Oriented to person, place, month, year, president. No anxiety or agitation. CBC:   Recent Labs     04/01/21  1445 04/02/21  0509 04/03/21  0521   WBC 7.7 10.3 12.7*   HGB 11.2* 12.7* 11.6*   HCT 34.0* 37.8* 35.1*   MCV 93.4 92.1 93.3    214 226     BMP:   Recent Labs     04/01/21  1445 04/02/21  0509 04/03/21  0521   * 140 132*   K 4.4 3.9 3.9   CL 96* 107 100   CO2 28 25 23   BUN 23* 16 21*   CREATININE 1.6* 1.0 1.5*     LIVER PROFILE:   Recent Labs     04/01/21  1445   AST 14*   ALT 11   BILITOT 0.4   ALKPHOS 92       CULTURES  Blood: NGTD  Rapid COVID 19: not detected     RADIOLOGY  CT ABDOMEN PELVIS WO CONTRAST Additional Contrast? None   Final Result   1. Large amount of stool in the colon may suggest constipation in this   patient with a prior history of partial colectomy. 2. No pattern of small bowel obstruction. XR CHEST PORTABLE   Final Result   New infiltrate right lower lobe concerning for pneumonia. Discharge Medications     Medication List      START taking these medications    amoxicillin-clavulanate 875-125 MG per tablet  Commonly known as:  Augmentin  Take 1 tablet by mouth 2 times daily for 5 days     azithromycin 250 MG tablet  Commonly known as: ZITHROMAX  Take 1 tablet by mouth See Admin Instructions for 5 days 500mg on day 1 followed by 250mg on days 2 - 5     docusate sodium 100 MG capsule  Commonly known as: COLACE  Take 1 capsule by mouth 2 times daily for 14 days        CONTINUE taking these medications    acetaminophen 325 MG

## 2021-04-03 NOTE — PROGRESS NOTES
Speech Language Pathology  Facility/Department: [facility]  Dysphagia Daily Treatment Note    NAME: Antonette Dai  : 1950  MRN: [id number]    Patient Diagnosis(es):   Patient Active Problem List    Diagnosis Date Noted    Chronic kidney disease     Benign essential HTN     Cardiomyopathy ([address]     Chest pain 10/22/2020    SOB (shortness of breath) 10/22/2020    Acute on chronic systolic (congestive) heart failure ([address] 10/22/2020    Dementia associated with other underlying disease without behavioral disturbance ([address] 04/10/2020    Closed head injury     Facial hematoma     Concussion with no loss of consciousness     Open wound of right foot     CHIP (acute kidney injury) ([address] 2019    Severe malnutrition ([facility] 2019    Agitation requiring sedation protocol 2019    DKA, type 1, not at goal [facility]) 2019    DKA, type 2, not at goal [facility]) 2019    TIA (transient ischemic attack)     Dyslipidemia     Dysarthria 10/18/2018    Left-sided weakness 10/18/2018    CVA (cerebral vascular accident) ([address] 10/17/2018    DM (diabetes mellitus) ([address] 10/17/2018    BPH (benign prostatic hyperplasia) 10/17/2018    Leg pain, anterior, left 10/03/2018    Closed nondisplaced fracture of lateral malleolus of left fibula 2018    Lumbar degenerative disc disease 2018    Protrusion of lumbar intervertebral disc 2018    Osteoarthritis of both knees 2018    Osteoarthritis of both hips 2018    Idiopathic peripheral neuropathy 2018    DDD (degenerative disc disease), cervical 2018    Spondylosis of cervical region without myelopathy or radiculopathy 2018    DDD (degenerative disc disease), lumbar 2018    Chronic transaminasemia 08/10/2018    Tobacco smoker 08/10/2018    Fall at home 08/10/2018    Hypochloremia 08/10/2018    Hyperkalemia 08/10/2018    Mixed metabolic and respiratory acidosis 08/10/2018    Closed nondisplaced L ankle fracture (lateral malleolus) 08/10/2018    L 2nd toenail avulsion 08/10/2018    Skin tear of L leg 08/10/2018    Head trauma 08/10/2018    IDDM (insulin dependent diabetes mellitus) 08/10/2018    Ventral hernia without obstruction or gangrene 08/10/2018    Diabetic ketoacidosis without coma associated with type 2 diabetes mellitus ([address]     Other insomnia 07/16/2018    Hx DKA (Feb 2018) 02/24/2018    DM (diabetes mellitus), secondary, uncontrolled, w/neurologic complic ([address]     Severe protein-calorie malnutrition ([address] 07/25/2017    Chronic normocytic anemia 07/18/2017    Acute hyperglycemia 07/18/2017    Chronic dCHF (grade 1 LVDD) 07/02/2017    S/p reversal of colostomy (6/27/17)     Hyponatremia 04/05/2017    S/P left colectomy 03/10/2017    Orthostatic hypotension     Moderate episode of recurrent major depressive disorder ([address] 09/08/2015    Chronic pain syndrome 08/26/2013    Mild-moderate alcohol consumption (beer) 06/14/2013    Bilateral knee & hip OA 12/04/2012    History of hepatitis C 11/29/2012    PTSD (post-traumatic stress disorder) 11/08/2012    Hepatic steatosis 10/09/2012    HLD (hyperlipidemia) 11/29/2011    COPD with acute bronchitis ([address] 04/14/2011    Peripheral neuropathy of bilateral feet 11/12/2010    GERD (gastroesophageal reflux disease) 10/20/2010     Allergies: Allergies   Allergen Reactions    Neurontin [Gabapentin] Other (See Comments)     Gastric side effects     Onset Date: 4/1/2021  Current Diet Level:  Regular / Thin with carb control diet. Subjective:  Pt was awake, alert, and sitting up in bed upon ST arrival. Agreeable to tx. Pain: No pain noted. Current Diet: DIET CARB CONTROL; Diet Tolerance:  Patient tolerating current diet level without signs/symptoms of penetration / aspiration. P.O. Trials:   Thin   x x5   Nectar / Mildly Thick       Honey / Moderately Thick       Pudding / Extremely Thick       Puree       Solid   x x5     Dysphagia Treatment and Impressions:  PO trials of regular x5 / thin x5 observed with no overt s/s of penetration / aspiration; however, pt's swallow was noticeably strained and effortful. Pt mentioned \"I have a hard time swallowing\". Pt indep attempted to tilt his head backwards in an attempt to help ease his swallow - ST immediately corrected and provided cues to improve swallow ease such as sitting up at a 90 degree angle, tilting his head downward (chin tuck), and taking sips after every 2-3 bites to clear suspected pharyngeal residue. ST modeled the swallow tips and provided feedback during pt's attempts. Pt acknowledged and demonstrated the taught strategies. Dysphagia Goals:  Timeframe for Long-term Goals: 7 days (04/09/2021)  Goal 1: The patient will tolerate LRD with no clinical s/s of aspiration or worsening respiratory status/pulmonary function. 4/3 Hospital ST recommends pt continue to receive ST services at SNF. Short-term Goals  Timeframe for Short-term Goals: 5 days (04/07/2021)  Goal 1: The patient will tolerate repeat BSE when able. 4/3 ongoing. Recommendations:  Solid Consistency: Regular   Liquid Consistency: Thin   Medication: Meds whole in thin     Patient/Family/Caregiver Education: Pt was educated on reasoning for referral, role of ST, and assessment results and recommendations. Compensatory Strategies: Sit up for all meals and thereafter for 30 minutes, Eat with small bites (1/2 tsp; 1 tsp), Drink from a cup with small sips and Chin tuck for all swallows    Plan:    Continued Dysphagia treatment with goals per plan of care. Discharge Recommendations: TBD    If pt discharges from hospital prior to Speech/Swallowing discharge, this note serves as tx and discharge summary.      Total Treatment Time / Charges     Time in Time out Total Time / units   Cognitive Tx         Speech Tx      Dysphagia Tx 1250 1317  27 min / 1 unit Signature:    Snow Rodriguez Mayank [address] [facility] SLP   [id number]

## 2021-04-05 LAB
BLOOD CULTURE, ROUTINE: NORMAL
CULTURE, BLOOD 2: NORMAL

## 2021-04-06 NOTE — RESULT ENCOUNTER NOTE
See previous notes:          Pharmacy:   [address]  [address] [facility] [address] [facility]  Phone: [phone] Fax: [phone], [address] [facility] [phone] Veronica Roche [phone] [address] [facility] [address]  Phone: [phone] Fax: [phone]    [address] [phone] - f [phone]  [address] [place] [address]  Phone: [phone] Fax: [phone]    Phone number:   Pharmacist receiving the prescription:

## 2021-04-09 NOTE — ED NOTES
Fall risk screening completed. Fall risk bracelet applied to patient. Non-skid socks provided and placed on patient. The fall risk is indicated using  fall sign . The call light is within the patient's reach, and instructions for use were provided. The bed is in the lowest position with wheels locked. The patient has been advised to notify staff, using the call light, if there is a need to get up or use restroom. The patient verbalized understanding of safety precautions and how to contact staff for assistance.        Vaughn Hunter RN  04/09/21 0413

## 2021-04-16 ENCOUNTER — APPOINTMENT (OUTPATIENT)
Dept: CT IMAGING | Age: 71
DRG: 312 | End: 2021-04-16
Payer: MEDICARE

## 2021-04-16 ENCOUNTER — APPOINTMENT (OUTPATIENT)
Dept: GENERAL RADIOLOGY | Age: 71
DRG: 312 | End: 2021-04-16
Payer: MEDICARE

## 2021-04-16 ENCOUNTER — HOSPITAL ENCOUNTER (INPATIENT)
Age: 71
LOS: 1 days | Discharge: NURSING, REHAB OR LONG-TERM CARE | DRG: 312 | End: 2021-04-19
Attending: STUDENT IN AN ORGANIZED HEALTH CARE EDUCATION/TRAINING PROGRAM | Admitting: INTERNAL MEDICINE
Payer: MEDICARE

## 2021-04-16 DIAGNOSIS — I10 HYPERTENSION, UNSPECIFIED TYPE: ICD-10-CM

## 2021-04-16 DIAGNOSIS — R51.9 NONINTRACTABLE HEADACHE, UNSPECIFIED CHRONICITY PATTERN, UNSPECIFIED HEADACHE TYPE: ICD-10-CM

## 2021-04-16 DIAGNOSIS — R55 SYNCOPE AND COLLAPSE: Primary | ICD-10-CM

## 2021-04-16 DIAGNOSIS — G60.9 IDIOPATHIC PERIPHERAL NEUROPATHY: Chronic | ICD-10-CM

## 2021-04-16 LAB
A/G RATIO: 1.6 (ref 1.1–2.2)
ALBUMIN SERPL-MCNC: 1.1 G/DL (ref 3.4–5)
ALP BLD-CCNC: 22 U/L (ref 40–129)
ALT SERPL-CCNC: <5 U/L (ref 10–40)
ANION GAP SERPL CALCULATED.3IONS-SCNC: 5 MMOL/L (ref 3–16)
ANION GAP SERPL CALCULATED.3IONS-SCNC: 7 MMOL/L (ref 3–16)
AST SERPL-CCNC: 7 U/L (ref 15–37)
BASE EXCESS VENOUS: -1.4 MMOL/L (ref -3–3)
BASOPHILS ABSOLUTE: 0.1 K/UL (ref 0–0.2)
BASOPHILS RELATIVE PERCENT: 1.1 %
BILIRUB SERPL-MCNC: <0.2 MG/DL (ref 0–1)
BILIRUBIN URINE: NEGATIVE
BLOOD, URINE: NEGATIVE
BUN BLDV-MCNC: 27 MG/DL (ref 7–20)
BUN BLDV-MCNC: 9 MG/DL (ref 7–20)
CALCIUM IONIZED: 1.05 MMOL/L (ref 1.12–1.32)
CALCIUM SERPL-MCNC: 2.4 MG/DL (ref 8.3–10.6)
CALCIUM SERPL-MCNC: 8.2 MG/DL (ref 8.3–10.6)
CARBOXYHEMOGLOBIN: 2 % (ref 0–1.5)
CHLORIDE BLD-SCNC: 101 MMOL/L (ref 99–110)
CHLORIDE BLD-SCNC: 135 MMOL/L (ref 99–110)
CHP ED QC CHECK: NORMAL
CLARITY: CLEAR
CO2: 25 MMOL/L (ref 21–32)
CO2: 8 MMOL/L (ref 21–32)
COLOR: YELLOW
CREAT SERPL-MCNC: 1.4 MG/DL (ref 0.8–1.3)
CREAT SERPL-MCNC: <0.5 MG/DL (ref 0.8–1.3)
EKG ATRIAL RATE: 69 BPM
EKG DIAGNOSIS: NORMAL
EKG P AXIS: 64 DEGREES
EKG P-R INTERVAL: 194 MS
EKG Q-T INTERVAL: 434 MS
EKG QRS DURATION: 126 MS
EKG QTC CALCULATION (BAZETT): 465 MS
EKG R AXIS: -13 DEGREES
EKG T AXIS: 230 DEGREES
EKG VENTRICULAR RATE: 69 BPM
EOSINOPHILS ABSOLUTE: 0.3 K/UL (ref 0–0.6)
EOSINOPHILS RELATIVE PERCENT: 3 %
GFR AFRICAN AMERICAN: >60
GFR AFRICAN AMERICAN: >60
GFR NON-AFRICAN AMERICAN: 50
GFR NON-AFRICAN AMERICAN: >60
GLOBULIN: 0.7 G/DL
GLUCOSE BLD-MCNC: 115 MG/DL (ref 70–99)
GLUCOSE BLD-MCNC: 121 MG/DL (ref 70–99)
GLUCOSE BLD-MCNC: 285 MG/DL
GLUCOSE BLD-MCNC: 285 MG/DL (ref 70–99)
GLUCOSE BLD-MCNC: 290 MG/DL (ref 70–99)
GLUCOSE URINE: 500 MG/DL
HCO3 VENOUS: 24.3 MMOL/L (ref 23–29)
HCT VFR BLD CALC: 34 % (ref 40.5–52.5)
HEMOGLOBIN: 11.3 G/DL (ref 13.5–17.5)
INFLUENZA A: NOT DETECTED
INFLUENZA B: NOT DETECTED
KETONES, URINE: NEGATIVE MG/DL
LEUKOCYTE ESTERASE, URINE: NEGATIVE
LYMPHOCYTES ABSOLUTE: 2.8 K/UL (ref 1–5.1)
LYMPHOCYTES RELATIVE PERCENT: 32.1 %
MAGNESIUM: 2.2 MG/DL (ref 1.8–2.4)
MCH RBC QN AUTO: 31.1 PG (ref 26–34)
MCHC RBC AUTO-ENTMCNC: 33.2 G/DL (ref 31–36)
MCV RBC AUTO: 93.6 FL (ref 80–100)
METHEMOGLOBIN VENOUS: 0.3 %
MICROSCOPIC EXAMINATION: YES
MONOCYTES ABSOLUTE: 0.9 K/UL (ref 0–1.3)
MONOCYTES RELATIVE PERCENT: 10.2 %
NEUTROPHILS ABSOLUTE: 4.6 K/UL (ref 1.7–7.7)
NEUTROPHILS RELATIVE PERCENT: 53.6 %
NITRITE, URINE: NEGATIVE
O2 CONTENT, VEN: 18 VOL %
O2 SAT, VEN: 94 %
O2 THERAPY: ABNORMAL
PCO2, VEN: 44.9 MMHG (ref 40–50)
PDW BLD-RTO: 13.1 % (ref 12.4–15.4)
PERFORMED ON: ABNORMAL
PERFORMED ON: ABNORMAL
PH UA: 6 (ref 5–8)
PH VENOUS: 7.33 (ref 7.35–7.45)
PH VENOUS: 7.35 (ref 7.35–7.45)
PLATELET # BLD: 231 K/UL (ref 135–450)
PMV BLD AUTO: 8.5 FL (ref 5–10.5)
PO2, VEN: 70.8 MMHG (ref 25–40)
POTASSIUM SERPL-SCNC: 1.6 MMOL/L (ref 3.5–5.1)
POTASSIUM SERPL-SCNC: 4.1 MMOL/L (ref 3.5–5.1)
PRO-BNP: 5253 PG/ML (ref 0–124)
PROTEIN UA: 100 MG/DL
RBC # BLD: 3.63 M/UL (ref 4.2–5.9)
RBC UA: NORMAL /HPF (ref 0–4)
SARS-COV-2 RNA, RT PCR: NOT DETECTED
SODIUM BLD-SCNC: 133 MMOL/L (ref 136–145)
SODIUM BLD-SCNC: 148 MMOL/L (ref 136–145)
SPECIFIC GRAVITY UA: 1.01 (ref 1–1.03)
TCO2 CALC VENOUS: 26 MMOL/L
TOTAL PROTEIN: 1.8 G/DL (ref 6.4–8.2)
TROPONIN: <0.01 NG/ML
TROPONIN: <0.01 NG/ML
URINE TYPE: ABNORMAL
UROBILINOGEN, URINE: 0.2 E.U./DL
WBC # BLD: 8.7 K/UL (ref 4–11)
WBC UA: NORMAL /HPF (ref 0–5)

## 2021-04-16 PROCEDURE — 36415 COLL VENOUS BLD VENIPUNCTURE: CPT

## 2021-04-16 PROCEDURE — 6370000000 HC RX 637 (ALT 250 FOR IP): Performed by: INTERNAL MEDICINE

## 2021-04-16 PROCEDURE — G0378 HOSPITAL OBSERVATION PER HR: HCPCS

## 2021-04-16 PROCEDURE — 6370000000 HC RX 637 (ALT 250 FOR IP): Performed by: STUDENT IN AN ORGANIZED HEALTH CARE EDUCATION/TRAINING PROGRAM

## 2021-04-16 PROCEDURE — 82803 BLOOD GASES ANY COMBINATION: CPT

## 2021-04-16 PROCEDURE — 72125 CT NECK SPINE W/O DYE: CPT

## 2021-04-16 PROCEDURE — 93010 ELECTROCARDIOGRAM REPORT: CPT | Performed by: INTERNAL MEDICINE

## 2021-04-16 PROCEDURE — 83735 ASSAY OF MAGNESIUM: CPT

## 2021-04-16 PROCEDURE — 82330 ASSAY OF CALCIUM: CPT

## 2021-04-16 PROCEDURE — 2580000003 HC RX 258: Performed by: STUDENT IN AN ORGANIZED HEALTH CARE EDUCATION/TRAINING PROGRAM

## 2021-04-16 PROCEDURE — 80053 COMPREHEN METABOLIC PANEL: CPT

## 2021-04-16 PROCEDURE — 74018 RADEX ABDOMEN 1 VIEW: CPT

## 2021-04-16 PROCEDURE — 85025 COMPLETE CBC W/AUTO DIFF WBC: CPT

## 2021-04-16 PROCEDURE — 70450 CT HEAD/BRAIN W/O DYE: CPT

## 2021-04-16 PROCEDURE — 83036 HEMOGLOBIN GLYCOSYLATED A1C: CPT

## 2021-04-16 PROCEDURE — 87636 SARSCOV2 & INF A&B AMP PRB: CPT

## 2021-04-16 PROCEDURE — 81001 URINALYSIS AUTO W/SCOPE: CPT

## 2021-04-16 PROCEDURE — 6360000002 HC RX W HCPCS: Performed by: STUDENT IN AN ORGANIZED HEALTH CARE EDUCATION/TRAINING PROGRAM

## 2021-04-16 PROCEDURE — 93005 ELECTROCARDIOGRAM TRACING: CPT | Performed by: NURSE PRACTITIONER

## 2021-04-16 PROCEDURE — 83880 ASSAY OF NATRIURETIC PEPTIDE: CPT

## 2021-04-16 PROCEDURE — 96374 THER/PROPH/DIAG INJ IV PUSH: CPT

## 2021-04-16 PROCEDURE — 6370000000 HC RX 637 (ALT 250 FOR IP): Performed by: NURSE PRACTITIONER

## 2021-04-16 PROCEDURE — 71045 X-RAY EXAM CHEST 1 VIEW: CPT

## 2021-04-16 PROCEDURE — 99285 EMERGENCY DEPT VISIT HI MDM: CPT

## 2021-04-16 PROCEDURE — 84484 ASSAY OF TROPONIN QUANT: CPT

## 2021-04-16 RX ORDER — POLYETHYLENE GLYCOL 3350 17 G/17G
17 POWDER, FOR SOLUTION ORAL DAILY PRN
Status: DISCONTINUED | OUTPATIENT
Start: 2021-04-16 | End: 2021-04-19 | Stop reason: HOSPADM

## 2021-04-16 RX ORDER — LOPERAMIDE HYDROCHLORIDE 2 MG/1
2 CAPSULE ORAL EVERY 6 HOURS PRN
Status: DISCONTINUED | OUTPATIENT
Start: 2021-04-16 | End: 2021-04-19 | Stop reason: HOSPADM

## 2021-04-16 RX ORDER — POTASSIUM CHLORIDE 7.45 MG/ML
10 INJECTION INTRAVENOUS
Status: DISCONTINUED | OUTPATIENT
Start: 2021-04-16 | End: 2021-04-16

## 2021-04-16 RX ORDER — LANOLIN ALCOHOL/MO/W.PET/CERES
100 CREAM (GRAM) TOPICAL DAILY
Status: DISCONTINUED | OUTPATIENT
Start: 2021-04-17 | End: 2021-04-19 | Stop reason: HOSPADM

## 2021-04-16 RX ORDER — MEMANTINE HYDROCHLORIDE 5 MG/1
10 TABLET ORAL 2 TIMES DAILY
Status: DISCONTINUED | OUTPATIENT
Start: 2021-04-16 | End: 2021-04-19 | Stop reason: HOSPADM

## 2021-04-16 RX ORDER — DEXTROSE MONOHYDRATE 25 G/50ML
12.5 INJECTION, SOLUTION INTRAVENOUS PRN
Status: DISCONTINUED | OUTPATIENT
Start: 2021-04-16 | End: 2021-04-19 | Stop reason: HOSPADM

## 2021-04-16 RX ORDER — ALBUTEROL SULFATE 2.5 MG/3ML
2.5 SOLUTION RESPIRATORY (INHALATION) EVERY 6 HOURS PRN
Status: DISCONTINUED | OUTPATIENT
Start: 2021-04-16 | End: 2021-04-19 | Stop reason: HOSPADM

## 2021-04-16 RX ORDER — FLUTICASONE PROPIONATE 44 UG/1
2 AEROSOL, METERED RESPIRATORY (INHALATION) 2 TIMES DAILY
Status: DISCONTINUED | OUTPATIENT
Start: 2021-04-16 | End: 2021-04-18 | Stop reason: DRUGHIGH

## 2021-04-16 RX ORDER — MECOBALAMIN 5000 MCG
10 TABLET,DISINTEGRATING ORAL NIGHTLY
Status: DISCONTINUED | OUTPATIENT
Start: 2021-04-16 | End: 2021-04-19 | Stop reason: HOSPADM

## 2021-04-16 RX ORDER — LISINOPRIL 10 MG/1
10 TABLET ORAL DAILY
Status: DISCONTINUED | OUTPATIENT
Start: 2021-04-17 | End: 2021-04-17

## 2021-04-16 RX ORDER — SODIUM CHLORIDE 0.9 % (FLUSH) 0.9 %
5-40 SYRINGE (ML) INJECTION EVERY 12 HOURS SCHEDULED
Status: DISCONTINUED | OUTPATIENT
Start: 2021-04-16 | End: 2021-04-19 | Stop reason: HOSPADM

## 2021-04-16 RX ORDER — PROMETHAZINE HYDROCHLORIDE 25 MG/1
12.5 TABLET ORAL EVERY 6 HOURS PRN
Status: DISCONTINUED | OUTPATIENT
Start: 2021-04-16 | End: 2021-04-19 | Stop reason: HOSPADM

## 2021-04-16 RX ORDER — OXYCODONE HYDROCHLORIDE 5 MG/1
10 TABLET ORAL EVERY 6 HOURS PRN
Status: DISCONTINUED | OUTPATIENT
Start: 2021-04-16 | End: 2021-04-18

## 2021-04-16 RX ORDER — ERGOCALCIFEROL 1.25 MG/1
50000 CAPSULE ORAL WEEKLY
Status: DISCONTINUED | OUTPATIENT
Start: 2021-04-22 | End: 2021-04-19 | Stop reason: HOSPADM

## 2021-04-16 RX ORDER — ONDANSETRON 2 MG/ML
4 INJECTION INTRAMUSCULAR; INTRAVENOUS EVERY 6 HOURS PRN
Status: DISCONTINUED | OUTPATIENT
Start: 2021-04-16 | End: 2021-04-19 | Stop reason: HOSPADM

## 2021-04-16 RX ORDER — METOPROLOL SUCCINATE 50 MG/1
50 TABLET, EXTENDED RELEASE ORAL NIGHTLY
Status: DISCONTINUED | OUTPATIENT
Start: 2021-04-16 | End: 2021-04-19 | Stop reason: HOSPADM

## 2021-04-16 RX ORDER — ASPIRIN 81 MG/1
81 TABLET, CHEWABLE ORAL DAILY
Status: DISCONTINUED | OUTPATIENT
Start: 2021-04-17 | End: 2021-04-19 | Stop reason: HOSPADM

## 2021-04-16 RX ORDER — SERTRALINE HYDROCHLORIDE 100 MG/1
100 TABLET, FILM COATED ORAL DAILY
Status: DISCONTINUED | OUTPATIENT
Start: 2021-04-17 | End: 2021-04-19 | Stop reason: HOSPADM

## 2021-04-16 RX ORDER — DOCUSATE SODIUM 100 MG/1
100 CAPSULE, LIQUID FILLED ORAL 2 TIMES DAILY
Status: DISCONTINUED | OUTPATIENT
Start: 2021-04-16 | End: 2021-04-19 | Stop reason: HOSPADM

## 2021-04-16 RX ORDER — FOLIC ACID 1 MG/1
1 TABLET ORAL DAILY
Status: DISCONTINUED | OUTPATIENT
Start: 2021-04-17 | End: 2021-04-19 | Stop reason: HOSPADM

## 2021-04-16 RX ORDER — FINASTERIDE 5 MG/1
5 TABLET, FILM COATED ORAL DAILY
Status: DISCONTINUED | OUTPATIENT
Start: 2021-04-17 | End: 2021-04-19 | Stop reason: HOSPADM

## 2021-04-16 RX ORDER — SODIUM CHLORIDE 9 MG/ML
INJECTION, SOLUTION INTRAVENOUS CONTINUOUS
Status: DISCONTINUED | OUTPATIENT
Start: 2021-04-16 | End: 2021-04-17

## 2021-04-16 RX ORDER — LISINOPRIL 10 MG/1
10 TABLET ORAL ONCE
Status: COMPLETED | OUTPATIENT
Start: 2021-04-16 | End: 2021-04-16

## 2021-04-16 RX ORDER — HYDRALAZINE HYDROCHLORIDE 20 MG/ML
5 INJECTION INTRAMUSCULAR; INTRAVENOUS ONCE
Status: COMPLETED | OUTPATIENT
Start: 2021-04-16 | End: 2021-04-16

## 2021-04-16 RX ORDER — ACETAMINOPHEN 500 MG
1000 TABLET ORAL ONCE
Status: COMPLETED | OUTPATIENT
Start: 2021-04-16 | End: 2021-04-16

## 2021-04-16 RX ORDER — INSULIN GLARGINE 100 [IU]/ML
20 INJECTION, SOLUTION SUBCUTANEOUS 2 TIMES DAILY
Status: DISCONTINUED | OUTPATIENT
Start: 2021-04-16 | End: 2021-04-19 | Stop reason: HOSPADM

## 2021-04-16 RX ORDER — POTASSIUM CHLORIDE 20 MEQ/1
40 TABLET, EXTENDED RELEASE ORAL ONCE
Status: DISCONTINUED | OUTPATIENT
Start: 2021-04-16 | End: 2021-04-16

## 2021-04-16 RX ORDER — POLYETHYLENE GLYCOL 3350 17 G/17G
17 POWDER, FOR SOLUTION ORAL DAILY
Status: DISCONTINUED | OUTPATIENT
Start: 2021-04-17 | End: 2021-04-19 | Stop reason: HOSPADM

## 2021-04-16 RX ORDER — DEXTROSE MONOHYDRATE 50 MG/ML
100 INJECTION, SOLUTION INTRAVENOUS PRN
Status: DISCONTINUED | OUTPATIENT
Start: 2021-04-16 | End: 2021-04-19 | Stop reason: HOSPADM

## 2021-04-16 RX ORDER — ATORVASTATIN CALCIUM 40 MG/1
80 TABLET, FILM COATED ORAL NIGHTLY
Status: DISCONTINUED | OUTPATIENT
Start: 2021-04-16 | End: 2021-04-19 | Stop reason: HOSPADM

## 2021-04-16 RX ORDER — SODIUM CHLORIDE 9 MG/ML
25 INJECTION, SOLUTION INTRAVENOUS PRN
Status: DISCONTINUED | OUTPATIENT
Start: 2021-04-16 | End: 2021-04-19 | Stop reason: HOSPADM

## 2021-04-16 RX ORDER — NICOTINE POLACRILEX 4 MG
15 LOZENGE BUCCAL PRN
Status: DISCONTINUED | OUTPATIENT
Start: 2021-04-16 | End: 2021-04-19 | Stop reason: HOSPADM

## 2021-04-16 RX ORDER — CHOLECALCIFEROL (VITAMIN D3) 125 MCG
1000 CAPSULE ORAL DAILY
Status: DISCONTINUED | OUTPATIENT
Start: 2021-04-17 | End: 2021-04-19 | Stop reason: HOSPADM

## 2021-04-16 RX ORDER — ACETAMINOPHEN 650 MG/1
650 SUPPOSITORY RECTAL EVERY 6 HOURS PRN
Status: DISCONTINUED | OUTPATIENT
Start: 2021-04-16 | End: 2021-04-19 | Stop reason: HOSPADM

## 2021-04-16 RX ORDER — 0.9 % SODIUM CHLORIDE 0.9 %
1000 INTRAVENOUS SOLUTION INTRAVENOUS ONCE
Status: COMPLETED | OUTPATIENT
Start: 2021-04-16 | End: 2021-04-16

## 2021-04-16 RX ORDER — ACETAMINOPHEN 325 MG/1
650 TABLET ORAL EVERY 6 HOURS PRN
Status: DISCONTINUED | OUTPATIENT
Start: 2021-04-16 | End: 2021-04-19 | Stop reason: HOSPADM

## 2021-04-16 RX ORDER — SODIUM CHLORIDE 0.9 % (FLUSH) 0.9 %
5-40 SYRINGE (ML) INJECTION PRN
Status: DISCONTINUED | OUTPATIENT
Start: 2021-04-16 | End: 2021-04-19 | Stop reason: HOSPADM

## 2021-04-16 RX ORDER — METOPROLOL TARTRATE 50 MG/1
50 TABLET, FILM COATED ORAL ONCE
Status: COMPLETED | OUTPATIENT
Start: 2021-04-16 | End: 2021-04-16

## 2021-04-16 RX ADMIN — SODIUM CHLORIDE 1000 ML: 9 INJECTION, SOLUTION INTRAVENOUS at 17:38

## 2021-04-16 RX ADMIN — OXYCODONE 10 MG: 5 TABLET ORAL at 18:56

## 2021-04-16 RX ADMIN — LISINOPRIL 10 MG: 10 TABLET ORAL at 20:40

## 2021-04-16 RX ADMIN — METOPROLOL TARTRATE 50 MG: 50 TABLET, FILM COATED ORAL at 20:39

## 2021-04-16 RX ADMIN — HYDRALAZINE HYDROCHLORIDE 5 MG: 20 INJECTION INTRAMUSCULAR; INTRAVENOUS at 22:03

## 2021-04-16 RX ADMIN — ACETAMINOPHEN 1000 MG: 500 TABLET ORAL at 22:04

## 2021-04-16 ASSESSMENT — PAIN DESCRIPTION - LOCATION
LOCATION: BACK
LOCATION: ABDOMEN;HEAD

## 2021-04-16 ASSESSMENT — PAIN SCALES - GENERAL: PAINLEVEL_OUTOF10: 8

## 2021-04-16 NOTE — ED NOTES
1708 - Perfect serve sent   1709 -  [facility] called back.       [facility]  04/16/21 0526

## 2021-04-16 NOTE — ED NOTES
Pt arrived via squad, per report pt has abd pain and back pain. Hypotension 80/50's, gave 150ml fluids in squad, bp now is 97/68. Pt states back and abd pain is chronic. Per report pt had multiple syncope episodes at [facility] today.       Sherman Fay RN  04/16/21 1103

## 2021-04-16 NOTE — ED PROVIDER NOTES
[facility] PCU TELEMETRY      CHIEF COMPLAINT  Syncope and hypotension     HISTORY OF PRESENT ILLNESS  Varghese Hartley is a 72 y.o. male with a past medical history of HLD, HTN, CHF, DM, Hep C, COPD, CVA who presents to the ED complaining of syncope. Patient could not provide much history about the event that caused him to be brought to the hospital because he could not remember it. I spoke to the nursing home. Nurse said that patient was noted to have multiple episodes of passing out while seated in a chair. No fall today. This was about an hour after receiving his medications but there has been no recent change to his medication doses. Patient was also reportedly hypotensive. Patient to me did report another episode a few days ago when he woke up on the floor and felt that he may have passed out while sitting at the side of the bed. Patient reports headache- left occipital- reports intermittent over past couple months but fairly persistent. Dull aching pain. 6/10. Not worst headache of life. Denies numbness or tingling, vision changes. Has generalized weakness. Patient has had falls, last one a couple days ago. Not on blood thinners. CT Head: 3/29/21  Impression   No acute intracranial abnormality. Denies chest pain or SOB. Reports periumbilical abdominal pain, no change from pain previous CT scan- aching pain. Denies vomiting or diarrhea. Denies dysuria. Last BM yesterday. CT Abd/Pelvis 4/2/21  1. Large amount of stool in the colon may suggest constipation in this   patient with a prior history of partial colectomy. 2. No pattern of small bowel obstruction. Patient recently admitted for hypotension, pneumonia, UTI. Discharged 4/3. No other complaints, modifying factors or associated symptoms. I have reviewed the following from the nursing documentation.     Past Medical History:   Diagnosis Date    Acute on chronic systolic (congestive) heart failure (HCC)     Anxiety disorder     BPH (benign prostatic hypertrophy)     Calcified granuloma of lung ([address] 2014    2:stable per 3/25/14 CT chest    Cataract 1/20/14    OU:Dr. hayward:[facility]    Cerebral artery occlusion with cerebral infarction ([address]     Chronic diastolic heart failure (HCC)     Chronic pain     Chronic viral hepatitis (HCC)     Cognitive communication deficit     COPD (chronic obstructive pulmonary disease) ([address]     Under care of pulmo(Dr. Calhoun)    Degenerative arthritis of hip     Dementia [facility]     Depression 3/11/2015    Depression with anxiety     Prior psychiatrist & therapist:Dr. Evaristo Hernandez.  Diabetes mellitus ([address] 2004    Endo Dr. Yadira Steward type 1 note below in [facility]    Diabetic eye exam ([facility] 1/20/14    [facility]:Dr. STEPHEN Hayward:No retinopathy. Cataract    Diabetic retinopathy ([address]     under care of [facility]:Dr. Sanjuana Marx    Diverticulitis 11/26/2011    Diverticulosis 11/26/2011    DKA (diabetic ketoacidoses) (HCC)     Emphysema     Esophageal candidiasis ([address] 7/16/13    GI:EGD    Essential hypertension, benign 11/2010    ETOH abuse     Fatty liver 10/9/2012    Foot ulcer:left 9/30/2013    Gastric ulcer, unspecified as acute or chronic, without mention of hemorrhage or perforation     Generalized anxiety disorder     GERD (gastroesophageal reflux disease)     Gout 1997    Right big toe    Hearing decreased     Left ear=60%. Right ear=80%.     Hemangioma 12/2010    Liver as per CT abdo    hepatitis c 7/26/17, 2010    Under care of Liver doc:Dr. Nhi Eubanks    Hyperlipidemia LDL goal < 100     Hypertensive heart disease with heart failure ([address]     Left-sided weakness     Low HDL (under 40) 10/9/2012    Myocardial infarction ([address]     Orthostatic hypotension     Peripheral neuropathy 2006    Pneumonia 10/15/13    Protein calorie malnutrition (HCC)     PTSD (post-traumatic stress disorder)     Pyogenic granuloma     per derm:Dr. Jerry Barbosa Restrictive lung disease     Under care of pulmo(Dr. Calhoun)    S/P colonoscopy 1996    Done secondary to rectal bleed:dx=hemorrhoids    S/P colonoscopy 11/11/10;10/23/2013    Dr. Marjorie Kumar 10/2016(3yrs):polyps;diverticulosis. Diverticulosis & polpy(removed). Next in10/2016.  S/P endoscopy 2009    EGD:stomach ulcers.     Sciatica     Superficial phlebitis of left leg 9/30/2013    Tachycardia     Therapeutic drug monitoring 4/8/15    OARRS report is consistent on 4/8/15;7/9/15;10/9/15;1/8/16;4/5/16    Type 1 diabetes mellitus not at goal [facility]) 3/17/14    updated diagnosis as per endo:Dr. Dylan Anders     Past Surgical History:   Procedure Laterality Date    COLONOSCOPY      DIAGNOSTIC CARDIAC CATH LAB PROCEDURE  2013    DILATATION, ESOPHAGUS      FOOT SURGERY Left Hammer toe, 2nd toe    10/9/14    FOOT SURGERY Left 12/5/2019    REMOVAL HARDWARE LEFT FOOT performed by Hay Valdez DPM at [address] [place] [address] [address] Left 12/03/2019    REMOVAL HARDWARE LEFT FOOT w. Dr David Oswald 12/5/19    [address] [place] [address]      broken leg    OTHER SURGICAL HISTORY Left 11/21/2013    EXCISION 5TH METATRSAL LEFT FOOT          OTHER SURGICAL HISTORY Right 05/30/2019    Procedure: PARTIAL RESECTION RIGHT FIFTH METATARSAL, ULCER DEBRIDEMENT WITH GRAFT APPLICATION    PRESSURE ULCER DEBRIDEMENT Right 5/30/2019    PARTIAL RESECTION RIGHT FIFTH METATARSAL, ULCER DEBRIDEMENT WITH GRAFT APPLICATION performed by Hay Valdez DPM at [place] [address]  06/27/2017    COLOSTOMY REVERSAL     TONSILLECTOMY      UPPER GASTROINTESTINAL ENDOSCOPY  7/16/2013    Esophageal Brushing     Family History   Problem Relation Age of Onset    Stroke Mother     Hypertension Mother     Arthritis Father     Substance Abuse Father         Etoh    Cancer Father         esophageal    Hypertension Father     Diabetes Brother     Diabetes Paternal Aunt     Asthma Neg Hx     Emphysema Neg Hx     Heart Failure Neg Hx      Social History     Socioeconomic History  Marital status: Single     Spouse name: Not on file    Number of children: 2    Years of education: Not on file    Highest education level: Not on file   Occupational History    Occupation: unemployed   Social Needs    Financial resource strain: Not on file    Food insecurity     Worry: Not on file     Inability: Not on file   [employer] needs     Medical: Not on file     Non-medical: Not on file   Tobacco Use    Smoking status: Former Smoker     Packs/day: 0.50     Years: 35.00     Pack years: 17.50     Types: Cigarettes     Quit date: 2018     Years since quittin.5    Smokeless tobacco: Never Used   Substance and Sexual Activity    Alcohol use: Not Currently    Drug use: No    Sexual activity: Not Currently   Lifestyle    Physical activity     Days per week: Not on file     Minutes per session: Not on file    Stress: Not on file   Relationships    Social connections     Talks on phone: Not on file     Gets together: Not on file     Attends Holiness service: Not on file     Active member of club or organization: Not on file     Attends meetings of clubs or organizations: Not on file     Relationship status: Not on file    Intimate partner violence     Fear of current or ex partner: Not on file     Emotionally abused: Not on file     Physically abused: Not on file     Forced sexual activity: Not on file   Other Topics Concern    Not on file   Social History Narrative    Not on file     Current Facility-Administered Medications   Medication Dose Route Frequency Provider Last Rate Last Admin    hydrALAZINE (APRESOLINE) injection 10 mg  10 mg Intravenous Q6H PRN Mariluz Bang MD   10 mg at 21 0659    NIFEdipine (PROCARDIA XL) extended release tablet 30 mg  30 mg Oral Daily Grace Lorenzana MD   30 mg at 21 1143    cloNIDine (CATAPRES) tablet 0.1 mg  0.1 mg Oral Daily Grace Lorenzana MD   0.1 mg at 21 1315    thiamine tablet 100 mg  100 mg Oral Daily Ronnie Sandoval MD   100 mg at 04/17/21 2033    vitamin B-12 (CYANOCOBALAMIN) tablet 1,000 mcg  1,000 mcg Oral Daily Ronnie Sandoval MD   1,000 mcg at 04/17/21 3769    vitamin E capsule 1,000 Units  1,000 Units Oral Daily Ronnie Sandoval MD        atorvastatin (LIPITOR) tablet 80 mg  80 mg Oral Nightly Ronnie Sandoval MD   80 mg at 04/17/21 0031    aspirin chewable tablet 81 mg  81 mg Oral Daily Ronnie Sandoval MD   81 mg at 04/17/21 2581    finasteride (PROSCAR) tablet 5 mg  5 mg Oral Daily Ronnie Sandoval MD   5 mg at 34/88/22 4044    folic acid (FOLVITE) tablet 1 mg  1 mg Oral Daily Ronnie Sandoval MD   1 mg at 04/17/21 0942    polyethylene glycol (GLYCOLAX) packet 17 g  17 g Oral Daily Ronnie Sandoval MD   17 g at 04/17/21 0943    fluticasone (FLOVENT HFA) 44 MCG/ACT inhaler 2 puff  2 puff Inhalation BID Ronnie Sandoval MD        loperamide (IMODIUM) capsule 2 mg  2 mg Oral Q6H PRN Ronnie Sandoval MD        memantine [facility]) tablet 10 mg  10 mg Oral BID Ronnie Sandoval MD   10 mg at 04/17/21 5167    metoprolol succinate (TOPROL XL) extended release tablet 50 mg  50 mg Oral Nightly Ronnie Sandoval MD   50 mg at 04/17/21 0031    sertraline (ZOLOFT) tablet 100 mg  100 mg Oral Daily Ronnie Sandoval MD   100 mg at 04/17/21 3501    melatonin disintegrating tablet 10 mg  10 mg Oral Nightly Ronnie Sandoval MD   10 mg at 04/17/21 0030    [START ON 4/22/2021] vitamin D (ERGOCALCIFEROL) capsule 50,000 Units  50,000 Units Oral Weekly Ronnie Sandoval MD        docusate sodium (COLACE) capsule 100 mg  100 mg Oral BID Ronnie Sandoval MD   100 mg at 04/17/21 0942    albuterol (PROVENTIL) nebulizer solution 2.5 mg  2.5 mg Nebulization Q6H PRN Ronnie Sandoval MD        insulin glargine (LANTUS) injection vial 20 Units  20 Units Subcutaneous BID Ronnie Sandoval MD   20 Units at 04/17/21 0030    glucose (GLUTOSE) 40 % oral gel 15 g  15 g Oral PRN Rochelle Benton MD        dextrose 50 % IV solution  12.5 g Intravenous PRN Rochelle Benton MD        glucagon (rDNA) injection 1 mg  1 mg Intramuscular PRN Rochelle Benton MD        dextrose 5 % solution  100 mL/hr Intravenous PRN Rochelle Benton MD        sodium chloride flush 0.9 % injection 5-40 mL  5-40 mL Intravenous 2 times per day Rochelle Benton MD   10 mL at 04/17/21 0943    sodium chloride flush 0.9 % injection 5-40 mL  5-40 mL Intravenous PRN Rochelle Benton MD        0.9 % sodium chloride infusion  25 mL Intravenous PRN Rochelle Benton MD        enoxaparin (LOVENOX) injection 40 mg  40 mg Subcutaneous Daily Rochelle Benton MD   40 mg at 04/17/21 0943    promethazine (PHENERGAN) tablet 12.5 mg  12.5 mg Oral Q6H PRN Rochelle Benton MD        Or    ondansetron [facility]) injection 4 mg  4 mg Intravenous Q6H PRN Rochelle Benton MD   4 mg at 04/17/21 0031    polyethylene glycol (GLYCOLAX) packet 17 g  17 g Oral Daily PRN Rochelle Benton MD        acetaminophen (TYLENOL) tablet 650 mg  650 mg Oral Q6H PRN Rochelle Benton MD   650 mg at 04/17/21 0663    Or    acetaminophen (TYLENOL) suppository 650 mg  650 mg Rectal Q6H PRN Rochelle Benton MD        insulin lispro (HUMALOG) injection vial 0-6 Units  0-6 Units Subcutaneous TID WC Rochelle Benton MD   1 Units at 04/17/21 1648    insulin lispro (HUMALOG) injection vial 0-3 Units  0-3 Units Subcutaneous Nightly Rochelle Benton MD        oxyCODONE (ROXICODONE) immediate release tablet 10 mg  10 mg Oral Q6H PRN RICCO Castellano - CNP   10 mg at 04/17/21 1315     Allergies   Allergen Reactions    Neurontin [Gabapentin] Other (See Comments)     Gastric side effects       REVIEW OF SYSTEMS  10 systems reviewed, pertinent positives per HPI otherwise noted to be negative.     PHYSICAL EXAM  /65   Pulse 68   Temp 97.9 °F (36.6 °C) (Oral)   Resp 14 K/uL    Lymphocytes Absolute 2.8 1.0 - 5.1 K/uL    Monocytes Absolute 0.9 0.0 - 1.3 K/uL    Eosinophils Absolute 0.3 0.0 - 0.6 K/uL    Basophils Absolute 0.1 0.0 - 0.2 K/uL   Comprehensive Metabolic Panel   Result Value Ref Range    Sodium 148 (H) 136 - 145 mmol/L    Potassium 1.6 (LL) 3.5 - 5.1 mmol/L    Chloride 135 (H) 99 - 110 mmol/L    CO2 8 (LL) 21 - 32 mmol/L    Anion Gap 5 3 - 16    Glucose 115 (H) 70 - 99 mg/dL    BUN 9 7 - 20 mg/dL    CREATININE <0.5 (L) 0.8 - 1.3 mg/dL    GFR Non-African American >60 >60    GFR African American >60 >60    Calcium 2.4 (LL) 8.3 - 10.6 mg/dL    Total Protein 1.8 (L) 6.4 - 8.2 g/dL    Albumin 1.1 (L) 3.4 - 5.0 g/dL    Albumin/Globulin Ratio 1.6 1.1 - 2.2    Total Bilirubin <0.2 0.0 - 1.0 mg/dL    Alkaline Phosphatase 22 (L) 40 - 129 U/L    ALT <5 (L) 10 - 40 U/L    AST 7 (L) 15 - 37 U/L    Globulin 0.7 g/dL   Troponin   Result Value Ref Range    Troponin <0.01 <0.01 ng/mL   Brain Natriuretic Peptide   Result Value Ref Range    Pro-BNP 5,253 (H) 0 - 124 pg/mL   Urinalysis, reflex to microscopic   Result Value Ref Range    Color, UA Yellow Straw/Yellow    Clarity, UA Clear Clear    Glucose, Ur 500 (A) Negative mg/dL    Bilirubin Urine Negative Negative    Ketones, Urine Negative Negative mg/dL    Specific Gravity, UA 1.010 1.005 - 1.030    Blood, Urine Negative Negative    pH, UA 6.0 5.0 - 8.0    Protein,  (A) Negative mg/dL    Urobilinogen, Urine 0.2 <2.0 E.U./dL    Nitrite, Urine Negative Negative    Leukocyte Esterase, Urine Negative Negative    Microscopic Examination YES     Urine Type NotGiven    Blood Gas, Venous   Result Value Ref Range    pH, Adam 7.352 7.350 - 7.450    pCO2, Adam 44.9 40.0 - 50.0 mmHg    pO2, Adam 70.8 (H) 25 - 40 mmHg    HCO3, Venous 24.3 23.0 - 29.0 mmol/L    Base Excess, Adam -1.4 -3.0 - 3.0 mmol/L    O2 Sat, Adam 94 Not Established %    Carboxyhemoglobin 2.0 (H) 0.0 - 1.5 %    MetHgb, Adam 0.3 <1.5 %    TC02 (Calc), Adam 26 Not Established mmol/L O2 Content, Adam 18 Not Established VOL %    O2 Therapy Unknown    Magnesium   Result Value Ref Range    Magnesium 2.20 1.80 - 2.40 mg/dL   Basic Metabolic Panel   Result Value Ref Range    Sodium 133 (L) 136 - 145 mmol/L    Potassium 4.1 3.5 - 5.1 mmol/L    Chloride 101 99 - 110 mmol/L    CO2 25 21 - 32 mmol/L    Anion Gap 7 3 - 16    Glucose 290 (H) 70 - 99 mg/dL    BUN 27 (H) 7 - 20 mg/dL    CREATININE 1.4 (H) 0.8 - 1.3 mg/dL    GFR Non-African American 50 (A) >60    GFR African American >60 >60    Calcium 8.2 (L) 8.3 - 10.6 mg/dL   Microscopic Urinalysis   Result Value Ref Range    WBC, UA 0-2 0 - 5 /HPF    RBC, UA None seen 0 - 4 /HPF   Troponin   Result Value Ref Range    Troponin <0.01 <0.01 ng/mL   Troponin   Result Value Ref Range    Troponin <0.01 <0.01 ng/mL   Calcium, ionized   Result Value Ref Range    Calcium, Ion 1.05 (L) 1.12 - 1.32 mmol/L    pH, Adam 7.333 (L) 7.350 - 7.450   POCT Glucose   Result Value Ref Range    Glucose 285 mg/dL    QC OK? y    POCT Glucose   Result Value Ref Range    POC Glucose 285 (H) 70 - 99 mg/dl    Performed on ACCU-CHEK    POCT Glucose   Result Value Ref Range    POC Glucose 121 (H) 70 - 99 mg/dl    Performed on ACCU-CHEK    POCT Glucose   Result Value Ref Range    POC Glucose 73 70 - 99 mg/dl    Performed on ACCU-CHEK    POCT Glucose   Result Value Ref Range    POC Glucose 100 (H) 70 - 99 mg/dl    Performed on ACCU-CHEK    POCT Glucose   Result Value Ref Range    POC Glucose 171 (H) 70 - 99 mg/dl    Performed on ACCU-CHEK    EKG 12 Lead   Result Value Ref Range    Ventricular Rate 69 BPM    Atrial Rate 69 BPM    P-R Interval 194 ms    QRS Duration 126 ms    Q-T Interval 434 ms    QTc Calculation (Bazett) 465 ms    P Axis 64 degrees    R Axis -13 degrees    T Axis 230 degrees    Diagnosis       Normal sinus rhythmPossible Left atrial enlargementNon-specific intra-ventricular conduction blockT wave abnormality, consider inferolateral ischemiaAbnormal ECGWhen compared with ECG of 01-APR-2021 14:34,No significant change was foundConfirmed by ADÁN Hurtado MD ([id number]) on 4/16/2021 6:23:22 PM       ECG  The Ekg interpreted by me shows  normal sinus rhythm with a rate of 69  Axis is   Left axis deviation  QTc is  prolonged  Intervals and Durations are unremarkable. ST Segments: no acute change  No significant change from prior EKG dated 4/1/21    RADIOLOGY    CT CERVICAL SPINE WO CONTRAST   Final Result   Stable degenerative and post-surgical changes of the cervical spine with bony   neural foraminal narrowing as above. No acute fracture or subluxation. Straightening of the normal cervical lordosis which may be related to muscle   spasm or position in collar. CT HEAD WO CONTRAST   Final Result   No evidence of acute intracranial process. Mild atrophy and chronic small   vessel ischemic changes noted, along with remote bilateral basal ganglia   region lacunar infarcts and a large remote infarct of the right occipital   lobe. XR ABDOMEN (KUB) (SINGLE AP VIEW)   Final Result   Moderate constipation which is unchanged with a non-specific gas pattern. XR CHEST PORTABLE   Final Result   No acute abnormality identified. No significant change from the prior study. ED COURSE / MDM  Patient seen and evaluated. Old records reviewed. Labs and imaging reviewed and results discussed with patient. Overall chronically ill appearing patient, in no acute distress, presenting for syncope and hyptension. Physical exam remarkable for chronically ill appearance and c spine tenderness to palpation, and mild abdominal tenderness to palpation. Differential diagnosis includes but is not limited to: syncope, fall, intracranial hemorrhage, c spine fracture, UTI, PNA, arrhythmia, ACS, polypharmacy    EKG, laboratory studies, and imaging obtained.      During the patient's ED course, the patient was given:  Medications   metoprolol tartrate (LOPRESSOR) tablet 50 mg (50 mg Oral Given 4/16/21 2039)   lisinopril (PRINIVIL;ZESTRIL) tablet 10 mg (10 mg Oral Given 4/16/21 2040)   acetaminophen (TYLENOL) tablet 1,000 mg (1,000 mg Oral Given 4/16/21 2204)   hydrALAZINE (APRESOLINE) injection 5 mg (5 mg Intravenous Given 4/16/21 2203)        Workup showed:  ED Course as of Apr 17 1809 Fri Apr 16, 2021   1551 Potassium of 1.6, bicarb of 8, calcium of 2.4. Significant hyperchloremia, mild hypernatremia. We will redraw a BMP to make sure this is accurate given that these values are very different compared to 12 days ago. Suspect dilutional affect with normal saline.    [ER]   1552 Liver function testing shows evidence of malnourishment. No evidence of hepatitis or other acute liver pathology. [ER]   9314 CXR: IMPRESSION:  No acute abnormality identified. No significant change from the prior study. [ER]   1630 Repeat BMP shows that electrolyte abnormalities have improved. Mild hyponatremia, no hyper kalemia or low bicarb. Potassium is only mildly low. [ER]   5303 Blood gas shows no acidemia or hypercarbia. [ER]   1658 Troponin is within normal limits. EKG is abnormal but similar to previous. Low suspicion for ACS or acute arrhythmia. The Ekg interpreted by me shows  normal sinus rhythm with a rate of 69  Axis is   Left axis deviation  QTc is  prolonged  Intervals and Durations are unremarkable. ST Segments: no acute change  No significant change from prior EKG dated 4/1/21    [ER]   1658 BNP is elevated at 5253, this is patient's baseline.    [ER]   1658 CXR: IMPRESSION:  No acute abnormality identified. No significant change from the prior study. ------------------------  No evidence of pneumonia, pneumothorax, plural effusion, or other acute pathology    [ER]   1659 CT Head: IMPRESSION:  No evidence of acute intracranial process.   Mild atrophy and chronic small vessel ischemic changes noted, along with remote bilateral basal ganglia region lacunar infarcts and a large remote infarct of the right occipital lobe.  ------------------  No evidence of malignancy, ICH, NPH, or other acute abnormality    [ER]   1659 KUB: IMPRESSION:  Moderate constipation which is unchanged with a non-specific gas pattern.  ------------------  KUB reassuring, given recent reassuring CT abd/pelvis- do not feel that we need to repeat given that patient reports no change in his abdominal pain from when that CT was completed. [ER]   4460 CT C spine: IMPRESSION:  Stable degenerative and post-surgical changes of the cervical spine with bony  neural foraminal narrowing as above. No acute fracture or subluxation. Straightening of the normal cervical lordosis which may be related to muscle  spasm or position in collar.  --------------------------  No evidence of c spine fracture    [ER]   1733 Orthostatic vital signs positive. However, patient did not have any lightheadedness with this. Patient had no episode of true hypotension while completed orthostatic vital signs. [ER]   [phone] to nursing at [facility], they state that patient had multiple episodes of syncope while sitting in a chair. He was also noted to have significant hypotension. These episodes of syncope were not related to trying to stand up.    [ER]   7240 Urinalysis shows no evidence of blood or infection. Low suspicion for UTI or kidney stone.    [ER]   7997 Accepted by Hospitalist    [ER]   2010 Flu and Covid negative. [ER]   6007 Patient's blood pressure remained elevated, discussed with pharmacy, they recommended hydralazine. This was given, patient's blood pressure was 186/99. [ER]   3650 Patient transported upstairs. [ER]      ED Course User Index  [ER] Rocio Ron MD      Based on results of work-up, I am concerned for syncope of unknown etiology and poorly controlled HTN. At this time, do feel the patient requires admission for further work-up and management.   Discussed the patient with hospital team.    CLINICAL IMPRESSION  1. Syncope and collapse    2. Hypertension, unspecified type        Blood pressure (!) 190/94, pulse 87, temperature 98.2 °F (36.8 °C), temperature source Oral, resp. rate 16, height 6' 5\" (1.956 m), weight 181 lb 7 oz (82.3 kg), SpO2 99 %. DISPOSITION  [employer] was admitted in stable condition. DISCLAIMER: This chart was created using Dragon dictation software. Efforts were made by me to ensure accuracy, however some errors may be present due to limitations of this technology and occasionally words are not transcribed correctly.         Winter Delgado MD  04/17/21 4233

## 2021-04-17 LAB
ANION GAP SERPL CALCULATED.3IONS-SCNC: 7 MMOL/L (ref 3–16)
BUN BLDV-MCNC: 23 MG/DL (ref 7–20)
CALCIUM SERPL-MCNC: 8.8 MG/DL (ref 8.3–10.6)
CHLORIDE BLD-SCNC: 98 MMOL/L (ref 99–110)
CO2: 26 MMOL/L (ref 21–32)
CREAT SERPL-MCNC: 1.3 MG/DL (ref 0.8–1.3)
GFR AFRICAN AMERICAN: >60
GFR NON-AFRICAN AMERICAN: 55
GLUCOSE BLD-MCNC: 100 MG/DL (ref 70–99)
GLUCOSE BLD-MCNC: 171 MG/DL (ref 70–99)
GLUCOSE BLD-MCNC: 315 MG/DL (ref 70–99)
GLUCOSE BLD-MCNC: 322 MG/DL (ref 70–99)
GLUCOSE BLD-MCNC: 73 MG/DL (ref 70–99)
PERFORMED ON: ABNORMAL
PERFORMED ON: NORMAL
POTASSIUM SERPL-SCNC: 4.3 MMOL/L (ref 3.5–5.1)
SODIUM BLD-SCNC: 131 MMOL/L (ref 136–145)
TROPONIN: <0.01 NG/ML

## 2021-04-17 PROCEDURE — 84484 ASSAY OF TROPONIN QUANT: CPT

## 2021-04-17 PROCEDURE — 96376 TX/PRO/DX INJ SAME DRUG ADON: CPT

## 2021-04-17 PROCEDURE — 6370000000 HC RX 637 (ALT 250 FOR IP): Performed by: INTERNAL MEDICINE

## 2021-04-17 PROCEDURE — 6360000002 HC RX W HCPCS: Performed by: INTERNAL MEDICINE

## 2021-04-17 PROCEDURE — 36415 COLL VENOUS BLD VENIPUNCTURE: CPT

## 2021-04-17 PROCEDURE — 99223 1ST HOSP IP/OBS HIGH 75: CPT | Performed by: INTERNAL MEDICINE

## 2021-04-17 PROCEDURE — 96372 THER/PROPH/DIAG INJ SC/IM: CPT

## 2021-04-17 PROCEDURE — 6370000000 HC RX 637 (ALT 250 FOR IP): Performed by: NURSE PRACTITIONER

## 2021-04-17 PROCEDURE — 96375 TX/PRO/DX INJ NEW DRUG ADDON: CPT

## 2021-04-17 PROCEDURE — G0378 HOSPITAL OBSERVATION PER HR: HCPCS

## 2021-04-17 PROCEDURE — 80048 BASIC METABOLIC PNL TOTAL CA: CPT

## 2021-04-17 PROCEDURE — 2580000003 HC RX 258: Performed by: INTERNAL MEDICINE

## 2021-04-17 RX ORDER — NIFEDIPINE 30 MG/1
30 TABLET, EXTENDED RELEASE ORAL DAILY
Status: DISCONTINUED | OUTPATIENT
Start: 2021-04-17 | End: 2021-04-19 | Stop reason: HOSPADM

## 2021-04-17 RX ORDER — HYDRALAZINE HYDROCHLORIDE 20 MG/ML
10 INJECTION INTRAMUSCULAR; INTRAVENOUS EVERY 6 HOURS PRN
Status: DISCONTINUED | OUTPATIENT
Start: 2021-04-17 | End: 2021-04-19 | Stop reason: HOSPADM

## 2021-04-17 RX ORDER — CLONIDINE HYDROCHLORIDE 0.1 MG/1
0.1 TABLET ORAL DAILY
Status: DISCONTINUED | OUTPATIENT
Start: 2021-04-17 | End: 2021-04-19 | Stop reason: HOSPADM

## 2021-04-17 RX ADMIN — MEMANTINE HYDROCHLORIDE 10 MG: 5 TABLET ORAL at 20:22

## 2021-04-17 RX ADMIN — DOCUSATE SODIUM 100 MG: 100 CAPSULE, LIQUID FILLED ORAL at 00:31

## 2021-04-17 RX ADMIN — ONDANSETRON 4 MG: 2 INJECTION INTRAMUSCULAR; INTRAVENOUS at 00:31

## 2021-04-17 RX ADMIN — FINASTERIDE 5 MG: 5 TABLET, FILM COATED ORAL at 09:42

## 2021-04-17 RX ADMIN — SODIUM CHLORIDE, PRESERVATIVE FREE 10 ML: 5 INJECTION INTRAVENOUS at 20:22

## 2021-04-17 RX ADMIN — NIFEDIPINE 30 MG: 30 TABLET, FILM COATED, EXTENDED RELEASE ORAL at 11:43

## 2021-04-17 RX ADMIN — MEMANTINE HYDROCHLORIDE 10 MG: 5 TABLET ORAL at 09:43

## 2021-04-17 RX ADMIN — HYDRALAZINE HYDROCHLORIDE 10 MG: 20 INJECTION INTRAMUSCULAR; INTRAVENOUS at 06:59

## 2021-04-17 RX ADMIN — INSULIN GLARGINE 20 UNITS: 100 INJECTION, SOLUTION SUBCUTANEOUS at 00:30

## 2021-04-17 RX ADMIN — INSULIN LISPRO 1 UNITS: 100 INJECTION, SOLUTION INTRAVENOUS; SUBCUTANEOUS at 16:48

## 2021-04-17 RX ADMIN — CLONIDINE HYDROCHLORIDE 0.1 MG: 0.1 TABLET ORAL at 13:15

## 2021-04-17 RX ADMIN — SODIUM CHLORIDE: 9 INJECTION, SOLUTION INTRAVENOUS at 11:02

## 2021-04-17 RX ADMIN — Medication 100 MG: at 09:42

## 2021-04-17 RX ADMIN — Medication 10 MG: at 00:30

## 2021-04-17 RX ADMIN — ASPIRIN 81 MG: 81 TABLET, CHEWABLE ORAL at 09:42

## 2021-04-17 RX ADMIN — SODIUM CHLORIDE: 9 INJECTION, SOLUTION INTRAVENOUS at 00:30

## 2021-04-17 RX ADMIN — ATORVASTATIN CALCIUM 80 MG: 40 TABLET, FILM COATED ORAL at 20:22

## 2021-04-17 RX ADMIN — CYANOCOBALAMIN TAB 500 MCG 1000 MCG: 500 TAB at 09:42

## 2021-04-17 RX ADMIN — SERTRALINE 100 MG: 100 TABLET, FILM COATED ORAL at 09:42

## 2021-04-17 RX ADMIN — ACETAMINOPHEN 650 MG: 325 TABLET ORAL at 06:14

## 2021-04-17 RX ADMIN — ENOXAPARIN SODIUM 40 MG: 40 INJECTION SUBCUTANEOUS at 09:43

## 2021-04-17 RX ADMIN — POLYETHYLENE GLYCOL (3350) 17 G: 17 POWDER, FOR SOLUTION ORAL at 09:43

## 2021-04-17 RX ADMIN — Medication 10 MG: at 20:21

## 2021-04-17 RX ADMIN — FOLIC ACID 1 MG: 1 TABLET ORAL at 09:42

## 2021-04-17 RX ADMIN — LISINOPRIL 10 MG: 10 TABLET ORAL at 09:42

## 2021-04-17 RX ADMIN — SODIUM CHLORIDE, PRESERVATIVE FREE 10 ML: 5 INJECTION INTRAVENOUS at 09:43

## 2021-04-17 RX ADMIN — DOCUSATE SODIUM 100 MG: 100 CAPSULE, LIQUID FILLED ORAL at 20:22

## 2021-04-17 RX ADMIN — OXYCODONE 10 MG: 5 TABLET ORAL at 06:59

## 2021-04-17 RX ADMIN — DOCUSATE SODIUM 100 MG: 100 CAPSULE, LIQUID FILLED ORAL at 09:42

## 2021-04-17 RX ADMIN — INSULIN GLARGINE 20 UNITS: 100 INJECTION, SOLUTION SUBCUTANEOUS at 21:22

## 2021-04-17 RX ADMIN — OXYCODONE 10 MG: 5 TABLET ORAL at 19:11

## 2021-04-17 RX ADMIN — METOPROLOL SUCCINATE 50 MG: 50 TABLET, EXTENDED RELEASE ORAL at 20:22

## 2021-04-17 RX ADMIN — OXYCODONE 10 MG: 5 TABLET ORAL at 01:05

## 2021-04-17 RX ADMIN — ATORVASTATIN CALCIUM 80 MG: 40 TABLET, FILM COATED ORAL at 00:31

## 2021-04-17 RX ADMIN — MEMANTINE HYDROCHLORIDE 10 MG: 5 TABLET ORAL at 00:31

## 2021-04-17 RX ADMIN — OXYCODONE 10 MG: 5 TABLET ORAL at 13:15

## 2021-04-17 RX ADMIN — METOPROLOL SUCCINATE 50 MG: 50 TABLET, EXTENDED RELEASE ORAL at 00:31

## 2021-04-17 ASSESSMENT — PAIN DESCRIPTION - ORIENTATION: ORIENTATION: MID

## 2021-04-17 ASSESSMENT — PAIN DESCRIPTION - DESCRIPTORS
DESCRIPTORS: HEADACHE
DESCRIPTORS: ACHING
DESCRIPTORS: HEADACHE

## 2021-04-17 ASSESSMENT — PAIN SCALES - GENERAL
PAINLEVEL_OUTOF10: 0
PAINLEVEL_OUTOF10: 8
PAINLEVEL_OUTOF10: 7
PAINLEVEL_OUTOF10: 8
PAINLEVEL_OUTOF10: 5
PAINLEVEL_OUTOF10: 3
PAINLEVEL_OUTOF10: 3

## 2021-04-17 ASSESSMENT — PAIN DESCRIPTION - PROGRESSION
CLINICAL_PROGRESSION: RAPIDLY IMPROVING
CLINICAL_PROGRESSION: NOT CHANGED

## 2021-04-17 ASSESSMENT — PAIN DESCRIPTION - FREQUENCY: FREQUENCY: CONTINUOUS

## 2021-04-17 ASSESSMENT — PAIN DESCRIPTION - ONSET: ONSET: ON-GOING

## 2021-04-17 ASSESSMENT — PAIN - FUNCTIONAL ASSESSMENT: PAIN_FUNCTIONAL_ASSESSMENT: PREVENTS OR INTERFERES WITH ALL ACTIVE AND SOME PASSIVE ACTIVITIES

## 2021-04-17 ASSESSMENT — PAIN DESCRIPTION - LOCATION: LOCATION: HEAD

## 2021-04-17 NOTE — H&P
Hospital Medicine History & Physical      PCP: Fidelia Jiang, APRN - CNP    Date of Admission: 4/16/2021    Date of Service: Pt seen/examined on 4/17/21 and Admitted to Inpatient     Chief Complaint: \"My BP was low\"    History Of Present Illness: The patient is a 72 y.o. male who presents to [address] [place] [address] with orthostatic hypotension/syncope. Per NH report, patient was noted to have multiple episodes of passing out while seated in a chair. No fall noted. This was about an hour after receiving his medications but there has been no recent change to his medication doses. Patient was also reportedly hypotensive and hence sent to the ER. Pt was recently admitted and dced on 4/3 for similar complaints    Past Medical History:        Diagnosis Date    Acute on chronic systolic (congestive) heart failure (HCC)     Anxiety disorder     BPH (benign prostatic hypertrophy)     Calcified granuloma of lung ([address] 2014    2:stable per 3/25/14 CT chest    Cataract 1/20/14    OU:Dr. hayward:[facility]    Cerebral artery occlusion with cerebral infarction ([address]     Chronic diastolic heart failure (HCC)     Chronic pain     Chronic viral hepatitis (HCC)     Cognitive communication deficit     COPD (chronic obstructive pulmonary disease) ([address]     Under care of pulmo(Dr. Calhoun)    Degenerative arthritis of hip     Dementia ([address]     Depression 3/11/2015    Depression with anxiety     Prior psychiatrist & therapist:Dr. Tian Hull.  Diabetes mellitus ([address] 2004    Endo Dr. Mireya Rahman type 1 note below in [facility]    Diabetic eye exam ([address] 1/20/14    [facility]:Dr. STEPHEN Hayward:No retinopathy.  Cataract    Diabetic retinopathy ([address]     under care of [facility]:Dr. Justine Coffey    Diverticulitis 11/26/2011    Diverticulosis 11/26/2011    DKA (diabetic ketoacidoses) ([address]     Emphysema     Esophageal candidiasis ([facility] 7/16/13    GI:EGD    Essential hypertension, benign 11/2010    ETOH abuse     Fatty liver 10/9/2012    Foot ulcer:left 9/30/2013    Gastric ulcer, unspecified as acute or chronic, without mention of hemorrhage or perforation     Generalized anxiety disorder     GERD (gastroesophageal reflux disease)     Gout 1997    Right big toe    Hearing decreased     Left ear=60%. Right ear=80%.  Hemangioma 12/2010    Liver as per CT abdo    hepatitis c 7/26/17, 2010    Under care of Liver doc:Dr. Alexey Galo    Hyperlipidemia LDL goal < 100     Hypertensive heart disease with heart failure ([facility]     Left-sided weakness     Low HDL (under 40) 10/9/2012    Myocardial infarction ([facility]     Orthostatic hypotension     Peripheral neuropathy 2006    Pneumonia 10/15/13    Protein calorie malnutrition (HCC)     PTSD (post-traumatic stress disorder)     Pyogenic granuloma     per derm:Dr. Damien Lawler Restrictive lung disease     Under care of pulmo(Dr. Calhoun)    S/P colonoscopy 1996    Done secondary to rectal bleed:dx=hemorrhoids    S/P colonoscopy 11/11/10;10/23/2013    Dr. Bobbi Barroso 10/2016(3yrs):polyps;diverticulosis. Diverticulosis & polpy(removed). Next in10/2016.  S/P endoscopy 2009    EGD:stomach ulcers.     Sciatica     Superficial phlebitis of left leg 9/30/2013    Tachycardia     Therapeutic drug monitoring 4/8/15    OARRS report is consistent on 4/8/15;7/9/15;10/9/15;1/8/16;4/5/16    Type 1 diabetes mellitus not at goal [facility]) 3/17/14    updated diagnosis as per endo:Dr. Joanna Pepper       Past Surgical History:        Procedure Laterality Date    COLONOSCOPY      DIAGNOSTIC CARDIAC CATH LAB PROCEDURE  2013    DILATATION, ESOPHAGUS      FOOT SURGERY Left Hammer toe, 2nd toe    10/9/14    FOOT SURGERY Left 12/5/2019    REMOVAL HARDWARE LEFT FOOT performed by Mayo Byrnes DPM at [address] [place] [address] [address] Left 12/03/2019    REMOVAL HARDWARE LEFT FOOT w. Dr Danny Guillermo 12/5/19    Shanda Voss      broken leg    OTHER SURGICAL HISTORY Left 11/21/2013    EXCISION 5TH METATRSAL LEFT FOOT          OTHER SURGICAL HISTORY Right 05/30/2019    Procedure: PARTIAL RESECTION RIGHT FIFTH METATARSAL, ULCER DEBRIDEMENT WITH GRAFT APPLICATION    PRESSURE ULCER DEBRIDEMENT Right 5/30/2019    PARTIAL RESECTION RIGHT FIFTH METATARSAL, ULCER DEBRIDEMENT WITH GRAFT APPLICATION performed by Russel Butler DPM at [place] [address]  06/27/2017    COLOSTOMY REVERSAL     TONSILLECTOMY      UPPER GASTROINTESTINAL ENDOSCOPY  7/16/2013    Esophageal Brushing       Medications Prior to Admission:    Prior to Admission medications    Medication Sig Start Date End Date Taking? Authorizing Provider   docusate sodium (COLACE) 100 MG capsule Take 1 capsule by mouth 2 times daily for 14 days 4/3/21 4/17/21 Yes Myriam Small PA-C   sertraline (ZOLOFT) 100 MG tablet Take 100 mg by mouth daily   Yes Historical Provider, MD   Insulin Lispro (HUMALOG SC) Inject 6 Units into the skin 3 times daily (before meals) And sliding scale PRN   Yes Historical Provider, MD   melatonin 10 MG CAPS capsule Take 10 mg by mouth nightly   Yes Historical Provider, MD   vitamin D (ERGOCALCIFEROL) 1.25 MG (47008 UT) CAPS capsule Take 50,000 Units by mouth once a week thursday   Yes Historical Provider, MD   oxyCODONE HCl (OXY-IR) 10 MG immediate release tablet Take 10 mg by mouth every 6 hours as needed for Pain.  Takes scheduled 12-6-12-6   Yes Historical Provider, MD   lisinopril (PRINIVIL;ZESTRIL) 10 MG tablet Take 1 tablet by mouth daily 10/27/20  Yes RICCO Wolf CNP   metoprolol succinate (TOPROL XL) 50 MG extended release tablet Take 1 tablet by mouth nightly 10/26/20  Yes RICCO Wolf CNP   loratadine (CLARITIN) 10 MG tablet Take 10 mg by mouth daily   Yes Historical Provider, MD   memantine (NAMENDA) 10 MG tablet Take 10 mg by mouth 2 every 6 hours as needed for Diarrhea     Historical Provider, MD   SUMAtriptan (IMITREX) 100 MG tablet Take 100 mg by mouth daily as needed for Migraine Give one additional dose if needed after 2 hours if 1st dose is ineffective (NTE 200mg/24 hrs)    Historical Provider, MD   Continuous Blood Gluc  (FREESTYLE BRIDGET READER) DENIS Use to monitor sugars 8/1/18   Emilia Carpio MD   Continuous Blood Gluc Sensor ([address] [place] [address]) MISC Use to monitor sugars 8/1/18   Emilia Carpio MD   AGAMATRIX PRESTO TEST strip USE TO TEST BLOOD SUGAR 3 TIMES A DAY 5/9/18   Emilia Carpio MD   albuterol sulfate HFA (PROAIR HFA) 108 (90 Base) MCG/ACT inhaler Inhale 2 puffs into the lungs every 4 hours as needed for Wheezing or Shortness of Breath 3/27/18   Ange Cagle MD   Lancets MISC Testing 2-3 times daily DX Code: E11.9 3/22/18   Emilia Carpio MD   glucagon 1 MG injection Inject 1 kit into the skin as needed    Historical Provider, MD       Allergies:  Neurontin [gabapentin]    Social History:  The patient currently lives in [address] [place] [address]:   reports that he quit smoking about 2 years ago. His smoking use included cigarettes. He has a 17.50 pack-year smoking history. He has never used smokeless tobacco.  ETOH:   reports previous alcohol use. Family History:  Reviewed in detail and negative for DM, Early CAD, Cancer, CVA. Positive as follows:        Problem Relation Age of Onset   Del Henle Stroke Mother     Hypertension Mother     Arthritis Father     Substance Abuse Father         Etoh    Cancer Father         esophageal    Hypertension Father     Diabetes Brother     Diabetes Paternal Aunt     Asthma Neg Hx     Emphysema Neg Hx     Heart Failure Neg Hx        REVIEW OF SYSTEMS:   Positive for headache and as noted in the HPI. All other systems reviewed and negative.     PHYSICAL EXAM:    BP (!) 190/94   Pulse 87   Temp 98.2 °F (36.8 °C) (Oral)   Resp 16   Ht 6' 5\" (1.956 m)   Wt 181 lb 7 oz (82.3 kg)   SpO2 99%   BMI 21.52 kg/m²     General appearance: No apparent distress appears stated age and cooperative. HEENT Normal cephalic, atraumatic without obvious deformity. Pupils equal, round, and reactive to light. Extra ocular muscles intact. Conjunctivae/corneas clear. Neck: Supple, No jugular venous distention/bruits. Trachea midline without thyromegaly or adenopathy with full range of motion. Lungs: Clear to auscultation, bilaterally without Rales/Wheezes/Rhonchi with good respiratory effort. Heart: Regular rate and rhythm with Normal S1/S2   Abdomen: Soft, non-tender or non-distended without rigidity or guarding and positive bowel sounds   Extremities: No clubbing, cyanosis, or edema bilaterally. Skin: Skin color, texture, turgor normal.  No rashes or lesions. Neurologic: Awake, neurovascularly intact with sensory/motor intact upper extremities/lower extremities, bilaterally. Cranial nerves: II-XII intact, grossly non-focal.  Mental status: Awake  Capillary Refill: Acceptable  < 3 seconds  Peripheral Pulses: +3 Easily felt, not easily obliterated with pressure      CXR:  I have reviewed the CXR with the following interpretation: clear    CBC   Recent Labs     04/16/21  1500   WBC 8.7   HGB 11.3*   HCT 34.0*         RENAL  Recent Labs     04/16/21  1500 04/16/21  1610   * 133*   K 1.6* 4.1   * 101   CO2 8* 25   BUN 9 27*   CREATININE <0.5* 1.4*     LFT'S  Recent Labs     04/16/21  1500   AST 7*   ALT <5*   BILITOT <0.2   ALKPHOS 22*     COAG  No results for input(s): INR in the last 72 hours.   CARDIAC ENZYMES  Recent Labs     04/16/21  1500 04/16/21  2318 04/17/21  0319   TROPONINI <0.01 <0.01 <0.01       U/A:    Lab Results   Component Value Date    NITRITE neg 01/08/2013    COLORU Yellow 04/16/2021    WBCUA 0-2 04/16/2021    RBCUA None seen 04/16/2021    BACTERIA Rare 03/29/2021    CLARITYU Clear 04/16/2021    SPECGRAV 1.010 04/16/2021    LEUKOCYTESUR Negative 04/16/2021    BLOODU Negative 04/16/2021    GLUCOSEU 500 04/16/2021    AMORPHOUS Rare 10/04/2018       ABG    Lab Results   Component Value Date    HAA4XDN 22.3 03/03/2019    BEART -3 03/03/2019    V0UZHZYS 97 03/03/2019    PHART 7.367 03/03/2019    KOI4ZRD 38.8 03/03/2019    PO2ART 88.2 03/03/2019    NYV8EMP 24 03/03/2019           Active Hospital Problems    Diagnosis Date Noted    Syncope and collapse [R55] 04/16/2021         PHYSICIANS CERTIFICATION:    I certify that Tita Moreno is expected to be hospitalized for > than 2 midnights based on the following assessment and plan:      ASSESSMENT/PLAN:    Orthostatic hypotension - POA, resolved now. Was hypotensive on arrival, BP improved and on higher side now. Resumed home meds. Dc lisinopril, start nifedipine/clonidine, cont BB. Recieved IVF-->dc now    ARF vs CKD III - suspect 2/2 orthostatic hypotension, on IVF, recheck in am    DM II - fairly controlled, cont SSI, on lantus    Hypocalcemia - replete    H/o CAD/cardiomyopathy - cont home meds      DVT Prophylaxis: lovenox  Diet: DIET CARB CONTROL; Carb Control: 4 carb choices (60 gms)/meal  Code Status: Full Code  PT/OT Eval Status: ordered    [facility]       Holli Machado MD    Thank you Vear Me, APRN - CNP for the opportunity to be involved in this patient's care. If you have any questions or concerns please feel free to contact me at [phone].

## 2021-04-17 NOTE — FLOWSHEET NOTE
04/17/21 0937   Vitals   Temp 97.1 °F (36.2 °C)   Temp Source Oral   Pulse 74   Heart Rate Source Monitor   Resp 16   BP (!) 165/89   Level of Consciousness Alert (0)   MEWS Score 1   Oxygen Therapy   SpO2 98 %   O2 Device None (Room air)   Vital signs stable. Pt is alert and oriented (baseline dementia) and denies **** at the moment. Nothing new noted on head to toe assessment. Pt is ***** on the monitor. Morning medication administration completed. Pt denies any further assistance at the moment. Will continue to monitor.

## 2021-04-17 NOTE — PROGRESS NOTES
Physical/Occupational Therapy  Attempt after discussion with RN. Upon entrance to room, patient writhing in pain. Stated he has a terrible headache and does not think he could participate. Notified RN, who will come in to assess and provide medication. Maico Truong, PT #[id number]  . Alec Fuentes, [facility], OTR/L   [id number]

## 2021-04-17 NOTE — FLOWSHEET NOTE
04/17/21 1131   Vitals   Blood Pressure Lying 196/93   Pulse Lying 82 PER MINUTE   Blood Pressure Sitting 116/74   Pulse Sitting 82 PER MINUTE   Blood Pressure Standing 113/67   Pulse Standing 86 PER MINUTE   Orthos as above.

## 2021-04-17 NOTE — PROGRESS NOTES
Patient admitted to room 311 from ed. Patient oriented to room, call light, bed rails, phone, lights and bathroom. Patient instructed about the schedule of the day including: vital sign frequency, lab draws, possible tests, frequency of MD and staff rounds, daily weights, I &O's and prescribed diet. Activated bed alarm in place, patient aware of placement and reason. Telemetry box in place, patient aware of placement and reason. Bed locked, in lowest position, side rails up 2/4, call light within reach. Recliner Assessment  Patient is able to demonstrate the ability to move from a reclining position to an upright position within the recliner. 4 Eyes Skin Assessment     The patient is being assess for   Admission    I agree that 2 RN's have performed a thorough Head to Toe Skin Assessment on the patient. ALL assessment sites listed below have been assessed. Areas assessed by both nurses:   [x]   Head, Face, and Ears   [x]   Shoulders, Back, and Chest, Abdomen  [x]   Arms, Elbows, and Hands   [x]   Coccyx, Sacrum, and Ischium  [x]   Legs, Feet, and Heels        Dryness to bilateral lower extremities noted. Scattered bruising. **SHARE this note so that the co-signing nurse is able to place an eSignature**    Co-signer eSignature: Electronically signed by Leticia Williamson RN on 4/17/21 at 4:25 AM EDT    Does the Patient have Skin Breakdown?   No          Julián Prevention initiated:  No   Wound Care Orders initiated:  No      [facility] nurse consulted for Pressure Injury (Stage 3,4, Unstageable, DTI, NWPT, Complex wounds)and New or Established Ostomies:  No      Primary Nurse eSignature: Electronically signed by Patricia Rodriguez RN on 4/17/21 at 4:24 AM EDT

## 2021-04-17 NOTE — ED NOTES
Pt report given to Fina Rahman RN, PCU. Pt taken to PCU by JANETTE Villegas RN.       Eliel Zamarripa RN  04/16/21 5801

## 2021-04-17 NOTE — FLOWSHEET NOTE
04/17/21 0937   Vitals   Temp 97.1 °F (36.2 °C)   Temp Source Oral   Pulse 74   Heart Rate Source Monitor   Resp 16   BP (!) 165/89   Level of Consciousness Alert (0)   MEWS Score 1   Oxygen Therapy   SpO2 98 %   O2 Device None (Room air)   Vital signs stable. Remains hypertensive. Scheduled antihypertensive provided. Pt is alert and oriented and denies any dizziness, worsening pain, or SOB at the moment. Nothing new noted on head to toe assessment. Pt is NSR on the monitor. Morning medication administration completed. Normal saline continue at 100ml/hr. Pt denies any further assistance at the moment. Will continue to monitor.

## 2021-04-17 NOTE — PROGRESS NOTES
FOOT performed by Nena Gallagher DPM at [address] [place] [address] [address] Left 12/03/2019    REMOVAL HARDWARE LEFT FOOT w. Dr Hermes Yung 12/5/19    [facility]      broken leg    OTHER SURGICAL HISTORY Left 11/21/2013    EXCISION 5TH METATRSAL LEFT FOOT          OTHER SURGICAL HISTORY Right 05/30/2019    Procedure: PARTIAL RESECTION RIGHT FIFTH METATARSAL, ULCER DEBRIDEMENT WITH GRAFT APPLICATION    PRESSURE ULCER DEBRIDEMENT Right 5/30/2019    PARTIAL RESECTION RIGHT FIFTH METATARSAL, ULCER DEBRIDEMENT WITH GRAFT APPLICATION performed by Nena Gallagher DPM at [address]  06/27/2017    COLOSTOMY REVERSAL     TONSILLECTOMY      UPPER GASTROINTESTINAL ENDOSCOPY  7/16/2013    Esophageal Brushing       Level of Consciousness: Alert, Oriented, and Cooperative = 0    Level of Activity: Walking unassisted = 0    Respiratory Pattern: Regular Pattern; RR 8-20 = 0    Breath Sounds: Diminshed bilaterally and/or crackles = 2    Sputum   ,  ,    Cough: Strong, spontaneous, non-productive = 0    Vital Signs   BP (!) 213/100   Pulse 75   Temp 98.1 °F (36.7 °C) (Oral)   Resp 16   Ht 6' 5\" (1.956 m)   Wt 181 lb 7 oz (82.3 kg)   SpO2 100%   BMI 21.52 kg/m²   SPO2 (COPD values may differ): Greater than or equal to 92% on room air = 0    Peak Flow (asthma only): not applicable = 0    RSI: 0-4 = See once and convert to home regimen or discontinue        Plan       Goals: medication delivery    Patient/caregiver was educated on the proper method of use for Respiratory Care Devices:  No:       Level of patient/caregiver understanding able to:   ? Verbalize understanding   ? Demonstrate understanding       ? Teach back        ? Needs reinforcement       ? No available caregiver               ? Other:     Response to education:  n/A     Is patient being placed on Home Treatment Regimen? Yes     Does the patient have everything they need prior to discharge?   NA Comments: Patient chart reviewed, patient assessed. Plan of Care: continue patient on home use PRN    Electronically signed by Dominic Logan RCP on 4/16/2021 at 11:18 PM    Respiratory Protocol Guidelines     1. Assessment and treatment by Respiratory Therapy will be initiated for medication and therapeutic interventions upon initiation of aerosolized medication. 2. Physician will be contacted for respiratory rate (RR) greater than 35 breaths per minute. Therapy will be held for heart rate (HR) greater than 140 beats per minute, pending direction from physician. 3. Bronchodilators will be administered via Metered Dose Inhaler (MDI) with spacer when the following criteria are met:  a. Alert and cooperative     b. HR < 140 bpm  c. RR < 30 bpm                d. Can demonstrate a 23 second inspiratory hold  4. Bronchodilators will be administered via Hand Held Nebulizer [facility]) to patients when ANY of the following criteria are met  a. Incognizant or uncooperative          b. Patients treated with HHN at Home        c. Unable to demonstrate proper use of MDI with spacer     d. RR > 30 bpm   5. Bronchodilators will be delivered via Metered Dose Inhaler (MDI), HHN, Aerogen to intubated patients on mechanical ventilation. 6. Inhalation medication orders will be delivered and/or substituted as outlined below. Aerosolized Medications Ordering and Administration Guidelines:    1. All Medications will be ordered by a physician, and their frequency and/or modality will be adjusted as defined by the patients Respiratory Severity Index (RSI) score. 2. If the patient does not have documented COPD, consider discontinuing anticholinergics when RSI is less than 9.  3. If the bronchospasm worsens (increased RSI), then the bronchodilator frequency can be increased to a maximum of every 4 hours. If greater than every 4 hours is required, the physician will be contacted.   4. If the bronchospasm improves, the frequency of the

## 2021-04-17 NOTE — PROGRESS NOTES
Inpatient Physical Therapy Evaluation and Treatment    Unit: PCU  Date:  4/18/2021  Patient Name:    Brisa Cohen  Admitting diagnosis:  Syncope and collapse [R55]  Admit Date:  4/16/2021  Precautions/Restrictions/WB Status/ Lines/ Wounds/ Oxygen: Fall risk, Bed/chair alarm and Lines -IV, Watch BP    Treatment Time:  16:20-16:55  Treatment Number:  1   Timed Code Treatment Minutes: 25 minutes  Total Treatment Minutes:   35 minutes    Patient Goals for Therapy: \" to have less pain and move more \"          Discharge Recommendations: return to LTC with PT  DME needs for discharge: Needs Met       Therapy recommendation for EMS Transport: requires transport by cot due to orthostatic hypotension with activity  Therapy recommendations for staff:  Assist of 1 with use of rolling walker (RW) and gait belt for all transfers to/from [employer], per RN discretion related to orthostatics.      Spoke with RN regarding current xiomy hose, which are too short for patient . He may benefit from thigh high hose, or extra long hose, which may provide more support, as he is tall  History of Present Illness: ED note, 4/16/2021, Efraín Abernathy:   \" 72 y. o. male with a past medical history of HLD, HTN, CHF, DM, Hep C, COPD, CVA who presents to the ED complaining of syncope.     Patient could not provide much history about the event that caused him to be brought to the hospital because he could not remember it. I spoke to the nursing home. Nurse said that patient was noted to have multiple episodes of passing out while seated in a chair. No fall today. This was about an hour after receiving his medications but there has been no recent change to his medication doses. Patient was also reportedly hypotensive.  Patient to me did report another episode a few days ago when he woke up on the floor and felt that he may have passed out while sitting at the side of the bed.      Patient reports headache- left occipital- reports intermittent over past couple working with therapy on walking with a rollator and exercises.      Pain  Yes    Location: all over , and headache, which he reports  He has almost constantly  Rating: severe /10  Pain Medicine Status: RN notified    Cognition    A&O Person, Place, Time and Situation   Able to follow 2 step commands    Subjective  Patient lying supine in bed with no family present. Pt agreeable to this PT eval & tx. Upper Extremity ROM/Strength  Please see OT evaluation. Lower Extremity ROM / Strength   AROM WFL: Yes  ROM limitations:   Formal mmt not performed. Noted that patient demonstrated quad weakness in standing    Lower Extremity Sensation    WFL    Lower Extremity Proprioception:   NT    Coordination and Tone  NT    Balance  Sitting:  Normal; Independent  Comments:     Standing: Fair +; CGA  Comments: noted postural sway, LE muscle fatigue    Bed Mobility   Supine to Sit:    Supervision  Sit to Supine:   Supervision  Rolling:   Not Tested  Scooting in sitting: Supervision  Scooting in supine:  Supervision    Transfer Training     Sit to stand:   CGA  Stand to sit:   CGA  Bed to Chair:   Not Tested with use of N/A    Gait gait deferred due to orthostatic BP changes; pt ambulated 0 ft. Activity Tolerance   Pt completed therapy session with Pain noted with all over and headache   BP HR SpO2 Patient Comments   Supine, at rest 129/86 67bpm 95% on RA Headache,neck,back pain   Seated at /73  132/79 68bpm 99% No complaints   Standing 110/70 70bpm  A little unsteady, no dizziness and talking   Immediately post   sitting 124/69      4 minutes post 132/73   fatigued     Positioning Needs   Pt in bed, alarm set, positioned in proper neutral alignment and pressure relief provided.    Call light provided and all needs within reach      Other    Patient/Family Education   Pt educated on role of inpatient PT, POC, importance of continued activity, safety awareness, transfer techniques and calling for assist with mobility. Assessment  Pt seen for Physical Therapy evaluation in acute care setting. Pt demonstrated decreased Activity tolerance, Balance and Safety as well as decreased independence with Ambulation, Bed Mobility  and Transfers. Patient will benefit form skilled intervention to maximize safety and functional mobility. [place] hose may be providing some support, but likely should be longer, as patient is tall. return to LTC with PT. Goals : To be met in 3 visits:  1). Independent with LE Ex x 10 reps    To be met in 6 visits:  1). Supine to/from sit: Independent  2). Sit to/from stand: SBA  3). Bed to chair: SBA with appropriate hemodynamic response  4). Gait: Ambulate 50 ft.  with  CGA and use of rolling walker (RW)  5). Tolerate B LE exercises 3 sets of 10-15 reps    Rehabilitation Potential: Good  Strengths for achieving goals include:   Pt motivated, PLOF and Pt cooperative   Barriers to achieving goals include:    Complexity of condition, Pain and Weakness    Plan    To be seen 3-5 x / week  while in acute care setting for therapeutic exercises, bed mobility, transfers, progressive gait training, balance training, and family/patient education. Signature: Vikki Ramos PT #[id number]    If patient discharges from this facility prior to next visit, this note will serve as the Discharge Summary.

## 2021-04-18 LAB
ANION GAP SERPL CALCULATED.3IONS-SCNC: 9 MMOL/L (ref 3–16)
BASOPHILS ABSOLUTE: 0.1 K/UL (ref 0–0.2)
BASOPHILS RELATIVE PERCENT: 1.1 %
BUN BLDV-MCNC: 20 MG/DL (ref 7–20)
CALCIUM SERPL-MCNC: 9.2 MG/DL (ref 8.3–10.6)
CHLORIDE BLD-SCNC: 103 MMOL/L (ref 99–110)
CO2: 24 MMOL/L (ref 21–32)
CREAT SERPL-MCNC: 1.2 MG/DL (ref 0.8–1.3)
EOSINOPHILS ABSOLUTE: 0.2 K/UL (ref 0–0.6)
EOSINOPHILS RELATIVE PERCENT: 2.1 %
ESTIMATED AVERAGE GLUCOSE: 191.5 MG/DL
GFR AFRICAN AMERICAN: >60
GFR NON-AFRICAN AMERICAN: 60
GLUCOSE BLD-MCNC: 150 MG/DL (ref 70–99)
GLUCOSE BLD-MCNC: 175 MG/DL (ref 70–99)
GLUCOSE BLD-MCNC: 233 MG/DL (ref 70–99)
GLUCOSE BLD-MCNC: 265 MG/DL (ref 70–99)
GLUCOSE BLD-MCNC: 282 MG/DL (ref 70–99)
HBA1C MFR BLD: 8.3 %
HCT VFR BLD CALC: 35.5 % (ref 40.5–52.5)
HEMOGLOBIN: 11.8 G/DL (ref 13.5–17.5)
LYMPHOCYTES ABSOLUTE: 3.2 K/UL (ref 1–5.1)
LYMPHOCYTES RELATIVE PERCENT: 33.5 %
MCH RBC QN AUTO: 31 PG (ref 26–34)
MCHC RBC AUTO-ENTMCNC: 33.2 G/DL (ref 31–36)
MCV RBC AUTO: 93.4 FL (ref 80–100)
MONOCYTES ABSOLUTE: 0.9 K/UL (ref 0–1.3)
MONOCYTES RELATIVE PERCENT: 9.4 %
NEUTROPHILS ABSOLUTE: 5.1 K/UL (ref 1.7–7.7)
NEUTROPHILS RELATIVE PERCENT: 53.9 %
PDW BLD-RTO: 13.3 % (ref 12.4–15.4)
PERFORMED ON: ABNORMAL
PLATELET # BLD: 210 K/UL (ref 135–450)
PMV BLD AUTO: 8.5 FL (ref 5–10.5)
POTASSIUM SERPL-SCNC: 3.7 MMOL/L (ref 3.5–5.1)
RBC # BLD: 3.8 M/UL (ref 4.2–5.9)
SODIUM BLD-SCNC: 136 MMOL/L (ref 136–145)
WBC # BLD: 9.5 K/UL (ref 4–11)

## 2021-04-18 PROCEDURE — 97530 THERAPEUTIC ACTIVITIES: CPT

## 2021-04-18 PROCEDURE — 85025 COMPLETE CBC W/AUTO DIFF WBC: CPT

## 2021-04-18 PROCEDURE — 6370000000 HC RX 637 (ALT 250 FOR IP): Performed by: INTERNAL MEDICINE

## 2021-04-18 PROCEDURE — 99233 SBSQ HOSP IP/OBS HIGH 50: CPT | Performed by: INTERNAL MEDICINE

## 2021-04-18 PROCEDURE — G0378 HOSPITAL OBSERVATION PER HR: HCPCS

## 2021-04-18 PROCEDURE — 6370000000 HC RX 637 (ALT 250 FOR IP): Performed by: NURSE PRACTITIONER

## 2021-04-18 PROCEDURE — 36415 COLL VENOUS BLD VENIPUNCTURE: CPT

## 2021-04-18 PROCEDURE — 97535 SELF CARE MNGMENT TRAINING: CPT

## 2021-04-18 PROCEDURE — 94761 N-INVAS EAR/PLS OXIMETRY MLT: CPT

## 2021-04-18 PROCEDURE — 97167 OT EVAL HIGH COMPLEX 60 MIN: CPT

## 2021-04-18 PROCEDURE — 94640 AIRWAY INHALATION TREATMENT: CPT

## 2021-04-18 PROCEDURE — 80048 BASIC METABOLIC PNL TOTAL CA: CPT

## 2021-04-18 PROCEDURE — 6360000002 HC RX W HCPCS: Performed by: INTERNAL MEDICINE

## 2021-04-18 PROCEDURE — 97162 PT EVAL MOD COMPLEX 30 MIN: CPT

## 2021-04-18 PROCEDURE — 96372 THER/PROPH/DIAG INJ SC/IM: CPT

## 2021-04-18 PROCEDURE — 2580000003 HC RX 258: Performed by: INTERNAL MEDICINE

## 2021-04-18 RX ORDER — OXYCODONE HYDROCHLORIDE 5 MG/1
10 TABLET ORAL EVERY 6 HOURS SCHEDULED
Status: DISCONTINUED | OUTPATIENT
Start: 2021-04-18 | End: 2021-04-19 | Stop reason: HOSPADM

## 2021-04-18 RX ORDER — CALCIUM CARBONATE 200(500)MG
500 TABLET,CHEWABLE ORAL 3 TIMES DAILY PRN
Status: DISCONTINUED | OUTPATIENT
Start: 2021-04-18 | End: 2021-04-19 | Stop reason: HOSPADM

## 2021-04-18 RX ORDER — FLUTICASONE PROPIONATE 110 UG/1
1 AEROSOL, METERED RESPIRATORY (INHALATION) 2 TIMES DAILY
Status: DISCONTINUED | OUTPATIENT
Start: 2021-04-18 | End: 2021-04-19 | Stop reason: HOSPADM

## 2021-04-18 RX ORDER — BUTALBITAL, ACETAMINOPHEN AND CAFFEINE 50; 325; 40 MG/1; MG/1; MG/1
1 TABLET ORAL EVERY 8 HOURS PRN
Status: DISCONTINUED | OUTPATIENT
Start: 2021-04-18 | End: 2021-04-19 | Stop reason: HOSPADM

## 2021-04-18 RX ADMIN — Medication 100 MG: at 08:42

## 2021-04-18 RX ADMIN — CYANOCOBALAMIN TAB 500 MCG 1000 MCG: 500 TAB at 08:42

## 2021-04-18 RX ADMIN — INSULIN GLARGINE 20 UNITS: 100 INJECTION, SOLUTION SUBCUTANEOUS at 08:43

## 2021-04-18 RX ADMIN — FINASTERIDE 5 MG: 5 TABLET, FILM COATED ORAL at 08:43

## 2021-04-18 RX ADMIN — SERTRALINE 100 MG: 100 TABLET, FILM COATED ORAL at 08:42

## 2021-04-18 RX ADMIN — BUTALBITAL, ACETAMINOPHEN, AND CAFFEINE 1 TABLET: 50; 325; 40 TABLET ORAL at 13:33

## 2021-04-18 RX ADMIN — INSULIN LISPRO 2 UNITS: 100 INJECTION, SOLUTION INTRAVENOUS; SUBCUTANEOUS at 12:14

## 2021-04-18 RX ADMIN — METOPROLOL SUCCINATE 50 MG: 50 TABLET, EXTENDED RELEASE ORAL at 20:15

## 2021-04-18 RX ADMIN — CALCIUM CARBONATE (ANTACID) CHEW TAB 500 MG 500 MG: 500 CHEW TAB at 22:30

## 2021-04-18 RX ADMIN — MEMANTINE HYDROCHLORIDE 10 MG: 5 TABLET ORAL at 08:43

## 2021-04-18 RX ADMIN — Medication 1 SPRAY: at 22:31

## 2021-04-18 RX ADMIN — OXYCODONE 10 MG: 5 TABLET ORAL at 23:43

## 2021-04-18 RX ADMIN — Medication 1 PUFF: at 20:21

## 2021-04-18 RX ADMIN — OXYCODONE 10 MG: 5 TABLET ORAL at 13:32

## 2021-04-18 RX ADMIN — FOLIC ACID 1 MG: 1 TABLET ORAL at 08:42

## 2021-04-18 RX ADMIN — POLYETHYLENE GLYCOL (3350) 17 G: 17 POWDER, FOR SOLUTION ORAL at 08:46

## 2021-04-18 RX ADMIN — Medication 10 MG: at 20:14

## 2021-04-18 RX ADMIN — INSULIN GLARGINE 20 UNITS: 100 INJECTION, SOLUTION SUBCUTANEOUS at 20:19

## 2021-04-18 RX ADMIN — DOCUSATE SODIUM 100 MG: 100 CAPSULE, LIQUID FILLED ORAL at 20:15

## 2021-04-18 RX ADMIN — SODIUM CHLORIDE, PRESERVATIVE FREE 10 ML: 5 INJECTION INTRAVENOUS at 12:15

## 2021-04-18 RX ADMIN — ASPIRIN 81 MG: 81 TABLET, CHEWABLE ORAL at 08:42

## 2021-04-18 RX ADMIN — OXYCODONE 10 MG: 5 TABLET ORAL at 18:03

## 2021-04-18 RX ADMIN — OXYCODONE 10 MG: 5 TABLET ORAL at 01:17

## 2021-04-18 RX ADMIN — INSULIN LISPRO 3 UNITS: 100 INJECTION, SOLUTION INTRAVENOUS; SUBCUTANEOUS at 17:02

## 2021-04-18 RX ADMIN — OXYCODONE 10 MG: 5 TABLET ORAL at 08:41

## 2021-04-18 RX ADMIN — ACETAMINOPHEN 650 MG: 325 TABLET ORAL at 17:02

## 2021-04-18 RX ADMIN — CLONIDINE HYDROCHLORIDE 0.1 MG: 0.1 TABLET ORAL at 08:42

## 2021-04-18 RX ADMIN — MEMANTINE HYDROCHLORIDE 10 MG: 5 TABLET ORAL at 20:14

## 2021-04-18 RX ADMIN — Medication 1000 UNITS: at 08:44

## 2021-04-18 RX ADMIN — SODIUM CHLORIDE, PRESERVATIVE FREE 10 ML: 5 INJECTION INTRAVENOUS at 20:15

## 2021-04-18 RX ADMIN — ATORVASTATIN CALCIUM 80 MG: 40 TABLET, FILM COATED ORAL at 20:14

## 2021-04-18 RX ADMIN — INSULIN LISPRO 1 UNITS: 100 INJECTION, SOLUTION INTRAVENOUS; SUBCUTANEOUS at 08:45

## 2021-04-18 RX ADMIN — ENOXAPARIN SODIUM 40 MG: 40 INJECTION SUBCUTANEOUS at 08:46

## 2021-04-18 RX ADMIN — ACETAMINOPHEN 650 MG: 325 TABLET ORAL at 04:26

## 2021-04-18 RX ADMIN — DOCUSATE SODIUM 100 MG: 100 CAPSULE, LIQUID FILLED ORAL at 08:42

## 2021-04-18 RX ADMIN — NIFEDIPINE 30 MG: 30 TABLET, FILM COATED, EXTENDED RELEASE ORAL at 08:42

## 2021-04-18 ASSESSMENT — PAIN SCALES - GENERAL
PAINLEVEL_OUTOF10: 3
PAINLEVEL_OUTOF10: 10
PAINLEVEL_OUTOF10: 8
PAINLEVEL_OUTOF10: 0
PAINLEVEL_OUTOF10: 8

## 2021-04-18 ASSESSMENT — PAIN DESCRIPTION - LOCATION
LOCATION: HEAD;NECK
LOCATION: GENERALIZED
LOCATION: HEAD;NECK

## 2021-04-18 ASSESSMENT — PAIN DESCRIPTION - DESCRIPTORS
DESCRIPTORS: HEADACHE
DESCRIPTORS: HEADACHE;DISCOMFORT
DESCRIPTORS: HEADACHE;DISCOMFORT

## 2021-04-18 ASSESSMENT — PAIN - FUNCTIONAL ASSESSMENT
PAIN_FUNCTIONAL_ASSESSMENT: ACTIVITIES ARE NOT PREVENTED

## 2021-04-18 ASSESSMENT — PAIN DESCRIPTION - ONSET
ONSET: ON-GOING

## 2021-04-18 ASSESSMENT — PAIN DESCRIPTION - PROGRESSION
CLINICAL_PROGRESSION: NOT CHANGED

## 2021-04-18 ASSESSMENT — PAIN DESCRIPTION - ORIENTATION
ORIENTATION: POSTERIOR
ORIENTATION: POSTERIOR

## 2021-04-18 ASSESSMENT — PAIN DESCRIPTION - PAIN TYPE
TYPE: ACUTE PAIN;CHRONIC PAIN
TYPE: ACUTE PAIN;CHRONIC PAIN
TYPE: CHRONIC PAIN

## 2021-04-18 ASSESSMENT — PAIN DESCRIPTION - FREQUENCY
FREQUENCY: INTERMITTENT
FREQUENCY: INTERMITTENT

## 2021-04-18 NOTE — PROGRESS NOTES
Inpatient Occupational Therapy  Evaluation and Treatment    Unit: PCU  Date:  4/18/2021  Patient Name:    Malorie Marcos  Admitting diagnosis:  Syncope and collapse [R55]  Admit Date:  4/16/2021  Precautions/Restrictions/WB Status/ Lines/ Wounds/ Oxygen: fall risk, IV and bed/chair alarm; WATCH BP     Treatment Time:  910-1013   Treatment Number: 1     Billable Treatment Time: 53 minutes   Total Treatment Time:   63   minutes    Patient Goals for Therapy:  \" to walk again \"      Discharge Recommendations: Return to LTC with OT   DME needs for discharge: Needs Met       Therapy recommendations for staff:  Assist of 1 with use of rolling walker (RW) and gait belt for all transfers to/from [facility], per RN discretion related to orthostatics. Spoke with RN and MD for trial of xiomy hose. MD placed order. PT to f/u this afternoon to assess impact of xiomy hose. History of Present Illness: ED note, 4/16/2021, Deyanira Caldera:   \" 72 y.o. male with a past medical history of HLD, HTN, CHF, DM, Hep C, COPD, CVA who presents to the ED complaining of syncope.     Patient could not provide much history about the event that caused him to be brought to the hospital because he could not remember it. I spoke to the nursing home. Nurse said that patient was noted to have multiple episodes of passing out while seated in a chair. No fall today. This was about an hour after receiving his medications but there has been no recent change to his medication doses. Patient was also reportedly hypotensive. Patient to me did report another episode a few days ago when he woke up on the floor and felt that he may have passed out while sitting at the side of the bed.      Patient reports headache- left occipital- reports intermittent over past couple months but fairly persistent. Dull aching pain. 6/10. Not worst headache of life. Denies numbness or tingling, vision changes. Has generalized weakness. Patient has had falls, last one a couple days ago. Not on blood thinners. \"    PMHx: Anxiety disorder, BPH (benign prostatic hypertrophy), Calcified granuloma of lung ([facility] [address] (2014), Cataract (1/20/14), Cerebral artery occlusion with cerebral infarction [facility]), Chronic pain, COPD (chronic obstructive pulmonary disease) ([facility] [address] Degenerative arthritis of hip, Dementia ([facility] [address] Depression (3/11/2015), Depression with anxiety, Diabetes mellitus ([address] (2004), Diabetic eye exam ([address] (1/20/14), Diabetic retinopathy ([facility] [address] Diverticulitis (11/26/2011), Diverticulosis (11/26/2011), DKA (diabetic ketoacidoses) ([facility] [address] Emphysema, Esophageal candidiasis ([facility] [address] (7/16/13), Essential hypertension, benign (11/2010), ETOH abuse, Fatty liver (10/9/2012), Foot ulcer:left (9/30/2013), Gastric ulcer, unspecified as acute or chronic, without mention of hemorrhage or perforation, GERD (gastroesophageal reflux disease), Gout (1997), Hearing decreased, Hemangioma (12/2010), hepatitis c (7/26/17, 2010), Hyperlipidemia LDL goal < 100, Left-sided weakness, Low HDL (under 40) (10/9/2012), Peripheral neuropathy (2006), Pneumonia (10/15/13), Pyogenic granuloma, Restrictive lung disease, S/P colonoscopy (1996), S/P colonoscopy (11/11/10;10/23/2013), S/P endoscopy (2009), Sciatica, Superficial phlebitis of left leg (9/30/2013), Tachycardia, Therapeutic drug monitoring (4/8/15), and Type 1 diabetes mellitus not at goal [facility]) (3/17/14). Home Health S4 Level Recommendation:  NA  AM-PAC Score: AM-PAC Inpatient Daily Activity Raw Score: 17    Information obtained from OT/PT evaluation on 4/11/2020 and edited as needed. Preadmission Environment   Pt.  [address]  Steps to enter first floor: No steps  Bathroom: Walk-in Shower, Grab bars, Shower Chair  and Raised toilet seat with grab bars  Equipment owned: SPC, Rollator, Shower Chair, lift chair and hospital bed, TENS unit (for back pain)             Pt has access to a RW and manual w/c through the NH.      Preadmission Status:  History of falls          Yes (Staff informed pt he fell but he does not remember it)   Pt. Able to drive: No - facility transport  Pt Fully independent with ADLs: No  Pt. Required assistance from family for: Bathing, Cleaning, Cooking and [address]             Staff assisted with showers. Pt indep with dressing.   Pt reports the facility took measurements for a w/c a couple months ago and he is waiting to receive it. Pt states he is not allowed to walk about the facility related to high fall risk. Pt reports he is working with therapy on walking with a rollator and exercises. Pain  Yes  Ratin  Location: neck, back of head, top of my femur bones, ball joint, knees   Pain Medicine Status: Received pain med prior to tx      Cognition    A&O Person, Place, Time and Situation   Able to follow 2 step commands    Subjective  Patient lying supine in bed with no family present. Pt agreeable to this OT eval & tx. Upper Extremity ROM:    WFL,  pt able to perform all bed mobility, transfers, and gait without ROM limitation. Upper Extremity Strength:    BUE strength WFL, but not formally assessed w/ MMT    Upper Extremity Sensation    WNL    Upper Extremity Proprioception:  WNL    Coordination and Tone  WNL    Balance  Functional Sitting Balance:   WNL  Functional Standing Balance:NT    Bed mobility:    Supine to sit:   Supervision  Sit to supine:   Supervision  Rolling:    Not Tested  Scooting in sitting:  Supervision  Scooting to head of bed:   Supervision    Bridging:   Independent    Transfers:    Sit to stand:  CGA  Stand to sit:  CGA  Bed to chair:   Not Tested  Standard toilet: Not Tested  Bed to [facility]:  Not Tested    Dressing:     UE:   Not Tested  LE:    Supervision to don socks    Bathing:    UE:  Not Tested  LE:  Not Tested    Eating:   Independent    Toileting:  Not Tested    Activity Tolerance   Pt completed therapy session with No adverse symptoms noted w/activity, pt reports chronic pain which limits him. BP Patient Comments   Supine, at rest 158/74 General body pain   Seated at /75 No complaints   Standing 108/63 No dizziness, conversing throughtout   Seated at /70 No complaints   Supine, end of session  135/77 No complaints     Positioning Needs: In bed, call light and needs in reach. Alarm Set    Exercise / Activities Initiated:   N/A    Patient/Family Education:   Role of OT    Assessment of Deficits: Pt seen for Occupational therapy evaluation in acute care setting. Pt demonstrated decreased Activity tolerance, ADLs, Balance  and Transfers. Pt functioning below baseline and will likely benefit from skilled occupational therapy services to maximize safety and independence. Goal(s): To be met in 3 Visits:  1). Bed to [facility]: CGA    To be met in 5 Visits:  1). Supine to/from Sit:  Independent  2). Upper Body Bathing:   SBA  3). Lower Body Bathing:   Min A  4). Upper Body Dressing:  SBA  5). Lower Body Dressing:  Min A  6). Pt to demonstrate UE exs x 15 reps with minimal cues    Rehabilitation Potential:  Good for goals listed above. Strengths for achieving goals include: Pt motivated and Pt cooperative  Barriers to achieving goals include:  Complexity of condition     Plan: To be seen 3-5 x/wk while in acute care setting for therapeutic exercises, bed mobility, transfers, dressing, bathing, family/patient education, ADL/IADL retraining, energy conservation training.      Leslee Solano, MOT, OTR/L   [id number]           If patient discharges from this facility prior to next visit, this note will serve as the Discharge Summary

## 2021-04-18 NOTE — PROGRESS NOTES
Patient educated about the reason for compression stockings. Compression stockings then applied at 10: 50 am per order. Patient had no complaints and was told the stockings will remain in place until night shift.

## 2021-04-18 NOTE — PROGRESS NOTES
Report given and care transferred to [facility], [place].

## 2021-04-18 NOTE — PROGRESS NOTES
04/18/21 0809   Vital Signs   Temp 98.8 °F (37.1 °C)   Temp Source Oral   Pulse 75   Heart Rate Source Monitor   Resp 18   BP (!) 150/64   BP Location Right upper arm   MAP (mmHg) 86   Patient Position Semi fowlers   Blood Pressure Lying 150/64   Pulse Lying 75 PER MINUTE   Blood Pressure Sitting 121/63   Pulse Sitting 79 PER MINUTE   Blood Pressure Standing 78/43   Pulse Standing 87 PER MINUTE   Level of Consciousness Alert (0)   MEWS Score 1   Patient Currently in Pain Yes   Pain Assessment   Pain Assessment 0-10   Pain Level 8   Pain Type Acute pain;Chronic pain   Pain Location Head;Hip;Neck;Back   Patient's Stated Pain Goal 3   Pain Orientation Posterior   Pain Descriptors Headache;Discomfort   Pain Frequency Intermittent   Pain Onset On-going   Clinical Progression Not changed   Functional Pain Assessment Activities are not prevented   Oxygen Therapy   SpO2 96 %   O2 Device None (Room air)       Vitals signs as above this AM. Patient medications given with no complications, see eMAR. Assessment completed, see flowsheets. Patient in bed eating breakfast. Call light within reach, bed in lowest position, and bed alarm on.

## 2021-04-18 NOTE — PLAN OF CARE
Problem: Falls - Risk of:  Goal: Will remain free from falls  Description: Will remain free from falls  4/18/2021 1447 by Tracey Adkins  Outcome: Ongoing  4/18/2021 0502 by Link Obando RN  Outcome: Ongoing  Goal: Absence of physical injury  Description: Absence of physical injury  4/18/2021 1447 by Tracey Adkins  Outcome: Ongoing  4/18/2021 0502 by Link Obando RN  Outcome: Ongoing     Problem: Pain:  Goal: Pain level will decrease  Description: Pain level will decrease  4/18/2021 1447 by Tracey Adkins  Outcome: Ongoing  4/18/2021 0502 by Link Obando RN  Outcome: Ongoing  Goal: Control of acute pain  Description: Control of acute pain  4/18/2021 1447 by Tracey Adkins  Outcome: Ongoing  4/18/2021 0502 by Link Obando RN  Outcome: Ongoing  Goal: Control of chronic pain  Description: Control of chronic pain  4/18/2021 1447 by Tracey Adkins  Outcome: Ongoing  4/18/2021 0502 by Link Obando RN  Outcome: Ongoing

## 2021-04-18 NOTE — PLAN OF CARE
Problem: Falls - Risk of:  Goal: Will remain free from falls  Description: Will remain free from falls  Outcome: Ongoing   Pt remains without falls during shift. Pt does not attempt to get OOB alone. Pt able to call out appropriately, call light within reach. Safety precautions in place include non slip footwear, fall armband, towel at foot of bed, bed and chair alarms. Bed in lowest position, wheels locked, rails up 2 /4. Continue with current care.      Problem: Pain:  Goal: Control of chronic pain  Description: Control of chronic pain  Outcome: Ongoing

## 2021-04-18 NOTE — FLOWSHEET NOTE
04/18/21 0412   Vitals   Patient Position Sitting   Orthostatic B/P and Pulse?  Yes   Blood Pressure Lying 148/68   Pulse Lying 71 PER MINUTE   Blood Pressure Sitting 122/63   Pulse Sitting 74 PER MINUTE   Blood Pressure Standing 89/63   Pulse Standing 85 PER MINUTE

## 2021-04-19 VITALS
SYSTOLIC BLOOD PRESSURE: 131 MMHG | RESPIRATION RATE: 16 BRPM | OXYGEN SATURATION: 97 % | HEART RATE: 72 BPM | WEIGHT: 183.2 LBS | HEIGHT: 77 IN | TEMPERATURE: 96.7 F | DIASTOLIC BLOOD PRESSURE: 79 MMHG | BODY MASS INDEX: 21.63 KG/M2

## 2021-04-19 LAB
ANION GAP SERPL CALCULATED.3IONS-SCNC: 5 MMOL/L (ref 3–16)
BASOPHILS ABSOLUTE: 0 K/UL (ref 0–0.2)
BASOPHILS RELATIVE PERCENT: 0.6 %
BUN BLDV-MCNC: 18 MG/DL (ref 7–20)
CALCIUM SERPL-MCNC: 9.4 MG/DL (ref 8.3–10.6)
CHLORIDE BLD-SCNC: 102 MMOL/L (ref 99–110)
CO2: 27 MMOL/L (ref 21–32)
CREAT SERPL-MCNC: 1 MG/DL (ref 0.8–1.3)
EOSINOPHILS ABSOLUTE: 0.3 K/UL (ref 0–0.6)
EOSINOPHILS RELATIVE PERCENT: 3.6 %
GFR AFRICAN AMERICAN: >60
GFR NON-AFRICAN AMERICAN: >60
GLUCOSE BLD-MCNC: 101 MG/DL (ref 70–99)
GLUCOSE BLD-MCNC: 245 MG/DL (ref 70–99)
GLUCOSE BLD-MCNC: 81 MG/DL (ref 70–99)
GLUCOSE BLD-MCNC: 93 MG/DL (ref 70–99)
HCT VFR BLD CALC: 35.3 % (ref 40.5–52.5)
HEMOGLOBIN: 11.9 G/DL (ref 13.5–17.5)
INFLUENZA A: NOT DETECTED
INFLUENZA B: NOT DETECTED
LYMPHOCYTES ABSOLUTE: 2.8 K/UL (ref 1–5.1)
LYMPHOCYTES RELATIVE PERCENT: 39.2 %
MCH RBC QN AUTO: 31.2 PG (ref 26–34)
MCHC RBC AUTO-ENTMCNC: 33.8 G/DL (ref 31–36)
MCV RBC AUTO: 92.3 FL (ref 80–100)
MONOCYTES ABSOLUTE: 0.8 K/UL (ref 0–1.3)
MONOCYTES RELATIVE PERCENT: 10.8 %
NEUTROPHILS ABSOLUTE: 3.3 K/UL (ref 1.7–7.7)
NEUTROPHILS RELATIVE PERCENT: 45.8 %
PDW BLD-RTO: 13.1 % (ref 12.4–15.4)
PERFORMED ON: ABNORMAL
PERFORMED ON: ABNORMAL
PERFORMED ON: NORMAL
PLATELET # BLD: 208 K/UL (ref 135–450)
PMV BLD AUTO: 8.1 FL (ref 5–10.5)
POTASSIUM SERPL-SCNC: 3.7 MMOL/L (ref 3.5–5.1)
RBC # BLD: 3.82 M/UL (ref 4.2–5.9)
SARS-COV-2 RNA, RT PCR: NOT DETECTED
SODIUM BLD-SCNC: 134 MMOL/L (ref 136–145)
WBC # BLD: 7.2 K/UL (ref 4–11)

## 2021-04-19 PROCEDURE — 2060000000 HC ICU INTERMEDIATE R&B

## 2021-04-19 PROCEDURE — 6360000002 HC RX W HCPCS: Performed by: INTERNAL MEDICINE

## 2021-04-19 PROCEDURE — 6370000000 HC RX 637 (ALT 250 FOR IP): Performed by: INTERNAL MEDICINE

## 2021-04-19 PROCEDURE — 36415 COLL VENOUS BLD VENIPUNCTURE: CPT

## 2021-04-19 PROCEDURE — 85025 COMPLETE CBC W/AUTO DIFF WBC: CPT

## 2021-04-19 PROCEDURE — 87636 SARSCOV2 & INF A&B AMP PRB: CPT

## 2021-04-19 PROCEDURE — 2580000003 HC RX 258: Performed by: INTERNAL MEDICINE

## 2021-04-19 PROCEDURE — 80048 BASIC METABOLIC PNL TOTAL CA: CPT

## 2021-04-19 PROCEDURE — 99239 HOSP IP/OBS DSCHRG MGMT >30: CPT | Performed by: INTERNAL MEDICINE

## 2021-04-19 PROCEDURE — 96372 THER/PROPH/DIAG INJ SC/IM: CPT

## 2021-04-19 RX ORDER — OXYCODONE HYDROCHLORIDE 10 MG/1
10 TABLET ORAL EVERY 6 HOURS PRN
Qty: 12 TABLET | Refills: 0 | Status: SHIPPED | OUTPATIENT
Start: 2021-04-19 | End: 2021-04-22

## 2021-04-19 RX ORDER — NIFEDIPINE 30 MG/1
30 TABLET, FILM COATED, EXTENDED RELEASE ORAL DAILY
Qty: 30 TABLET | Refills: 3 | Status: ON HOLD
Start: 2021-04-20 | End: 2022-02-21 | Stop reason: HOSPADM

## 2021-04-19 RX ORDER — CLONIDINE HYDROCHLORIDE 0.1 MG/1
0.1 TABLET ORAL DAILY
Qty: 60 TABLET | Refills: 3 | Status: ON HOLD
Start: 2021-04-20 | End: 2022-02-21 | Stop reason: HOSPADM

## 2021-04-19 RX ADMIN — SODIUM CHLORIDE, PRESERVATIVE FREE 5 ML: 5 INJECTION INTRAVENOUS at 09:04

## 2021-04-19 RX ADMIN — NIFEDIPINE 30 MG: 30 TABLET, FILM COATED, EXTENDED RELEASE ORAL at 09:01

## 2021-04-19 RX ADMIN — SERTRALINE 100 MG: 100 TABLET, FILM COATED ORAL at 09:01

## 2021-04-19 RX ADMIN — CYANOCOBALAMIN TAB 500 MCG 1000 MCG: 500 TAB at 09:01

## 2021-04-19 RX ADMIN — DOCUSATE SODIUM 100 MG: 100 CAPSULE, LIQUID FILLED ORAL at 09:01

## 2021-04-19 RX ADMIN — ASPIRIN 81 MG: 81 TABLET, CHEWABLE ORAL at 09:03

## 2021-04-19 RX ADMIN — POLYETHYLENE GLYCOL (3350) 17 G: 17 POWDER, FOR SOLUTION ORAL at 09:01

## 2021-04-19 RX ADMIN — INSULIN GLARGINE 20 UNITS: 100 INJECTION, SOLUTION SUBCUTANEOUS at 09:23

## 2021-04-19 RX ADMIN — ACETAMINOPHEN 650 MG: 325 TABLET ORAL at 10:52

## 2021-04-19 RX ADMIN — FOLIC ACID 1 MG: 1 TABLET ORAL at 09:01

## 2021-04-19 RX ADMIN — ACETAMINOPHEN 650 MG: 325 TABLET ORAL at 03:07

## 2021-04-19 RX ADMIN — BUTALBITAL, ACETAMINOPHEN, AND CAFFEINE 1 TABLET: 50; 325; 40 TABLET ORAL at 09:00

## 2021-04-19 RX ADMIN — INSULIN LISPRO 2 UNITS: 100 INJECTION, SOLUTION INTRAVENOUS; SUBCUTANEOUS at 12:44

## 2021-04-19 RX ADMIN — FINASTERIDE 5 MG: 5 TABLET, FILM COATED ORAL at 09:03

## 2021-04-19 RX ADMIN — Medication 100 MG: at 09:01

## 2021-04-19 RX ADMIN — OXYCODONE 10 MG: 5 TABLET ORAL at 10:51

## 2021-04-19 RX ADMIN — Medication 1000 UNITS: at 09:03

## 2021-04-19 RX ADMIN — OXYCODONE 10 MG: 5 TABLET ORAL at 05:41

## 2021-04-19 RX ADMIN — MEMANTINE HYDROCHLORIDE 10 MG: 5 TABLET ORAL at 09:02

## 2021-04-19 RX ADMIN — ENOXAPARIN SODIUM 40 MG: 40 INJECTION SUBCUTANEOUS at 09:02

## 2021-04-19 RX ADMIN — CLONIDINE HYDROCHLORIDE 0.1 MG: 0.1 TABLET ORAL at 09:01

## 2021-04-19 ASSESSMENT — PAIN SCALES - GENERAL
PAINLEVEL_OUTOF10: 9
PAINLEVEL_OUTOF10: 8
PAINLEVEL_OUTOF10: 3
PAINLEVEL_OUTOF10: 9
PAINLEVEL_OUTOF10: 9

## 2021-04-19 ASSESSMENT — PAIN - FUNCTIONAL ASSESSMENT: PAIN_FUNCTIONAL_ASSESSMENT: PREVENTS OR INTERFERES SOME ACTIVE ACTIVITIES AND ADLS

## 2021-04-19 ASSESSMENT — PAIN DESCRIPTION - ONSET: ONSET: ON-GOING

## 2021-04-19 ASSESSMENT — PAIN DESCRIPTION - PAIN TYPE
TYPE: ACUTE PAIN
TYPE: ACUTE PAIN

## 2021-04-19 ASSESSMENT — PAIN DESCRIPTION - DESCRIPTORS
DESCRIPTORS: HEADACHE
DESCRIPTORS: HEADACHE

## 2021-04-19 ASSESSMENT — PAIN DESCRIPTION - FREQUENCY: FREQUENCY: CONTINUOUS

## 2021-04-19 ASSESSMENT — PAIN DESCRIPTION - LOCATION
LOCATION: HEAD

## 2021-04-19 NOTE — DISCHARGE SUMMARY
Name:  Wilfrido Real  Room:  /7761-97  MRN:    [id number]    Discharge Summary      This discharge summary is in conjunction with a complete physical exam done on the day of discharge. Discharging Physician: Dr. Jessy Bashir: 4/16/2021  Discharge: 4/19/2021     HPI taken from admission H&P:    The patient is a 72 y.o. male who presents to [address] [place] [address] with orthostatic hypotension/syncope. Per NH report, patient was noted to have multiple episodes of passing out while seated in a chair. No fall noted. This was about an hour after receiving his medications but there has been no recent change to his medication doses. Patient was also reportedly hypotensive and hence sent to the ER. Pt was recently admitted and dced on 4/3 for similar complaints    Diagnoses this Admission and Hospital Course   Orthostatic hypotension  - POA, hypotension improved but still orthostatic   - Was hypotensive on arrival, BP improved and on higher side now. - Resumed home meds. - Dc lisinopril, started nifedipine/clonidine, cont BB.   - Recieved IVF-->dc now, ximoy hose placed.   - PT/OT eval ordered  Needs outpatient neurology eval for continued headaches.       ARF vs CKD III   - suspect 2/2 orthostatic hypotension, on IVF  - recheck improved to 1.2     DM II   - fairly controlled, cont SSI, on lantus.   - A1c 8.3     Hypocalcemia   - repleted     H/o CAD/cardiomyopathy   - cont home meds     Procedures (Please Review Full Report for Details)  None     Consults    None     Physical Exam at Discharge:    BP (!) 148/96   Pulse 76   Temp 98.5 °F (36.9 °C) (Oral)   Resp 16   Ht 6' 5\" (1.956 m)   Wt 183 lb 3.2 oz (83.1 kg)   SpO2 96%   BMI 21.72 kg/m²     General appearance: No apparent distress appears stated age and cooperative. HEENT Normal cephalic, atraumatic without obvious deformity.  Pupils equal, round, and reactive to light.  Extra ocular muscles intact.  Conjunctivae/corneas clear. Neck: Supple, No jugular venous distention/bruits.  Trachea midline without thyromegaly or adenopathy with full range of motion. Lungs: Clear to auscultation, bilaterally without Rales/Wheezes/Rhonchi with good respiratory effort. Heart: Regular rate and rhythm with Normal S1/S2   Abdomen: Soft, non-tender or non-distended without rigidity or guarding and positive bowel sounds   Extremities: No clubbing, cyanosis, or edema bilaterally.    Skin: Skin color, texture, turgor normal.  No rashes or lesions. Neurologic: Awake, neurovascularly intact with sensory/motor intact upper extremities/lower extremities, bilaterally.  Cranial nerves: II-XII intact, grossly non-focal.  Mental status: Awake  Capillary Refill: Acceptable  < 3 seconds  Peripheral Pulses: +3 Easily felt, not easily obliterated with pressure    CBC:   Recent Labs     04/16/21  1500 04/18/21  0441 04/19/21  0454   WBC 8.7 9.5 7.2   HGB 11.3* 11.8* 11.9*   HCT 34.0* 35.5* 35.3*   MCV 93.6 93.4 92.3    210 208     BMP:   Recent Labs     04/17/21  2032 04/18/21  0441 04/19/21  0454   * 136 134*   K 4.3 3.7 3.7   CL 98* 103 102   CO2 26 24 27   BUN 23* 20 18   CREATININE 1.3 1.2 1.0     LIVER PROFILE:   Recent Labs     04/16/21  1500   AST 7*   ALT <5*   BILITOT <0.2   ALKPHOS 22*     UA:  Recent Labs     04/16/21  1730   COLORU Yellow   PHUR 6.0   WBCUA 0-2   RBCUA None seen   CLARITYU Clear   SPECGRAV 1.010   LEUKOCYTESUR Negative   UROBILINOGEN 0.2   BILIRUBINUR Negative   BLOODU Negative   GLUCOSEU 500*     CULTURES  SARS-CoV-2 RNA, RT PCR NOT DETECTED      INFLUENZA A NOT DETECTED  NOT DETECTED Final 04/16/2021  5:30 PM [facility]   INFLUENZA B NOT DETECTED  NOT DETECTED Final         RADIOLOGY  CT CERVICAL SPINE WO CONTRAST   Final Result   Stable degenerative and post-surgical changes of the cervical spine with bony   neural foraminal narrowing as above. No acute fracture or subluxation.    Straightening of the normal cervical lordosis which may be related to muscle   spasm or position in collar. CT HEAD WO CONTRAST   Final Result   No evidence of acute intracranial process. Mild atrophy and chronic small   vessel ischemic changes noted, along with remote bilateral basal ganglia   region lacunar infarcts and a large remote infarct of the right occipital   lobe. XR ABDOMEN (KUB) (SINGLE AP VIEW)   Final Result   Moderate constipation which is unchanged with a non-specific gas pattern. XR CHEST PORTABLE   Final Result   No acute abnormality identified. No significant change from the prior study.              Discharge Medications     Medication List      START taking these medications    cloNIDine 0.1 MG tablet  Commonly known as: CATAPRES  Take 1 tablet by mouth daily  Start taking on: April 20, 2021  Notes to patient: Catapres (clonidine)  Use: to lower blood pressure     Side effects: drowsiness, dizziness     NIFEdipine 30 MG extended release tablet  Commonly known as: ADALAT CC  Take 1 tablet by mouth daily  Start taking on: April 20, 2021        Maikel Ayoub taking these medications    acetaminophen 325 MG tablet  Commonly known as: TYLENOL     AgaMatrix Presto Test strip  Generic drug: blood glucose test strips  USE TO TEST BLOOD SUGAR 3 TIMES A DAY     albuterol sulfate  (90 Base) MCG/ACT inhaler  Commonly known as: ProAir HFA  Inhale 2 puffs into the lungs every 4 hours as needed for Wheezing or Shortness of Breath     aluminum & magnesium hydroxide-simethicone 200-200-20 MG/5ML Susp suspension  Commonly known as: MAALOX     aspirin 81 MG tablet     atorvastatin 80 MG tablet  Commonly known as: LIPITOR     finasteride 5 MG tablet  Commonly known as: PROSCAR     fluticasone 44 MCG/ACT inhaler  Commonly known as: FLOVENT HFA     folic acid 1 MG tablet  Commonly known as: FOLVITE     FreeStyle Dino Gays Emily  Use to monitor sugars     RONNELL Thompson  Use to monitor sugars glucagon 1 MG injection     HUMALOG SC     Lancets Misc  Testing 2-3 times daily DX Code: E11.9     LANTUS SOLOSTAR SC     loperamide 2 MG capsule  Commonly known as: IMODIUM     loratadine 10 MG tablet  Commonly known as: CLARITIN     melatonin 10 MG Caps capsule     memantine 10 MG tablet  Commonly known as: NAMENDA     metoprolol succinate 50 MG extended release tablet  Commonly known as: TOPROL XL  Take 1 tablet by mouth nightly     oxyCODONE HCl 10 MG immediate release tablet  Commonly known as: OXY-IR  Take 1 tablet by mouth every 6 hours as needed for Pain for up to 3 days. Takes scheduled 12-6-12-6     polyethylene glycol 17 GM/SCOOP powder  Commonly known as: GLYCOLAX     sertraline 100 MG tablet  Commonly known as: ZOLOFT     SUMAtriptan 100 MG tablet  Commonly known as: IMITREX     thiamine 100 MG tablet  Take 1 tablet by mouth daily     tiZANidine 4 MG tablet  Commonly known as: ZANAFLEX     True Metrix Air Glucose Meter Emily  1 meter     Trulicity 0.23 VF/4.3FF Sopn  Generic drug: Dulaglutide     umeclidinium-vilanterol 62.5-25 MCG/INH Aepb inhaler  Commonly known as:  Anoro Ellipta  Inhale 1 puff into the lungs daily     vitamin B-12 1000 MCG tablet  Commonly known as: CYANOCOBALAMIN  TAKE 1 TABLET BY MOUTH DAILY     vitamin D 1.25 MG (84553 UT) Caps capsule  Commonly known as: ERGOCALCIFEROL     vitamin E 1000 units capsule  Take 1 capsule by mouth daily        STOP taking these medications    docusate sodium 100 MG capsule  Commonly known as: COLACE     lisinopril 10 MG tablet  Commonly known as: PRINIVIL;ZESTRIL           Where to Get Your Medications      You can get these medications from any pharmacy    Bring a paper prescription for each of these medications  · oxyCODONE HCl 10 MG immediate release tablet     Information about where to get these medications is not yet available    Ask your nurse or doctor about these medications  · cloNIDine 0.1 MG tablet  · NIFEdipine 30 MG extended release

## 2021-04-19 NOTE — PROGRESS NOTES
Hospitalist Progress Note      PCP: Karina Elliott, APRN - CNP    Date of Admission: 4/16/2021    Subjective: upset about his pain med dose not being scheduled, still orthostatic    Medications:  Reviewed    Infusion Medications    dextrose      sodium chloride       Scheduled Medications    oxyCODONE  10 mg Oral 4 times per day    fluticasone  1 puff Inhalation BID    NIFEdipine  30 mg Oral Daily    cloNIDine  0.1 mg Oral Daily    thiamine  100 mg Oral Daily    vitamin B-12  1,000 mcg Oral Daily    vitamin E  1,000 Units Oral Daily    atorvastatin  80 mg Oral Nightly    aspirin  81 mg Oral Daily    finasteride  5 mg Oral Daily    folic acid  1 mg Oral Daily    polyethylene glycol  17 g Oral Daily    memantine  10 mg Oral BID    metoprolol succinate  50 mg Oral Nightly    sertraline  100 mg Oral Daily    melatonin  10 mg Oral Nightly    [START ON 4/22/2021] vitamin D  50,000 Units Oral Weekly    docusate sodium  100 mg Oral BID    insulin glargine  20 Units Subcutaneous BID    sodium chloride flush  5-40 mL Intravenous 2 times per day    enoxaparin  40 mg Subcutaneous Daily    insulin lispro  0-6 Units Subcutaneous TID WC    insulin lispro  0-3 Units Subcutaneous Nightly     PRN Meds: butalbital-acetaminophen-caffeine, phenol, calcium carbonate, hydrALAZINE, loperamide, albuterol, glucose, dextrose, glucagon (rDNA), dextrose, sodium chloride flush, sodium chloride, promethazine **OR** ondansetron, polyethylene glycol, acetaminophen **OR** acetaminophen      Intake/Output Summary (Last 24 hours) at 4/19/2021 0225  Last data filed at 4/19/2021 0216  Gross per 24 hour   Intake 1060 ml   Output 2700 ml   Net -1640 ml       Physical Exam Performed:    BP (!) 156/86   Pulse 68   Temp 98.2 °F (36.8 °C) (Oral)   Resp 16   Ht 6' 5\" (1.956 m)   Wt 183 lb 3.2 oz (83.1 kg)   SpO2 94%   BMI 21.72 kg/m²     General appearance: No apparent distress appears stated age and cooperative. HEENT Normal cephalic, atraumatic without obvious deformity. Pupils equal, round, and reactive to light. Extra ocular muscles intact. Conjunctivae/corneas clear. Neck: Supple, No jugular venous distention/bruits. Trachea midline without thyromegaly or adenopathy with full range of motion. Lungs: Clear to auscultation, bilaterally without Rales/Wheezes/Rhonchi with good respiratory effort. Heart: Regular rate and rhythm with Normal S1/S2   Abdomen: Soft, non-tender or non-distended without rigidity or guarding and positive bowel sounds   Extremities: No clubbing, cyanosis, or edema bilaterally. Skin: Skin color, texture, turgor normal.  No rashes or lesions. Neurologic: Awake, neurovascularly intact with sensory/motor intact upper extremities/lower extremities, bilaterally. Cranial nerves: II-XII intact, grossly non-focal.  Mental status: Awake  Capillary Refill: Acceptable  < 3 seconds  Peripheral Pulses: +3 Easily felt, not easily obliterated with pressure    Labs:   Recent Labs     04/16/21  1500 04/18/21  0441   WBC 8.7 9.5   HGB 11.3* 11.8*   HCT 34.0* 35.5*    210     Recent Labs     04/16/21  1610 04/17/21 2032 04/18/21  0441   * 131* 136   K 4.1 4.3 3.7    98* 103   CO2 25 26 24   BUN 27* 23* 20   CREATININE 1.4* 1.3 1.2   CALCIUM 8.2* 8.8 9.2     Recent Labs     04/16/21  1500   AST 7*   ALT <5*   BILITOT <0.2   ALKPHOS 22*     No results for input(s): INR in the last 72 hours.   Recent Labs     04/16/21  1500 04/16/21  2318 04/17/21  0319   TROPONINI <0.01 <0.01 <0.01       Urinalysis:      Lab Results   Component Value Date    NITRU Negative 04/16/2021    WBCUA 0-2 04/16/2021    BACTERIA Rare 03/29/2021    RBCUA None seen 04/16/2021    BLOODU Negative 04/16/2021    SPECGRAV 1.010 04/16/2021    GLUCOSEU 500 04/16/2021       Radiology:  CT CERVICAL SPINE WO CONTRAST   Final Result   Stable degenerative and post-surgical changes of the cervical spine with bony   neural foraminal narrowing as above. No acute fracture or subluxation. Straightening of the normal cervical lordosis which may be related to muscle   spasm or position in collar. CT HEAD WO CONTRAST   Final Result   No evidence of acute intracranial process. Mild atrophy and chronic small   vessel ischemic changes noted, along with remote bilateral basal ganglia   region lacunar infarcts and a large remote infarct of the right occipital   lobe. XR ABDOMEN (KUB) (SINGLE AP VIEW)   Final Result   Moderate constipation which is unchanged with a non-specific gas pattern. XR CHEST PORTABLE   Final Result   No acute abnormality identified. No significant change from the prior study. Assessment/Plan:    Active Hospital Problems    Diagnosis    Syncope and collapse [R55]         Orthostatic hypotension - POA, hypotension improved but still orthostatic Was hypotensive on arrival, BP improved and on higher side now. Resumed home meds. Dc lisinopril, started nifedipine/clonidine, cont BB. Recieved IVF-->dc now, xiomy hose placed. PT/OT eval ordered     ARF vs CKD III - suspect 2/2 orthostatic hypotension, on IVF, recheck in am improved to 1.2     DM II - fairly controlled, cont SSI, on lantus.  A1c 8.3     Hypocalcemia - repleted     H/o CAD/cardiomyopathy - cont home meds        DVT Prophylaxis: lovenox  Diet: DIET CARB CONTROL; Carb Control: 4 carb choices (60 gms)/meal  Code Status: Full Code    PT/OT Eval Status: ordered    Dispo - cont care, recheck Remi Daugherty MD

## 2021-04-19 NOTE — ACP (ADVANCE CARE PLANNING)
Advance Care Planning   Healthcare Decision Maker:    Primary Decision Maker: Delio Day - Brother/Sister - [phone]    Secondary Decision Maker: Blanca Norwood - Other - [phone]    Click here to complete Healthcare Decision Makers including selection of the Healthcare Decision Maker Relationship (ie \"Primary\").

## 2021-04-19 NOTE — PROGRESS NOTES
Patient educated on discharge instructions as well as new medications use, dosage, administration and possible side effects. Patient verified knowledge. IV removed without difficulty and dry dressing in place. Telemetry monitor removed and returned to [facility]. Pt left facility in stable condition to [address] with all of their personal belongings. Called report to Josiah Tomas, notified will need to follow up appointment with neurology referral made and sent with paperwork,copy to chart.

## 2021-04-19 NOTE — FLOWSHEET NOTE
04/18/21 2000   Vital Signs   Temp 97 °F (36.1 °C)   Temp Source Oral   Pulse 70   Heart Rate Source Monitor   Resp 18   BP (!) 177/85   BP Location Right upper arm   Level of Consciousness Alert (0)   MEWS Score 1   Pain Assessment   Pain Assessment 0-10   Pain Level 8   Oxygen Therapy   SpO2 97 %   O2 Device None (Room air)   Pt. Resting in bed. Call light in reach. Shift assessment completed see flow sheet. Denies any needs at this time. Will continue to monitor.

## 2021-04-19 NOTE — DISCHARGE INSTR - COC
Continuity of Care Form    Patient Name: Kenia Stubbs   :  1950  MRN:  [id number]    Admit date:  2021  Discharge date:  21    Code Status Order: Full Code   Advance Directives:      Admitting Physician:  Antonette Gregorio MD  PCP: Latisha Mcdonald, APRN - CNP    Discharging Nurse: CHANDU Velázquez [address] [place] [address] Unit/Room#: /1481-72  Discharging Unit Phone Number: [phone]    Emergency Contact:   Extended Emergency Contact Information  Primary Emergency Contact: Adam Bernal [address] Phone: [phone]  Mobile Phone: [phone]  Relation: Brother/Sister  Secondary Emergency Contact: [address] Phone: [phone]  Relation: Other    Past Surgical History:  Past Surgical History:   Procedure Laterality Date    COLONOSCOPY      DIAGNOSTIC CARDIAC CATH LAB PROCEDURE      DILATATION, ESOPHAGUS      FOOT SURGERY Left Hammer toe, 2nd toe    10/9/14    FOOT SURGERY Left 2019    REMOVAL HARDWARE LEFT FOOT performed by Anya Arnold DPM at [address] REMOVAL Left 2019    REMOVAL HARDWARE LEFT FOOT w. Dr Terry Vasquez 19    [address]      broken leg    OTHER SURGICAL HISTORY Left 2013    EXCISION 5TH METATRSAL LEFT FOOT          OTHER SURGICAL HISTORY Right 2019    Procedure: PARTIAL RESECTION RIGHT FIFTH METATARSAL, ULCER DEBRIDEMENT WITH GRAFT APPLICATION    PRESSURE ULCER DEBRIDEMENT Right 2019    PARTIAL RESECTION RIGHT FIFTH METATARSAL, ULCER DEBRIDEMENT WITH GRAFT APPLICATION performed by Anya Arnold DPM at [place] [address]  2017    COLOSTOMY REVERSAL     TONSILLECTOMY      UPPER GASTROINTESTINAL ENDOSCOPY  2013    Esophageal Brushing       Immunization History:   Immunization History   Administered Date(s) Administered    Hepatitis A 2010, 2016    Hepatitis A Adult (Havrix, Vaqta) 2016    trauma S09.90XA    IDDM (insulin dependent diabetes mellitus) [id number]    Ventral hernia without obstruction or gangrene K43.9    Diabetic ketoacidosis without coma associated with type 2 diabetes mellitus ([facility] E11.10    DDD (degenerative disc disease), cervical M50.30    Spondylosis of cervical region without myelopathy or radiculopathy M47.812    DDD (degenerative disc disease), lumbar M51.36    Closed nondisplaced fracture of lateral malleolus of left fibula S82.65XA    Lumbar degenerative disc disease M51.36    Protrusion of lumbar intervertebral disc M51.26    Osteoarthritis of both knees M17.0    Osteoarthritis of both hips M16.0    Idiopathic peripheral neuropathy G60.9    Leg pain, anterior, left M79.605    CVA (cerebral vascular accident) ([facility] I63.9    DM (diabetes mellitus) ([facility] E11.9    BPH (benign prostatic hyperplasia) N40.0    Dysarthria R47.1    Left-sided weakness R53.1    TIA (transient ischemic attack) G45.9    Dyslipidemia E78.5    DKA, type 2, not at goal [facility]) E11.10    Agitation requiring sedation protocol R45.1    DKA, type 1, not at goal [facility]) E10.10    Severe malnutrition ([facility] E43    CHIP (acute kidney injury) ([facility] N17.9    Open wound of right foot S91.301A    Dementia associated with other underlying disease without behavioral disturbance (HCC) F02.80    Closed head injury S09.90XA    Facial hematoma S00.83XA    Concussion with no loss of consciousness S06.0X0A    Chest pain R07.9    SOB (shortness of breath) R06.02    Acute on chronic systolic (congestive) heart failure (HCC) I50.23    Cardiomyopathy (HCC) I42.9    Benign essential HTN I10    Chronic kidney disease N18.9    Syncope and collapse R55       Isolation/Infection:   Isolation            No Isolation          Patient Infection Status       Infection Onset Added Last Indicated Last Indicated By Review Planned Expiration Resolved Resolved By    None active    Resolved    COVID-19 Rule Out

## 2021-04-19 NOTE — FLOWSHEET NOTE
04/19/21 0853   Vitals   Temp 98.5 °F (36.9 °C)   Temp Source Oral   Pulse 76   Heart Rate Source Monitor   Resp 16   BP (!) 148/96   BP Location Right upper arm   BP Upper/Lower Upper   BP Method Automatic   Patient Position Semi fowlers   Level of Consciousness Alert (0)   MEWS Score 1   Patient Currently in Pain Yes   Oxygen Therapy   SpO2 96 %   O2 Device None (Room air)     Pt awake, in bed. Oriented X4. Pt states chronic migraine see PRN medications. Shift assessment complete. WNL. Pt on RA. NSR monitor. YORDAN hose placed bilateral.     Urinal at bedside- denies dizziness. AM meds whole w/water. Lantus-     Call light in reach. Bed alarm for fall risk.

## 2021-04-19 NOTE — CARE COORDINATION
Case Management Assessment  Initial Evaluation and DC Note      Patient Name: Sunni Agarwal  YOB: 1950  Diagnosis: Syncope and collapse [R55]  Date / Time: 4/16/2021  2:48 PM    Admission status/Date:4/19/2021  Chart Reviewed: Yes      Patient Interviewed: Yes   Family Interviewed:  No      Hospitalization in the last 30 days:  Yes      Health Care Decision Maker :   Primary Decision Maker: Chandra Freeman - Brother/Sister - [phone]    Secondary Decision Maker: Maury Gomesn - Ybhgm - [phone]    (CM - must 1st enter selection under Navigator - emergency contact- [place] Relationship and pick relationship)   Who do you trust or have selected to make healthcare decisions for you      Met with: pt  Interview conducted  (bedside/phone):bedside    Current PCP: LTC @ [address] required for SNF : N          3 night stay required - N    ADLS  Support Systems/Care Needs: ECF/Assisted Living  Transportation: EMS transportation    Meal Preparation: facility    Housing  Living Arrangements: LTC @ [facility]  Steps:   Intent for return to present living arrangements: Yes  Identified Issues:             [address] [place] [address] Provider: facility  Equipment: facility  Walker___Cane___RTS___ BSC___Shower Chair___Hospital Bed___W/C____Other________  02 at ____Liter(s)---wears(frequency)_______  - [facility] ___ CPAP___ BiPap___   N/A____      Home O2 Use :  No    If No for home O2---if presently on O2 during hospitalization:  No      Community Service Affiliation  Dialysis:  No    · Agency:  · Location:  · Dialysis Schedule:  · Phone:   · Fax: Other Community Services: (ex:PT/OT,Mental Health,Wound Clinic, Cardio/Pul [address])    DISCHARGE PLAN: Explained Case Management role/services. Chart reviewed. Met with pt at bedside. Pt is LTC @ [facility] and plans to return.  TC to [facility] @ [facility] and she states pt can return with negative COVID test, no precert needed to return. Mahnaz is aware PT is recommending PT/OT. DISCHARGE ORDER  Date/Time 2021 12:30 PM  Completed by: Hansa Hall Case Management    Patient Name: Negar Campos    : 1950      Admit order Date and Status:2021  Noted discharge order. (verify MD's last order for status of admission/Traditional Medicare 3 [place] Inpatient qualifying stay required for SNF)    Confirmed discharge plan with:              Patient:  Yes              When pt confirms DC plan does any support person need to be contacted by CM No if yes who______                      Discharge to Facility: [address] [place] [address] phone number for staff giving report: [phone] completed: not needed per [facility] Exemption Notification (HENS) completed: not needed LTC   Discharge orders and Continuity of Care faxed to facility:  yes      Transportation:               Medical Transport explained with choice list offered to pt/family. Choice:Yes(no preference)  [facility]:         Pt/family/Nursing/Facility aware of  time:   Yes Names: pt/ bedside RN Shayy/Mahnaz with [facility]  Ambulance form completed:  yes:      Comments:Chart reviewed. Pt is agreeable to returning to LTC @ [facility] and is aware of  time. Mahnaz with [facility] aware of  time and negative COVID test results. Pt is being d/c'd to [facility] today. Pt's O2 sats are 96% on RA. Discharge timeout done with Shayy. All discharge needs and concerns addressed. Discharging nurse to complete NINA, reconcile AVS, and place final copy with patient's discharge packet. Discharging RN to ensure that written prescriptions for  Level II medications are sent with patient to the facility as per protocol.

## 2021-04-20 NOTE — PROGRESS NOTES
Physician Progress Note      PATIENT:               [address]  CSN #:                  [id number]  :                       1950  ADMIT DATE:       2021 2:48 PM  Kelsy Munoz DATE:        2021 3:45 PM  RESPONDING  PROVIDER #:        Tonya Hill MD          QUERY TEXT:    Patient admitted with syncope, noted to have orthostatic hypotension. If   possible, please document in progress notes and discharge summary if you are   evaluating and/or treating any of the following: The medical record reflects the following:  Risk Factors: age, DM, cardiomyopathy, diabetic retinopathy, h/o DKA  Clinical Indicators: DM II, A1c-8.3, Orthostatic hypotension -   POA,?hypotension improved but still orthostatic? Treatment:  Dc lisinopril, started?nifedipine/clonidine, cont BB, - Recieved   IVF-->dc now, xiomy hose placed, cont SSI, on lantus    Thank you for your assistance,  Bandar Isabel RN,BSN,CCDS,CRCR  Options provided:  -- Syncope due to orthostatic hypotension secondary to diabetic autonomic   neuropathy  -- Syncope due to orthostatic hypotension secondary to dehydration  -- Syncope due to orthostatic hypotension secondary to, please specify cause   if known. -- Other - I will add my own diagnosis  -- Disagree - Not applicable / Not valid  -- Disagree - Clinically unable to determine / Unknown  -- Refer to Clinical Documentation Reviewer    PROVIDER RESPONSE TEXT:    The syncope is due to orthostatic hypotension secondary to dehydration.     Query created by: Girma Ragland on 2021 2:51 PM      Electronically signed by:  Tonya Hill MD 2021 11:30 AM

## 2021-08-13 PROBLEM — I77.810 AORTIC ROOT DILATION (HCC): Status: ACTIVE | Noted: 2021-08-13

## 2021-08-13 PROBLEM — I51.9 LV DYSFUNCTION: Status: ACTIVE | Noted: 2021-08-13

## 2021-10-01 ENCOUNTER — HOSPITAL ENCOUNTER (OUTPATIENT)
Dept: MRI IMAGING | Age: 71
Discharge: HOME | End: 2021-10-01
Payer: MEDICARE

## 2021-10-01 DIAGNOSIS — M47.814 THORACIC SPONDYLOSIS WITHOUT MYELOPATHY: ICD-10-CM

## 2021-10-01 PROCEDURE — 72146 MRI CHEST SPINE W/O DYE: CPT

## 2021-10-01 PROCEDURE — 72148 MRI LUMBAR SPINE W/O DYE: CPT

## 2021-10-06 ENCOUNTER — HOSPITAL ENCOUNTER (OUTPATIENT)
Age: 71
Setting detail: OBSERVATION
Discharge: NURSING, REHAB OR LONG-TERM CARE | End: 2021-10-07
Attending: EMERGENCY MEDICINE | Admitting: INTERNAL MEDICINE
Payer: MEDICARE

## 2021-10-06 ENCOUNTER — APPOINTMENT (OUTPATIENT)
Dept: GENERAL RADIOLOGY | Age: 71
End: 2021-10-06
Payer: MEDICARE

## 2021-10-06 ENCOUNTER — APPOINTMENT (OUTPATIENT)
Dept: CT IMAGING | Age: 71
End: 2021-10-06
Payer: MEDICARE

## 2021-10-06 DIAGNOSIS — N17.9 AKI (ACUTE KIDNEY INJURY) (HCC): Primary | ICD-10-CM

## 2021-10-06 DIAGNOSIS — E87.1 HYPONATREMIA: ICD-10-CM

## 2021-10-06 DIAGNOSIS — R73.9 HYPERGLYCEMIA: ICD-10-CM

## 2021-10-06 LAB
A/G RATIO: 1.7 (ref 1.1–2.2)
ALBUMIN SERPL-MCNC: 4.1 G/DL (ref 3.4–5)
ALP BLD-CCNC: 87 U/L (ref 40–129)
ALT SERPL-CCNC: 8 U/L (ref 10–40)
ANION GAP SERPL CALCULATED.3IONS-SCNC: 15 MMOL/L (ref 3–16)
AST SERPL-CCNC: 13 U/L (ref 15–37)
BASE EXCESS VENOUS: -2.5 MMOL/L (ref -3–3)
BASOPHILS ABSOLUTE: 0.1 K/UL (ref 0–0.2)
BASOPHILS RELATIVE PERCENT: 0.4 %
BETA-HYDROXYBUTYRATE: 0.2 MMOL/L (ref 0–0.27)
BILIRUB SERPL-MCNC: 0.5 MG/DL (ref 0–1)
BILIRUBIN URINE: NEGATIVE
BLOOD, URINE: NEGATIVE
BUN BLDV-MCNC: 40 MG/DL (ref 7–20)
CALCIUM SERPL-MCNC: 8.8 MG/DL (ref 8.3–10.6)
CARBOXYHEMOGLOBIN: 1.6 % (ref 0–1.5)
CHLORIDE BLD-SCNC: 91 MMOL/L (ref 99–110)
CHP ED QC CHECK: YES
CLARITY: CLEAR
CO2: 20 MMOL/L (ref 21–32)
COLOR: YELLOW
CREAT SERPL-MCNC: 2 MG/DL (ref 0.8–1.3)
EOSINOPHILS ABSOLUTE: 0 K/UL (ref 0–0.6)
EOSINOPHILS RELATIVE PERCENT: 0 %
EPITHELIAL CELLS, UA: ABNORMAL /HPF (ref 0–5)
GFR AFRICAN AMERICAN: 40
GFR NON-AFRICAN AMERICAN: 33
GLOBULIN: 2.4 G/DL
GLUCOSE BLD-MCNC: 144 MG/DL (ref 70–99)
GLUCOSE BLD-MCNC: 274 MG/DL
GLUCOSE BLD-MCNC: 274 MG/DL (ref 70–99)
GLUCOSE BLD-MCNC: 283 MG/DL (ref 70–99)
GLUCOSE URINE: >=1000 MG/DL
HCO3 VENOUS: 22.3 MMOL/L (ref 23–29)
HCT VFR BLD CALC: 32.9 % (ref 40.5–52.5)
HEMOGLOBIN: 11.1 G/DL (ref 13.5–17.5)
HYALINE CASTS: ABNORMAL /LPF (ref 0–2)
KETONES, URINE: NEGATIVE MG/DL
LACTIC ACID: 3.1 MMOL/L (ref 0.4–2)
LEUKOCYTE ESTERASE, URINE: NEGATIVE
LYMPHOCYTES ABSOLUTE: 2 K/UL (ref 1–5.1)
LYMPHOCYTES RELATIVE PERCENT: 13.5 %
MCH RBC QN AUTO: 30.4 PG (ref 26–34)
MCHC RBC AUTO-ENTMCNC: 33.7 G/DL (ref 31–36)
MCV RBC AUTO: 90.2 FL (ref 80–100)
METHEMOGLOBIN VENOUS: 0.3 %
MICROSCOPIC EXAMINATION: YES
MONOCYTES ABSOLUTE: 1.5 K/UL (ref 0–1.3)
MONOCYTES RELATIVE PERCENT: 9.9 %
MUCUS: ABNORMAL /LPF
NEUTROPHILS ABSOLUTE: 11.5 K/UL (ref 1.7–7.7)
NEUTROPHILS RELATIVE PERCENT: 76.2 %
NITRITE, URINE: NEGATIVE
O2 CONTENT, VEN: 16 VOL %
O2 SAT, VEN: 96 %
O2 THERAPY: ABNORMAL
PCO2, VEN: 38.3 MMHG (ref 40–50)
PDW BLD-RTO: 12.9 % (ref 12.4–15.4)
PERFORMED ON: ABNORMAL
PERFORMED ON: ABNORMAL
PH UA: 6 (ref 5–8)
PH VENOUS: 7.38 (ref 7.35–7.45)
PLATELET # BLD: 228 K/UL (ref 135–450)
PMV BLD AUTO: 8.3 FL (ref 5–10.5)
PO2, VEN: 83.5 MMHG (ref 25–40)
POTASSIUM REFLEX MAGNESIUM: 4.4 MMOL/L (ref 3.5–5.1)
PROCALCITONIN: 0.09 NG/ML (ref 0–0.15)
PROTEIN UA: 100 MG/DL
RBC # BLD: 3.65 M/UL (ref 4.2–5.9)
RBC UA: ABNORMAL /HPF (ref 0–4)
SARS-COV-2, NAAT: NOT DETECTED
SEDIMENTATION RATE, ERYTHROCYTE: 32 MM/HR (ref 0–20)
SODIUM BLD-SCNC: 126 MMOL/L (ref 136–145)
SPECIFIC GRAVITY UA: 1.01 (ref 1–1.03)
TCO2 CALC VENOUS: 23 MMOL/L
TOTAL PROTEIN: 6.5 G/DL (ref 6.4–8.2)
URINE TYPE: ABNORMAL
UROBILINOGEN, URINE: 0.2 E.U./DL
WBC # BLD: 15.1 K/UL (ref 4–11)
WBC UA: ABNORMAL /HPF (ref 0–5)

## 2021-10-06 PROCEDURE — 80053 COMPREHEN METABOLIC PANEL: CPT

## 2021-10-06 PROCEDURE — 85652 RBC SED RATE AUTOMATED: CPT

## 2021-10-06 PROCEDURE — 99285 EMERGENCY DEPT VISIT HI MDM: CPT

## 2021-10-06 PROCEDURE — 6370000000 HC RX 637 (ALT 250 FOR IP): Performed by: NURSE PRACTITIONER

## 2021-10-06 PROCEDURE — G0378 HOSPITAL OBSERVATION PER HR: HCPCS

## 2021-10-06 PROCEDURE — 81001 URINALYSIS AUTO W/SCOPE: CPT

## 2021-10-06 PROCEDURE — 86140 C-REACTIVE PROTEIN: CPT

## 2021-10-06 PROCEDURE — 85025 COMPLETE CBC W/AUTO DIFF WBC: CPT

## 2021-10-06 PROCEDURE — 96360 HYDRATION IV INFUSION INIT: CPT

## 2021-10-06 PROCEDURE — 82010 KETONE BODYS QUAN: CPT

## 2021-10-06 PROCEDURE — 83605 ASSAY OF LACTIC ACID: CPT

## 2021-10-06 PROCEDURE — 96361 HYDRATE IV INFUSION ADD-ON: CPT

## 2021-10-06 PROCEDURE — 71045 X-RAY EXAM CHEST 1 VIEW: CPT

## 2021-10-06 PROCEDURE — 70450 CT HEAD/BRAIN W/O DYE: CPT

## 2021-10-06 PROCEDURE — 87040 BLOOD CULTURE FOR BACTERIA: CPT

## 2021-10-06 PROCEDURE — 2580000003 HC RX 258: Performed by: NURSE PRACTITIONER

## 2021-10-06 PROCEDURE — 87635 SARS-COV-2 COVID-19 AMP PRB: CPT

## 2021-10-06 PROCEDURE — 82803 BLOOD GASES ANY COMBINATION: CPT

## 2021-10-06 PROCEDURE — 84145 PROCALCITONIN (PCT): CPT

## 2021-10-06 RX ORDER — GLUCAGON 1 MG/ML
1 KIT INJECTION PRN
Status: DISCONTINUED | OUTPATIENT
Start: 2021-10-06 | End: 2021-10-08 | Stop reason: HOSPADM

## 2021-10-06 RX ORDER — SODIUM CHLORIDE 9 MG/ML
INJECTION, SOLUTION INTRAVENOUS CONTINUOUS
Status: DISCONTINUED | OUTPATIENT
Start: 2021-10-07 | End: 2021-10-07

## 2021-10-06 RX ORDER — OXYCODONE AND ACETAMINOPHEN 10; 325 MG/1; MG/1
1 TABLET ORAL ONCE
Status: COMPLETED | OUTPATIENT
Start: 2021-10-06 | End: 2021-10-06

## 2021-10-06 RX ORDER — SODIUM CHLORIDE 9 MG/ML
25 INJECTION, SOLUTION INTRAVENOUS PRN
Status: DISCONTINUED | OUTPATIENT
Start: 2021-10-06 | End: 2021-10-08 | Stop reason: HOSPADM

## 2021-10-06 RX ORDER — DEXTROSE MONOHYDRATE 25 G/50ML
12.5 INJECTION, SOLUTION INTRAVENOUS PRN
Status: DISCONTINUED | OUTPATIENT
Start: 2021-10-06 | End: 2021-10-08 | Stop reason: HOSPADM

## 2021-10-06 RX ORDER — ACETAMINOPHEN 325 MG/1
650 TABLET ORAL EVERY 6 HOURS PRN
Status: DISCONTINUED | OUTPATIENT
Start: 2021-10-06 | End: 2021-10-08 | Stop reason: HOSPADM

## 2021-10-06 RX ORDER — NIFEDIPINE 30 MG/1
30 TABLET, EXTENDED RELEASE ORAL DAILY
Status: DISCONTINUED | OUTPATIENT
Start: 2021-10-07 | End: 2021-10-08 | Stop reason: HOSPADM

## 2021-10-06 RX ORDER — CLONIDINE HYDROCHLORIDE 0.1 MG/1
0.1 TABLET ORAL DAILY
Status: DISCONTINUED | OUTPATIENT
Start: 2021-10-07 | End: 2021-10-08 | Stop reason: HOSPADM

## 2021-10-06 RX ORDER — SERTRALINE HYDROCHLORIDE 100 MG/1
100 TABLET, FILM COATED ORAL DAILY
Status: DISCONTINUED | OUTPATIENT
Start: 2021-10-07 | End: 2021-10-08 | Stop reason: HOSPADM

## 2021-10-06 RX ORDER — FINASTERIDE 5 MG/1
5 TABLET, FILM COATED ORAL DAILY
Status: DISCONTINUED | OUTPATIENT
Start: 2021-10-07 | End: 2021-10-08 | Stop reason: HOSPADM

## 2021-10-06 RX ORDER — DEXTROSE MONOHYDRATE 50 MG/ML
100 INJECTION, SOLUTION INTRAVENOUS PRN
Status: DISCONTINUED | OUTPATIENT
Start: 2021-10-06 | End: 2021-10-08 | Stop reason: HOSPADM

## 2021-10-06 RX ORDER — 0.9 % SODIUM CHLORIDE 0.9 %
1000 INTRAVENOUS SOLUTION INTRAVENOUS ONCE
Status: COMPLETED | OUTPATIENT
Start: 2021-10-06 | End: 2021-10-06

## 2021-10-06 RX ORDER — LANOLIN ALCOHOL/MO/W.PET/CERES
100 CREAM (GRAM) TOPICAL DAILY
Status: DISCONTINUED | OUTPATIENT
Start: 2021-10-07 | End: 2021-10-08 | Stop reason: HOSPADM

## 2021-10-06 RX ORDER — ONDANSETRON 4 MG/1
4 TABLET, ORALLY DISINTEGRATING ORAL EVERY 8 HOURS PRN
Status: DISCONTINUED | OUTPATIENT
Start: 2021-10-06 | End: 2021-10-08 | Stop reason: HOSPADM

## 2021-10-06 RX ORDER — INSULIN GLARGINE 100 [IU]/ML
20 INJECTION, SOLUTION SUBCUTANEOUS 2 TIMES DAILY
Status: DISCONTINUED | OUTPATIENT
Start: 2021-10-07 | End: 2021-10-08 | Stop reason: HOSPADM

## 2021-10-06 RX ORDER — METOPROLOL SUCCINATE 50 MG/1
50 TABLET, EXTENDED RELEASE ORAL NIGHTLY
Status: DISCONTINUED | OUTPATIENT
Start: 2021-10-07 | End: 2021-10-08 | Stop reason: HOSPADM

## 2021-10-06 RX ORDER — ATORVASTATIN CALCIUM 40 MG/1
80 TABLET, FILM COATED ORAL NIGHTLY
Status: DISCONTINUED | OUTPATIENT
Start: 2021-10-07 | End: 2021-10-08 | Stop reason: HOSPADM

## 2021-10-06 RX ORDER — FOLIC ACID 1 MG/1
1 TABLET ORAL DAILY
Status: DISCONTINUED | OUTPATIENT
Start: 2021-10-07 | End: 2021-10-08 | Stop reason: HOSPADM

## 2021-10-06 RX ORDER — ASPIRIN 81 MG/1
81 TABLET, CHEWABLE ORAL DAILY
Status: DISCONTINUED | OUTPATIENT
Start: 2021-10-07 | End: 2021-10-08 | Stop reason: HOSPADM

## 2021-10-06 RX ORDER — ACETAMINOPHEN 650 MG/1
650 SUPPOSITORY RECTAL EVERY 6 HOURS PRN
Status: DISCONTINUED | OUTPATIENT
Start: 2021-10-06 | End: 2021-10-08 | Stop reason: HOSPADM

## 2021-10-06 RX ORDER — SODIUM CHLORIDE 0.9 % (FLUSH) 0.9 %
5-40 SYRINGE (ML) INJECTION EVERY 12 HOURS SCHEDULED
Status: DISCONTINUED | OUTPATIENT
Start: 2021-10-07 | End: 2021-10-08 | Stop reason: HOSPADM

## 2021-10-06 RX ORDER — FLUTICASONE PROPIONATE 44 UG/1
2 AEROSOL, METERED RESPIRATORY (INHALATION) 2 TIMES DAILY
Status: DISCONTINUED | OUTPATIENT
Start: 2021-10-07 | End: 2021-10-08 | Stop reason: HOSPADM

## 2021-10-06 RX ORDER — ACETAMINOPHEN 500 MG
1000 TABLET ORAL ONCE
Status: COMPLETED | OUTPATIENT
Start: 2021-10-06 | End: 2021-10-06

## 2021-10-06 RX ORDER — ONDANSETRON 2 MG/ML
4 INJECTION INTRAMUSCULAR; INTRAVENOUS EVERY 6 HOURS PRN
Status: DISCONTINUED | OUTPATIENT
Start: 2021-10-06 | End: 2021-10-08 | Stop reason: HOSPADM

## 2021-10-06 RX ORDER — SODIUM CHLORIDE 0.9 % (FLUSH) 0.9 %
5-40 SYRINGE (ML) INJECTION PRN
Status: DISCONTINUED | OUTPATIENT
Start: 2021-10-06 | End: 2021-10-08 | Stop reason: HOSPADM

## 2021-10-06 RX ORDER — NICOTINE POLACRILEX 4 MG
15 LOZENGE BUCCAL PRN
Status: DISCONTINUED | OUTPATIENT
Start: 2021-10-06 | End: 2021-10-08 | Stop reason: HOSPADM

## 2021-10-06 RX ORDER — TIZANIDINE 4 MG/1
4 TABLET ORAL EVERY 8 HOURS PRN
Status: DISCONTINUED | OUTPATIENT
Start: 2021-10-06 | End: 2021-10-08 | Stop reason: HOSPADM

## 2021-10-06 RX ORDER — MECOBALAMIN 5000 MCG
10 TABLET,DISINTEGRATING ORAL NIGHTLY
Status: DISCONTINUED | OUTPATIENT
Start: 2021-10-07 | End: 2021-10-08 | Stop reason: HOSPADM

## 2021-10-06 RX ADMIN — OXYCODONE HYDROCHLORIDE AND ACETAMINOPHEN 1 TABLET: 10; 325 TABLET ORAL at 18:14

## 2021-10-06 RX ADMIN — SODIUM CHLORIDE 1000 ML: 9 INJECTION, SOLUTION INTRAVENOUS at 15:51

## 2021-10-06 RX ADMIN — ACETAMINOPHEN 1000 MG: 500 TABLET ORAL at 14:33

## 2021-10-06 ASSESSMENT — PAIN DESCRIPTION - PAIN TYPE
TYPE: CHRONIC PAIN
TYPE: CHRONIC PAIN

## 2021-10-06 ASSESSMENT — PAIN SCALES - GENERAL
PAINLEVEL_OUTOF10: 9
PAINLEVEL_OUTOF10: 6
PAINLEVEL_OUTOF10: 7
PAINLEVEL_OUTOF10: 9

## 2021-10-06 NOTE — ED PROVIDER NOTES
[address] ED  EMERGENCY DEPARTMENT ENCOUNTER        Pt Name: Jocelyne Mas  MRN: [id number]  [place] 1950  Date of evaluation: 10/6/2021  Provider: RICCO Hogan CNP  PCP: RICCO Grady CNP  Note Started: 2:09 PM EDT        I have seen and evaluated this patient with my supervising physician Bogdan Heart MD.    [address] [place] [address]       Chief Complaint   Patient presents with    Blood Sugar Problem       HISTORY OF PRESENT ILLNESS   (Location, Timing/Onset, Context/Setting, Quality, Duration, Modifying Factors, Severity, Associated Signs and Symptoms)  Note limiting factors. Chief Complaint: hyperglycemia    Jocelyne Mas is a 72 y.o. male with medical history of hard of hearing, gout, depression, anxiety, GERD, stomach ulcers, HTN, COPD, chronic back pain, diverticulosis, diverticulitis, HLD, liver hemangioma, BPH, emphysema, DM type I, pyogenic granuloma, peripheral neuropathy, fatty liver, hepatitis C, DKA, MI, chronic systolic heart failure, PTSD, chronic diastolic heart failure, cognitive communication deficits, chronic viral hepatitis, colostomy with revision who presents the emergency department from [address] [place] [address] with complaints of episode of hypoglycemia since this morning. Patient notes that he had his blood sugar checked at 8:18 this morning and it was 560, it was then rechecked at 10:20 and was noted to be high and he was then given 25 units of Humalog. He was rechecked at 12:28 was 424. Patient notes that he received a steroid injection into his upper back yesterday due to chronic back pain. He has received numerous steroid injections without any difficulty. Patient also endorses a generalized headache that has been intermittent and consistent with his history of previous chronic headaches.   He denies any associated numbness, tingling, weakness, rashes, fevers, lightheaded, dizzy, syncope, nausea, vomiting, diarrhea, chest pain, cough, wheezing. He has received his COVID-19 vaccination series Pfizer in January. Former smoker, denies alcohol use or street drugs. Nursing Notes were all reviewed and agreed with or any disagreements were addressed in the HPI. REVIEW OF SYSTEMS    (2-9 systems for level 4, 10 or more for level 5)     Review of Systems    Positives and Pertinent negatives as per HPI. Except as noted above in the ROS, all other systems were reviewed and negative. PAST MEDICAL HISTORY     Past Medical History:   Diagnosis Date    Acute on chronic systolic (congestive) heart failure (HCC)     Anxiety disorder     BPH (benign prostatic hypertrophy)     Calcified granuloma of lung ([facility] 2014    2:stable per 3/25/14 CT chest    Cataract 1/20/14    OU:Dr. hayward:[facility]    Cerebral artery occlusion with cerebral infarction ([facility]     Chronic diastolic heart failure (HCC)     Chronic pain     Chronic viral hepatitis (HCC)     Cognitive communication deficit     COPD (chronic obstructive pulmonary disease) ([facility]     Under care of pulmo(Dr. Calhoun)    Degenerative arthritis of hip     Dementia ([facility]     Depression 3/11/2015    Depression with anxiety     Prior psychiatrist & therapist:Dr. Bruno Rinne.  Diabetes mellitus ([facility] 2004    Endo Dr. Dimitry Holloway type 1 note below in [address]    Diabetic eye exam ([facility] 1/20/14    [facility]:Dr. STEPHEN Hayward:No retinopathy.  Cataract    Diabetic retinopathy ([facility]     under care of [facility]:Dr. Bryant Redo    Diverticulitis 11/26/2011    Diverticulosis 11/26/2011    DKA (diabetic ketoacidoses) (HCC)     Emphysema     Esophageal candidiasis ([address] 7/16/13    GI:EGD    Essential hypertension, benign 11/2010    ETOH abuse     Fatty liver 10/9/2012    Foot ulcer:left 9/30/2013    Gastric ulcer, unspecified as acute or chronic, without mention of hemorrhage or perforation     Generalized anxiety disorder     GERD (gastroesophageal reflux disease)     Gout 1997    Right big toe    Hearing decreased     Left ear=60%. Right ear=80%.  Hemangioma 12/2010    Liver as per CT abdo    hepatitis c 7/26/17, 2010    Under care of Liver doc:Dr. Kim Wong    Hyperlipidemia LDL goal < 100     Hypertensive heart disease with heart failure ([facility]     Left-sided weakness     Low HDL (under 40) 10/9/2012    Myocardial infarction ([facility]     Orthostatic hypotension     Peripheral neuropathy 2006    Pneumonia 10/15/13    Protein calorie malnutrition (HCC)     PTSD (post-traumatic stress disorder)     Pyogenic granuloma     per derm:Dr. Ryan Gotti Restrictive lung disease     Under care of pulmo(Dr. Calhoun)    S/P colonoscopy 1996    Done secondary to rectal bleed:dx=hemorrhoids    S/P colonoscopy 11/11/10;10/23/2013    Dr. Martha Carr 10/2016(3yrs):polyps;diverticulosis. Diverticulosis & polpy(removed). Next in10/2016.  S/P endoscopy 2009    EGD:stomach ulcers.     Sciatica     Superficial phlebitis of left leg 9/30/2013    Tachycardia     Therapeutic drug monitoring 4/8/15    OARRS report is consistent on 4/8/15;7/9/15;10/9/15;1/8/16;4/5/16    Type 1 diabetes mellitus not at goal [facility]) 3/17/14    updated diagnosis as per endo:Dr. Inman Karlos         SURGICAL HISTORY     Past Surgical History:   Procedure Laterality Date    COLONOSCOPY      DIAGNOSTIC CARDIAC CATH LAB PROCEDURE  2013    DILATATION, ESOPHAGUS      FOOT SURGERY Left Hammer toe, 2nd toe    10/9/14    FOOT SURGERY Left 12/5/2019    REMOVAL HARDWARE LEFT FOOT performed by Ashu Amanda DPM at [address] [place] [address] [address] Left 12/03/2019    REMOVAL HARDWARE LEFT FOOT w. Dr Sueann Saint 12/5/19    [address] [facility]      broken leg    OTHER SURGICAL HISTORY Left 11/21/2013    EXCISION 5TH METATRSAL LEFT FOOT          OTHER SURGICAL HISTORY Right 05/30/2019    Procedure: PARTIAL RESECTION RIGHT FIFTH METATARSAL, ULCER DEBRIDEMENT WITH GRAFT APPLICATION    PRESSURE ULCER DEBRIDEMENT Right 5/30/2019    PARTIAL RESECTION RIGHT FIFTH METATARSAL, ULCER DEBRIDEMENT WITH GRAFT APPLICATION performed by Dandre Reynolds DPM at [place] [address]  06/27/2017    COLOSTOMY REVERSAL     TONSILLECTOMY      UPPER GASTROINTESTINAL ENDOSCOPY  7/16/2013    Esophageal Brushing         CURRENTMEDICATIONS       Previous Medications    ACETAMINOPHEN (TYLENOL) 325 MG TABLET    Take 650 mg by mouth every 4 hours as needed for Pain    AGAMATRIX PRESTO TEST STRIP    USE TO TEST BLOOD SUGAR 3 TIMES A DAY    ALBUTEROL SULFATE HFA (PROAIR HFA) 108 (90 BASE) MCG/ACT INHALER    Inhale 2 puffs into the lungs every 4 hours as needed for Wheezing or Shortness of Breath    ALUMINUM & MAGNESIUM HYDROXIDE-SIMETHICONE (MAALOX) 200-200-20 MG/5ML SUSP SUSPENSION    Take 5 mLs by mouth every 6 hours as needed for Indigestion Takes mylanta 30 ml Q4 hours PRN    ASPIRIN 81 MG TABLET    Take 81 mg by mouth daily chewable    ATORVASTATIN (LIPITOR) 80 MG TABLET    Take 80 mg by mouth nightly     BLOOD GLUCOSE MONITORING SUPPL (TRUE METRIX AIR GLUCOSE METER) DENIS    1 meter    CLONIDINE (CATAPRES) 0.1 MG TABLET    Take 1 tablet by mouth daily    CONTINUOUS BLOOD GLUC  (FREESTYLE BRIDGET READER) DENIS    Use to monitor sugars    CONTINUOUS BLOOD GLUC SENSOR (FREESTYLE BRIDGET SENSOR SYSTEM) MISC    Use to monitor sugars    DULAGLUTIDE (TRULICITY) 5.92 PP/9.5YY SOPN    Inject 0.75 mg into the skin once a week On Saturday    FINASTERIDE (PROSCAR) 5 MG TABLET    Take 5 mg by mouth daily    FLUTICASONE (FLOVENT HFA) 44 MCG/ACT INHALER    Inhale 2 puffs into the lungs 2 times daily    FOLIC ACID (FOLVITE) 1 MG TABLET    Take 1 mg by mouth daily    GLUCAGON 1 MG INJECTION    Inject 1 kit into the skin as needed    INSULIN GLARGINE (LANTUS SOLOSTAR SC)    Inject 20 Units into the skin 2 times daily     INSULIN LISPRO (HUMALOG SC)    Inject 6 Units into the skin 3 times daily (before meals) And sliding scale PRN    LANCETS MISC    Testing 2-3 times daily DX Code: E11.9    LOPERAMIDE (IMODIUM) 2 MG CAPSULE    Take 2 mg by mouth every 6 hours as needed for Diarrhea     LORATADINE (CLARITIN) 10 MG TABLET    Take 10 mg by mouth daily    MELATONIN 10 MG CAPS CAPSULE    Take 10 mg by mouth nightly    MEMANTINE (NAMENDA) 10 MG TABLET    Take 10 mg by mouth 2 times daily    METOPROLOL SUCCINATE (TOPROL XL) 50 MG EXTENDED RELEASE TABLET    Take 1 tablet by mouth nightly    NIFEDIPINE (ADALAT CC) 30 MG EXTENDED RELEASE TABLET    Take 1 tablet by mouth daily    POLYETHYLENE GLYCOL (GLYCOLAX) POWDER    Take 17 g by mouth daily    SERTRALINE (ZOLOFT) 100 MG TABLET    Take 100 mg by mouth daily    SUMATRIPTAN (IMITREX) 100 MG TABLET    Take 100 mg by mouth daily as needed for Migraine Give one additional dose if needed after 2 hours if 1st dose is ineffective (NTE 200mg/24 hrs)    THIAMINE 100 MG TABLET    Take 1 tablet by mouth daily    TIZANIDINE (ZANAFLEX) 4 MG TABLET    Take 4 mg by mouth every 8 hours as needed    UMECLIDINIUM-VILANTEROL (ANORO ELLIPTA) 62.5-25 MCG/INH AEPB INHALER    Inhale 1 puff into the lungs daily    VITAMIN B-12 (CYANOCOBALAMIN) 1000 MCG TABLET    TAKE 1 TABLET BY MOUTH DAILY    VITAMIN D (ERGOCALCIFEROL) 1.25 MG (25545 UT) CAPS CAPSULE    Take 50,000 Units by mouth once a week thursday    VITAMIN E 1000 UNITS CAPSULE    Take 1 capsule by mouth daily         ALLERGIES     Neurontin [gabapentin]    FAMILYHISTORY       Family History   Problem Relation Age of Onset    Stroke Mother     Hypertension Mother     Arthritis Father     Substance Abuse Father         Etoh    Cancer Father         esophageal    Hypertension Father     Diabetes Brother     Diabetes Paternal Aunt     Asthma Neg Hx     Emphysema Neg Hx     Heart Failure Neg Hx           SOCIAL HISTORY       Social History     Tobacco Use    Smoking status: Former Smoker     Packs/day: 0.50     Years: 35.00     Pack years: 17.50     Types: Cigarettes     Quit date: 9/20/2018     Years since quitting: 3.0    Smokeless tobacco: Never Used   Vaping Use    Vaping Use: Never used   Substance Use Topics    Alcohol use: Not Currently    Drug use: No       SCREENINGS             PHYSICAL EXAM    (up to 7 for level 4, 8 or more for level 5)     ED Triage Vitals [10/06/21 1338]   BP Temp Temp Source Pulse Resp SpO2 Height Weight   117/62 97.7 °F (36.5 °C) Tympanic 98 14 98 % 6' 5\" (1.956 m) 196 lb (88.9 kg)       Physical Exam  Vitals and nursing note reviewed. Constitutional:       General: He is awake. Appearance: Normal appearance. He is well-developed and normal weight. HENT:      Head: Normocephalic and atraumatic. Right Ear: Hearing normal.      Left Ear: Hearing normal.      Nose: Nose normal.      Right Sinus: No maxillary sinus tenderness or frontal sinus tenderness. Left Sinus: No maxillary sinus tenderness or frontal sinus tenderness. Mouth/Throat:      Mouth: Mucous membranes are moist.      Pharynx: Oropharynx is clear. Eyes:      General:         Right eye: No discharge. Left eye: No discharge. Extraocular Movements: Extraocular movements intact. Conjunctiva/sclera: Conjunctivae normal.      Pupils: Pupils are equal, round, and reactive to light. Cardiovascular:      Rate and Rhythm: Normal rate and regular rhythm. Pulses:           Dorsalis pedis pulses are 2+ on the right side and 2+ on the left side. Heart sounds: Normal heart sounds. Pulmonary:      Effort: Pulmonary effort is normal. No respiratory distress. Musculoskeletal:         General: Normal range of motion. Cervical back: Full passive range of motion without pain and normal range of motion. No spinous process tenderness or muscular tenderness. Back:       Right lower leg: No edema. Left lower leg: No edema. Skin:     General: Skin is warm and dry. Coloration: Skin is not pale.    Neurological:      Mental Status: He is alert and oriented to person, place, and time. Psychiatric:         Behavior: Behavior normal. Behavior is cooperative.          DIAGNOSTIC RESULTS   LABS:    Labs Reviewed   BLOOD GAS, VENOUS - Abnormal; Notable for the following components:       Result Value    pCO2, Adam 38.3 (*)     pO2, Adam 83.5 (*)     HCO3, Venous 22.3 (*)     Carboxyhemoglobin 1.6 (*)     All other components within normal limits    Narrative:     Performed at:  [facility],  [place] [employer]   Phone [phone]   CBC WITH AUTO DIFFERENTIAL - Abnormal; Notable for the following components:    WBC 15.1 (*)     RBC 3.65 (*)     Hemoglobin 11.1 (*)     Hematocrit 32.9 (*)     Neutrophils Absolute 11.5 (*)     Monocytes Absolute 1.5 (*)     All other components within normal limits    Narrative:     Performed at:  [facility],  [place]   Phone [phone]   COMPREHENSIVE METABOLIC PANEL W/ REFLEX TO MG FOR LOW K - Abnormal; Notable for the following components:    Sodium 126 (*)     Chloride 91 (*)     CO2 20 (*)     Glucose 283 (*)     BUN 40 (*)     CREATININE 2.0 (*)     GFR Non- 33 (*)     GFR  40 (*)     ALT 8 (*)     AST 13 (*)     All other components within normal limits    Narrative:     Performed at:  [facility],  [place]   Phone [phone]   URINALYSIS - Abnormal; Notable for the following components:    Glucose, Ur >=1000 (*)     Protein,  (*)     All other components within normal limits    Narrative:     Performed at:  [facility] - [facility],  [place]   Phone [phone]   LACTIC ACID, PLASMA - Abnormal; Notable for the following components:    Lactic Acid 3.1 (*)     All other components within normal limits    Narrative:     Performed at:  [facility]) - [facility],  [place], [employer]   Phone [phone]   MICROSCOPIC URINALYSIS - Abnormal; Notable for the following components:    Hyaline Casts, UA 3-5 (*)     Mucus, UA Rare (*)     All other components within normal limits    Narrative:     Performed at:  [facility],  [place], [employer]   Phone [phone]   SEDIMENTATION RATE - Abnormal; Notable for the following components:    Sed Rate 32 (*)     All other components within normal limits    Narrative:     Performed at:  [place] [address],  [place], [employer]   Phone [phone]   POCT GLUCOSE - Abnormal; Notable for the following components:    POC Glucose 274 (*)     All other components within normal limits    Narrative:     Performed at:  [place] [address],  [place], [employer]   Phone [phone]   POCT GLUCOSE - Abnormal; Notable for the following components:    POC Glucose 144 (*)     All other components within normal limits    Narrative:     Performed at:  [facility]) - [facility],  [place], [employer]   Phone [phone]   POCT GLUCOSE - Normal   CULTURE, BLOOD 1   CULTURE, BLOOD 2   BETA-HYDROXYBUTYRATE    Narrative:     Performed at:  [place] [address],  [place], [employer]   Phone [phone]   PROCALCITONIN    Narrative:     Performed at:  [facility]) - [facility],  [place], [employer]   Phone [phone]   C-REACTIVE PROTEIN       When ordered only abnormal lab results are displayed. All other labs were within normal range or not returned as of this dictation. EKG:  When ordered, EKG's are interpreted by the Emergency Department Physician in the absence of a cardiologist.  Please see their note for interpretation of EKG.    RADIOLOGY:   Non-plain film images such as CT, Ultrasound and MRI are read by the radiologist. Plain radiographic images are visualized and preliminarily interpreted by the ED Provider with the below findings:        Interpretation per the Radiologist below, if available at the time of this note:    XR CHEST PORTABLE   Final Result   Mild bibasilar discoid atelectasis or scarring which is less prominent with   no infiltrate or effusion         CT HEAD WO CONTRAST   Final Result   No hemorrhage or mass identified      Atrophy with periventricular and scattered frontal parietal white matter   disease, likely due to small-vessel ischemic change with remote appearing   infarct right occipital lobe      Paranasal sinus disease, greatest in the left maxillary sinus           MRI THORACIC SPINE WO CONTRAST    Result Date: 10/1/2021  EXAMINATION: MRI OF THE THORACIC SPINE WITHOUT CONTRAST  10/1/2021 1:26 pm TECHNIQUE: Multiplanar multisequence MRI of the thoracic spine was performed without the administration of intravenous contrast. COMPARISON: None. HISTORY: ORDERING SYSTEM PROVIDED HISTORY: Thoracic spondylosis without myelopathy TECHNOLOGIST PROVIDED HISTORY: Reason for Exam: Thoracic spondylosis without myelopathy Acuity: Chronic Type of Exam: Initial FINDINGS: BONES/ALIGNMENT: There is normal alignment of the thoracic spine. There is no acute fracture or listhesis. Bone marrow signal intensity is normal. There is multilevel disc desiccation. Vertebral body heights and intervertebral disc heights are maintained. Mild multilevel anterior osteophyte formation is present. SPINAL CORD: The thoracic spinal cord is normal in size and signal intensities. SOFT TISSUES: There is no paraspinal mass identified. DEGENERATIVE CHANGES: There is no disc bulge or protrusion. No significant spinal canal stenosis or neural foraminal narrowing is present. Mild degenerative changes of the thoracic spine.   There is no significant spinal canal stenosis or neural foraminal narrowing. MRI LUMBAR SPINE WO CONTRAST    Result Date: 10/4/2021  EXAMINATION: MRI OF THE LUMBAR SPINE WITHOUT CONTRAST, 10/1/2021 1:26 pm TECHNIQUE: Multiplanar multisequence MRI of the lumbar spine was performed without the administration of intravenous contrast. COMPARISON: None. HISTORY: ORDERING SYSTEM PROVIDED HISTORY: Thoracic spondylosis without myelopathy TECHNOLOGIST PROVIDED HISTORY: Reason for Exam: Chonic back pain Initial evaluation FINDINGS: BONES/ALIGNMENT: There is normal alignment of the spine. The vertebral body heights are maintained. The bone marrow signal appears unremarkable. There is degenerative disc disease with disc space narrowing and osteophytes at L2-3, L3-4, L4-5, and L5-S1. SPINAL CORD: The conus terminates normally. SOFT TISSUES: No paraspinal mass. L1-L2: The thecal sac and neural foramina are intact. L2-3: There is a disc protrusion measuring 2-3 mm with small osteophytes. Disc and/or osteophytes cause minimal narrowing of the neural foramina bilaterally. L3-L4: There is a broad-based disc bulge measuring 3-4 mm. There are small osteophytes. There is moderate facet and ligamentum flavum hypertrophy. The thecal sac is mildly stenotic measuring 9.5 mm. Disc and/or osteophytes result in moderate narrowing of the neural foramina bilaterally. L4-L5: There is a disc protrusion measuring 3-4 mm. There are small osteophytes. Disc and/or osteophytes result in moderate narrowing of the neural foramina bilaterally. The left neural foramen is narrowed greater than right. The thecal sac is mildly stenotic measuring 9.8 mm. L5-S1: There is a disc protrusion measuring 3-4 mm and osteophytes. There is mild facet and ligamentum flavum hypertrophy. The thecal sac is not stenotic. The S1 nerve roots are intact. Disc and/or osteophytes result in moderate narrowing of the neural foramina bilaterally.      Disc and osteophytes result in mild stenosis of the thecal sac and narrowing of the neural foramina throughout the lumbar region as discussed in detail above. PROCEDURES   Unless otherwise noted below, none     Procedures    CRITICAL CARE TIME   N/A    CONSULTS:  IP CONSULT TO HOSPITALIST      EMERGENCY DEPARTMENT COURSE and DIFFERENTIAL DIAGNOSIS/MDM:   Vitals:    Vitals:    10/06/21 1338 10/06/21 1551 10/06/21 1816   BP: 117/62  (!) 171/85   Pulse: 98 69 71   Resp: 14  17   Temp: 97.7 °F (36.5 °C)     TempSrc: Tympanic     SpO2: 98% 98% 98%   Weight: 196 lb (88.9 kg)     Height: 6' 5\" (1.956 m)         Patient was given the following medications:  Medications   acetaminophen (TYLENOL) tablet 1,000 mg (1,000 mg Oral Given 10/6/21 1433)   0.9 % sodium chloride bolus (0 mLs IntraVENous Stopped 10/6/21 1721)   oxyCODONE-acetaminophen (PERCOCET)  MG per tablet 1 tablet (1 tablet Oral Given 10/6/21 1814)           Care of this patient took place during the COVID-19 pandemic emergency. ED COURSE & MEDICAL DECISION MAKING    - The patient presented to the ER with complaints of hyperglycemia. Vital signs were reviewed. Exam well-developed, well-nourished male who appears uncomfortable. Peripheral IV placed. Labs, Imaging ordered. - Pertinent Labs & Imaging studies reviewed. (See chart for details)   -  Patient seen and evaluated in the emergency department. -  Triage and nursing notes reviewed and incorporated. -  Old chart records reviewed and incorporated.  -  Dr. Brenna Presley emergency department attending assessed the patient  -  Differential diagnosis includes:  Cellulitis, abscess, epidural abscess, DKA, dissection, aneurysm, SAH, dehydration, pyelonephritis, UTI, rhabdo, versus COVID-19  -  Work-up included:  See above  -  ED treatment included:   Tylenol, oxycodone  -  Results discussed with patient. Jennifer Trevino is a 56-year-old male from [address] [place] [address] with complaints of hyperglycemia.   His portions of this note were completed with a voice recognition program.  Efforts were made to edit the dictations but occasionally words are mis-transcribed.)    RICCO Cook CNP (electronically signed)            RICCO Cook CNP  10/06/21 9172

## 2021-10-06 NOTE — ED PROVIDER NOTES
ED Attending Attestation Note    This patient was seen by the advance practice provider. I have seen and examined the patient. I agree with the workup, evaluation, management, and diagnosis. The care plan has been discussed. My assessment reveals 72 y.o. male presenting for evaluation of hyperglycemia. Is occurring in the setting of known history of DM 2 and recent steroid injection. Pt reports he has been receiving insulin regularly at his nursing facility. He denies fever, chest pain, shortness of breath, nausea, vomiting or diarrhea, abdominal pain, dysuria. My focused exam reveals 73 yo M, nontoxic, NAD. Mucus membranes are tacky. Lungs CTAB. Abdomen soft, nontender, nondistended. For further details of the patient's emergency department visit, please see the advanced practice provider's documentation. Linda Whelan MD     This report has been produced using speech recognition software and may contain errors related to that system including errors in grammar, punctuation, and spelling, as well as words and phrases that may be inappropriate. If there are any questions or concerns please feel free to contact the dictating provider for clarification.        Linda Whelan MD  10/06/21 9909

## 2021-10-07 VITALS
BODY MASS INDEX: 23.31 KG/M2 | OXYGEN SATURATION: 98 % | WEIGHT: 197.4 LBS | SYSTOLIC BLOOD PRESSURE: 165 MMHG | DIASTOLIC BLOOD PRESSURE: 87 MMHG | HEART RATE: 69 BPM | TEMPERATURE: 98.4 F | HEIGHT: 77 IN | RESPIRATION RATE: 16 BRPM

## 2021-10-07 PROBLEM — N17.9 AKI (ACUTE KIDNEY INJURY) (HCC): Status: RESOLVED | Noted: 2021-10-06 | Resolved: 2021-10-07

## 2021-10-07 PROBLEM — I50.22 CHRONIC SYSTOLIC CHF (CONGESTIVE HEART FAILURE) (HCC): Status: ACTIVE | Noted: 2020-10-22

## 2021-10-07 LAB
A/G RATIO: 1.4 (ref 1.1–2.2)
ALBUMIN SERPL-MCNC: 4.2 G/DL (ref 3.4–5)
ALP BLD-CCNC: 86 U/L (ref 40–129)
ALT SERPL-CCNC: 7 U/L (ref 10–40)
ANION GAP SERPL CALCULATED.3IONS-SCNC: 11 MMOL/L (ref 3–16)
AST SERPL-CCNC: 11 U/L (ref 15–37)
BASOPHILS ABSOLUTE: 0.1 K/UL (ref 0–0.2)
BASOPHILS RELATIVE PERCENT: 0.8 %
BILIRUB SERPL-MCNC: 0.5 MG/DL (ref 0–1)
BUN BLDV-MCNC: 31 MG/DL (ref 7–20)
C-REACTIVE PROTEIN: <3 MG/L (ref 0–5.1)
CALCIUM SERPL-MCNC: 8.9 MG/DL (ref 8.3–10.6)
CHLORIDE BLD-SCNC: 102 MMOL/L (ref 99–110)
CO2: 23 MMOL/L (ref 21–32)
CREAT SERPL-MCNC: 1.3 MG/DL (ref 0.8–1.3)
EOSINOPHILS ABSOLUTE: 0 K/UL (ref 0–0.6)
EOSINOPHILS RELATIVE PERCENT: 0.3 %
GFR AFRICAN AMERICAN: >60
GFR NON-AFRICAN AMERICAN: 55
GLOBULIN: 2.9 G/DL
GLUCOSE BLD-MCNC: 222 MG/DL (ref 70–99)
GLUCOSE BLD-MCNC: 375 MG/DL (ref 70–99)
GLUCOSE BLD-MCNC: 379 MG/DL (ref 70–99)
GLUCOSE BLD-MCNC: 415 MG/DL (ref 70–99)
GLUCOSE BLD-MCNC: 73 MG/DL (ref 70–99)
HCT VFR BLD CALC: 36.6 % (ref 40.5–52.5)
HEMOGLOBIN: 12.1 G/DL (ref 13.5–17.5)
LACTIC ACID: 0.9 MMOL/L (ref 0.4–2)
LYMPHOCYTES ABSOLUTE: 2.4 K/UL (ref 1–5.1)
LYMPHOCYTES RELATIVE PERCENT: 24.1 %
MCH RBC QN AUTO: 30.1 PG (ref 26–34)
MCHC RBC AUTO-ENTMCNC: 33.1 G/DL (ref 31–36)
MCV RBC AUTO: 91.1 FL (ref 80–100)
MONOCYTES ABSOLUTE: 0.9 K/UL (ref 0–1.3)
MONOCYTES RELATIVE PERCENT: 8.8 %
NEUTROPHILS ABSOLUTE: 6.7 K/UL (ref 1.7–7.7)
NEUTROPHILS RELATIVE PERCENT: 66 %
PDW BLD-RTO: 12.9 % (ref 12.4–15.4)
PERFORMED ON: ABNORMAL
PERFORMED ON: NORMAL
PLATELET # BLD: 236 K/UL (ref 135–450)
PMV BLD AUTO: 8.2 FL (ref 5–10.5)
POTASSIUM SERPL-SCNC: 4.3 MMOL/L (ref 3.5–5.1)
RBC # BLD: 4.02 M/UL (ref 4.2–5.9)
SODIUM BLD-SCNC: 136 MMOL/L (ref 136–145)
TOTAL PROTEIN: 7.1 G/DL (ref 6.4–8.2)
WBC # BLD: 10.1 K/UL (ref 4–11)

## 2021-10-07 PROCEDURE — 6370000000 HC RX 637 (ALT 250 FOR IP): Performed by: INTERNAL MEDICINE

## 2021-10-07 PROCEDURE — 96372 THER/PROPH/DIAG INJ SC/IM: CPT

## 2021-10-07 PROCEDURE — 99217 PR OBSERVATION CARE DISCHARGE MANAGEMENT: CPT | Performed by: PHYSICIAN ASSISTANT

## 2021-10-07 PROCEDURE — 85025 COMPLETE CBC W/AUTO DIFF WBC: CPT

## 2021-10-07 PROCEDURE — 83605 ASSAY OF LACTIC ACID: CPT

## 2021-10-07 PROCEDURE — G0378 HOSPITAL OBSERVATION PER HR: HCPCS

## 2021-10-07 PROCEDURE — 6370000000 HC RX 637 (ALT 250 FOR IP): Performed by: PHYSICIAN ASSISTANT

## 2021-10-07 PROCEDURE — 6360000002 HC RX W HCPCS: Performed by: INTERNAL MEDICINE

## 2021-10-07 PROCEDURE — 2580000003 HC RX 258: Performed by: INTERNAL MEDICINE

## 2021-10-07 PROCEDURE — 83036 HEMOGLOBIN GLYCOSYLATED A1C: CPT

## 2021-10-07 PROCEDURE — 80053 COMPREHEN METABOLIC PANEL: CPT

## 2021-10-07 PROCEDURE — 36415 COLL VENOUS BLD VENIPUNCTURE: CPT

## 2021-10-07 RX ORDER — PANTOPRAZOLE SODIUM 40 MG/1
40 TABLET, DELAYED RELEASE ORAL
Status: DISCONTINUED | OUTPATIENT
Start: 2021-10-08 | End: 2021-10-08 | Stop reason: HOSPADM

## 2021-10-07 RX ORDER — OXYCODONE HCL 10 MG/1
10 TABLET, FILM COATED, EXTENDED RELEASE ORAL EVERY 12 HOURS SCHEDULED
Status: DISCONTINUED | OUTPATIENT
Start: 2021-10-07 | End: 2021-10-08 | Stop reason: HOSPADM

## 2021-10-07 RX ORDER — MEMANTINE HYDROCHLORIDE 5 MG/1
10 TABLET ORAL 2 TIMES DAILY
Status: DISCONTINUED | OUTPATIENT
Start: 2021-10-07 | End: 2021-10-08 | Stop reason: HOSPADM

## 2021-10-07 RX ORDER — OXYCODONE HYDROCHLORIDE 5 MG/1
10 TABLET ORAL EVERY 8 HOURS PRN
Status: ON HOLD | COMMUNITY
End: 2022-02-21 | Stop reason: SDUPTHER

## 2021-10-07 RX ORDER — POLYETHYLENE GLYCOL 3350 17 G/17G
17 POWDER, FOR SOLUTION ORAL DAILY
Status: DISCONTINUED | OUTPATIENT
Start: 2021-10-07 | End: 2021-10-08 | Stop reason: HOSPADM

## 2021-10-07 RX ORDER — FLUTICASONE FUROATE 100 UG/1
1 POWDER RESPIRATORY (INHALATION) DAILY
COMMUNITY

## 2021-10-07 RX ORDER — PANTOPRAZOLE SODIUM 40 MG/1
40 TABLET, DELAYED RELEASE ORAL DAILY
COMMUNITY

## 2021-10-07 RX ORDER — OXYCODONE HYDROCHLORIDE 5 MG/1
10 TABLET ORAL EVERY 6 HOURS PRN
Status: DISCONTINUED | OUTPATIENT
Start: 2021-10-07 | End: 2021-10-08 | Stop reason: HOSPADM

## 2021-10-07 RX ORDER — OXYCODONE 9 MG/1
9 CAPSULE, EXTENDED RELEASE ORAL EVERY 12 HOURS
Status: ON HOLD | COMMUNITY
End: 2022-02-17

## 2021-10-07 RX ADMIN — Medication 10 MG: at 01:43

## 2021-10-07 RX ADMIN — SODIUM CHLORIDE: 9 INJECTION, SOLUTION INTRAVENOUS at 00:49

## 2021-10-07 RX ADMIN — METOPROLOL SUCCINATE 50 MG: 50 TABLET, EXTENDED RELEASE ORAL at 01:44

## 2021-10-07 RX ADMIN — INSULIN GLARGINE 20 UNITS: 100 INJECTION, SOLUTION SUBCUTANEOUS at 01:36

## 2021-10-07 RX ADMIN — INSULIN GLARGINE 20 UNITS: 100 INJECTION, SOLUTION SUBCUTANEOUS at 09:31

## 2021-10-07 RX ADMIN — Medication 100 MG: at 09:26

## 2021-10-07 RX ADMIN — CLONIDINE HYDROCHLORIDE 0.1 MG: 0.1 TABLET ORAL at 09:26

## 2021-10-07 RX ADMIN — ASPIRIN 81 MG: 81 TABLET, CHEWABLE ORAL at 09:25

## 2021-10-07 RX ADMIN — FOLIC ACID 1 MG: 1 TABLET ORAL at 09:26

## 2021-10-07 RX ADMIN — NIFEDIPINE 30 MG: 30 TABLET, FILM COATED, EXTENDED RELEASE ORAL at 09:26

## 2021-10-07 RX ADMIN — FINASTERIDE 5 MG: 5 TABLET, FILM COATED ORAL at 09:25

## 2021-10-07 RX ADMIN — SERTRALINE 100 MG: 100 TABLET, FILM COATED ORAL at 09:26

## 2021-10-07 RX ADMIN — ENOXAPARIN SODIUM 40 MG: 40 INJECTION SUBCUTANEOUS at 09:25

## 2021-10-07 RX ADMIN — OXYCODONE HYDROCHLORIDE 10 MG: 5 TABLET ORAL at 13:13

## 2021-10-07 ASSESSMENT — PAIN SCALES - GENERAL: PAINLEVEL_OUTOF10: 9

## 2021-10-07 NOTE — DISCHARGE INSTR - COC
Continuity of Care Form    Patient Name: Milka Worthy   :  1950  MRN:  [id number]    Admit date:  10/6/2021  Discharge date:  10/7/21    Code Status Order: Full Code   Advance Directives:     Admitting Physician:  Judge Cristóbal MD  PCP: RICCO Kenney CNP    Discharging Nurse: [facility] Unit/Room#: SD02/SD-02  Discharging Unit Phone Number: ***    Emergency Contact:   Extended Emergency Contact Information  Primary Emergency Contact: [address] Phone: [phone]  Mobile Phone: [phone]  Relation: Brother/Sister  Secondary Emergency Contact: Carole Paulson, [address] Phone: [phone]  Relation: Other    Past Surgical History:  Past Surgical History:   Procedure Laterality Date    COLONOSCOPY      DIAGNOSTIC CARDIAC CATH LAB PROCEDURE      DILATATION, ESOPHAGUS      FOOT SURGERY Left Hammer toe, 2nd toe    10/9/14    FOOT SURGERY Left 2019    REMOVAL HARDWARE LEFT FOOT performed by Norma Vasquez DPM at [address] REMOVAL Left 2019    REMOVAL HARDWARE LEFT FOOT w. Dr Beau Colon 19    [facility]      broken leg    OTHER SURGICAL HISTORY Left 2013    EXCISION 5TH METATRSAL LEFT FOOT          OTHER SURGICAL HISTORY Right 2019    Procedure: PARTIAL RESECTION RIGHT FIFTH METATARSAL, ULCER DEBRIDEMENT WITH GRAFT APPLICATION    PRESSURE ULCER DEBRIDEMENT Right 2019    PARTIAL RESECTION RIGHT FIFTH METATARSAL, ULCER DEBRIDEMENT WITH GRAFT [facility]    COLOSTOMY REVERSAL     TONSILLECTOMY      UPPER GASTROINTESTINAL ENDOSCOPY  2013    Esophageal Brushing       Immunization History:   Immunization History   Administered Date(s) Administered    COVID-19, Olivera Peter, PF, 30mcg/0.3mL 2020, 2021    Hepatitis A 2010, 2016    Hepatitis A Adult (Havrix, Vaqta) 08/18/2016    Hepatitis B 02/05/2010, 08/18/2016    Influenza 10/20/2010, 11/29/2011, 10/09/2012    Influenza Vaccine, unspecified formulation 10/16/2013, 11/03/2016    Influenza Virus Vaccine 10/17/2013, 10/06/2014, 10/09/2015, 11/03/2016    Influenza, High Dose (Fluzone 65 yrs and older) 09/27/2017    Influenza, Hilda Mclean, 6 mo and older, IM, PF (Flulaval, Fluarix) 10/19/2018    Pneumococcal Conjugate 13-valent ([id number]) 11/10/2015    Pneumococcal Conjugate Vaccine 11/10/2015    Pneumococcal Polysaccharide ([id number]) 09/27/2012, 10/16/2013, 09/22/2015    Tdap (Boostrix, Adacel) 02/05/2010, 09/06/2019       Active Problems:  Patient Active Problem List   Diagnosis Code    GERD (gastroesophageal reflux disease) K21.9    Peripheral neuropathy of bilateral feet G62.9    COPD with acute bronchitis ([facility] [address] J44.0, J20.9    HLD (hyperlipidemia) E78.5    Hepatic steatosis K76.0    PTSD (post-traumatic stress disorder) F43.10    History of hepatitis C Z86.19    Bilateral knee & hip OA M17.10    Mild-moderate alcohol consumption (beer) F10.20    Chronic pain syndrome G89.4    Moderate episode of recurrent major depressive disorder (HCC) F33.1    Orthostatic hypotension I95.1    S/P left colectomy Z90.49    Hyponatremia E87.1    S/p reversal of colostomy (6/27/17) K55.9    Chronic dCHF (grade 1 LVDD) I50.32    Chronic normocytic anemia D64.9    Hyperglycemia R73.9    Severe protein-calorie malnutrition ([facility] [address] E43    DM (diabetes mellitus), secondary, uncontrolled, w/neurologic complic (HCC) W23.06, X38.31    Hx DKA (Feb 2018) E11.10, Z79.4    Other insomnia G47.09    Chronic transaminasemia R74.01    Tobacco smoker F17.200    Fall at home W19. Amaryllis Prader, Y92.009    Hypochloremia E87.8    Hyperkalemia E87.5    Mixed metabolic and respiratory acidosis E87.2    Closed nondisplaced L ankle fracture (lateral malleolus) S82.892A    L 2nd toenail avulsion S91.209A    Skin tear of L leg S81.812A    Head trauma S09.90XA    IDDM (insulin dependent diabetes mellitus) LDH5013    Ventral hernia without obstruction or gangrene K43.9    Diabetic ketoacidosis without coma associated with type 2 diabetes mellitus (HCC) E11.10    DDD (degenerative disc disease), cervical M50.30    Spondylosis of cervical region without myelopathy or radiculopathy M47.812    DDD (degenerative disc disease), lumbar M51.36    Closed nondisplaced fracture of lateral malleolus of left fibula S82.65XA    Lumbar degenerative disc disease M51.36    Protrusion of lumbar intervertebral disc M51.26    Osteoarthritis of both knees M17.0    Osteoarthritis of both hips M16.0    Idiopathic peripheral neuropathy G60.9    Leg pain, anterior, left M79.605    CVA (cerebral vascular accident) ([facility] I63.9    DM (diabetes mellitus) ([facility] [address] E11.9    BPH (benign prostatic hyperplasia) N40.0    Dysarthria R47.1    Left-sided weakness R53.1    TIA (transient ischemic attack) G45.9    Dyslipidemia E78.5    DKA, type 2, not at goal [facility]) E11.10    Agitation requiring sedation protocol R45.1    DKA, type 1, not at goal [facility]) E10.10    Severe malnutrition (HCC) E43    Acute renal failure (HCC) N17.9    Open wound of right foot S91.301A    Dementia associated with other underlying disease without behavioral disturbance ([facility]) F02.80    Closed head injury S09.90XA    Facial hematoma S00.83XA    Concussion with no loss of consciousness S06.0X0A    Chest pain R07.9    SOB (shortness of breath) R06.02    Chronic systolic CHF (congestive heart failure) ([facility]) I50.22    Cardiomyopathy ([facility]) I42.9    Hypertension I10    Chronic kidney disease N18.9    Syncope and collapse R55    LV dysfunction I51.9    Aortic root dilation ([facility]) I77.810       Isolation/Infection:   Isolation          No Isolation        Patient Infection Status     Infection Onset Added Last Indicated Last Indicated By Review Planned Expiration Resolved Resolved By    None active    Resolved    COVID-19 Rule Out 10/06/21 10/06/21 10/06/21 COVID-19, Rapid (Ordered)   10/06/21 Rule-Out Test Resulted    COVID-19 Rule Out 04/19/21 04/19/21 04/19/21 COVID-19 & Influenza Combo (Ordered)   04/19/21 Rule-Out Test Resulted    COVID-19 Rule Out 04/16/21 04/16/21 04/16/21 COVID-19 & Influenza Combo (Ordered)   04/16/21 Rule-Out Test Resulted    COVID-19 Rule Out 04/01/21 04/01/21 04/01/21 COVID-19, Rapid (Ordered)   04/01/21 Rule-Out Test Resulted    COVID-19 Rule Out 10/22/20 10/22/20 10/22/20 COVID-19 (Ordered)   10/23/20 Rule-Out Test Resulted    COVID-19 Rule Out 10/22/20 10/22/20 10/22/20 COVID-19 (Ordered)   10/22/20 Rule-Out Test Resulted          Nurse Assessment:  Last Vital Signs: BP (!) 160/90   Pulse 65   Temp 97.8 °F (36.6 °C) (Oral)   Resp 16   Ht 6' 5\" (1.956 m)   Wt 197 lb 6.4 oz (89.5 kg)   SpO2 98%   BMI 23.41 kg/m²     Last documented pain score (0-10 scale): Pain Level: 9  Last Weight:   Wt Readings from Last 1 Encounters:   10/06/21 197 lb 6.4 oz (89.5 kg)     Mental Status:  {IP PT MENTAL STATUS:21815}    IV Access:  { NINA IV ACCESS:690568338}    Nursing Mobility/ADLs:  Walking   {P DME NPJR:782410847}  Transfer  {P DME MFOA:607887952}  Bathing  {P DME ZERE:607256851}  Dressing  {CHP DME SJIB:872583040}  Toileting  {CHP DME WSEF:488811505}  Feeding  {P DME ITDT:402112526}  Med Admin  {P DME ERGE:400972373}  Med Delivery   { NINA MED Delivery:573877382}    Wound Care Documentation and Therapy:        Elimination:  Continence:   · Bowel: {YES / QL:43417}  · Bladder: {YES / NP:49003}  Urinary Catheter: {Urinary Catheter:447702268}   Colostomy/Ileostomy/Ileal Conduit: {YES / VR:81292}       Date of Last BM: ***    Intake/Output Summary (Last 24 hours) at 10/7/2021 1417  Last data filed at 10/6/2021 1914  Gross per 24 hour   Intake 1000 ml   Output 850 ml   Net 150 ml     I/O last 3 completed shifts:   In: 1000 [IV Piggyback:1000]  Out: 850 [Urine:850]    Safety Concerns:     [address] Safety Concerns:252912537}    Impairments/Disabilities:      [address] Impairments/Disabilities:310238962}    Nutrition Therapy:  Current Nutrition Therapy:   [address] NINA Diet List:185649593}    Routes of Feeding: {CHP DME Other Feedings:921745656}  Liquids: {Slp liquid thickness:70432}  Daily Fluid Restriction: {CHP DME Yes amt example:690497420}  Last Modified Barium Swallow with Video (Video Swallowing Test): {Done Not Done CPXO:898627859}    Treatments at the Time of Hospital Discharge:   Respiratory Treatments: ***  Oxygen Therapy:  {Therapy; copd oxygen:53414}  Ventilator:    {[facility] Vent DROO:794961924}    Rehab Therapies: {THERAPEUTIC INTERVENTION:6943819631}  Weight Bearing Status/Restrictions: 50T[address] Castillo  Weight Bearin}  Other Medical Equipment (for information only, NOT a DME order):  {EQUIPMENT:965062226}  Other Treatments: ***    Patient's personal belongings (please select all that are sent with patient):  {[facility] DME Belongings:297913872}    RN SIGNATURE:  {Esignature:415202691}    CASE MANAGEMENT/SOCIAL WORK SECTION    Inpatient Status Date: OBSERVATION  Readmission Risk Assessment Score:  Readmission Risk              Risk of Unplanned Readmission:  0           Discharging to Facility/ Agency   Name:   Name: PATIENTS' [facility]  Address: [place] [address]  Phone:[phone]  VOD:[phone]    ·   · Address:  · Phone:  · Fax:    Dialysis Facility (if applicable)   · Name:  · Address:  · Dialysis Schedule:  · Phone:  · Fax:    / signature: Electronically signed by Marion Bernabe RN on 10/7/21 at 2:18 PM EDT    PHYSICIAN SECTION    Prognosis: {Prognosis:1152715870}    Condition at Discharge: 50T[address] Castillo Patient Condition:000064731}    Rehab Potential (if transferring to Rehab): {Prognosis:8602210396}    Recommended Labs or Other Treatments After Discharge: ***    Physician Certification: I certify the above information and transfer of Maury Reich  is necessary for the continuing treatment of the diagnosis listed and that he requires {Admit to Appropriate Level of Care:66957} for {GREATER/LESS:433160365} 30 days.      Update Admission H&P: {CHP DME Changes in [facility]:899522897}    PHYSICIAN SIGNATURE:  {Esignature:420200089}

## 2021-10-07 NOTE — PLAN OF CARE
Problem: Falls - Risk of:  Goal: Will remain free from falls  Description: Will remain free from falls  Outcome: Ongoing  Goal: Absence of physical injury  Description: Absence of physical injury  Outcome: Ongoing     Problem: Infection:  Goal: Will remain free from infection  Description: Will remain free from infection  Outcome: Ongoing     Problem: Safety:  Goal: Free from accidental physical injury  Description: Free from accidental physical injury  Outcome: Ongoing  Goal: Free from intentional harm  Description: Free from intentional harm  Outcome: Ongoing     Problem: Daily Care:  Goal: Daily care needs are met  Description: Daily care needs are met  Outcome: Ongoing     Problem: Pain:  Goal: Patient's pain/discomfort is manageable  Description: Patient's pain/discomfort is manageable  Outcome: Ongoing     Problem: Skin Integrity:  Goal: Skin integrity will stabilize  Description: Skin integrity will stabilize  Outcome: Ongoing     Problem: Discharge Planning:  Goal: Patients continuum of care needs are met  Description: Patients continuum of care needs are met  Outcome: Ongoing     Problem: Discharge Planning:  Goal: Discharged to appropriate level of care  Description: Discharged to appropriate level of care  Outcome: Ongoing     Problem: Serum Glucose Level - Abnormal:  Goal: Ability to maintain appropriate glucose levels will improve  Description: Ability to maintain appropriate glucose levels will improve  Outcome: Ongoing     Problem: Sensory Perception - Impaired:  Goal: Ability to maintain a stable neurologic state will improve  Description: Ability to maintain a stable neurologic state will improve  Outcome: Ongoing

## 2021-10-07 NOTE — PROGRESS NOTES
Handoff report and transfer of care given at bedside to [facility] and [facility] RN. Patient in stable condition, denies needs/concerns at this time. Call light within reach.

## 2021-10-07 NOTE — FLOWSHEET NOTE
10/07/21 0830   Vital Signs   Temp 97.5 °F (36.4 °C)   Temp Source Oral   Pulse 71   Heart Rate Source Monitor   Resp 18   BP (!) 218/145   BP Location Left upper arm   Patient Position Semi fowlers   Level of Consciousness Alert (0)   MEWS Score 3   Patient Currently in Pain Yes   Oxygen Therapy   O2 Device None (Room air)       Moises FERMIN notified of BP's advised to give AM medications and recheck.

## 2021-10-07 NOTE — PLAN OF CARE
Problem: Falls - Risk of:  Goal: Will remain free from falls  Description: Will remain free from falls  10/5/2154 0738 by Frank Sahni RN  Outcome: Ongoing  10/7/2021 0617 by Byron Owens RN  Outcome: Ongoing  Goal: Absence of physical injury  Description: Absence of physical injury  62/9/5638 2237 by Frank Sahni RN  Outcome: Ongoing  10/7/2021 0617 by Byron Owens RN  Outcome: Ongoing     Problem: Safety:  Goal: Free from accidental physical injury  Description: Free from accidental physical injury  44/1/3090 6732 by Frank Sahni RN  Outcome: Ongoing  10/7/2021 0617 by Byron Owens RN  Outcome: Ongoing  Goal: Free from intentional harm  Description: Free from intentional harm  78/5/5751 7563 by Frank Sahni RN  Outcome: Ongoing  10/7/2021 0617 by Byron Owens RN  Outcome: Ongoing     Problem: Infection:  Goal: Will remain free from infection  Description: Will remain free from infection  14/8/2853 2554 by Frank Sahni RN  Outcome: Ongoing  10/7/2021 0617 by Byron Owens RN  Outcome: Ongoing

## 2021-10-07 NOTE — PROGRESS NOTES
Admission questions and assessment complete; see flow sheet. Scheduled medications administered; See MAR. IV infusing without difficulty. Pt denies pain at this time. Pt denies any needs at this time.  Pt educated on use of call light and to call out with needs, verbalized understanding, bed in low locked position for pt safety

## 2021-10-07 NOTE — H&P
Hospital Medicine History & Physical      PCP: Candance Farrier, APRN - CNP    Date of Admission: 10/6/2021    Date of Service: Pt seen/examined on 10/6/2021    Chief Complaint: Elevated blood sugars and hypotension    History Of Present Illness:    72 y.o. male who presented to the hospital with a chief complaint of difficulty controlling her sugars and hypotension. The patient has a history of systolic heart failure, diabetes mellitus as well as coronary artery disease. Patient states that he went to his pain specialist where he got a pain injection to his back. Post his injection he noticed that his sugars were running high, he has since had difficulty controlling his blood sugars since. He also has been hypotensive and not feeling right. With his blood sugars being difficult to control and history of DKA in the past he was brought to the emergency department. In the ER he was found to have CHIP and hyperglycemia without DKA. He will be admitted for observation and hydration. Past Medical History:          Diagnosis Date    Acute on chronic systolic (congestive) heart failure (HCC)     Anxiety disorder     BPH (benign prostatic hypertrophy)     Calcified granuloma of lung ([address] 2014    2:stable per 3/25/14 CT chest    Cataract 1/20/14    OU:Dr. hayward:[facility]    Cerebral artery occlusion with cerebral infarction ([address]     Chronic diastolic heart failure (HCC)     Chronic pain     Chronic viral hepatitis (HCC)     Cognitive communication deficit     COPD (chronic obstructive pulmonary disease) ([address]     Under care of pulmo(Dr. Calhoun)    Degenerative arthritis of hip     Dementia ([address]     Depression 3/11/2015    Depression with anxiety     Prior psychiatrist & therapist:Dr. Carlos Hughes.  Diabetes mellitus ([address] 2004    Endo Dr. Sridhar Jaimes type 1 note below in [facility]    Diabetic eye exam ([address] 1/20/14    [facility]:Dr. STEPHEN Hayward:No retinopathy.  Cataract    Diabetic retinopathy ([address]     under care of [facility]:Dr. Damien Davidson    Diverticulitis 11/26/2011    Diverticulosis 11/26/2011    DKA (diabetic ketoacidoses) [facility]     Emphysema     Esophageal candidiasis ([facility] 7/16/13    GI:EGD    Essential hypertension, benign 11/2010    ETOH abuse     Fatty liver 10/9/2012    Foot ulcer:left 9/30/2013    Gastric ulcer, unspecified as acute or chronic, without mention of hemorrhage or perforation     Generalized anxiety disorder     GERD (gastroesophageal reflux disease)     Gout 1997    Right big toe    Hearing decreased     Left ear=60%. Right ear=80%.  Hemangioma 12/2010    Liver as per CT abdo    hepatitis c 7/26/17, 2010    Under care of Liver doc:Dr. Staci Barton    Hyperlipidemia LDL goal < 100     Hypertensive heart disease with heart failure ([facility]     Left-sided weakness     Low HDL (under 40) 10/9/2012    Myocardial infarction ([facility]     Orthostatic hypotension     Peripheral neuropathy 2006    Pneumonia 10/15/13    Protein calorie malnutrition (HCC)     PTSD (post-traumatic stress disorder)     Pyogenic granuloma     per derm:Dr. Mabel Duarte Restrictive lung disease     Under care of pulmo(Dr. Calhoun)    S/P colonoscopy 1996    Done secondary to rectal bleed:dx=hemorrhoids    S/P colonoscopy 11/11/10;10/23/2013    Dr. Mike Rogel 10/2016(3yrs):polyps;diverticulosis. Diverticulosis & polpy(removed). Next in10/2016.  S/P endoscopy 2009    EGD:stomach ulcers.     Sciatica     Superficial phlebitis of left leg 9/30/2013    Tachycardia     Therapeutic drug monitoring 4/8/15    OARRS report is consistent on 4/8/15;7/9/15;10/9/15;1/8/16;4/5/16    Type 1 diabetes mellitus not at goal [facility]) 3/17/14    updated diagnosis as per endo:Dr. Moises Caceres       Past Surgical History:          Procedure Laterality Date    COLONOSCOPY      DIAGNOSTIC CARDIAC CATH LAB PROCEDURE  2013    DILATATION, ESOPHAGUS      FOOT SURGERY Left Hammer toe, 2nd toe    10/9/14    FOOT SURGERY Left 12/5/2019 REMOVAL HARDWARE LEFT FOOT performed by Jj Sykes DPM at [address] [place] [address] [address] Left 12/03/2019    REMOVAL HARDWARE LEFT FOOT w. Dr Kristen Sorto 12/5/19    [address] [facility]      broken leg    OTHER SURGICAL HISTORY Left 11/21/2013    EXCISION 5TH METATRSAL LEFT FOOT          OTHER SURGICAL HISTORY Right 05/30/2019    Procedure: PARTIAL RESECTION RIGHT FIFTH METATARSAL, ULCER DEBRIDEMENT WITH GRAFT APPLICATION    PRESSURE ULCER DEBRIDEMENT Right 5/30/2019    PARTIAL RESECTION RIGHT FIFTH METATARSAL, ULCER DEBRIDEMENT WITH GRAFT APPLICATION performed by Jj Sykes DPM at [place] [address]  06/27/2017    COLOSTOMY REVERSAL     TONSILLECTOMY      UPPER GASTROINTESTINAL ENDOSCOPY  7/16/2013    Esophageal Brushing       Medications Prior to Admission:      Prior to Admission medications    Medication Sig Start Date End Date Taking?  Authorizing Provider   NIFEdipine (ADALAT CC) 30 MG extended release tablet Take 1 tablet by mouth daily 4/20/21   Wing Azul MD   cloNIDine (CATAPRES) 0.1 MG tablet Take 1 tablet by mouth daily 4/20/21   Wing Azul MD   sertraline (ZOLOFT) 100 MG tablet Take 100 mg by mouth daily    Historical Provider, MD   Insulin Lispro (HUMALOG SC) Inject 6 Units into the skin 3 times daily (before meals) And sliding scale PRN    Historical Provider, MD   melatonin 10 MG CAPS capsule Take 10 mg by mouth nightly    Historical Provider, MD   vitamin D (ERGOCALCIFEROL) 1.25 MG (71541 UT) CAPS capsule Take 50,000 Units by mouth once a week thursday    Historical Provider, MD   tiZANidine (ZANAFLEX) 4 MG tablet Take 4 mg by mouth every 8 hours as needed    Historical Provider, MD   metoprolol succinate (TOPROL XL) 50 MG extended release tablet Take 1 tablet by mouth nightly 10/26/20   RICCO Cook - CNP   loratadine (CLARITIN) 10 MG tablet Take 10 mg by mouth daily    Historical Provider, MD   memantine MOUTH DAILY 7/2/18   RICCO Blue CNP   AGAMATRIX PRESTO TEST strip USE TO TEST BLOOD SUGAR 3 TIMES A DAY 5/9/18   Tomeka Barboza MD   umeclidinium-vilanterol (ANORO ELLIPTA) 62.5-25 MCG/INH AEPB inhaler Inhale 1 puff into the lungs daily 3/27/18   David Ford MD   albuterol sulfate HFA (PROAIR HFA) 108 (90 Base) MCG/ACT inhaler Inhale 2 puffs into the lungs every 4 hours as needed for Wheezing or Shortness of Breath 3/27/18   David Ford MD   Lancets MISC Testing 2-3 times daily DX Code: E11.9 3/22/18   Tomeka Barboza MD   thiamine 100 MG tablet Take 1 tablet by mouth daily 3/6/18   RICCO Blue CNP   Blood Glucose Monitoring Suppl (TRUE METRIX AIR GLUCOSE METER) DENIS 1 meter 3/6/18   RICCO Blue CNP   glucagon 1 MG injection Inject 1 kit into the skin as needed    Historical Provider, MD       Allergies:  Neurontin [gabapentin]    Social History:      TOBACCO:   reports that he quit smoking about 3 years ago. His smoking use included cigarettes. He has a 17.50 pack-year smoking history. He has never used smokeless tobacco.  ETOH:   reports previous alcohol use. Family History:          Problem Relation Age of Onset   Paulette Blank Stroke Mother     Hypertension Mother     Arthritis Father     Substance Abuse Father         Etoh    Cancer Father         esophageal    Hypertension Father     Diabetes Brother     Diabetes Paternal Aunt     Asthma Neg Hx     Emphysema Neg Hx     Heart Failure Neg Hx        REVIEW OF SYSTEMS:   Constitutional:   Generalized weakness and fatigue  ENT:  Negative for rhinorrhea, epistaxis, hoarseness, sore throat.   Respiratory: Negative for SOB or wheezing   Cardiovascular:   Negative for  chest pain, palpitations   Gastrointestinal:  Negative for nausea, vomiting, diarrhea  Genitourinary:  Negative for polyuria, dysuria   Hematologic/Lymphatic:  Negative for  bleeding tendency, easy bruising  Musculoskeletal: Chronic back pain  Neurologic:  Negative for  confusion,dysarthria. Skin:  Negative for itching,rash  Psychiatric:  Negative for depression,anxiety, agitation. Endocrine:  Negative for polydipsia,polyuria,heat /cold intolerance. PHYSICAL EXAM:  BP (!) 154/76   Pulse 74   Temp 97.7 °F (36.5 °C) (Tympanic)   Resp 17   Ht 6' 5\" (1.956 m)   Wt 196 lb (88.9 kg)   SpO2 96%   BMI 23.24 kg/m²   General appearance:  No apparent distress, appears stated age and cooperative. HEENT:  Normal cephalic, atraumatic without obvious deformity. Pupils equal, round, and reactive to light. Extra ocular muscles intact. Conjunctivae/corneas clear. Neck: Supple, with full range of motion. No jugular venous distention. Trachea midline. Respiratory:  Normal respiratory effort. Clear to auscultation, bilaterally without Rales/Wheezes/Rhonchi. Cardiovascular:  Regular rate and rhythm with normal S1/S2 without murmurs, rubs or gallops. Abdomen: Soft, non-tender, non-distended with normal bowel sounds. Musculoskeletal:  No clubbing, cyanosis or edema bilaterally. Full range of motion without deformity. Skin: Skin color, texture, turgor normal.  No rashes or lesions. Neurologic:  Neurovascularly intact without any focal sensory/motor deficits. Cranial nerves: II-XII intact, grossly non-focal.  Psychiatric:  Alert and oriented, thought content appropriate, normal insight  Capillary Refill: Brisk,< 3 seconds   Peripheral Pulses: +2 palpable, equal bilaterally       Labs:   Recent Labs     10/06/21  1352   WBC 15.1*   HGB 11.1*   HCT 32.9*        Recent Labs     10/06/21  1352   *   K 4.4   CL 91*   CO2 20*   BUN 40*   CREATININE 2.0*   CALCIUM 8.8     Recent Labs     10/06/21  1352   AST 13*   ALT 8*   BILITOT 0.5   ALKPHOS 87     No results for input(s): INR in the last 72 hours. No results for input(s): Giselle Kras in the last 72 hours.     Urinalysis:      Lab Results   Component Value Date    NITRU Negative 10/06/2021    WBCUA 0-2 10/06/2021    BACTERIA Rare 03/29/2021    RBCUA 0-2 10/06/2021    BLOODU Negative 10/06/2021    SPECGRAV 1.010 10/06/2021    GLUCOSEU >=1000 10/06/2021       Radiology:   XR CHEST PORTABLE   Final Result   Mild bibasilar discoid atelectasis or scarring which is less prominent with   no infiltrate or effusion         CT HEAD WO CONTRAST   Final Result   No hemorrhage or mass identified      Atrophy with periventricular and scattered frontal parietal white matter   disease, likely due to small-vessel ischemic change with remote appearing   infarct right occipital lobe      Paranasal sinus disease, greatest in the left maxillary sinus             ASSESSMENT:  Acute kidney injury, likely prerenal  Diabetes type 1 with hyperglycemia  Hyponatremia, hypovolemic  Systolic heart failure, compensated  Hypertension  Chronic pain syndrome  Lactic acidosis secondary to hypoperfusion    PLAN:  We will admit for gentle IV fluid hydration  Resume home insulin plus sliding scale therapy. Hold nephrotoxic medications, repeat renal panel in a.m. Breathing treatments as needed  Resume home pain medications  Will admit for observation, hopefully can be discharged in a.m. DVT Prophylaxis: Lovenox  Diet: No diet orders on file  Code Status: Prior    Dispo -admit to [facility]      Thank you for the opportunity to be involved in this patient's care.       (Please note that portions of this note were completed with a voice recognition program. Efforts were made to edit the dictations but occasionally words are mis-transcribed.)

## 2021-10-07 NOTE — DISCHARGE INSTR - COC
Adult (Havrix, Vaqta) 08/18/2016    Hepatitis B 02/05/2010, 08/18/2016    Influenza 10/20/2010, 11/29/2011, 10/09/2012    Influenza Vaccine, unspecified formulation 10/16/2013, 11/03/2016    Influenza Virus Vaccine 10/17/2013, 10/06/2014, 10/09/2015, 11/03/2016    Influenza, High Dose (Fluzone 65 yrs and older) 09/27/2017    Influenza, [place], 6 mo and older, IM, PF (Flulaval, Fluarix) 10/19/2018    Pneumococcal Conjugate 13-valent ([id number]) 11/10/2015    Pneumococcal Conjugate Vaccine 11/10/2015    Pneumococcal Polysaccharide (Ywcecyarv43) 09/27/2012, 10/16/2013, 09/22/2015    Tdap (Boostrix, Adacel) 02/05/2010, 09/06/2019       Active Problems:  Patient Active Problem List   Diagnosis Code    GERD (gastroesophageal reflux disease) K21.9    Peripheral neuropathy of bilateral feet G62.9    COPD with acute bronchitis ([facility] J44.0, J20.9    HLD (hyperlipidemia) E78.5    Hepatic steatosis K76.0    PTSD (post-traumatic stress disorder) F43.10    History of hepatitis C Z86.19    Bilateral knee & hip OA M17.10    Mild-moderate alcohol consumption (beer) F10.20    Chronic pain syndrome G89.4    Moderate episode of recurrent major depressive disorder (HCC) F33.1    Orthostatic hypotension I95.1    S/P left colectomy Z90.49    Hyponatremia E87.1    S/p reversal of colostomy (6/27/17) K55.9    Chronic dCHF (grade 1 LVDD) I50.32    Chronic normocytic anemia D64.9    Hyperglycemia R73.9    Severe protein-calorie malnutrition ([facility] E43    DM (diabetes mellitus), secondary, uncontrolled, w/neurologic complic (HCC) F01.78, R04.23    Hx DKA (Feb 2018) E11.10, Z79.4    Other insomnia G47.09    Chronic transaminasemia R74.01    Tobacco smoker F17.200    Fall at home W19. Leela Hernández, Y92.009    Hypochloremia E87.8    Hyperkalemia E87.5    Mixed metabolic and respiratory acidosis E87.2    Closed nondisplaced L ankle fracture (lateral malleolus) S82.892A    L 2nd toenail avulsion S91.209A    Skin tear Resolved Resolved By    None active    Resolved    COVID-19 Rule Out 10/06/21 10/06/21 10/06/21 COVID-19, Rapid (Ordered)   10/06/21 Rule-Out Test Resulted    COVID-19 Rule Out 04/19/21 04/19/21 04/19/21 COVID-19 & Influenza Combo (Ordered)   04/19/21 Rule-Out Test Resulted    COVID-19 Rule Out 04/16/21 04/16/21 04/16/21 COVID-19 & Influenza Combo (Ordered)   04/16/21 Rule-Out Test Resulted    COVID-19 Rule Out 04/01/21 04/01/21 04/01/21 COVID-19, Rapid (Ordered)   04/01/21 Rule-Out Test Resulted    COVID-19 Rule Out 10/22/20 10/22/20 10/22/20 COVID-19 (Ordered)   10/23/20 Rule-Out Test Resulted    COVID-19 Rule Out 10/22/20 10/22/20 10/22/20 COVID-19 (Ordered)   10/22/20 Rule-Out Test Resulted            Nurse Assessment:  Last Vital Signs: BP (!) 160/90   Pulse 65   Temp 97.8 °F (36.6 °C) (Oral)   Resp 16   Ht 6' 5\" (1.956 m)   Wt 197 lb 6.4 oz (89.5 kg)   SpO2 98%   BMI 23.41 kg/m²     Last documented pain score (0-10 scale): Pain Level: 9  Last Weight:   Wt Readings from Last 1 Encounters:   10/06/21 197 lb 6.4 oz (89.5 kg)     Mental Status:  {IP PT MENTAL STATUS:20030:::0}    IV Access:  { NINA IV ACCESS:493345208:::0}    Nursing Mobility/ADLs:  Walking   {CHP DME ADLs:655885943:::0}  Transfer  {CHP DME ADLs:987872358:::0}  Bathing  {CHP DME ADLs:878073471:::0}  Dressing  {CHP DME ADLs:142223442:::0}  Toileting  {CHP DME ADLs:260302920:::0}  Feeding  {CHP DME ADLs:818062488:::0}  Med Admin  {CHP DME ADLs:800623965:::0}  Med Delivery   {MH NINA MED Delivery:232349076:::0}    Wound Care Documentation and Therapy:        Elimination:  Continence:   · Bowel: {YES / GM:65757}  · Bladder: {YES / PE:01596}  Urinary Catheter: {Urinary Catheter:194965753:::0}   Colostomy/Ileostomy/Ileal Conduit: {YES / GI:92557}       Date of Last BM: ***    Intake/Output Summary (Last 24 hours) at 10/7/2021 1417  Last data filed at 10/6/2021 1914  Gross per 24 hour   Intake 1000 ml   Output 850 ml   Net 150 ml     I/O last 3 completed shifts:   In: 1000 [IV Piggyback:1000]  Out: 850 [Urine:850]    Safety Concerns:     [address] Safety Concerns:288276345:::0}    Impairments/Disabilities:      [address] Impairments/Disabilities:151703574:::0}    Nutrition Therapy:  Current Nutrition Therapy:   [address] Diet List:589823542:::0}    Routes of Feeding: {P DME Other Feedings:238634010:::0}  Liquids: {Slp liquid thickness:24111}  Daily Fluid Restriction: {CHP DME Yes amt example:813960394:::0}  Last Modified Barium Swallow with Video (Video Swallowing Test): {Done Not Done XLSS:351604887:::2}    Treatments at the Time of Hospital Discharge:   Respiratory Treatments: ***  Oxygen Therapy:  {Therapy; copd oxygen:03505:::0}  Ventilator:    { CC Vent List:349989100:::0}    Rehab Therapies: {THERAPEUTIC INTERVENTION:4626857799}  Weight Bearing Status/Restrictions: { CC Weight Bearin:::0}  Other Medical Equipment (for information only, NOT a DME order):  {EQUIPMENT:057685593}  Other Treatments: ***    Patient's personal belongings (please select all that are sent with patient):  {CHP DME Belongings:519267782:::0}    RN SIGNATURE:  {Esignature:204674231:::0}    CASE MANAGEMENT/SOCIAL WORK SECTION    Inpatient Status Date: OBSERVATION    Readmission Risk Assessment Score:  Readmission Risk              Risk of Unplanned Readmission:  0           Discharging to Facility/ Agency   Name:   Name: [facility]  Address: [address], [facility]  Phone:[phone]  FPV:[phone]    ·   · Address:  · Phone:  · Fax:    Dialysis Facility (if applicable)   · Name:  · Address:  · Dialysis Schedule:  · Phone:  · Fax:    / signature: Electronically signed by Ramez Wong RN on 10/7/21 at 3:49 PM EDT    PHYSICIAN SECTION    Prognosis: Good    Condition at Discharge: Stable    Rehab Potential (if transferring to Rehab):  n/s    Recommended Labs or Other Treatments After Discharge: may need increase in

## 2021-10-07 NOTE — FLOWSHEET NOTE
10/07/21 0830   Vital Signs   Temp 97.5 °F (36.4 °C)   Temp Source Oral   Pulse 71   Heart Rate Source Monitor   Resp 18   BP (!) 218/145   BP Location Left upper arm   Patient Position Semi fowlers   Level of Consciousness Alert (0)   MEWS Score 3   Patient Currently in Pain Yes   Oxygen Therapy   O2 Device None (Room air)   Shift assessment complete. See flow sheet. Scheduled medications given, See MAR. Head to toe complete. Vital signs logged and active bowel sounds in all 4 quadrants. Assisted pt to bathroom. Meds taken whole with water. No further needs noted at this time. Call light and bedside table within reach. Bed in lowest position, wheels locked and side rails up x2.      Rosalinda Vasques RN

## 2021-10-07 NOTE — CARE COORDINATION
DISCHARGE ORDER  Date/Time 10/7/2021 2:27 PM  Completed by: Geoffrey Rodríguez RN, Case Management    Patient Name: Kristen Hall    : 1950      Admit order Date and Status: OBSERVATION  Noted discharge order. (verify MD's last order for status of admission/Traditional Medicare 3 [place] Inpatient qualifying stay required for SNF)    Confirmed discharge plan with:              Patient:  Yes              When pt confirms DC plan does any support person need to be contacted by CM No if yes who______                      Discharge to Facility: [facility] phone number for staff giving report: [phone]   Pre-certification completed: [address] [facility] Notification (HENS) completed: n/a   Discharge orders and Continuity of Care faxed to facility:        Transportation:               Medical Transport explained with choice list offered to pt/family. Choice:(no preference)  Agency used: [facility] up time:   5225-9170      Pt/family/Nursing/Facility aware of  time:   Yes Names: pt, nurse, facility,   Ambulance form completed:  yes:      Comments: -    Pt is being d/c'd to [facility] today. Pt's O2 sats are 98% on roomair. Discharge timeout done with [facility]. All discharge needs and concerns addressed. Discharging nurse to complete NINA, reconcile AVS, and place final copy with patient's discharge packet. Discharging RN to ensure that written prescriptions for  Level II medications are sent with patient to the facility as per protocol.

## 2021-10-07 NOTE — DISCHARGE SUMMARY
Name:  Samantha Woodard  Room:  SD02/SD-02  MRN:    [id number]    Discharge Summary      This discharge summary is in conjunction with a complete physical exam done on the day of discharge. Discharging Provider: Ainsley Anguiano PA-C    Admit: 10/6/2021  Discharge:   10/7/2021     HPI taken from admission H&P:    72 y.o. male who presented to the hospital with a chief complaint of difficulty controlling her sugars and hypotension. The patient has a history of systolic heart failure, diabetes mellitus as well as coronary artery disease. Patient states that he went to his pain specialist where he got a pain injection to his back. Post his injection he noticed that his sugars were running high, he has since had difficulty controlling his blood sugars since. He also has been hypotensive and not feeling right. With his blood sugars being difficult to control and history of DKA in the past he was brought to the emergency department. In the ER he was found to have CHIP and hyperglycemia without DKA. He will be admitted for observation and hydration. Diagnoses this Admission and Hospital Course     #CHIP  - baseline Crt variable  - Crt was 1.0 4/19/21  - admitted with Crt 2.0. Suspect CHIP 2/2 hypovolemia related to hyperglycemia   - sp IV NS with improvement to 1.3  - CHIP resolved    #DM2 with hyperglycemia   - suspect hyperglycemia related to recent steroid injection at pain management office   - Cont Lantus 20 U BID, humalog 6 U TID, SSI, metformin, and other home regimen  - BG improved to 200s at discharge  - not in DKA   - he may need in increase in his SSI orders while effects of spinal steroid injection last     #Lactic acidosis   - suspect 2/2 hypovolemia. No sepsis. No apparent infection. Repeat after IVF is 0.9     #Hyponatremia   - suspect related to hypovolemia, resolved with IVF    #HTN  - BP low at NH and therefore all meds held on admit.   BP subsequently elevated, all home meds resumed, BP improving at discharge    #Chronic systolic CHF  - EF 88% 77/7391  - he is compensated  - cont Toprol XL on discharge  - he is not on diuretics or ACEi or ARB    #Chronic pain  - cont home pain regimen    Procedures (Please Review Full Report for Details)  None     Consults    None       Physical Exam at Discharge:    BP (!) 160/90   Pulse 65   Temp 97.8 °F (36.6 °C) (Oral)   Resp 16   Ht 6' 5\" (1.956 m)   Wt 197 lb 6.4 oz (89.5 kg)   SpO2 98%   BMI 23.41 kg/m²     Gen: No distress. Alert. Eyes: PERRL. No sclera icterus. No conjunctival injection. ENT: No discharge. Pharynx clear. Neck: No JVD. Trachea midline. Resp: No accessory muscle use. No crackles. No wheezes. No rhonchi. CV: Regular rate. Regular rhythm. No murmur. No rub. No edema. GI: Non-tender. Non-distended. Normal bowel sounds. Multiple old surgical scars noted   Skin: Warm and dry. No nodule on exposed extremities. No rash on exposed extremities. M/S: No cyanosis. No joint deformity. No clubbing. Neuro: Awake. Grossly nonfocal    Psych: Oriented x 3. No anxiety or agitation. CBC:   Recent Labs     10/06/21  1352 10/07/21  0718   WBC 15.1* 10.1   HGB 11.1* 12.1*   HCT 32.9* 36.6*   MCV 90.2 91.1    236     BMP:   Recent Labs     10/06/21  1352 10/07/21  0718   * 136   K 4.4 4.3   CL 91* 102   CO2 20* 23   BUN 40* 31*   CREATININE 2.0* 1.3     LIVER PROFILE:   Recent Labs     10/06/21  1352 10/07/21  0718   AST 13* 11*   ALT 8* 7*   BILITOT 0.5 0.5   ALKPHOS 87 86     PT/INR: No results for input(s): PROTIME, INR in the last 72 hours. APTT: No results for input(s): APTT in the last 72 hours.   UA:  Recent Labs     10/06/21  1414   COLORU Yellow   PHUR 6.0   WBCUA 0-2   RBCUA 0-2   MUCUS Rare*   CLARITYU Clear   SPECGRAV 1.010   LEUKOCYTESUR Negative   UROBILINOGEN 0.2   BILIRUBINUR Negative   BLOODU Negative   GLUCOSEU >=1000*       CULTURES  Blood: pending     RADIOLOGY  XR CHEST PORTABLE   Final Result ROXICODONE     pantoprazole 40 MG tablet  Commonly known as: PROTONIX     polyethylene glycol 17 GM/SCOOP powder  Commonly known as: GLYCOLAX     sertraline 100 MG tablet  Commonly known as: ZOLOFT     SUMAtriptan 100 MG tablet  Commonly known as: IMITREX     thiamine 100 MG tablet  Take 1 tablet by mouth daily     tiZANidine 4 MG tablet  Commonly known as: ZANAFLEX     Trulicity 6.96 NG/9.4RM Sopn  Generic drug: Dulaglutide     umeclidinium-vilanterol 62.5-25 MCG/INH Aepb inhaler  Commonly known as: Anoro Ellipta  Inhale 1 puff into the lungs daily     vitamin B-12 1000 MCG tablet  Commonly known as: CYANOCOBALAMIN  TAKE 1 TABLET BY MOUTH DAILY     vitamin D 1.25 MG (26078 UT) Caps capsule  Commonly known as: ERGOCALCIFEROL     Xtampza ER 9 MG C12a  Generic drug: oxyCODONE ER              Discharged in stable condition to NH    Follow Up:   Follow up with PCP in 1 week    Ainsley Anguiano PA-C  10/7/2021 2:09 PM

## 2021-10-07 NOTE — CARE COORDINATION
Case Management Assessment  Initial Evaluation      Patient Name: Estrellita Waller  YOB: 1950  Diagnosis: Hyponatremia [E87.1]  Hyperglycemia [R73.9]  CHIP (acute kidney injury) ([facility] [N17.9]  Date / Time: 10/6/2021  1:16 PM    Admission status/Date: OBSERVATION  Chart Reviewed: Yes      Patient Interviewed: Yes   Family Interviewed:  No      Hospitalization in the last 30 days:  No      Health Care Decision Maker :   Primary Decision Maker: Nicol Diaz - Brother/Sister - [phone]    Secondary Decision Maker: Cammie Rangelor - Other - [phone]    (CM - must 1st enter selection under Navigator - emergency contact- [place] Relationship and pick relationship)   Who do you trust or have selected to make healthcare decisions for you      Met with: pt at bedside  Interview conducted  (bedside/phone):    Current PCP: Shelby    Financial  Medicare  Precert required for SNF : Y, N          3 night stay required - Y, N    ADLS  Support Systems/Care Needs:    Transportation: EMS transportation    Meal Preparation: per facility    Housing  Living Arrangements: [facility]  Steps: n/a  Intent for return to present living arrangements: No  Identified Issues: [address] [address] [facility] with [address] : No Agency:(Services)     Passport/Waiver : No  :                      Phone Number:    Passport/Waiver Services: -          Durable Medical Equiptment   DME Provider: per facility  Equipment: n/a  Walker___Cane___RTS___ BSC___Shower Chair___Hospital Bed___W/C____Other________  02 at ____Liter(s)---wears(frequency)_______ [facility] ___ CPAP___ BiPap___   N/A____      Home O2 Use :  Per facility    If No for home O2---if presently on O2 during hospitalization:  No  if yes CM to follow for potential DC O2 need  Informed of need for care provider to bring portable home O2 tank on day of discharge for nursing to connect prior to leaving:   Not Indicated  Verbalized agreement/Understanding:   Not Indicated    Community Service Affiliation  Dialysis:  No    · Agency:  · Location:  · Dialysis Schedule:  · Phone:   · Fax: Other Community Services: (ex:PT/OT,Mental Health,Wound Clinic, Cardio/Pul [address])    DISCHARGE PLAN: Explained Case Management role/services. Reviewed chart and met with pt at bedside. Pt reports that he is LTC at [facility] and plans to return there at discharge. CM spoke with Oliverio Jacome at [facility] who states that pt is a bed hold and can return at discharge. Per Oliverio Jacome, no precert needed, will need rapid Covid test within 72 hrs of discharge.

## 2021-10-07 NOTE — FLOWSHEET NOTE
10/07/21 1045   Vital Signs   Temp 97.8 °F (36.6 °C)   Temp Source Oral   Pulse 65   Heart Rate Source Monitor   Resp 16   BP (!) 181/85   BP Location Left lower arm   Patient Position Semi fowlers   Level of Consciousness Alert (0)   MEWS Score 1   Oxygen Therapy   SpO2 98 %   O2 Device None (Room air)       Myriam notified of BP

## 2021-10-08 LAB
ESTIMATED AVERAGE GLUCOSE: 194.4 MG/DL
HBA1C MFR BLD: 8.4 %

## 2021-10-08 NOTE — PROGRESS NOTES
Transport here to p/u pt. He is A&O X4 calm and cooperative. V.s.s. Braces and shoes on new compression socks provided. Pt confirms he has no other belongings. Pt denies pain or need no s./s of distress. Off unit at this time.

## 2021-10-10 LAB
BLOOD CULTURE, ROUTINE: NORMAL
CULTURE, BLOOD 2: NORMAL

## 2021-11-15 ENCOUNTER — CLINICAL DOCUMENTATION (OUTPATIENT)
Dept: OTHER | Age: 71
End: 2021-11-15

## 2022-02-16 ENCOUNTER — APPOINTMENT (OUTPATIENT)
Dept: CT IMAGING | Age: 72
DRG: 312 | End: 2022-02-16
Payer: MEDICARE

## 2022-02-16 ENCOUNTER — HOSPITAL ENCOUNTER (INPATIENT)
Age: 72
LOS: 5 days | Discharge: NURSING, REHAB OR LONG-TERM CARE | DRG: 312 | End: 2022-02-21
Attending: EMERGENCY MEDICINE | Admitting: INTERNAL MEDICINE
Payer: MEDICARE

## 2022-02-16 ENCOUNTER — APPOINTMENT (OUTPATIENT)
Dept: GENERAL RADIOLOGY | Age: 72
DRG: 312 | End: 2022-02-16
Payer: MEDICARE

## 2022-02-16 DIAGNOSIS — N17.9 AKI (ACUTE KIDNEY INJURY) (HCC): Primary | ICD-10-CM

## 2022-02-16 DIAGNOSIS — M50.30 DDD (DEGENERATIVE DISC DISEASE), CERVICAL: ICD-10-CM

## 2022-02-16 DIAGNOSIS — R53.1 GENERALIZED WEAKNESS: ICD-10-CM

## 2022-02-16 LAB
A/G RATIO: 1.3 (ref 1.1–2.2)
ABO/RH: NORMAL
ALBUMIN SERPL-MCNC: 4 G/DL (ref 3.4–5)
ALP BLD-CCNC: 76 U/L (ref 40–129)
ALT SERPL-CCNC: 11 U/L (ref 10–40)
ANION GAP SERPL CALCULATED.3IONS-SCNC: 14 MMOL/L (ref 3–16)
ANTIBODY SCREEN: NORMAL
AST SERPL-CCNC: 16 U/L (ref 15–37)
BASOPHILS ABSOLUTE: 0.1 K/UL (ref 0–0.2)
BASOPHILS RELATIVE PERCENT: 0.6 %
BILIRUB SERPL-MCNC: 0.5 MG/DL (ref 0–1)
BUN BLDV-MCNC: 23 MG/DL (ref 7–20)
CALCIUM SERPL-MCNC: 8.8 MG/DL (ref 8.3–10.6)
CHLORIDE BLD-SCNC: 98 MMOL/L (ref 99–110)
CO2: 21 MMOL/L (ref 21–32)
CREAT SERPL-MCNC: 1.6 MG/DL (ref 0.8–1.3)
EKG ATRIAL RATE: 85 BPM
EKG DIAGNOSIS: NORMAL
EKG P AXIS: 57 DEGREES
EKG P-R INTERVAL: 190 MS
EKG Q-T INTERVAL: 402 MS
EKG QRS DURATION: 130 MS
EKG QTC CALCULATION (BAZETT): 478 MS
EKG R AXIS: -22 DEGREES
EKG T AXIS: 138 DEGREES
EKG VENTRICULAR RATE: 85 BPM
EOSINOPHILS ABSOLUTE: 0.2 K/UL (ref 0–0.6)
EOSINOPHILS RELATIVE PERCENT: 1.6 %
GFR AFRICAN AMERICAN: 52
GFR NON-AFRICAN AMERICAN: 43
GLUCOSE BLD-MCNC: 211 MG/DL (ref 70–99)
GLUCOSE BLD-MCNC: 243 MG/DL (ref 70–99)
GLUCOSE BLD-MCNC: 256 MG/DL
GLUCOSE BLD-MCNC: 256 MG/DL (ref 70–99)
HCT VFR BLD CALC: 35 % (ref 40.5–52.5)
HEMOGLOBIN: 11.6 G/DL (ref 13.5–17.5)
INFLUENZA A: NOT DETECTED
INFLUENZA B: NOT DETECTED
LYMPHOCYTES ABSOLUTE: 2.1 K/UL (ref 1–5.1)
LYMPHOCYTES RELATIVE PERCENT: 20.1 %
MCH RBC QN AUTO: 30 PG (ref 26–34)
MCHC RBC AUTO-ENTMCNC: 33.2 G/DL (ref 31–36)
MCV RBC AUTO: 90.3 FL (ref 80–100)
MONOCYTES ABSOLUTE: 0.8 K/UL (ref 0–1.3)
MONOCYTES RELATIVE PERCENT: 7.7 %
NEUTROPHILS ABSOLUTE: 7.4 K/UL (ref 1.7–7.7)
NEUTROPHILS RELATIVE PERCENT: 70 %
PDW BLD-RTO: 13.6 % (ref 12.4–15.4)
PERFORMED ON: ABNORMAL
PERFORMED ON: ABNORMAL
PLATELET # BLD: 269 K/UL (ref 135–450)
PMV BLD AUTO: 7.5 FL (ref 5–10.5)
POTASSIUM REFLEX MAGNESIUM: 4.5 MMOL/L (ref 3.5–5.1)
PRO-BNP: 2921 PG/ML (ref 0–124)
RBC # BLD: 3.87 M/UL (ref 4.2–5.9)
SARS-COV-2 RNA, RT PCR: NOT DETECTED
SODIUM BLD-SCNC: 133 MMOL/L (ref 136–145)
TOTAL PROTEIN: 7 G/DL (ref 6.4–8.2)
TROPONIN: <0.01 NG/ML
WBC # BLD: 10.6 K/UL (ref 4–11)

## 2022-02-16 PROCEDURE — 71045 X-RAY EXAM CHEST 1 VIEW: CPT

## 2022-02-16 PROCEDURE — 87636 SARSCOV2 & INF A&B AMP PRB: CPT

## 2022-02-16 PROCEDURE — 93005 ELECTROCARDIOGRAM TRACING: CPT | Performed by: PHYSICIAN ASSISTANT

## 2022-02-16 PROCEDURE — 1200000000 HC SEMI PRIVATE

## 2022-02-16 PROCEDURE — 80053 COMPREHEN METABOLIC PANEL: CPT

## 2022-02-16 PROCEDURE — 6370000000 HC RX 637 (ALT 250 FOR IP)

## 2022-02-16 PROCEDURE — 83880 ASSAY OF NATRIURETIC PEPTIDE: CPT

## 2022-02-16 PROCEDURE — 6360000002 HC RX W HCPCS

## 2022-02-16 PROCEDURE — 2580000003 HC RX 258

## 2022-02-16 PROCEDURE — 70450 CT HEAD/BRAIN W/O DYE: CPT

## 2022-02-16 PROCEDURE — 86901 BLOOD TYPING SEROLOGIC RH(D): CPT

## 2022-02-16 PROCEDURE — 99285 EMERGENCY DEPT VISIT HI MDM: CPT

## 2022-02-16 PROCEDURE — 83036 HEMOGLOBIN GLYCOSYLATED A1C: CPT

## 2022-02-16 PROCEDURE — 93010 ELECTROCARDIOGRAM REPORT: CPT | Performed by: INTERNAL MEDICINE

## 2022-02-16 PROCEDURE — 85025 COMPLETE CBC W/AUTO DIFF WBC: CPT

## 2022-02-16 PROCEDURE — 96360 HYDRATION IV INFUSION INIT: CPT

## 2022-02-16 PROCEDURE — 2580000003 HC RX 258: Performed by: PHYSICIAN ASSISTANT

## 2022-02-16 PROCEDURE — 94761 N-INVAS EAR/PLS OXIMETRY MLT: CPT

## 2022-02-16 PROCEDURE — 36415 COLL VENOUS BLD VENIPUNCTURE: CPT

## 2022-02-16 PROCEDURE — 86850 RBC ANTIBODY SCREEN: CPT

## 2022-02-16 PROCEDURE — 86900 BLOOD TYPING SEROLOGIC ABO: CPT

## 2022-02-16 PROCEDURE — 94640 AIRWAY INHALATION TREATMENT: CPT

## 2022-02-16 PROCEDURE — 84484 ASSAY OF TROPONIN QUANT: CPT

## 2022-02-16 RX ORDER — ACETAMINOPHEN 325 MG/1
650 TABLET ORAL EVERY 6 HOURS PRN
Status: DISCONTINUED | OUTPATIENT
Start: 2022-02-16 | End: 2022-02-21 | Stop reason: HOSPADM

## 2022-02-16 RX ORDER — CHOLECALCIFEROL (VITAMIN D3) 125 MCG
1000 CAPSULE ORAL DAILY
Status: DISCONTINUED | OUTPATIENT
Start: 2022-02-16 | End: 2022-02-21 | Stop reason: HOSPADM

## 2022-02-16 RX ORDER — PANTOPRAZOLE SODIUM 40 MG/1
40 TABLET, DELAYED RELEASE ORAL
Status: DISCONTINUED | OUTPATIENT
Start: 2022-02-17 | End: 2022-02-21 | Stop reason: HOSPADM

## 2022-02-16 RX ORDER — METOPROLOL SUCCINATE 50 MG/1
50 TABLET, EXTENDED RELEASE ORAL NIGHTLY
Status: DISCONTINUED | OUTPATIENT
Start: 2022-02-17 | End: 2022-02-21 | Stop reason: HOSPADM

## 2022-02-16 RX ORDER — ERGOCALCIFEROL 1.25 MG/1
50000 CAPSULE ORAL WEEKLY
Status: DISCONTINUED | OUTPATIENT
Start: 2022-02-17 | End: 2022-02-21 | Stop reason: HOSPADM

## 2022-02-16 RX ORDER — ALBUTEROL SULFATE 90 UG/1
2 AEROSOL, METERED RESPIRATORY (INHALATION) 2 TIMES DAILY
Status: DISCONTINUED | OUTPATIENT
Start: 2022-02-17 | End: 2022-02-21 | Stop reason: HOSPADM

## 2022-02-16 RX ORDER — LOPERAMIDE HYDROCHLORIDE 2 MG/1
2 CAPSULE ORAL EVERY 6 HOURS PRN
Status: DISCONTINUED | OUTPATIENT
Start: 2022-02-16 | End: 2022-02-21 | Stop reason: HOSPADM

## 2022-02-16 RX ORDER — POLYETHYLENE GLYCOL 3350 17 G/17G
17 POWDER, FOR SOLUTION ORAL DAILY PRN
Status: DISCONTINUED | OUTPATIENT
Start: 2022-02-16 | End: 2022-02-18

## 2022-02-16 RX ORDER — FLUTICASONE PROPIONATE 110 UG/1
1 AEROSOL, METERED RESPIRATORY (INHALATION) 2 TIMES DAILY
Status: DISCONTINUED | OUTPATIENT
Start: 2022-02-16 | End: 2022-02-16

## 2022-02-16 RX ORDER — ALBUTEROL SULFATE 90 UG/1
2 AEROSOL, METERED RESPIRATORY (INHALATION) EVERY 4 HOURS PRN
Status: DISCONTINUED | OUTPATIENT
Start: 2022-02-16 | End: 2022-02-21 | Stop reason: HOSPADM

## 2022-02-16 RX ORDER — ACETAMINOPHEN 650 MG/1
650 SUPPOSITORY RECTAL EVERY 6 HOURS PRN
Status: DISCONTINUED | OUTPATIENT
Start: 2022-02-16 | End: 2022-02-21 | Stop reason: HOSPADM

## 2022-02-16 RX ORDER — MEMANTINE HYDROCHLORIDE 5 MG/1
10 TABLET ORAL 2 TIMES DAILY
Status: DISCONTINUED | OUTPATIENT
Start: 2022-02-16 | End: 2022-02-21 | Stop reason: HOSPADM

## 2022-02-16 RX ORDER — FLUTICASONE PROPIONATE 110 UG/1
1 AEROSOL, METERED RESPIRATORY (INHALATION) 2 TIMES DAILY
Status: DISCONTINUED | OUTPATIENT
Start: 2022-02-17 | End: 2022-02-21 | Stop reason: HOSPADM

## 2022-02-16 RX ORDER — SUMATRIPTAN 25 MG/1
100 TABLET, FILM COATED ORAL DAILY PRN
Status: DISCONTINUED | OUTPATIENT
Start: 2022-02-16 | End: 2022-02-17

## 2022-02-16 RX ORDER — CLONIDINE HYDROCHLORIDE 0.1 MG/1
0.1 TABLET ORAL DAILY
Status: DISCONTINUED | OUTPATIENT
Start: 2022-02-16 | End: 2022-02-17

## 2022-02-16 RX ORDER — MAGNESIUM SULFATE 1 G/100ML
1000 INJECTION INTRAVENOUS PRN
Status: DISCONTINUED | OUTPATIENT
Start: 2022-02-16 | End: 2022-02-21 | Stop reason: HOSPADM

## 2022-02-16 RX ORDER — MAGNESIUM HYDROXIDE/ALUMINUM HYDROXICE/SIMETHICONE 120; 1200; 1200 MG/30ML; MG/30ML; MG/30ML
5 SUSPENSION ORAL EVERY 6 HOURS PRN
Status: DISCONTINUED | OUTPATIENT
Start: 2022-02-16 | End: 2022-02-21 | Stop reason: HOSPADM

## 2022-02-16 RX ORDER — LANOLIN ALCOHOL/MO/W.PET/CERES
100 CREAM (GRAM) TOPICAL DAILY
Status: DISCONTINUED | OUTPATIENT
Start: 2022-02-17 | End: 2022-02-21 | Stop reason: HOSPADM

## 2022-02-16 RX ORDER — NICOTINE POLACRILEX 4 MG
15 LOZENGE BUCCAL PRN
Status: DISCONTINUED | OUTPATIENT
Start: 2022-02-16 | End: 2022-02-21 | Stop reason: HOSPADM

## 2022-02-16 RX ORDER — ONDANSETRON 2 MG/ML
4 INJECTION INTRAMUSCULAR; INTRAVENOUS EVERY 6 HOURS PRN
Status: DISCONTINUED | OUTPATIENT
Start: 2022-02-16 | End: 2022-02-21 | Stop reason: HOSPADM

## 2022-02-16 RX ORDER — 0.9 % SODIUM CHLORIDE 0.9 %
500 INTRAVENOUS SOLUTION INTRAVENOUS ONCE
Status: COMPLETED | OUTPATIENT
Start: 2022-02-16 | End: 2022-02-16

## 2022-02-16 RX ORDER — FINASTERIDE 5 MG/1
5 TABLET, FILM COATED ORAL DAILY
Status: DISCONTINUED | OUTPATIENT
Start: 2022-02-16 | End: 2022-02-21 | Stop reason: HOSPADM

## 2022-02-16 RX ORDER — ATORVASTATIN CALCIUM 40 MG/1
80 TABLET, FILM COATED ORAL NIGHTLY
Status: DISCONTINUED | OUTPATIENT
Start: 2022-02-16 | End: 2022-02-21 | Stop reason: HOSPADM

## 2022-02-16 RX ORDER — SODIUM CHLORIDE 9 MG/ML
25 INJECTION, SOLUTION INTRAVENOUS PRN
Status: DISCONTINUED | OUTPATIENT
Start: 2022-02-16 | End: 2022-02-21 | Stop reason: HOSPADM

## 2022-02-16 RX ORDER — ONDANSETRON 4 MG/1
4 TABLET, ORALLY DISINTEGRATING ORAL EVERY 8 HOURS PRN
Status: DISCONTINUED | OUTPATIENT
Start: 2022-02-16 | End: 2022-02-21 | Stop reason: HOSPADM

## 2022-02-16 RX ORDER — POTASSIUM CHLORIDE 7.45 MG/ML
10 INJECTION INTRAVENOUS PRN
Status: DISCONTINUED | OUTPATIENT
Start: 2022-02-16 | End: 2022-02-21 | Stop reason: HOSPADM

## 2022-02-16 RX ORDER — POLYETHYLENE GLYCOL 3350 17 G/17G
17 POWDER, FOR SOLUTION ORAL DAILY
Status: DISCONTINUED | OUTPATIENT
Start: 2022-02-17 | End: 2022-02-16 | Stop reason: SDUPTHER

## 2022-02-16 RX ORDER — SODIUM CHLORIDE 9 MG/ML
INJECTION, SOLUTION INTRAVENOUS CONTINUOUS
Status: DISCONTINUED | OUTPATIENT
Start: 2022-02-16 | End: 2022-02-17

## 2022-02-16 RX ORDER — DEXTROSE MONOHYDRATE 25 G/50ML
12.5 INJECTION, SOLUTION INTRAVENOUS PRN
Status: DISCONTINUED | OUTPATIENT
Start: 2022-02-16 | End: 2022-02-16

## 2022-02-16 RX ORDER — DEXTROSE MONOHYDRATE 50 MG/ML
100 INJECTION, SOLUTION INTRAVENOUS PRN
Status: DISCONTINUED | OUTPATIENT
Start: 2022-02-16 | End: 2022-02-21 | Stop reason: HOSPADM

## 2022-02-16 RX ORDER — SODIUM CHLORIDE 0.9 % (FLUSH) 0.9 %
5-40 SYRINGE (ML) INJECTION EVERY 12 HOURS SCHEDULED
Status: DISCONTINUED | OUTPATIENT
Start: 2022-02-16 | End: 2022-02-21 | Stop reason: HOSPADM

## 2022-02-16 RX ORDER — INSULIN GLARGINE 100 [IU]/ML
20 INJECTION, SOLUTION SUBCUTANEOUS 2 TIMES DAILY
Status: DISCONTINUED | OUTPATIENT
Start: 2022-02-16 | End: 2022-02-17

## 2022-02-16 RX ORDER — ASPIRIN 81 MG/1
81 TABLET, CHEWABLE ORAL DAILY
Status: DISCONTINUED | OUTPATIENT
Start: 2022-02-17 | End: 2022-02-21 | Stop reason: HOSPADM

## 2022-02-16 RX ORDER — NIFEDIPINE 30 MG/1
30 TABLET, EXTENDED RELEASE ORAL DAILY
Status: DISCONTINUED | OUTPATIENT
Start: 2022-02-17 | End: 2022-02-19

## 2022-02-16 RX ORDER — SODIUM CHLORIDE 0.9 % (FLUSH) 0.9 %
10 SYRINGE (ML) INJECTION PRN
Status: DISCONTINUED | OUTPATIENT
Start: 2022-02-16 | End: 2022-02-21 | Stop reason: HOSPADM

## 2022-02-16 RX ORDER — POTASSIUM CHLORIDE 20 MEQ/1
40 TABLET, EXTENDED RELEASE ORAL PRN
Status: DISCONTINUED | OUTPATIENT
Start: 2022-02-16 | End: 2022-02-21 | Stop reason: HOSPADM

## 2022-02-16 RX ORDER — TIZANIDINE 4 MG/1
4 TABLET ORAL EVERY 8 HOURS PRN
Status: DISCONTINUED | OUTPATIENT
Start: 2022-02-16 | End: 2022-02-21 | Stop reason: HOSPADM

## 2022-02-16 RX ORDER — MECOBALAMIN 5000 MCG
10 TABLET,DISINTEGRATING ORAL NIGHTLY
Status: DISCONTINUED | OUTPATIENT
Start: 2022-02-16 | End: 2022-02-21 | Stop reason: HOSPADM

## 2022-02-16 RX ORDER — FOLIC ACID 1 MG/1
1 TABLET ORAL DAILY
Status: DISCONTINUED | OUTPATIENT
Start: 2022-02-16 | End: 2022-02-21 | Stop reason: HOSPADM

## 2022-02-16 RX ORDER — OXYCODONE HYDROCHLORIDE 5 MG/1
10 TABLET ORAL EVERY 6 HOURS PRN
Status: DISCONTINUED | OUTPATIENT
Start: 2022-02-16 | End: 2022-02-21 | Stop reason: HOSPADM

## 2022-02-16 RX ADMIN — OXYCODONE 10 MG: 5 TABLET ORAL at 19:01

## 2022-02-16 RX ADMIN — ATORVASTATIN CALCIUM 80 MG: 40 TABLET, FILM COATED ORAL at 22:47

## 2022-02-16 RX ADMIN — CYANOCOBALAMIN TAB 500 MCG 1000 MCG: 500 TAB at 22:48

## 2022-02-16 RX ADMIN — FINASTERIDE 5 MG: 5 TABLET, FILM COATED ORAL at 22:49

## 2022-02-16 RX ADMIN — INSULIN LISPRO 1 UNITS: 100 INJECTION, SOLUTION INTRAVENOUS; SUBCUTANEOUS at 22:58

## 2022-02-16 RX ADMIN — Medication 10 MG: at 22:47

## 2022-02-16 RX ADMIN — SODIUM CHLORIDE: 9 INJECTION, SOLUTION INTRAVENOUS at 22:58

## 2022-02-16 RX ADMIN — Medication 2 PUFF: at 23:26

## 2022-02-16 RX ADMIN — ENOXAPARIN SODIUM 40 MG: 100 INJECTION SUBCUTANEOUS at 22:48

## 2022-02-16 RX ADMIN — INSULIN GLARGINE 20 UNITS: 100 INJECTION, SOLUTION SUBCUTANEOUS at 22:59

## 2022-02-16 RX ADMIN — FOLIC ACID 1 MG: 1 TABLET ORAL at 22:48

## 2022-02-16 RX ADMIN — SUMATRIPTAN SUCCINATE 100 MG: 25 TABLET ORAL at 22:53

## 2022-02-16 RX ADMIN — Medication 1 PUFF: at 23:26

## 2022-02-16 RX ADMIN — MEMANTINE HYDROCHLORIDE 10 MG: 5 TABLET ORAL at 22:47

## 2022-02-16 RX ADMIN — SODIUM CHLORIDE 500 ML: 9 INJECTION, SOLUTION INTRAVENOUS at 15:41

## 2022-02-16 ASSESSMENT — ENCOUNTER SYMPTOMS
DIARRHEA: 1
RESPIRATORY NEGATIVE: 1

## 2022-02-16 ASSESSMENT — PAIN SCALES - GENERAL
PAINLEVEL_OUTOF10: 9
PAINLEVEL_OUTOF10: 9

## 2022-02-16 NOTE — ED NOTES
Report given to 2 [facility], transport scheduled at this time.       Haja Toribio RN  02/16/22 9731

## 2022-02-16 NOTE — ED NOTES
1604 - Perfect serve sent to Dr. Leeanne To  02/16/22 1602    Maria M FERMIN called back.       Giovanna Hope  02/16/22 0682

## 2022-02-16 NOTE — PLAN OF CARE
Admit to 2w    RN resident from [address] [facility] [address]: Dizziness and generalized weakness    Clinical dehydration and mild CHIP on CKD  -Gentle IVF with Chronic sCHF (previous EF 40%)    Alphonso Sevilla PA-C  2/16/2022 4:52 PM

## 2022-02-16 NOTE — ED NOTES
Pt unable to stand to finish standing B/P. Pt became dizzy and had to sit down. Pt assisted back into bed.       Karl Duran RN  02/16/22 4753

## 2022-02-17 LAB
ANION GAP SERPL CALCULATED.3IONS-SCNC: 9 MMOL/L (ref 3–16)
BASOPHILS ABSOLUTE: 0 K/UL (ref 0–0.2)
BASOPHILS RELATIVE PERCENT: 0.6 %
BUN BLDV-MCNC: 19 MG/DL (ref 7–20)
CALCIUM SERPL-MCNC: 8.9 MG/DL (ref 8.3–10.6)
CHLORIDE BLD-SCNC: 104 MMOL/L (ref 99–110)
CO2: 26 MMOL/L (ref 21–32)
CREAT SERPL-MCNC: 1.4 MG/DL (ref 0.8–1.3)
EOSINOPHILS ABSOLUTE: 0.3 K/UL (ref 0–0.6)
EOSINOPHILS RELATIVE PERCENT: 3.1 %
ESTIMATED AVERAGE GLUCOSE: 165.7 MG/DL
GFR AFRICAN AMERICAN: >60
GFR NON-AFRICAN AMERICAN: 50
GLUCOSE BLD-MCNC: 103 MG/DL (ref 70–99)
GLUCOSE BLD-MCNC: 108 MG/DL (ref 70–99)
GLUCOSE BLD-MCNC: 160 MG/DL (ref 70–99)
GLUCOSE BLD-MCNC: 196 MG/DL (ref 70–99)
GLUCOSE BLD-MCNC: 219 MG/DL (ref 70–99)
GLUCOSE BLD-MCNC: 227 MG/DL (ref 70–99)
GLUCOSE BLD-MCNC: 277 MG/DL (ref 70–99)
GLUCOSE BLD-MCNC: 51 MG/DL (ref 70–99)
HBA1C MFR BLD: 7.4 %
HCT VFR BLD CALC: 34.6 % (ref 40.5–52.5)
HEMOGLOBIN: 11.7 G/DL (ref 13.5–17.5)
LYMPHOCYTES ABSOLUTE: 2.9 K/UL (ref 1–5.1)
LYMPHOCYTES RELATIVE PERCENT: 34.5 %
MCH RBC QN AUTO: 30.1 PG (ref 26–34)
MCHC RBC AUTO-ENTMCNC: 33.8 G/DL (ref 31–36)
MCV RBC AUTO: 89.1 FL (ref 80–100)
MONOCYTES ABSOLUTE: 0.9 K/UL (ref 0–1.3)
MONOCYTES RELATIVE PERCENT: 10.4 %
NEUTROPHILS ABSOLUTE: 4.3 K/UL (ref 1.7–7.7)
NEUTROPHILS RELATIVE PERCENT: 51.4 %
PDW BLD-RTO: 13.5 % (ref 12.4–15.4)
PERFORMED ON: ABNORMAL
PLATELET # BLD: 215 K/UL (ref 135–450)
PMV BLD AUTO: 7.5 FL (ref 5–10.5)
POTASSIUM REFLEX MAGNESIUM: 3.7 MMOL/L (ref 3.5–5.1)
RBC # BLD: 3.89 M/UL (ref 4.2–5.9)
SODIUM BLD-SCNC: 139 MMOL/L (ref 136–145)
WBC # BLD: 8.4 K/UL (ref 4–11)

## 2022-02-17 PROCEDURE — 97166 OT EVAL MOD COMPLEX 45 MIN: CPT

## 2022-02-17 PROCEDURE — 6370000000 HC RX 637 (ALT 250 FOR IP): Performed by: INTERNAL MEDICINE

## 2022-02-17 PROCEDURE — 6370000000 HC RX 637 (ALT 250 FOR IP): Performed by: PHYSICIAN ASSISTANT

## 2022-02-17 PROCEDURE — 97110 THERAPEUTIC EXERCISES: CPT

## 2022-02-17 PROCEDURE — 97530 THERAPEUTIC ACTIVITIES: CPT

## 2022-02-17 PROCEDURE — 36415 COLL VENOUS BLD VENIPUNCTURE: CPT

## 2022-02-17 PROCEDURE — 1200000000 HC SEMI PRIVATE

## 2022-02-17 PROCEDURE — 80048 BASIC METABOLIC PNL TOTAL CA: CPT

## 2022-02-17 PROCEDURE — 6370000000 HC RX 637 (ALT 250 FOR IP)

## 2022-02-17 PROCEDURE — 94640 AIRWAY INHALATION TREATMENT: CPT

## 2022-02-17 PROCEDURE — 85025 COMPLETE CBC W/AUTO DIFF WBC: CPT

## 2022-02-17 PROCEDURE — 2580000003 HC RX 258: Performed by: PHYSICIAN ASSISTANT

## 2022-02-17 PROCEDURE — 94761 N-INVAS EAR/PLS OXIMETRY MLT: CPT

## 2022-02-17 PROCEDURE — 6360000002 HC RX W HCPCS: Performed by: INTERNAL MEDICINE

## 2022-02-17 PROCEDURE — 97161 PT EVAL LOW COMPLEX 20 MIN: CPT

## 2022-02-17 PROCEDURE — 99222 1ST HOSP IP/OBS MODERATE 55: CPT | Performed by: PHYSICIAN ASSISTANT

## 2022-02-17 PROCEDURE — 6360000002 HC RX W HCPCS

## 2022-02-17 PROCEDURE — 97535 SELF CARE MNGMENT TRAINING: CPT

## 2022-02-17 RX ORDER — CLONIDINE HYDROCHLORIDE 0.1 MG/1
0.1 TABLET ORAL 2 TIMES DAILY
Status: DISCONTINUED | OUTPATIENT
Start: 2022-02-17 | End: 2022-02-18

## 2022-02-17 RX ORDER — 0.9 % SODIUM CHLORIDE 0.9 %
500 INTRAVENOUS SOLUTION INTRAVENOUS ONCE
Status: COMPLETED | OUTPATIENT
Start: 2022-02-17 | End: 2022-02-17

## 2022-02-17 RX ORDER — HYDRALAZINE HYDROCHLORIDE 20 MG/ML
15 INJECTION INTRAMUSCULAR; INTRAVENOUS EVERY 4 HOURS PRN
Status: DISCONTINUED | OUTPATIENT
Start: 2022-02-17 | End: 2022-02-21 | Stop reason: HOSPADM

## 2022-02-17 RX ORDER — SODIUM CHLORIDE 9 MG/ML
INJECTION, SOLUTION INTRAVENOUS CONTINUOUS
Status: ACTIVE | OUTPATIENT
Start: 2022-02-17 | End: 2022-02-17

## 2022-02-17 RX ORDER — PROCHLORPERAZINE EDISYLATE 5 MG/ML
10 INJECTION INTRAMUSCULAR; INTRAVENOUS EVERY 6 HOURS PRN
Status: DISCONTINUED | OUTPATIENT
Start: 2022-02-17 | End: 2022-02-21 | Stop reason: HOSPADM

## 2022-02-17 RX ORDER — INSULIN GLARGINE 100 [IU]/ML
15 INJECTION, SOLUTION SUBCUTANEOUS 2 TIMES DAILY
Status: DISCONTINUED | OUTPATIENT
Start: 2022-02-17 | End: 2022-02-21 | Stop reason: HOSPADM

## 2022-02-17 RX ORDER — INSULIN GLARGINE 100 [IU]/ML
25 INJECTION, SOLUTION SUBCUTANEOUS 2 TIMES DAILY
Status: DISCONTINUED | OUTPATIENT
Start: 2022-02-17 | End: 2022-02-17

## 2022-02-17 RX ORDER — DIPHENHYDRAMINE HYDROCHLORIDE 50 MG/ML
25 INJECTION INTRAMUSCULAR; INTRAVENOUS EVERY 6 HOURS PRN
Status: DISCONTINUED | OUTPATIENT
Start: 2022-02-17 | End: 2022-02-21 | Stop reason: HOSPADM

## 2022-02-17 RX ADMIN — METFORMIN HYDROCHLORIDE 1000 MG: 500 TABLET ORAL at 18:46

## 2022-02-17 RX ADMIN — MEMANTINE HYDROCHLORIDE 10 MG: 5 TABLET ORAL at 09:08

## 2022-02-17 RX ADMIN — ENOXAPARIN SODIUM 40 MG: 100 INJECTION SUBCUTANEOUS at 09:03

## 2022-02-17 RX ADMIN — INSULIN GLARGINE 20 UNITS: 100 INJECTION, SOLUTION SUBCUTANEOUS at 08:57

## 2022-02-17 RX ADMIN — DIPHENHYDRAMINE HYDROCHLORIDE 25 MG: 50 INJECTION, SOLUTION INTRAMUSCULAR; INTRAVENOUS at 23:35

## 2022-02-17 RX ADMIN — CYANOCOBALAMIN TAB 500 MCG 1000 MCG: 500 TAB at 09:08

## 2022-02-17 RX ADMIN — METOPROLOL SUCCINATE 50 MG: 50 TABLET, EXTENDED RELEASE ORAL at 20:29

## 2022-02-17 RX ADMIN — MEMANTINE HYDROCHLORIDE 10 MG: 5 TABLET ORAL at 20:28

## 2022-02-17 RX ADMIN — Medication 1 PUFF: at 07:07

## 2022-02-17 RX ADMIN — SERTRALINE HYDROCHLORIDE 100 MG: 50 TABLET ORAL at 09:08

## 2022-02-17 RX ADMIN — Medication 2 PUFF: at 07:07

## 2022-02-17 RX ADMIN — SODIUM CHLORIDE: 9 INJECTION, SOLUTION INTRAVENOUS at 14:30

## 2022-02-17 RX ADMIN — Medication 2 PUFF: at 19:17

## 2022-02-17 RX ADMIN — ALUMINUM HYDROXIDE, MAGNESIUM HYDROXIDE, AND SIMETHICONE 5 ML: 200; 200; 20 SUSPENSION ORAL at 23:35

## 2022-02-17 RX ADMIN — Medication 10 MG: at 20:28

## 2022-02-17 RX ADMIN — ATORVASTATIN CALCIUM 80 MG: 40 TABLET, FILM COATED ORAL at 20:29

## 2022-02-17 RX ADMIN — ACETAMINOPHEN 650 MG: 325 TABLET ORAL at 09:08

## 2022-02-17 RX ADMIN — ERGOCALCIFEROL 50000 UNITS: 1.25 CAPSULE ORAL at 09:08

## 2022-02-17 RX ADMIN — Medication 15 G: at 14:28

## 2022-02-17 RX ADMIN — SUMATRIPTAN SUCCINATE 100 MG: 25 TABLET ORAL at 20:38

## 2022-02-17 RX ADMIN — ASPIRIN 81 MG: 81 TABLET, CHEWABLE ORAL at 09:08

## 2022-02-17 RX ADMIN — INSULIN GLARGINE 15 UNITS: 100 INJECTION, SOLUTION SUBCUTANEOUS at 20:29

## 2022-02-17 RX ADMIN — HYDRALAZINE HYDROCHLORIDE 15 MG: 20 INJECTION, SOLUTION INTRAMUSCULAR; INTRAVENOUS at 22:54

## 2022-02-17 RX ADMIN — PANTOPRAZOLE SODIUM 40 MG: 40 TABLET, DELAYED RELEASE ORAL at 05:45

## 2022-02-17 RX ADMIN — Medication 100 MG: at 09:08

## 2022-02-17 RX ADMIN — FINASTERIDE 5 MG: 5 TABLET, FILM COATED ORAL at 09:08

## 2022-02-17 RX ADMIN — OXYCODONE 10 MG: 5 TABLET ORAL at 05:52

## 2022-02-17 RX ADMIN — OXYCODONE 10 MG: 5 TABLET ORAL at 18:50

## 2022-02-17 RX ADMIN — FOLIC ACID 1 MG: 1 TABLET ORAL at 09:08

## 2022-02-17 RX ADMIN — Medication 1 PUFF: at 19:17

## 2022-02-17 RX ADMIN — SODIUM CHLORIDE: 9 INJECTION, SOLUTION INTRAVENOUS at 20:41

## 2022-02-17 RX ADMIN — INSULIN LISPRO 1 UNITS: 100 INJECTION, SOLUTION INTRAVENOUS; SUBCUTANEOUS at 20:29

## 2022-02-17 RX ADMIN — OXYCODONE 10 MG: 5 TABLET ORAL at 13:04

## 2022-02-17 RX ADMIN — NIFEDIPINE 30 MG: 30 TABLET, FILM COATED, EXTENDED RELEASE ORAL at 09:08

## 2022-02-17 RX ADMIN — SODIUM CHLORIDE 500 ML: 9 INJECTION, SOLUTION INTRAVENOUS at 09:39

## 2022-02-17 ASSESSMENT — PAIN SCALES - GENERAL
PAINLEVEL_OUTOF10: 8
PAINLEVEL_OUTOF10: 6
PAINLEVEL_OUTOF10: 8
PAINLEVEL_OUTOF10: 8

## 2022-02-17 NOTE — PROGRESS NOTES
Large urine output; 2 pitches of water present on bedside table. Took one away and instructed patient to not drink so much. States he does drink a lot of water. Call light in reach/ IV infusing w/o diff.

## 2022-02-17 NOTE — DISCHARGE INSTR - COC
Continuity of Care Form    Patient Name: Vanessa Hogan   :  1950  MRN:  [id number]    Admit date:  2022  Discharge date:  2022    Code Status Order: Full Code   Advance Directives:      Admitting Physician:  Wagner Gatica MD  PCP: No primary care provider on file.     Discharging Nurse: [address] Unit/Room#: 0211/0211-02  Discharging Unit Phone Number: [phone]    Emergency Contact:   Extended Emergency Contact Information  Primary Emergency Contact: [address] Phone: [phone]  Mobile Phone: [phone]  Relation: Brother/Sister  Secondary Emergency Contact: Noemy Wesley, [address] Phone: [phone]  Relation: Other    Past Surgical History:  Past Surgical History:   Procedure Laterality Date    COLONOSCOPY      DIAGNOSTIC CARDIAC CATH LAB PROCEDURE      DILATATION, ESOPHAGUS      FOOT SURGERY Left Hammer toe, 2nd toe    10/9/14    FOOT SURGERY Left 2019    REMOVAL HARDWARE LEFT FOOT performed by Corinna Price DPM at [address] [facility] Left 2019    REMOVAL HARDWARE LEFT FOOT w. Dr Lio Flores 19    HIP FRACTURE SURGERY      LEG SURGERY      broken leg    OTHER SURGICAL HISTORY Left 2013    EXCISION 5TH METATRSAL LEFT FOOT          OTHER SURGICAL HISTORY Right 2019    Procedure: PARTIAL RESECTION RIGHT FIFTH METATARSAL, ULCER DEBRIDEMENT WITH GRAFT APPLICATION    PRESSURE ULCER DEBRIDEMENT Right 2019    PARTIAL RESECTION RIGHT FIFTH METATARSAL, ULCER DEBRIDEMENT WITH GRAFT APPLICATION performed by Corinna Price DPM at [facility]  2017    COLOSTOMY REVERSAL     TONSILLECTOMY      UPPER GASTROINTESTINAL ENDOSCOPY  2013    Esophageal Brushing       Immunization History:   Immunization History   Administered Date(s) Administered    COVID-19, Pfizer Purple top, DILUTE for use, 12+ yrs, 30mcg/0.3mL dose 2020, 2021    Hepatitis A 02/05/2010, 08/18/2016    Hepatitis A Adult (Havrix, Vaqta) 08/18/2016    Hepatitis B 02/05/2010, 08/18/2016    Influenza 10/20/2010, 11/29/2011, 10/09/2012    Influenza Vaccine, unspecified formulation 10/16/2013, 11/03/2016    Influenza Virus Vaccine 10/17/2013, 10/06/2014, 10/09/2015, 11/03/2016    Influenza, High Dose (Fluzone 65 yrs and older) 09/27/2017    Influenza, [place], 6 mo and older, IM, PF (Flulaval, Fluarix) 10/19/2018    Pneumococcal Conjugate 13-valent (Urqhgzb25) 11/10/2015    Pneumococcal Conjugate Vaccine 11/10/2015    Pneumococcal Polysaccharide ([id number]) 09/27/2012, 10/16/2013, 09/22/2015    Tdap (Boostrix, Adacel) 02/05/2010, 09/06/2019       Active Problems:  Patient Active Problem List   Diagnosis Code    GERD (gastroesophageal reflux disease) K21.9    Peripheral neuropathy of bilateral feet G62.9    COPD with acute bronchitis (HCC) J44.0, J20.9    HLD (hyperlipidemia) E78.5    Hepatic steatosis K76.0    PTSD (post-traumatic stress disorder) F43.10    History of hepatitis C Z86.19    Bilateral knee & hip OA M17.10    Mild-moderate alcohol consumption (beer) F10.20    Chronic pain syndrome G89.4    Moderate episode of recurrent major depressive disorder (HCC) F33.1    Orthostatic hypotension I95.1    S/P left colectomy Z90.49    Hyponatremia E87.1    S/p reversal of colostomy (6/27/17) K55.9    Chronic dCHF (grade 1 LVDD) I50.32    Chronic normocytic anemia D64.9    Hyperglycemia R73.9    Severe protein-calorie malnutrition ([address] E43    DM (diabetes mellitus), secondary, uncontrolled, w/neurologic complic (HCC) W71.25, V98.67    Hx DKA (Feb 2018) E11.10, Z79.4    Other insomnia G47.09    Chronic transaminasemia R74.01    Tobacco smoker F17.200    Fall at home W19. Ryan Hazy, Y92.009    Hypochloremia E87.8    Hyperkalemia D88.0    Mixed metabolic and respiratory acidosis E87.2    Closed nondisplaced L ankle fracture (lateral malleolus) S82.892A    L 2nd toenail avulsion S91.209A    Skin tear of L leg S81.812A    Head trauma S09.90XA    IDDM (insulin dependent diabetes mellitus) KOH2158    Ventral hernia without obstruction or gangrene K43.9    Diabetic ketoacidosis without coma associated with type 2 diabetes mellitus (HCC) E11.10    DDD (degenerative disc disease), cervical M50.30    Spondylosis of cervical region without myelopathy or radiculopathy M47.812    DDD (degenerative disc disease), lumbar M51.36    Closed nondisplaced fracture of lateral malleolus of left fibula S82.65XA    Lumbar degenerative disc disease M51.36    Protrusion of lumbar intervertebral disc M51.26    Osteoarthritis of both knees M17.0    Osteoarthritis of both hips M16.0    Idiopathic peripheral neuropathy G60.9    Leg pain, anterior, left M79.605    CVA (cerebral vascular accident) ([facility] I63.9    DM (diabetes mellitus) ([facility] E11.9    BPH (benign prostatic hyperplasia) N40.0    Dysarthria R47.1    Left-sided weakness R53.1    TIA (transient ischemic attack) G45.9    Dyslipidemia E78.5    DKA, type 2, not at goal [facility]) E11.10    Agitation requiring sedation protocol R45.1    DKA, type 1, not at goal [facility]) E10.10    Severe malnutrition ([facility] E43    Acute renal failure ([facility] N17.9    Open wound of right foot S91.301A    Dementia associated with other underlying disease without behavioral disturbance ([facility] F02.80    Closed head injury S09.90XA    Facial hematoma S00.83XA    Concussion with no loss of consciousness S06.0X0A    Chest pain R07.9    SOB (shortness of breath) R06.02    Chronic systolic CHF (congestive heart failure) (HCC) I50.22    Cardiomyopathy (HCC) I42.9    Hypertension I10    Chronic kidney disease N18.9    Syncope and collapse R55    LV dysfunction I51.9    Aortic root dilation (HCC) I77.810       Isolation/Infection:   Isolation            No Isolation          Patient Infection Status       Infection Onset Added Last Indicated Last Indicated By Review Planned Expiration Resolved Resolved By    None active Resolved    COVID-19 (Rule Out) 02/16/22 02/16/22 02/16/22 COVID-19 & Influenza Combo (Ordered)   02/16/22 Rule-Out Test Resulted    COVID-19 (Rule Out) 10/06/21 10/06/21 10/06/21 COVID-19, Rapid (Ordered)   10/06/21 Rule-Out Test Resulted    COVID-19 (Rule Out) 04/19/21 04/19/21 04/19/21 COVID-19 & Influenza Combo (Ordered)   04/19/21 Rule-Out Test Resulted    COVID-19 (Rule Out) 04/16/21 04/16/21 04/16/21 COVID-19 & Influenza Combo (Ordered)   04/16/21 Rule-Out Test Resulted    COVID-19 (Rule Out) 04/01/21 04/01/21 04/01/21 COVID-19, Rapid (Ordered)   04/01/21 Rule-Out Test Resulted    COVID-19 (Rule Out) 10/22/20 10/22/20 10/22/20 COVID-19 (Ordered)   10/23/20 Rule-Out Test Resulted    COVID-19 (Rule Out) 10/22/20 10/22/20 10/22/20 COVID-19 (Ordered)   10/22/20 Rule-Out Test Resulted            Nurse Assessment:  Last Vital Signs: /64 Comment: standing  Pulse 76   Temp 97.6 °F (36.4 °C) (Oral)   Resp 16   Ht 6' 5\" (1.956 m)   Wt 188 lb 0.8 oz (85.3 kg)   SpO2 95%   BMI 22.30 kg/m²     Last documented pain score (0-10 scale): Pain Level: 8  Last Weight:   Wt Readings from Last 1 Encounters:   02/16/22 188 lb 0.8 oz (85.3 kg)     Mental Status:  oriented and alert    IV Access:  - None    Nursing Mobility/ADLs:  Walking   Assisted  Transfer  Assisted  Bathing  Assisted  Dressing  Assisted  Toileting  Assisted  Feeding  Independent  Med Admin  Assisted  Med Delivery   whole    Wound Care Documentation and Therapy:        Elimination:  Continence: Bowel: at times  Bladder: at times  Urinary Catheter: None   Colostomy/Ileostomy/Ileal Conduit: No       Date of Last BM: 2/20/22    Intake/Output Summary (Last 24 hours) at 2/17/2022 1001  Last data filed at 2/17/2022 0604  Gross per 24 hour   Intake 331.14 ml   Output 4350 ml   Net -4018.86 ml     I/O last 3 completed shifts: In: 331.1 [I.V.:331.1]  Out: 4720 [Urine:4350]    Safety Concerns:      At Risk for Falls    Impairments/Disabilities:      Vision and Hearing    Nutrition Therapy:  Current Nutrition Therapy:   - Oral Diet:  Carb Control 4 carbs/meal (1800kcals/day)    Routes of Feeding: Oral  Liquids: Thin Liquids  Daily Fluid Restriction: no  Last Modified Barium Swallow with Video (Video Swallowing Test): not done    Treatments at the Time of Hospital Discharge:   Respiratory Treatments: see discharge instructions  Oxygen Therapy:  is not on home oxygen therapy. Ventilator:    - No ventilator support    Rehab Therapies: Long term care  Weight Bearing Status/Restrictions: No weight bearing restirctions  Other Medical Equipment (for information only, NOT a DME order):  walker  Other Treatments: n/a    Patient's personal belongings (please select all that are sent with patient):       RN SIGNATURE:  Electronically signed by Harmeet Cedillo RN on 2/21/22 at 11:13 AM EST    CASE MANAGEMENT/SOCIAL WORK SECTION    Inpatient Status Date: 02/16/22    Readmission Risk Assessment Score:  Readmission Risk              Risk of Unplanned Readmission:  35           Discharging to Facility/ Agency   Name: [facility]  Phone: [phone]    / signature: Electronically signed by Stephanie Ignacio RN on 2/21/22 at 8:52 AM EST    PHYSICIAN SECTION    Prognosis: Good    Condition at Discharge: Stable    Rehab Potential (if transferring to Rehab): Good    Recommended Labs or Other Treatments After Discharge:  avoid multiple BP meds        Physician Certification: I certify the above information and transfer of Maria Del Carmen Singer  is necessary for the continuing treatment of the diagnosis listed and that he requires [facility] for less 30 days.      Update Admission H&P: No change in H&P    PHYSICIAN SIGNATURE:  Electronically signed by Daphne Tsai MD on 2/21/22 at 8:46 AM EST

## 2022-02-17 NOTE — PROGRESS NOTES
Inpatient Occupational Therapy  Evaluation and Treatment    Unit: [address] [place]  Date:  2/17/2022  Patient Name:    Loan Sandoval  Admitting diagnosis:  Dizziness [R42]  Generalized weakness [R53.1]  CHIP (acute kidney injury) [facility]) [N17.9]  Admit Date:  2/16/2022  Precautions/Restrictions/WB Status/ Lines/ Wounds/ Oxygen: Fall risk, Bed/chair alarm, Lines -IV and low vision, [place]    Orthostatic BP issues-Patient reports YORDAN hose have been somewhat helpful with this in the past, RN notified. Treatment Time:  8928-9192  Treatment Number: 1   Timed code treatment minutes 40 minutes   Total Treatment minutes:   50   minutes    Patient Goals for Therapy:  Unable to state      Discharge Recommendations: SNF  DME needs for discharge: defer to facility       Therapy recommendations for staff:   Assist of 2 to EOB/stand at EOB    Continue to assess for transfers    History of Present Illness: per ELEAZAR Small H&P 2/17/22:  \"The patient is a 80 y.o. male with systolic congestive heart failure, chronic pain, dementia, diabetes mellitus type 2, COPD who presented to [facility] ED with complaint of dizziness. Patient states he is not sure why he was sent to the ER. He states he has been dizzy for \"a long time. \"  He denies any new complaints. ER provider note reviewed, reports patient presented for generalized weakness and dizziness. I called the patient's nursing home- nurse reports patient was disoriented, had low BP, and complained of HA therefore EMS was called to transport to ER. I reviewed records from NH- patient has complained of a chronic headache since Spring 2021.       ER work-up revealed a mild CHIP and orthostatic hypotension. He was admitted to the hospital for for further evaluation and care.       This morning patient complains of chronic pain. He is oriented x 4. \"     Home Health S4 Level Recommendation:  NA  AM-PAC Score: AM-PAC Inpatient Daily Activity Raw Score: 14    Preadmission Environment   Pt. [address]  Steps to enter first floor: No steps  Bathroom: Walk-in Shower, Grab bars, Shower Chair  and Raised toilet seat with grab bars  Equipment owned: SPC, Rollator, Shower Chair, lift chair and hospital bed, TENS unit (for back pain), knee braces             Pt has access to a RW and manual w/c through the NH.      Preadmission Status:  History of falls          No  Pt. Able to drive: No - facility transport  Pt Fully independent with ADLs: No  Pt. Required assistance from family for: Bathing, Cleaning, Cooking and Laundry               Staff assists with showers and sometimes assist with dressing (able to complete most dressing tasks on his own)  Pt states he is not allowed to walk about the facility related to high fall risk, but he does walk in his room with RW. Reports going to the bathroom in his room independently most of the time, some episodes of incontinence. Pt reports he is working with therapy on walking with a rollator and exercises. Pain  Yes  Ratin  Location:headache, abdomen (feels like gas that he cannot pass), neck (posterior)  Pain Medicine Status: RN notified      Cognition    A&O x4 with additional time allotted to recall name of specific hospital  Able to follow 1 step commands    Subjective  Patient lying supine in bed with no family present. Pt agreeable to this OT eval & tx. Upper Extremity ROM:    WFL    Upper Extremity Strength:    BUE strength impaired but not formally assessed w/ MMT   Patient with onset of tremors, mostly in R hand, during session. Reports this is likely related to low blood sugar. RN notified and in to address.       Upper Extremity Sensation    Impaired distal BUEs    Upper Extremity Proprioception:  WFL    Coordination and Tone  WFL    Balance  Functional Sitting Balance:  Impaired CGA at EOB, unable to consistently hold upright posture due to dizziness, L groin pain likely benefit from skilled occupational therapy services to maximize safety and independence. Goal(s) : To be met in 3 Visits:  1). Bed to toilet/BSC: Mod A     To be met in 5 Visits:  1). Supine to/from Sit:  SBA  2). Upper Body Bathing:   CGA  3). Lower Body Bathing:   Min A   4). Upper Body Dressing:  CGA  5). Lower Body Dressing:  Min A   6). Pt to demonstrate UE exs x 15 reps with minimal cues    Rehabilitation Potential:  Good for goals listed above. Strengths for achieving goals include: Pt motivated and Pt cooperative  Barriers to achieving goals include:  Complexity of condition and Weakness     Plan: To be seen 3-5 x/wk while in acute care setting for therapeutic exercises, bed mobility, transfers, dressing, bathing, family/patient education, ADL/IADL retraining, energy conservation training.      Mallorie Santos, OTR/L 4070            If patient discharges from this facility prior to next visit, this note will serve as the Discharge Summary

## 2022-02-17 NOTE — H&P
Hospital Medicine History & Physical      PCP: No primary care provider on file. Date of Admission: 2/16/2022    Date of Service: Pt seen/examined on 2/17/2022      Chief Complaint:    Chief Complaint   Patient presents with    Dizziness     pt brought in by EMS from [facility] due to dizziness. Pt states that he did not pass out at facility. Pt reports ongoing problems with B/p and BS         History Of Present Illness: The patient is a 80 y.o. male with systolic congestive heart failure, chronic pain, dementia, diabetes mellitus type 2, COPD who presented to [facility] ED with complaint of dizziness. Patient states he is not sure why he was sent to the ER. He states he has been dizzy for \"a long time. \"  He denies any new complaints. ER provider note reviewed, reports patient presented for generalized weakness and dizziness. I called the patient's nursing home- nurse reports patient was disoriented, had low BP, and complained of HA therefore EMS was called to transport to ER. I reviewed records from NH- patient has complained of a chronic headache since Spring 2021. ER work-up revealed a mild CHIP and orthostatic hypotension. He was admitted to the hospital for for further evaluation and care. This morning patient complains of chronic pain. He is oriented x 4.      Past Medical History:        Diagnosis Date    Acute on chronic systolic (congestive) heart failure (HCC)     Anxiety disorder     BPH (benign prostatic hypertrophy)     Calcified granuloma of lung ([address] 2014    2:stable per 3/25/14 CT chest    Cataract 1/20/14    OU:Dr. mcclain:[facility]    Cerebral artery occlusion with cerebral infarction ([address]     Chronic diastolic heart failure (HCC)     Chronic pain     Chronic viral hepatitis (HCC)     Cognitive communication deficit     COPD (chronic obstructive pulmonary disease) ([address]     Under care of pulmo(Dr. Calhoun)    Degenerative arthritis of hip     Dementia ([address]     Depression 3/11/2015    Depression with anxiety     Prior psychiatrist & therapist:Dr. Odalis Cruz.  Diabetes mellitus ([address] 2004    Endo Dr. Kevin Combs type 1 note below in [facility]    Diabetic eye exam ([facility] 1/20/14    [facility]:Dr. STEPHEN Hayward:No retinopathy. Cataract    Diabetic retinopathy ([address]     under care of [facility]:Dr. Katya Moore    Diverticulitis 11/26/2011    Diverticulosis 11/26/2011    DKA (diabetic ketoacidoses)     Emphysema     Esophageal candidiasis ([address] 7/16/13    GI:EGD    Essential hypertension, benign 11/2010    ETOH abuse     Fatty liver 10/9/2012    Foot ulcer:left 9/30/2013    Gastric ulcer, unspecified as acute or chronic, without mention of hemorrhage or perforation     Generalized anxiety disorder     GERD (gastroesophageal reflux disease)     Gout 1997    Right big toe    Hearing decreased     Left ear=60%. Right ear=80%.  Hemangioma 12/2010    Liver as per CT abdo    hepatitis c 7/26/17, 2010    Under care of Liver doc:Dr. Rishabh Lowry    Hyperlipidemia LDL goal < 100     Hypertensive heart disease with heart failure ([address]     Left-sided weakness     Low HDL (under 40) 10/9/2012    Myocardial infarction ([address]     Orthostatic hypotension     Peripheral neuropathy 2006    Pneumonia 10/15/13    Protein calorie malnutrition (HCC)     PTSD (post-traumatic stress disorder)     Pyogenic granuloma     per derm:Dr. Elisabeth Jeronimo Restrictive lung disease     Under care of pulmo(Dr. Calhoun)    S/P colonoscopy 1996    Done secondary to rectal bleed:dx=hemorrhoids    S/P colonoscopy 11/11/10;10/23/2013    Dr. Hines Begun 10/2016(3yrs):polyps;diverticulosis. Diverticulosis & polpy(removed). Next in10/2016.  S/P endoscopy 2009    EGD:stomach ulcers.     Sciatica     Superficial phlebitis of left leg 9/30/2013    Tachycardia     Therapeutic drug monitoring 4/8/15    OARRS report is consistent on 4/8/15;7/9/15;10/9/15;1/8/16;4/5/16    Type 1 diabetes mellitus not at goal [facility]) 3/17/14 updated diagnosis as per endo:Dr. Vianey Whittaker       Past Surgical History:        Procedure Laterality Date    COLONOSCOPY      DIAGNOSTIC CARDIAC CATH LAB PROCEDURE  2013    DILATATION, ESOPHAGUS      FOOT SURGERY Left Hammer toe, 2nd toe    10/9/14    FOOT SURGERY Left 12/5/2019    REMOVAL HARDWARE LEFT FOOT performed by Nkechi Atkins DPM at [address] [place] [address] [address] Left 12/03/2019    REMOVAL HARDWARE LEFT FOOT w. Dr Carlyn Mendoza 12/5/19    [address] [facility]      broken leg    OTHER SURGICAL HISTORY Left 11/21/2013    EXCISION 5TH METATRSAL LEFT FOOT          OTHER SURGICAL HISTORY Right 05/30/2019    Procedure: PARTIAL RESECTION RIGHT FIFTH METATARSAL, ULCER DEBRIDEMENT WITH GRAFT APPLICATION    PRESSURE ULCER DEBRIDEMENT Right 5/30/2019    PARTIAL RESECTION RIGHT FIFTH METATARSAL, ULCER DEBRIDEMENT WITH GRAFT APPLICATION performed by Nkechi Atkins DPM at [place] [address]  06/27/2017    COLOSTOMY REVERSAL     TONSILLECTOMY      UPPER GASTROINTESTINAL ENDOSCOPY  7/16/2013    Esophageal Brushing       Medications Prior to Admission:    Prior to Admission medications    Medication Sig Start Date End Date Taking? Authorizing Provider   fluticasone (ARNUITY ELLIPTA) 100 MCG/ACT AEPB Inhale 1 puff into the lungs daily    Historical Provider, MD   metFORMIN (GLUCOPHAGE) 500 MG tablet Take 500 mg by mouth daily (with breakfast)    Historical Provider, MD   oxyCODONE (ROXICODONE) 5 MG immediate release tablet Take 10 mg by mouth every 6 hours as needed for Pain. Historical Provider, MD   pantoprazole (PROTONIX) 40 MG tablet Take 40 mg by mouth daily    Historical Provider, MD   oxyCODONE ER (XTAMPZA ER) 9 MG C12A Take 9 mg by mouth every 12 hours.     Historical Provider, MD   NIFEdipine (ADALAT CC) 30 MG extended release tablet Take 1 tablet by mouth daily 4/20/21   Dru Amezcua MD   cloNIDine (CATAPRES) 0.1 MG tablet Take 1 tablet by mouth daily 4/20/21   Alhaji Byrne MD   sertraline (ZOLOFT) 100 MG tablet Take 100 mg by mouth daily    Historical Provider, MD   Insulin Lispro (HUMALOG SC) Inject 6 Units into the skin 3 times daily (before meals) And sliding scale PRN    Historical Provider, MD   melatonin 10 MG CAPS capsule Take 10 mg by mouth nightly    Historical Provider, MD   vitamin D (ERGOCALCIFEROL) 1.25 MG (33308 UT) CAPS capsule Take 50,000 Units by mouth once a week thursday    Historical Provider, MD   tiZANidine (ZANAFLEX) 4 MG tablet Take 4 mg by mouth every 8 hours as needed    Historical Provider, MD   metoprolol succinate (TOPROL XL) 50 MG extended release tablet Take 1 tablet by mouth nightly 10/26/20   RICCO Hunter - CNP   loratadine (CLARITIN) 10 MG tablet Take 10 mg by mouth daily    Historical Provider, MD   memantine (NAMENDA) 10 MG tablet Take 10 mg by mouth 2 times daily    Historical Provider, MD   aluminum & magnesium hydroxide-simethicone (MAALOX) 200-200-20 MG/5ML SUSP suspension Take 5 mLs by mouth every 6 hours as needed for Indigestion Takes mylanta 30 ml Q4 hours PRN    Historical Provider, MD   polyethylene glycol (GLYCOLAX) powder Take 17 g by mouth daily    Historical Provider, MD   acetaminophen (TYLENOL) 325 MG tablet Take 650 mg by mouth every 4 hours as needed for Pain    Historical Provider, MD   loperamide (IMODIUM) 2 MG capsule Take 2 mg by mouth every 6 hours as needed for Diarrhea     Historical Provider, MD   aspirin 81 MG tablet Take 81 mg by mouth daily chewable    Historical Provider, MD   finasteride (PROSCAR) 5 MG tablet Take 5 mg by mouth daily    Historical Provider, MD   folic acid (FOLVITE) 1 MG tablet Take 1 mg by mouth daily    Historical Provider, MD   Dulaglutide (TRULICITY) 2.74 AK/1.1WJ SOPN Inject 0.75 mg into the skin once a week On Saturday    Historical Provider, MD   SUMAtriptan (IMITREX) 100 MG tablet Take 100 mg by mouth daily as needed for Migraine Give one additional dose if needed after 2 hours if 1st dose is ineffective (NTE 200mg/24 hrs)    Historical Provider, MD   atorvastatin (LIPITOR) 80 MG tablet Take 80 mg by mouth nightly     Historical Provider, MD   Insulin Glargine (LANTUS SOLOSTAR SC) Inject 20 Units into the skin 2 times daily     Historical Provider, MD   Continuous Blood Gluc  (FREESTYLE BRIDGET READER) DENIS Use to monitor sugars 8/1/18   Janeth Boyle MD   vitamin B-12 (CYANOCOBALAMIN) 1000 MCG tablet TAKE 1 TABLET BY MOUTH DAILY 7/2/18   RICCO Shepherd - CNP   umeclidinium-vilanterol (ANORO ELLIPTA) 62.5-25 MCG/INH AEPB inhaler Inhale 1 puff into the lungs daily 3/27/18   Carrol Thomson MD   albuterol sulfate HFA (PROAIR HFA) 108 (90 Base) MCG/ACT inhaler Inhale 2 puffs into the lungs every 4 hours as needed for Wheezing or Shortness of Breath 3/27/18   Carrol Thomson MD   Lancets MISC Testing 2-3 times daily DX Code: E11.9 3/22/18   Janeth Boyle MD   thiamine 100 MG tablet Take 1 tablet by mouth daily 3/6/18   RICCO Shepherd CNP   glucagon 1 MG injection Inject 1 kit into the skin as needed    Historical Provider, MD       Allergies:  Neurontin [gabapentin]    Social History:  The patient [facility] lives at [facility]     TOBACCO:   reports that he quit smoking about 3 years ago. His smoking use included cigarettes. He has a 17.50 pack-year smoking history. He has never used smokeless tobacco.  ETOH:   reports previous alcohol use.       Family History:   Positive as follows:        Problem Relation Age of Onset   Gladys Pro Stroke Mother     Hypertension Mother     Arthritis Father     Substance Abuse Father         Etoh    Cancer Father         esophageal    Hypertension Father     Diabetes Brother     Diabetes Paternal Aunt     Asthma Neg Hx     Emphysema Neg Hx     Heart Failure Neg Hx        REVIEW OF SYSTEMS:       Constitutional: Negative for fever   HENT: Negative for sore throat   Eyes: Negative for redness   Respiratory: Negative  for dyspnea, cough   Cardiovascular: Negative for chest pain   Gastrointestinal: Negative for vomiting, diarrhea   Genitourinary: Negative for hematuria   Musculoskeletal: +for arthralgias   Skin: Negative for rash   Neurological: Negative for syncope   Hematological: Negative for adenopathy   Psychiatric/Behavorial: Negative for anxiety    PHYSICAL EXAM:    BP (!) 160/84   Pulse 76   Temp 97.6 °F (36.4 °C) (Oral)   Resp 16   Ht 6' 5\" (1.956 m)   Wt 188 lb 0.8 oz (85.3 kg)   SpO2 95%   BMI 22.30 kg/m²     Gen: No distress. Alert. Eyes: PERRL. No sclera icterus. No conjunctival injection. ENT: No discharge. Pharynx clear. Edentulous   Neck: No JVD. Trachea midline. Resp: No accessory muscle use. No crackles. No wheezes. No rhonchi. CV: Regular rate. Regular rhythm. No murmur. No rub. No edema. Capillary Refill: Brisk,< 3 seconds   Peripheral Pulses: +2 palpable, equal bilaterally   GI: Non-tender. Non-distended. No masses. No organomegaly. Normal bowel sounds. Large midline old scar with incisional hernia     Skin: Warm and dry. No nodule on exposed extremities. No rash on exposed extremities. M/S: No cyanosis. No joint deformity. No clubbing. Neuro:   Awake and alert  Oriented to month, year, person, place, president  Follows commands appropriately  Extraocular movements intact  Visual fields intact  No facial palsy  No upper or lower extremity motor drift  No limb ataxia-finger-nose-finger and heel-to-shin test are within normal limits   normal sensation  No aphasia, no dysarthria  Psych: Oriented x 3. No anxiety or agitation.      CBC:   Recent Labs     02/16/22  1434 02/17/22  0605   WBC 10.6 8.4   HGB 11.6* 11.7*   HCT 35.0* 34.6*   MCV 90.3 89.1    215     BMP:   Recent Labs     02/16/22  1434 02/17/22  0604   * 139   K 4.5 3.7   CL 98* 104   CO2 21 26   BUN 23* 19   CREATININE 1.6* 1.4*     LIVER PROFILE:   Recent Labs     02/16/22  1434 AST 16   ALT 11   BILITOT 0.5   ALKPHOS 76     PT/INR: No results for input(s): PROTIME, INR in the last 72 hours. APTT: No results for input(s): APTT in the last 72 hours. UA:No results for input(s): NITRITE, COLORU, PHUR, LABCAST, WBCUA, RBCUA, MUCUS, TRICHOMONAS, YEAST, BACTERIA, CLARITYU, SPECGRAV, LEUKOCYTESUR, UROBILINOGEN, BILIRUBINUR, BLOODU, GLUCOSEU, AMORPHOUS in the last 72 hours. Invalid input(s): [facility]       CARDIAC ENZYMES  Recent Labs     02/16/22  1434   TROPONINI <0.01       U/A:    Lab Results   Component Value Date    NITRITE neg 01/08/2013    COLORU Yellow 10/06/2021    WBCUA 0-2 10/06/2021    RBCUA 0-2 10/06/2021    MUCUS Rare 10/06/2021    BACTERIA Rare 03/29/2021    CLARITYU Clear 10/06/2021    SPECGRAV 1.010 10/06/2021    LEUKOCYTESUR Negative 10/06/2021    BLOODU Negative 10/06/2021    GLUCOSEU >=1000 10/06/2021    AMORPHOUS Rare 10/04/2018       ABG    Lab Results   Component Value Date    BDR9SPC 22.3 03/03/2019    BEART -3 03/03/2019    T1FJLRTN 97 03/03/2019    PHART 7.367 03/03/2019    JDH4MDN 38.8 03/03/2019    PO2ART 88.2 03/03/2019    GWZ7SKU 24 03/03/2019       CULTURES  COVID 19, PCR: not detected  Flu A/B: neg/neg     EKG:  Normal sinus rhythm  Possible Left atrial enlargement  Left ventricular hypertrophy with QRS widening and repolarization abnormality  ST abnormaltiy anterolateral leads consider ischemia vs repolarization change  When compared with ECG of 16-APR-2021 14:59,  T wave inversion no longer evident in Inferior leads  Confirmed by ADÁN URBINA MD    Anterolateral ST changes are not new when compared to EKG from 4/2021     RADIOLOGY  CT HEAD WO CONTRAST   Final Result   No acute intracranial abnormality. Encephalomalacia with old right occipital infarction and small basal ganglia   lacunar infarctions unchanged. XR CHEST PORTABLE   Final Result   No acute abnormality.                Pertinent previous results reviewed   Echo 10/2020   Limited only f/u for LVEF, RWMA, IVC, Aortic root. Ejection fraction is visually estimated to be 40 %. Global hypokinesis with regional variation, more prominent inferoapical   hypokinesis. Changes noted from previous echo on 4- in left ventricular function. The aortic root is mildly dilated. The right ventricle is low normal (to mildly depressed) in function. IVC size is normal (<2.1cm) and collapses > 50% with respiration consistent   with normal RA pressure (3mmHg). ASSESSMENT/PLAN:    #Orthostatic hypotension  - there is diagnosis of orthostasis on NH paperwork  - give gentle IVF  - add YORDAN hose  - cont home antihypertensives with exception of clonidine   - monitor overnight and repeat orthostatics in AM    #HTN  - episode of hypotension at the NH, BP has been stable at rest since admit, but + orthostasis  - Cont home meds: nifedipine, toprol   - hold clonidine     #Elevated Creatinine in patient with CKD 3a-b  - Baseline Crt variable ~1.2-1.3  - Crt 1.6 on admit  - sp gentle IVF and Crt improved to 1.4    #Dehydration   - sp IVF, he is tolerating orally, now off IVF     #Chronic systolic CHF   - without evidence of decompensation   - continue toprol XL  - no ACEi/ARB suspect 2/2 CKD     #DM2  - Cont Lantus, geremias humalog, metformin, SSI    #COPD  - no exacerbation, cont inhalers    #Chronic pain  - home medication regimen continued     #Remote CVA  - neuro exam is WNL, head CT without acute findings   - cont ASA, statin     #Dementia  - oriented x 4- at baseline. Continue namenda     #Chronic anemia  - hb is at baseline     #GERD  - cont PPI    #MDD  - cont Zoloft    #PCM  - dietitian consult     #BPH with LUTS   - cont Proscar    #Hepatitis C     DVT Prophylaxis: Lovenox   Diet: ADULT DIET;  Regular; 4 carb choices (60 gm/meal)  Code Status: Full Code    David Person PA-C  2/17/2022 8:52 AM

## 2022-02-17 NOTE — PROGRESS NOTES
Pt c/o feeling shaky says feels like his sugar is low. Juice and crackers given. FSBS 51. Oral glucose given.

## 2022-02-17 NOTE — PLAN OF CARE
Problem: Falls - Risk of:  Goal: Will remain free from falls  Description: Will remain free from falls  2/17/2022 1227 by Sly Durbin RN  Outcome: Ongoing     Problem: Falls - Risk of:  Goal: Absence of physical injury  Description: Absence of physical injury  2/17/2022 1227 by Sly Durbin RN  Outcome: Ongoing

## 2022-02-17 NOTE — PROGRESS NOTES
RT Inhaler-Nebulizer Bronchodilator Protocol Note    There is a bronchodilator order in the chart from a provider indicating to follow the RT Bronchodilator Protocol and there is an Initiate RT Inhaler-Nebulizer Bronchodilator Protocol order as well (see protocol at bottom of note). CXR Findings:  XR CHEST PORTABLE    Result Date: 2/16/2022  No acute abnormality. The findings from the last RT Protocol Assessment were as follows:   History Pulmonary Disease: (P) Chronic pulmonary disease  Respiratory Pattern: (P) Regular pattern and RR 12-20 bpm  Breath Sounds: (P) Clear breath sounds  Cough: (P) Strong, spontaneous, non-productive  Indication for Bronchodilator Therapy: (P) On home bronchodilators  Bronchodilator Assessment Score: (P) 2    Aerosolized bronchodilator medication orders have been revised according to the RT Inhaler-Nebulizer Bronchodilator Protocol below. Respiratory Therapist to perform RT Therapy Protocol Assessment initially then follow the protocol. Repeat RT Therapy Protocol Assessment PRN for score 0-3 or on second treatment, BID, and PRN for scores above 3. No Indications  adjust the frequency to every 6 hours PRN wheezing or bronchospasm, if no treatments needed after 48 hours then discontinue using Per Protocol order mode. If indication present, adjust the RT bronchodilator orders based on the Bronchodilator Assessment Score as indicated below. Use Inhaler orders unless patient has one or more of the following: on home nebulizer, not able to hold breath for 10 seconds, is not alert and oriented, cannot activate and use MDI correctly, or respiratory rate 25 breaths per minute or more, then use the equivalent nebulizer order(s) with same Frequency and PRN reasons based on the score. If a patient is on this medication at home then do not decrease Frequency below that used at home.     0-3  enter or revise RT bronchodilator order(s) to equivalent RT Bronchodilator order with Frequency of every 4 hours PRN for wheezing or increased work of breathing using Per Protocol order mode. 4-6  enter or revise RT Bronchodilator order(s) to two equivalent RT bronchodilator orders with one order with BID Frequency and one order with Frequency of every 4 hours PRN wheezing or increased work of breathing using Per Protocol order mode. 7-10  enter or revise RT Bronchodilator order(s) to two equivalent RT bronchodilator orders with one order with TID Frequency and one order with Frequency of every 4 hours PRN wheezing or increased work of breathing using Per Protocol order mode. 11-13  enter or revise RT Bronchodilator order(s) to one equivalent RT bronchodilator order with QID Frequency and an Albuterol order with Frequency of every 4 hours PRN wheezing or increased work of breathing using Per Protocol order mode. Greater than 13  enter or revise RT Bronchodilator order(s) to one equivalent RT bronchodilator order with every 4 hours Frequency and an Albuterol order with Frequency of every 2 hours PRN wheezing or increased work of breathing using Per Protocol order mode.        Electronically signed by Amina Solorio RCP on 2/16/2022 at 11:32 PM

## 2022-02-17 NOTE — PROGRESS NOTES
Inpatient Physical Therapy Evaluation and Treatment    Unit: [facility]  Date:  2/17/2022  Patient Name:    Kenia Stubbs  Admitting diagnosis:  Dizziness [R42]  Generalized weakness [R53.1]  CHIP (acute kidney injury) [facility]) [N17.9]  Admit Date:  2/16/2022  Precautions/Restrictions/WB Status/ Lines/ Wounds/ Oxygen: Fall risk, Bed/chair alarm, Lines -IV and [place] (hard of hearing), hypotensive with functional mobility, low vision as reported by the patient. Treatment Time:  3608 - 7495  Treatment Number:  1   Timed Code Treatment Minutes: 26 minutes  Total Treatment Minutes:  36  minutes    Patient Goals for Therapy: Did not state goals. Discharge Recommendations: SNF  DME needs for discharge: defer to facility       Therapy recommendation for EMS Transport: can transport by wheelchair    Therapy recommendations for staff:   Assist of 2 to EOB/stand at EOB     Continue to assess for transfers limited mobility due to hypotension during functional transfers    History of Present Illness: per ELEAZAR Small H&P 2/17/22:  \"The patient is a 76 y. o. male with systolic congestive heart failure, chronic pain, dementia, diabetes mellitus type 2, COPD who presented to [facility] ED with complaint of dizziness.  Patient states he is not sure why he was sent to the ER.   He states he has been dizzy for \"a long time. \" Taz Hunt denies any new complaints.  ER provider note reviewed, reports patient presented for generalized weakness and dizziness.  I called the patient's nursing home- nurse reports patient was disoriented, had low BP, and complained of HA therefore EMS was called to transport to [facility] reviewed records from NH- patient has complained of a chronic headache since Spring 2021.       ER work-up revealed a mild CHIP and orthostatic hypotension.  He was admitted to the hospital for for further evaluation and care.       This morning patient complains of chronic pain.  He is oriented x 4. \"     Home Health S4 Level Recommendation:  Level 3 Safety  -PAC Mobility Score    -PAC Inpatient Mobility Raw Score : 15       Preadmission Environment (Information taken from recent PT evaluation dated 2021)  Pt. 160Jarret Reyes  Steps to enter first floor: No steps  Bathroom: Walk-in Shower, Grab bars, Shower Chair  and Raised toilet seat with grab bars  Equipment owned: SPC, Rollator, Shower Chair, lift chair and hospital bed, TENS unit (for back pain)             Pt has access to a RW and manual w/c through the NH.      Preadmission Status:  History of falls          Yes (Staff informed pt he fell but he does not remember it)   Pt. Able to drive: No - facility transport  Pt Fully independent with ADLs: No  Pt. Required assistance from family for: Bathing, Cleaning, Cooking and [employer]             Staff assisted with showers. Pt indep with dressing.   Pt reports the facility took measurements for a w/c a couple months ago and he is waiting to receive it. Pt states he is not allowed to walk about the facility related to high fall risk. Pt reports he is working with therapy on walking with a rollator and exercises.     Pain  Yes  Ratin  Location:headache, abdomen (feels like gas that he cannot pass), neck (posterior)  Pain Medicine Status: RN notified      Cognition    A&O x4 with additional time allotted to recall name of specific hospital  Able to follow 1 step commands    Subjective  Patient lying supine in bed with no family present. Pt agreeable to this PT eval & tx. Upper Extremity ROM/Strength  Please see OT evaluation.       Lower Extremity ROM / Strength   AROM WFL: Yes  ROM limitations: N/A    Strength Assessment (measured on a 0-5 scale):  R LE   Quad   3+   Ant Tib  3+   Hamstring 3+   Iliopsoas 3+  L LE  Quad   3+   Ant Tib  3+   Hamstring 3+   Iliopsoas 3+    Lower Extremity Sensation    [facility]    Lower Extremity Proprioception: WFL    Coordination and Tone  WFL    Balance  Sitting:  Fair -; CGA  Comments: 2 min    Standing: Fair -; CGA  Comments: 1 min    Bed Mobility   Supine to Sit:    CGA  Sit to Supine:   CGA  Rolling:   Not Tested  Scooting in sitting: CGA  Scooting in supine:  CGA    Transfer Training     Sit to stand:   CGA  Stand to sit:   CGA  Bed to Chair:   CGA with use of gait belt and rolling walker (RW)    Gait gait deferred due to hypotensive response to standing; pt ambulated 0 ft. Stair Training deferred, pt does not have stairs in home environment    Activity Tolerance   Pt completed therapy session with Dizziness noted with stance, low blood sugar reported with activity   Supine with LEs elevated:  /94, rechecked and 181/98  HR 75  SpO2 98% on RA     EOB:  /82  HR 85  SpO2 99% on RA     Standing:  /67, reports dizziness in stance     EOB:  /86     RN checked blood sugar (BS) after juice and crackers provided, BS 51. RN addressing medically.     RN notified of above BP fluctuations. Patient reports YORDAN hose have been somewhat helpful with this in the past, RN notified. Positioning Needs   Pt in bed, alarm set, positioned in proper neutral alignment and pressure relief provided. Call light provided and all needs within reach    Exercises Initiated  all completed bilaterally unless indicated  Ankle Pumps x 10 reps  Heel slides x 10 reps    Other  None. Patient/Family Education   Pt educated on role of inpatient PT, POC, importance of continued activity, DC recommendations, safety awareness, transfer techniques, pursed lip breathing, energy conservation, pacing activity and calling for assist with mobility. Assessment  Pt seen for Physical Therapy evaluation in acute care setting. Pt demonstrated decreased Activity tolerance, Balance, Safety and Strength as well as decreased independence with Ambulation, Bed Mobility  and Transfers.      Recommending SNF upon discharge as patient functioning well below baseline, demonstrates good rehab potential and unable to return home due to limited or no family support, burden of care beyond caregiver ability, home environment not conducive to patient recovery and limited safety awareness. Goals : To be met in 3 visits:  1). Independent with LE Ex x 10 reps    To be met in 6 visits:  1). Supine to/from sit: SBA  2). Sit to/from stand: SBA  3). Bed to chair: SBA  4). Gait: Ambulate 50 ft.  with  SBA and use of LRAD (least restrictive assistive device)  5). Tolerate B LE exercises 3 sets of 10-15 reps      Rehabilitation Potential: Fair  Strengths for achieving goals include:   Pt cooperative   Barriers to achieving goals include:    Weakness    Plan    To be seen 3-5 x / week  while in acute care setting for therapeutic exercises, bed mobility, transfers, progressive gait training, balance training, and family/patient education. Signature: Sheron Brown MS PT, # [id number]    If patient discharges from this facility prior to next visit, this note will serve as the Discharge Summary.

## 2022-02-17 NOTE — PROGRESS NOTES
Reinforced use of call light before getting oob to urinal; voiced understanding. Bed alarm remains in use. Call light in reach.

## 2022-02-17 NOTE — CARE COORDINATION
Case Management Assessment  Initial Evaluation      Patient Name: Virginia Lynn  YOB: 1950  Diagnosis: Dizziness [R42]  Generalized weakness [R53.1]  [facility] [N17.9]  Date / Time: 2/16/2022  2:22 PM    Admission status/Date:02/16/22  Chart Reviewed: Yes      Patient Interviewed: Yes   Family Interviewed:  No      Hospitalization in the last 30 days:  No      Health Care Decision Maker :   Primary Decision Maker: Davin Castillo - Brother/Sister - [phone]    (CM - must 1st enter selection under Navigator - emergency contact- Health Care Decision Maker Relationship and pick relationship)   Who do you trust or have selected to make healthcare decisions for you      Met with: Pt  Interview conducted  (bedside/phone): bedside    Current PCP: Facility MD    Financial  Medicare with [facility] required for SNF : N          3 night stay required - N    ADLS  Support Systems/Care Needs:    Transportation: EMS transportation    Meal Preparation: facility    Housing  Living Arrangements: [facility] [address]  Steps: n/a  Intent for return to present living arrangements: Yes  Identified Issues: [address] [facility] with [address] : No Agency:(Services)     Passport/Waiver : No  :                      Phone Number:    Passport/Waiver Services: n/a          Durable Medical Equiptment   DME Provider:   Equipment:   Walker__X_Cane___RTS___ BSC___Shower Chair___Hospital Bed___W/C__X__Other________  02 at ____Liter(s)---wears(frequency)_______ HHN ___ CPAP___ BiPap___   N/A____      Home O2 Use :  No - if needed facility can provide  If No for home O2---if presently on O2 during hospitalization:  No  if yes CM to follow for potential DC O2 need  Informed of need for care provider to bring portable home O2 tank on day of discharge for nursing to connect prior to leaving:   Not Indicated  Verbalized agreement/Understanding:   Not Indicated    Community Service Affiliation  Dialysis:  No    · Agency:  · Location:  · Dialysis Schedule:  · Phone:   · Fax: Other Community Services: none    DISCHARGE PLAN: Explained Case Management role/services. From [facility] and plan return, likely today. Will follow and arrange transportation when Pt is ready.

## 2022-02-17 NOTE — PROGRESS NOTES
Occupational/Physical Therapy  Orders received, chart reviewed. Patient hypotensive this am and held until after bolus. Will reattempt later in day as status permits.   Rod Waller, OTR/L [id number]  Dianne Adame, MS PT, # [id number]

## 2022-02-17 NOTE — PROGRESS NOTES
Patient resting in bed currently denies any needs. Bedside Mobility Assessment Tool (BMAT):     Assessment Level 1- Sit and Shake    1. From a semi-reclined position, ask patient to sit up and rotate to a seated position at the side of the bed. Can use the bedrail. 2. Ask patient to reach out and grab your hand and shake making sure patient reaches across his/her midline. Pass- Patient is able to come to a seated position, maintain core strength. Maintains seated balance while reaching across midline. Move on to Assessment Level 2. Assessment Level 2- Stretch and Point   1. With patient in seated position at the side of the bed, have patient place both feet on the floor (or stool) with knees no higher than hips. 2. Ask patient to stretch one leg and straighten the knee, then bend the ankle/flex and point the toes. If appropriate, repeat with the other leg. Pass- Patient is able to demonstrate appropriate quad strength on intended weight bearing limb(s). Move onto Assessment Level 3. Assessment Level 3- Stand   1. Ask patient to elevate off the bed or chair (seated to standing) using an assistive device (cane, bedrail). 2. Patient should be able to raise buttocks off be and hold for a count of five. May repeat once. Pass- Patient maintains standing stability for at least 5 seconds, proceed to assessment level 4. Assessment Level 4- Walk   1. Ask patient to march in place at bedside. 2. Then ask patient to advance step and return each foot. Some medical conditions may render a patient from stepping backwards, use your best clinical judgement. Pass- Patient demonstrates balance while shifting weight and ability to step, takes independent steps, does not use assistive device patient is MOBILITY LEVEL 4.       Mobility Level- 4

## 2022-02-18 LAB
ANION GAP SERPL CALCULATED.3IONS-SCNC: 12 MMOL/L (ref 3–16)
BASOPHILS ABSOLUTE: 0.1 K/UL (ref 0–0.2)
BASOPHILS RELATIVE PERCENT: 0.6 %
BUN BLDV-MCNC: 12 MG/DL (ref 7–20)
CALCIUM SERPL-MCNC: 9.4 MG/DL (ref 8.3–10.6)
CHLORIDE BLD-SCNC: 104 MMOL/L (ref 99–110)
CO2: 24 MMOL/L (ref 21–32)
CREAT SERPL-MCNC: 1.2 MG/DL (ref 0.8–1.3)
EOSINOPHILS ABSOLUTE: 0.3 K/UL (ref 0–0.6)
EOSINOPHILS RELATIVE PERCENT: 3.2 %
GFR AFRICAN AMERICAN: >60
GFR NON-AFRICAN AMERICAN: 60
GLUCOSE BLD-MCNC: 178 MG/DL (ref 70–99)
GLUCOSE BLD-MCNC: 204 MG/DL (ref 70–99)
GLUCOSE BLD-MCNC: 244 MG/DL (ref 70–99)
GLUCOSE BLD-MCNC: 410 MG/DL (ref 70–99)
GLUCOSE BLD-MCNC: 93 MG/DL (ref 70–99)
HCT VFR BLD CALC: 37.6 % (ref 40.5–52.5)
HEMOGLOBIN: 12.7 G/DL (ref 13.5–17.5)
LYMPHOCYTES ABSOLUTE: 2.1 K/UL (ref 1–5.1)
LYMPHOCYTES RELATIVE PERCENT: 24.7 %
MCH RBC QN AUTO: 30.1 PG (ref 26–34)
MCHC RBC AUTO-ENTMCNC: 33.7 G/DL (ref 31–36)
MCV RBC AUTO: 89.3 FL (ref 80–100)
MONOCYTES ABSOLUTE: 0.7 K/UL (ref 0–1.3)
MONOCYTES RELATIVE PERCENT: 8.6 %
NEUTROPHILS ABSOLUTE: 5.3 K/UL (ref 1.7–7.7)
NEUTROPHILS RELATIVE PERCENT: 62.9 %
PDW BLD-RTO: 13.4 % (ref 12.4–15.4)
PERFORMED ON: ABNORMAL
PERFORMED ON: NORMAL
PLATELET # BLD: 244 K/UL (ref 135–450)
PMV BLD AUTO: 7.5 FL (ref 5–10.5)
POTASSIUM REFLEX MAGNESIUM: 3.6 MMOL/L (ref 3.5–5.1)
RBC # BLD: 4.21 M/UL (ref 4.2–5.9)
SODIUM BLD-SCNC: 140 MMOL/L (ref 136–145)
WBC # BLD: 8.5 K/UL (ref 4–11)

## 2022-02-18 PROCEDURE — 6370000000 HC RX 637 (ALT 250 FOR IP): Performed by: PHYSICIAN ASSISTANT

## 2022-02-18 PROCEDURE — 2580000003 HC RX 258: Performed by: INTERNAL MEDICINE

## 2022-02-18 PROCEDURE — 6370000000 HC RX 637 (ALT 250 FOR IP): Performed by: INTERNAL MEDICINE

## 2022-02-18 PROCEDURE — 80048 BASIC METABOLIC PNL TOTAL CA: CPT

## 2022-02-18 PROCEDURE — 36415 COLL VENOUS BLD VENIPUNCTURE: CPT

## 2022-02-18 PROCEDURE — 6360000002 HC RX W HCPCS

## 2022-02-18 PROCEDURE — 6360000002 HC RX W HCPCS: Performed by: INTERNAL MEDICINE

## 2022-02-18 PROCEDURE — 2580000003 HC RX 258

## 2022-02-18 PROCEDURE — 1200000000 HC SEMI PRIVATE

## 2022-02-18 PROCEDURE — 85025 COMPLETE CBC W/AUTO DIFF WBC: CPT

## 2022-02-18 PROCEDURE — 6370000000 HC RX 637 (ALT 250 FOR IP)

## 2022-02-18 PROCEDURE — 94640 AIRWAY INHALATION TREATMENT: CPT

## 2022-02-18 PROCEDURE — 99232 SBSQ HOSP IP/OBS MODERATE 35: CPT | Performed by: INTERNAL MEDICINE

## 2022-02-18 PROCEDURE — 2500000003 HC RX 250 WO HCPCS: Performed by: INTERNAL MEDICINE

## 2022-02-18 PROCEDURE — 94761 N-INVAS EAR/PLS OXIMETRY MLT: CPT

## 2022-02-18 PROCEDURE — 97530 THERAPEUTIC ACTIVITIES: CPT

## 2022-02-18 RX ORDER — CLONIDINE HYDROCHLORIDE 0.1 MG/1
0.1 TABLET ORAL EVERY 6 HOURS PRN
Status: DISCONTINUED | OUTPATIENT
Start: 2022-02-18 | End: 2022-02-19

## 2022-02-18 RX ORDER — SENNA AND DOCUSATE SODIUM 50; 8.6 MG/1; MG/1
1 TABLET, FILM COATED ORAL 2 TIMES DAILY
Status: DISCONTINUED | OUTPATIENT
Start: 2022-02-18 | End: 2022-02-21 | Stop reason: HOSPADM

## 2022-02-18 RX ORDER — LABETALOL HYDROCHLORIDE 5 MG/ML
10 INJECTION, SOLUTION INTRAVENOUS ONCE
Status: COMPLETED | OUTPATIENT
Start: 2022-02-18 | End: 2022-02-18

## 2022-02-18 RX ORDER — POLYETHYLENE GLYCOL 3350 17 G/17G
17 POWDER, FOR SOLUTION ORAL DAILY
Status: DISCONTINUED | OUTPATIENT
Start: 2022-02-18 | End: 2022-02-21 | Stop reason: HOSPADM

## 2022-02-18 RX ORDER — 0.9 % SODIUM CHLORIDE 0.9 %
500 INTRAVENOUS SOLUTION INTRAVENOUS ONCE
Status: COMPLETED | OUTPATIENT
Start: 2022-02-18 | End: 2022-02-18

## 2022-02-18 RX ADMIN — FOLIC ACID 1 MG: 1 TABLET ORAL at 08:53

## 2022-02-18 RX ADMIN — INSULIN GLARGINE 15 UNITS: 100 INJECTION, SOLUTION SUBCUTANEOUS at 21:11

## 2022-02-18 RX ADMIN — METFORMIN HYDROCHLORIDE 1000 MG: 500 TABLET ORAL at 08:52

## 2022-02-18 RX ADMIN — Medication 2 PUFF: at 07:23

## 2022-02-18 RX ADMIN — Medication 10 ML: at 08:54

## 2022-02-18 RX ADMIN — POLYETHYLENE GLYCOL (3350) 17 G: 17 POWDER, FOR SOLUTION ORAL at 10:17

## 2022-02-18 RX ADMIN — Medication 1 PUFF: at 07:24

## 2022-02-18 RX ADMIN — METFORMIN HYDROCHLORIDE 1000 MG: 500 TABLET ORAL at 16:40

## 2022-02-18 RX ADMIN — DOCUSATE SODIUM 50MG AND SENNOSIDES 8.6MG 1 TABLET: 8.6; 5 TABLET, FILM COATED ORAL at 20:10

## 2022-02-18 RX ADMIN — ONDANSETRON HYDROCHLORIDE 4 MG: 2 INJECTION, SOLUTION INTRAMUSCULAR; INTRAVENOUS at 20:31

## 2022-02-18 RX ADMIN — INSULIN GLARGINE 15 UNITS: 100 INJECTION, SOLUTION SUBCUTANEOUS at 10:16

## 2022-02-18 RX ADMIN — CYANOCOBALAMIN TAB 500 MCG 1000 MCG: 500 TAB at 08:53

## 2022-02-18 RX ADMIN — INSULIN LISPRO 3 UNITS: 100 INJECTION, SOLUTION INTRAVENOUS; SUBCUTANEOUS at 20:27

## 2022-02-18 RX ADMIN — ACETAMINOPHEN 650 MG: 325 TABLET ORAL at 20:21

## 2022-02-18 RX ADMIN — MEMANTINE HYDROCHLORIDE 10 MG: 5 TABLET ORAL at 08:53

## 2022-02-18 RX ADMIN — ATORVASTATIN CALCIUM 80 MG: 40 TABLET, FILM COATED ORAL at 20:10

## 2022-02-18 RX ADMIN — SERTRALINE HYDROCHLORIDE 100 MG: 50 TABLET ORAL at 08:52

## 2022-02-18 RX ADMIN — SODIUM CHLORIDE 500 ML: 9 INJECTION, SOLUTION INTRAVENOUS at 10:17

## 2022-02-18 RX ADMIN — ASPIRIN 81 MG: 81 TABLET, CHEWABLE ORAL at 08:52

## 2022-02-18 RX ADMIN — ENOXAPARIN SODIUM 40 MG: 100 INJECTION SUBCUTANEOUS at 08:54

## 2022-02-18 RX ADMIN — TIZANIDINE 4 MG: 4 TABLET ORAL at 20:14

## 2022-02-18 RX ADMIN — MEMANTINE HYDROCHLORIDE 10 MG: 5 TABLET ORAL at 20:10

## 2022-02-18 RX ADMIN — Medication 2 PUFF: at 17:43

## 2022-02-18 RX ADMIN — TIOTROPIUM BROMIDE AND OLODATEROL 2 PUFF: 3.124; 2.736 SPRAY, METERED RESPIRATORY (INHALATION) at 07:24

## 2022-02-18 RX ADMIN — METOPROLOL SUCCINATE 50 MG: 50 TABLET, EXTENDED RELEASE ORAL at 20:10

## 2022-02-18 RX ADMIN — OXYCODONE 10 MG: 5 TABLET ORAL at 22:44

## 2022-02-18 RX ADMIN — Medication 1 PUFF: at 17:43

## 2022-02-18 RX ADMIN — Medication 100 MG: at 08:53

## 2022-02-18 RX ADMIN — OXYCODONE 10 MG: 5 TABLET ORAL at 16:54

## 2022-02-18 RX ADMIN — OXYCODONE 10 MG: 5 TABLET ORAL at 08:53

## 2022-02-18 RX ADMIN — Medication 10 MG: at 20:10

## 2022-02-18 RX ADMIN — PANTOPRAZOLE SODIUM 40 MG: 40 TABLET, DELAYED RELEASE ORAL at 06:22

## 2022-02-18 RX ADMIN — LABETALOL HYDROCHLORIDE 10 MG: 5 INJECTION, SOLUTION INTRAVENOUS at 00:33

## 2022-02-18 RX ADMIN — DOCUSATE SODIUM 50MG AND SENNOSIDES 8.6MG 1 TABLET: 8.6; 5 TABLET, FILM COATED ORAL at 10:17

## 2022-02-18 RX ADMIN — HYDRALAZINE HYDROCHLORIDE 15 MG: 20 INJECTION, SOLUTION INTRAMUSCULAR; INTRAVENOUS at 16:40

## 2022-02-18 RX ADMIN — Medication 10 ML: at 20:10

## 2022-02-18 ASSESSMENT — PAIN SCALES - GENERAL
PAINLEVEL_OUTOF10: 8

## 2022-02-18 NOTE — PROGRESS NOTES
Progress Note    Admit Date:  2/16/2022    Nursing home patient admitted with orthostatic hypotension. Antihypertensive medication doses being adjusted. Patient got IV fluids. Patient's blood pressures were significantly high last night  up to 212/105. This is associated with a migraine headache. And after a dose of Imitrex. Imitrex has been discontinued. Patient got a dose of labetalol and was orthostatic again today    Subjective:  Mr. [place] is calm now. Appears very fatigued. No headache    Orthostatic vital signs this morning -> blood pressure lying down was 174/82, sitting was 106/70, standing was 88/58. Objective:   /85   Pulse 79   Temp 98.2 °F (36.8 °C) (Oral)   Resp 16   Ht 6' 5\" (1.956 m)   Wt 188 lb 0.8 oz (85.3 kg)   SpO2 96%   BMI 22.30 kg/m²     Intake/Output Summary (Last 24 hours) at 2/18/2022 0900  Last data filed at 2/18/2022 0800  Gross per 24 hour   Intake    Output 1500 ml   Net -1500 ml         Physical Exam:  Gen: No distress. .  Awake. Looks fatigued. Eyes: PERRL. No sclera icterus. No conjunctival injection. ENT: No discharge. Pharynx clear. Edentulous   Neck: No JVD. Trachea midline. Resp: No accessory muscle use. No crackles. No wheezes. No rhonchi. CV: Regular rate. Regular rhythm. No murmur. No rub. No edema. Capillary Refill: Brisk,< 3 seconds   Peripheral Pulses: +2 palpable, equal bilaterally   GI: Non-tender. Non-distended. No masses. No organomegaly. Normal bowel sounds. Large midline old scar with incisional hernia     Skin: Warm and dry. No nodule on exposed extremities. No rash on exposed extremities. M/S: No cyanosis. No joint deformity. No clubbing.    Neuro: Awake and alert  Oriented to month, year, person, place, president  Follows commands appropriately  Extraocular movements intact  Visual fields intact  No facial palsy  No upper or lower extremity motor drift  No limb ataxia-finger-nose-finger and heel-to-shin test are within normal limits   normal sensation  No aphasia, no dysarthria  Psych: Oriented x 3. No anxiety or agitation.          Medications:   Scheduled Meds:   metFORMIN  1,000 mg Oral BID WC    insulin glargine  15 Units SubCUTAneous BID    aspirin  81 mg Oral Daily    atorvastatin  80 mg Oral Nightly    finasteride  5 mg Oral Daily    folic acid  1 mg Oral Daily    [Held by provider] insulin lispro  6 Units SubCUTAneous TID WC    melatonin  10 mg Oral Nightly    memantine  10 mg Oral BID    metoprolol succinate  50 mg Oral Nightly    NIFEdipine  30 mg Oral Daily    sertraline  100 mg Oral Daily    pantoprazole  40 mg Oral QAM AC    thiamine  100 mg Oral Daily    vitamin B-12  1,000 mcg Oral Daily    vitamin D  50,000 Units Oral Weekly    sodium chloride flush  5-40 mL IntraVENous 2 times per day    enoxaparin  40 mg SubCUTAneous Daily    insulin lispro  0-6 Units SubCUTAneous TID WC    insulin lispro  0-3 Units SubCUTAneous Nightly    albuterol sulfate HFA  2 puff Inhalation BID    fluticasone  1 puff Inhalation BID    tiotropium-olodaterol  2 puff Inhalation Daily       Continuous Infusions:   dextrose      sodium chloride         Data:  CBC:   Recent Labs     02/16/22  1434 02/17/22  0605 02/18/22  0526   WBC 10.6 8.4 8.5   RBC 3.87* 3.89* 4.21   HGB 11.6* 11.7* 12.7*   HCT 35.0* 34.6* 37.6*   MCV 90.3 89.1 89.3   RDW 13.6 13.5 13.4    215 244     BMP:   Recent Labs     02/16/22  1434 02/17/22  0604 02/18/22  0526   * 139 140   K 4.5 3.7 3.6   CL 98* 104 104   CO2 21 26 24   BUN 23* 19 12   CREATININE 1.6* 1.4* 1.2     BNP: No results for input(s): BNP in the last 72 hours. PT/INR: No results for input(s): PROTIME, INR in the last 72 hours. APTT: No results for input(s): APTT in the last 72 hours.   CARDIAC ENZYMES:   Recent Labs     02/16/22  1434   TROPONINI <0.01     FASTING LIPID PANEL:  Lab Results   Component Value Date    CHOL 110 10/23/2020    HDL 51 10/23/2020    TRIG 84 10/23/2020     LIVER PROFILE:   Recent Labs     02/16/22  1434   AST 16   ALT 11   BILITOT 0.5   ALKPHOS 76        CULTURES  COVID 19, PCR: not detected  Flu A/B: neg/neg      EKG:  Normal sinus rhythm  Possible Left atrial enlargement  Left ventricular hypertrophy with QRS widening and repolarization abnormality  ST abnormaltiy anterolateral leads consider ischemia vs repolarization change  When compared with ECG of 16-APR-2021 14:59,  T wave inversion no longer evident in Inferior leads  Confirmed by ADÁN URBINA MD     Anterolateral ST changes are not new when compared to EKG from 4/2021      RADIOLOGY  CT HEAD WO CONTRAST   Final Result   No acute intracranial abnormality. Encephalomalacia with old right occipital infarction and small basal ganglia   lacunar infarctions unchanged. XR CHEST PORTABLE   Final Result   No acute abnormality. Pertinent previous results reviewed   Echo 10/2020   Limited only f/u for LVEF, RWMA, IVC, Aortic root.   Ejection fraction is visually estimated to be 40 %.  Global hypokinesis with regional variation, more prominent inferoapical   hypokinesis.   Changes noted from previous echo on 4- in left ventricular function.   The aortic root is mildly dilated.   The right ventricle is low normal (to mildly depressed) in function.  IVC size is normal (<2.1cm) and collapses > 50% with respiration consistent   with normal RA pressure (3mmHg). ASSESSMENT/PLAN:     #Orthostatic hypotension  - there is diagnosis of orthostasis on NH paperwork  - give gentle IVF  - add YORDAN hose  - cont home antihypertensives with exception of clonidine . Currently on Toprol and nifedipine. Change clonidine to as needed dosing normally. Recurrent issues with orthostatic hypotension; patient got a dose of labetalol last night. We will again bolus him with IV fluids today. Continue to monitor orthostatic vital signs    #HTN  -Medical management as above . Patient has labile blood pressures . - Cont home meds: nifedipine, toprol   - hold clonidine-use as needed only      #Elevated Creatinine in patient with CKD 3a-b  - Baseline Crt variable ~1.2-1.3  - Crt 1.6 on admit  - sp gentle IVF and Crt improved to 1.4     #Dehydration   - sp IVF, he is tolerating orally, now off IVF      #Chronic systolic CHF   - without evidence of decompensation   - continue toprol XL  - no ACEi/ARB suspect 2/2 CKD      #DM2  - Cont Lantus, geremias humalog, metformin, SSI     #COPD  - no exacerbation, cont inhalers     #Chronic pain  - home medication regimen continued      #Remote CVA  - neuro exam is WNL, head CT without acute findings   - cont ASA, statin      #Dementia  - oriented x 4- at baseline. Continue namenda      #Chronic anemia  - hb is at baseline      #GERD  - cont PPI     #MDD  - cont Zoloft     #PCM  - dietitian consult      #BPH with LUTS   - cont Proscar     #Hepatitis C     # constipation  - add senokot. make miralax scheduled    # migraine   - avoid imitrex      DVT Prophylaxis: Lovenox   Diet: ADULT DIET;  Regular; 4 carb choices (60 gm/meal)  Code Status: Full Code      Burgess Sabine MD, MD 2/18/2022 9:00 AM

## 2022-02-18 NOTE — PROGRESS NOTES
RT Inhaler-Nebulizer Bronchodilator Protocol Note    There is a bronchodilator order in the chart from a provider indicating to follow the RT Bronchodilator Protocol and there is an Initiate RT Inhaler-Nebulizer Bronchodilator Protocol order as well (see protocol at bottom of note). CXR Findings:  XR CHEST PORTABLE    Result Date: 2/16/2022  No acute abnormality. The findings from the last RT Protocol Assessment were as follows:   History Pulmonary Disease: (P) Chronic pulmonary disease  Respiratory Pattern: (P) Regular pattern and RR 12-20 bpm  Breath Sounds: (P) Clear breath sounds  Cough: (P) Strong, spontaneous, non-productive  Indication for Bronchodilator Therapy: (P) On home bronchodilators  Bronchodilator Assessment Score: (P) 2    Aerosolized bronchodilator medication orders have been revised according to the RT Inhaler-Nebulizer Bronchodilator Protocol below. Respiratory Therapist to perform RT Therapy Protocol Assessment initially then follow the protocol. Repeat RT Therapy Protocol Assessment PRN for score 0-3 or on second treatment, BID, and PRN for scores above 3. No Indications  adjust the frequency to every 6 hours PRN wheezing or bronchospasm, if no treatments needed after 48 hours then discontinue using Per Protocol order mode. If indication present, adjust the RT bronchodilator orders based on the Bronchodilator Assessment Score as indicated below. Use Inhaler orders unless patient has one or more of the following: on home nebulizer, not able to hold breath for 10 seconds, is not alert and oriented, cannot activate and use MDI correctly, or respiratory rate 25 breaths per minute or more, then use the equivalent nebulizer order(s) with same Frequency and PRN reasons based on the score. If a patient is on this medication at home then do not decrease Frequency below that used at home.     0-3  enter or revise RT bronchodilator order(s) to equivalent RT Bronchodilator order with Frequency of every 4 hours PRN for wheezing or increased work of breathing using Per Protocol order mode. 4-6  enter or revise RT Bronchodilator order(s) to two equivalent RT bronchodilator orders with one order with BID Frequency and one order with Frequency of every 4 hours PRN wheezing or increased work of breathing using Per Protocol order mode. 7-10  enter or revise RT Bronchodilator order(s) to two equivalent RT bronchodilator orders with one order with TID Frequency and one order with Frequency of every 4 hours PRN wheezing or increased work of breathing using Per Protocol order mode. 11-13  enter or revise RT Bronchodilator order(s) to one equivalent RT bronchodilator order with QID Frequency and an Albuterol order with Frequency of every 4 hours PRN wheezing or increased work of breathing using Per Protocol order mode. Greater than 13  enter or revise RT Bronchodilator order(s) to one equivalent RT bronchodilator order with every 4 hours Frequency and an Albuterol order with Frequency of every 2 hours PRN wheezing or increased work of breathing using Per Protocol order mode.        Electronically signed by Miguel Salazar RCP on 2/17/2022 at 8:39 PM

## 2022-02-18 NOTE — FLOWSHEET NOTE
MD notified. This writer texted Dr Cassandra De at : \"Pt /105. When we first checked it was 199/94. Gave 50mg scheduled PO metoprolol. Rechecked and it was higher. No PRNs ordered. He's on a monitor so he can have IVP meds. \" Message read at . Waiting on orders.       02/17/22 2206   Vital Signs   BP (!) 212/105

## 2022-02-18 NOTE — PROGRESS NOTES
Inpatient Physical Therapy Daily Treatment Note    Unit: 2 [address]  Date:  2/18/2022  Patient Name:    Elian Portillo  Admitting diagnosis:  Dizziness [R42]  Generalized weakness [R53.1]  CHIP (acute kidney injury) [facility]) [N17.9]  Admit Date:  2/16/2022  Precautions/Restrictions:  Fall risk, Bed/chair alarm, Lines -IV and [place] (hard of hearing), hypotensive with functional mobility, low vision as reported by the patient., Telemetry, YORDAN hose      Discharge Recommendations: SNF  DME needs for discharge: defer to facility       Therapy recommendation for EMS Transport: requires transport by cot due to Unable to tolerate transfer due to orthostatic BP changes    Therapy recommendations for staff:   Assist of 2 to EOB/stand at EOB     Continue to assess for transfers limited mobility due to hypotension during functional transfers    History of Present Illness: per ELEAZAR Small H&P 2/17/22:  \"The patient is a 76 y. o. male with systolic congestive heart failure, chronic pain, dementia, diabetes mellitus type 2, COPD who presented to [facility] ED with complaint of dizziness.  Patient states he is not sure why he was sent to the ER.   He states he has been dizzy for \"a long time. \" [facility] denies any new complaints.  ER provider note reviewed, reports patient presented for generalized weakness and dizziness.  I called the patient's nursing home- nurse reports patient was disoriented, had low BP, and complained of HA therefore EMS was called to transport to [facility] reviewed records from NH- patient has complained of a chronic headache since Spring 2021.       ER work-up revealed a mild CHIP and orthostatic hypotension.  He was admitted to the hospital for for further evaluation and care.       This morning patient complains of chronic pain.  He is oriented x 4.\"     Home Health S4 Level Recommendation: NA  AM-PAC Mobility Score   AM-PAC Inpatient Mobility Raw Score : 15       Treatment Time:  16:50-17:10  Treatment number: 2  Timed Code Treatment Minutes: 20 minutes  Total Treatment Minutes:  20 minutes    Cognition    A&O orientation not directly assessed. Able to follow 1 step commands, moves impulsively    Subjective  Patient lying supine in bed with no family present   Pt agreeable to this PT tx. Pain   Yes  Location: neck,back hips knees  Ratin/10  Pain Medicine Status: Received pain med prior to tx     Bed Mobility   Supine to Sit:    Supervision  Sit to Supine:   Supervision  Rolling:   Not Tested  Scooting:   Supervision    Transfer Training     Sit to stand:   Not Tested  Stand to sit:   Not Tested  Bed to Chair:   Not Tested with use of N/A    Gait Training gait deferred due to orthostatic BP changes semi supine to sitting; pt ambulated 0 ft. Balance  Sitting:  Normal; Independent  Comments:     Standing: Not tested; Not Tested  Comments:     Patient Education      Role of PT, POC, Discharge recommendations, safety awareness and calling for assist with mobility. Positioning Needs       Pt in bed, alarm set, positioned in proper neutral alignment and pressure relief provided. Call light provided and all needs within reach      Activity Tolerance   Pt completed therapy session with orthostatic BP changes   BP (mmHg) HR (bpm) SpO2 (%) Comments   Semi supine, at rest 167/87 83 97    Seated at /68 87  dizzy   Returned to bed 160/88        Other  RN aware of BP changes. Did not progress activity. Assessment :  Patient demonstrated orthostatic changes in BP with position change from supine to sit, which limited activity progression this date. Recommending SNF at discharge to progress activity level. Goals : To be met in 3 visits:  1). Independent with LE Ex x 10 reps     To be met in 6 visits:  1). Supine to/from sit: SBA with adequate BP response  2). Sit to/from stand: SBA with appropriate BP response  3). Bed to chair: SBA with apprpriate BP response  4).   Gait: Ambulate 50 ft.  with  SBA and use of LRAD (least restrictive assistive device)  5). Tolerate B LE exercises 3 sets of 10-15 reps    Plan   Continue with plan of care. Signature: Glenny Villa, PT #[id number]    If patient discharges from this facility prior to next visit, this note will serve as the Discharge Summary.

## 2022-02-18 NOTE — PROGRESS NOTES
Per Dr Tessie Santacruz, no new orders for blood pressure (BP improving) and \"no need to check him until next prn is available\". Will reassess once again around the time Hydralazine is available to be given.

## 2022-02-18 NOTE — PROGRESS NOTES
RT Inhaler-Nebulizer Bronchodilator Protocol Note    There is a bronchodilator order in the chart from a provider indicating to follow the RT Bronchodilator Protocol and there is an Initiate RT Inhaler-Nebulizer Bronchodilator Protocol order as well (see protocol at bottom of note). CXR Findings:  XR CHEST PORTABLE    Result Date: 2/16/2022  No acute abnormality. The findings from the last RT Protocol Assessment were as follows:   History Pulmonary Disease: (P) Chronic pulmonary disease  Respiratory Pattern: (P) Regular pattern and RR 12-20 bpm  Breath Sounds: (P) Clear breath sounds  Cough: (P) Strong, spontaneous, non-productive  Indication for Bronchodilator Therapy: (P) On home bronchodilators  Bronchodilator Assessment Score: (P) 2    Aerosolized bronchodilator medication orders have been revised according to the RT Inhaler-Nebulizer Bronchodilator Protocol below. Respiratory Therapist to perform RT Therapy Protocol Assessment initially then follow the protocol. Repeat RT Therapy Protocol Assessment PRN for score 0-3 or on second treatment, BID, and PRN for scores above 3. No Indications  adjust the frequency to every 6 hours PRN wheezing or bronchospasm, if no treatments needed after 48 hours then discontinue using Per Protocol order mode. If indication present, adjust the RT bronchodilator orders based on the Bronchodilator Assessment Score as indicated below. Use Inhaler orders unless patient has one or more of the following: on home nebulizer, not able to hold breath for 10 seconds, is not alert and oriented, cannot activate and use MDI correctly, or respiratory rate 25 breaths per minute or more, then use the equivalent nebulizer order(s) with same Frequency and PRN reasons based on the score. If a patient is on this medication at home then do not decrease Frequency below that used at home.     0-3  enter or revise RT bronchodilator order(s) to equivalent RT Bronchodilator order with Frequency of every 4 hours PRN for wheezing or increased work of breathing using Per Protocol order mode. 4-6  enter or revise RT Bronchodilator order(s) to two equivalent RT bronchodilator orders with one order with BID Frequency and one order with Frequency of every 4 hours PRN wheezing or increased work of breathing using Per Protocol order mode. 7-10  enter or revise RT Bronchodilator order(s) to two equivalent RT bronchodilator orders with one order with TID Frequency and one order with Frequency of every 4 hours PRN wheezing or increased work of breathing using Per Protocol order mode. 11-13  enter or revise RT Bronchodilator order(s) to one equivalent RT bronchodilator order with QID Frequency and an Albuterol order with Frequency of every 4 hours PRN wheezing or increased work of breathing using Per Protocol order mode. Greater than 13  enter or revise RT Bronchodilator order(s) to one equivalent RT bronchodilator order with every 4 hours Frequency and an Albuterol order with Frequency of every 2 hours PRN wheezing or increased work of breathing using Per Protocol order mode. RT to enter RT Home Evaluation for COPD & MDI Assessment order using Per Protocol order mode.     Electronically signed by Juan J Dias on 2/18/2022 at 7:33 AM

## 2022-02-18 NOTE — CARE COORDINATION
INTERDISCIPLINARY PLAN OF CARE CONFERENCE    Date/Time: 2/18/2022 3:04 PM  Completed by: Latosha Thomas RN, Case Management      Patient Name:  Enrique Caceres  YOB: 1950  Admitting Diagnosis: Dizziness [R42]  Generalized weakness [R53.1]  CHIP (acute kidney injury) ([facility] [address] [N17.9]     Admit Date/Time:  2/16/2022  2:22 PM    Chart reviewed. Interdisciplinary team contacted or reviewed plan related to patient progress and discharge plans. Disciplines included Case Management, Nursing, and Dietitian. Current Status:inpt  PT/OT recommendation for discharge plan of care: na    Expected D/C Disposition:  na    Discharge Plan Comments: Reviewed chart. Pt from [id number] and plan return. Following.     Home O2 in place on admit: No  Pt informed of need to bring portable home O2 tank on day of discharge for nursing to connect prior to leaving:  Not Indicated  Verbalized agreement/Understanding:  Not Indicated

## 2022-02-18 NOTE — FLOWSHEET NOTE
BP improved with labetalol. MD notified of recheck. Will given Hydralazine again in one hour.       02/18/22 0158   Vital Signs   Temp 97.2 °F (36.2 °C)   Temp Source Axillary   Pulse 89   Heart Rate Source Monitor   Resp 18   BP (!) 160/79   BP Location Left upper arm   Patient Position Lying right side   Level of Consciousness Alert (0)   MEWS Score 1   Patient Currently in Pain Denies   Oxygen Therapy   SpO2 99 %   O2 Device None (Room air)

## 2022-02-18 NOTE — PROGRESS NOTES
Notified by RN pt's BP rising despite BP RX. She noted pt has a migraine, currently. BP @ 2019: 199/94  2038: Imitrex 100 mg given   BP @ 2206: 212/105    DC Imitrex. This may not be a good Rx for a pt w/ Hx of stroke and BP problems. Added PRN IV compazine and Benadryl (linked order) for continued migraine. Did not add IV Toradol that is part of my usual migraine regimen bc of his renal fxn.

## 2022-02-19 LAB
ANION GAP SERPL CALCULATED.3IONS-SCNC: 10 MMOL/L (ref 3–16)
BASOPHILS ABSOLUTE: 0 K/UL (ref 0–0.2)
BASOPHILS RELATIVE PERCENT: 0.6 %
BUN BLDV-MCNC: 16 MG/DL (ref 7–20)
CALCIUM SERPL-MCNC: 9.3 MG/DL (ref 8.3–10.6)
CHLORIDE BLD-SCNC: 101 MMOL/L (ref 99–110)
CO2: 26 MMOL/L (ref 21–32)
CREAT SERPL-MCNC: 1.5 MG/DL (ref 0.8–1.3)
EOSINOPHILS ABSOLUTE: 0.4 K/UL (ref 0–0.6)
EOSINOPHILS RELATIVE PERCENT: 4.9 %
GFR AFRICAN AMERICAN: 56
GFR NON-AFRICAN AMERICAN: 46
GLUCOSE BLD-MCNC: 100 MG/DL (ref 70–99)
GLUCOSE BLD-MCNC: 128 MG/DL (ref 70–99)
GLUCOSE BLD-MCNC: 269 MG/DL (ref 70–99)
GLUCOSE BLD-MCNC: 91 MG/DL (ref 70–99)
GLUCOSE BLD-MCNC: 95 MG/DL (ref 70–99)
GLUCOSE BLD-MCNC: 98 MG/DL (ref 70–99)
HCT VFR BLD CALC: 35.1 % (ref 40.5–52.5)
HEMOGLOBIN: 11.6 G/DL (ref 13.5–17.5)
LYMPHOCYTES ABSOLUTE: 2.4 K/UL (ref 1–5.1)
LYMPHOCYTES RELATIVE PERCENT: 29.6 %
MCH RBC QN AUTO: 29.9 PG (ref 26–34)
MCHC RBC AUTO-ENTMCNC: 33.2 G/DL (ref 31–36)
MCV RBC AUTO: 90.2 FL (ref 80–100)
MONOCYTES ABSOLUTE: 0.9 K/UL (ref 0–1.3)
MONOCYTES RELATIVE PERCENT: 10.8 %
NEUTROPHILS ABSOLUTE: 4.5 K/UL (ref 1.7–7.7)
NEUTROPHILS RELATIVE PERCENT: 54.1 %
PDW BLD-RTO: 13.5 % (ref 12.4–15.4)
PERFORMED ON: ABNORMAL
PERFORMED ON: NORMAL
PERFORMED ON: NORMAL
PLATELET # BLD: 205 K/UL (ref 135–450)
PMV BLD AUTO: 7.6 FL (ref 5–10.5)
POTASSIUM REFLEX MAGNESIUM: 4.3 MMOL/L (ref 3.5–5.1)
RBC # BLD: 3.89 M/UL (ref 4.2–5.9)
SODIUM BLD-SCNC: 137 MMOL/L (ref 136–145)
WBC # BLD: 8.3 K/UL (ref 4–11)

## 2022-02-19 PROCEDURE — 6370000000 HC RX 637 (ALT 250 FOR IP): Performed by: PHYSICIAN ASSISTANT

## 2022-02-19 PROCEDURE — 6360000002 HC RX W HCPCS

## 2022-02-19 PROCEDURE — 6370000000 HC RX 637 (ALT 250 FOR IP): Performed by: INTERNAL MEDICINE

## 2022-02-19 PROCEDURE — 2580000003 HC RX 258: Performed by: INTERNAL MEDICINE

## 2022-02-19 PROCEDURE — 85025 COMPLETE CBC W/AUTO DIFF WBC: CPT

## 2022-02-19 PROCEDURE — 99233 SBSQ HOSP IP/OBS HIGH 50: CPT | Performed by: INTERNAL MEDICINE

## 2022-02-19 PROCEDURE — 6370000000 HC RX 637 (ALT 250 FOR IP)

## 2022-02-19 PROCEDURE — 94640 AIRWAY INHALATION TREATMENT: CPT

## 2022-02-19 PROCEDURE — 1200000000 HC SEMI PRIVATE

## 2022-02-19 PROCEDURE — 36415 COLL VENOUS BLD VENIPUNCTURE: CPT

## 2022-02-19 PROCEDURE — 2580000003 HC RX 258

## 2022-02-19 PROCEDURE — 80048 BASIC METABOLIC PNL TOTAL CA: CPT

## 2022-02-19 RX ORDER — BUTALBITAL, ACETAMINOPHEN AND CAFFEINE 50; 325; 40 MG/1; MG/1; MG/1
2 TABLET ORAL ONCE
Status: COMPLETED | OUTPATIENT
Start: 2022-02-19 | End: 2022-02-19

## 2022-02-19 RX ORDER — AMLODIPINE BESYLATE 5 MG/1
10 TABLET ORAL DAILY
Status: DISCONTINUED | OUTPATIENT
Start: 2022-02-19 | End: 2022-02-21

## 2022-02-19 RX ORDER — SODIUM CHLORIDE 9 MG/ML
INJECTION, SOLUTION INTRAVENOUS CONTINUOUS
Status: DISCONTINUED | OUTPATIENT
Start: 2022-02-19 | End: 2022-02-21

## 2022-02-19 RX ADMIN — AMLODIPINE BESYLATE 10 MG: 5 TABLET ORAL at 18:17

## 2022-02-19 RX ADMIN — MEMANTINE HYDROCHLORIDE 10 MG: 5 TABLET ORAL at 19:32

## 2022-02-19 RX ADMIN — FOLIC ACID 1 MG: 1 TABLET ORAL at 08:38

## 2022-02-19 RX ADMIN — ASPIRIN 81 MG: 81 TABLET, CHEWABLE ORAL at 08:39

## 2022-02-19 RX ADMIN — ACETAMINOPHEN 650 MG: 325 TABLET ORAL at 06:31

## 2022-02-19 RX ADMIN — Medication 10 ML: at 19:32

## 2022-02-19 RX ADMIN — INSULIN GLARGINE 15 UNITS: 100 INJECTION, SOLUTION SUBCUTANEOUS at 21:42

## 2022-02-19 RX ADMIN — DOCUSATE SODIUM 50MG AND SENNOSIDES 8.6MG 1 TABLET: 8.6; 5 TABLET, FILM COATED ORAL at 19:32

## 2022-02-19 RX ADMIN — BISACODYL 10 MG: 5 TABLET, COATED ORAL at 18:48

## 2022-02-19 RX ADMIN — Medication 10 MG: at 19:32

## 2022-02-19 RX ADMIN — PANTOPRAZOLE SODIUM 40 MG: 40 TABLET, DELAYED RELEASE ORAL at 05:30

## 2022-02-19 RX ADMIN — OXYCODONE 10 MG: 5 TABLET ORAL at 05:30

## 2022-02-19 RX ADMIN — METOPROLOL SUCCINATE 50 MG: 50 TABLET, EXTENDED RELEASE ORAL at 19:32

## 2022-02-19 RX ADMIN — Medication 2 PUFF: at 07:23

## 2022-02-19 RX ADMIN — POLYETHYLENE GLYCOL (3350) 17 G: 17 POWDER, FOR SOLUTION ORAL at 08:39

## 2022-02-19 RX ADMIN — Medication 10 ML: at 08:39

## 2022-02-19 RX ADMIN — ENOXAPARIN SODIUM 40 MG: 100 INJECTION SUBCUTANEOUS at 08:39

## 2022-02-19 RX ADMIN — OXYCODONE 10 MG: 5 TABLET ORAL at 19:32

## 2022-02-19 RX ADMIN — Medication 1 PUFF: at 07:23

## 2022-02-19 RX ADMIN — CYANOCOBALAMIN TAB 500 MCG 1000 MCG: 500 TAB at 08:39

## 2022-02-19 RX ADMIN — Medication 1 PUFF: at 21:03

## 2022-02-19 RX ADMIN — Medication 100 MG: at 08:38

## 2022-02-19 RX ADMIN — SERTRALINE HYDROCHLORIDE 100 MG: 50 TABLET ORAL at 08:39

## 2022-02-19 RX ADMIN — DOCUSATE SODIUM 50MG AND SENNOSIDES 8.6MG 1 TABLET: 8.6; 5 TABLET, FILM COATED ORAL at 08:39

## 2022-02-19 RX ADMIN — Medication 2 PUFF: at 21:00

## 2022-02-19 RX ADMIN — BUTALBITAL, ACETAMINOPHEN, AND CAFFEINE 2 TABLET: 50; 325; 40 TABLET ORAL at 14:18

## 2022-02-19 RX ADMIN — INSULIN GLARGINE 15 UNITS: 100 INJECTION, SOLUTION SUBCUTANEOUS at 08:27

## 2022-02-19 RX ADMIN — SODIUM CHLORIDE: 9 INJECTION, SOLUTION INTRAVENOUS at 14:17

## 2022-02-19 RX ADMIN — TIOTROPIUM BROMIDE AND OLODATEROL 2 PUFF: 3.124; 2.736 SPRAY, METERED RESPIRATORY (INHALATION) at 07:23

## 2022-02-19 RX ADMIN — METFORMIN HYDROCHLORIDE 1000 MG: 500 TABLET ORAL at 08:38

## 2022-02-19 RX ADMIN — FINASTERIDE 5 MG: 5 TABLET, FILM COATED ORAL at 08:39

## 2022-02-19 RX ADMIN — ATORVASTATIN CALCIUM 80 MG: 40 TABLET, FILM COATED ORAL at 19:32

## 2022-02-19 RX ADMIN — MEMANTINE HYDROCHLORIDE 10 MG: 5 TABLET ORAL at 08:39

## 2022-02-19 ASSESSMENT — PAIN SCALES - GENERAL
PAINLEVEL_OUTOF10: 8
PAINLEVEL_OUTOF10: 7
PAINLEVEL_OUTOF10: 7
PAINLEVEL_OUTOF10: 8

## 2022-02-19 NOTE — PROGRESS NOTES
Report given @ bedside to [facility], for transferral of care. Pt resting in bed. Call light in reach.

## 2022-02-19 NOTE — PLAN OF CARE
Problem: Falls - Risk of:  Goal: Will remain free from falls  Description: Will remain free from falls  2/18/2022 2024 by Soledad Noyola RN  Outcome: Ongoing  2/18/2022 1035 by Tomas Malone RN  Outcome: Ongoing  Goal: Absence of physical injury  Description: Absence of physical injury  2/18/2022 2024 by Soledad Noyola RN  Outcome: Ongoing  2/18/2022 1035 by Tomas Malone RN  Outcome: Ongoing

## 2022-02-19 NOTE — PLAN OF CARE
Problem: Falls - Risk of:  Goal: Will remain free from falls  Description: Will remain free from falls  2/19/2022 1016 by Tomas Malone RN  Outcome: Ongoing     Problem: Falls - Risk of:  Goal: Absence of physical injury  Description: Absence of physical injury  2/19/2022 1016 by Tomas Malone RN  Outcome: Ongoing

## 2022-02-19 NOTE — PROGRESS NOTES
Progress Note    Admit Date:  2/16/2022    Nursing home patient admitted with orthostatic hypotension. Antihypertensive medication doses being adjusted. Subjective:  Persisting headache. BP elevated supine, + orthostatics - and symptomatic. Objective:   BP (!) 176/90   Pulse 83   Temp 97.3 °F (36.3 °C) (Oral)   Resp 18   Ht 6' 5\" (1.956 m)   Wt 188 lb 0.8 oz (85.3 kg)   SpO2 96%   BMI 22.30 kg/m²       Intake/Output Summary (Last 24 hours) at 2/19/2022 1608  Last data filed at 2/19/2022 1341  Gross per 24 hour   Intake 360 ml   Output 2100 ml   Net -1740 ml         Physical Exam:  Gen: No distress. .  Awake. Looks fatigued. Eyes: PERRL. No sclera icterus. No conjunctival injection. ENT: No discharge. Pharynx clear. Edentulous   Neck: No JVD. Trachea midline. Resp: No accessory muscle use. No crackles. No wheezes. No rhonchi. CV: Regular rate. Regular rhythm. No murmur. No rub. No edema. Capillary Refill: Brisk,< 3 seconds   Peripheral Pulses: +2 palpable, equal bilaterally   GI: Non-tender. Non-distended. No masses. No organomegaly. Normal bowel sounds. Large midline old scar with incisional hernia     Skin: Warm and dry. No nodule on exposed extremities. No rash on exposed extremities. M/S: No cyanosis. No joint deformity. No clubbing. Neuro: Awake and alert  Oriented to month, year, person, place, president  Follows commands appropriately  Extraocular movements intact  Visual fields intact  No facial palsy  No upper or lower extremity motor drift  No limb ataxia-finger-nose-finger and heel-to-shin test are within normal limits   normal sensation  No aphasia, no dysarthria  Psych: Oriented x 3.  No anxiety or agitation.          Medications:   Scheduled Meds:   amLODIPine  10 mg Oral Daily    sennosides-docusate sodium  1 tablet Oral BID    polyethylene glycol  17 g Oral Daily    metFORMIN  1,000 mg Oral BID     insulin glargine  15 Units SubCUTAneous BID    aspirin  81 mg Oral Daily    atorvastatin  80 mg Oral Nightly    finasteride  5 mg Oral Daily    folic acid  1 mg Oral Daily    [Held by provider] insulin lispro  6 Units SubCUTAneous TID WC    melatonin  10 mg Oral Nightly    memantine  10 mg Oral BID    metoprolol succinate  50 mg Oral Nightly    sertraline  100 mg Oral Daily    pantoprazole  40 mg Oral QAM AC    thiamine  100 mg Oral Daily    vitamin B-12  1,000 mcg Oral Daily    vitamin D  50,000 Units Oral Weekly    sodium chloride flush  5-40 mL IntraVENous 2 times per day    enoxaparin  40 mg SubCUTAneous Daily    insulin lispro  0-6 Units SubCUTAneous TID WC    insulin lispro  0-3 Units SubCUTAneous Nightly    albuterol sulfate HFA  2 puff Inhalation BID    fluticasone  1 puff Inhalation BID    tiotropium-olodaterol  2 puff Inhalation Daily       Continuous Infusions:   sodium chloride 75 mL/hr at 02/19/22 1417    dextrose      sodium chloride         Data:  CBC:   Recent Labs     02/17/22  0605 02/18/22  0526 02/19/22  0522   WBC 8.4 8.5 8.3   RBC 3.89* 4.21 3.89*   HGB 11.7* 12.7* 11.6*   HCT 34.6* 37.6* 35.1*   MCV 89.1 89.3 90.2   RDW 13.5 13.4 13.5    244 205     BMP:   Recent Labs     02/17/22  0604 02/18/22  0526 02/19/22  0522    140 137   K 3.7 3.6 4.3    104 101   CO2 26 24 26   BUN 19 12 16   CREATININE 1.4* 1.2 1.5*     BNP: No results for input(s): BNP in the last 72 hours. PT/INR: No results for input(s): PROTIME, INR in the last 72 hours. APTT: No results for input(s): APTT in the last 72 hours. CARDIAC ENZYMES:   No results for input(s): CKMB, CKMBINDEX, TROPONINI in the last 72 hours. Invalid input(s): CKTOTAL;3  FASTING LIPID PANEL:  Lab Results   Component Value Date    CHOL 110 10/23/2020    HDL 51 10/23/2020    TRIG 84 10/23/2020     LIVER PROFILE:   No results for input(s): AST, ALT, ALB, BILIDIR, BILITOT, ALKPHOS in the last 72 hours.      CULTURES  COVID 19, PCR: not detected  Flu A/B: neg/neg      EKG:  Normal sinus rhythm  Possible Left atrial enlargement  Left ventricular hypertrophy with QRS widening and repolarization abnormality  ST abnormaltiy anterolateral leads consider ischemia vs repolarization change  When compared with ECG of 16-APR-2021 14:59,  T wave inversion no longer evident in Inferior leads  Confirmed by ADÁN URBINA MD     Anterolateral ST changes are not new when compared to EKG from 4/2021      RADIOLOGY  CT HEAD WO CONTRAST   Final Result   No acute intracranial abnormality. Encephalomalacia with old right occipital infarction and small basal ganglia   lacunar infarctions unchanged. XR CHEST PORTABLE   Final Result   No acute abnormality. Pertinent previous results reviewed   Echo 10/2020   Limited only f/u for LVEF, RWMA, IVC, Aortic root.   Ejection fraction is visually estimated to be 40 %.  Global hypokinesis with regional variation, more prominent inferoapical   hypokinesis.   Changes noted from previous echo on 4- in left ventricular function.   The aortic root is mildly dilated.   The right ventricle is low normal (to mildly depressed) in function.  IVC size is normal (<2.1cm) and collapses > 50% with respiration consistent   with normal RA pressure (3mmHg). ASSESSMENT/PLAN:     #Orthostatic hypotension  - there is diagnosis of orthostasis on NH paperwork  - start IVF  - add YORDAN hose - he has very skinny LEs - these are of very little help at this time. - cont BB. - start slow maintenance IVF   - stop clonidine.    - stop nifedipine. - switch to amlodipine. - ok for PRN hydralazine.    Recurrent issues with orthostatic hypotension; p      #HTN  Changes as above.        #Elevated Creatinine in patient with CKD 3a-b  - Baseline Crt variable ~1.2-1.3  - Crt 1.6 on admit  - sp gentle IVF and Crt improved to 1.4       #Dehydration   resume IVF 2/19.        #Chronic systolic CHF   - without evidence of decompensation - continue toprol XL  - no ACEi/ARB suspect 2/2 CKD        #DM2  - Cont Lantus, geremias humalog, metformin, SSI       #COPD  - no exacerbation, cont inhalers       #Chronic pain  - home medication regimen continued        #Remote CVA  - neuro exam is WNL, head CT without acute findings   - cont ASA, statin        #Dementia  - oriented x 4- at baseline. Continue namenda        #Chronic anemia  - hb is at baseline        #GERD  - cont PPI       #MDD  - cont Zoloft       #PCM moderate  - dietitian consult        #BPH with LUTS   - cont Proscar     #Hepatitis C     # constipation  - add senokot. make miralax scheduled    # migraine   - avoid imitrex   - will try fioricet today.      DVT Prophylaxis: Lovenox   Diet: ADULT DIET;  Regular; 4 carb choices (60 gm/meal)  Code Status: Full Code      Rober Cohen MD, MD 2/19/2022 4:08 PM

## 2022-02-19 NOTE — PROGRESS NOTES
Patient requesting imitrex, I explained to patient that it was discontinued by the MD earlier today. Patient was very upset and stated that he was not informed when it was discontinued.

## 2022-02-20 LAB
ANION GAP SERPL CALCULATED.3IONS-SCNC: 10 MMOL/L (ref 3–16)
BASOPHILS ABSOLUTE: 0 K/UL (ref 0–0.2)
BASOPHILS RELATIVE PERCENT: 0.6 %
BUN BLDV-MCNC: 12 MG/DL (ref 7–20)
CALCIUM SERPL-MCNC: 9.1 MG/DL (ref 8.3–10.6)
CHLORIDE BLD-SCNC: 104 MMOL/L (ref 99–110)
CO2: 24 MMOL/L (ref 21–32)
CREAT SERPL-MCNC: 1.2 MG/DL (ref 0.8–1.3)
EOSINOPHILS ABSOLUTE: 0.3 K/UL (ref 0–0.6)
EOSINOPHILS RELATIVE PERCENT: 4.1 %
GFR AFRICAN AMERICAN: >60
GFR NON-AFRICAN AMERICAN: 60
GLUCOSE BLD-MCNC: 103 MG/DL (ref 70–99)
GLUCOSE BLD-MCNC: 146 MG/DL (ref 70–99)
GLUCOSE BLD-MCNC: 151 MG/DL (ref 70–99)
GLUCOSE BLD-MCNC: 175 MG/DL (ref 70–99)
GLUCOSE BLD-MCNC: 201 MG/DL (ref 70–99)
GLUCOSE BLD-MCNC: 90 MG/DL (ref 70–99)
HCT VFR BLD CALC: 34.9 % (ref 40.5–52.5)
HEMOGLOBIN: 11.6 G/DL (ref 13.5–17.5)
LYMPHOCYTES ABSOLUTE: 2.3 K/UL (ref 1–5.1)
LYMPHOCYTES RELATIVE PERCENT: 28.7 %
MCH RBC QN AUTO: 29.9 PG (ref 26–34)
MCHC RBC AUTO-ENTMCNC: 33.2 G/DL (ref 31–36)
MCV RBC AUTO: 89.9 FL (ref 80–100)
MONOCYTES ABSOLUTE: 0.8 K/UL (ref 0–1.3)
MONOCYTES RELATIVE PERCENT: 10.4 %
NEUTROPHILS ABSOLUTE: 4.5 K/UL (ref 1.7–7.7)
NEUTROPHILS RELATIVE PERCENT: 56.2 %
PDW BLD-RTO: 13.7 % (ref 12.4–15.4)
PERFORMED ON: ABNORMAL
PERFORMED ON: NORMAL
PLATELET # BLD: 201 K/UL (ref 135–450)
PMV BLD AUTO: 7.6 FL (ref 5–10.5)
POTASSIUM REFLEX MAGNESIUM: 3.8 MMOL/L (ref 3.5–5.1)
RBC # BLD: 3.88 M/UL (ref 4.2–5.9)
SODIUM BLD-SCNC: 138 MMOL/L (ref 136–145)
WBC # BLD: 8 K/UL (ref 4–11)

## 2022-02-20 PROCEDURE — 6370000000 HC RX 637 (ALT 250 FOR IP)

## 2022-02-20 PROCEDURE — 1200000000 HC SEMI PRIVATE

## 2022-02-20 PROCEDURE — 2580000003 HC RX 258: Performed by: INTERNAL MEDICINE

## 2022-02-20 PROCEDURE — 94761 N-INVAS EAR/PLS OXIMETRY MLT: CPT

## 2022-02-20 PROCEDURE — 94640 AIRWAY INHALATION TREATMENT: CPT

## 2022-02-20 PROCEDURE — 2580000003 HC RX 258

## 2022-02-20 PROCEDURE — 6370000000 HC RX 637 (ALT 250 FOR IP): Performed by: INTERNAL MEDICINE

## 2022-02-20 PROCEDURE — 6370000000 HC RX 637 (ALT 250 FOR IP): Performed by: PHYSICIAN ASSISTANT

## 2022-02-20 PROCEDURE — 80048 BASIC METABOLIC PNL TOTAL CA: CPT

## 2022-02-20 PROCEDURE — 97535 SELF CARE MNGMENT TRAINING: CPT

## 2022-02-20 PROCEDURE — 36415 COLL VENOUS BLD VENIPUNCTURE: CPT

## 2022-02-20 PROCEDURE — 6360000002 HC RX W HCPCS

## 2022-02-20 PROCEDURE — 85025 COMPLETE CBC W/AUTO DIFF WBC: CPT

## 2022-02-20 PROCEDURE — 97530 THERAPEUTIC ACTIVITIES: CPT

## 2022-02-20 PROCEDURE — 99233 SBSQ HOSP IP/OBS HIGH 50: CPT | Performed by: INTERNAL MEDICINE

## 2022-02-20 RX ADMIN — PANTOPRAZOLE SODIUM 40 MG: 40 TABLET, DELAYED RELEASE ORAL at 06:44

## 2022-02-20 RX ADMIN — METFORMIN HYDROCHLORIDE 1000 MG: 500 TABLET ORAL at 08:44

## 2022-02-20 RX ADMIN — DOCUSATE SODIUM 50MG AND SENNOSIDES 8.6MG 1 TABLET: 8.6; 5 TABLET, FILM COATED ORAL at 08:44

## 2022-02-20 RX ADMIN — DOCUSATE SODIUM 50MG AND SENNOSIDES 8.6MG 1 TABLET: 8.6; 5 TABLET, FILM COATED ORAL at 20:51

## 2022-02-20 RX ADMIN — FINASTERIDE 5 MG: 5 TABLET, FILM COATED ORAL at 08:44

## 2022-02-20 RX ADMIN — INSULIN GLARGINE 15 UNITS: 100 INJECTION, SOLUTION SUBCUTANEOUS at 08:49

## 2022-02-20 RX ADMIN — ACETAMINOPHEN 650 MG: 325 TABLET ORAL at 18:05

## 2022-02-20 RX ADMIN — AMLODIPINE BESYLATE 10 MG: 5 TABLET ORAL at 08:44

## 2022-02-20 RX ADMIN — SODIUM CHLORIDE: 9 INJECTION, SOLUTION INTRAVENOUS at 15:49

## 2022-02-20 RX ADMIN — Medication 10 MG: at 20:52

## 2022-02-20 RX ADMIN — OXYCODONE 10 MG: 5 TABLET ORAL at 09:33

## 2022-02-20 RX ADMIN — METOPROLOL SUCCINATE 50 MG: 50 TABLET, EXTENDED RELEASE ORAL at 20:51

## 2022-02-20 RX ADMIN — SODIUM CHLORIDE: 9 INJECTION, SOLUTION INTRAVENOUS at 02:25

## 2022-02-20 RX ADMIN — METFORMIN HYDROCHLORIDE 1000 MG: 500 TABLET ORAL at 17:14

## 2022-02-20 RX ADMIN — OXYCODONE 10 MG: 5 TABLET ORAL at 15:47

## 2022-02-20 RX ADMIN — ATORVASTATIN CALCIUM 80 MG: 40 TABLET, FILM COATED ORAL at 20:51

## 2022-02-20 RX ADMIN — POLYETHYLENE GLYCOL (3350) 17 G: 17 POWDER, FOR SOLUTION ORAL at 08:44

## 2022-02-20 RX ADMIN — OXYCODONE 10 MG: 5 TABLET ORAL at 23:16

## 2022-02-20 RX ADMIN — Medication 100 MG: at 08:44

## 2022-02-20 RX ADMIN — MEMANTINE HYDROCHLORIDE 10 MG: 5 TABLET ORAL at 20:51

## 2022-02-20 RX ADMIN — Medication 1 PUFF: at 19:18

## 2022-02-20 RX ADMIN — FOLIC ACID 1 MG: 1 TABLET ORAL at 08:44

## 2022-02-20 RX ADMIN — MEMANTINE HYDROCHLORIDE 10 MG: 5 TABLET ORAL at 08:43

## 2022-02-20 RX ADMIN — OXYCODONE 10 MG: 5 TABLET ORAL at 02:25

## 2022-02-20 RX ADMIN — Medication 2 PUFF: at 07:10

## 2022-02-20 RX ADMIN — CYANOCOBALAMIN TAB 500 MCG 1000 MCG: 500 TAB at 08:44

## 2022-02-20 RX ADMIN — SERTRALINE HYDROCHLORIDE 100 MG: 50 TABLET ORAL at 08:43

## 2022-02-20 RX ADMIN — Medication 2 PUFF: at 19:17

## 2022-02-20 RX ADMIN — Medication 10 ML: at 08:44

## 2022-02-20 RX ADMIN — ASPIRIN 81 MG: 81 TABLET, CHEWABLE ORAL at 08:44

## 2022-02-20 RX ADMIN — Medication 1 PUFF: at 07:10

## 2022-02-20 RX ADMIN — INSULIN LISPRO 1 UNITS: 100 INJECTION, SOLUTION INTRAVENOUS; SUBCUTANEOUS at 20:57

## 2022-02-20 RX ADMIN — ENOXAPARIN SODIUM 40 MG: 100 INJECTION SUBCUTANEOUS at 08:43

## 2022-02-20 RX ADMIN — Medication 10 ML: at 20:52

## 2022-02-20 RX ADMIN — INSULIN GLARGINE 15 UNITS: 100 INJECTION, SOLUTION SUBCUTANEOUS at 20:56

## 2022-02-20 RX ADMIN — TIZANIDINE 4 MG: 4 TABLET ORAL at 17:14

## 2022-02-20 ASSESSMENT — PAIN SCALES - GENERAL
PAINLEVEL_OUTOF10: 8
PAINLEVEL_OUTOF10: 7

## 2022-02-20 NOTE — PROGRESS NOTES
Occupational Therapy Daily Treatment Note    Unit: [address] [place]  Date:  2/20/2022  Patient Name:    Angelic Collier  Admitting diagnosis:  Dizziness [R42]  Generalized weakness [R53.1]  CHIP (acute kidney injury) [facility]) [N17.9]  Admit Date:  2/16/2022  Precautions/Restrictions:  Fall risk, Bed/chair alarm, Lines -IV and low vision, [place]    Per Dr. Maldonado Smith, 2/20/2022: If BP stable (will likely remain high when supine) and no symptoms from orthostasis with ambulation - ok to d/c IVF Monday and ok for d/c planning. Treatment Time:  1688-1108  Treatment number:  2   Total Treatment Time:   25 minutes    Patient Goals for Session:  \" I have to pee \"      Discharge Recommendations: Return to LTC/ECF with OT  DME needs for discharge: needs met       Therapy recommendations for staff:  Assist of 1 with use of rolling walker and gait belt for all transfers to/from BSC/chair    History of Present Illness: per ELEAZAR Small H&P 2/17/22:  \"The patient is a 76 y. o. male with systolic congestive heart failure, chronic pain, dementia, diabetes mellitus type 2, COPD who presented to [facility] ED with complaint of dizziness.  Patient states he is not sure why he was sent to the ER.   He states he has been dizzy for \"a long time. \" Devika Fairchild denies any new complaints.  ER provider note reviewed, reports patient presented for generalized weakness and dizziness.  I called the patient's nursing home- nurse reports patient was disoriented, had low BP, and complained of HA therefore EMS was called to transport to [facility] reviewed records from NH- patient has complained of a chronic headache since Spring 2021.       ER work-up revealed a mild CHIP and orthostatic hypotension.  He was admitted to the hospital for for further evaluation and care.       This morning patient complains of chronic pain.  He is oriented x 4. \"     Home Health S4 Level Recommendation:  NA    AM-PAC Score: AM-PAC Inpatient Daily Activity Raw Score: 14  Pt scored a 14/24 on the AM-PAC ADL Inpatient form. Current research shows that an AM-PAC score of 17 or less is typically not associated with a discharge to the patient's home setting. Subjective:  Pt in the process of moving to EOB upon therapist arrival. Pt agreeable to work with therapy this date. Pain   No c/o pain throughout     Cognition:    A&O Person and orientation not directly assessed. Able to follow 1 step commands, consistently. Balance:  Functional Sitting Balance:  WFL   Comments: Good  Functional Standing Balance:WFL   Comments: For urinal mgmt in standing     Bed mobility:    Supine to sit:   supervision  Sit to supine:   supervision  Rolling:    Not Tested  Scooting in sitting:  supervision  Scooting to head of bed: Independent   Bridging:   Yes - partial     Transfers:     Sit to stand:  CGA  Stand to sit:  CGA  Bed to chair:   Not Tested  Standard toilet: Not Tested  Bed to [facility]:  Not Tested    Activities of Daily Living:   UB Dressing:   Not Tested  LB Dressing:    Independent to don/doff socks   UB Bathing:  Not Tested  LB Bathing:  Not Tested  Feeding:  Independent - meal initiated and set up independently   Grooming:   Not Tested  Toileting:  supervision to use urinal in standing - steady balance     Activity Tolerance:   Pt completed therapy session with No adverse symptoms noted w/activity   BP (mmHg) HR (bpm) Comments   Seated at /76 96    Standing 144/72  No c/o dizziness. Requesting muscle relaxer. RN notified. Therapeutic Exercise:   N/A    Patient Education:   Role of OT    Positioning Needs: In bed, call light and needs in reach. Alarm Set    Family Present:  No    Assessment: Pt with good progress this date. Positive blood pressure response with standing. Pt declining further activity 2/2 requesting muscle relaxer. Will continue to follow up.  Pt may benefit from continued skilled occupational therapy while in the hospital in order to progress to a safe and more

## 2022-02-20 NOTE — PROGRESS NOTES
Progress Note    Admit Date:  2/16/2022    Nursing home patient admitted with orthostatic hypotension. Antihypertensive medication doses being adjusted. Subjective:  Headache better after fioricet. Denies dizziness with standing and willing to ambulate - will ask PT for assistance. Cont IVF today - likely stop IVF tomorrow. Objective:   BP (!) 171/92   Pulse 76   Temp 97.7 °F (36.5 °C) (Oral)   Resp 16   Ht 6' 5\" (1.956 m)   Wt 188 lb 0.8 oz (85.3 kg)   SpO2 96%   BMI 22.30 kg/m²       Intake/Output Summary (Last 24 hours) at 2/20/2022 1226  Last data filed at 2/20/2022 5423  Gross per 24 hour   Intake 240 ml   Output 3375 ml   Net -3135 ml         Physical Exam:  Gen: No distress. .  Awake. Looks fatigued. Eyes: PERRL. No sclera icterus. No conjunctival injection. ENT: No discharge. Pharynx clear. Edentulous   Neck: No JVD. Trachea midline. Resp: No accessory muscle use. No crackles. No wheezes. No rhonchi. CV: Regular rate. Regular rhythm. No murmur. No rub. No edema. Capillary Refill: Brisk,< 3 seconds   Peripheral Pulses: +2 palpable, equal bilaterally   GI: Non-tender. Non-distended. No masses. No organomegaly. Normal bowel sounds. Large midline old scar with incisional hernia     Skin: Warm and dry. No nodule on exposed extremities. No rash on exposed extremities. M/S: No cyanosis. No joint deformity. No clubbing. Neuro: Awake and alert  Oriented to month, year, person, place, president  Follows commands appropriately  Extraocular movements intact  Visual fields intact  No facial palsy  No upper or lower extremity motor drift  No limb ataxia-finger-nose-finger and heel-to-shin test are within normal limits   normal sensation  No aphasia, no dysarthria  Psych: Oriented x 3.  No anxiety or agitation.          Medications:   Scheduled Meds:   amLODIPine  10 mg Oral Daily    sennosides-docusate sodium  1 tablet Oral BID    polyethylene glycol  17 g Oral BILITOT, ALKPHOS in the last 72 hours. CULTURES  COVID 19, PCR: not detected  Flu A/B: neg/neg      EKG:  Normal sinus rhythm  Possible Left atrial enlargement  Left ventricular hypertrophy with QRS widening and repolarization abnormality  ST abnormaltiy anterolateral leads consider ischemia vs repolarization change  When compared with ECG of 16-APR-2021 14:59,  T wave inversion no longer evident in Inferior leads  Confirmed by ADÁN URBINA MD     Anterolateral ST changes are not new when compared to EKG from 4/2021      RADIOLOGY  CT HEAD WO CONTRAST   Final Result   No acute intracranial abnormality. Encephalomalacia with old right occipital infarction and small basal ganglia   lacunar infarctions unchanged. XR CHEST PORTABLE   Final Result   No acute abnormality. Pertinent previous results reviewed   Echo 10/2020   Limited only f/u for LVEF, RWMA, IVC, Aortic root.   Ejection fraction is visually estimated to be 40 %.  Global hypokinesis with regional variation, more prominent inferoapical   hypokinesis.   Changes noted from previous echo on 4- in left ventricular function.   The aortic root is mildly dilated.   The right ventricle is low normal (to mildly depressed) in function.  IVC size is normal (<2.1cm) and collapses > 50% with respiration consistent   with normal RA pressure (3mmHg). ASSESSMENT/PLAN:     #Symptomatic Orthostatic hypotension  - not new diagnosis for him. - add YORDAN hose - he has very skinny LEs - these are of very little help at this time. - cont BB. - start slow maintenance IVF - likely stop Monday - improving.    - stopped clonidine.    - stopped nifedipine. - switched to amlodipine. - ok for PRN hydralazine. May need to accept higher supine BP to avoid syncope and fall with ambulation. Will check orthostatics throughout the day today. Start PTOT.        #HTN  Changes as above.        #Elevated Creatinine in patient with CKD 3a-b  - Baseline Crt variable ~1.2-1.3  - Crt 1.6 on admit  - better with IVF       #Dehydration   As above.        #Chronic systolic CHF   - without evidence of decompensation   - continue toprol XL  - no ACEi/ARB suspect 2/2 CKD        #DM2  - Cont Lantus, geremias humalog, metformin, SSI       #COPD  - no exacerbation, cont inhalers       #Chronic pain  - home medication regimen continued        #Remote CVA  - neuro exam is WNL, head CT without acute findings   - cont ASA, statin        #Dementia  - oriented x 4- at baseline. Continue namenda        #Chronic anemia  - hb is at baseline        #GERD  - cont PPI       #MDD  - cont Zoloft       #PCM moderate  - dietitian consult        #BPH with LUTS   - cont Proscar     #Hepatitis C     # constipation  - add senokot. make miralax scheduled    # migraine   - avoid imitrex   - s/p fioricet Saturday with fair effect         DVT Prophylaxis: Lovenox   Diet: ADULT DIET; Regular; 4 carb choices (60 gm/meal)  Code Status: Full Code    If BP stable (will likely remain high when supine) and no symptoms from orthostasis with ambulation - ok to d/c IVF Monday and ok for d/c planning.            Jones Mcardle, MD, MD 2/20/2022 12:26 PM

## 2022-02-20 NOTE — PROGRESS NOTES
Bedside report given and pt care transferred to [facility]. Pt denies needs at this time. Call light within reach.

## 2022-02-20 NOTE — ED PROVIDER NOTES
I personally saw Jayson Hui and performed a substantive portion of the visit including all aspects of the medical decision making. .    In brief, this is a 67-year-old male presenting for evaluation of lightheadedness. He resides at [facility]. He states that when he stands or tends to ambulate, he feels lightheaded and as though he is going to pass out he did not actually have syncope. He denies chest pain or shortness of breath, denies vomiting or diarrhea. On exam, he is chronically ill-appearing. Heart is regular rate and rhythm and lungs are diminished diffusely, no wheezes rales or rhonchi. Strength 5/5 in UE and LE bilaterally. Sensation intact x 4. No aphasia or dysarthria. CN 2-12 intact. No facial droop. No limb ataxia. The Ekg interpreted by me shows  normal sinus rhythm with a rate of 85  Axis is   Left axis deviation  QTc is  within an acceptable range  Intervals and Durations are unremarkable. ST Segments: nonspecific ST abnormality no acute ischemia   No significant change from prior EKG dated 4/16/21      ED course: This is a 67-year-old male presenting for evaluation of lightheadedness. His vital signs show soft blood pressure, otherwise within normal limits. He is afebrile, chronically ill but not acutely toxic on exam.  Lab work is obtained and reviewed. No sign of infection he does not have a leukocytosis. He is not significantly anemic. He does have an CHIP with creatinine 1.6, likely prerenal in etiology as his orthostatics are positive. I do suspect this is the etiology of his lightheadedness. We attempted to ambulate him, he was unsteady and symptomatic despite fluids, therefore he will be admitted for further treatment. All diagnostic, treatment, and disposition decisions were made by myself in conjunction with the advanced practice provider.     For all further details of the patient's emergency department visit, please see the advanced practice provider's documentation. Comment: Please note this report has been produced using speech recognition software and may contain errors related to that system including errors in grammar, punctuation, and spelling, as well as words and phrases that may be inappropriate. If there are any questions or concerns please feel free to contact the dictating provider for clarification.          Mi [place]  02/19/22 1473

## 2022-02-20 NOTE — PROGRESS NOTES
RT Inhaler-Nebulizer Bronchodilator Protocol Note    There is a bronchodilator order in the chart from a provider indicating to follow the RT Bronchodilator Protocol and there is an Initiate RT Inhaler-Nebulizer Bronchodilator Protocol order as well (see protocol at bottom of note). CXR Findings:  No results found. The findings from the last RT Protocol Assessment were as follows:   History Pulmonary Disease: Chronic pulmonary disease  Respiratory Pattern: Regular pattern and RR 12-20 bpm  Breath Sounds: Clear breath sounds  Cough: Strong, spontaneous, non-productive  Indication for Bronchodilator Therapy: Decreased or absent breath sounds  Bronchodilator Assessment Score: 2    Aerosolized bronchodilator medication orders have been revised according to the RT Inhaler-Nebulizer Bronchodilator Protocol below. Respiratory Therapist to perform RT Therapy Protocol Assessment initially then follow the protocol. Repeat RT Therapy Protocol Assessment PRN for score 0-3 or on second treatment, BID, and PRN for scores above 3. No Indications  adjust the frequency to every 6 hours PRN wheezing or bronchospasm, if no treatments needed after 48 hours then discontinue using Per Protocol order mode. If indication present, adjust the RT bronchodilator orders based on the Bronchodilator Assessment Score as indicated below. Use Inhaler orders unless patient has one or more of the following: on home nebulizer, not able to hold breath for 10 seconds, is not alert and oriented, cannot activate and use MDI correctly, or respiratory rate 25 breaths per minute or more, then use the equivalent nebulizer order(s) with same Frequency and PRN reasons based on the score. If a patient is on this medication at home then do not decrease Frequency below that used at home.     0-3  enter or revise RT bronchodilator order(s) to equivalent RT Bronchodilator order with Frequency of every 4 hours PRN for wheezing or increased work of breathing using Per Protocol order mode. 4-6  enter or revise RT Bronchodilator order(s) to two equivalent RT bronchodilator orders with one order with BID Frequency and one order with Frequency of every 4 hours PRN wheezing or increased work of breathing using Per Protocol order mode. 7-10  enter or revise RT Bronchodilator order(s) to two equivalent RT bronchodilator orders with one order with TID Frequency and one order with Frequency of every 4 hours PRN wheezing or increased work of breathing using Per Protocol order mode. 11-13  enter or revise RT Bronchodilator order(s) to one equivalent RT bronchodilator order with QID Frequency and an Albuterol order with Frequency of every 4 hours PRN wheezing or increased work of breathing using Per Protocol order mode. Greater than 13  enter or revise RT Bronchodilator order(s) to one equivalent RT bronchodilator order with every 4 hours Frequency and an Albuterol order with Frequency of every 2 hours PRN wheezing or increased work of breathing using Per Protocol order mode. RT to enter RT Home Evaluation for COPD & MDI Assessment order using Per Protocol order mode.     Electronically signed by Tonia Araujo RCP on 2/20/2022 at 7:13 AM

## 2022-02-20 NOTE — PLAN OF CARE
Problem: Falls - Risk of:  Goal: Will remain free from falls  Description: Will remain free from falls  2/19/2022 1938 by Nica Saeed RN  Outcome: Ongoing  2/19/2022 1016 by Brandon Martinez RN  Outcome: Ongoing  Goal: Absence of physical injury  Description: Absence of physical injury  2/19/2022 1938 by Nica Saeed RN  Outcome: Ongoing  2/19/2022 1016 by Brandon Martinez RN  Outcome: Ongoing

## 2022-02-21 VITALS
SYSTOLIC BLOOD PRESSURE: 153 MMHG | TEMPERATURE: 97 F | BODY MASS INDEX: 22.2 KG/M2 | DIASTOLIC BLOOD PRESSURE: 78 MMHG | WEIGHT: 188.05 LBS | RESPIRATION RATE: 18 BRPM | HEIGHT: 77 IN | OXYGEN SATURATION: 95 % | HEART RATE: 78 BPM

## 2022-02-21 LAB
ANION GAP SERPL CALCULATED.3IONS-SCNC: 10 MMOL/L (ref 3–16)
BASOPHILS ABSOLUTE: 0 K/UL (ref 0–0.2)
BASOPHILS RELATIVE PERCENT: 0.6 %
BUN BLDV-MCNC: 14 MG/DL (ref 7–20)
CALCIUM SERPL-MCNC: 9 MG/DL (ref 8.3–10.6)
CHLORIDE BLD-SCNC: 104 MMOL/L (ref 99–110)
CO2: 24 MMOL/L (ref 21–32)
CREAT SERPL-MCNC: 1.2 MG/DL (ref 0.8–1.3)
EOSINOPHILS ABSOLUTE: 0.4 K/UL (ref 0–0.6)
EOSINOPHILS RELATIVE PERCENT: 4.9 %
GFR AFRICAN AMERICAN: >60
GFR NON-AFRICAN AMERICAN: 60
GLUCOSE BLD-MCNC: 160 MG/DL (ref 70–99)
GLUCOSE BLD-MCNC: 188 MG/DL (ref 70–99)
GLUCOSE BLD-MCNC: 193 MG/DL (ref 70–99)
HCT VFR BLD CALC: 37 % (ref 40.5–52.5)
HEMOGLOBIN: 12.3 G/DL (ref 13.5–17.5)
LYMPHOCYTES ABSOLUTE: 2.5 K/UL (ref 1–5.1)
LYMPHOCYTES RELATIVE PERCENT: 31.5 %
MCH RBC QN AUTO: 29.9 PG (ref 26–34)
MCHC RBC AUTO-ENTMCNC: 33.1 G/DL (ref 31–36)
MCV RBC AUTO: 90.4 FL (ref 80–100)
MONOCYTES ABSOLUTE: 0.8 K/UL (ref 0–1.3)
MONOCYTES RELATIVE PERCENT: 10 %
NEUTROPHILS ABSOLUTE: 4.2 K/UL (ref 1.7–7.7)
NEUTROPHILS RELATIVE PERCENT: 53 %
PDW BLD-RTO: 13.6 % (ref 12.4–15.4)
PERFORMED ON: ABNORMAL
PERFORMED ON: ABNORMAL
PLATELET # BLD: 208 K/UL (ref 135–450)
PMV BLD AUTO: 7.7 FL (ref 5–10.5)
POTASSIUM REFLEX MAGNESIUM: 3.9 MMOL/L (ref 3.5–5.1)
RBC # BLD: 4.09 M/UL (ref 4.2–5.9)
SODIUM BLD-SCNC: 138 MMOL/L (ref 136–145)
WBC # BLD: 8 K/UL (ref 4–11)

## 2022-02-21 PROCEDURE — 2580000003 HC RX 258: Performed by: INTERNAL MEDICINE

## 2022-02-21 PROCEDURE — 94761 N-INVAS EAR/PLS OXIMETRY MLT: CPT

## 2022-02-21 PROCEDURE — 6370000000 HC RX 637 (ALT 250 FOR IP): Performed by: PHYSICIAN ASSISTANT

## 2022-02-21 PROCEDURE — 80048 BASIC METABOLIC PNL TOTAL CA: CPT

## 2022-02-21 PROCEDURE — 85025 COMPLETE CBC W/AUTO DIFF WBC: CPT

## 2022-02-21 PROCEDURE — 6370000000 HC RX 637 (ALT 250 FOR IP)

## 2022-02-21 PROCEDURE — 6360000002 HC RX W HCPCS

## 2022-02-21 PROCEDURE — 99239 HOSP IP/OBS DSCHRG MGMT >30: CPT | Performed by: INTERNAL MEDICINE

## 2022-02-21 PROCEDURE — 36415 COLL VENOUS BLD VENIPUNCTURE: CPT

## 2022-02-21 PROCEDURE — 6370000000 HC RX 637 (ALT 250 FOR IP): Performed by: INTERNAL MEDICINE

## 2022-02-21 RX ORDER — INSULIN GLARGINE 100 [IU]/ML
18 INJECTION, SOLUTION SUBCUTANEOUS 2 TIMES DAILY
Qty: 5 PEN | Refills: 3
Start: 2022-02-21

## 2022-02-21 RX ORDER — AMLODIPINE BESYLATE 5 MG/1
5 TABLET ORAL NIGHTLY
Status: DISCONTINUED | OUTPATIENT
Start: 2022-02-21 | End: 2022-02-21 | Stop reason: HOSPADM

## 2022-02-21 RX ORDER — AMLODIPINE BESYLATE 5 MG/1
5 TABLET ORAL NIGHTLY
Qty: 30 TABLET | Refills: 3
Start: 2022-02-21

## 2022-02-21 RX ORDER — OXYCODONE HYDROCHLORIDE 5 MG/1
10 TABLET ORAL EVERY 8 HOURS PRN
Qty: 6 TABLET | Refills: 0 | Status: SHIPPED | OUTPATIENT
Start: 2022-02-21 | End: 2022-02-24

## 2022-02-21 RX ADMIN — METFORMIN HYDROCHLORIDE 1000 MG: 500 TABLET ORAL at 09:35

## 2022-02-21 RX ADMIN — Medication 1 PUFF: at 07:03

## 2022-02-21 RX ADMIN — FINASTERIDE 5 MG: 5 TABLET, FILM COATED ORAL at 09:36

## 2022-02-21 RX ADMIN — Medication 100 MG: at 09:36

## 2022-02-21 RX ADMIN — ASPIRIN 81 MG: 81 TABLET, CHEWABLE ORAL at 09:47

## 2022-02-21 RX ADMIN — SERTRALINE HYDROCHLORIDE 100 MG: 50 TABLET ORAL at 09:36

## 2022-02-21 RX ADMIN — POLYETHYLENE GLYCOL (3350) 17 G: 17 POWDER, FOR SOLUTION ORAL at 09:37

## 2022-02-21 RX ADMIN — FOLIC ACID 1 MG: 1 TABLET ORAL at 09:36

## 2022-02-21 RX ADMIN — DOCUSATE SODIUM 50MG AND SENNOSIDES 8.6MG 1 TABLET: 8.6; 5 TABLET, FILM COATED ORAL at 09:36

## 2022-02-21 RX ADMIN — TIZANIDINE 4 MG: 4 TABLET ORAL at 09:47

## 2022-02-21 RX ADMIN — SODIUM CHLORIDE: 9 INJECTION, SOLUTION INTRAVENOUS at 05:21

## 2022-02-21 RX ADMIN — MEMANTINE HYDROCHLORIDE 10 MG: 5 TABLET ORAL at 09:36

## 2022-02-21 RX ADMIN — OXYCODONE 10 MG: 5 TABLET ORAL at 05:21

## 2022-02-21 RX ADMIN — PANTOPRAZOLE SODIUM 40 MG: 40 TABLET, DELAYED RELEASE ORAL at 05:21

## 2022-02-21 RX ADMIN — TIOTROPIUM BROMIDE AND OLODATEROL 2 PUFF: 3.124; 2.736 SPRAY, METERED RESPIRATORY (INHALATION) at 07:03

## 2022-02-21 RX ADMIN — ENOXAPARIN SODIUM 40 MG: 100 INJECTION SUBCUTANEOUS at 09:37

## 2022-02-21 RX ADMIN — CYANOCOBALAMIN TAB 500 MCG 1000 MCG: 500 TAB at 09:36

## 2022-02-21 ASSESSMENT — PAIN SCALES - GENERAL
PAINLEVEL_OUTOF10: 7
PAINLEVEL_OUTOF10: 8

## 2022-02-21 NOTE — PROGRESS NOTES
Progress Note    Admit Date:  2/16/2022    Nursing home patient admitted with orthostatic hypotension. Antihypertensive medication doses being adjusted. Subjective:  Mr Demetris Magallanes is doing ok today  Has several somatic complaints- abd bloating, hernia pain , constipation and poor appetite although he finished in breakfast 100 %     No BP issues        Objective:   /76   Pulse 77   Temp 96.7 °F (35.9 °C) (Oral)   Resp 18   Ht 6' 5\" (1.956 m)   Wt 188 lb 0.8 oz (85.3 kg)   SpO2 95%   BMI 22.30 kg/m²       Intake/Output Summary (Last 24 hours) at 2/21/2022 4296  Last data filed at 2/21/2022 0350  Gross per 24 hour   Intake 480 ml   Output 4000 ml   Net -3520 ml         Physical Exam:  Gen: elderly male, No distress. .  Awake alert and oriented . Eyes: PERRL. No sclera icterus. No conjunctival injection. ENT: No discharge. Pharynx clear. Edentulous   Neck: No JVD. Trachea midline. Resp: No accessory muscle use. No crackles. No wheezes. No rhonchi. CV: Regular rate. Regular rhythm. No murmur. No rub. No edema. Capillary Refill: Brisk,< 3 seconds   Peripheral Pulses: +2 palpable, equal bilaterally   GI: Non-tender. Non-distended. No masses. No organomegaly. Normal bowel sounds. Large midline old scar with incisional hernia     Skin: Warm and dry. No nodule on exposed extremities. No rash on exposed extremities. M/S: No cyanosis. No joint deformity. No clubbing. Neuro: Awake and alert  Oriented to month, year, person, place, president  Follows commands appropriately  Extraocular movements intact  Visual fields intact  No facial palsy  No upper or lower extremity motor drift  No aphasia, no dysarthria  Psych: Oriented x 3.  No anxiety or agitation.          Medications:   Scheduled Meds:   amLODIPine  10 mg Oral Daily    sennosides-docusate sodium  1 tablet Oral BID    polyethylene glycol  17 g Oral Daily    metFORMIN  1,000 mg Oral BID     insulin glargine  15 Units SubCUTAneous BID    aspirin  81 mg Oral Daily    atorvastatin  80 mg Oral Nightly    finasteride  5 mg Oral Daily    folic acid  1 mg Oral Daily    [Held by provider] insulin lispro  6 Units SubCUTAneous TID WC    melatonin  10 mg Oral Nightly    memantine  10 mg Oral BID    metoprolol succinate  50 mg Oral Nightly    sertraline  100 mg Oral Daily    pantoprazole  40 mg Oral QAM AC    thiamine  100 mg Oral Daily    vitamin B-12  1,000 mcg Oral Daily    vitamin D  50,000 Units Oral Weekly    sodium chloride flush  5-40 mL IntraVENous 2 times per day    enoxaparin  40 mg SubCUTAneous Daily    insulin lispro  0-6 Units SubCUTAneous TID WC    insulin lispro  0-3 Units SubCUTAneous Nightly    albuterol sulfate HFA  2 puff Inhalation BID    fluticasone  1 puff Inhalation BID    tiotropium-olodaterol  2 puff Inhalation Daily       Continuous Infusions:   sodium chloride 75 mL/hr at 02/21/22 0521    dextrose      sodium chloride         Data:  CBC:   Recent Labs     02/19/22  0522 02/20/22  0509 02/21/22  0554   WBC 8.3 8.0 8.0   RBC 3.89* 3.88* 4.09*   HGB 11.6* 11.6* 12.3*   HCT 35.1* 34.9* 37.0*   MCV 90.2 89.9 90.4   RDW 13.5 13.7 13.6    201 208     BMP:   Recent Labs     02/19/22  0522 02/20/22  0509 02/21/22  0554    138 138   K 4.3 3.8 3.9    104 104   CO2 26 24 24   BUN 16 12 14   CREATININE 1.5* 1.2 1.2     CULTURES  COVID 19, PCR: not detected  Flu A/B: neg/neg      EKG:  Normal sinus rhythm  Possible Left atrial enlargement  Left ventricular hypertrophy with QRS widening and repolarization abnormality  ST abnormaltiy anterolateral leads consider ischemia vs repolarization change  When compared with ECG of 16-APR-2021 14:59,  T wave inversion no longer evident in Inferior leads  Confirmed by ADÁN URBINA MD     Anterolateral ST changes are not new when compared to EKG from 4/2021      RADIOLOGY  CT HEAD WO CONTRAST   Final Result   No acute intracranial abnormality. constipation  Resolved     # migraine   - avoid imitrex   - s/p fioricet Saturday with fair effect         DVT Prophylaxis: Lovenox   Diet: ADULT DIET;  Regular; 4 carb choices (60 gm/meal)  Code Status: Full Code    Dc back to [facility]        Lesly Oconnor MD, MD 2/21/2022 7:25 AM

## 2022-02-21 NOTE — PLAN OF CARE
Problem: Falls - Risk of:  Goal: Will remain free from falls  Description: Will remain free from falls  2/20/2022 2055 by Ally Chavez RN  Outcome: Ongoing  2/20/2022 1039 by Victoria Gutierrez RN  Outcome: Ongoing  Goal: Absence of physical injury  Description: Absence of physical injury  2/20/2022 2055 by Ally Chavez RN  Outcome: Ongoing  2/20/2022 1039 by Victoria Gutierrez RN  Outcome: Ongoing

## 2022-02-21 NOTE — FLOWSHEET NOTE
02/21/22 0930   Vital Signs   Temp 97 °F (36.1 °C)   Temp Source Oral   Pulse 78   Heart Rate Source Monitor   Resp 18   BP (!) 153/78   BP Location Left upper arm   Orthostatic B/P and Pulse? Yes   Blood Pressure Lying 153/78   Pulse Lying 78 PER MINUTE   Blood Pressure Sitting 139/73   Pulse Sitting 78 PER MINUTE   Blood Pressure Standing 79/55   Pulse Standing 87 PER MINUTE   Level of Consciousness Alert (0)   MEWS Score 1     Pt a/o am assessment completed. See flowsheet. Bed alarm in place for safety, call light within reach. Parth Yuen RN\

## 2022-02-21 NOTE — DISCHARGE SUMMARY
Name:  Terra Mcgrath  Room:  0211/0211-02  MRN:    [id number]    Discharge Summary      This discharge summary is in conjunction with a complete physical exam done on the day of discharge. Discharging Physician: Dr. Urbano Barton: 2/16/2022  Discharge:  2/21/2022    HPI:    The patient is a 80 y.o. male with systolic congestive heart failure, chronic pain, dementia, diabetes mellitus type 2, COPD who presented to [facility] ED with complaint of dizziness. Patient states he is not sure why he was sent to the ER. He states he has been dizzy for \"a long time. \"  He denies any new complaints. ER provider note reviewed, reports patient presented for generalized weakness and dizziness. I called the patient's nursing home- nurse reports patient was disoriented, had low BP, and complained of HA therefore EMS was called to transport to ER. I reviewed records from NH- patient has complained of a chronic headache since Spring 2021.       ER work-up revealed a mild CHIP and orthostatic hypotension. He was admitted to the hospital for for further evaluation and care.       This morning patient complains of chronic pain. He is oriented x 4. Diagnoses this Admission and Hospital Course     #Symptomatic Orthostatic hypotension  - not new diagnosis for him. He is on multiple meds which can cause hypotension issues     - added YORDAN hose   - cont'hector BB.               - stopped clonidine.               - stopped nifedipine. - switched to amlodipine.      May need to accept higher supine BP to avoid syncope and fall with ambulation.    S.p IVF  Orthostatics improved     #HTN- essential   Changes as above.         #Elevated Creatinine in patient with CKD 3a-b  - Baseline Crt variable ~1.2-1.3  - Crt 1.6 on admit  - better with IVF        #Dehydration   As above.         #Chronic systolic CHF   - without evidence of decompensation   - continued toprol XL  - no ACEi/ARB suspect 2/2 CKD       #DM2  - Cont'd Lantus, geremias humalog, metformin, SSI        #COPD  - no exacerbation, cont'd inhalers        #Chronic pain  - home medication regimen continued         #Remote CVA  - neuro exam is WNL, head CT without acute findings   - cont'd ASA, statin         #Dementia  - oriented x 4- at baseline.  Continued namenda         #Chronic anemia  - hgb is at baseline         #GERD  - cont'd PPI        #MDD  - cont'd Zoloft        #PCM moderate  - dietitian consulted        #BPH with LUTS   - cont'd Proscar     #Hepatitis C      # constipation  Resolved      # migraine   - avoided imitrex   - s/p fioricet Saturday with fair effect     DVT Prophylaxis: Lovenox        Dc back to snf     Procedures (Please Review Full Report for Details)  N/A    Consults    N/A      Physical Exam at Discharge:    BP (!) 153/78   Pulse 78   Temp 97 °F (36.1 °C) (Oral)   Resp 18   Ht 6' 5\" (1.956 m)   Wt 188 lb 0.8 oz (85.3 kg)   SpO2 95%   BMI 22.30 kg/m²     See today's progress note    CBC: Recent Labs     02/19/22  0522 02/20/22  0509 02/21/22  0554   WBC 8.3 8.0 8.0   HGB 11.6* 11.6* 12.3*   HCT 35.1* 34.9* 37.0*   MCV 90.2 89.9 90.4    201 208     BMP:   Recent Labs     02/19/22  0522 02/20/22  0509 02/21/22  0554    138 138   K 4.3 3.8 3.9    104 104   CO2 26 24 24   BUN 16 12 14   CREATININE 1.5* 1.2 1.2       U/A:    Lab Results   Component Value Date    NITRITE neg 01/08/2013    COLORU Yellow 10/06/2021    WBCUA 0-2 10/06/2021    RBCUA 0-2 10/06/2021    MUCUS Rare 10/06/2021    BACTERIA Rare 03/29/2021    CLARITYU Clear 10/06/2021    SPECGRAV 1.010 10/06/2021    LEUKOCYTESUR Negative 10/06/2021    BLOODU Negative 10/06/2021    GLUCOSEU >=1000 10/06/2021    AMORPHOUS Rare 10/04/2018       ABG    Lab Results   Component Value Date    RNO7WCF 22.3 03/03/2019    BEART -3 03/03/2019    B8KWOLNF 97 03/03/2019    PHART 7.367 03/03/2019    FCA1RHQ 38.8 03/03/2019    PO2ART 88.2 03/03/2019    SXO9MQA 24

## 2022-02-21 NOTE — PROGRESS NOTES
RT Inhaler-Nebulizer Bronchodilator Protocol Note    There is a bronchodilator order in the chart from a provider indicating to follow the RT Bronchodilator Protocol and there is an Initiate RT Inhaler-Nebulizer Bronchodilator Protocol order as well (see protocol at bottom of note). CXR Findings:  No results found. The findings from the last RT Protocol Assessment were as follows:   History Pulmonary Disease: Chronic pulmonary disease  Respiratory Pattern: Dyspnea on exertion or RR 21-25 bpm  Breath Sounds: Slightly diminished and/or crackles  Cough: Strong, spontaneous, non-productive  Indication for Bronchodilator Therapy: Decreased or absent breath sounds  Bronchodilator Assessment Score: 6    Aerosolized bronchodilator medication orders have been revised according to the RT Inhaler-Nebulizer Bronchodilator Protocol below. Respiratory Therapist to perform RT Therapy Protocol Assessment initially then follow the protocol. Repeat RT Therapy Protocol Assessment PRN for score 0-3 or on second treatment, BID, and PRN for scores above 3. No Indications  adjust the frequency to every 6 hours PRN wheezing or bronchospasm, if no treatments needed after 48 hours then discontinue using Per Protocol order mode. If indication present, adjust the RT bronchodilator orders based on the Bronchodilator Assessment Score as indicated below. Use Inhaler orders unless patient has one or more of the following: on home nebulizer, not able to hold breath for 10 seconds, is not alert and oriented, cannot activate and use MDI correctly, or respiratory rate 25 breaths per minute or more, then use the equivalent nebulizer order(s) with same Frequency and PRN reasons based on the score. If a patient is on this medication at home then do not decrease Frequency below that used at home.     0-3  enter or revise RT bronchodilator order(s) to equivalent RT Bronchodilator order with Frequency of every 4 hours PRN for wheezing or increased work of breathing using Per Protocol order mode. 4-6  enter or revise RT Bronchodilator order(s) to two equivalent RT bronchodilator orders with one order with BID Frequency and one order with Frequency of every 4 hours PRN wheezing or increased work of breathing using Per Protocol order mode. 7-10  enter or revise RT Bronchodilator order(s) to two equivalent RT bronchodilator orders with one order with TID Frequency and one order with Frequency of every 4 hours PRN wheezing or increased work of breathing using Per Protocol order mode. 11-13  enter or revise RT Bronchodilator order(s) to one equivalent RT bronchodilator order with QID Frequency and an Albuterol order with Frequency of every 4 hours PRN wheezing or increased work of breathing using Per Protocol order mode. Greater than 13  enter or revise RT Bronchodilator order(s) to one equivalent RT bronchodilator order with every 4 hours Frequency and an Albuterol order with Frequency of every 2 hours PRN wheezing or increased work of breathing using Per Protocol order mode.          Electronically signed by Sheree Guadalupe RCP on 2/20/2022 at 10:13 PM

## 2022-02-21 NOTE — CARE COORDINATION
DISCHARGE ORDER  Date/Time 2022 8:53 AM  Completed by: Manuel Mckeon RN, Case Management    Patient Name: Chelo Ann    : 1950      Admit order Date and Status: 2022  Noted discharge order. (verify MD's last order for status of admission/Traditional Medicare 3 [place] Inpatient qualifying stay required for SNF)    Confirmed discharge plan with:              Patient:  Yes- requested I notify sister, Uli Willis               Discharge to Facility: [facility]    · Name: [facility]  · Address: [address], [facility]  · Phone:[phone]  · QQQ:[phone] or [phone]      Facility phone number for staff giving report: see above  Pre-certification completed: [id number] Notification ([facility]) completed: NA  Discharge orders and Continuity of Care faxed to facility:  YES     Transportation:               Medical Transport explained with choice list offered to pt/family. Choice: first available (no preference)  Agency used: [facility]   time:   11:00 AM   Pt/family/Nursing/Facility aware of  time:   Patient  Uli Willis (sister)  Ministerio Abbott   Ambulance form completed:  YES     Comments:    Pt is being d/c'd to [facility] ([facility]) today. Pt's O2 sats are 95% on RA. Discharge timeout done with Stephanie Mccord. All discharge needs and concerns addressed. Discharging nurse to complete NINA, reconcile AVS, and place final copy with patient's discharge packet. Discharging RN to ensure that written prescriptions for  Level II medications are sent with patient to the facility as per protocol.

## 2022-02-21 NOTE — PROGRESS NOTES
Medicated with Oxycodone for chronic pain. See mar. Report called to [facility]. [facility] Transport  Service here to transport pt back to [facility]. Mo Card RN\

## 2022-09-12 NOTE — PROGRESS NOTES
Admitted to [place] [address] at this time. Patient in bed; denies dizziness. States that this dizziness is nothing new; he states he has been dizzy for years. Requesting food and drink. Call light in reach. Pharmacokinetic Assessment Follow Up: IV Vancomycin    Vancomycin serum concentration assessment(s):    The trough level was drawn correctly and can be used to guide therapy at this time. The measurement is within the desired definitive target range of 10 to 20 mcg/mL.    Vancomycin Regimen Plan:    Continue regimen to Vancomycin 1750 mg IV every 12 hours with next serum trough concentration measured at 1230 prior to the dose on 9/13/22    Drug levels (last 3 results):  Recent Labs   Lab Result Units 09/10/22  0001 09/11/22  2310   Vancomycin-Trough ug/mL 11.2 16.4       Pharmacy will continue to follow and monitor vancomycin.    Please contact pharmacy at extension [phone] for questions regarding this assessment.    Thank you for the consult,   Breanne Khan       Patient brief summary:  Leighton Carroll is a 44 y.o. male initiated on antimicrobial therapy with IV Vancomycin for treatment of  pneumonia      Drug Allergies:   Review of patient's allergies indicates:  No Known Allergies    Actual Body Weight:   86.2 kg    Renal Function:   Estimated Creatinine Clearance: 148 mL/min (based on SCr of 0.7 mg/dL).,     Dialysis Method (if applicable):  N/A    CBC (last 72 hours):  Recent Labs   Lab Result Units 09/09/22  0421 09/10/22  0056 09/10/22  0433 09/11/22  0356   WBC K/uL 17.80* 17.14* 16.16* 15.42*   Hemoglobin g/dL 13.2* 13.3* 13.2* 12.9*   Hematocrit % 39.0* 39.5* 38.8* 39.1*   Platelets K/uL 980* 1,043* 961* 928*   Gran % % 75.1* 74.1* 71.4 65.1   Lymph % % 10.4* 12.3* 12.7* 16.8*   Mono % % 10.6 9.9 11.3 12.3   Eosinophil % % 1.1 1.1 1.8 3.2   Basophil % % 0.6 0.4 0.6 0.8   Differential Method  Automated Automated Automated Automated       Metabolic Panel (last 72 hours):  Recent Labs   Lab Result Units 09/09/22  0421 09/09/22  1317 09/10/22  0433 09/11/22  0356   Sodium mmol/L 136  --  135* 138   Potassium mmol/L 3.2*  --  3.5 3.9   Chloride mmol/L 97  --  102 105   CO2 mmol/L 27  --  23 23   Glucose mg/dL 102   --  106 101   Glucose, UA   --  Negative  --   --    BUN mg/dL 41*  --  33* 31*   Creatinine mg/dL 0.7  --  0.6 0.7   Albumin g/dL 3.1*  --   --  3.2*   Total Bilirubin mg/dL 0.9  --   --  0.7   Alkaline Phosphatase U/L 93  --   --  77   AST U/L 81*  --   --  45*   ALT U/L 122*  --   --  103*   Magnesium mg/dL 2.1  --  2.0 2.2   Phosphorus mg/dL 2.9  --  3.2 3.7       Vancomycin Administrations:  vancomycin given in the last 96 hours                     vancomycin 1.75 g in 5 % dextrose 500 mL IVPB (mg) 1,750 mg New Bag 09/11/22 1154     1,750 mg New Bag  0015     1,750 mg New Bag 09/10/22 1213    vancomycin 1.5 g in dextrose 5 % 250 mL IVPB (ready to mix) (mg) 1,500 mg New Bag 09/10/22 0059     1,500 mg New Bag 09/09/22 1200     1,500 mg New Bag 09/08/22 2333     1,500 mg New Bag  1141                    Microbiologic Results:  Microbiology Results (last 7 days)       Procedure Component Value Units Date/Time    Blood culture [[id number]]     Order Status: Sent Specimen: Blood     Blood culture [[id number]]     Order Status: Sent Specimen: Blood     MRSA/SA Rapid ID by PCR from Blood culture [[id number]] Collected: 09/11/22 1840    Order Status: Completed Updated: 09/11/22 2056     Staph aureus ID by PCR Negative     MRSA ID by PCR Negative    Blood culture [[id number]] Collected: 09/10/22 0007    Order Status: Completed Specimen: Blood Updated: 09/11/22 1841     Blood Culture, Routine Gram stain aer bottle: Gram positive cocci in clusters resembling Staph      Results called to and read back by:Salena Nelson RN 09/11/2022  13:54      Gram stain meron bottle: Gram positive cocci in clusters resembling Staph      Positive results previously called 09/11/2022  18:40    Blood culture [[id number]] Collected: 09/10/22 1641    Order Status: Completed Specimen: Blood from Antecubital, Right Hand Updated: 09/11/22 0715     Blood Culture, Routine No Growth to date    Narrative:      Aerobic and Anaerobic, only 1 of 2 collected this  morning    Blood culture [[id number]] Collected: 09/05/22 1205    Order Status: Completed Specimen: Blood Updated: 09/10/22 2022     Blood Culture, Routine No growth after 5 days.    Blood culture [[id number]] Collected: 09/05/22 1210    Order Status: Completed Specimen: Blood Updated: 09/10/22 2022     Blood Culture, Routine No growth after 5 days.    Blood culture [[id number]]     Order Status: Canceled Specimen: Blood     Culture, Respiratory with Gram Stain [[id number]]  (Abnormal)  (Susceptibility) Collected: 09/03/22 1344    Order Status: Completed Specimen: Respiratory from Endotracheal Aspirate Updated: 09/06/22 0937     Respiratory Culture No Pseudomonas isolated.      METHICILLIN RESISTANT STAPHYLOCOCCUS AUREUS  Many        PATRICK ALBICANS  Moderate       Gram Stain (Respiratory) Few WBC's     Gram Stain (Respiratory) Rare Gram positive cocci

## 2023-08-14 ENCOUNTER — INITIAL CONSULT (OUTPATIENT)
Dept: SURGERY | Age: 73
End: 2023-08-14
Payer: MEDICARE

## 2023-08-14 VITALS — HEART RATE: 113 BPM | SYSTOLIC BLOOD PRESSURE: 138 MMHG | DIASTOLIC BLOOD PRESSURE: 71 MMHG

## 2023-08-14 DIAGNOSIS — K59.00 CONSTIPATION, UNSPECIFIED CONSTIPATION TYPE: Primary | ICD-10-CM

## 2023-08-14 PROCEDURE — 1123F ACP DISCUSS/DSCN MKR DOCD: CPT | Performed by: SURGERY

## 2023-08-14 PROCEDURE — 3074F SYST BP LT 130 MM HG: CPT | Performed by: SURGERY

## 2023-08-14 PROCEDURE — 3078F DIAST BP <80 MM HG: CPT | Performed by: SURGERY

## 2023-08-14 PROCEDURE — 1036F TOBACCO NON-USER: CPT | Performed by: SURGERY

## 2023-08-14 PROCEDURE — 3017F COLORECTAL CA SCREEN DOC REV: CPT | Performed by: SURGERY

## 2023-08-14 PROCEDURE — 99213 OFFICE O/P EST LOW 20 MIN: CPT | Performed by: SURGERY

## 2023-08-14 PROCEDURE — G8421 BMI NOT CALCULATED: HCPCS | Performed by: SURGERY

## 2023-08-14 PROCEDURE — G8427 DOCREV CUR MEDS BY ELIG CLIN: HCPCS | Performed by: SURGERY

## 2023-08-14 NOTE — PROGRESS NOTES
by mouth every 6 hours as needed for Indigestion Takes mylanta 30 ml Q4 hours PRN   Yes Historical Provider, MD   polyethylene glycol (GLYCOLAX) powder Take 17 g by mouth daily   Yes Historical Provider, MD   acetaminophen (TYLENOL) 325 MG tablet Take 2 tablets by mouth every 4 hours as needed for Pain   Yes Historical Provider, MD   loperamide (IMODIUM) 2 MG capsule Take 1 capsule by mouth every 6 hours as needed for Diarrhea   Yes Historical Provider, MD   aspirin 81 MG tablet Take 1 tablet by mouth daily chewable   Yes Historical Provider, MD   finasteride (PROSCAR) 5 MG tablet Take 1 tablet by mouth daily   Yes Historical Provider, MD   folic acid (FOLVITE) 1 MG tablet Take 1 tablet by mouth daily   Yes Historical Provider, MD   Dulaglutide 0.75 MG/0.5ML SOPN Inject 0.75 mg into the skin once a week On Saturday   Yes Historical Provider, MD   SUMAtriptan (IMITREX) 100 MG tablet Take 1 tablet by mouth daily as needed for Migraine Give one additional dose if needed after 2 hours if 1st dose is ineffective (NTE 200mg/24 hrs)   Yes Historical Provider, MD   atorvastatin (LIPITOR) 80 MG tablet Take 1 tablet by mouth nightly   Yes Historical Provider, MD   vitamin B-12 (CYANOCOBALAMIN) 1000 MCG tablet TAKE 1 TABLET BY MOUTH DAILY 7/2/18  Yes Gabby Le, APRN - CNP   umeclidinium-vilanterol (ANORO ELLIPTA) 62.5-25 MCG/INH AEPB inhaler Inhale 1 puff into the lungs daily 3/27/18  Yes Kendra Morales MD   albuterol sulfate HFA (PROAIR HFA) 108 (90 Base) MCG/ACT inhaler Inhale 2 puffs into the lungs every 4 hours as needed for Wheezing or Shortness of Breath 3/27/18  Yes Kendra Morales MD   glucagon 1 MG injection Inject 1 mg into the skin as needed   Yes Historical Provider, MD       Allergies:  Neurontin [gabapentin]    Social History     Socioeconomic History    Marital status: Single     Spouse name: None    Number of children: 2    Years of education: None    Highest education level: None   Occupational

## 2023-08-21 ENCOUNTER — HOSPITAL ENCOUNTER (OUTPATIENT)
Dept: CT IMAGING | Age: 73
Discharge: HOME | End: 2023-08-21
Attending: SURGERY
Payer: MEDICARE

## 2023-08-21 DIAGNOSIS — K59.00 CONSTIPATION, UNSPECIFIED CONSTIPATION TYPE: ICD-10-CM

## 2023-08-21 PROCEDURE — 74176 CT ABD & PELVIS W/O CONTRAST: CPT

## 2023-08-29 ENCOUNTER — TELEPHONE (OUTPATIENT)
Dept: SURGERY | Age: 73
End: 2023-08-29

## 2023-08-29 NOTE — TELEPHONE ENCOUNTER
Spoke with patient's sister and notified per Dr. Angelina White, CT showed persistent constipation and patient needs to see GI. Patient's sister asked for me to call Colleen Honeycutt and make Macie aware. Left detailed message for Macie.

## 2023-08-30 ENCOUNTER — TELEPHONE (OUTPATIENT)
Dept: SURGERY | Age: 73
End: 2023-08-30

## 2023-08-30 NOTE — TELEPHONE ENCOUNTER
Attempted to contact patient about CT results with no response. Demonstrates continued constipation. As he reports being on laxative daily I would recommend seeing his Gastroenterologist for further input.     Roberto Penn MD

## 2023-11-09 NOTE — PLAN OF CARE
Problem: Falls - Risk of:  Goal: Will remain free from falls  Description: Will remain free from falls  4/1/2021 2313 by Phoenix Franz RN  Outcome: Met This Shift  Goal: Absence of physical injury  Description: Absence of physical injury  4/1/2021 2313 by Phoenix Franz RN  Outcome: Met This Shift     Problem: Pain:  Goal: Pain level will decrease  Description: Pain level will decrease  4/1/2021 2313 by Phoenix Franz RN  Outcome: Met This Shift     Problem: Airway Clearance - Ineffective:  Goal: Clear lung sounds  Description: Clear lung sounds  4/1/2021 2313 by Phoenix Franz RN  Outcome: Met This Shift
Problem: Falls - Risk of:  Goal: Will remain free from falls  Description: Will remain free from falls  Outcome: Met This Shift  Goal: Absence of physical injury  Description: Absence of physical injury  Outcome: Met This Shift     Problem: Pain:  Goal: Pain level will decrease  Description: Pain level will decrease  Outcome: Met This Shift  Goal: Control of chronic pain  Description: Control of chronic pain  Outcome: Ongoing     Problem: Airway Clearance - Ineffective:  Goal: Clear lung sounds  Description: Clear lung sounds  Outcome: Met This Shift
Problem: Falls - Risk of:  Goal: Will remain free from falls  Description: Will remain free from falls  Outcome: Ongoing  Goal: Absence of physical injury  Description: Absence of physical injury  Outcome: Ongoing     Problem: Pain:  Goal: Pain level will decrease  Description: Pain level will decrease  Outcome: Ongoing  Goal: Control of acute pain  Description: Control of acute pain  Outcome: Ongoing  Goal: Control of chronic pain  Description: Control of chronic pain  Outcome: Ongoing     Problem: Discharge Planning:  Goal: Discharged to appropriate level of care  Description: Discharged to appropriate level of care  Outcome: Ongoing  Goal: Participates in care planning  Description: Participates in care planning  Outcome: Ongoing     Problem: Airway Clearance - Ineffective:  Goal: Clear lung sounds  Description: Clear lung sounds  Outcome: Ongoing  Goal: Ability to maintain a clear airway will improve  Description: Ability to maintain a clear airway will improve  Outcome: Ongoing     Problem: Fluid Volume - Deficit:  Goal: Achieves intake and output within specified parameters  Description: Achieves intake and output within specified parameters  Outcome: Ongoing     Problem: Gas Exchange - Impaired:  Goal: Levels of oxygenation will improve  Description: Levels of oxygenation will improve  Outcome: Ongoing     Problem: Hyperthermia:  Goal: Ability to maintain a body temperature in the normal range will improve  Description: Ability to maintain a body temperature in the normal range will improve  Outcome: Ongoing     Problem: Tobacco Use:  Goal: Will participate in inpatient tobacco-use cessation counseling  Description: Will participate in inpatient tobacco-use cessation counseling  Outcome: Ongoing
There are no Wet Read(s) to document.

## 2024-01-11 LAB
ALBUMIN SERPL-MCNC: 3.8 G/DL
ALP BLD-CCNC: 85 U/L
ALT SERPL-CCNC: 6 U/L
ANION GAP SERPL CALCULATED.3IONS-SCNC: NORMAL MMOL/L
AST SERPL-CCNC: 10 U/L
BASOPHILS ABSOLUTE: 0.1 /ΜL
BASOPHILS RELATIVE PERCENT: 0.6 %
BILIRUB SERPL-MCNC: 0.3 MG/DL (ref 0.1–1.4)
BUN BLDV-MCNC: 20 MG/DL
CALCIUM SERPL-MCNC: 8.6 MG/DL
CHLORIDE BLD-SCNC: 102 MMOL/L
CO2: 27 MMOL/L
CREAT SERPL-MCNC: 1.8 MG/DL
EGFR: NORMAL
EOSINOPHILS ABSOLUTE: 0.3 /ΜL
EOSINOPHILS RELATIVE PERCENT: 3 %
GLUCOSE BLD-MCNC: 68 MG/DL
HCT VFR BLD CALC: 30.1 % (ref 41–53)
HEMOGLOBIN: 9.5 G/DL (ref 13.5–17.5)
LYMPHOCYTES ABSOLUTE: 2.1 /ΜL
LYMPHOCYTES RELATIVE PERCENT: 18.5 %
MCH RBC QN AUTO: 26.3 PG
MCHC RBC AUTO-ENTMCNC: 31.4 G/DL
MCV RBC AUTO: 83.7 FL
MONOCYTES ABSOLUTE: 1.1 /ΜL
MONOCYTES RELATIVE PERCENT: 10.1 %
NEUTROPHILS ABSOLUTE: 7.7 /ΜL
NEUTROPHILS RELATIVE PERCENT: 67.8 %
PLATELET # BLD: 285 K/ΜL
PMV BLD AUTO: 7.1 FL
POTASSIUM SERPL-SCNC: 5.1 MMOL/L
RBC # BLD: 3.6 10^6/ΜL
SODIUM BLD-SCNC: 137 MMOL/L
TOTAL PROTEIN: 6.6
WBC # BLD: 11.4 10^3/ML

## 2024-01-25 ENCOUNTER — HOSPITAL ENCOUNTER (INPATIENT)
Age: 74
LOS: 6 days | Discharge: NURSING, REHAB OR LONG-TERM CARE | DRG: 682 | End: 2024-01-31
Attending: EMERGENCY MEDICINE | Admitting: INTERNAL MEDICINE
Payer: MEDICARE

## 2024-01-25 ENCOUNTER — APPOINTMENT (OUTPATIENT)
Dept: CT IMAGING | Age: 74
DRG: 682 | End: 2024-01-25
Payer: MEDICARE

## 2024-01-25 ENCOUNTER — APPOINTMENT (OUTPATIENT)
Dept: GENERAL RADIOLOGY | Age: 74
DRG: 682 | End: 2024-01-25
Payer: MEDICARE

## 2024-01-25 DIAGNOSIS — E16.2 HYPOGLYCEMIA: ICD-10-CM

## 2024-01-25 DIAGNOSIS — N18.9 ACUTE KIDNEY INJURY SUPERIMPOSED ON CHRONIC KIDNEY DISEASE (HCC): ICD-10-CM

## 2024-01-25 DIAGNOSIS — G89.4 CHRONIC PAIN SYNDROME: Primary | Chronic | ICD-10-CM

## 2024-01-25 DIAGNOSIS — R51.9 ACUTE NONINTRACTABLE HEADACHE, UNSPECIFIED HEADACHE TYPE: ICD-10-CM

## 2024-01-25 DIAGNOSIS — N17.9 ACUTE KIDNEY INJURY SUPERIMPOSED ON CHRONIC KIDNEY DISEASE (HCC): ICD-10-CM

## 2024-01-25 LAB
ALBUMIN SERPL-MCNC: 3.6 G/DL (ref 3.4–5)
ALBUMIN/GLOB SERPL: 1 {RATIO} (ref 1.1–2.2)
ALP SERPL-CCNC: 93 U/L (ref 40–129)
ALT SERPL-CCNC: 6 U/L (ref 10–40)
ANION GAP SERPL CALCULATED.3IONS-SCNC: 11 MMOL/L (ref 3–16)
AST SERPL-CCNC: 13 U/L (ref 15–37)
BASE EXCESS BLDV CALC-SCNC: -1.4 MMOL/L (ref -3–3)
BASOPHILS # BLD: 0.1 K/UL (ref 0–0.2)
BASOPHILS NFR BLD: 0.6 %
BILIRUB SERPL-MCNC: <0.2 MG/DL (ref 0–1)
BUN SERPL-MCNC: 22 MG/DL (ref 7–20)
CALCIUM SERPL-MCNC: 8.6 MG/DL (ref 8.3–10.6)
CHLORIDE SERPL-SCNC: 102 MMOL/L (ref 99–110)
CK SERPL-CCNC: 38 U/L (ref 39–308)
CO2 BLDV-SCNC: 22 MMOL/L
CO2 SERPL-SCNC: 23 MMOL/L (ref 21–32)
COHGB MFR BLDV: 3.2 % (ref 0–1.5)
CREAT SERPL-MCNC: 1.8 MG/DL (ref 0.8–1.3)
DEPRECATED RDW RBC AUTO: 14.4 % (ref 12.4–15.4)
EKG ATRIAL RATE: 70 BPM
EKG DIAGNOSIS: NORMAL
EKG P AXIS: 51 DEGREES
EKG P-R INTERVAL: 206 MS
EKG Q-T INTERVAL: 464 MS
EKG QRS DURATION: 144 MS
EKG QTC CALCULATION (BAZETT): 501 MS
EKG R AXIS: -24 DEGREES
EKG T AXIS: 53 DEGREES
EKG VENTRICULAR RATE: 70 BPM
EOSINOPHIL # BLD: 0.5 K/UL (ref 0–0.6)
EOSINOPHIL NFR BLD: 4 %
GFR SERPLBLD CREATININE-BSD FMLA CKD-EPI: 39 ML/MIN/{1.73_M2}
GLUCOSE BLD-MCNC: 123 MG/DL (ref 70–99)
GLUCOSE BLD-MCNC: 144 MG/DL (ref 70–99)
GLUCOSE BLD-MCNC: 426 MG/DL (ref 70–99)
GLUCOSE BLD-MCNC: 63 MG/DL (ref 70–99)
GLUCOSE BLD-MCNC: 65 MG/DL (ref 70–99)
GLUCOSE BLD-MCNC: 96 MG/DL (ref 70–99)
GLUCOSE SERPL-MCNC: 92 MG/DL (ref 70–99)
HCO3 BLDV-SCNC: 21 MMOL/L (ref 23–29)
HCT VFR BLD AUTO: 32.6 % (ref 40.5–52.5)
HGB BLD-MCNC: 10.5 G/DL (ref 13.5–17.5)
LACTATE BLDV-SCNC: 1.4 MMOL/L (ref 0.4–2)
LYMPHOCYTES # BLD: 1.7 K/UL (ref 1–5.1)
LYMPHOCYTES NFR BLD: 13.4 %
MAGNESIUM SERPL-MCNC: 2.2 MG/DL (ref 1.8–2.4)
MCH RBC QN AUTO: 26.5 PG (ref 26–34)
MCHC RBC AUTO-ENTMCNC: 32.1 G/DL (ref 31–36)
MCV RBC AUTO: 82.5 FL (ref 80–100)
METHGB MFR BLDV: 0.2 %
MONOCYTES # BLD: 1.1 K/UL (ref 0–1.3)
MONOCYTES NFR BLD: 9 %
NEUTROPHILS # BLD: 9.1 K/UL (ref 1.7–7.7)
NEUTROPHILS NFR BLD: 73 %
O2 THERAPY: ABNORMAL
PCO2 BLDV: 28.2 MMHG (ref 40–50)
PERFORMED ON: ABNORMAL
PERFORMED ON: NORMAL
PH BLDV: 7.49 [PH] (ref 7.35–7.45)
PLATELET # BLD AUTO: 387 K/UL (ref 135–450)
PMV BLD AUTO: 6.4 FL (ref 5–10.5)
PO2 BLDV: 139.4 MMHG (ref 25–40)
POTASSIUM SERPL-SCNC: 3.7 MMOL/L (ref 3.5–5.1)
PROT SERPL-MCNC: 7.3 G/DL (ref 6.4–8.2)
RBC # BLD AUTO: 3.95 M/UL (ref 4.2–5.9)
SAO2 % BLDV: 98 %
SODIUM SERPL-SCNC: 136 MMOL/L (ref 136–145)
TROPONIN, HIGH SENSITIVITY: 25 NG/L (ref 0–22)
WBC # BLD AUTO: 12.5 K/UL (ref 4–11)

## 2024-01-25 PROCEDURE — 85025 COMPLETE CBC W/AUTO DIFF WBC: CPT

## 2024-01-25 PROCEDURE — 51702 INSERT TEMP BLADDER CATH: CPT

## 2024-01-25 PROCEDURE — 2500000003 HC RX 250 WO HCPCS

## 2024-01-25 PROCEDURE — 2580000003 HC RX 258: Performed by: EMERGENCY MEDICINE

## 2024-01-25 PROCEDURE — 96361 HYDRATE IV INFUSION ADD-ON: CPT

## 2024-01-25 PROCEDURE — 70450 CT HEAD/BRAIN W/O DYE: CPT

## 2024-01-25 PROCEDURE — 82550 ASSAY OF CK (CPK): CPT

## 2024-01-25 PROCEDURE — 6360000002 HC RX W HCPCS: Performed by: NURSE PRACTITIONER

## 2024-01-25 PROCEDURE — 71045 X-RAY EXAM CHEST 1 VIEW: CPT

## 2024-01-25 PROCEDURE — 6370000000 HC RX 637 (ALT 250 FOR IP): Performed by: NURSE PRACTITIONER

## 2024-01-25 PROCEDURE — 83605 ASSAY OF LACTIC ACID: CPT

## 2024-01-25 PROCEDURE — 93010 ELECTROCARDIOGRAM REPORT: CPT | Performed by: INTERNAL MEDICINE

## 2024-01-25 PROCEDURE — 83036 HEMOGLOBIN GLYCOSYLATED A1C: CPT

## 2024-01-25 PROCEDURE — 84484 ASSAY OF TROPONIN QUANT: CPT

## 2024-01-25 PROCEDURE — 83735 ASSAY OF MAGNESIUM: CPT

## 2024-01-25 PROCEDURE — 80053 COMPREHEN METABOLIC PANEL: CPT

## 2024-01-25 PROCEDURE — 2580000003 HC RX 258: Performed by: NURSE PRACTITIONER

## 2024-01-25 PROCEDURE — 2500000003 HC RX 250 WO HCPCS: Performed by: EMERGENCY MEDICINE

## 2024-01-25 PROCEDURE — 93005 ELECTROCARDIOGRAM TRACING: CPT | Performed by: EMERGENCY MEDICINE

## 2024-01-25 PROCEDURE — 72125 CT NECK SPINE W/O DYE: CPT

## 2024-01-25 PROCEDURE — 82803 BLOOD GASES ANY COMBINATION: CPT

## 2024-01-25 PROCEDURE — 96374 THER/PROPH/DIAG INJ IV PUSH: CPT

## 2024-01-25 PROCEDURE — 2060000000 HC ICU INTERMEDIATE R&B

## 2024-01-25 PROCEDURE — 6370000000 HC RX 637 (ALT 250 FOR IP)

## 2024-01-25 PROCEDURE — 99285 EMERGENCY DEPT VISIT HI MDM: CPT

## 2024-01-25 RX ORDER — SODIUM CHLORIDE 9 MG/ML
INJECTION, SOLUTION INTRAVENOUS PRN
Status: DISCONTINUED | OUTPATIENT
Start: 2024-01-25 | End: 2024-01-31 | Stop reason: HOSPADM

## 2024-01-25 RX ORDER — ATORVASTATIN CALCIUM 80 MG/1
80 TABLET, FILM COATED ORAL NIGHTLY
Status: DISCONTINUED | OUTPATIENT
Start: 2024-01-25 | End: 2024-01-31 | Stop reason: HOSPADM

## 2024-01-25 RX ORDER — SODIUM CHLORIDE 0.9 % (FLUSH) 0.9 %
5-40 SYRINGE (ML) INJECTION EVERY 12 HOURS SCHEDULED
Status: DISCONTINUED | OUTPATIENT
Start: 2024-01-25 | End: 2024-01-31 | Stop reason: HOSPADM

## 2024-01-25 RX ORDER — MECOBALAMIN 5000 MCG
10 TABLET,DISINTEGRATING ORAL NIGHTLY
Status: DISCONTINUED | OUTPATIENT
Start: 2024-01-25 | End: 2024-01-31 | Stop reason: HOSPADM

## 2024-01-25 RX ORDER — DEXTROSE MONOHYDRATE 25 G/50ML
12.5 INJECTION, SOLUTION INTRAVENOUS ONCE
Status: DISCONTINUED | OUTPATIENT
Start: 2024-01-25 | End: 2024-01-31 | Stop reason: HOSPADM

## 2024-01-25 RX ORDER — 0.9 % SODIUM CHLORIDE 0.9 %
500 INTRAVENOUS SOLUTION INTRAVENOUS ONCE
Status: COMPLETED | OUTPATIENT
Start: 2024-01-25 | End: 2024-01-25

## 2024-01-25 RX ORDER — ONDANSETRON 4 MG/1
4 TABLET, ORALLY DISINTEGRATING ORAL EVERY 8 HOURS PRN
Status: DISCONTINUED | OUTPATIENT
Start: 2024-01-25 | End: 2024-01-25

## 2024-01-25 RX ORDER — PANTOPRAZOLE SODIUM 40 MG/1
40 TABLET, DELAYED RELEASE ORAL DAILY
Status: DISCONTINUED | OUTPATIENT
Start: 2024-01-25 | End: 2024-01-31 | Stop reason: HOSPADM

## 2024-01-25 RX ORDER — SODIUM CHLORIDE 0.9 % (FLUSH) 0.9 %
5-40 SYRINGE (ML) INJECTION PRN
Status: DISCONTINUED | OUTPATIENT
Start: 2024-01-25 | End: 2024-01-31 | Stop reason: HOSPADM

## 2024-01-25 RX ORDER — AMLODIPINE BESYLATE 5 MG/1
5 TABLET ORAL NIGHTLY
Status: DISCONTINUED | OUTPATIENT
Start: 2024-01-26 | End: 2024-01-30

## 2024-01-25 RX ORDER — ENOXAPARIN SODIUM 100 MG/ML
40 INJECTION SUBCUTANEOUS DAILY
Status: DISCONTINUED | OUTPATIENT
Start: 2024-01-26 | End: 2024-01-29

## 2024-01-25 RX ORDER — PROCHLORPERAZINE EDISYLATE 5 MG/ML
10 INJECTION INTRAMUSCULAR; INTRAVENOUS EVERY 6 HOURS PRN
Status: DISCONTINUED | OUTPATIENT
Start: 2024-01-25 | End: 2024-01-31 | Stop reason: HOSPADM

## 2024-01-25 RX ORDER — ONDANSETRON 2 MG/ML
4 INJECTION INTRAMUSCULAR; INTRAVENOUS EVERY 6 HOURS PRN
Status: DISCONTINUED | OUTPATIENT
Start: 2024-01-25 | End: 2024-01-25

## 2024-01-25 RX ORDER — DEXTROSE MONOHYDRATE, SODIUM CHLORIDE, AND POTASSIUM CHLORIDE 50; 1.49; 9 G/1000ML; G/1000ML; G/1000ML
INJECTION, SOLUTION INTRAVENOUS CONTINUOUS
Status: DISCONTINUED | OUTPATIENT
Start: 2024-01-25 | End: 2024-01-25

## 2024-01-25 RX ORDER — TAMSULOSIN HYDROCHLORIDE 0.4 MG/1
0.4 CAPSULE ORAL DAILY
Status: DISCONTINUED | OUTPATIENT
Start: 2024-01-25 | End: 2024-01-31 | Stop reason: HOSPADM

## 2024-01-25 RX ORDER — ACETAMINOPHEN 325 MG/1
650 TABLET ORAL EVERY 6 HOURS PRN
Status: DISCONTINUED | OUTPATIENT
Start: 2024-01-25 | End: 2024-01-31 | Stop reason: HOSPADM

## 2024-01-25 RX ORDER — ASPIRIN 81 MG/1
81 TABLET, CHEWABLE ORAL DAILY
Status: DISCONTINUED | OUTPATIENT
Start: 2024-01-26 | End: 2024-01-31 | Stop reason: HOSPADM

## 2024-01-25 RX ORDER — DEXTROSE MONOHYDRATE 100 MG/ML
INJECTION, SOLUTION INTRAVENOUS CONTINUOUS PRN
Status: DISCONTINUED | OUTPATIENT
Start: 2024-01-25 | End: 2024-01-31 | Stop reason: HOSPADM

## 2024-01-25 RX ORDER — DEXTROSE MONOHYDRATE 25 G/50ML
12.5 INJECTION, SOLUTION INTRAVENOUS ONCE
Status: COMPLETED | OUTPATIENT
Start: 2024-01-25 | End: 2024-01-25

## 2024-01-25 RX ORDER — ACETAMINOPHEN 650 MG/1
650 SUPPOSITORY RECTAL EVERY 6 HOURS PRN
Status: DISCONTINUED | OUTPATIENT
Start: 2024-01-25 | End: 2024-01-31 | Stop reason: HOSPADM

## 2024-01-25 RX ORDER — GLUCAGON 1 MG/ML
1 KIT INJECTION PRN
Status: DISCONTINUED | OUTPATIENT
Start: 2024-01-25 | End: 2024-01-31 | Stop reason: HOSPADM

## 2024-01-25 RX ORDER — BUTALBITAL, ACETAMINOPHEN AND CAFFEINE 50; 325; 40 MG/1; MG/1; MG/1
1 TABLET ORAL EVERY 4 HOURS PRN
Status: DISCONTINUED | OUTPATIENT
Start: 2024-01-25 | End: 2024-01-31 | Stop reason: HOSPADM

## 2024-01-25 RX ORDER — MEMANTINE HYDROCHLORIDE 5 MG/1
10 TABLET ORAL 2 TIMES DAILY
Status: DISCONTINUED | OUTPATIENT
Start: 2024-01-25 | End: 2024-01-31 | Stop reason: HOSPADM

## 2024-01-25 RX ORDER — FINASTERIDE 5 MG/1
5 TABLET, FILM COATED ORAL DAILY
Status: DISCONTINUED | OUTPATIENT
Start: 2024-01-25 | End: 2024-01-31 | Stop reason: HOSPADM

## 2024-01-25 RX ORDER — METOCLOPRAMIDE HYDROCHLORIDE 5 MG/ML
10 INJECTION INTRAMUSCULAR; INTRAVENOUS ONCE
Status: DISCONTINUED | OUTPATIENT
Start: 2024-01-25 | End: 2024-01-26 | Stop reason: ALTCHOICE

## 2024-01-25 RX ORDER — POLYETHYLENE GLYCOL 3350 17 G/17G
17 POWDER, FOR SOLUTION ORAL DAILY PRN
Status: DISCONTINUED | OUTPATIENT
Start: 2024-01-25 | End: 2024-01-31 | Stop reason: HOSPADM

## 2024-01-25 RX ADMIN — MEMANTINE 10 MG: 5 TABLET ORAL at 21:33

## 2024-01-25 RX ADMIN — ATORVASTATIN CALCIUM 80 MG: 80 TABLET, FILM COATED ORAL at 21:33

## 2024-01-25 RX ADMIN — Medication 10 MG: at 22:39

## 2024-01-25 RX ADMIN — SERTRALINE HYDROCHLORIDE 100 MG: 50 TABLET ORAL at 17:42

## 2024-01-25 RX ADMIN — TAMSULOSIN HYDROCHLORIDE 0.4 MG: 0.4 CAPSULE ORAL at 17:42

## 2024-01-25 RX ADMIN — SODIUM CHLORIDE 500 ML: 9 INJECTION, SOLUTION INTRAVENOUS at 10:56

## 2024-01-25 RX ADMIN — POTASSIUM CHLORIDE: 2 INJECTION, SOLUTION, CONCENTRATE INTRAVENOUS at 21:30

## 2024-01-25 RX ADMIN — DEXTROSE MONOHYDRATE 12.5 G: 25 INJECTION, SOLUTION INTRAVENOUS at 11:51

## 2024-01-25 RX ADMIN — POTASSIUM CHLORIDE, DEXTROSE MONOHYDRATE AND SODIUM CHLORIDE: 150; 5; 900 INJECTION, SOLUTION INTRAVENOUS at 11:59

## 2024-01-25 RX ADMIN — PANTOPRAZOLE SODIUM 40 MG: 40 TABLET, DELAYED RELEASE ORAL at 17:43

## 2024-01-25 RX ADMIN — ACETAMINOPHEN 650 MG: 325 TABLET ORAL at 17:43

## 2024-01-25 RX ADMIN — DEXTROSE MONOHYDRATE 12.5 G: 25 INJECTION, SOLUTION INTRAVENOUS at 15:24

## 2024-01-25 RX ADMIN — FINASTERIDE 5 MG: 5 TABLET, FILM COATED ORAL at 17:43

## 2024-01-25 RX ADMIN — BUTALBITAL, ACETAMINOPHEN AND CAFFEINE 1 TABLET: 325; 50; 40 TABLET ORAL at 22:39

## 2024-01-25 ASSESSMENT — PAIN SCALES - GENERAL
PAINLEVEL_OUTOF10: 9
PAINLEVEL_OUTOF10: 4
PAINLEVEL_OUTOF10: 6
PAINLEVEL_OUTOF10: 4

## 2024-01-25 ASSESSMENT — PAIN DESCRIPTION - LOCATION
LOCATION: HEAD
LOCATION: HEAD

## 2024-01-25 ASSESSMENT — PAIN DESCRIPTION - ORIENTATION
ORIENTATION: RIGHT;LEFT
ORIENTATION: ANTERIOR;POSTERIOR

## 2024-01-25 ASSESSMENT — PAIN DESCRIPTION - FREQUENCY: FREQUENCY: CONTINUOUS

## 2024-01-25 ASSESSMENT — PAIN - FUNCTIONAL ASSESSMENT
PAIN_FUNCTIONAL_ASSESSMENT: PREVENTS OR INTERFERES SOME ACTIVE ACTIVITIES AND ADLS
PAIN_FUNCTIONAL_ASSESSMENT: ACTIVITIES ARE NOT PREVENTED

## 2024-01-25 ASSESSMENT — PAIN DESCRIPTION - ONSET: ONSET: PROGRESSIVE

## 2024-01-25 ASSESSMENT — PAIN DESCRIPTION - DESCRIPTORS
DESCRIPTORS: DISCOMFORT;DULL;HEAVINESS
DESCRIPTORS: ACHING;HEAVINESS

## 2024-01-25 ASSESSMENT — PAIN DESCRIPTION - DIRECTION: RADIATING_TOWARDS: FRONT OF HEAD

## 2024-01-25 ASSESSMENT — PAIN DESCRIPTION - PAIN TYPE: TYPE: ACUTE PAIN

## 2024-01-25 NOTE — H&P
MD Jason   fluticasone (ARNUITY ELLIPTA) 100 MCG/ACT AEPB Inhale 1 puff into the lungs daily    Malachi Nuñez MD   metFORMIN (GLUCOPHAGE) 500 MG tablet Take 2 tablets by mouth 2 times daily (with meals)    Malachi Nuñez MD   pantoprazole (PROTONIX) 40 MG tablet Take 1 tablet by mouth daily    Malachi Nuñez MD   sertraline (ZOLOFT) 100 MG tablet Take 1 tablet by mouth daily    Malachi Nuñez MD   Insulin Lispro (HUMALOG SC) Inject 6 Units into the skin 3 times daily (before meals) And sliding scale PRN    Malachi Nuñez MD   melatonin 10 MG CAPS capsule Take 1 capsule by mouth nightly    Malachi Nuñez MD   vitamin D (ERGOCALCIFEROL) 1.25 MG (87563 UT) CAPS capsule Take 1 capsule by mouth once a week thursday    Malachi Nuñez MD   metoprolol succinate (TOPROL XL) 50 MG extended release tablet Take 1 tablet by mouth nightly 10/26/20   Amanda Wright APRN - ROOPA   memantine (NAMENDA) 10 MG tablet Take 1 tablet by mouth 2 times daily    Malachi Nuñez MD   aluminum & magnesium hydroxide-simethicone (MAALOX) 200-200-20 MG/5ML SUSP suspension Take 5 mLs by mouth every 6 hours as needed for Indigestion Takes mylanta 30 ml Q4 hours PRN    Malachi Nuñez MD   polyethylene glycol (GLYCOLAX) powder Take 17 g by mouth daily    Malachi Nuñez MD   acetaminophen (TYLENOL) 325 MG tablet Take 2 tablets by mouth every 4 hours as needed for Pain    Malachi Nuñez MD   loperamide (IMODIUM) 2 MG capsule Take 1 capsule by mouth every 6 hours as needed for Diarrhea    Malachi Nuñez MD   aspirin 81 MG tablet Take 1 tablet by mouth daily chewable    Malachi Nuñez MD   finasteride (PROSCAR) 5 MG tablet Take 1 tablet by mouth daily    Malachi Nuñez MD   folic acid (FOLVITE) 1 MG tablet Take 1 tablet by mouth daily    Malachi Nuñez MD   Dulaglutide 0.75 MG/0.5ML SOPN Inject 0.75 mg into the skin once a week On Saturday     Provider, MD Malachi   SUMAtriptan (IMITREX) 100 MG tablet Take 1 tablet by mouth daily as needed for Migraine Give one additional dose if needed after 2 hours if 1st dose is ineffective (NTE 200mg/24 hrs)    Malachi Nuñez MD   atorvastatin (LIPITOR) 80 MG tablet Take 1 tablet by mouth nightly    ProviderMalachi MD   vitamin B-12 (CYANOCOBALAMIN) 1000 MCG tablet TAKE 1 TABLET BY MOUTH DAILY 7/2/18   Barb Ryan APRN - CNP   umeclidinium-vilanterol (ANORO ELLIPTA) 62.5-25 MCG/INH AEPB inhaler Inhale 1 puff into the lungs daily 3/27/18   Payal Jo MD   albuterol sulfate HFA (PROAIR HFA) 108 (90 Base) MCG/ACT inhaler Inhale 2 puffs into the lungs every 4 hours as needed for Wheezing or Shortness of Breath 3/27/18   Payal Jo MD   glucagon 1 MG injection Inject 1 mg into the skin as needed    ProviderMalachi MD       Labs: Personally reviewed and interpreted for clinical significance.   Recent Labs     01/25/24  1008   WBC 12.5*   HGB 10.5*   HCT 32.6*        Recent Labs     01/25/24  1008      K 3.7      CO2 23   BUN 22*   CREATININE 1.8*   CALCIUM 8.6   MG 2.20     Recent Labs     01/25/24  1008   TROPHS 25*     No results for input(s): \"LABA1C\" in the last 72 hours.  Recent Labs     01/25/24  1008   AST 13*   ALT 6*   BILITOT <0.2   ALKPHOS 93     Recent Labs     01/25/24  1008   LACTA 1.4        Myriam Melgar, APRN - CNP

## 2024-01-25 NOTE — ED PROVIDER NOTES
[facility]  ED  EMERGENCY DEPARTMENT ENCOUNTER        Pt Name: Don Hi  MRN: [id number]  Birthdate 1950  Date of evaluation: 1/25/2024  Provider: Ochoa Flor MD  PCP: No primary care provider on file.      CHIEF COMPLAINT       Chief Complaint   Patient presents with    Altered Mental Status     Patient to the ED for altered mental status via [facility] from [facility]. Per EMS staff reports patient got his pain medication this morning and blood glucose noted in the 20's, facility gave glucagon and called EMS. EMS reports blood sugar in the 80's with no other interventions. Patient is alert to verbal stimulus but not talking.           HISTORY OFPRESENT ILLNESS   (Location/Symptom, Timing/Onset, Context/Setting, Quality, Duration, Modifying Factors,Severity)  Note limiting factors.     Don Hi is a 73 y.o. male presenting today due to concern for reportedly being altered this morning and when nursing home checked his blood glucose was in the 20s.  He reportedly received glucagon by facility before squad arrived and when they showed up he was alert and blood glucose was in the 80s.  When I went to see him, he is hard of hearing but as long as I spoke loud enough, he would answer questions appropriately and was alert and oriented x 3.  He does report having a headache over the last 3 months along with some left-sided neck pain.  He denies any ear pain.  No chest pain or abdominal pain.  No vomiting or diarrhea.  He denies any recent evaluation for his headache but based on chart review it appears she was seen by neurology in September 2023 for cervicogenic headache and occipital neuralgia and therefore this does appear to be more of a chronic issue that he has been seen for.  He does have a prior history of dementia on chart review but it does seem like he is answering questions overall appropriately at this point.  He reportedly did receive some pain  the hospital although no focal neurological deficits on neurological exam at this time.  Story is not suggestive of dissection at this time.      Exclusion criteria - the patient is NOT to be included for SEP-1 Core Measure due to:  Infection is not suspected     I was the primary provider for the patient.      The patient tolerated their visit well.   The patient and / or the family were informed of the results of any tests, a time was given to answer questions.    FINAL IMPRESSION      1. Hypoglycemia    2. Acute nonintractable headache, unspecified headache type    3. Acute kidney injury superimposed on chronic kidney disease (HCC)          DISPOSITION/PLAN   DISPOSITION Admitted 01/25/2024 12:11:46 PM      PATIENT REFERRED TO:   Residence at [place] [address]  [phone]          DISCHARGEMEDICATIONS:  New Prescriptions    No medications on file       DISCONTINUED MEDICATIONS:  Discontinued Medications    No medications on file              (Please note that portions of this note were completed with a voicerecognition program.  Efforts were made to edit the dictations but occasionally words are mis-transcribed.)    Ochoa Flor MD (electronically signed)            Ochoa Flor MD  01/25/24 7609

## 2024-01-25 NOTE — CARE COORDINATION
Case Management Assessment  Initial Evaluation    Date/Time of Evaluation: 1/25/2024 12:12 PM  Assessment Completed by: Flora Plaza    If patient is discharged prior to next notation, then this note serves as note for discharge by case management.    Patient Name: Don Hi                   YOB: 1950  Diagnosis: Hypoglycemia [E16.2]                   Date / Time: 1/25/2024  9:53 AM    Patient Admission Status: Inpatient   Readmission Risk (Low < 19, Mod (19-27), High > 27): No data recorded  Current PCP: No primary care provider on file.  PCP verified by CM? Yes    Chart Reviewed: Yes      History Provided by: Medical Record  Patient Orientation: Unable to Assess    Patient Cognition: Dementia / Early Alzheimer's    Hospitalization in the last 30 days (Readmission):  No    If yes, Readmission Assessment in CM Navigator will be completed.    Advance Directives:      Code Status: Prior   Patient's Primary Decision Maker is: Legal Next of Kin    Primary Decision Maker: Mallory Arauz - Brother/Sister - [phone]    Secondary Decision Maker: Ten Hi - Brother/Sister - [phone]    Discharge Planning:    Patient lives with: Other (Comment) Type of Home: Long-Term Care  Primary Care Giver: Other (Comment) ([facility])  Patient Support Systems include: Family Members   Current Financial resources: Medicare  Current community resources: Other (Comment) (LTC)  Current services prior to admission: Extended Care Facility            Current DME:              Type of Home Care services:  None    ADLS  Prior functional level: Assistance with the following:, Bathing, Dressing, Cooking, Toileting, Housework, Shopping, Mobility  Current functional level: Assistance with the following:, Bathing, Dressing, Toileting, Cooking, Housework, Shopping, Mobility    PT AM-PAC:   /24  OT AM-PAC:   /24    Family can provide assistance at DC: No  Would you like Case Management to discuss the discharge  discharge plan. Freedom of choice list with basic dialogue that supports the patient's individualized plan of care/goals and shares the quality data associated with the providers was provided to:     Patient Representative Name:       The Patient and/or Patient Representative Agree with the Discharge Plan?      PAULO French Student  Case Management Department  Ph: [phone] Fax: [phone]   Electronically signed by SANTI Leblanc on 1/25/2024 at 12:57 PM

## 2024-01-26 LAB
ANION GAP SERPL CALCULATED.3IONS-SCNC: 6 MMOL/L (ref 3–16)
ANION GAP SERPL CALCULATED.3IONS-SCNC: 9 MMOL/L (ref 3–16)
BASOPHILS # BLD: 0 K/UL (ref 0–0.2)
BASOPHILS NFR BLD: 0.3 %
BUN SERPL-MCNC: 20 MG/DL (ref 7–20)
BUN SERPL-MCNC: 26 MG/DL (ref 7–20)
CALCIUM SERPL-MCNC: 8.3 MG/DL (ref 8.3–10.6)
CALCIUM SERPL-MCNC: 8.4 MG/DL (ref 8.3–10.6)
CHLORIDE SERPL-SCNC: 108 MMOL/L (ref 99–110)
CHLORIDE SERPL-SCNC: 97 MMOL/L (ref 99–110)
CO2 SERPL-SCNC: 24 MMOL/L (ref 21–32)
CO2 SERPL-SCNC: 25 MMOL/L (ref 21–32)
CREAT SERPL-MCNC: 1.7 MG/DL (ref 0.8–1.3)
CREAT SERPL-MCNC: 1.8 MG/DL (ref 0.8–1.3)
DEPRECATED RDW RBC AUTO: 14.6 % (ref 12.4–15.4)
EOSINOPHIL # BLD: 0.2 K/UL (ref 0–0.6)
EOSINOPHIL NFR BLD: 2.2 %
EST. AVERAGE GLUCOSE BLD GHB EST-MCNC: 191.5 MG/DL
GFR SERPLBLD CREATININE-BSD FMLA CKD-EPI: 39 ML/MIN/{1.73_M2}
GFR SERPLBLD CREATININE-BSD FMLA CKD-EPI: 42 ML/MIN/{1.73_M2}
GLUCOSE BLD-MCNC: 186 MG/DL (ref 70–99)
GLUCOSE BLD-MCNC: 219 MG/DL (ref 70–99)
GLUCOSE BLD-MCNC: 405 MG/DL (ref 70–99)
GLUCOSE BLD-MCNC: 407 MG/DL (ref 70–99)
GLUCOSE BLD-MCNC: 500 MG/DL (ref 70–99)
GLUCOSE BLD-MCNC: 583 MG/DL (ref 70–99)
GLUCOSE BLD-MCNC: 84 MG/DL (ref 70–99)
GLUCOSE BLD-MCNC: 93 MG/DL (ref 70–99)
GLUCOSE BLD-MCNC: >600 MG/DL (ref 70–99)
GLUCOSE SERPL-MCNC: 572 MG/DL (ref 70–99)
GLUCOSE SERPL-MCNC: 83 MG/DL (ref 70–99)
HBA1C MFR BLD: 8.3 %
HCT VFR BLD AUTO: 29.4 % (ref 40.5–52.5)
HGB BLD-MCNC: 9.6 G/DL (ref 13.5–17.5)
LYMPHOCYTES # BLD: 2 K/UL (ref 1–5.1)
LYMPHOCYTES NFR BLD: 20 %
MCH RBC QN AUTO: 27 PG (ref 26–34)
MCHC RBC AUTO-ENTMCNC: 32.7 G/DL (ref 31–36)
MCV RBC AUTO: 82.4 FL (ref 80–100)
MONOCYTES # BLD: 1 K/UL (ref 0–1.3)
MONOCYTES NFR BLD: 9.6 %
NEUTROPHILS # BLD: 6.8 K/UL (ref 1.7–7.7)
NEUTROPHILS NFR BLD: 67.9 %
PERFORMED ON: ABNORMAL
PERFORMED ON: NORMAL
PERFORMED ON: NORMAL
PLATELET # BLD AUTO: 335 K/UL (ref 135–450)
PMV BLD AUTO: 6.4 FL (ref 5–10.5)
POTASSIUM SERPL-SCNC: 5.3 MMOL/L (ref 3.5–5.1)
POTASSIUM SERPL-SCNC: 5.3 MMOL/L (ref 3.5–5.1)
POTASSIUM SERPL-SCNC: 5.4 MMOL/L (ref 3.5–5.1)
RBC # BLD AUTO: 3.57 M/UL (ref 4.2–5.9)
SODIUM SERPL-SCNC: 130 MMOL/L (ref 136–145)
SODIUM SERPL-SCNC: 139 MMOL/L (ref 136–145)
WBC # BLD AUTO: 10.1 K/UL (ref 4–11)

## 2024-01-26 PROCEDURE — 80048 BASIC METABOLIC PNL TOTAL CA: CPT

## 2024-01-26 PROCEDURE — 2060000000 HC ICU INTERMEDIATE R&B

## 2024-01-26 PROCEDURE — 84132 ASSAY OF SERUM POTASSIUM: CPT

## 2024-01-26 PROCEDURE — 85025 COMPLETE CBC W/AUTO DIFF WBC: CPT

## 2024-01-26 PROCEDURE — 97530 THERAPEUTIC ACTIVITIES: CPT

## 2024-01-26 PROCEDURE — 6360000004 HC RX CONTRAST MEDICATION

## 2024-01-26 PROCEDURE — 6360000002 HC RX W HCPCS

## 2024-01-26 PROCEDURE — 51702 INSERT TEMP BLADDER CATH: CPT

## 2024-01-26 PROCEDURE — 6360000002 HC RX W HCPCS: Performed by: NURSE PRACTITIONER

## 2024-01-26 PROCEDURE — 2580000003 HC RX 258: Performed by: NURSE PRACTITIONER

## 2024-01-26 PROCEDURE — 97162 PT EVAL MOD COMPLEX 30 MIN: CPT

## 2024-01-26 PROCEDURE — 6370000000 HC RX 637 (ALT 250 FOR IP)

## 2024-01-26 PROCEDURE — 97166 OT EVAL MOD COMPLEX 45 MIN: CPT

## 2024-01-26 PROCEDURE — 94640 AIRWAY INHALATION TREATMENT: CPT

## 2024-01-26 PROCEDURE — 2580000003 HC RX 258

## 2024-01-26 PROCEDURE — 36415 COLL VENOUS BLD VENIPUNCTURE: CPT

## 2024-01-26 PROCEDURE — 6370000000 HC RX 637 (ALT 250 FOR IP): Performed by: NURSE PRACTITIONER

## 2024-01-26 PROCEDURE — C8923 2D TTE W OR W/O FOL W/CON,CO: HCPCS

## 2024-01-26 PROCEDURE — 97110 THERAPEUTIC EXERCISES: CPT

## 2024-01-26 PROCEDURE — 97535 SELF CARE MNGMENT TRAINING: CPT

## 2024-01-26 PROCEDURE — 92610 EVALUATE SWALLOWING FUNCTION: CPT

## 2024-01-26 RX ORDER — DOCUSATE SODIUM 100 MG/1
100 CAPSULE, LIQUID FILLED ORAL DAILY PRN
Status: DISCONTINUED | OUTPATIENT
Start: 2024-01-26 | End: 2024-01-31 | Stop reason: HOSPADM

## 2024-01-26 RX ORDER — AMITRIPTYLINE HYDROCHLORIDE 50 MG/1
50 TABLET, FILM COATED ORAL NIGHTLY
COMMUNITY

## 2024-01-26 RX ORDER — FLUTICASONE PROPIONATE 110 UG/1
1 AEROSOL, METERED RESPIRATORY (INHALATION) DAILY
Status: DISCONTINUED | OUTPATIENT
Start: 2024-01-26 | End: 2024-01-31 | Stop reason: HOSPADM

## 2024-01-26 RX ORDER — METOPROLOL TARTRATE 50 MG/1
50 TABLET, FILM COATED ORAL 2 TIMES DAILY
Status: DISCONTINUED | OUTPATIENT
Start: 2024-01-26 | End: 2024-01-31 | Stop reason: HOSPADM

## 2024-01-26 RX ORDER — INSULIN LISPRO 100 [IU]/ML
6 INJECTION, SOLUTION INTRAVENOUS; SUBCUTANEOUS
Status: DISCONTINUED | OUTPATIENT
Start: 2024-01-26 | End: 2024-01-27

## 2024-01-26 RX ORDER — AMITRIPTYLINE HYDROCHLORIDE 25 MG/1
50 TABLET, FILM COATED ORAL NIGHTLY
Status: DISCONTINUED | OUTPATIENT
Start: 2024-01-26 | End: 2024-01-31 | Stop reason: HOSPADM

## 2024-01-26 RX ORDER — DIVALPROEX SODIUM 125 MG/1
125 TABLET, DELAYED RELEASE ORAL 2 TIMES DAILY
COMMUNITY

## 2024-01-26 RX ORDER — MIRTAZAPINE 15 MG/1
7.5 TABLET, FILM COATED ORAL NIGHTLY
COMMUNITY

## 2024-01-26 RX ORDER — MELOXICAM 7.5 MG/1
7.5 TABLET ORAL DAILY PRN
COMMUNITY

## 2024-01-26 RX ORDER — DOCUSATE SODIUM 100 MG/1
100 CAPSULE, LIQUID FILLED ORAL DAILY PRN
COMMUNITY

## 2024-01-26 RX ORDER — BISACODYL 5 MG/1
5 TABLET, DELAYED RELEASE ORAL EVERY 8 HOURS PRN
COMMUNITY

## 2024-01-26 RX ORDER — NITROGLYCERIN 0.4 MG/1
0.4 TABLET SUBLINGUAL EVERY 5 MIN PRN
COMMUNITY

## 2024-01-26 RX ORDER — METOPROLOL TARTRATE 50 MG/1
50 TABLET, FILM COATED ORAL 2 TIMES DAILY
COMMUNITY

## 2024-01-26 RX ORDER — INSULIN GLARGINE 100 [IU]/ML
15 INJECTION, SOLUTION SUBCUTANEOUS NIGHTLY
Status: DISCONTINUED | OUTPATIENT
Start: 2024-01-26 | End: 2024-01-26

## 2024-01-26 RX ORDER — INSULIN LISPRO 100 [IU]/ML
0-16 INJECTION, SOLUTION INTRAVENOUS; SUBCUTANEOUS
Status: DISCONTINUED | OUTPATIENT
Start: 2024-01-26 | End: 2024-01-31 | Stop reason: HOSPADM

## 2024-01-26 RX ORDER — FLUTICASONE PROPIONATE 110 UG/1
1 AEROSOL, METERED RESPIRATORY (INHALATION) DAILY
COMMUNITY

## 2024-01-26 RX ORDER — INSULIN LISPRO 100 [IU]/ML
0-4 INJECTION, SOLUTION INTRAVENOUS; SUBCUTANEOUS NIGHTLY
Status: DISCONTINUED | OUTPATIENT
Start: 2024-01-26 | End: 2024-01-31 | Stop reason: HOSPADM

## 2024-01-26 RX ORDER — SENNOSIDES A AND B 8.6 MG/1
2 TABLET, FILM COATED ORAL NIGHTLY PRN
COMMUNITY

## 2024-01-26 RX ORDER — DIVALPROEX SODIUM 125 MG/1
125 CAPSULE, COATED PELLETS ORAL 2 TIMES DAILY
Status: DISCONTINUED | OUTPATIENT
Start: 2024-01-26 | End: 2024-01-31 | Stop reason: HOSPADM

## 2024-01-26 RX ORDER — OXYCODONE AND ACETAMINOPHEN 10; 325 MG/1; MG/1
1 TABLET ORAL EVERY 8 HOURS PRN
Status: DISCONTINUED | OUTPATIENT
Start: 2024-01-26 | End: 2024-01-28

## 2024-01-26 RX ORDER — ERGOCALCIFEROL 1.25 MG/1
50000 CAPSULE ORAL WEEKLY
COMMUNITY

## 2024-01-26 RX ORDER — HYDROXYZINE PAMOATE 25 MG/1
25 CAPSULE ORAL EVERY 12 HOURS PRN
COMMUNITY

## 2024-01-26 RX ORDER — SUMATRIPTAN 25 MG/1
50 TABLET, FILM COATED ORAL ONCE
Status: COMPLETED | OUTPATIENT
Start: 2024-01-26 | End: 2024-01-26

## 2024-01-26 RX ORDER — INSULIN LISPRO 100 [IU]/ML
0-8 INJECTION, SOLUTION INTRAVENOUS; SUBCUTANEOUS
Status: DISCONTINUED | OUTPATIENT
Start: 2024-01-26 | End: 2024-01-26

## 2024-01-26 RX ORDER — OXYCODONE HYDROCHLORIDE 5 MG/1
5 TABLET ORAL EVERY 6 HOURS PRN
Status: DISCONTINUED | OUTPATIENT
Start: 2024-01-26 | End: 2024-01-26

## 2024-01-26 RX ORDER — HYDROXYZINE PAMOATE 25 MG/1
25 CAPSULE ORAL EVERY 12 HOURS PRN
Status: DISCONTINUED | OUTPATIENT
Start: 2024-01-26 | End: 2024-01-31 | Stop reason: HOSPADM

## 2024-01-26 RX ORDER — INSULIN LISPRO 100 [IU]/ML
0-4 INJECTION, SOLUTION INTRAVENOUS; SUBCUTANEOUS NIGHTLY
Status: DISCONTINUED | OUTPATIENT
Start: 2024-01-26 | End: 2024-01-26

## 2024-01-26 RX ORDER — 0.9 % SODIUM CHLORIDE 0.9 %
1000 INTRAVENOUS SOLUTION INTRAVENOUS ONCE
Status: COMPLETED | OUTPATIENT
Start: 2024-01-26 | End: 2024-01-26

## 2024-01-26 RX ORDER — OXYCODONE AND ACETAMINOPHEN 10; 325 MG/1; MG/1
1 TABLET ORAL EVERY 8 HOURS PRN
Status: ON HOLD | COMMUNITY
End: 2024-01-30

## 2024-01-26 RX ORDER — INSULIN GLARGINE 100 [IU]/ML
20 INJECTION, SOLUTION SUBCUTANEOUS NIGHTLY
Status: DISCONTINUED | OUTPATIENT
Start: 2024-01-26 | End: 2024-01-27

## 2024-01-26 RX ADMIN — INSULIN LISPRO 8 UNITS: 100 INJECTION, SOLUTION INTRAVENOUS; SUBCUTANEOUS at 13:31

## 2024-01-26 RX ADMIN — AMLODIPINE BESYLATE 5 MG: 5 TABLET ORAL at 20:45

## 2024-01-26 RX ADMIN — Medication 10 MG: at 20:45

## 2024-01-26 RX ADMIN — SODIUM CHLORIDE 1000 ML: 9 INJECTION, SOLUTION INTRAVENOUS at 14:55

## 2024-01-26 RX ADMIN — INSULIN LISPRO 6 UNITS: 100 INJECTION, SOLUTION INTRAVENOUS; SUBCUTANEOUS at 17:11

## 2024-01-26 RX ADMIN — TAMSULOSIN HYDROCHLORIDE 0.4 MG: 0.4 CAPSULE ORAL at 08:55

## 2024-01-26 RX ADMIN — INSULIN GLARGINE 20 UNITS: 100 INJECTION, SOLUTION SUBCUTANEOUS at 20:46

## 2024-01-26 RX ADMIN — Medication 10 ML: at 20:46

## 2024-01-26 RX ADMIN — INSULIN LISPRO 16 UNITS: 100 INJECTION, SOLUTION INTRAVENOUS; SUBCUTANEOUS at 17:12

## 2024-01-26 RX ADMIN — TIOTROPIUM BROMIDE AND OLODATEROL 2 PUFF: 3.124; 2.736 SPRAY, METERED RESPIRATORY (INHALATION) at 08:03

## 2024-01-26 RX ADMIN — FINASTERIDE 5 MG: 5 TABLET, FILM COATED ORAL at 10:17

## 2024-01-26 RX ADMIN — SERTRALINE HYDROCHLORIDE 100 MG: 50 TABLET ORAL at 08:55

## 2024-01-26 RX ADMIN — POTASSIUM CHLORIDE: 2 INJECTION, SOLUTION, CONCENTRATE INTRAVENOUS at 04:08

## 2024-01-26 RX ADMIN — SODIUM ZIRCONIUM CYCLOSILICATE 5 G: 5 POWDER, FOR SUSPENSION ORAL at 08:56

## 2024-01-26 RX ADMIN — BUTALBITAL, ACETAMINOPHEN AND CAFFEINE 1 TABLET: 325; 50; 40 TABLET ORAL at 02:54

## 2024-01-26 RX ADMIN — SUMATRIPTAN SUCCINATE 50 MG: 25 TABLET ORAL at 09:53

## 2024-01-26 RX ADMIN — Medication 10 ML: at 13:35

## 2024-01-26 RX ADMIN — PERFLUTREN 1.5 ML: 6.52 INJECTION, SUSPENSION INTRAVENOUS at 06:44

## 2024-01-26 RX ADMIN — DIVALPROEX SODIUM 125 MG: 125 CAPSULE, COATED PELLETS ORAL at 13:31

## 2024-01-26 RX ADMIN — OXYCODONE AND ACETAMINOPHEN 1 TABLET: 10; 325 TABLET ORAL at 20:45

## 2024-01-26 RX ADMIN — ATORVASTATIN CALCIUM 80 MG: 80 TABLET, FILM COATED ORAL at 20:45

## 2024-01-26 RX ADMIN — BUTALBITAL, ACETAMINOPHEN AND CAFFEINE 1 TABLET: 325; 50; 40 TABLET ORAL at 08:56

## 2024-01-26 RX ADMIN — INSULIN HUMAN 15 UNITS: 100 INJECTION, SOLUTION PARENTERAL at 15:12

## 2024-01-26 RX ADMIN — METOPROLOL TARTRATE 50 MG: 50 TABLET, FILM COATED ORAL at 20:45

## 2024-01-26 RX ADMIN — MEMANTINE 10 MG: 5 TABLET ORAL at 08:55

## 2024-01-26 RX ADMIN — OXYCODONE 5 MG: 5 TABLET ORAL at 13:31

## 2024-01-26 RX ADMIN — DIVALPROEX SODIUM 125 MG: 125 CAPSULE, COATED PELLETS ORAL at 20:45

## 2024-01-26 RX ADMIN — AMITRIPTYLINE HYDROCHLORIDE 50 MG: 25 TABLET, FILM COATED ORAL at 20:45

## 2024-01-26 RX ADMIN — BUTALBITAL, ACETAMINOPHEN AND CAFFEINE 1 TABLET: 325; 50; 40 TABLET ORAL at 17:14

## 2024-01-26 RX ADMIN — ASPIRIN 81 MG 81 MG: 81 TABLET ORAL at 08:55

## 2024-01-26 RX ADMIN — PANTOPRAZOLE SODIUM 40 MG: 40 TABLET, DELAYED RELEASE ORAL at 08:55

## 2024-01-26 RX ADMIN — ENOXAPARIN SODIUM 40 MG: 100 INJECTION SUBCUTANEOUS at 08:55

## 2024-01-26 RX ADMIN — MEMANTINE 10 MG: 5 TABLET ORAL at 20:45

## 2024-01-26 ASSESSMENT — PAIN DESCRIPTION - LOCATION
LOCATION: HEAD

## 2024-01-26 ASSESSMENT — PAIN SCALES - GENERAL
PAINLEVEL_OUTOF10: 9
PAINLEVEL_OUTOF10: 0
PAINLEVEL_OUTOF10: 6
PAINLEVEL_OUTOF10: 5
PAINLEVEL_OUTOF10: 9
PAINLEVEL_OUTOF10: 9
PAINLEVEL_OUTOF10: 7
PAINLEVEL_OUTOF10: 9
PAINLEVEL_OUTOF10: 8
PAINLEVEL_OUTOF10: 9
PAINLEVEL_OUTOF10: 8

## 2024-01-26 ASSESSMENT — PAIN DESCRIPTION - ORIENTATION
ORIENTATION: POSTERIOR
ORIENTATION: LEFT;POSTERIOR;ANTERIOR
ORIENTATION: LEFT;POSTERIOR;ANTERIOR
ORIENTATION: RIGHT;POSTERIOR
ORIENTATION: RIGHT;LEFT

## 2024-01-26 ASSESSMENT — PAIN DESCRIPTION - DESCRIPTORS
DESCRIPTORS: ACHING;DISCOMFORT;DULL
DESCRIPTORS: ACHING;CRUSHING;DISCOMFORT
DESCRIPTORS: ACHING;DULL;HEAVINESS
DESCRIPTORS: ACHING;DULL;DISCOMFORT

## 2024-01-26 ASSESSMENT — PAIN DESCRIPTION - PAIN TYPE
TYPE: CHRONIC PAIN

## 2024-01-26 ASSESSMENT — PAIN DESCRIPTION - FREQUENCY: FREQUENCY: CONTINUOUS

## 2024-01-26 NOTE — CARE COORDINATION
LTC at [facility]. Admitted with blood sugar of 26 and today blood sugar was over 400. Internal med adjusting insulin and diabetic medications. Can return to [facility] when stable.

## 2024-01-26 NOTE — PROGRESS NOTES
Speech Language Pathology  Clinical Bedside Swallow Assessment  Facility/Department: [place] [address] PCU        Recommendations:  Diet recommendation: IDDSI 7 Regular Solids; IDDSI 0 Thin Liquids; Meds whole with thin liquids  Instrumentation: Not clinically indicated at this time  Risk management: upright for all intake, stay upright for at least 30 mins after intake, small bites/sips, oral care 2-3x/day to reduce adverse affects in the event of aspiration, alternate bites/sips, slow rate of intake, general aspiration precautions, and hold PO and contact SLP if s/s of aspiration or worsening respiratory status develop.      NAME:Don Hi  : 1950 (73 y.o.)   MRN: [id number]  ROOM: [facility]/0435-01  ADMISSION DATE: 2024  PATIENT DIAGNOSIS(ES): Hypoglycemia [E16.2]  Acute nonintractable headache, unspecified headache type [R51.9]  Acute kidney injury superimposed on chronic kidney disease (HCC) [N17.9, N18.9]  Chief Complaint   Patient presents with    Altered Mental Status     Patient to the ED for altered mental status via [facility] from [facility]. Per EMS staff reports patient got his pain medication this morning and blood glucose noted in the 20's, facility gave glucagon and called EMS. EMS reports blood sugar in the 80's with no other interventions. Patient is alert to verbal stimulus but not talking.         Patient Active Problem List    Diagnosis Date Noted    Hypoglycemia 2024    CHIP (acute kidney injury) (HCC) 10/06/2021    LV dysfunction 2021    Aortic root dilation (HCC) 2021    Syncope and collapse 2021    Chronic kidney disease     Benign essential HTN     Cardiomyopathy (HCC)     Chest pain 10/22/2020    SOB (shortness of breath) 10/22/2020    Chronic systolic CHF (congestive heart failure) (HCC) 10/22/2020    Dementia associated with other underlying disease without behavioral disturbance (HCC) 04/10/2020    Closed head injury     Facial hematoma           Total Treatment Time / Charges       Time in Time out Total Time / units   Swallow Eval/Tx Time  0915  0930  15 min / 1 unit     Signature:  Colleen Skaggs MA CCC-SLP #[id number]  Speech Language Pathologist

## 2024-01-26 NOTE — PROGRESS NOTES
1936: Patients Blood Sugar 426. Ashley Mancia, NP informed. Dextrose 5 % and 0.9 % NaCl with KCl 20 mEq infusion discontinued. Order for Potassium Chloride 20 mEq in lactated ringers Continuous infusion obtained.

## 2024-01-26 NOTE — PROGRESS NOTES
2152: Patient requesting something to help him sleep, also something else to help with headache. Patient was given Tylenol at 1743 and patient states it did not help. Ashley Mancia, WILMAR notified. Order for Melatonin 10mg Nightly and Butalbital-acetaminophen-caffeine Prn every 4 hours for Headache.

## 2024-01-26 NOTE — PROGRESS NOTES
No  Position Activity Restriction  Other position/activity restrictions: JEFFERSON, michaela, zoë     Subjective   Pain: 10/10 back of head, reports headache that has been constant for some time, cannot say how long.  General  Chart Reviewed: Yes  Patient assessed for rehabilitation services?: Yes  Family / Caregiver Present: No  Referring Practitioner: Myriam Melgar APRN - CNP  Referral Date : 01/26/24  Diagnosis: hypoglycemia.  Follows Commands: Impaired  Other (Comment): during MMT and ROM evaluation fo BLE pt ignored requests to move ankles and stoof impulsively without AD, sometimes difficult to redirect.  Subjective  Subjective: pt foudn in bed, agreeable to PT initial evaluation, cleared for eval by RN.         Social/Functional History  Social/Functional History  Lives With: Other (comment) ([facility])  Type of Home:  (LTC facility)  Home Layout: One level  Home Access: Level entry  Bathroom Shower/Tub: Shower chair with back, Walk-in shower  Bathroom Equipment: Grab bars in shower, Grab bars around toilet  Home Equipment: Rollator  Has the patient had two or more falls in the past year or any fall with injury in the past year?: Yes (reports 6 falls d/t knee vs hip buckeling)  Receives Help From:  (staff at LT)  ADL Assistance: Needs assistance (staff assists with bathing and donning socks)  Homemaking Assistance: Needs assistance  Homemaking Responsibilities: No  Ambulation Assistance: Independent  Transfer Assistance: Independent  Active : No  Occupation: Retired  Additional Comments: pt gives occasionally inconsistant answers and may not be a reliable historian.  Vision/Hearing  Vision  Vision: Impaired  Vision Exceptions: Wears glasses at all times  Hearing  Hearing: Exceptions to WFL  Hearing Exceptions: Bilateral hearing aid;Hard of hearing/hearing concerns    Cognition   Orientation  Overall Orientation Status: Within Functional Limits  Orientation Level: Oriented to place;Oriented to  to chair.)  Interventions: Safety awareness training;Verbal cues (VCs to wait until PT gave pt RW to improve balance and safety, pt impulsive with movements at times.)  Sit to Stand: Contact-guard assistance;Adaptive equipment  Stand to Sit: Contact-guard assistance;Adaptive equipment  Bed to Chair: Contact-guard assistance;Adaptive equipment  Gait Training  Gait Training: No (pt impulsive and has some difficulty following VCs at times, unsafe to attempt at this time.)      Exercise Treatment: AO, LAQ, GS, 1X10 each in seated in chair.      AM-PAC - Mobility    -PAC Basic Mobility - Inpatient   How much help is needed turning from your back to your side while in a flat bed without using bedrails?: None  How much help is needed moving from lying on your back to sitting on the side of a flat bed without using bedrails?: None  How much help is needed moving to and from a bed to a chair?: A Little  How much help is needed standing up from a chair using your arms?: A Little  How much help is needed walking in hospital room?: A Little  How much help is needed climbing 3-5 steps with a railing?: A Little  [facility] Inpatient Mobility Raw Score : 20  AM-PAC Inpatient T-Scale Score : 47.67  Mobility Inpatient CMS 0-100% Score: 35.83  Mobility Inpatient CMS G-Code Modifier : CJ    Goals  Short Term Goals  Time Frame for Short Term Goals: 7 days (2/02) unless otherwise noted.  Short Term Goal 1: Pt will demonstrate IND with bed mobility.  Short Term Goal 2: Pt will demonstrate SUP with functional transfers with LRAD.  Short Term Goal 3: Pt will demonstrate SUP for ambulation up to 50' with LRAD.  Short Term Goal 4: Pt will participate in 4 different BLE exercises of 12-15 reps each to improve strength and endurance by 1/31.  Patient Goals   Patient Goals : pt does not state goal at this time, only states he wants his headache pain to decrease.       Education  Patient Education  Education Given To: Patient  Education Provided:

## 2024-01-26 NOTE — PROGRESS NOTES
Hospital Medicine Progress Note      Date of Admission: 1/25/2024  Hospital Day: 2    Chief Admission Complaint:  altered mental status    Subjective:  reports ongoing headache. Denies n/v, dyspnea    Presenting Admission History: 73 y.o. male with past medical history significant for diabetes, BPH, dementia, COPD.  Presented to [facility] from [facility] for altered mental status.  His blood sugar was reported to be 26 at the facility, and he was given glucagon.  In the squad glucose was 80.  Glucose was 123 on arrival, but dropped to 63.  He was given 12.5g of D50 x2 and started on fluids with D5.  The patient does not recall any events leading up to him coming to the hospital.  At time of exam, he denies n/v, dizziness; but complains of headache     Assessment/Plan:      Current Principal Problem:  Hypoglycemia    Diabetes type II, uncontrolled.  With significant hypoglycemia.  A1c noted to be 8.3 this admission.  Hold home regimen.  Fingerstick blood glucose will be monitored closely for hypoglycemic adverse drug reaction.  Basal bolus, prandial, and sliding scale insulin will be monitored and adjusted as needed.  Hypoglycemia protocol in place.  Pt hyperglycemic on 1/26 - received 15 units regular insulin and 1L fluid bolus.       Acute metabolic encephalopathy, resolved.  Likely due to hypoglycemia.  Supportive care.  Will continue to follow clinical response     Acute kidney injury on CKD stage 3a.  Likely due to urinary retention.  Baseline creat likely normal, there are few labs for comparison.  On admission it was 1.8 and is currently 1.7.  Avoid hypotension, nephrotoxics as able.  Monitor     Hyperkalemia.  Likely d/t CHIP and hyperglycemia.  Lokelma x1 today, repeat lab this afternoon.    Chronic systolic heart failure.  No evidence of exacerbation at this time.  Echo 10/2020: EF 40%; global hypokinesis.  Limited echo this admission: EF 40-45%; hypokinesis of apex and apical lateral walls.  Monitor daily  lispro  0-16 Units SubCUTAneous TID WC    insulin lispro  0-4 Units SubCUTAneous Nightly    insulin lispro  6 Units SubCUTAneous TID WC    sodium chloride  1,000 mL IntraVENous Once    metoprolol tartrate  50 mg Oral BID    fluticasone  1 puff Inhalation Daily    sodium chloride flush  5-40 mL IntraVENous 2 times per day    enoxaparin  40 mg SubCUTAneous Daily    dextrose  12.5 g IntraVENous Once    memantine  10 mg Oral BID    finasteride  5 mg Oral Daily    sertraline  100 mg Oral Daily    tiotropium-olodaterol  2 puff Inhalation Daily RT    pantoprazole  40 mg Oral Daily    atorvastatin  80 mg Oral Nightly    aspirin  81 mg Oral Daily    amLODIPine  5 mg Oral Nightly    tamsulosin  0.4 mg Oral Daily    melatonin  10 mg Oral Nightly     PRN Meds: docusate sodium, hydrOXYzine pamoate, oxyCODONE-acetaminophen, sodium chloride flush, sodium chloride, polyethylene glycol, acetaminophen **OR** acetaminophen, glucose, dextrose bolus **OR** dextrose bolus, glucagon (rDNA), dextrose, prochlorperazine, butalbital-acetaminophen-caffeine     Labs:  Personally reviewed and interpreted for clinical significance.     Recent Labs     01/25/24  1008 01/26/24  0510   WBC 12.5* 10.1   HGB 10.5* 9.6*   HCT 32.6* 29.4*    335     Recent Labs     01/25/24  1008 01/26/24  0510 01/26/24  1202 01/26/24  1451    139  --  130*   K 3.7 5.4* 5.3* 5.3*    108  --  97*   CO2 23 25  --  24   BUN 22* 20  --  26*   CREATININE 1.8* 1.7*  --  1.8*   CALCIUM 8.6 8.4  --  8.3   MG 2.20  --   --   --      Recent Labs     01/25/24  1008   TROPHS 25*     Recent Labs     01/25/24  1008   LABA1C 8.3     Recent Labs     01/25/24  1008   AST 13*   ALT 6*   BILITOT <0.2   ALKPHOS 93     Recent Labs     01/25/24  1008   LACTA 1.4       Urine Cultures:   Lab Results   Component Value Date/Time    LABURIN >100,000 CFU/ml 03/29/2021 04:45 PM     Blood Cultures:   Lab Results   Component Value Date/Time    BC No Growth after 4 days of

## 2024-01-26 NOTE — FLOWSHEET NOTE
Patient alert and oriented x 3, anxious at times, c/o chronic headache pain. Imitrex given per order and PRN medication given per order. Patient satisfied resting in bed with eyes closed. Large BM today.  Patient admitted for low blood sugars and today blood sugars are 186 before breakfast and 405 after breakfast.  K slightly elevated to 5.4 and orders to stop fluids with K and Lokema given as ordered.    01/26/24 0800   Assessment   Charting Type Shift assessment   Psychosocial   Psychosocial (WDL) X   Patient Behaviors Irritable;Anxious   Neurological   Neuro (WDL) WDL   Level of Consciousness 0   [place] Coma Scale   Eye Opening 4   Best Verbal Response 5   Best Motor Response 6   [place] Coma Scale Score 15   HEENT (Head, Ears, Eyes, Nose, & Throat)   HEENT (WDL) WDL   Respiratory   Respiratory (WDL) WDL   Cardiac   Cardiac (WDL) WDL   Gastrointestinal   Abdominal (WDL) WDL   Genitourinary   Genitourinary (WDL) X  (Lopez)   Suprapubic Tenderness No   Dysuria (Pain/Burning w/Urination) No   Urine Assessment   Urinary Status Lopez   Urine Color Yellow/straw   Urine Appearance Clear   Urine Odor VANI   Peripheral Vascular   Peripheral Vascular (WDL) WDL   Skin Integumentary    Skin Integumentary (WDL) X   Urinary Catheter 01/25/24 Lopez   Placement Date/Time: 01/25/24 1637   Inserted by: SN Dontae  2nd Staff Assisting: Marcio HA Instructor  Insertion attempts: 1  Catheter Type: Lopez  Catheter Size: 16 FR  Catheter Balloon Size: 10 mL  Insertion Procedure Practices: Order written;...   Catheter Indications Urinary retention (acute or chronic), continuous bladder irrigation or bladder outlet obstruction   Site Assessment Pink   Urine Color Yellow   Urine Appearance Clear   Urine Odor Malodorous   Collection Container Standard   Securement Method Securing device (Describe)   Catheter Best Practices  Drainage tube clipped to bed;Catheter secured to thigh;Tamper seal intact;Bag below bladder;Bag not on floor;Lack

## 2024-01-26 NOTE — PROGRESS NOTES
Occupational Therapy  [address]  Occupational Therapy Initial Assessment & Treatment Note    Name: Don Hi  : 1950  MRN: [id number]  Date of Service: 2024    Discharge Recommendations:  Long Term Care with OT  OT Equipment Recommendations  Equipment Needed: No (defer)     Therapy discharge recommendations take into account each patient's current medical complexities and are subject to input/oversight from the patient's healthcare team.     Barriers to Home Discharge:   [] Steps to access home entry or bed/bath:   [x] Unable to transfer, ambulate, or propel wheelchair household distances without assist   [] Limited available assist at home upon discharge    [x] Patient or family requests d/c to post-acute facility    [x] Poor cognition/safety awareness for d/c to home alone    [] Unable to maintain ordered weight bearing status    [] Patient with salient signs of long-standing immobility   [x] Decreased independence with ADLs   [x] Increased risk for falls   [] Other:    If pt is unable to be seen after this session, please let this note serve as discharge summary.  Please see case management note for discharge disposition.  Thank you.      Patient Diagnosis(es): The primary encounter diagnosis was Hypoglycemia. Diagnoses of Acute nonintractable headache, unspecified headache type and Acute kidney injury superimposed on chronic kidney disease (HCC) were also pertinent to this visit.  Past Medical History:  has a past medical history of Acute on chronic systolic (congestive) heart failure (HCC), Anxiety disorder, BPH (benign prostatic hypertrophy), Calcified granuloma of lung (HCC), Cataract, Cerebral artery occlusion with cerebral infarction (HCC), Chronic diastolic heart failure (HCC), Chronic pain, Chronic viral hepatitis (HCC), Cognitive communication deficit, COPD (chronic obstructive pulmonary disease) (HCC), Degenerative arthritis of hip, Dementia (HCC), Depression,

## 2024-01-26 NOTE — PLAN OF CARE
Problem: Discharge Planning  Goal: Discharge to home or other facility with appropriate resources  Outcome: Progressing  Flowsheets (Taken 1/25/2024 1530 by Juan Gimenez RN)  Discharge to home or other facility with appropriate resources: Arrange for needed discharge resources and transportation as appropriate     Problem: Pain  Goal: Verbalizes/displays adequate comfort level or baseline comfort level  Outcome: Progressing     Problem: Skin/Tissue Integrity  Goal: Absence of new skin breakdown  Description: 1.  Monitor for areas of redness and/or skin breakdown  2.  Assess vascular access sites hourly  3.  Every 4-6 hours minimum:  Change oxygen saturation probe site  4.  Every 4-6 hours:  If on nasal continuous positive airway pressure, respiratory therapy assess nares and determine need for appliance change or resting period.  Outcome: Progressing     Problem: Safety - Adult  Goal: Free from fall injury  Outcome: Progressing

## 2024-01-27 LAB
ANION GAP SERPL CALCULATED.3IONS-SCNC: 9 MMOL/L (ref 3–16)
BUN SERPL-MCNC: 24 MG/DL (ref 7–20)
CALCIUM SERPL-MCNC: 8.7 MG/DL (ref 8.3–10.6)
CHLORIDE SERPL-SCNC: 101 MMOL/L (ref 99–110)
CO2 SERPL-SCNC: 24 MMOL/L (ref 21–32)
CREAT SERPL-MCNC: 1.5 MG/DL (ref 0.8–1.3)
GFR SERPLBLD CREATININE-BSD FMLA CKD-EPI: 49 ML/MIN/{1.73_M2}
GLUCOSE BLD-MCNC: 209 MG/DL (ref 70–99)
GLUCOSE BLD-MCNC: 231 MG/DL (ref 70–99)
GLUCOSE BLD-MCNC: 312 MG/DL (ref 70–99)
GLUCOSE BLD-MCNC: 343 MG/DL (ref 70–99)
GLUCOSE SERPL-MCNC: 162 MG/DL (ref 70–99)
PERFORMED ON: ABNORMAL
POTASSIUM SERPL-SCNC: 5.1 MMOL/L (ref 3.5–5.1)
SODIUM SERPL-SCNC: 134 MMOL/L (ref 136–145)

## 2024-01-27 PROCEDURE — 6370000000 HC RX 637 (ALT 250 FOR IP)

## 2024-01-27 PROCEDURE — 6370000000 HC RX 637 (ALT 250 FOR IP): Performed by: NURSE PRACTITIONER

## 2024-01-27 PROCEDURE — 94640 AIRWAY INHALATION TREATMENT: CPT

## 2024-01-27 PROCEDURE — 80048 BASIC METABOLIC PNL TOTAL CA: CPT

## 2024-01-27 PROCEDURE — 36415 COLL VENOUS BLD VENIPUNCTURE: CPT

## 2024-01-27 PROCEDURE — 2060000000 HC ICU INTERMEDIATE R&B

## 2024-01-27 PROCEDURE — 2580000003 HC RX 258

## 2024-01-27 PROCEDURE — 6360000002 HC RX W HCPCS

## 2024-01-27 RX ORDER — INSULIN GLARGINE 100 [IU]/ML
25 INJECTION, SOLUTION SUBCUTANEOUS NIGHTLY
Status: DISCONTINUED | OUTPATIENT
Start: 2024-01-27 | End: 2024-01-28

## 2024-01-27 RX ORDER — INSULIN LISPRO 100 [IU]/ML
8 INJECTION, SOLUTION INTRAVENOUS; SUBCUTANEOUS
Status: DISCONTINUED | OUTPATIENT
Start: 2024-01-27 | End: 2024-01-28

## 2024-01-27 RX ADMIN — ATORVASTATIN CALCIUM 80 MG: 80 TABLET, FILM COATED ORAL at 21:29

## 2024-01-27 RX ADMIN — PANTOPRAZOLE SODIUM 40 MG: 40 TABLET, DELAYED RELEASE ORAL at 08:18

## 2024-01-27 RX ADMIN — Medication 10 MG: at 21:29

## 2024-01-27 RX ADMIN — METOPROLOL TARTRATE 50 MG: 50 TABLET, FILM COATED ORAL at 21:29

## 2024-01-27 RX ADMIN — FINASTERIDE 5 MG: 5 TABLET, FILM COATED ORAL at 08:18

## 2024-01-27 RX ADMIN — INSULIN LISPRO 8 UNITS: 100 INJECTION, SOLUTION INTRAVENOUS; SUBCUTANEOUS at 11:19

## 2024-01-27 RX ADMIN — SERTRALINE HYDROCHLORIDE 100 MG: 50 TABLET ORAL at 08:17

## 2024-01-27 RX ADMIN — INSULIN LISPRO 6 UNITS: 100 INJECTION, SOLUTION INTRAVENOUS; SUBCUTANEOUS at 08:30

## 2024-01-27 RX ADMIN — Medication 10 ML: at 08:18

## 2024-01-27 RX ADMIN — ENOXAPARIN SODIUM 40 MG: 100 INJECTION SUBCUTANEOUS at 08:16

## 2024-01-27 RX ADMIN — TAMSULOSIN HYDROCHLORIDE 0.4 MG: 0.4 CAPSULE ORAL at 08:17

## 2024-01-27 RX ADMIN — Medication 10 ML: at 21:29

## 2024-01-27 RX ADMIN — INSULIN LISPRO 12 UNITS: 100 INJECTION, SOLUTION INTRAVENOUS; SUBCUTANEOUS at 16:51

## 2024-01-27 RX ADMIN — OXYCODONE AND ACETAMINOPHEN 1 TABLET: 10; 325 TABLET ORAL at 13:15

## 2024-01-27 RX ADMIN — METOPROLOL TARTRATE 50 MG: 50 TABLET, FILM COATED ORAL at 08:17

## 2024-01-27 RX ADMIN — INSULIN GLARGINE 25 UNITS: 100 INJECTION, SOLUTION SUBCUTANEOUS at 21:29

## 2024-01-27 RX ADMIN — Medication 1 PUFF: at 08:04

## 2024-01-27 RX ADMIN — BUTALBITAL, ACETAMINOPHEN AND CAFFEINE 1 TABLET: 325; 50; 40 TABLET ORAL at 15:13

## 2024-01-27 RX ADMIN — DIVALPROEX SODIUM 125 MG: 125 CAPSULE, COATED PELLETS ORAL at 21:36

## 2024-01-27 RX ADMIN — INSULIN LISPRO 12 UNITS: 100 INJECTION, SOLUTION INTRAVENOUS; SUBCUTANEOUS at 11:20

## 2024-01-27 RX ADMIN — DIVALPROEX SODIUM 125 MG: 125 CAPSULE, COATED PELLETS ORAL at 08:29

## 2024-01-27 RX ADMIN — BUTALBITAL, ACETAMINOPHEN AND CAFFEINE 1 TABLET: 325; 50; 40 TABLET ORAL at 22:52

## 2024-01-27 RX ADMIN — MEMANTINE 10 MG: 5 TABLET ORAL at 21:28

## 2024-01-27 RX ADMIN — INSULIN LISPRO 4 UNITS: 100 INJECTION, SOLUTION INTRAVENOUS; SUBCUTANEOUS at 08:17

## 2024-01-27 RX ADMIN — MEMANTINE 10 MG: 5 TABLET ORAL at 08:17

## 2024-01-27 RX ADMIN — INSULIN LISPRO 8 UNITS: 100 INJECTION, SOLUTION INTRAVENOUS; SUBCUTANEOUS at 16:50

## 2024-01-27 RX ADMIN — BUTALBITAL, ACETAMINOPHEN AND CAFFEINE 1 TABLET: 325; 50; 40 TABLET ORAL at 08:29

## 2024-01-27 RX ADMIN — TIOTROPIUM BROMIDE AND OLODATEROL 2 PUFF: 3.124; 2.736 SPRAY, METERED RESPIRATORY (INHALATION) at 08:04

## 2024-01-27 RX ADMIN — AMITRIPTYLINE HYDROCHLORIDE 50 MG: 25 TABLET, FILM COATED ORAL at 21:28

## 2024-01-27 RX ADMIN — OXYCODONE AND ACETAMINOPHEN 1 TABLET: 10; 325 TABLET ORAL at 21:28

## 2024-01-27 RX ADMIN — OXYCODONE AND ACETAMINOPHEN 1 TABLET: 10; 325 TABLET ORAL at 04:59

## 2024-01-27 RX ADMIN — ASPIRIN 81 MG 81 MG: 81 TABLET ORAL at 08:18

## 2024-01-27 RX ADMIN — AMLODIPINE BESYLATE 5 MG: 5 TABLET ORAL at 21:29

## 2024-01-27 ASSESSMENT — PAIN DESCRIPTION - DESCRIPTORS
DESCRIPTORS: ACHING
DESCRIPTORS: ACHING
DESCRIPTORS: BURNING
DESCRIPTORS: ACHING

## 2024-01-27 ASSESSMENT — PAIN SCALES - GENERAL
PAINLEVEL_OUTOF10: 9
PAINLEVEL_OUTOF10: 8
PAINLEVEL_OUTOF10: 9

## 2024-01-27 ASSESSMENT — PAIN DESCRIPTION - LOCATION
LOCATION: BACK;NECK
LOCATION: HEAD;NECK
LOCATION: BACK;HEAD;NECK
LOCATION: BACK;HEAD;NECK

## 2024-01-27 ASSESSMENT — PAIN DESCRIPTION - ORIENTATION
ORIENTATION: RIGHT;LEFT
ORIENTATION: RIGHT;LEFT
ORIENTATION: LEFT;MID

## 2024-01-27 ASSESSMENT — PAIN DESCRIPTION - ONSET: ONSET: ON-GOING

## 2024-01-27 ASSESSMENT — PAIN DESCRIPTION - PAIN TYPE
TYPE: ACUTE PAIN
TYPE: CHRONIC PAIN

## 2024-01-27 NOTE — PLAN OF CARE
Problem: Discharge Planning  Goal: Discharge to home or other facility with appropriate resources  Outcome: Progressing     Problem: Pain  Goal: Verbalizes/displays adequate comfort level or baseline comfort level  Outcome: Progressing     Problem: Skin/Tissue Integrity  Goal: Absence of new skin breakdown  Description: 1.  Monitor for areas of redness and/or skin breakdown  2.  Assess vascular access sites hourly  3.  Every 4-6 hours minimum:  Change oxygen saturation probe site  4.  Every 4-6 hours:  If on nasal continuous positive airway pressure, respiratory therapy assess nares and determine need for appliance change or resting period.  Outcome: Progressing     Problem: Safety - Adult  Goal: Free from fall injury  Outcome: Progressing     Problem: Chronic Conditions and Co-morbidities  Goal: Patient's chronic conditions and co-morbidity symptoms are monitored and maintained or improved  Outcome: Progressing

## 2024-01-27 NOTE — PROGRESS NOTES
Hospital Medicine Progress Note      Date of Admission: 1/25/2024  Hospital Day: 3    Chief Admission Complaint:  altered mental status    Subjective:  denies n/v, constipation    Presenting Admission History: 73 y.o. male with past medical history significant for diabetes, BPH, dementia, COPD.  Presented to [facility] from [facility] for altered mental status.  His blood sugar was reported to be 26 at the facility, and he was given glucagon.  In the squad glucose was 80.  Glucose was 123 on arrival, but dropped to 63.  He was given 12.5g of D50 x2 and started on fluids with D5.  The patient does not recall any events leading up to him coming to the hospital.  At time of exam, he denies n/v, dizziness; but complains of headache     Assessment/Plan:      Current Principal Problem:  Hypoglycemia    Diabetes type II, uncontrolled.  Presented with significant hypoglycemia and was treated with D50 and a D5 drip.  He is now hyperglycemic.  A1c noted to be 8.3 this admission.  Hold home regimen.  Fingerstick blood glucose will be monitored closely for hypoglycemic adverse drug reaction.  Basal bolus, prandial, and sliding scale insulin will be monitored and adjusted as needed.  Hypoglycemia protocol in place.  Pt hyperglycemic on 1/26 - received 15 units regular insulin and 1L fluid bolus.       Acute metabolic encephalopathy, resolved.  Likely due to hypoglycemia.  Supportive care.  Will continue to follow clinical response     Acute kidney injury on CKD stage 3a.  Likely due to urinary retention.  Baseline creat likely normal, there are few labs for comparison.  On admission it was 1.8 and is currently 1.5.  Avoid hypotension, nephrotoxics as able.  Monitor     Hyperkalemia.  Likely d/t CHIP and hyperglycemia.  Lokelma x1 today, repeat lab this afternoon.    Chronic systolic heart failure.  No evidence of exacerbation at this time.  Echo 10/2020: EF 40%; global hypokinesis.  Limited echo this admission: EF 40-45%; hypokinesis

## 2024-01-28 LAB
ANION GAP SERPL CALCULATED.3IONS-SCNC: 8 MMOL/L (ref 3–16)
BUN SERPL-MCNC: 24 MG/DL (ref 7–20)
CALCIUM SERPL-MCNC: 8.7 MG/DL (ref 8.3–10.6)
CHLORIDE SERPL-SCNC: 98 MMOL/L (ref 99–110)
CO2 SERPL-SCNC: 26 MMOL/L (ref 21–32)
CREAT SERPL-MCNC: 1.7 MG/DL (ref 0.8–1.3)
GFR SERPLBLD CREATININE-BSD FMLA CKD-EPI: 42 ML/MIN/{1.73_M2}
GLUCOSE BLD-MCNC: 128 MG/DL (ref 70–99)
GLUCOSE BLD-MCNC: 276 MG/DL (ref 70–99)
GLUCOSE BLD-MCNC: 393 MG/DL (ref 70–99)
GLUCOSE BLD-MCNC: 442 MG/DL (ref 70–99)
GLUCOSE SERPL-MCNC: 395 MG/DL (ref 70–99)
PERFORMED ON: ABNORMAL
POTASSIUM SERPL-SCNC: 5 MMOL/L (ref 3.5–5.1)
POTASSIUM SERPL-SCNC: 5.3 MMOL/L (ref 3.5–5.1)
SODIUM SERPL-SCNC: 132 MMOL/L (ref 136–145)

## 2024-01-28 PROCEDURE — 84132 ASSAY OF SERUM POTASSIUM: CPT

## 2024-01-28 PROCEDURE — 6370000000 HC RX 637 (ALT 250 FOR IP): Performed by: NURSE PRACTITIONER

## 2024-01-28 PROCEDURE — 6370000000 HC RX 637 (ALT 250 FOR IP)

## 2024-01-28 PROCEDURE — 94640 AIRWAY INHALATION TREATMENT: CPT

## 2024-01-28 PROCEDURE — 2580000003 HC RX 258

## 2024-01-28 PROCEDURE — 6360000002 HC RX W HCPCS

## 2024-01-28 PROCEDURE — 36415 COLL VENOUS BLD VENIPUNCTURE: CPT

## 2024-01-28 PROCEDURE — 80048 BASIC METABOLIC PNL TOTAL CA: CPT

## 2024-01-28 PROCEDURE — 2060000000 HC ICU INTERMEDIATE R&B

## 2024-01-28 RX ORDER — INSULIN LISPRO 100 [IU]/ML
15 INJECTION, SOLUTION INTRAVENOUS; SUBCUTANEOUS
Status: DISCONTINUED | OUTPATIENT
Start: 2024-01-28 | End: 2024-01-31 | Stop reason: HOSPADM

## 2024-01-28 RX ORDER — OXYCODONE AND ACETAMINOPHEN 10; 325 MG/1; MG/1
1 TABLET ORAL EVERY 6 HOURS PRN
Status: DISCONTINUED | OUTPATIENT
Start: 2024-01-28 | End: 2024-01-31 | Stop reason: HOSPADM

## 2024-01-28 RX ORDER — INSULIN LISPRO 100 [IU]/ML
10 INJECTION, SOLUTION INTRAVENOUS; SUBCUTANEOUS
Status: DISCONTINUED | OUTPATIENT
Start: 2024-01-28 | End: 2024-01-28

## 2024-01-28 RX ORDER — INSULIN GLARGINE 100 [IU]/ML
40 INJECTION, SOLUTION SUBCUTANEOUS NIGHTLY
Status: DISCONTINUED | OUTPATIENT
Start: 2024-01-28 | End: 2024-01-31 | Stop reason: HOSPADM

## 2024-01-28 RX ORDER — 0.9 % SODIUM CHLORIDE 0.9 %
500 INTRAVENOUS SOLUTION INTRAVENOUS ONCE
Status: COMPLETED | OUTPATIENT
Start: 2024-01-28 | End: 2024-01-28

## 2024-01-28 RX ADMIN — SODIUM CHLORIDE 500 ML: 9 INJECTION, SOLUTION INTRAVENOUS at 09:57

## 2024-01-28 RX ADMIN — SERTRALINE HYDROCHLORIDE 100 MG: 50 TABLET ORAL at 08:27

## 2024-01-28 RX ADMIN — PANTOPRAZOLE SODIUM 40 MG: 40 TABLET, DELAYED RELEASE ORAL at 08:27

## 2024-01-28 RX ADMIN — INSULIN LISPRO 16 UNITS: 100 INJECTION, SOLUTION INTRAVENOUS; SUBCUTANEOUS at 12:03

## 2024-01-28 RX ADMIN — TAMSULOSIN HYDROCHLORIDE 0.4 MG: 0.4 CAPSULE ORAL at 08:27

## 2024-01-28 RX ADMIN — INSULIN GLARGINE 40 UNITS: 100 INJECTION, SOLUTION SUBCUTANEOUS at 20:52

## 2024-01-28 RX ADMIN — Medication 10 ML: at 08:28

## 2024-01-28 RX ADMIN — Medication 1 PUFF: at 07:50

## 2024-01-28 RX ADMIN — MEMANTINE 10 MG: 5 TABLET ORAL at 08:27

## 2024-01-28 RX ADMIN — METOPROLOL TARTRATE 50 MG: 50 TABLET, FILM COATED ORAL at 20:52

## 2024-01-28 RX ADMIN — ENOXAPARIN SODIUM 40 MG: 100 INJECTION SUBCUTANEOUS at 08:27

## 2024-01-28 RX ADMIN — METOPROLOL TARTRATE 50 MG: 50 TABLET, FILM COATED ORAL at 08:27

## 2024-01-28 RX ADMIN — BUTALBITAL, ACETAMINOPHEN AND CAFFEINE 1 TABLET: 325; 50; 40 TABLET ORAL at 09:30

## 2024-01-28 RX ADMIN — AMITRIPTYLINE HYDROCHLORIDE 50 MG: 25 TABLET, FILM COATED ORAL at 20:52

## 2024-01-28 RX ADMIN — FINASTERIDE 5 MG: 5 TABLET, FILM COATED ORAL at 08:26

## 2024-01-28 RX ADMIN — BUTALBITAL, ACETAMINOPHEN AND CAFFEINE 1 TABLET: 325; 50; 40 TABLET ORAL at 20:52

## 2024-01-28 RX ADMIN — DIVALPROEX SODIUM 125 MG: 125 CAPSULE, COATED PELLETS ORAL at 20:52

## 2024-01-28 RX ADMIN — Medication 10 ML: at 20:53

## 2024-01-28 RX ADMIN — ASPIRIN 81 MG 81 MG: 81 TABLET ORAL at 08:27

## 2024-01-28 RX ADMIN — MEMANTINE 10 MG: 5 TABLET ORAL at 20:52

## 2024-01-28 RX ADMIN — OXYCODONE AND ACETAMINOPHEN 1 TABLET: 10; 325 TABLET ORAL at 16:02

## 2024-01-28 RX ADMIN — INSULIN LISPRO 10 UNITS: 100 INJECTION, SOLUTION INTRAVENOUS; SUBCUTANEOUS at 12:03

## 2024-01-28 RX ADMIN — Medication 10 MG: at 20:52

## 2024-01-28 RX ADMIN — INSULIN LISPRO 16 UNITS: 100 INJECTION, SOLUTION INTRAVENOUS; SUBCUTANEOUS at 08:28

## 2024-01-28 RX ADMIN — SODIUM ZIRCONIUM CYCLOSILICATE 5 G: 5 POWDER, FOR SUSPENSION ORAL at 09:30

## 2024-01-28 RX ADMIN — OXYCODONE AND ACETAMINOPHEN 1 TABLET: 10; 325 TABLET ORAL at 06:56

## 2024-01-28 RX ADMIN — TIOTROPIUM BROMIDE AND OLODATEROL 2 PUFF: 3.124; 2.736 SPRAY, METERED RESPIRATORY (INHALATION) at 07:50

## 2024-01-28 RX ADMIN — AMLODIPINE BESYLATE 5 MG: 5 TABLET ORAL at 20:52

## 2024-01-28 RX ADMIN — DIVALPROEX SODIUM 125 MG: 125 CAPSULE, COATED PELLETS ORAL at 09:32

## 2024-01-28 RX ADMIN — HYDROXYZINE PAMOATE 25 MG: 25 CAPSULE ORAL at 20:52

## 2024-01-28 RX ADMIN — INSULIN LISPRO 8 UNITS: 100 INJECTION, SOLUTION INTRAVENOUS; SUBCUTANEOUS at 08:27

## 2024-01-28 RX ADMIN — ATORVASTATIN CALCIUM 80 MG: 80 TABLET, FILM COATED ORAL at 20:52

## 2024-01-28 RX ADMIN — OXYCODONE AND ACETAMINOPHEN 1 TABLET: 10; 325 TABLET ORAL at 23:18

## 2024-01-28 ASSESSMENT — PAIN DESCRIPTION - ONSET: ONSET: AWAKENED FROM SLEEP

## 2024-01-28 ASSESSMENT — PAIN DESCRIPTION - DESCRIPTORS
DESCRIPTORS: ACHING
DESCRIPTORS: ACHING

## 2024-01-28 ASSESSMENT — PAIN DESCRIPTION - PAIN TYPE
TYPE: ACUTE PAIN
TYPE: ACUTE PAIN

## 2024-01-28 ASSESSMENT — PAIN DESCRIPTION - ORIENTATION: ORIENTATION: MID

## 2024-01-28 ASSESSMENT — PAIN DESCRIPTION - DIRECTION
RADIATING_TOWARDS: BACK
RADIATING_TOWARDS: BACK

## 2024-01-28 ASSESSMENT — PAIN SCALES - GENERAL
PAINLEVEL_OUTOF10: 8
PAINLEVEL_OUTOF10: 8
PAINLEVEL_OUTOF10: 7
PAINLEVEL_OUTOF10: 8
PAINLEVEL_OUTOF10: 8
PAINLEVEL_OUTOF10: 9

## 2024-01-28 ASSESSMENT — PAIN DESCRIPTION - LOCATION
LOCATION: HEAD
LOCATION: HEAD
LOCATION: HEAD;NECK
LOCATION: HEAD;NECK;BACK
LOCATION: HEAD;NECK

## 2024-01-28 ASSESSMENT — PAIN DESCRIPTION - FREQUENCY: FREQUENCY: INTERMITTENT

## 2024-01-28 NOTE — PROGRESS NOTES
retention, acute.  Likely due to obstruction.  Jennings placed 1/25 for bladder scan ~999mL.  Continue home dose finasteride.  Continue tamsulosin.  Voiding trial 1/29, remove jennings early AM     COPD.  Continue home regimen.  Stable on room air     Headache, ongoing.  Imitrex did not provide relief, per patient.  Continued home dose fioricet     Chronic pain.  Continue home regimen      Dementia.  Continue home dose namenda.  Supportive care.    Acute metabolic encephalopathy, resolved.  Likely due to hypoglycemia.  Supportive care.  Will continue to follow clinical response      Physical Exam Performed:      General appearance:  No apparent distress  Respiratory:  Normal respiratory effort. Room air  Cardiovascular:  Regular rate and rhythm.  Abdomen:  Soft, non-tender, non-distended.  Musculoskelatal:  No edema  Neurologic:  Non-focal  Psychiatric:  Alert and oriented    BP (!) 155/79   Pulse 74   Temp 97.5 °F (36.4 °C) (Oral)   Resp 16   Ht 1.956 m (6' 5\")   Wt 100.8 kg (222 lb 3.6 oz)   SpO2 93%   BMI 26.35 kg/m²     Diet: ADULT DIET; Regular; 5 carb choices (75 gm/meal)  ADULT ORAL NUTRITION SUPPLEMENT; Dinner, Lunch; Standard High Calorie/High Protein Oral Supplement    DVT Prophylaxis: [x]PPx LMWH  []SQ Heparin  []IPC/SCDs  []Eliquis  []Xarelto  []Coumadin  []Other -      Code status: Full Code    PT/OT Eval Status:   []NOT yet ordered  [x]Ordered and Pending   []Seen with Recommendations for:  []Home independently  []Home w/ assist  []HHC  []SNF  []Acute Rehab    Anticipated Discharge Day/Date: pending glucose control    Anticipated Discharge Location: []Home []HHC []SNF []Acute Rehab [x]ECF  []LTAC  []Hospice  []Other -      Consults:      IP CONSULT TO HOSPITALIST      This patient has a high likelihood of being discharged tomorrow and is appropriate for A1 Discharge Unit in AM pending clinical course overnight: []Yes     Lab Results   Component Value Date/Time    LABURIN >100,000 CFU/ml 03/29/2021 04:45 PM     Blood Cultures:   Lab Results   Component Value Date/Time    BC No Growth after 4 days of incubation. 10/06/2021 03:40 PM     Lab Results   Component Value Date/Time    BLOODCULT2 No Growth after 4 days of incubation. 10/06/2021 03:50 PM     Organism:   Lab Results   Component Value Date/Time    ORG Staphylococcus aureus 03/29/2021 04:45 PM         RICCO Philip - CNP

## 2024-01-28 NOTE — PLAN OF CARE
Problem: Discharge Planning  Goal: Discharge to home or other facility with appropriate resources  1/28/2024 1038 by Tarsha Castro RN  Outcome: Progressing  1/28/2024 0353 by Dhruv Villanueva RN  Outcome: Progressing     Problem: Pain  Goal: Verbalizes/displays adequate comfort level or baseline comfort level  1/28/2024 1038 by Tarsha Castro RN  Outcome: Progressing  1/28/2024 0353 by Dhruv Villanueva RN  Outcome: Progressing     Problem: Skin/Tissue Integrity  Goal: Absence of new skin breakdown  Description: 1.  Monitor for areas of redness and/or skin breakdown  2.  Assess vascular access sites hourly  3.  Every 4-6 hours minimum:  Change oxygen saturation probe site  4.  Every 4-6 hours:  If on nasal continuous positive airway pressure, respiratory therapy assess nares and determine need for appliance change or resting period.  1/28/2024 1038 by Tarsha Castro RN  Outcome: Progressing  1/28/2024 0353 by Dhruv Villanueva RN  Outcome: Progressing     Problem: Safety - Adult  Goal: Free from fall injury  1/28/2024 1038 by Tarsha Castro RN  Outcome: Progressing  1/28/2024 0353 by Dhruv Villanueva RN  Outcome: Progressing     Problem: Chronic Conditions and Co-morbidities  Goal: Patient's chronic conditions and co-morbidity symptoms are monitored and maintained or improved  1/28/2024 1038 by Tarsha Castro RN  Outcome: Progressing  1/28/2024 0353 by Dhruv Villanueva RN  Outcome: Progressing

## 2024-01-28 NOTE — PLAN OF CARE
Problem: Discharge Planning  Goal: Discharge to home or other facility with appropriate resources  1/28/2024 0353 by Dhruv Villanueva RN  Outcome: Progressing  1/27/2024 1858 by Tarsha Casrto RN  Outcome: Progressing     Problem: Pain  Goal: Verbalizes/displays adequate comfort level or baseline comfort level  1/28/2024 0353 by Dhruv Villanueva RN  Outcome: Progressing  1/27/2024 1858 by Tarsha Castro RN  Outcome: Progressing     Problem: Skin/Tissue Integrity  Goal: Absence of new skin breakdown  Description: 1.  Monitor for areas of redness and/or skin breakdown  2.  Assess vascular access sites hourly  3.  Every 4-6 hours minimum:  Change oxygen saturation probe site  4.  Every 4-6 hours:  If on nasal continuous positive airway pressure, respiratory therapy assess nares and determine need for appliance change or resting period.  1/28/2024 0353 by Dhruv Villanueva RN  Outcome: Progressing  1/27/2024 1858 by Tarsha Castro RN  Outcome: Progressing     Problem: Safety - Adult  Goal: Free from fall injury  1/28/2024 0353 by Dhruv Villanueva RN  Outcome: Progressing  1/27/2024 1858 by Tarsha Castro RN  Outcome: Progressing     Problem: Chronic Conditions and Co-morbidities  Goal: Patient's chronic conditions and co-morbidity symptoms are monitored and maintained or improved  1/28/2024 0353 by Dhruv Villanueva RN  Outcome: Progressing  1/27/2024 1858 by Tarsha Castro RN  Outcome: Progressing

## 2024-01-29 LAB
ANION GAP SERPL CALCULATED.3IONS-SCNC: 10 MMOL/L (ref 3–16)
BUN SERPL-MCNC: 25 MG/DL (ref 7–20)
CALCIUM SERPL-MCNC: 8.7 MG/DL (ref 8.3–10.6)
CHLORIDE SERPL-SCNC: 96 MMOL/L (ref 99–110)
CO2 SERPL-SCNC: 25 MMOL/L (ref 21–32)
CREAT SERPL-MCNC: 1.5 MG/DL (ref 0.8–1.3)
GFR SERPLBLD CREATININE-BSD FMLA CKD-EPI: 49 ML/MIN/{1.73_M2}
GLUCOSE BLD-MCNC: 111 MG/DL (ref 70–99)
GLUCOSE BLD-MCNC: 204 MG/DL (ref 70–99)
GLUCOSE BLD-MCNC: 295 MG/DL (ref 70–99)
GLUCOSE BLD-MCNC: 301 MG/DL (ref 70–99)
GLUCOSE BLD-MCNC: 54 MG/DL (ref 70–99)
GLUCOSE BLD-MCNC: 68 MG/DL (ref 70–99)
GLUCOSE SERPL-MCNC: 298 MG/DL (ref 70–99)
PERFORMED ON: ABNORMAL
POTASSIUM SERPL-SCNC: 4.1 MMOL/L (ref 3.5–5.1)
SODIUM SERPL-SCNC: 131 MMOL/L (ref 136–145)

## 2024-01-29 PROCEDURE — 97535 SELF CARE MNGMENT TRAINING: CPT

## 2024-01-29 PROCEDURE — 6370000000 HC RX 637 (ALT 250 FOR IP)

## 2024-01-29 PROCEDURE — 6370000000 HC RX 637 (ALT 250 FOR IP): Performed by: NURSE PRACTITIONER

## 2024-01-29 PROCEDURE — 6360000002 HC RX W HCPCS

## 2024-01-29 PROCEDURE — 80048 BASIC METABOLIC PNL TOTAL CA: CPT

## 2024-01-29 PROCEDURE — 2060000000 HC ICU INTERMEDIATE R&B

## 2024-01-29 PROCEDURE — 2580000003 HC RX 258

## 2024-01-29 PROCEDURE — 36415 COLL VENOUS BLD VENIPUNCTURE: CPT

## 2024-01-29 PROCEDURE — 6360000002 HC RX W HCPCS: Performed by: INTERNAL MEDICINE

## 2024-01-29 RX ORDER — ENOXAPARIN SODIUM 100 MG/ML
30 INJECTION SUBCUTANEOUS 2 TIMES DAILY
Status: DISCONTINUED | OUTPATIENT
Start: 2024-01-29 | End: 2024-01-31 | Stop reason: HOSPADM

## 2024-01-29 RX ADMIN — PANTOPRAZOLE SODIUM 40 MG: 40 TABLET, DELAYED RELEASE ORAL at 09:29

## 2024-01-29 RX ADMIN — INSULIN LISPRO 12 UNITS: 100 INJECTION, SOLUTION INTRAVENOUS; SUBCUTANEOUS at 12:21

## 2024-01-29 RX ADMIN — INSULIN LISPRO 15 UNITS: 100 INJECTION, SOLUTION INTRAVENOUS; SUBCUTANEOUS at 12:21

## 2024-01-29 RX ADMIN — INSULIN GLARGINE 40 UNITS: 100 INJECTION, SOLUTION SUBCUTANEOUS at 20:41

## 2024-01-29 RX ADMIN — ENOXAPARIN SODIUM 30 MG: 100 INJECTION SUBCUTANEOUS at 20:41

## 2024-01-29 RX ADMIN — ENOXAPARIN SODIUM 40 MG: 100 INJECTION SUBCUTANEOUS at 09:28

## 2024-01-29 RX ADMIN — OXYCODONE AND ACETAMINOPHEN 1 TABLET: 10; 325 TABLET ORAL at 20:40

## 2024-01-29 RX ADMIN — MEMANTINE 10 MG: 5 TABLET ORAL at 09:29

## 2024-01-29 RX ADMIN — Medication 10 ML: at 09:29

## 2024-01-29 RX ADMIN — FINASTERIDE 5 MG: 5 TABLET, FILM COATED ORAL at 09:29

## 2024-01-29 RX ADMIN — METOPROLOL TARTRATE 50 MG: 50 TABLET, FILM COATED ORAL at 09:29

## 2024-01-29 RX ADMIN — TAMSULOSIN HYDROCHLORIDE 0.4 MG: 0.4 CAPSULE ORAL at 09:29

## 2024-01-29 RX ADMIN — HYDROXYZINE PAMOATE 25 MG: 25 CAPSULE ORAL at 20:40

## 2024-01-29 RX ADMIN — DIVALPROEX SODIUM 125 MG: 125 CAPSULE, COATED PELLETS ORAL at 09:29

## 2024-01-29 RX ADMIN — OXYCODONE AND ACETAMINOPHEN 1 TABLET: 10; 325 TABLET ORAL at 12:21

## 2024-01-29 RX ADMIN — INSULIN LISPRO 8 UNITS: 100 INJECTION, SOLUTION INTRAVENOUS; SUBCUTANEOUS at 09:29

## 2024-01-29 RX ADMIN — MEMANTINE 10 MG: 5 TABLET ORAL at 20:40

## 2024-01-29 RX ADMIN — INSULIN LISPRO 15 UNITS: 100 INJECTION, SOLUTION INTRAVENOUS; SUBCUTANEOUS at 09:28

## 2024-01-29 RX ADMIN — SERTRALINE HYDROCHLORIDE 100 MG: 50 TABLET ORAL at 09:29

## 2024-01-29 RX ADMIN — ATORVASTATIN CALCIUM 80 MG: 80 TABLET, FILM COATED ORAL at 20:40

## 2024-01-29 RX ADMIN — Medication 10 ML: at 20:41

## 2024-01-29 RX ADMIN — AMLODIPINE BESYLATE 5 MG: 5 TABLET ORAL at 20:40

## 2024-01-29 RX ADMIN — Medication 10 MG: at 20:41

## 2024-01-29 RX ADMIN — BUTALBITAL, ACETAMINOPHEN AND CAFFEINE 1 TABLET: 325; 50; 40 TABLET ORAL at 23:15

## 2024-01-29 RX ADMIN — DIVALPROEX SODIUM 125 MG: 125 CAPSULE, COATED PELLETS ORAL at 20:40

## 2024-01-29 RX ADMIN — OXYCODONE AND ACETAMINOPHEN 1 TABLET: 10; 325 TABLET ORAL at 06:18

## 2024-01-29 RX ADMIN — AMITRIPTYLINE HYDROCHLORIDE 50 MG: 25 TABLET, FILM COATED ORAL at 20:41

## 2024-01-29 RX ADMIN — ASPIRIN 81 MG 81 MG: 81 TABLET ORAL at 09:29

## 2024-01-29 RX ADMIN — BUTALBITAL, ACETAMINOPHEN AND CAFFEINE 1 TABLET: 325; 50; 40 TABLET ORAL at 08:24

## 2024-01-29 RX ADMIN — METOPROLOL TARTRATE 50 MG: 50 TABLET, FILM COATED ORAL at 20:40

## 2024-01-29 RX ADMIN — BUTALBITAL, ACETAMINOPHEN AND CAFFEINE 1 TABLET: 325; 50; 40 TABLET ORAL at 17:13

## 2024-01-29 ASSESSMENT — PAIN DESCRIPTION - LOCATION
LOCATION: HEAD;NECK
LOCATION: HEAD
LOCATION: NECK;HEAD
LOCATION: HEAD;NECK

## 2024-01-29 ASSESSMENT — PAIN DESCRIPTION - DESCRIPTORS: DESCRIPTORS: POUNDING

## 2024-01-29 ASSESSMENT — PAIN SCALES - GENERAL
PAINLEVEL_OUTOF10: 8
PAINLEVEL_OUTOF10: 9

## 2024-01-29 NOTE — PROGRESS NOTES
Lopez removed per protocol, patient tolerated well, patient educated on notifying staff of elimination needs, urinal/bsc placed at bedside.

## 2024-01-29 NOTE — PROGRESS NOTES
Yes  Overall Level of Assistance: Adaptive equipment;Minimum assistance  Distance (ft): 20 Feet (20 feet x2)  Assistive Device: Walker, rolling;Gait belt  Interventions: Safety awareness training;Tactile cues;Verbal cues     ADL  Grooming: Setup;Verbal cueing;Increased time to complete;Supervision  Grooming Skilled Clinical Factors: seated EOB to wash hands/face and comb hair with min cues for sitting balance and attention to task  LE Dressing: Maximum assistance  LE Dressing Skilled Clinical Factors: Patient requiring full assistance to thread BLE into brief while seated in chair. Patient able to manage clothing over hips while in stance with Min A for standing balance using intermittent UE support on FWW.  Functional Mobility: Minimal assistance  Functional Mobility Skilled Clinical Factors: Min A to/from EOB and chair using FWW with cues for safety and hand placement. Min A functional ambulation 20 feet x2 using FWW.  Skin Care: Bath wipes        Safety Devices  Type of Devices: Left in chair;Chair alarm in place;Call light within reach;Gait belt;Nurse notified;Patient at risk for falls;All fall risk precautions in place;All logan prominences offloaded     Patient Education  Education Given To: Patient  Education Provided: Role of Therapy;Plan of Care;Fall Prevention Strategies;Transfer Training;Equipment;ADL Adaptive Strategies  Education Provided Comments: sitting/standing balance, safety with transfers, safe walker management, importance of OOB activity, safety with LB dressing.  Education Method: Demonstration;Verbal  Barriers to Learning: Cognition  Education Outcome: Verbalized understanding;Demonstrated understanding;Continued education needed    Goals  Short Term Goals  Time Frame for Short Term Goals: 2/2/24 unless stated otherwise; ongoing  Short Term Goal 1: Pt will complete LB dress min A  Short Term Goal 2: Pt will complete toileting SBA  Short Term Goal 3: Pt will complete functional/ toilet  transfer w/ SUPV + LRAD  Short Term Goal 4: Pt will perform x15 reps BUE therex denying pain/ fatigue  Patient Goals   Patient goals : \"to get rid of this headache\"    AM-PAC - ADL  AM-PAC Daily Activity - Inpatient   How much help is needed for putting on and taking off regular lower body clothing?: A Lot  How much help is needed for bathing (which includes washing, rinsing, drying)?: A Lot  How much help is needed for toileting (which includes using toilet, bedpan, or urinal)?: A Lot  How much help is needed for putting on and taking off regular upper body clothing?: A Little  How much help is needed for taking care of personal grooming?: A Little  How much help for eating meals?: None  AM-PAC Inpatient Daily Activity Raw Score: 16  AM-PAC Inpatient ADL T-Scale Score : 35.96  ADL Inpatient CMS 0-100% Score: 53.32  ADL Inpatient CMS G-Code Modifier : CK    Therapy Time   Individual Concurrent Group Co-treatment   Time In 1305         Time Out 1330         Minutes 25         Timed Code Treatment Minutes: 25 Minutes     If pt is discharged prior to next OT session, this note will serve as the discharge summary.    Marissa Crespo, OT

## 2024-01-29 NOTE — PROGRESS NOTES
Hospital Medicine Progress Note      Date of Admission: 1/25/2024  Hospital Day: 5    Chief Admission Complaint:  altered mental status    Subjective:  denies n/v, dizziness, dyspnea. C/O chronic HA    Presenting Admission History: 73 y.o. male with past medical history significant for diabetes, BPH, dementia, COPD.  Presented to [facility] from [facility] for altered mental status.  His blood sugar was reported to be 26 at the facility, and he was given glucagon.  In the squad glucose was 80.  Glucose was 123 on arrival, but dropped to 63.  He was given 12.5g of D50 x2 and started on fluids with D5.  The patient does not recall any events leading up to him coming to the hospital.  At time of exam, he denies n/v, dizziness; but complains of headache     Assessment/Plan:      Current Principal Problem:  Hypoglycemia    Diabetes type II, uncontrolled.  Presented with significant hypoglycemia and was treated with D50 and a D5 drip.  He is now hyperglycemic.  A1c noted to be 8.3 this admission.  Hold home regimen.  Fingerstick blood glucose will be monitored closely for hypoglycemic adverse drug reaction.  Basal bolus, prandial, and sliding scale insulin will be monitored and adjusted as needed - adjusted again on 1/28.  Hypoglycemia protocol in place.       Acute kidney injury on CKD stage 3a.  Likely due to urinary retention.  Baseline creat likely normal, there are few labs for comparison.  On admission it was 1.8 and is currently 1.5.  Avoid hypotension, nephrotoxics as able.  Monitor     Pseudohyponatremia.  Na 131, corrects to 136 when hyperglycemia is accounted for    Hyperkalemia.  Likely d/t CHIP and hyperglycemia.  Lokelma x1. K 4.1 today    Chronic systolic heart failure.  No evidence of exacerbation at this time.  Echo 10/2020: EF 40%; global hypokinesis.  Limited echo this admission: EF 40-45%; hypokinesis of apex and apical lateral walls.  Monitor daily weights, I&O.      Urinary retention, acute.  Likely due

## 2024-01-29 NOTE — PROGRESS NOTES
Inpatient Physical Therapy Treatment    Pt chart reviewed.  RN contacted and okayed for therapy treatment.  Pt found resting in semi fowlers in bed watching TV.  Pt declining PT treatment at this time due to headache (received recent meds and waiting for them to kick in).  Pt declining supine exercises, or needs to transfer to bedside chair or toilet.  Will re-attempt PT treatment as pt status and therapist schedule allows.    No charge.     Victoria Barton, PT, DPT   #[id number]

## 2024-01-29 NOTE — CARE COORDINATION
Patient is LTC at [facility] and can return when stable. Blood sugars still elevated. MD keeping to adjust diabetic medications and monitor another day. Call out to [facility] to see if there are barriers to return he has resided there for a while. Plan on d/c if stable back to [facility] tomorrow .

## 2024-01-29 NOTE — PLAN OF CARE
Problem: Discharge Planning  Goal: Discharge to home or other facility with appropriate resources  1/28/2024 2210 by Dhruv Villanueva RN  Outcome: Progressing  1/28/2024 1038 by Tarsha Castro RN  Outcome: Progressing     Problem: Pain  Goal: Verbalizes/displays adequate comfort level or baseline comfort level  1/28/2024 2210 by Dhruv Villanueva RN  Outcome: Progressing  1/28/2024 1038 by Tarsha Castro RN  Outcome: Progressing     Problem: Skin/Tissue Integrity  Goal: Absence of new skin breakdown  Description: 1.  Monitor for areas of redness and/or skin breakdown  2.  Assess vascular access sites hourly  3.  Every 4-6 hours minimum:  Change oxygen saturation probe site  4.  Every 4-6 hours:  If on nasal continuous positive airway pressure, respiratory therapy assess nares and determine need for appliance change or resting period.  1/28/2024 2210 by Dhruv Villanueva RN  Outcome: Progressing  1/28/2024 1038 by Tarsha Castro RN  Outcome: Progressing     Problem: Safety - Adult  Goal: Free from fall injury  1/28/2024 2210 by Dhruv Villanueva RN  Outcome: Progressing  1/28/2024 1038 by Tarsha Castro RN  Outcome: Progressing     Problem: Chronic Conditions and Co-morbidities  Goal: Patient's chronic conditions and co-morbidity symptoms are monitored and maintained or improved  1/28/2024 2210 by Dhruv Villanueva RN  Outcome: Progressing  1/28/2024 1038 by Tarsha Castro RN  Outcome: Progressing

## 2024-01-30 LAB
ANION GAP SERPL CALCULATED.3IONS-SCNC: 10 MMOL/L (ref 3–16)
BUN SERPL-MCNC: 29 MG/DL (ref 7–20)
CALCIUM SERPL-MCNC: 8.7 MG/DL (ref 8.3–10.6)
CHLORIDE SERPL-SCNC: 98 MMOL/L (ref 99–110)
CO2 SERPL-SCNC: 24 MMOL/L (ref 21–32)
CREAT SERPL-MCNC: 1.6 MG/DL (ref 0.8–1.3)
GFR SERPLBLD CREATININE-BSD FMLA CKD-EPI: 45 ML/MIN/{1.73_M2}
GLUCOSE BLD-MCNC: 100 MG/DL (ref 70–99)
GLUCOSE BLD-MCNC: 212 MG/DL (ref 70–99)
GLUCOSE BLD-MCNC: 237 MG/DL (ref 70–99)
GLUCOSE BLD-MCNC: 239 MG/DL (ref 70–99)
GLUCOSE SERPL-MCNC: 239 MG/DL (ref 70–99)
PERFORMED ON: ABNORMAL
POTASSIUM SERPL-SCNC: 3.9 MMOL/L (ref 3.5–5.1)
SODIUM SERPL-SCNC: 132 MMOL/L (ref 136–145)

## 2024-01-30 PROCEDURE — 2580000003 HC RX 258

## 2024-01-30 PROCEDURE — 6360000002 HC RX W HCPCS: Performed by: INTERNAL MEDICINE

## 2024-01-30 PROCEDURE — 6370000000 HC RX 637 (ALT 250 FOR IP)

## 2024-01-30 PROCEDURE — 6370000000 HC RX 637 (ALT 250 FOR IP): Performed by: NURSE PRACTITIONER

## 2024-01-30 PROCEDURE — 94640 AIRWAY INHALATION TREATMENT: CPT

## 2024-01-30 PROCEDURE — 80048 BASIC METABOLIC PNL TOTAL CA: CPT

## 2024-01-30 PROCEDURE — 6360000002 HC RX W HCPCS: Performed by: NURSE PRACTITIONER

## 2024-01-30 PROCEDURE — 36415 COLL VENOUS BLD VENIPUNCTURE: CPT

## 2024-01-30 PROCEDURE — 2060000000 HC ICU INTERMEDIATE R&B

## 2024-01-30 RX ORDER — HYDRALAZINE HYDROCHLORIDE 20 MG/ML
10 INJECTION INTRAMUSCULAR; INTRAVENOUS EVERY 6 HOURS PRN
Status: DISCONTINUED | OUTPATIENT
Start: 2024-01-30 | End: 2024-01-31 | Stop reason: HOSPADM

## 2024-01-30 RX ORDER — OXYCODONE AND ACETAMINOPHEN 10; 325 MG/1; MG/1
1 TABLET ORAL EVERY 8 HOURS PRN
Qty: 3 TABLET | Refills: 0 | Status: SHIPPED | OUTPATIENT
Start: 2024-01-30 | End: 2024-02-01

## 2024-01-30 RX ORDER — LABETALOL HYDROCHLORIDE 5 MG/ML
10 INJECTION, SOLUTION INTRAVENOUS EVERY 6 HOURS PRN
Status: DISCONTINUED | OUTPATIENT
Start: 2024-01-30 | End: 2024-01-31 | Stop reason: HOSPADM

## 2024-01-30 RX ORDER — AMLODIPINE BESYLATE 5 MG/1
10 TABLET ORAL NIGHTLY
Refills: 0 | DISCHARGE
Start: 2024-01-31 | End: 2024-03-01

## 2024-01-30 RX ORDER — AMLODIPINE BESYLATE 5 MG/1
10 TABLET ORAL DAILY
Status: DISCONTINUED | OUTPATIENT
Start: 2024-01-30 | End: 2024-01-31 | Stop reason: HOSPADM

## 2024-01-30 RX ORDER — TAMSULOSIN HYDROCHLORIDE 0.4 MG/1
0.4 CAPSULE ORAL DAILY
Qty: 30 CAPSULE | Refills: 3 | DISCHARGE
Start: 2024-01-31

## 2024-01-30 RX ORDER — AMLODIPINE BESYLATE 5 MG/1
10 TABLET ORAL NIGHTLY
Status: DISCONTINUED | OUTPATIENT
Start: 2024-01-30 | End: 2024-01-30

## 2024-01-30 RX ORDER — INSULIN LISPRO 100 [IU]/ML
15 INJECTION, SOLUTION INTRAVENOUS; SUBCUTANEOUS
Refills: 0 | DISCHARGE
Start: 2024-01-30 | End: 2024-02-29

## 2024-01-30 RX ADMIN — BUTALBITAL, ACETAMINOPHEN AND CAFFEINE 1 TABLET: 325; 50; 40 TABLET ORAL at 21:50

## 2024-01-30 RX ADMIN — METOPROLOL TARTRATE 50 MG: 50 TABLET, FILM COATED ORAL at 21:29

## 2024-01-30 RX ADMIN — INSULIN LISPRO 15 UNITS: 100 INJECTION, SOLUTION INTRAVENOUS; SUBCUTANEOUS at 15:02

## 2024-01-30 RX ADMIN — BUTALBITAL, ACETAMINOPHEN AND CAFFEINE 1 TABLET: 325; 50; 40 TABLET ORAL at 15:45

## 2024-01-30 RX ADMIN — OXYCODONE AND ACETAMINOPHEN 1 TABLET: 10; 325 TABLET ORAL at 04:56

## 2024-01-30 RX ADMIN — ASPIRIN 81 MG 81 MG: 81 TABLET ORAL at 09:25

## 2024-01-30 RX ADMIN — SERTRALINE HYDROCHLORIDE 100 MG: 50 TABLET ORAL at 09:26

## 2024-01-30 RX ADMIN — DIVALPROEX SODIUM 125 MG: 125 CAPSULE, COATED PELLETS ORAL at 21:50

## 2024-01-30 RX ADMIN — Medication 10 ML: at 21:30

## 2024-01-30 RX ADMIN — INSULIN LISPRO 4 UNITS: 100 INJECTION, SOLUTION INTRAVENOUS; SUBCUTANEOUS at 18:05

## 2024-01-30 RX ADMIN — MEMANTINE 10 MG: 5 TABLET ORAL at 09:25

## 2024-01-30 RX ADMIN — MEMANTINE 10 MG: 5 TABLET ORAL at 21:29

## 2024-01-30 RX ADMIN — TIOTROPIUM BROMIDE AND OLODATEROL 2 PUFF: 3.124; 2.736 SPRAY, METERED RESPIRATORY (INHALATION) at 07:57

## 2024-01-30 RX ADMIN — Medication 10 MG: at 21:29

## 2024-01-30 RX ADMIN — INSULIN LISPRO 15 UNITS: 100 INJECTION, SOLUTION INTRAVENOUS; SUBCUTANEOUS at 18:05

## 2024-01-30 RX ADMIN — AMLODIPINE BESYLATE 10 MG: 5 TABLET ORAL at 15:04

## 2024-01-30 RX ADMIN — FINASTERIDE 5 MG: 5 TABLET, FILM COATED ORAL at 09:28

## 2024-01-30 RX ADMIN — AMITRIPTYLINE HYDROCHLORIDE 50 MG: 25 TABLET, FILM COATED ORAL at 21:29

## 2024-01-30 RX ADMIN — INSULIN LISPRO 15 UNITS: 100 INJECTION, SOLUTION INTRAVENOUS; SUBCUTANEOUS at 09:29

## 2024-01-30 RX ADMIN — INSULIN LISPRO 4 UNITS: 100 INJECTION, SOLUTION INTRAVENOUS; SUBCUTANEOUS at 09:27

## 2024-01-30 RX ADMIN — ENOXAPARIN SODIUM 30 MG: 100 INJECTION SUBCUTANEOUS at 09:27

## 2024-01-30 RX ADMIN — PANTOPRAZOLE SODIUM 40 MG: 40 TABLET, DELAYED RELEASE ORAL at 09:28

## 2024-01-30 RX ADMIN — HYDRALAZINE HYDROCHLORIDE 10 MG: 20 INJECTION, SOLUTION INTRAMUSCULAR; INTRAVENOUS at 17:01

## 2024-01-30 RX ADMIN — TAMSULOSIN HYDROCHLORIDE 0.4 MG: 0.4 CAPSULE ORAL at 09:25

## 2024-01-30 RX ADMIN — ENOXAPARIN SODIUM 30 MG: 100 INJECTION SUBCUTANEOUS at 21:30

## 2024-01-30 RX ADMIN — OXYCODONE AND ACETAMINOPHEN 1 TABLET: 10; 325 TABLET ORAL at 18:37

## 2024-01-30 RX ADMIN — Medication 1 PUFF: at 07:57

## 2024-01-30 RX ADMIN — METOPROLOL TARTRATE 50 MG: 50 TABLET, FILM COATED ORAL at 09:25

## 2024-01-30 RX ADMIN — INSULIN LISPRO 4 UNITS: 100 INJECTION, SOLUTION INTRAVENOUS; SUBCUTANEOUS at 15:03

## 2024-01-30 RX ADMIN — ATORVASTATIN CALCIUM 80 MG: 80 TABLET, FILM COATED ORAL at 21:30

## 2024-01-30 RX ADMIN — OXYCODONE AND ACETAMINOPHEN 1 TABLET: 10; 325 TABLET ORAL at 11:39

## 2024-01-30 RX ADMIN — DIVALPROEX SODIUM 125 MG: 125 CAPSULE, COATED PELLETS ORAL at 09:25

## 2024-01-30 RX ADMIN — Medication 10 ML: at 09:27

## 2024-01-30 RX ADMIN — BUTALBITAL, ACETAMINOPHEN AND CAFFEINE 1 TABLET: 325; 50; 40 TABLET ORAL at 09:25

## 2024-01-30 ASSESSMENT — PAIN SCALES - GENERAL
PAINLEVEL_OUTOF10: 8
PAINLEVEL_OUTOF10: 9
PAINLEVEL_OUTOF10: 9
PAINLEVEL_OUTOF10: 8

## 2024-01-30 ASSESSMENT — PAIN DESCRIPTION - LOCATION
LOCATION: HEAD;NECK;BACK
LOCATION: GENERALIZED
LOCATION: HEAD

## 2024-01-30 ASSESSMENT — PAIN DESCRIPTION - FREQUENCY
FREQUENCY: CONTINUOUS
FREQUENCY: CONTINUOUS

## 2024-01-30 ASSESSMENT — PAIN DESCRIPTION - PAIN TYPE
TYPE: ACUTE PAIN
TYPE: ACUTE PAIN

## 2024-01-30 ASSESSMENT — PAIN DESCRIPTION - ORIENTATION: ORIENTATION: POSTERIOR

## 2024-01-30 NOTE — PROGRESS NOTES
Hospital Medicine Progress Note      Date of Admission: 1/25/2024  Hospital Day: 6    Chief Admission Complaint:  altered mental status    Subjective:  denies n/v, dizziness, dyspnea.     Presenting Admission History: 73 y.o. male with past medical history significant for diabetes, BPH, dementia, COPD.  Presented to [facility] from [facility] for altered mental status.  His blood sugar was reported to be 26 at the facility, and he was given glucagon.  In the squad glucose was 80.  Glucose was 123 on arrival, but dropped to 63.  He was given 12.5g of D50 x2 and started on fluids with D5.  The patient does not recall any events leading up to him coming to the hospital.  At time of exam, he denies n/v, dizziness; but complains of headache     Assessment/Plan:      Current Principal Problem:  Hypoglycemia    Diabetes type II, uncontrolled.  Presented with significant hypoglycemia and was treated with D50 and a D5 drip.  He is now hyperglycemic.  A1c noted to be 8.3 this admission.  Hold home regimen.  Fingerstick blood glucose will be monitored closely for hypoglycemic adverse drug reaction.  Basal bolus, prandial, and sliding scale insulin will be monitored and adjusted as needed - adjusted again on 1/28.  Hypoglycemia protocol in place.       Acute kidney injury on CKD stage 3a.  Likely due to urinary retention.  Baseline creat likely normal, there are few labs for comparison.  On admission it was 1.8 and is currently 1.5.  Avoid hypotension, nephrotoxics as able.  Monitor     Pseudohyponatremia.  Na 131, corrects to 136 when hyperglycemia is accounted for    Hyperkalemia.  Likely d/t CHIP and hyperglycemia.  Lokelma x1. K 4.1 today    Chronic systolic heart failure.  No evidence of exacerbation at this time.  Echo 10/2020: EF 40%; global hypokinesis.  Limited echo this admission: EF 40-45%; hypokinesis of apex and apical lateral walls.  Monitor daily weights, I&O.      Urinary retention, acute.  Likely due to obstruction.

## 2024-01-30 NOTE — FLOWSHEET NOTE
Discharge instructions, along with upcoming appointment information and new medications reviewed with patient; pt verbalized understanding. Tele monitor removed & logged, CMU aware. Peripheral IV removed w/o complication. Patient dressed and personal belongings packed. Report given to EMS transport staff. Copy of AVS and [facility] in chart. Report called to *** RN at *** facility at **** AM/PM. EMS transport en route to facility.    01/30/24 1538   AVS Reviewed   AVS & discharge instructions reviewed with patient and/or representative? Yes   Reviewed instructions with Patient   Level of Understanding Questions answered;Verbalized understanding

## 2024-01-30 NOTE — CARE COORDINATION
Patient's BP was 204/109  when EMS arrived to  patient. RN umair served MD and is holding discharge and patient is getting some IV blood pressure medication. Notified Yoon in admissions.

## 2024-01-30 NOTE — DISCHARGE INSTR - COC
Continuity of Care Form    Patient Name: Don Hi   :  1950  MRN:  [id number]    Admit date:  2024  Discharge date:  24      Code Status Order: Full Code   Advance Directives:     Admitting Physician:  Shaggy Gordon MD  PCP: No primary care provider on file.    Discharging Nurse: Kaela Velásquez  Discharging Hospital Unit/Room#: 0435/0435-01  Discharging Unit Phone Number: [phone]      Emergency Contact:   Extended Emergency Contact Information  Primary Emergency Contact: Mallory Arauz   [facility]  Home Phone: [phone]  Mobile Phone: [phone]  Relation: Brother/Sister  Secondary Emergency Contact: Ten Hi  Address: [address]           [id number]   [place]  Relation: Brother/Sister    Past Surgical History:  Past Surgical History:   Procedure Laterality Date    COLONOSCOPY      DIAGNOSTIC CARDIAC CATH LAB PROCEDURE      DILATATION, ESOPHAGUS      FOOT SURGERY Left Hammer toe, 2nd toe    10/9/14    FOOT SURGERY Left 2019    REMOVAL HARDWARE LEFT FOOT performed by Haresh Cotton DPM at [facility]    FRACTURE SURGERY      HARDWARE REMOVAL Left 2019    REMOVAL HARDWARE LEFT FOOT [facility] Cotton 19    HIP FRACTURE SURGERY      LEG SURGERY      broken leg    OTHER SURGICAL HISTORY Left 2013    EXCISION 5TH METATRSAL LEFT FOOT          OTHER SURGICAL HISTORY Right 2019    Procedure: PARTIAL RESECTION RIGHT FIFTH METATARSAL, ULCER DEBRIDEMENT WITH GRAFT APPLICATION    PRESSURE ULCER DEBRIDEMENT Right 2019    PARTIAL RESECTION RIGHT FIFTH METATARSAL, ULCER DEBRIDEMENT WITH GRAFT APPLICATION performed by Haresh Cotton DPM at [facility] OR    REVISION COLOSTOMY  2017    COLOSTOMY REVERSAL     TONSILLECTOMY      UPPER GASTROINTESTINAL ENDOSCOPY  2013    Esophageal Brushing       Immunization History:   Immunization History   Administered Date(s) Administered    COVID-19, PFIZER Bivalent, DO NOT Dilute, (age  belongings (please select all that are sent with patient):  All belongings with patient    RN SIGNATURE:  Electronically signed by Kaela Velásquez RN on 1/30/24 at 3:37 PM EST    CASE MANAGEMENT/SOCIAL WORK SECTION    Inpatient Status Date: ***    Readmission Risk Assessment Score:  Readmission Risk              Risk of Unplanned Readmission:  24           Discharging to Facility/ Agency   Name: Edmar Lynn  Phone:[phone]  Fax:[phone]      / signature: Electronically signed by Bonnie Adan RN on 1/30/24 at 12:40 PM EST    PHYSICIAN SECTION    Prognosis: Fair    Condition at Discharge: Stable    Rehab Potential (if transferring to Rehab): Fair    Recommended Labs or Other Treatments After Discharge: Resume LTC, Recommend repeat renal panel in 3-5 days, follow up with PCP, Glucose check ac/hs, Sliding scale insulin medium scale    Physician Certification: I certify the above information and transfer of Don Hi  is necessary for the continuing treatment of the diagnosis listed and that he requires Skilled Nursing Facility for less 30 days.     Update Admission H&P: No change in H&P    PHYSICIAN SIGNATURE:  Electronically signed by RICCO Lazar CNP on 1/30/24 at 1:01 PM EST

## 2024-01-30 NOTE — CARE COORDINATION
CASE MANAGEMENT DISCHARGE SUMMARY      Discharge to: [facility]    Precertification completed: NA  Hospital Exemption Notification (HENS) completed: NA    IMM given: (date) 1/26/2024    New Durable Medical Equipment ordered/agency: Defer to ECF    Transportation:      Medical Transport explained to pt/family. Pt/family voice no agency preference.    Agency used: [facility]  Ambulance   time: 3:30 pm   Ambulance form completed: Yes    Confirmed discharge plan with:MD/Kaela BRISENO/ Edmar Lynn     Patient: yes     Family:  yes  Name:Mallory Rodriguez Contact number: [phone]     Facility/Agency, name:  [facility] faxed Per Yoon in admissions orders have been pulled from [facility]   Phone number for report to facility: [phone]     FINN, name: Kaela

## 2024-01-30 NOTE — PROGRESS NOTES
Upon assessment shortly before discharge, pt's BP was elevated. NP contacted via [facility], see new orders. D/C canceled at this time, new IV placed. Charge RN and DC planner/CM aware. EMS transport cancelled. DON at facility notified and updated of status change.

## 2024-01-30 NOTE — PROGRESS NOTES
Anish candelario MD perfectserved about hypertension, still 190's syst after IVP hydralazine. See new orders.

## 2024-01-30 NOTE — PLAN OF CARE
Problem: Discharge Planning  Goal: Discharge to home or other facility with appropriate resources  1/30/2024 1530 by Kaela Velásquez RN  Outcome: Adequate for Discharge  1/30/2024 0544 by Dhruv Villanueva RN  Outcome: Progressing     Problem: Pain  Goal: Verbalizes/displays adequate comfort level or baseline comfort level  1/30/2024 1530 by Kaela Velásquez RN  Outcome: Adequate for Discharge  1/30/2024 0544 by Dhruv Villanueva RN  Outcome: Progressing     Problem: Skin/Tissue Integrity  Goal: Absence of new skin breakdown  Description: 1.  Monitor for areas of redness and/or skin breakdown  2.  Assess vascular access sites hourly  3.  Every 4-6 hours minimum:  Change oxygen saturation probe site  4.  Every 4-6 hours:  If on nasal continuous positive airway pressure, respiratory therapy assess nares and determine need for appliance change or resting period.  1/30/2024 1530 by Kaela Velásquez RN  Outcome: Adequate for Discharge  1/30/2024 0544 by Dhruv Villanueva RN  Outcome: Progressing     Problem: Safety - Adult  Goal: Free from fall injury  1/30/2024 1530 by Kaela Velásquez RN  Outcome: Adequate for Discharge  1/30/2024 0544 by Dhruv Villanueva RN  Outcome: Progressing     Problem: Chronic Conditions and Co-morbidities  Goal: Patient's chronic conditions and co-morbidity symptoms are monitored and maintained or improved  1/30/2024 1530 by Kaela Velásquez RN  Outcome: Adequate for Discharge  1/30/2024 0544 by Dhruv Villanueva RN  Outcome: Progressing

## 2024-01-31 VITALS
HEIGHT: 77 IN | BODY MASS INDEX: 25.94 KG/M2 | SYSTOLIC BLOOD PRESSURE: 170 MMHG | DIASTOLIC BLOOD PRESSURE: 83 MMHG | OXYGEN SATURATION: 99 % | TEMPERATURE: 98.2 F | WEIGHT: 219.7 LBS | RESPIRATION RATE: 16 BRPM | HEART RATE: 75 BPM

## 2024-01-31 LAB
ANION GAP SERPL CALCULATED.3IONS-SCNC: 10 MMOL/L (ref 3–16)
BUN SERPL-MCNC: 27 MG/DL (ref 7–20)
CALCIUM SERPL-MCNC: 8.7 MG/DL (ref 8.3–10.6)
CHLORIDE SERPL-SCNC: 99 MMOL/L (ref 99–110)
CO2 SERPL-SCNC: 23 MMOL/L (ref 21–32)
CREAT SERPL-MCNC: 1.6 MG/DL (ref 0.8–1.3)
GFR SERPLBLD CREATININE-BSD FMLA CKD-EPI: 45 ML/MIN/{1.73_M2}
GLUCOSE BLD-MCNC: 274 MG/DL (ref 70–99)
GLUCOSE BLD-MCNC: 290 MG/DL (ref 70–99)
GLUCOSE SERPL-MCNC: 258 MG/DL (ref 70–99)
PERFORMED ON: ABNORMAL
PERFORMED ON: ABNORMAL
POTASSIUM SERPL-SCNC: 4 MMOL/L (ref 3.5–5.1)
SODIUM SERPL-SCNC: 132 MMOL/L (ref 136–145)

## 2024-01-31 PROCEDURE — 36415 COLL VENOUS BLD VENIPUNCTURE: CPT

## 2024-01-31 PROCEDURE — 6370000000 HC RX 637 (ALT 250 FOR IP)

## 2024-01-31 PROCEDURE — 6370000000 HC RX 637 (ALT 250 FOR IP): Performed by: NURSE PRACTITIONER

## 2024-01-31 PROCEDURE — 94640 AIRWAY INHALATION TREATMENT: CPT

## 2024-01-31 PROCEDURE — 6360000002 HC RX W HCPCS: Performed by: INTERNAL MEDICINE

## 2024-01-31 PROCEDURE — 2580000003 HC RX 258

## 2024-01-31 PROCEDURE — 80048 BASIC METABOLIC PNL TOTAL CA: CPT

## 2024-01-31 RX ADMIN — ENOXAPARIN SODIUM 30 MG: 100 INJECTION SUBCUTANEOUS at 08:51

## 2024-01-31 RX ADMIN — FINASTERIDE 5 MG: 5 TABLET, FILM COATED ORAL at 08:51

## 2024-01-31 RX ADMIN — AMLODIPINE BESYLATE 10 MG: 5 TABLET ORAL at 08:51

## 2024-01-31 RX ADMIN — TAMSULOSIN HYDROCHLORIDE 0.4 MG: 0.4 CAPSULE ORAL at 08:51

## 2024-01-31 RX ADMIN — INSULIN LISPRO 15 UNITS: 100 INJECTION, SOLUTION INTRAVENOUS; SUBCUTANEOUS at 08:52

## 2024-01-31 RX ADMIN — OXYCODONE AND ACETAMINOPHEN 1 TABLET: 10; 325 TABLET ORAL at 12:02

## 2024-01-31 RX ADMIN — Medication 1 PUFF: at 07:51

## 2024-01-31 RX ADMIN — DIVALPROEX SODIUM 125 MG: 125 CAPSULE, COATED PELLETS ORAL at 08:51

## 2024-01-31 RX ADMIN — MEMANTINE 10 MG: 5 TABLET ORAL at 08:51

## 2024-01-31 RX ADMIN — OXYCODONE AND ACETAMINOPHEN 1 TABLET: 10; 325 TABLET ORAL at 05:50

## 2024-01-31 RX ADMIN — SERTRALINE HYDROCHLORIDE 100 MG: 50 TABLET ORAL at 08:51

## 2024-01-31 RX ADMIN — ASPIRIN 81 MG 81 MG: 81 TABLET ORAL at 08:51

## 2024-01-31 RX ADMIN — INSULIN LISPRO 8 UNITS: 100 INJECTION, SOLUTION INTRAVENOUS; SUBCUTANEOUS at 12:03

## 2024-01-31 RX ADMIN — TIOTROPIUM BROMIDE AND OLODATEROL 2 PUFF: 3.124; 2.736 SPRAY, METERED RESPIRATORY (INHALATION) at 07:49

## 2024-01-31 RX ADMIN — Medication 10 ML: at 08:52

## 2024-01-31 RX ADMIN — BUTALBITAL, ACETAMINOPHEN AND CAFFEINE 1 TABLET: 325; 50; 40 TABLET ORAL at 08:51

## 2024-01-31 RX ADMIN — PANTOPRAZOLE SODIUM 40 MG: 40 TABLET, DELAYED RELEASE ORAL at 08:51

## 2024-01-31 RX ADMIN — METOPROLOL TARTRATE 50 MG: 50 TABLET, FILM COATED ORAL at 08:51

## 2024-01-31 RX ADMIN — INSULIN LISPRO 8 UNITS: 100 INJECTION, SOLUTION INTRAVENOUS; SUBCUTANEOUS at 08:52

## 2024-01-31 RX ADMIN — BUTALBITAL, ACETAMINOPHEN AND CAFFEINE 1 TABLET: 325; 50; 40 TABLET ORAL at 04:39

## 2024-01-31 RX ADMIN — BUTALBITAL, ACETAMINOPHEN AND CAFFEINE 1 TABLET: 325; 50; 40 TABLET ORAL at 13:18

## 2024-01-31 RX ADMIN — INSULIN LISPRO 15 UNITS: 100 INJECTION, SOLUTION INTRAVENOUS; SUBCUTANEOUS at 12:02

## 2024-01-31 ASSESSMENT — PAIN SCALES - GENERAL
PAINLEVEL_OUTOF10: 9
PAINLEVEL_OUTOF10: 9
PAINLEVEL_OUTOF10: 7
PAINLEVEL_OUTOF10: 7
PAINLEVEL_OUTOF10: 9
PAINLEVEL_OUTOF10: 8

## 2024-01-31 ASSESSMENT — PAIN DESCRIPTION - LOCATION
LOCATION: HEAD

## 2024-01-31 NOTE — PLAN OF CARE
Problem: Discharge Planning  Goal: Discharge to home or other facility with appropriate resources  1/31/2024 0048 by Karen Calix RN  Outcome: Progressing  1/30/2024 2038 by Kaela Velásquez RN  Outcome: Progressing  1/30/2024 1530 by Kaela Velásquez RN  Outcome: Adequate for Discharge     Problem: Pain  Goal: Verbalizes/displays adequate comfort level or baseline comfort level  1/31/2024 0048 by Karen Calix RN  Outcome: Progressing  1/30/2024 2038 by Kaela Velásquez RN  Outcome: Progressing  1/30/2024 1530 by Kaela Velásquez RN  Outcome: Adequate for Discharge     Problem: Skin/Tissue Integrity  Goal: Absence of new skin breakdown  Description: 1.  Monitor for areas of redness and/or skin breakdown  2.  Assess vascular access sites hourly  3.  Every 4-6 hours minimum:  Change oxygen saturation probe site  4.  Every 4-6 hours:  If on nasal continuous positive airway pressure, respiratory therapy assess nares and determine need for appliance change or resting period.  1/31/2024 0048 by Karen Calix RN  Outcome: Progressing  1/30/2024 2038 by Kaela Velásquez RN  Outcome: Progressing  1/30/2024 1530 by Kaela Velásquez RN  Outcome: Adequate for Discharge     Problem: Safety - Adult  Goal: Free from fall injury  1/31/2024 0048 by Karen Calix RN  Outcome: Progressing  1/30/2024 2038 by Kaela Velásquez RN  Outcome: Progressing  1/30/2024 1530 by Kaela Velásquez RN  Outcome: Adequate for Discharge     Problem: Chronic Conditions and Co-morbidities  Goal: Patient's chronic conditions and co-morbidity symptoms are monitored and maintained or improved  1/31/2024 0048 by Karen Calix RN  Outcome: Progressing  1/30/2024 2038 by Kaela Velásquez RN  Outcome: Progressing  1/30/2024 1530 by Kaela Velásquez RN  Outcome: Adequate for Discharge

## 2024-01-31 NOTE — CARE COORDINATION
CASE MANAGEMENT DISCHARGE SUMMARY      Discharge to: [facility]    Precertification completed: NA  Hospital Exemption Notification (HENS) completed: NA    IMM given: (date) Today    New Durable Medical Equipment ordered/agency: defer to ECF    Transportation:       Medical Transport explained to pt/family. Pt/family voice no agency preference.    Agency used:   time:1330   Ambulance form completed: Yes    Confirmed discharge plan with:     Patient: yes     Family:  yes/no    Name:Mallory denis Contact number: [phone]     Facility/Agency, name:  [facility] received per Yoon in admissions at [facility]   Phone number for report to facility: [phone]     RN, name: Onesimo

## 2024-01-31 NOTE — PLAN OF CARE
Problem: Discharge Planning  Goal: Discharge to home or other facility with appropriate resources  1/30/2024 2038 by Kaela Velásquez RN  Outcome: Progressing  1/30/2024 1530 by Kaela Velásquez RN  Outcome: Adequate for Discharge     Problem: Pain  Goal: Verbalizes/displays adequate comfort level or baseline comfort level  1/30/2024 2038 by Kaela Velásquez RN  Outcome: Progressing  1/30/2024 1530 by Kaela Velásquez RN  Outcome: Adequate for Discharge     Problem: Skin/Tissue Integrity  Goal: Absence of new skin breakdown  Description: 1.  Monitor for areas of redness and/or skin breakdown  2.  Assess vascular access sites hourly  3.  Every 4-6 hours minimum:  Change oxygen saturation probe site  4.  Every 4-6 hours:  If on nasal continuous positive airway pressure, respiratory therapy assess nares and determine need for appliance change or resting period.  1/30/2024 2038 by Kaela Velásquez RN  Outcome: Progressing  1/30/2024 1530 by Kaela Velásquez RN  Outcome: Adequate for Discharge     Problem: Safety - Adult  Goal: Free from fall injury  1/30/2024 2038 by Kaela Velásquez RN  Outcome: Progressing  1/30/2024 1530 by Kaela Velásquez RN  Outcome: Adequate for Discharge     Problem: Chronic Conditions and Co-morbidities  Goal: Patient's chronic conditions and co-morbidity symptoms are monitored and maintained or improved  1/30/2024 2038 by Kaela Velásquez RN  Outcome: Progressing  1/30/2024 1530 by Kaela Velásquez RN  Outcome: Adequate for Discharge

## 2024-02-01 NOTE — DISCHARGE SUMMARY
Hospital Medicine Discharge Summary    Patient: Don Hi   : 1950     Admit Date: 2024   Discharge Date: 2024    Disposition:  []Home   []HHC  []SNF  [x]ECF  []Acute Rehab  []LTAC  []Hospice  Code status:  [x]Full  []DNR/CCA  []Limited (DNR/CCA with Do Not Intubate)  []DNRCC  Condition at Discharge: Stable  Primary Care Provider: No primary care provider on file.    Admitting Provider: Shaggy Gordon MD  Discharge Provider: RICCO Lazar - CNP     Discharge Diagnoses:      Active Hospital Problems    Diagnosis     Hypoglycemia [E16.2]        Presenting Admission History:      73 y.o. male with past medical history significant for diabetes, BPH, dementia, and COPD.  Presented to [facility] from [facility] for altered mental status.  His blood sugar was reported to be 26 at the facility, and he was given glucagon.  In the squad glucose was 80.  Glucose was 123 on arrival, but dropped to 63.  He was given 12.5g of D50 x2 and started on fluids with D5.  The patient does not recall any events leading up to him coming to the hospital.  At time of admit, he denies n/v, dizziness; but complains of headache which is chronic.     Diabetes type II, uncontrolled.  Presented with significant hypoglycemia and was treated with D50 and a D5 drip.  He is now hyperglycemic.  A1c noted to be 8.3 this admission.  Hold home regimen.  Fingerstick blood glucose will be monitored closely for hypoglycemic adverse drug reaction.  Basal bolus, prandial, and sliding scale insulin will be monitored and adjusted as needed - adjusted again on .  Hypoglycemia protocol in place.       Acute kidney injury on CKD stage 3a.  Likely due to urinary retention.  Baseline creat unclear, there are no labs for recent comparison.  On admission it was 1.8 and is currently 1.6.  Avoid hypotension, nephrotoxics as able. Monitor      Pseudohyponatremia.  Na 131, corrects to 136 when hyperglycemia is accounted for     Hyperkalemia.  scattered levels of facet are seen.  Scattered levels of foraminal narrowing also seen.No vertebral body fracture. Atherosclerotic change seen in the carotid arteries.  No focal consolidation seen on limited images of lungs.  There is emphysema.  Small lymph nodes are seen neck     Scattered degenerative change.  No fracture noted     CT HEAD WO CONTRAST    Result Date: 1/25/2024  EXAMINATION: CT OF THE HEAD WITHOUT CONTRAST  1/25/2024 11:02 am TECHNIQUE: CT of the head was performed without the administration of intravenous contrast. Automated exposure control, iterative reconstruction, and/or weight based adjustment of the mA/kV was utilized to reduce the radiation dose to as low as reasonably achievable. COMPARISON: 02/16/2022 HISTORY: ORDERING SYSTEM PROVIDED HISTORY: headache TECHNOLOGIST PROVIDED HISTORY: If patient is on cardiac monitor and/or pulse ox, they may be taken off cardiac monitor and pulse ox, left on O2 if currently on. All monitors reattached when patient returns to room. Has a \"code stroke\" or \"stroke alert\" been called?->No Reason for exam:->headache Decision Support Exception - unselect if not a suspected or confirmed emergency medical condition->Emergency Medical Condition (MA) Reason for Exam: AMS, c/o headache and neck pain today FINDINGS: BRAIN/VENTRICLES: There is no acute intracranial hemorrhage, mass effect or midline shift. No abnormal extra-axial fluid collection.  The gray-white differentiation is maintained without evidence of an acute infarct.  Remote right PCA distribution infarct is noted.  There is prominence of the ventricles and sulci due to global parenchymal volume loss.  There are nonspecific areas of hypoattenuation within the periventricular and subcortical white matter, which likely represent chronic microvascular ischemic change. ORBITS: The visualized portion of the orbits demonstrate no acute abnormality. SINUSES: The visualized paranasal sinuses and mastoid air cells

## 2024-02-09 LAB
BASOPHILS ABSOLUTE: 0.1 /ΜL
BASOPHILS RELATIVE PERCENT: 0.8 %
EOSINOPHILS ABSOLUTE: 0.4 /ΜL
EOSINOPHILS RELATIVE PERCENT: 4.9 %
HCT VFR BLD CALC: 31.2 % (ref 41–53)
HEMOGLOBIN: 10.2 G/DL (ref 13.5–17.5)
LYMPHOCYTES ABSOLUTE: 3 /ΜL
LYMPHOCYTES RELATIVE PERCENT: 33.9 %
MCH RBC QN AUTO: 27.4 PG
MCHC RBC AUTO-ENTMCNC: 32.8 G/DL
MCV RBC AUTO: 83.6 FL
MONOCYTES ABSOLUTE: 1.1 /ΜL
MONOCYTES RELATIVE PERCENT: 11.9 %
NEUTROPHILS ABSOLUTE: 4.3 /ΜL
NEUTROPHILS RELATIVE PERCENT: 48.5 %
PLATELET # BLD: 389 K/ΜL
PMV BLD AUTO: 7.4 FL
RBC # BLD: 3.74 10^6/ΜL
WBC # BLD: 8.9 10^3/ML

## 2024-02-13 ENCOUNTER — TELEPHONE (OUTPATIENT)
Dept: CARDIOLOGY CLINIC | Age: 74
End: 2024-02-13

## 2024-02-13 NOTE — TELEPHONE ENCOUNTER
[facility] had a couple cancellations for 2/14/24.  He needs to stay with [facility].  He is not considered a new patient.

## 2024-02-13 NOTE — TELEPHONE ENCOUNTER
Patient scheduled to see [facility] tomorrow as a \"new patient\". Patient is an established patient with DEJA. Please call patient and schedule with DEJA. Last seen 2/18/2021 DEJA

## 2024-02-13 NOTE — TELEPHONE ENCOUNTER
Attempted to call pt @ [facility] and phone rang repeatedly, according to pts chart this appt was a r/s from a no show appt and pt stated he needed next available

## 2024-02-13 NOTE — PROGRESS NOTES
results to our facility - Attn Dr. Junior  - [phone]       Tentatively follow up  6 months; call with results of stress test in interim - further recommendations pending results.     Scribe's attestation:  This note was scribed in the presence of Dr. Anton Junior MD by Ashley Barrera, RN        The scribe’s documentation has been prepared under my direction and personally reviewed by me in its entirety.  I confirm that the note above accurately reflects all work, treatment, procedures, and medical decision making performed by me.  I, Anton Junior MD, personally performed the services described in this documentation as scribed by  Ashley Barrera RN in my presence, and it is both accurate and complete to the best of our ability.        Thank you for allowing us to participate in the care of Don Hi. Please call me with any questions [phone].    Anton Junior MD, [facility]  Cardiovascular Disease  [facility]  [phone] [place] [facility]  [phone] [place] [facility]  2/14/2024 2:58 PM

## 2024-02-13 NOTE — TELEPHONE ENCOUNTER
Called and spoke w/ cherie rn @ [facility] and she is checking w/ transport to see if pts time can be changed tomorrow. Stated she would call back to confirm. If cherie returns call please confirm appt w/ DEJA if tomorrow & if that appt doesn't work pt needs to be r/s with DEJA

## 2024-02-14 ENCOUNTER — OFFICE VISIT (OUTPATIENT)
Dept: CARDIOLOGY CLINIC | Age: 74
End: 2024-02-14

## 2024-02-14 VITALS
WEIGHT: 231.5 LBS | OXYGEN SATURATION: 96 % | HEART RATE: 76 BPM | BODY MASS INDEX: 27.33 KG/M2 | HEIGHT: 77 IN | SYSTOLIC BLOOD PRESSURE: 132 MMHG | DIASTOLIC BLOOD PRESSURE: 78 MMHG

## 2024-02-14 DIAGNOSIS — I25.5 ISCHEMIC CARDIOMYOPATHY: Primary | ICD-10-CM

## 2024-02-14 DIAGNOSIS — R07.89 OTHER CHEST PAIN: ICD-10-CM

## 2024-02-14 DIAGNOSIS — I10 BENIGN ESSENTIAL HTN: ICD-10-CM

## 2024-02-14 DIAGNOSIS — R06.02 SOB (SHORTNESS OF BREATH): ICD-10-CM

## 2024-02-14 PROBLEM — I51.89 MILD LEFT VENTRICULAR SYSTOLIC DYSFUNCTION (LVSD): Status: ACTIVE | Noted: 2024-02-14

## 2024-02-14 NOTE — PATIENT INSTRUCTIONS
PLAN:  Check patients blood pressure with a Bicep cuff.  Recommend not using a wrist cuff as these are not as accurate.   Lexiscan Stress test to risk stratify    Your provider has ordered testing for further evaluation.  An order/prescription has been included in your paper work.   To schedule outpatient testing, contact Central Scheduling by calling [address] [place] [address] ([phone]).  Once you complete the test we will call you with the results     No change in medications today  Information printed for mediterranean diet and low salt/sodium diet   Recommend fasting lab work be drawn at your facility.   LIPIDS AND CMP   Please fax results to our facility - Attn Dr. Junior  - [phone]

## 2024-02-15 LAB
ALBUMIN SERPL-MCNC: 3.6 G/DL
ALP BLD-CCNC: 79 U/L
ALT SERPL-CCNC: 6 U/L
ANION GAP SERPL CALCULATED.3IONS-SCNC: NORMAL MMOL/L
AST SERPL-CCNC: 10 U/L
BILIRUB SERPL-MCNC: 0.2 MG/DL (ref 0.1–1.4)
BUN BLDV-MCNC: 28 MG/DL
CALCIUM SERPL-MCNC: 8.7 MG/DL
CHLORIDE BLD-SCNC: 101 MMOL/L
CHOLESTEROL, TOTAL: 104 MG/DL
CHOLESTEROL/HDL RATIO: 0.7
CO2: 30 MMOL/L
CREAT SERPL-MCNC: 1.9 MG/DL
EGFR: NORMAL
GLUCOSE BLD-MCNC: 81 MG/DL
HDLC SERPL-MCNC: 41 MG/DL (ref 35–70)
LDL CHOLESTEROL CALCULATED: 31 MG/DL (ref 0–160)
NONHDLC SERPL-MCNC: NORMAL MG/DL
SODIUM BLD-SCNC: 137 MMOL/L
TOTAL PROTEIN: 6.4
TRIGL SERPL-MCNC: 162 MG/DL
VLDLC SERPL CALC-MCNC: 32 MG/DL

## 2024-02-19 ENCOUNTER — TELEPHONE (OUTPATIENT)
Dept: CARDIOLOGY CLINIC | Age: 74
End: 2024-02-19

## 2024-02-19 NOTE — TELEPHONE ENCOUNTER
Labs reviewed.  Cholesterol looks pretty good, LDL at goal.  Metabolic profile shows progression of chronic kidney disease, creatinine 1.9, stage III.  Consider nephrology evaluation in future if progressive.  Most recent CBC with stable anemia.  No changes from my standpoint.  Please ensure facility and he received recommendations to change metoprolol to tartrate 50 twice daily to metoprolol succinate 100 mg once daily.  Thank you    DEJA

## 2024-02-20 NOTE — TELEPHONE ENCOUNTER
Spoke with FINN Bassett at [facility], relayed message per DEJA. RN v/danny and stated she will give metoprolol changes to in house doctor.

## 2024-02-20 NOTE — TELEPHONE ENCOUNTER
Tried calling again. No answer at nurse station. Called back and asked  to give the nurse a message to call our office.  Message can be relayed once she returns the call.

## 2024-03-05 ENCOUNTER — HOSPITAL ENCOUNTER (OUTPATIENT)
Dept: CARDIOLOGY | Age: 74
Discharge: HOME | End: 2024-03-05

## 2024-03-08 ENCOUNTER — HOSPITAL ENCOUNTER (OUTPATIENT)
Dept: CARDIOLOGY | Age: 74
Discharge: HOME | End: 2024-03-08
Attending: INTERNAL MEDICINE

## 2024-03-12 ENCOUNTER — HOSPITAL ENCOUNTER (OUTPATIENT)
Dept: NON INVASIVE DIAGNOSTICS | Age: 74
Discharge: HOME | End: 2024-03-12
Payer: MEDICARE

## 2024-03-12 ENCOUNTER — HOSPITAL ENCOUNTER (OUTPATIENT)
Dept: NUCLEAR MEDICINE | Age: 74
Discharge: HOME | End: 2024-03-12
Payer: MEDICARE

## 2024-03-12 DIAGNOSIS — R07.89 OTHER CHEST PAIN: ICD-10-CM

## 2024-03-12 DIAGNOSIS — R06.02 SOB (SHORTNESS OF BREATH): ICD-10-CM

## 2024-03-12 DIAGNOSIS — I10 BENIGN ESSENTIAL HTN: ICD-10-CM

## 2024-03-12 PROCEDURE — 93017 CV STRESS TEST TRACING ONLY: CPT

## 2024-03-12 PROCEDURE — 3430000000 HC RX DIAGNOSTIC RADIOPHARMACEUTICAL: Performed by: INTERNAL MEDICINE

## 2024-03-12 PROCEDURE — A9502 TC99M TETROFOSMIN: HCPCS | Performed by: INTERNAL MEDICINE

## 2024-03-12 PROCEDURE — 78452 HT MUSCLE IMAGE SPECT MULT: CPT

## 2024-03-12 PROCEDURE — 6360000002 HC RX W HCPCS: Performed by: INTERNAL MEDICINE

## 2024-03-12 RX ORDER — REGADENOSON 0.08 MG/ML
0.4 INJECTION, SOLUTION INTRAVENOUS
Status: COMPLETED | OUTPATIENT
Start: 2024-03-12 | End: 2024-03-12

## 2024-03-12 RX ADMIN — TETROFOSMIN 10.9 MILLICURIE: 1.38 INJECTION, POWDER, LYOPHILIZED, FOR SOLUTION INTRAVENOUS at 08:53

## 2024-03-12 RX ADMIN — REGADENOSON 0.4 MG: 0.08 INJECTION, SOLUTION INTRAVENOUS at 10:17

## 2024-03-12 RX ADMIN — TETROFOSMIN 31 MILLICURIE: 1.38 INJECTION, POWDER, LYOPHILIZED, FOR SOLUTION INTRAVENOUS at 10:17

## 2024-03-14 ENCOUNTER — TELEPHONE (OUTPATIENT)
Dept: CARDIOLOGY CLINIC | Age: 74
End: 2024-03-14

## 2024-03-14 NOTE — TELEPHONE ENCOUNTER
Called [phone] spoke with FINN Kat. Notified of RJM results and instructions she  states will notify patient. F/u appt scheduled she VU to time,date, and location for pt appt.

## 2024-03-14 NOTE — TELEPHONE ENCOUNTER
----- Message from Anton Junior MD sent at 3/12/2024  5:54 PM EDT -----  Stress test shows prior scar in the heart. Similar to previous studies. No large areas at risk noted. Follow up in office at 3 months from last OV for review of symptoms following changes made to medications. Call if progressive worsening pain/shortness of breath.

## 2024-04-07 ENCOUNTER — APPOINTMENT (OUTPATIENT)
Dept: GENERAL RADIOLOGY | Age: 74
End: 2024-04-07
Payer: MEDICARE

## 2024-04-07 ENCOUNTER — HOSPITAL ENCOUNTER (EMERGENCY)
Age: 74
Discharge: NURSING, REHAB OR LONG-TERM CARE | End: 2024-04-07
Attending: EMERGENCY MEDICINE
Payer: MEDICARE

## 2024-04-07 VITALS
HEART RATE: 72 BPM | HEIGHT: 77 IN | SYSTOLIC BLOOD PRESSURE: 176 MMHG | RESPIRATION RATE: 16 BRPM | WEIGHT: 225 LBS | BODY MASS INDEX: 26.57 KG/M2 | DIASTOLIC BLOOD PRESSURE: 84 MMHG | OXYGEN SATURATION: 96 % | TEMPERATURE: 98 F

## 2024-04-07 DIAGNOSIS — R73.9 HYPERGLYCEMIA: Primary | ICD-10-CM

## 2024-04-07 LAB
ALBUMIN SERPL-MCNC: 3.6 G/DL (ref 3.4–5)
ALBUMIN SERPL-MCNC: 4.1 G/DL (ref 3.4–5)
ALBUMIN/GLOB SERPL: 1.1 {RATIO} (ref 1.1–2.2)
ALBUMIN/GLOB SERPL: 1.2 {RATIO} (ref 1.1–2.2)
ALP SERPL-CCNC: 104 U/L (ref 40–129)
ALP SERPL-CCNC: 93 U/L (ref 40–129)
ALT SERPL-CCNC: 6 U/L (ref 10–40)
ALT SERPL-CCNC: 8 U/L (ref 10–40)
ANION GAP SERPL CALCULATED.3IONS-SCNC: 12 MMOL/L (ref 3–16)
ANION GAP SERPL CALCULATED.3IONS-SCNC: 9 MMOL/L (ref 3–16)
AST SERPL-CCNC: 12 U/L (ref 15–37)
AST SERPL-CCNC: 23 U/L (ref 15–37)
BASE EXCESS BLDV CALC-SCNC: -2 MMOL/L (ref -3–3)
BASE EXCESS BLDV CALC-SCNC: -2 MMOL/L (ref -3–3)
BASOPHILS # BLD: 0.1 K/UL (ref 0–0.2)
BASOPHILS NFR BLD: 1.3 %
BILIRUB SERPL-MCNC: 0.3 MG/DL (ref 0–1)
BILIRUB SERPL-MCNC: 0.3 MG/DL (ref 0–1)
BILIRUB UR QL STRIP.AUTO: NEGATIVE
BUN SERPL-MCNC: 24 MG/DL (ref 7–20)
BUN SERPL-MCNC: 25 MG/DL (ref 7–20)
CALCIUM SERPL-MCNC: 8.3 MG/DL (ref 8.3–10.6)
CALCIUM SERPL-MCNC: 8.9 MG/DL (ref 8.3–10.6)
CHLORIDE SERPL-SCNC: 102 MMOL/L (ref 99–110)
CHLORIDE SERPL-SCNC: 92 MMOL/L (ref 99–110)
CLARITY UR: CLEAR
CO2 BLDV-SCNC: 23 MMOL/L
CO2 BLDV-SCNC: 27 MMOL/L
CO2 SERPL-SCNC: 22 MMOL/L (ref 21–32)
CO2 SERPL-SCNC: 24 MMOL/L (ref 21–32)
COHGB MFR BLDV: 2.9 % (ref 0–1.5)
COHGB MFR BLDV: 3.4 % (ref 0–1.5)
COLOR UR: YELLOW
CREAT SERPL-MCNC: 1.5 MG/DL (ref 0.8–1.3)
CREAT SERPL-MCNC: 1.8 MG/DL (ref 0.8–1.3)
DEPRECATED RDW RBC AUTO: 15.7 % (ref 12.4–15.4)
EOSINOPHIL # BLD: 0.3 K/UL (ref 0–0.6)
EOSINOPHIL NFR BLD: 3 %
GFR SERPLBLD CREATININE-BSD FMLA CKD-EPI: 39 ML/MIN/{1.73_M2}
GFR SERPLBLD CREATININE-BSD FMLA CKD-EPI: 49 ML/MIN/{1.73_M2}
GLUCOSE BLD-MCNC: 254 MG/DL (ref 70–99)
GLUCOSE BLD-MCNC: 493 MG/DL (ref 70–99)
GLUCOSE SERPL-MCNC: 222 MG/DL (ref 70–99)
GLUCOSE SERPL-MCNC: 490 MG/DL (ref 70–99)
GLUCOSE UR STRIP.AUTO-MCNC: >=1000 MG/DL
HCO3 BLDV-SCNC: 22.1 MMOL/L (ref 23–29)
HCO3 BLDV-SCNC: 24.9 MMOL/L (ref 23–29)
HCT VFR BLD AUTO: 35.1 % (ref 40.5–52.5)
HGB BLD-MCNC: 11.3 G/DL (ref 13.5–17.5)
HGB UR QL STRIP.AUTO: NEGATIVE
KETONES UR STRIP.AUTO-MCNC: NEGATIVE MG/DL
LACTATE BLDV-SCNC: 1.5 MMOL/L (ref 0.4–1.9)
LACTATE BLDV-SCNC: 2.4 MMOL/L (ref 0.4–1.9)
LEUKOCYTE ESTERASE UR QL STRIP.AUTO: NEGATIVE
LYMPHOCYTES # BLD: 2.6 K/UL (ref 1–5.1)
LYMPHOCYTES NFR BLD: 27 %
MCH RBC QN AUTO: 27.5 PG (ref 26–34)
MCHC RBC AUTO-ENTMCNC: 32.3 G/DL (ref 31–36)
MCV RBC AUTO: 85.2 FL (ref 80–100)
METHGB MFR BLDV: 0.3 %
METHGB MFR BLDV: 0.3 %
MONOCYTES # BLD: 1 K/UL (ref 0–1.3)
MONOCYTES NFR BLD: 10.6 %
NEUTROPHILS # BLD: 5.5 K/UL (ref 1.7–7.7)
NEUTROPHILS NFR BLD: 58.1 %
NITRITE UR QL STRIP.AUTO: NEGATIVE
O2 THERAPY: ABNORMAL
O2 THERAPY: ABNORMAL
PCO2 BLDV: 35.5 MMHG (ref 40–50)
PCO2 BLDV: 51.6 MMHG (ref 40–50)
PERFORMED ON: ABNORMAL
PERFORMED ON: ABNORMAL
PH BLDV: 7.3 [PH] (ref 7.35–7.45)
PH BLDV: 7.41 [PH] (ref 7.35–7.45)
PH UR STRIP.AUTO: 6 [PH] (ref 5–8)
PLATELET # BLD AUTO: 335 K/UL (ref 135–450)
PMV BLD AUTO: 7.6 FL (ref 5–10.5)
PO2 BLDV: 134.7 MMHG (ref 25–40)
PO2 BLDV: 33.3 MMHG (ref 25–40)
POTASSIUM SERPL-SCNC: 4.3 MMOL/L (ref 3.5–5.1)
POTASSIUM SERPL-SCNC: 4.4 MMOL/L (ref 3.5–5.1)
PROT SERPL-MCNC: 6.6 G/DL (ref 6.4–8.2)
PROT SERPL-MCNC: 7.8 G/DL (ref 6.4–8.2)
PROT UR STRIP.AUTO-MCNC: 30 MG/DL
RBC # BLD AUTO: 4.12 M/UL (ref 4.2–5.9)
RBC #/AREA URNS HPF: NORMAL /HPF (ref 0–4)
SAO2 % BLDV: 61 %
SAO2 % BLDV: 98 %
SODIUM SERPL-SCNC: 128 MMOL/L (ref 136–145)
SODIUM SERPL-SCNC: 133 MMOL/L (ref 136–145)
SP GR UR STRIP.AUTO: 1.01 (ref 1–1.03)
UA COMPLETE W REFLEX CULTURE PNL UR: ABNORMAL
UA DIPSTICK W REFLEX MICRO PNL UR: YES
URN SPEC COLLECT METH UR: ABNORMAL
UROBILINOGEN UR STRIP-ACNC: 0.2 E.U./DL
WBC # BLD AUTO: 9.5 K/UL (ref 4–11)
WBC #/AREA URNS HPF: NORMAL /HPF (ref 0–5)

## 2024-04-07 PROCEDURE — 80053 COMPREHEN METABOLIC PANEL: CPT

## 2024-04-07 PROCEDURE — 2580000003 HC RX 258: Performed by: REGISTERED NURSE

## 2024-04-07 PROCEDURE — 99284 EMERGENCY DEPT VISIT MOD MDM: CPT

## 2024-04-07 PROCEDURE — 71046 X-RAY EXAM CHEST 2 VIEWS: CPT

## 2024-04-07 PROCEDURE — 82803 BLOOD GASES ANY COMBINATION: CPT

## 2024-04-07 PROCEDURE — 85025 COMPLETE CBC W/AUTO DIFF WBC: CPT

## 2024-04-07 PROCEDURE — 81001 URINALYSIS AUTO W/SCOPE: CPT

## 2024-04-07 PROCEDURE — 82010 KETONE BODYS QUAN: CPT

## 2024-04-07 PROCEDURE — 83605 ASSAY OF LACTIC ACID: CPT

## 2024-04-07 PROCEDURE — 6370000000 HC RX 637 (ALT 250 FOR IP): Performed by: REGISTERED NURSE

## 2024-04-07 RX ORDER — IPRATROPIUM BROMIDE AND ALBUTEROL SULFATE 2.5; .5 MG/3ML; MG/3ML
1 SOLUTION RESPIRATORY (INHALATION) ONCE
Status: COMPLETED | OUTPATIENT
Start: 2024-04-07 | End: 2024-04-07

## 2024-04-07 RX ORDER — 0.9 % SODIUM CHLORIDE 0.9 %
1000 INTRAVENOUS SOLUTION INTRAVENOUS ONCE
Status: COMPLETED | OUTPATIENT
Start: 2024-04-07 | End: 2024-04-07

## 2024-04-07 RX ORDER — 0.9 % SODIUM CHLORIDE 0.9 %
500 INTRAVENOUS SOLUTION INTRAVENOUS ONCE
Status: COMPLETED | OUTPATIENT
Start: 2024-04-07 | End: 2024-04-07

## 2024-04-07 RX ADMIN — SODIUM CHLORIDE 500 ML: 9 INJECTION, SOLUTION INTRAVENOUS at 12:21

## 2024-04-07 RX ADMIN — SODIUM CHLORIDE 500 ML: 9 INJECTION, SOLUTION INTRAVENOUS at 11:43

## 2024-04-07 RX ADMIN — IPRATROPIUM BROMIDE AND ALBUTEROL SULFATE 1 DOSE: 2.5; .5 SOLUTION RESPIRATORY (INHALATION) at 13:19

## 2024-04-07 RX ADMIN — SODIUM CHLORIDE 1000 ML: 9 INJECTION, SOLUTION INTRAVENOUS at 13:19

## 2024-04-07 ASSESSMENT — PAIN - FUNCTIONAL ASSESSMENT: PAIN_FUNCTIONAL_ASSESSMENT: 0-10

## 2024-04-07 ASSESSMENT — PAIN SCALES - GENERAL: PAINLEVEL_OUTOF10: 0

## 2024-04-07 NOTE — ED PROVIDER NOTES
interpreted by the Emergency Department Physician who either signs orCo-signs this chart in the absence of a cardiologist.  Please see their note for interpretation of EKG.      RADIOLOGY:   Non-plain film images such as CT, Ultrasound and MRI are read by the radiologist. Plain radiographic images are visualized andpreliminarily interpreted by the  ED Provider with the below findings:    Interpretation sebastian Radiologist below, if available at the time of this note:    XR CHEST (2 VW)   Final Result   No acute process.           No results found.       PROCEDURES   Unless otherwise noted below, none     Procedures    CRITICAL CARE TIME   I personally saw the patient and independently provided 35 minutes of non-concurrent critical care out of the total shared critical care time provided. This excludes time spent doing separately billable procedures.  This includes time at the bedside, data interpretation, medication management, obtaining critical history from collateral sources if the patient is unable to provide it directly, and physician consultation.  Specifics of interventions taken and potentially life-threatening diagnostic considerations are listed above in the medical decision making.  If this was a shared visit with a Physician the time in this attestation is non-concurrent critical care time out of the total shared critical care time provided by the Physician and myself.      CONSULTS:  None      EMERGENCY DEPARTMENT COURSE and DIFFERENTIALDIAGNOSIS/MDM:   Vitals:    Vitals:    04/07/24 1300 04/07/24 1330 04/07/24 1400 04/07/24 1430   BP: (!) 169/75 (!) 153/90 (!) 178/77 (!) 176/84   Pulse: 67 67 66 72   Resp: 16 16 16 16   Temp:       TempSrc:       SpO2: 96% 99% 95% 96%   Weight:       Height:           Patient was given thefollowing medications:  Medications   sodium chloride 0.9 % bolus 500 mL (0 mLs IntraVENous Stopped 4/7/24 1219)   sodium chloride 0.9 % bolus 500 mL (0 mLs IntraVENous Stopped 4/7/24

## 2024-04-08 LAB — BETA-HYDROXYBUTYRATE: 0.17 MMOL/L (ref 0–0.27)

## 2024-05-07 ENCOUNTER — OFFICE VISIT (OUTPATIENT)
Dept: CARDIOLOGY CLINIC | Age: 74
End: 2024-05-07

## 2024-05-07 ENCOUNTER — TELEPHONE (OUTPATIENT)
Dept: CARDIOLOGY CLINIC | Age: 74
End: 2024-05-07

## 2024-05-07 VITALS
WEIGHT: 225 LBS | SYSTOLIC BLOOD PRESSURE: 120 MMHG | HEIGHT: 77 IN | HEART RATE: 66 BPM | RESPIRATION RATE: 84 BRPM | DIASTOLIC BLOOD PRESSURE: 62 MMHG | BODY MASS INDEX: 26.57 KG/M2 | OXYGEN SATURATION: 96 %

## 2024-05-07 DIAGNOSIS — I10 ESSENTIAL HYPERTENSION: ICD-10-CM

## 2024-05-07 DIAGNOSIS — E78.2 MIXED HYPERLIPIDEMIA: ICD-10-CM

## 2024-05-07 DIAGNOSIS — R06.02 SOB (SHORTNESS OF BREATH): ICD-10-CM

## 2024-05-07 DIAGNOSIS — I25.5 ISCHEMIC CARDIOMYOPATHY: ICD-10-CM

## 2024-05-07 DIAGNOSIS — I20.89 ANGINA AT REST (HCC): Primary | ICD-10-CM

## 2024-05-07 RX ORDER — ISOSORBIDE MONONITRATE 30 MG/1
30 TABLET, EXTENDED RELEASE ORAL DAILY
Qty: 90 TABLET | Refills: 3
Start: 2024-05-07

## 2024-05-07 RX ORDER — METOPROLOL SUCCINATE 100 MG/1
100 TABLET, EXTENDED RELEASE ORAL DAILY
Qty: 90 TABLET | Refills: 1
Start: 2024-05-07

## 2024-05-07 NOTE — PROGRESS NOTES
of Breath 3/27/18  Yes Payal Jo MD   glucagon 1 MG injection Inject 1 mg into the skin as needed   Yes ProviderMalachi MD   insulin lispro (HUMALOG) 100 UNIT/ML SOLN injection vial Inject 15 Units into the skin 3 times daily (with meals) 1/30/24 2/29/24  Haresh Reeves APRN - CNP   polyethylene glycol (GLYCOLAX) powder Take 17 g by mouth daily  Patient not taking: Reported on 2/14/2024    ProviderMalachi MD        CURRENT Medications:  No current facility-administered medications for this visit.      Allergies:  Neurontin [gabapentin]     Review of Systems:   A 14 point review of symptoms completed. Pertinent positives identified in the HPI, all other review of symptoms negative.       Objective:     Vitals:    05/07/24 1340 05/07/24 1350   BP: 120/62    Pulse: 66    Resp: (!) 84    SpO2:  96%   Weight: 102.1 kg (225 lb)    Height: 1.956 m (6' 5\")           Weight - Scale: 102.1 kg (225 lb)       PHYSICAL EXAM:    General:  Chronically ill appearing man in no acute distress, in wheelchair    Head:  Normocephalic, atraumatic   Eyes:  Conjunctiva/corneas clear, anicteric sclerae    Nose: Nares normal, no drainage or sinus tenderness   Throat: No abnormalities of the lips, oral mucosa or tongue.    Neck: Trachea midline. Neck supple with no lymphadenopathy    Lungs:   Clear to auscultation bilaterally, no wheezes, no rales, no respiratory distress   Chest Wall:  No deformity or tenderness to palpation   Heart:  Regular rate and rhythm, normal S1, normal S2, no murmur, no rub, no S3/S4, PMI non-palpable.     Abdomen:   Soft, non-tender, with normoactive bowel sounds    Extremities: No cyanosis, clubbing or pitting edema.    Vascular: 2+ radial, diminished dorsalis pedis and posterior tibial pulses bilaterally. Brisk carotid upstrokes without carotid bruit.    Skin: Skin color, texture, turgor are normal with no rashes or ulceration.    Pysch: Euthymic mood, appropriate affect   Neurologic: facial

## 2024-05-07 NOTE — TELEPHONE ENCOUNTER
Isabel from [facility] had questions about his medications. Pt advised them that ap changed him from the lopressor to Metoprolol. Isabel stated that pt was already on Metoprolol. At  on 5/7 ap prescribed Metoprolol Succinate. Isabel can be reached at [phone]. Please advise.

## 2024-05-07 NOTE — TELEPHONE ENCOUNTER
Patient to be scheduled for a Left Heart Cath  Dr. Abernathy or Romario   Next 2 weeks   Medications reviewed while in office and print out given to take back to nursing facility.   He resides at [facility].  Number to facility is [phone].

## 2024-05-07 NOTE — PATIENT INSTRUCTIONS
PLAN:  A Left Heart Catheterization has been discussed  Our  - Diane will call you to schedule this  You will need a   If no intervention is needed, you will go home same day  If intervention is needed, you will stay over night  Medication instruction - You will take 1/2 dose of insulin the night before the procedure.    No insulin the morning of the procedure.  Hold any vitamins or supplements.  May take all other medications in the morning with sips of water, including the Aspirin.    Patient will be started on new medication isosorbide 30 mg daily.  Patient verbalizes understanding of the need for treatment and education provided at today's visit.  Additional education materials will be provided in the AVS.

## 2024-05-15 NOTE — TELEPHONE ENCOUNTER
Nurse from [facility] returned aimees call, informed nurse she was off today and will return tomorrow. Nurse will try to call again tomorrow.

## 2024-05-17 NOTE — TELEPHONE ENCOUNTER
Procedure:  [facility] (Coming by transportation)  Doctor:  Dr. Abernathy ([place])  Date:  5/31/24  Time:  8am  Arrival:  6:30am  Reps:  n/a  Anesthesia:  n/a      Spoke with Isabel @ [facility]. Please have patient arrive to the main entrance of [facility] ([address] [place], [place] [id number]) and check in with the registration desk.  They will be directed to the Cath Lab.   Remind patient to be NPO after midnight (8 hours prior). Do not apply lotions/creams on skin the day of procedure.    Information faxed to [phone].

## 2024-05-31 ENCOUNTER — HOSPITAL ENCOUNTER (OUTPATIENT)
Age: 74
Discharge: HOME | End: 2024-05-31
Attending: INTERNAL MEDICINE | Admitting: INTERNAL MEDICINE
Payer: MEDICARE

## 2024-05-31 VITALS
WEIGHT: 225 LBS | BODY MASS INDEX: 26.57 KG/M2 | HEART RATE: 80 BPM | SYSTOLIC BLOOD PRESSURE: 154 MMHG | HEIGHT: 77 IN | OXYGEN SATURATION: 96 % | RESPIRATION RATE: 15 BRPM | DIASTOLIC BLOOD PRESSURE: 84 MMHG

## 2024-05-31 DIAGNOSIS — I20.89 ANGINA AT REST (HCC): ICD-10-CM

## 2024-05-31 DIAGNOSIS — R07.9 CHEST PAIN, UNSPECIFIED TYPE: ICD-10-CM

## 2024-05-31 DIAGNOSIS — I42.9 CARDIOMYOPATHY, UNSPECIFIED TYPE (HCC): Primary | ICD-10-CM

## 2024-05-31 LAB
ANION GAP SERPL CALCULATED.3IONS-SCNC: 9 MMOL/L (ref 3–16)
BUN SERPL-MCNC: 18 MG/DL (ref 7–20)
CALCIUM SERPL-MCNC: 8.9 MG/DL (ref 8.3–10.6)
CHLORIDE SERPL-SCNC: 100 MMOL/L (ref 99–110)
CO2 SERPL-SCNC: 28 MMOL/L (ref 21–32)
CREAT SERPL-MCNC: 1.8 MG/DL (ref 0.8–1.3)
DEPRECATED RDW RBC AUTO: 14.5 % (ref 12.4–15.4)
GFR SERPLBLD CREATININE-BSD FMLA CKD-EPI: 39 ML/MIN/{1.73_M2}
GLUCOSE SERPL-MCNC: 253 MG/DL (ref 70–99)
HCT VFR BLD AUTO: 31.4 % (ref 40.5–52.5)
HGB BLD-MCNC: 10.4 G/DL (ref 13.5–17.5)
MCH RBC QN AUTO: 28.2 PG (ref 26–34)
MCHC RBC AUTO-ENTMCNC: 33 G/DL (ref 31–36)
MCV RBC AUTO: 85.3 FL (ref 80–100)
PLATELET # BLD AUTO: 268 K/UL (ref 135–450)
PMV BLD AUTO: 7.1 FL (ref 5–10.5)
POTASSIUM SERPL-SCNC: 4.1 MMOL/L (ref 3.5–5.1)
RBC # BLD AUTO: 3.69 M/UL (ref 4.2–5.9)
SODIUM SERPL-SCNC: 137 MMOL/L (ref 136–145)
WBC # BLD AUTO: 7.8 K/UL (ref 4–11)

## 2024-05-31 PROCEDURE — 6360000004 HC RX CONTRAST MEDICATION: Performed by: INTERNAL MEDICINE

## 2024-05-31 PROCEDURE — 93458 L HRT ARTERY/VENTRICLE ANGIO: CPT | Performed by: INTERNAL MEDICINE

## 2024-05-31 PROCEDURE — 2500000003 HC RX 250 WO HCPCS

## 2024-05-31 PROCEDURE — 6360000002 HC RX W HCPCS

## 2024-05-31 PROCEDURE — 99152 MOD SED SAME PHYS/QHP 5/>YRS: CPT | Performed by: INTERNAL MEDICINE

## 2024-05-31 PROCEDURE — 2580000003 HC RX 258: Performed by: INTERNAL MEDICINE

## 2024-05-31 PROCEDURE — 6370000000 HC RX 637 (ALT 250 FOR IP): Performed by: INTERNAL MEDICINE

## 2024-05-31 PROCEDURE — 2500000003 HC RX 250 WO HCPCS: Performed by: INTERNAL MEDICINE

## 2024-05-31 PROCEDURE — 93005 ELECTROCARDIOGRAM TRACING: CPT | Performed by: INTERNAL MEDICINE

## 2024-05-31 PROCEDURE — 7100000011 HC PHASE II RECOVERY - ADDTL 15 MIN: Performed by: INTERNAL MEDICINE

## 2024-05-31 PROCEDURE — 7100000010 HC PHASE II RECOVERY - FIRST 15 MIN: Performed by: INTERNAL MEDICINE

## 2024-05-31 PROCEDURE — 85027 COMPLETE CBC AUTOMATED: CPT

## 2024-05-31 PROCEDURE — 2709999900 HC NON-CHARGEABLE SUPPLY: Performed by: INTERNAL MEDICINE

## 2024-05-31 PROCEDURE — C1894 INTRO/SHEATH, NON-LASER: HCPCS | Performed by: INTERNAL MEDICINE

## 2024-05-31 PROCEDURE — C1769 GUIDE WIRE: HCPCS | Performed by: INTERNAL MEDICINE

## 2024-05-31 PROCEDURE — 6360000002 HC RX W HCPCS: Performed by: INTERNAL MEDICINE

## 2024-05-31 PROCEDURE — 80048 BASIC METABOLIC PNL TOTAL CA: CPT

## 2024-05-31 RX ORDER — HEPARIN SODIUM 1000 [USP'U]/ML
INJECTION, SOLUTION INTRAVENOUS; SUBCUTANEOUS PRN
Status: DISCONTINUED | OUTPATIENT
Start: 2024-05-31 | End: 2024-05-31 | Stop reason: HOSPADM

## 2024-05-31 RX ORDER — SODIUM CHLORIDE 9 MG/ML
INJECTION, SOLUTION INTRAVENOUS CONTINUOUS
Status: DISCONTINUED | OUTPATIENT
Start: 2024-05-31 | End: 2024-05-31 | Stop reason: HOSPADM

## 2024-05-31 RX ORDER — SODIUM CHLORIDE 0.9 % (FLUSH) 0.9 %
5-40 SYRINGE (ML) INJECTION PRN
Status: DISCONTINUED | OUTPATIENT
Start: 2024-05-31 | End: 2024-05-31 | Stop reason: HOSPADM

## 2024-05-31 RX ORDER — ASPIRIN 81 MG/1
81 TABLET, CHEWABLE ORAL ONCE
Status: COMPLETED | OUTPATIENT
Start: 2024-05-31 | End: 2024-05-31

## 2024-05-31 RX ORDER — SODIUM CHLORIDE 9 MG/ML
INJECTION, SOLUTION INTRAVENOUS CONTINUOUS PRN
Status: COMPLETED | OUTPATIENT
Start: 2024-05-31 | End: 2024-05-31

## 2024-05-31 RX ORDER — FENTANYL CITRATE 50 UG/ML
INJECTION, SOLUTION INTRAMUSCULAR; INTRAVENOUS PRN
Status: DISCONTINUED | OUTPATIENT
Start: 2024-05-31 | End: 2024-05-31 | Stop reason: HOSPADM

## 2024-05-31 RX ORDER — MIDAZOLAM HYDROCHLORIDE 1 MG/ML
INJECTION INTRAMUSCULAR; INTRAVENOUS PRN
Status: DISCONTINUED | OUTPATIENT
Start: 2024-05-31 | End: 2024-05-31 | Stop reason: HOSPADM

## 2024-05-31 RX ORDER — OXYCODONE AND ACETAMINOPHEN 10; 325 MG/1; MG/1
1 TABLET ORAL EVERY 8 HOURS PRN
COMMUNITY

## 2024-05-31 RX ORDER — SODIUM CHLORIDE 0.9 % (FLUSH) 0.9 %
5-40 SYRINGE (ML) INJECTION EVERY 12 HOURS SCHEDULED
Status: DISCONTINUED | OUTPATIENT
Start: 2024-05-31 | End: 2024-05-31 | Stop reason: HOSPADM

## 2024-05-31 RX ORDER — ACETAMINOPHEN 325 MG/1
650 TABLET ORAL EVERY 4 HOURS PRN
Status: DISCONTINUED | OUTPATIENT
Start: 2024-05-31 | End: 2024-05-31 | Stop reason: HOSPADM

## 2024-05-31 RX ORDER — SODIUM CHLORIDE 9 MG/ML
INJECTION, SOLUTION INTRAVENOUS PRN
Status: DISCONTINUED | OUTPATIENT
Start: 2024-05-31 | End: 2024-05-31 | Stop reason: HOSPADM

## 2024-05-31 RX ADMIN — SODIUM CHLORIDE: 9 INJECTION, SOLUTION INTRAVENOUS at 08:35

## 2024-05-31 RX ADMIN — ASPIRIN 81 MG 81 MG: 81 TABLET ORAL at 08:05

## 2024-05-31 NOTE — PROGRESS NOTES
Patient/family given discharge instructions. Patient/family verbalize understanding of discharge instructions, all questions addressed, copy given to patient/family. Pt transferred to vehicle via wheelchair to be discharged home with family.  Right radial site remains unremarkable, arm board in place.

## 2024-05-31 NOTE — DISCHARGE INSTRUCTIONS
Cath Labs at  [facility]   Discharge Instructions        5/31/2024  Don Hi   Date of Birth 1950       Activity:  No driving for 24 hours.  In 24 hours you may remove dressing and shower, wash site gently with soap and water and leave open to air  Avoid submerging your arm in sitting water for 5 days.  Do not use your right hand for 24 hours, then  No lifting more than 5 pounds for 5 days.   No lotions, powders, or ointments near site for 5 days.   No work/school for 5 days unless instructed otherwise by your cardiologist.    Diet:   Resume previous diet, if a cardiac diet is specified you will receive a handout with  general guidelines.   Drink extra non-alcoholic/decaffienated fluids for first 24 hours after your procedure.    Arm Management:  If bleeding occurs from the site or a hematoma (lump) begins to increase in size, apply pressure directly over the site, call 911 to return to the hospital.    Special Instructions:  Report any coolness or numbness in the arm  Report any chills, fever, itching, red bumps or rash   Report any of the following to the MD: drainage from the site, redness and/or swelling at the site, increased tenderness at the site   If you are currently taking Metformin or Metformin combination medications for Diabetes, hold your dose for 48 hours after your procedure.  Consult your Cardiologist before taking any NSAIDS, vitamin supplements, estrogen, or estrogen plus progestin.  Do not stop taking Plavix, Brilinta or Effient, without first consulting your cardiologist.    Sedation Discharge Instructions:  For the next 24 hours do not drive a car, operate machinery, power tools or kitchen appliances.    Do not drink alcohol; including beer or wine.    Do not make any important decisions or sign any important papers.  For the next 24 hours you can expect drowsiness, light-headed or dizziness, nausea/ vomiting, inability to concentrate, fatigue and desire to sleep.  We strongly

## 2024-05-31 NOTE — FLOWSHEET NOTE
Patient/caregiver given discharge instructions. Patient/caregiver verbalize understanding of discharge instructions, all questions addressed, copy given to patient/caregiver. Pt transferred to vehicle via wheelchair to be discharged to SNF with caregiver.

## 2024-06-01 LAB
EKG ATRIAL RATE: 68 BPM
EKG DIAGNOSIS: NORMAL
EKG P AXIS: 51 DEGREES
EKG P-R INTERVAL: 224 MS
EKG Q-T INTERVAL: 432 MS
EKG QRS DURATION: 132 MS
EKG QTC CALCULATION (BAZETT): 459 MS
EKG R AXIS: -25 DEGREES
EKG T AXIS: 38 DEGREES
EKG VENTRICULAR RATE: 68 BPM

## 2024-06-01 PROCEDURE — 93010 ELECTROCARDIOGRAM REPORT: CPT | Performed by: INTERNAL MEDICINE

## 2024-06-02 LAB — ECHO BSA: 2.35 M2

## 2024-07-05 ENCOUNTER — OFFICE VISIT (OUTPATIENT)
Dept: CARDIOLOGY CLINIC | Age: 74
End: 2024-07-05
Payer: MEDICARE

## 2024-07-05 VITALS
SYSTOLIC BLOOD PRESSURE: 118 MMHG | BODY MASS INDEX: 26.33 KG/M2 | DIASTOLIC BLOOD PRESSURE: 70 MMHG | WEIGHT: 223 LBS | HEART RATE: 71 BPM | HEIGHT: 77 IN | OXYGEN SATURATION: 96 %

## 2024-07-05 DIAGNOSIS — I10 ESSENTIAL HYPERTENSION: ICD-10-CM

## 2024-07-05 DIAGNOSIS — E78.2 MIXED HYPERLIPIDEMIA: ICD-10-CM

## 2024-07-05 DIAGNOSIS — I42.8 NICM (NONISCHEMIC CARDIOMYOPATHY) (HCC): Primary | ICD-10-CM

## 2024-07-05 DIAGNOSIS — R07.89 OTHER CHEST PAIN: ICD-10-CM

## 2024-07-05 DIAGNOSIS — R06.02 SOB (SHORTNESS OF BREATH): ICD-10-CM

## 2024-07-05 PROCEDURE — 1123F ACP DISCUSS/DSCN MKR DOCD: CPT | Performed by: INTERNAL MEDICINE

## 2024-07-05 PROCEDURE — 1036F TOBACCO NON-USER: CPT | Performed by: INTERNAL MEDICINE

## 2024-07-05 PROCEDURE — G8427 DOCREV CUR MEDS BY ELIG CLIN: HCPCS | Performed by: INTERNAL MEDICINE

## 2024-07-05 PROCEDURE — 99214 OFFICE O/P EST MOD 30 MIN: CPT | Performed by: INTERNAL MEDICINE

## 2024-07-05 PROCEDURE — 3078F DIAST BP <80 MM HG: CPT | Performed by: INTERNAL MEDICINE

## 2024-07-05 PROCEDURE — G8419 CALC BMI OUT NRM PARAM NOF/U: HCPCS | Performed by: INTERNAL MEDICINE

## 2024-07-05 PROCEDURE — 3074F SYST BP LT 130 MM HG: CPT | Performed by: INTERNAL MEDICINE

## 2024-07-05 PROCEDURE — 3017F COLORECTAL CA SCREEN DOC REV: CPT | Performed by: INTERNAL MEDICINE

## 2024-07-05 RX ORDER — BUTALBITAL, ACETAMINOPHEN AND CAFFEINE 300; 40; 50 MG/1; MG/1; MG/1
CAPSULE ORAL
COMMUNITY
Start: 2024-06-24

## 2024-07-05 RX ORDER — DOXAZOSIN MESYLATE 4 MG/1
TABLET ORAL
COMMUNITY
Start: 2024-05-27

## 2024-07-05 NOTE — PROGRESS NOTES
25 MG capsule Take 1 capsule by mouth every 12 hours as needed for Anxiety   Yes ProviderMalachi MD   insulin glargine (LANTUS SOLOSTAR) 100 UNIT/ML injection pen Inject 18 Units into the skin 2 times daily 2/21/22  Yes Rogerio Chris MD   pantoprazole (PROTONIX) 40 MG tablet Take 1 tablet by mouth daily   Yes Malachi Nuñez MD   sertraline (ZOLOFT) 100 MG tablet Take 125 mg by mouth daily   Yes ProviderMalachi MD   melatonin 10 MG CAPS capsule Take 5 mg by mouth nightly   Yes Provider, MD Malachi   memantine (NAMENDA) 10 MG tablet Take 1 tablet by mouth 2 times daily   Yes ProviderMalachi MD   polyethylene glycol (GLYCOLAX) powder Take 17 g by mouth daily   Yes ProviderMalachi MD   acetaminophen (TYLENOL) 325 MG tablet Take 2 tablets by mouth every 4 hours as needed for Pain   Yes Provider, MD Malachi   aspirin 81 MG tablet Take 1 tablet by mouth daily chewable   Yes ProviderMalachi MD   finasteride (PROSCAR) 5 MG tablet Take 1 tablet by mouth daily   Yes Provider, MD Malachi   folic acid (FOLVITE) 1 MG tablet Take 1 tablet by mouth daily   Yes Provider, MD Malachi   Dulaglutide 0.75 MG/0.5ML SOPN Inject 0.5 mLs into the skin once a week On Saturday   Yes ProviderMalachi MD   atorvastatin (LIPITOR) 40 MG tablet Take 1 tablet by mouth nightly   Yes ProviderMalachi MD   vitamin B-12 (CYANOCOBALAMIN) 1000 MCG tablet TAKE 1 TABLET BY MOUTH DAILY 7/2/18  Yes Barb Ryan APRN - CNP   umeclidinium-vilanterol (ANORO ELLIPTA) 62.5-25 MCG/INH AEPB inhaler Inhale 1 puff into the lungs daily 3/27/18  Yes Payal Jo MD   glucagon 1 MG injection Inject 1 mg into the skin as needed   Yes ProviderMalachi MD   tamsulosin (FLOMAX) 0.4 MG capsule Take 1 capsule by mouth daily  Patient not taking: Reported on 7/5/2024 1/31/24   Haresh Reeves, APRN - CNP   amitriptyline (ELAVIL) 50 MG tablet Take 1 tablet by mouth nightly For neuro

## 2024-07-05 NOTE — PATIENT INSTRUCTIONS
PLAN:  No change in cardiac medications  No cardiac testing warranted at this time  Recommend GI specialist for diarrhea/constipation issues  Recommend possible neurology appointment related to arm dropping

## 2024-09-12 ENCOUNTER — TELEPHONE (OUTPATIENT)
Dept: CARDIOLOGY CLINIC | Age: 74
End: 2024-09-12

## 2024-12-18 NOTE — PROGRESS NOTES
Orthostatic blood pressures completed, patient was positive for orthostatic blood pressure, however he denies dizziness.      They were as below:       04/18/21 0809   Vital Signs   Blood Pressure Lying 150/64   Pulse Lying 75 PER MINUTE   Blood Pressure Sitting 121/63   Pulse Sitting 79 PER MINUTE   Blood Pressure Standing 78/43   Pulse Standing 87 PER MINUTE   Level of Consciousness Alert (0)   MEWS Score 1   Patient Currently in Pain Yes Patient/Caregiver provided printed discharge information.

## 2025-06-08 ENCOUNTER — APPOINTMENT (OUTPATIENT)
Dept: GENERAL RADIOLOGY | Age: 75
End: 2025-06-08
Payer: MEDICARE

## 2025-06-08 ENCOUNTER — APPOINTMENT (OUTPATIENT)
Dept: CT IMAGING | Age: 75
End: 2025-06-08
Payer: MEDICARE

## 2025-06-08 ENCOUNTER — HOSPITAL ENCOUNTER (EMERGENCY)
Age: 75
Discharge: HOME | End: 2025-06-08
Attending: EMERGENCY MEDICINE
Payer: MEDICARE

## 2025-06-08 VITALS
TEMPERATURE: 97.5 F | HEIGHT: 77 IN | DIASTOLIC BLOOD PRESSURE: 86 MMHG | OXYGEN SATURATION: 96 % | SYSTOLIC BLOOD PRESSURE: 160 MMHG | RESPIRATION RATE: 14 BRPM | HEART RATE: 80 BPM | WEIGHT: 232.1 LBS | BODY MASS INDEX: 27.41 KG/M2
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
## 2025-06-08 DIAGNOSIS — S80.02XA CONTUSION OF LEFT KNEE, INITIAL ENCOUNTER: ICD-10-CM

## 2025-06-08 DIAGNOSIS — G89.29 CHRONIC NECK PAIN: ICD-10-CM

## 2025-06-08 DIAGNOSIS — S09.90XA CLOSED HEAD INJURY WITHOUT CONCUSSION, INITIAL ENCOUNTER: ICD-10-CM

## 2025-06-08 DIAGNOSIS — M54.2 CHRONIC NECK PAIN: ICD-10-CM

## 2025-06-08 LAB
ALBUMIN SERPL-MCNC: 3.9 G/DL (ref 3.4–5)
ALBUMIN/GLOB SERPL: 1.3 {RATIO} (ref 1.1–2.2)
ALP SERPL-CCNC: 94 U/L (ref 40–129)
ALT SERPL-CCNC: 7 U/L (ref 10–40)
ANION GAP SERPL CALCULATED.3IONS-SCNC: 13 MMOL/L (ref 3–16)
AST SERPL-CCNC: 14 U/L (ref 15–37)
BASOPHILS # BLD: 0.1 K/UL (ref 0–0.2)
BASOPHILS NFR BLD: 0.8 %
BILIRUB SERPL-MCNC: <0.2 MG/DL (ref 0–1)
BUN SERPL-MCNC: 11 MG/DL (ref 7–20)
CALCIUM SERPL-MCNC: 9.4 MG/DL (ref 8.3–10.6)
CHLORIDE SERPL-SCNC: 105 MMOL/L (ref 99–110)
CO2 SERPL-SCNC: 23 MMOL/L (ref 21–32)
CREAT SERPL-MCNC: 1.5 MG/DL (ref 0.8–1.3)
DEPRECATED RDW RBC AUTO: 16.3 % (ref 12.4–15.4)
EKG ATRIAL RATE: 74 BPM
EKG DIAGNOSIS: NORMAL
EKG P AXIS: 55 DEGREES
EKG P-R INTERVAL: 218 MS
EKG Q-T INTERVAL: 452 MS
EKG QRS DURATION: 164 MS
EKG QTC CALCULATION (BAZETT): 501 MS
EKG R AXIS: -52 DEGREES
EKG T AXIS: 42 DEGREES
EKG VENTRICULAR RATE: 74 BPM
EOSINOPHIL # BLD: 0.3 K/UL (ref 0–0.6)
EOSINOPHIL NFR BLD: 4 %
GFR SERPLBLD CREATININE-BSD FMLA CKD-EPI: 49 ML/MIN/{1.73_M2}
GLUCOSE SERPL-MCNC: 82 MG/DL (ref 70–99)
HCT VFR BLD AUTO: 33.5 % (ref 40.5–52.5)
HGB BLD-MCNC: 10.9 G/DL (ref 13.5–17.5)
LIPASE SERPL-CCNC: 9 U/L (ref 13–60)
LYMPHOCYTES # BLD: 2.6 K/UL (ref 1–5.1)
LYMPHOCYTES NFR BLD: 32.3 %
MCH RBC QN AUTO: 27.7 PG (ref 26–34)
MCHC RBC AUTO-ENTMCNC: 32.5 G/DL (ref 31–36)
MCV RBC AUTO: 85.2 FL (ref 80–100)
MONOCYTES # BLD: 1 K/UL (ref 0–1.3)
MONOCYTES NFR BLD: 11.9 %
NEUTROPHILS # BLD: 4.1 K/UL (ref 1.7–7.7)
NEUTROPHILS NFR BLD: 51 %
PLATELET # BLD AUTO: 270 K/UL (ref 135–450)
PMV BLD AUTO: 7.4 FL (ref 5–10.5)
POTASSIUM SERPL-SCNC: 3.6 MMOL/L (ref 3.5–5.1)
PROT SERPL-MCNC: 6.8 G/DL (ref 6.4–8.2)
RBC # BLD AUTO: 3.93 M/UL (ref 4.2–5.9)
SODIUM SERPL-SCNC: 141 MMOL/L (ref 136–145)
TROPONIN, HIGH SENSITIVITY: 21 NG/L (ref 0–22)
TROPONIN, HIGH SENSITIVITY: 29 NG/L (ref 0–22)
WBC # BLD AUTO: 8 K/UL (ref 4–11)

## 2025-06-08 PROCEDURE — 71045 X-RAY EXAM CHEST 1 VIEW: CPT

## 2025-06-08 PROCEDURE — 85025 COMPLETE CBC W/AUTO DIFF WBC: CPT

## 2025-06-08 PROCEDURE — 73560 X-RAY EXAM OF KNEE 1 OR 2: CPT

## 2025-06-08 PROCEDURE — 6370000000 HC RX 637 (ALT 250 FOR IP): Performed by: EMERGENCY MEDICINE

## 2025-06-08 PROCEDURE — 6360000004 HC RX CONTRAST MEDICATION: Performed by: EMERGENCY MEDICINE

## 2025-06-08 PROCEDURE — 72125 CT NECK SPINE W/O DYE: CPT

## 2025-06-08 PROCEDURE — 93005 ELECTROCARDIOGRAM TRACING: CPT | Performed by: EMERGENCY MEDICINE

## 2025-06-08 PROCEDURE — 70450 CT HEAD/BRAIN W/O DYE: CPT

## 2025-06-08 PROCEDURE — 93010 ELECTROCARDIOGRAM REPORT: CPT | Performed by: INTERNAL MEDICINE

## 2025-06-08 PROCEDURE — 83690 ASSAY OF LIPASE: CPT

## 2025-06-08 PROCEDURE — 99285 EMERGENCY DEPT VISIT HI MDM: CPT

## 2025-06-08 PROCEDURE — 84484 ASSAY OF TROPONIN QUANT: CPT

## 2025-06-08 PROCEDURE — 74177 CT ABD & PELVIS W/CONTRAST: CPT

## 2025-06-08 PROCEDURE — 80053 COMPREHEN METABOLIC PANEL: CPT

## 2025-06-08 RX ORDER — XYLITOL/YERBA SANTA
30 AEROSOL, SPRAY WITH PUMP (ML) MUCOUS MEMBRANE DAILY
COMMUNITY

## 2025-06-08 RX ORDER — AMLODIPINE BESYLATE 5 MG/1
5 TABLET ORAL DAILY
COMMUNITY

## 2025-06-08 RX ORDER — BUSPIRONE HYDROCHLORIDE 10 MG/1
10 TABLET ORAL 3 TIMES DAILY
COMMUNITY

## 2025-06-08 RX ORDER — IOPAMIDOL 755 MG/ML
75 INJECTION, SOLUTION INTRAVASCULAR
Status: COMPLETED | OUTPATIENT
Start: 2025-06-08 | End: 2025-06-08

## 2025-06-08 RX ORDER — PRAZOSIN HYDROCHLORIDE 2 MG/1
2 CAPSULE ORAL NIGHTLY
COMMUNITY

## 2025-06-08 RX ORDER — GUAIFENESIN/DEXTROMETHORPHAN 100-10MG/5
5 SYRUP ORAL 3 TIMES DAILY PRN
COMMUNITY

## 2025-06-08 RX ORDER — DIAZEPAM 2 MG/1
2 TABLET ORAL EVERY 6 HOURS PRN
COMMUNITY

## 2025-06-08 RX ORDER — HYDROCODONE BITARTRATE AND ACETAMINOPHEN 5; 325 MG/1; MG/1
1 TABLET ORAL ONCE
Status: COMPLETED | OUTPATIENT
Start: 2025-06-08 | End: 2025-06-08

## 2025-06-08 RX ADMIN — HYDROCODONE BITARTRATE AND ACETAMINOPHEN 1 TABLET: 5; 325 TABLET ORAL at 14:17

## 2025-06-08 RX ADMIN — IOPAMIDOL 75 ML: 755 INJECTION, SOLUTION INTRAVENOUS at 12:35

## 2025-06-08 ASSESSMENT — PAIN - FUNCTIONAL ASSESSMENT
PAIN_FUNCTIONAL_ASSESSMENT: 0-10
PAIN_FUNCTIONAL_ASSESSMENT: 0-10

## 2025-06-08 ASSESSMENT — PAIN SCALES - GENERAL
PAINLEVEL_OUTOF10: 8
PAINLEVEL_OUTOF10: 8

## 2025-06-08 ASSESSMENT — LIFESTYLE VARIABLES
HOW OFTEN DO YOU HAVE A DRINK CONTAINING ALCOHOL: 4 OR MORE TIMES A WEEK
HOW MANY STANDARD DRINKS CONTAINING ALCOHOL DO YOU HAVE ON A TYPICAL DAY: 1 OR 2

## 2025-06-08 NOTE — ED NOTES
Ambulate pt in room with walker, pt states he felt weak and unsteady with some complains of pain to the right knee.

## 2025-06-08 NOTE — ED NOTES
Writer updated patient's medication list provided by [facility] report. Patient cannot verify complete daily medications.

## 2025-06-08 NOTE — ED NOTES
Spoke to Edmar Lynn regarding patient baseline mobility, per staff gait usually unsteady and he does have a history of falls with them. States generally patient is able to ambulate some with a walker.

## 2025-06-08 NOTE — ED PROVIDER NOTES
[facility] EMERGENCY DEPARTMENT     EMERGENCY DEPARTMENT ENCOUNTER            Pt Name: Don iH   MRN: [id number]   Birthdate 1950   Date of evaluation: 6/8/2025   Provider: Shaggy Herrera II, DO   PCP: Lisa Wells MD   Note Started: 11:53 AM EDT 6/8/25          CHIEF COMPLAINT     Chief Complaint   Patient presents with    Fall    Loss of Consciousness     Patient from [facility] staff called EMS for unwitnessed fall, Pt is alert, complaining of left knee pain, abrasion noted, pt stated he was on the toilet and fell forward off and hit head on toilet, unsure of LOC. Pt hx of vascular dementia,              HISTORY OF PRESENT ILLNESS:   History from : Patient/EMS  Limitations to history : None     Don Hi is a 74 y.o. male who presents to the emergency department from [facility].  Patient had an unwitnessed fall from toilet.  It is not entirely clear if patient lost consciousness though he denies LOC.  Patient reports head injury/neck pain as well as left knee pain.  No additional complaints noted at this time.  No seizure-like activity observed.  No urinary incontinence or tongue biting..    Nursing Notes were all reviewed and agreed with, or any disagreements were addressed in the HPI.     REVIEW OF SYSTEMS :    Positives and Pertinent negatives as per HPI.      MEDICAL HISTORY   has a past medical history of Acute on chronic systolic (congestive) heart failure ([facility]), Anxiety disorder, BPH (benign prostatic hypertrophy), Calcified granuloma of lung (2014), Cataract (1/20/14), Cerebral artery occlusion with cerebral infarction ([facility]), Chronic diastolic heart failure ([facility]), Chronic pain, Chronic viral hepatitis ([facility]), Cognitive communication deficit, COPD (chronic obstructive pulmonary disease) ([facility]), Degenerative arthritis of hip, Dementia ([facility]), Depression (3/11/2015), Depression with anxiety, Diabetes mellitus ([facility]) (2004), Diabetic eye exam ([facility])

## 2025-06-17 ENCOUNTER — OFFICE VISIT (OUTPATIENT)
Dept: PULMONOLOGY | Age: 75
End: 2025-06-17
Payer: MEDICARE

## 2025-06-17 VITALS
HEART RATE: 73 BPM | TEMPERATURE: 98 F | BODY MASS INDEX: 27.52 KG/M2 | DIASTOLIC BLOOD PRESSURE: 63 MMHG | RESPIRATION RATE: 20 BRPM | OXYGEN SATURATION: 92 % | SYSTOLIC BLOOD PRESSURE: 138 MMHG | HEIGHT: 77 IN

## 2025-06-17 DIAGNOSIS — J43.9 PULMONARY EMPHYSEMA, UNSPECIFIED EMPHYSEMA TYPE (HCC): Primary | ICD-10-CM

## 2025-06-17 PROCEDURE — G8427 DOCREV CUR MEDS BY ELIG CLIN: HCPCS | Performed by: INTERNAL MEDICINE

## 2025-06-17 PROCEDURE — 99204 OFFICE O/P NEW MOD 45 MIN: CPT | Performed by: INTERNAL MEDICINE

## 2025-06-17 PROCEDURE — 3023F SPIROM DOC REV: CPT | Performed by: INTERNAL MEDICINE

## 2025-06-17 PROCEDURE — 1036F TOBACCO NON-USER: CPT | Performed by: INTERNAL MEDICINE

## 2025-06-17 PROCEDURE — 1123F ACP DISCUSS/DSCN MKR DOCD: CPT | Performed by: INTERNAL MEDICINE

## 2025-06-17 PROCEDURE — 1159F MED LIST DOCD IN RCRD: CPT | Performed by: INTERNAL MEDICINE

## 2025-06-17 PROCEDURE — 3075F SYST BP GE 130 - 139MM HG: CPT | Performed by: INTERNAL MEDICINE

## 2025-06-17 PROCEDURE — G2211 COMPLEX E/M VISIT ADD ON: HCPCS | Performed by: INTERNAL MEDICINE

## 2025-06-17 PROCEDURE — 3078F DIAST BP <80 MM HG: CPT | Performed by: INTERNAL MEDICINE

## 2025-06-17 PROCEDURE — 3017F COLORECTAL CA SCREEN DOC REV: CPT | Performed by: INTERNAL MEDICINE

## 2025-06-17 PROCEDURE — G8419 CALC BMI OUT NRM PARAM NOF/U: HCPCS | Performed by: INTERNAL MEDICINE

## 2025-06-17 RX ORDER — UBROGEPANT 100 MG/1
TABLET ORAL
COMMUNITY

## 2025-06-17 RX ORDER — REVEFENACIN 175 UG/3ML
1 SOLUTION RESPIRATORY (INHALATION) DAILY
Qty: 90 ML | Refills: 3 | Status: SHIPPED | OUTPATIENT
Start: 2025-06-17

## 2025-06-17 RX ORDER — FORMOTEROL FUMARATE 20 UG/2ML
20 SOLUTION RESPIRATORY (INHALATION)
Qty: 120 ML | Refills: 5 | Status: SHIPPED | OUTPATIENT
Start: 2025-06-17 | End: 2025-12-14

## 2025-06-17 RX ORDER — ALBUTEROL SULFATE 5 MG/ML
2.5 SOLUTION RESPIRATORY (INHALATION) 4 TIMES DAILY PRN
Qty: 120 EACH | Refills: 3 | Status: SHIPPED | OUTPATIENT
Start: 2025-06-17 | End: 2026-06-17

## 2025-06-17 NOTE — PATIENT INSTRUCTIONS
Discontinue Anoro Ellipta and Arnuity elliptica due to difficulties with inhaler use considering the background of medical issues  Start PERFOROMIST nebulizer twice a day, budesonide nebulizer twice a day, Yupelri nebulizer once daily  Albuterol nebulizer as needed 4 times daily  Flutter valve 4 times daily  CT chest with contrast and PFTs before next visit.  Patient is wheelchair-bound and 6-minute walk test is not practical or needed to qualify for oxygen.  He does not require oxygen at rest  Follow-up in 3 months        Health Maintenance/Preventive measures:        >>  Avoid smoking, vaping or secondhand exposure.  Avoid exposure to irritants, allergens as possible as well as contact with patients with infectious respiratory illness.        >>  Stay up-to-date with influenza & pneumonia vaccines, RSV, & COVID-19 vaccine as recommended by the Advisory Committee on Immunization Practices (ACIP)        >>  Healthy diet and activity as able.        >>  Acid reflux precautions: Head of bed elevation, avoiding tight clothes, avoiding big meals or snacking 3 hours before bedtime, targeting healthy weight.        >>  Practice sleep hygiene measures. Avoid driving or operating heavy machines if tired or sleepy.

## 2025-06-17 NOTE — PROGRESS NOTES
MA Communication:  The following orders are received by verbal communication from Comfort Pablo MD    Orders include:    Patient given prescriptions to fill  CT in 3 months  PFT in 3 months  Follow up 3 months    
CAPS per capsule, , Disp: , Rfl:     oxyCODONE-acetaminophen (PERCOCET)  MG per tablet, Take 1 tablet by mouth every 8 hours as needed for Pain., Disp: , Rfl:     metoprolol succinate (TOPROL XL) 100 MG extended release tablet, Take 1 tablet by mouth daily, Disp: 90 tablet, Rfl: 1    isosorbide mononitrate (IMDUR) 30 MG extended release tablet, Take 1 tablet by mouth daily, Disp: 90 tablet, Rfl: 3    divalproex (DEPAKOTE) 125 MG DR tablet, Take 1 tablet by mouth in the morning and at bedtime For migraines, Disp: , Rfl:     ergocalciferol (ERGOCALCIFEROL) 1.25 MG (03048 UT) capsule, Take 1 capsule by mouth once a week Thursdays, Disp: , Rfl:     fluticasone (FLOVENT HFA) 110 MCG/ACT inhaler, Inhale 1 puff into the lungs daily, Disp: , Rfl:     mirtazapine (REMERON) 15 MG tablet, Take 0.5 tablets by mouth nightly, Disp: , Rfl:     nitroGLYCERIN (NITROSTAT) 0.4 MG SL tablet, Place 1 tablet under the tongue every 5 minutes as needed for Chest pain up to max of 3 total doses. If no relief after 1 dose, call 911., Disp: , Rfl:     insulin glargine (LANTUS SOLOSTAR) 100 UNIT/ML injection pen, Inject 18 Units into the skin 2 times daily, Disp: 5 pen, Rfl: 3    pantoprazole (PROTONIX) 40 MG tablet, Take 1 tablet by mouth daily, Disp: , Rfl:     sertraline (ZOLOFT) 100 MG tablet, Take 125 mg by mouth daily, Disp: , Rfl:     melatonin 10 MG CAPS capsule, Take 5 mg by mouth nightly, Disp: , Rfl:     memantine (NAMENDA) 10 MG tablet, Take 1 tablet by mouth 2 times daily, Disp: , Rfl:     polyethylene glycol (GLYCOLAX) powder, Take 17 g by mouth daily, Disp: , Rfl:     acetaminophen (TYLENOL) 325 MG tablet, Take 2 tablets by mouth every 4 hours as needed for Pain, Disp: , Rfl:     aspirin 81 MG tablet, Take 1 tablet by mouth daily chewable, Disp: , Rfl:     finasteride (PROSCAR) 5 MG tablet, Take 1 tablet by mouth daily, Disp: , Rfl:     folic acid (FOLVITE) 1 MG tablet, Take 1 tablet by mouth daily, Disp: , Rfl:

## 2025-07-13 ENCOUNTER — APPOINTMENT (OUTPATIENT)
Dept: CT IMAGING | Age: 75
DRG: 065 | End: 2025-07-13
Payer: MEDICARE

## 2025-07-13 ENCOUNTER — APPOINTMENT (OUTPATIENT)
Dept: GENERAL RADIOLOGY | Age: 75
DRG: 065 | End: 2025-07-13
Payer: MEDICARE

## 2025-07-13 ENCOUNTER — HOSPITAL ENCOUNTER (INPATIENT)
Age: 75
LOS: 2 days | Discharge: NURSING, REHAB OR LONG-TERM CARE | DRG: 065 | End: 2025-07-16
Attending: EMERGENCY MEDICINE | Admitting: STUDENT IN AN ORGANIZED HEALTH CARE EDUCATION/TRAINING PROGRAM
Payer: MEDICARE

## 2025-07-13 DIAGNOSIS — R29.818 TRANSIENT NEUROLOGICAL SYMPTOMS: Primary | ICD-10-CM

## 2025-07-13 DIAGNOSIS — I63.9 CEREBROVASCULAR ACCIDENT (CVA), UNSPECIFIED MECHANISM (HCC): ICD-10-CM

## 2025-07-13 LAB
ALBUMIN SERPL-MCNC: 4.4 G/DL (ref 3.4–5)
ALBUMIN/GLOB SERPL: 1.3 {RATIO} (ref 1.1–2.2)
ALP SERPL-CCNC: 94 U/L (ref 40–129)
ALT SERPL-CCNC: 9 U/L (ref 10–40)
ANION GAP SERPL CALCULATED.3IONS-SCNC: 12 MMOL/L (ref 3–16)
AST SERPL-CCNC: 17 U/L (ref 15–37)
BACTERIA URNS QL MICRO: ABNORMAL /HPF
BASOPHILS # BLD: 0 K/UL (ref 0–0.2)
BASOPHILS NFR BLD: 0.6 %
BILIRUB SERPL-MCNC: <0.2 MG/DL (ref 0–1)
BILIRUB UR QL STRIP.AUTO: NEGATIVE
BUN SERPL-MCNC: 12 MG/DL (ref 7–20)
CALCIUM SERPL-MCNC: 8.9 MG/DL (ref 8.3–10.6)
CHLORIDE SERPL-SCNC: 102 MMOL/L (ref 99–110)
CLARITY UR: CLEAR
CO2 SERPL-SCNC: 26 MMOL/L (ref 21–32)
COLOR UR: ABNORMAL
CREAT SERPL-MCNC: 1.6 MG/DL (ref 0.8–1.3)
DEPRECATED RDW RBC AUTO: 16 % (ref 12.4–15.4)
EOSINOPHIL # BLD: 0.4 K/UL (ref 0–0.6)
EOSINOPHIL NFR BLD: 5.1 %
EPI CELLS #/AREA URNS HPF: ABNORMAL /HPF (ref 0–5)
FLUAV RNA RESP QL NAA+PROBE: NOT DETECTED
FLUBV RNA RESP QL NAA+PROBE: NOT DETECTED
GFR SERPLBLD CREATININE-BSD FMLA CKD-EPI: 45 ML/MIN/{1.73_M2}
GLUCOSE BLD-MCNC: 113 MG/DL (ref 70–99)
GLUCOSE SERPL-MCNC: 94 MG/DL (ref 70–99)
GLUCOSE UR STRIP.AUTO-MCNC: NEGATIVE MG/DL
HCT VFR BLD AUTO: 34.7 % (ref 40.5–52.5)
HGB BLD-MCNC: 11.5 G/DL (ref 13.5–17.5)
HGB UR QL STRIP.AUTO: NEGATIVE
KETONES UR STRIP.AUTO-MCNC: NEGATIVE MG/DL
LEUKOCYTE ESTERASE UR QL STRIP.AUTO: NEGATIVE
LYMPHOCYTES # BLD: 2.7 K/UL (ref 1–5.1)
LYMPHOCYTES NFR BLD: 33.7 %
MCH RBC QN AUTO: 28.4 PG (ref 26–34)
MCHC RBC AUTO-ENTMCNC: 33.1 G/DL (ref 31–36)
MCV RBC AUTO: 85.9 FL (ref 80–100)
MONOCYTES # BLD: 0.8 K/UL (ref 0–1.3)
MONOCYTES NFR BLD: 9.5 %
NEUTROPHILS # BLD: 4.1 K/UL (ref 1.7–7.7)
NEUTROPHILS NFR BLD: 51.1 %
NITRITE UR QL STRIP.AUTO: NEGATIVE
PERFORMED ON: ABNORMAL
PH UR STRIP.AUTO: 7 [PH] (ref 5–8)
PLATELET # BLD AUTO: 298 K/UL (ref 135–450)
PMV BLD AUTO: 7.4 FL (ref 5–10.5)
POTASSIUM SERPL-SCNC: 4.1 MMOL/L (ref 3.5–5.1)
PROT SERPL-MCNC: 7.8 G/DL (ref 6.4–8.2)
PROT UR STRIP.AUTO-MCNC: ABNORMAL MG/DL
RBC # BLD AUTO: 4.04 M/UL (ref 4.2–5.9)
RBC #/AREA URNS HPF: ABNORMAL /HPF (ref 0–4)
SARS-COV-2 RNA RESP QL NAA+PROBE: NOT DETECTED
SODIUM SERPL-SCNC: 140 MMOL/L (ref 136–145)
SP GR UR STRIP.AUTO: 1.01 (ref 1–1.03)
TROPONIN, HIGH SENSITIVITY: 21 NG/L (ref 0–22)
TROPONIN, HIGH SENSITIVITY: 22 NG/L (ref 0–22)
UA COMPLETE W REFLEX CULTURE PNL UR: ABNORMAL
UA DIPSTICK W REFLEX MICRO PNL UR: YES
URN SPEC COLLECT METH UR: ABNORMAL
UROBILINOGEN UR STRIP-ACNC: 0.2 E.U./DL
WBC # BLD AUTO: 8.1 K/UL (ref 4–11)
WBC #/AREA URNS HPF: ABNORMAL /HPF (ref 0–5)

## 2025-07-13 PROCEDURE — 99285 EMERGENCY DEPT VISIT HI MDM: CPT

## 2025-07-13 PROCEDURE — 87636 SARSCOV2 & INF A&B AMP PRB: CPT

## 2025-07-13 PROCEDURE — 70450 CT HEAD/BRAIN W/O DYE: CPT

## 2025-07-13 PROCEDURE — 36415 COLL VENOUS BLD VENIPUNCTURE: CPT

## 2025-07-13 PROCEDURE — 71045 X-RAY EXAM CHEST 1 VIEW: CPT

## 2025-07-13 PROCEDURE — 85025 COMPLETE CBC W/AUTO DIFF WBC: CPT

## 2025-07-13 PROCEDURE — 6360000004 HC RX CONTRAST MEDICATION

## 2025-07-13 PROCEDURE — 93005 ELECTROCARDIOGRAM TRACING: CPT

## 2025-07-13 PROCEDURE — 80053 COMPREHEN METABOLIC PANEL: CPT

## 2025-07-13 PROCEDURE — 81001 URINALYSIS AUTO W/SCOPE: CPT

## 2025-07-13 PROCEDURE — 6370000000 HC RX 637 (ALT 250 FOR IP): Performed by: EMERGENCY MEDICINE

## 2025-07-13 PROCEDURE — 84484 ASSAY OF TROPONIN QUANT: CPT

## 2025-07-13 PROCEDURE — 6370000000 HC RX 637 (ALT 250 FOR IP)

## 2025-07-13 PROCEDURE — 70496 CT ANGIOGRAPHY HEAD: CPT

## 2025-07-13 RX ORDER — ASPIRIN 81 MG/1
81 TABLET, CHEWABLE ORAL ONCE
Status: COMPLETED | OUTPATIENT
Start: 2025-07-13 | End: 2025-07-13

## 2025-07-13 RX ORDER — BUTALBITAL, ACETAMINOPHEN AND CAFFEINE 50; 325; 40 MG/1; MG/1; MG/1
1 TABLET ORAL ONCE
Status: COMPLETED | OUTPATIENT
Start: 2025-07-13 | End: 2025-07-13

## 2025-07-13 RX ORDER — IOPAMIDOL 755 MG/ML
75 INJECTION, SOLUTION INTRAVASCULAR
Status: COMPLETED | OUTPATIENT
Start: 2025-07-13 | End: 2025-07-13

## 2025-07-13 RX ADMIN — ASPIRIN 81 MG: 81 TABLET, CHEWABLE ORAL at 22:50

## 2025-07-13 RX ADMIN — IOPAMIDOL 75 ML: 755 INJECTION, SOLUTION INTRAVENOUS at 19:50

## 2025-07-13 RX ADMIN — BUTALBITAL, ACETAMINOPHEN AND CAFFEINE 1 TABLET: 325; 50; 40 TABLET ORAL at 21:35

## 2025-07-13 ASSESSMENT — PAIN SCALES - GENERAL
PAINLEVEL_OUTOF10: 8
PAINLEVEL_OUTOF10: 8

## 2025-07-13 ASSESSMENT — PAIN DESCRIPTION - LOCATION
LOCATION: CHEST
LOCATION: HEAD

## 2025-07-13 ASSESSMENT — PAIN - FUNCTIONAL ASSESSMENT: PAIN_FUNCTIONAL_ASSESSMENT: 0-10

## 2025-07-14 PROBLEM — Z86.73 HISTORY OF STROKE: Status: ACTIVE | Noted: 2025-07-14

## 2025-07-14 PROBLEM — R29.810 WEAKNESS ON LEFT SIDE OF FACE: Status: ACTIVE | Noted: 2025-07-14

## 2025-07-14 PROBLEM — I63.9 ACUTE STROKE DUE TO ISCHEMIA (HCC): Status: ACTIVE | Noted: 2025-07-14

## 2025-07-14 LAB
EKG ATRIAL RATE: 74 BPM
EKG DIAGNOSIS: NORMAL
EKG P AXIS: 57 DEGREES
EKG P-R INTERVAL: 246 MS
EKG Q-T INTERVAL: 408 MS
EKG QRS DURATION: 136 MS
EKG QTC CALCULATION (BAZETT): 452 MS
EKG R AXIS: -12 DEGREES
EKG T AXIS: 86 DEGREES
EKG VENTRICULAR RATE: 74 BPM
GLUCOSE BLD-MCNC: 184 MG/DL (ref 70–99)
GLUCOSE BLD-MCNC: 239 MG/DL (ref 70–99)
GLUCOSE BLD-MCNC: 343 MG/DL (ref 70–99)
PERFORMED ON: ABNORMAL

## 2025-07-14 PROCEDURE — 92610 EVALUATE SWALLOWING FUNCTION: CPT

## 2025-07-14 PROCEDURE — 97530 THERAPEUTIC ACTIVITIES: CPT

## 2025-07-14 PROCEDURE — 6360000002 HC RX W HCPCS: Performed by: STUDENT IN AN ORGANIZED HEALTH CARE EDUCATION/TRAINING PROGRAM

## 2025-07-14 PROCEDURE — 2500000003 HC RX 250 WO HCPCS: Performed by: STUDENT IN AN ORGANIZED HEALTH CARE EDUCATION/TRAINING PROGRAM

## 2025-07-14 PROCEDURE — 92526 ORAL FUNCTION THERAPY: CPT

## 2025-07-14 PROCEDURE — 6370000000 HC RX 637 (ALT 250 FOR IP): Performed by: STUDENT IN AN ORGANIZED HEALTH CARE EDUCATION/TRAINING PROGRAM

## 2025-07-14 PROCEDURE — 97162 PT EVAL MOD COMPLEX 30 MIN: CPT

## 2025-07-14 PROCEDURE — 6370000000 HC RX 637 (ALT 250 FOR IP)

## 2025-07-14 PROCEDURE — 6360000002 HC RX W HCPCS

## 2025-07-14 PROCEDURE — 1200000000 HC SEMI PRIVATE

## 2025-07-14 PROCEDURE — 99222 1ST HOSP IP/OBS MODERATE 55: CPT | Performed by: STUDENT IN AN ORGANIZED HEALTH CARE EDUCATION/TRAINING PROGRAM

## 2025-07-14 PROCEDURE — 97166 OT EVAL MOD COMPLEX 45 MIN: CPT

## 2025-07-14 PROCEDURE — 97535 SELF CARE MNGMENT TRAINING: CPT

## 2025-07-14 RX ORDER — ENOXAPARIN SODIUM 100 MG/ML
30 INJECTION SUBCUTANEOUS 2 TIMES DAILY
Status: DISCONTINUED | OUTPATIENT
Start: 2025-07-14 | End: 2025-07-16 | Stop reason: HOSPADM

## 2025-07-14 RX ORDER — ASPIRIN 81 MG/1
81 TABLET, CHEWABLE ORAL DAILY
Status: DISCONTINUED | OUTPATIENT
Start: 2025-07-14 | End: 2025-07-14

## 2025-07-14 RX ORDER — IPRATROPIUM BROMIDE AND ALBUTEROL SULFATE 2.5; .5 MG/3ML; MG/3ML
1 SOLUTION RESPIRATORY (INHALATION) EVERY 4 HOURS PRN
Status: DISCONTINUED | OUTPATIENT
Start: 2025-07-14 | End: 2025-07-16 | Stop reason: HOSPADM

## 2025-07-14 RX ORDER — BUSPIRONE HYDROCHLORIDE 5 MG/1
10 TABLET ORAL 3 TIMES DAILY
Status: DISCONTINUED | OUTPATIENT
Start: 2025-07-14 | End: 2025-07-16 | Stop reason: HOSPADM

## 2025-07-14 RX ORDER — IPRATROPIUM BROMIDE AND ALBUTEROL SULFATE 2.5; .5 MG/3ML; MG/3ML
1 SOLUTION RESPIRATORY (INHALATION)
Status: DISCONTINUED | OUTPATIENT
Start: 2025-07-14 | End: 2025-07-14

## 2025-07-14 RX ORDER — PANTOPRAZOLE SODIUM 40 MG/1
40 TABLET, DELAYED RELEASE ORAL
Status: DISCONTINUED | OUTPATIENT
Start: 2025-07-15 | End: 2025-07-16 | Stop reason: HOSPADM

## 2025-07-14 RX ORDER — DIAZEPAM 2 MG/1
2 TABLET ORAL EVERY 6 HOURS PRN
Status: DISCONTINUED | OUTPATIENT
Start: 2025-07-14 | End: 2025-07-16 | Stop reason: HOSPADM

## 2025-07-14 RX ORDER — ALBUTEROL SULFATE 0.83 MG/ML
2.5 SOLUTION RESPIRATORY (INHALATION) EVERY 4 HOURS PRN
Status: DISCONTINUED | OUTPATIENT
Start: 2025-07-14 | End: 2025-07-16 | Stop reason: HOSPADM

## 2025-07-14 RX ORDER — DEXTROSE MONOHYDRATE 100 MG/ML
INJECTION, SOLUTION INTRAVENOUS CONTINUOUS PRN
Status: DISCONTINUED | OUTPATIENT
Start: 2025-07-14 | End: 2025-07-16 | Stop reason: HOSPADM

## 2025-07-14 RX ORDER — INSULIN LISPRO 100 [IU]/ML
0-4 INJECTION, SOLUTION INTRAVENOUS; SUBCUTANEOUS
Status: DISCONTINUED | OUTPATIENT
Start: 2025-07-14 | End: 2025-07-15

## 2025-07-14 RX ORDER — ASPIRIN 300 MG/1
300 SUPPOSITORY RECTAL DAILY
Status: DISCONTINUED | OUTPATIENT
Start: 2025-07-14 | End: 2025-07-14

## 2025-07-14 RX ORDER — POLYETHYLENE GLYCOL 3350 17 G/17G
17 POWDER, FOR SOLUTION ORAL DAILY
Status: DISCONTINUED | OUTPATIENT
Start: 2025-07-14 | End: 2025-07-16 | Stop reason: HOSPADM

## 2025-07-14 RX ORDER — AMLODIPINE BESYLATE 5 MG/1
5 TABLET ORAL DAILY
Status: DISCONTINUED | OUTPATIENT
Start: 2025-07-14 | End: 2025-07-14

## 2025-07-14 RX ORDER — SODIUM CHLORIDE 9 MG/ML
INJECTION, SOLUTION INTRAVENOUS PRN
Status: DISCONTINUED | OUTPATIENT
Start: 2025-07-14 | End: 2025-07-16 | Stop reason: HOSPADM

## 2025-07-14 RX ORDER — ISOSORBIDE MONONITRATE 30 MG/1
30 TABLET, EXTENDED RELEASE ORAL DAILY
Status: DISCONTINUED | OUTPATIENT
Start: 2025-07-14 | End: 2025-07-16 | Stop reason: HOSPADM

## 2025-07-14 RX ORDER — SODIUM CHLORIDE 0.9 % (FLUSH) 0.9 %
5-40 SYRINGE (ML) INJECTION PRN
Status: DISCONTINUED | OUTPATIENT
Start: 2025-07-14 | End: 2025-07-16 | Stop reason: HOSPADM

## 2025-07-14 RX ORDER — METOPROLOL SUCCINATE 50 MG/1
100 TABLET, EXTENDED RELEASE ORAL DAILY
Status: DISCONTINUED | OUTPATIENT
Start: 2025-07-14 | End: 2025-07-16 | Stop reason: HOSPADM

## 2025-07-14 RX ORDER — CLOPIDOGREL BISULFATE 75 MG/1
300 TABLET ORAL ONCE
Status: COMPLETED | OUTPATIENT
Start: 2025-07-14 | End: 2025-07-14

## 2025-07-14 RX ORDER — INSULIN GLARGINE 100 [IU]/ML
18 INJECTION, SOLUTION SUBCUTANEOUS 2 TIMES DAILY
Status: DISCONTINUED | OUTPATIENT
Start: 2025-07-14 | End: 2025-07-16 | Stop reason: HOSPADM

## 2025-07-14 RX ORDER — DIVALPROEX SODIUM 125 MG/1
125 CAPSULE, COATED PELLETS ORAL 2 TIMES DAILY
Status: DISCONTINUED | OUTPATIENT
Start: 2025-07-14 | End: 2025-07-16

## 2025-07-14 RX ORDER — ONDANSETRON 4 MG/1
4 TABLET, ORALLY DISINTEGRATING ORAL EVERY 8 HOURS PRN
Status: CANCELLED | OUTPATIENT
Start: 2025-07-13

## 2025-07-14 RX ORDER — SENNOSIDES 8.6 MG
325 CAPSULE ORAL DAILY
Status: DISCONTINUED | OUTPATIENT
Start: 2025-07-15 | End: 2025-07-16 | Stop reason: HOSPADM

## 2025-07-14 RX ORDER — TAMSULOSIN HYDROCHLORIDE 0.4 MG/1
0.4 CAPSULE ORAL DAILY
Status: DISCONTINUED | OUTPATIENT
Start: 2025-07-14 | End: 2025-07-16 | Stop reason: HOSPADM

## 2025-07-14 RX ORDER — MEMANTINE HYDROCHLORIDE 5 MG/1
10 TABLET ORAL 2 TIMES DAILY
Status: DISCONTINUED | OUTPATIENT
Start: 2025-07-14 | End: 2025-07-16 | Stop reason: HOSPADM

## 2025-07-14 RX ORDER — ONDANSETRON 2 MG/ML
4 INJECTION INTRAMUSCULAR; INTRAVENOUS EVERY 6 HOURS PRN
Status: CANCELLED | OUTPATIENT
Start: 2025-07-13

## 2025-07-14 RX ORDER — ENOXAPARIN SODIUM 100 MG/ML
40 INJECTION SUBCUTANEOUS DAILY
Status: DISCONTINUED | OUTPATIENT
Start: 2025-07-14 | End: 2025-07-14

## 2025-07-14 RX ORDER — AMLODIPINE BESYLATE 5 MG/1
10 TABLET ORAL DAILY
Status: DISCONTINUED | OUTPATIENT
Start: 2025-07-14 | End: 2025-07-16 | Stop reason: HOSPADM

## 2025-07-14 RX ORDER — GUAIFENESIN/DEXTROMETHORPHAN 100-10MG/5
5 SYRUP ORAL 3 TIMES DAILY PRN
Status: DISCONTINUED | OUTPATIENT
Start: 2025-07-14 | End: 2025-07-16 | Stop reason: HOSPADM

## 2025-07-14 RX ORDER — ONDANSETRON 2 MG/ML
4 INJECTION INTRAMUSCULAR; INTRAVENOUS EVERY 6 HOURS PRN
Status: DISCONTINUED | OUTPATIENT
Start: 2025-07-14 | End: 2025-07-16 | Stop reason: HOSPADM

## 2025-07-14 RX ORDER — SODIUM CHLORIDE 0.9 % (FLUSH) 0.9 %
5-40 SYRINGE (ML) INJECTION EVERY 12 HOURS SCHEDULED
Status: DISCONTINUED | OUTPATIENT
Start: 2025-07-14 | End: 2025-07-16 | Stop reason: HOSPADM

## 2025-07-14 RX ORDER — ATORVASTATIN CALCIUM 80 MG/1
80 TABLET, FILM COATED ORAL NIGHTLY
Status: DISCONTINUED | OUTPATIENT
Start: 2025-07-14 | End: 2025-07-16 | Stop reason: HOSPADM

## 2025-07-14 RX ORDER — OXYCODONE AND ACETAMINOPHEN 5; 325 MG/1; MG/1
1 TABLET ORAL EVERY 6 HOURS PRN
Refills: 0 | Status: DISCONTINUED | OUTPATIENT
Start: 2025-07-14 | End: 2025-07-16 | Stop reason: HOSPADM

## 2025-07-14 RX ORDER — GLUCAGON 1 MG/ML
1 KIT INJECTION PRN
Status: DISCONTINUED | OUTPATIENT
Start: 2025-07-14 | End: 2025-07-16 | Stop reason: HOSPADM

## 2025-07-14 RX ORDER — POLYETHYLENE GLYCOL 3350 17 G/17G
17 POWDER, FOR SOLUTION ORAL DAILY PRN
Status: DISCONTINUED | OUTPATIENT
Start: 2025-07-14 | End: 2025-07-14

## 2025-07-14 RX ORDER — CLOPIDOGREL BISULFATE 75 MG/1
75 TABLET ORAL DAILY
Status: DISCONTINUED | OUTPATIENT
Start: 2025-07-15 | End: 2025-07-16 | Stop reason: HOSPADM

## 2025-07-14 RX ORDER — HYDRALAZINE HYDROCHLORIDE 20 MG/ML
10 INJECTION INTRAMUSCULAR; INTRAVENOUS EVERY 6 HOURS PRN
Status: DISCONTINUED | OUTPATIENT
Start: 2025-07-14 | End: 2025-07-16 | Stop reason: HOSPADM

## 2025-07-14 RX ADMIN — INSULIN LISPRO 1 UNITS: 100 INJECTION, SOLUTION INTRAVENOUS; SUBCUTANEOUS at 22:12

## 2025-07-14 RX ADMIN — SERTRALINE 125 MG: 50 TABLET, FILM COATED ORAL at 09:41

## 2025-07-14 RX ADMIN — OXYCODONE AND ACETAMINOPHEN 1 TABLET: 5; 325 TABLET ORAL at 18:06

## 2025-07-14 RX ADMIN — GUAIFENESIN AND DEXTROMETHORPHAN 5 ML: 100; 10 SYRUP ORAL at 05:18

## 2025-07-14 RX ADMIN — GUAIFENESIN AND DEXTROMETHORPHAN 5 ML: 100; 10 SYRUP ORAL at 22:16

## 2025-07-14 RX ADMIN — INSULIN LISPRO 3 UNITS: 100 INJECTION, SOLUTION INTRAVENOUS; SUBCUTANEOUS at 17:07

## 2025-07-14 RX ADMIN — ATORVASTATIN CALCIUM 80 MG: 80 TABLET, FILM COATED ORAL at 22:12

## 2025-07-14 RX ADMIN — ISOSORBIDE MONONITRATE 30 MG: 30 TABLET, EXTENDED RELEASE ORAL at 13:40

## 2025-07-14 RX ADMIN — INSULIN GLARGINE 18 UNITS: 100 INJECTION, SOLUTION SUBCUTANEOUS at 09:00

## 2025-07-14 RX ADMIN — AMLODIPINE BESYLATE 10 MG: 5 TABLET ORAL at 13:40

## 2025-07-14 RX ADMIN — SODIUM CHLORIDE, PRESERVATIVE FREE 10 ML: 5 INJECTION INTRAVENOUS at 09:42

## 2025-07-14 RX ADMIN — MEMANTINE 10 MG: 5 TABLET ORAL at 22:12

## 2025-07-14 RX ADMIN — BUSPIRONE HYDROCHLORIDE 10 MG: 5 TABLET ORAL at 09:41

## 2025-07-14 RX ADMIN — ENOXAPARIN SODIUM 30 MG: 100 INJECTION SUBCUTANEOUS at 22:12

## 2025-07-14 RX ADMIN — ASPIRIN 81 MG: 81 TABLET, CHEWABLE ORAL at 09:41

## 2025-07-14 RX ADMIN — CLOPIDOGREL BISULFATE 300 MG: 75 TABLET, FILM COATED ORAL at 11:39

## 2025-07-14 RX ADMIN — OXYCODONE AND ACETAMINOPHEN 1 TABLET: 5; 325 TABLET ORAL at 05:18

## 2025-07-14 RX ADMIN — ENOXAPARIN SODIUM 40 MG: 100 INJECTION SUBCUTANEOUS at 09:30

## 2025-07-14 RX ADMIN — GUAIFENESIN AND DEXTROMETHORPHAN 5 ML: 100; 10 SYRUP ORAL at 11:40

## 2025-07-14 RX ADMIN — DIVALPROEX SODIUM 125 MG: 125 CAPSULE ORAL at 09:49

## 2025-07-14 RX ADMIN — ONDANSETRON 4 MG: 2 INJECTION, SOLUTION INTRAMUSCULAR; INTRAVENOUS at 18:06

## 2025-07-14 RX ADMIN — DIVALPROEX SODIUM 125 MG: 125 CAPSULE ORAL at 22:12

## 2025-07-14 RX ADMIN — MEMANTINE 10 MG: 5 TABLET ORAL at 13:41

## 2025-07-14 RX ADMIN — BUSPIRONE HYDROCHLORIDE 10 MG: 5 TABLET ORAL at 22:12

## 2025-07-14 RX ADMIN — DIAZEPAM 2 MG: 2 TABLET ORAL at 09:49

## 2025-07-14 RX ADMIN — SODIUM CHLORIDE, PRESERVATIVE FREE 10 ML: 5 INJECTION INTRAVENOUS at 22:12

## 2025-07-14 RX ADMIN — OXYCODONE AND ACETAMINOPHEN 1 TABLET: 5; 325 TABLET ORAL at 11:39

## 2025-07-14 RX ADMIN — METOPROLOL SUCCINATE 100 MG: 50 TABLET, EXTENDED RELEASE ORAL at 13:40

## 2025-07-14 RX ADMIN — TAMSULOSIN HYDROCHLORIDE 0.4 MG: 0.4 CAPSULE ORAL at 13:39

## 2025-07-14 RX ADMIN — BUSPIRONE HYDROCHLORIDE 10 MG: 5 TABLET ORAL at 13:39

## 2025-07-14 RX ADMIN — INSULIN GLARGINE 18 UNITS: 100 INJECTION, SOLUTION SUBCUTANEOUS at 22:13

## 2025-07-14 ASSESSMENT — PAIN SCALES - GENERAL
PAINLEVEL_OUTOF10: 0
PAINLEVEL_OUTOF10: 9
PAINLEVEL_OUTOF10: 6
PAINLEVEL_OUTOF10: 7
PAINLEVEL_OUTOF10: 8
PAINLEVEL_OUTOF10: 8

## 2025-07-14 ASSESSMENT — PAIN DESCRIPTION - DESCRIPTORS
DESCRIPTORS: ACHING

## 2025-07-14 ASSESSMENT — PAIN DESCRIPTION - LOCATION
LOCATION: HEAD
LOCATION: BACK;FOOT;HEAD
LOCATION: KNEE

## 2025-07-14 ASSESSMENT — PAIN - FUNCTIONAL ASSESSMENT
PAIN_FUNCTIONAL_ASSESSMENT: PREVENTS OR INTERFERES WITH MANY ACTIVE NOT PASSIVE ACTIVITIES
PAIN_FUNCTIONAL_ASSESSMENT: ACTIVITIES ARE NOT PREVENTED

## 2025-07-14 ASSESSMENT — PAIN DESCRIPTION - ORIENTATION
ORIENTATION: MID
ORIENTATION: LEFT
ORIENTATION: MID

## 2025-07-14 ASSESSMENT — LIFESTYLE VARIABLES
HOW MANY STANDARD DRINKS CONTAINING ALCOHOL DO YOU HAVE ON A TYPICAL DAY: PATIENT DOES NOT DRINK
HOW OFTEN DO YOU HAVE A DRINK CONTAINING ALCOHOL: NEVER
HOW OFTEN DO YOU HAVE A DRINK CONTAINING ALCOHOL: NEVER

## 2025-07-14 NOTE — PROGRESS NOTES
Speech Language Pathology  Clinical Bedside Swallow Assessment  Facility/Department: [facility] C5 - MED SURG/ORTHO        Recommendations:  Diet recommendation: IDDSI 7 Regular- Easy To Chew; IDDSI 0 Thin Liquids; Meds PO as tolerated  Instrumentation: Not clinically indicated at this time. Will continue to monitor  Risk management: upright for all intake, small bites/sips, oral care 2-3x/day to reduce adverse affects in the event of aspiration, alternate bites/sips, slow rate of intake, general GERD precautions, general aspiration precautions, and hold PO and contact SLP if s/s of aspiration or worsening respiratory status develop.      NAME:Don Hi  : 1950 (74 y.o.)   MRN: [id number]  ROOM: [facility]  ADMISSION DATE: 2025  PATIENT DIAGNOSIS(ES): Cerebrovascular accident (CVA), unspecified mechanism (HCC) [I63.9]  Acute stroke due to ischemia (HCC) [I63.9]  Chief Complaint   Patient presents with    Fatigue     Patient arrives from [facility] for facial droop and slurred speech. Pt reports that he has a hx of 5 stroke and 1 heart attack. Pt was assessed by provider upon arrival, no code stroke called. Pt is A&Ox4 and ambulatory with a walker at baseline.      Patient Active Problem List    Diagnosis Date Noted    Acute stroke due to ischemia (HCC) 2025    Mild left ventricular systolic dysfunction (LVSD) 2024    Hypoglycemia 2024    CHIP (acute kidney injury) 10/06/2021    LV dysfunction 2021    Aortic root dilation 2021    Syncope and collapse 2021    Chronic kidney disease     Benign essential HTN     Cardiomyopathy (HCC)     Chest pain 10/22/2020    SOB (shortness of breath) 10/22/2020    Chronic systolic CHF (congestive heart failure) (HCC) 10/22/2020    Dementia associated with other underlying disease without behavioral disturbance (HCC) 04/10/2020    Closed head injury     Facial hematoma     Concussion with no loss of consciousness     Open wound of  04/05/2017    S/P left colectomy 03/10/2017    Orthostatic hypotension     Moderate episode of recurrent major depressive disorder (HCC) 09/08/2015    Chronic pain syndrome 08/26/2013    Mild-moderate alcohol consumption (beer) 06/14/2013    Bilateral knee & hip OA 12/04/2012    History of hepatitis C 11/29/2012    PTSD (post-traumatic stress disorder) 11/08/2012    Hepatic steatosis 10/09/2012    HLD (hyperlipidemia) 11/29/2011    COPD with acute bronchitis (HCC) 04/14/2011    Peripheral neuropathy of bilateral feet 11/12/2010    GERD (gastroesophageal reflux disease) 10/20/2010     Past Medical History:   Diagnosis Date    Acute on chronic systolic (congestive) heart failure (HCC)     Alcohol dependence (HCC)     Anxiety disorder     BPH (benign prostatic hypertrophy)     Calcified granuloma of lung 2014    2:stable per 3/25/14 CT chest    Cataract 01/20/2014    OU:Dr. hayward:[facility]    Chronic diastolic heart failure (HCC)     Chronic pain     Chronic viral hepatitis (HCC)     CKD (chronic kidney disease)     Cognitive communication deficit     COPD (chronic obstructive pulmonary disease) (HCC)     Under care of pulmo(Dr. Calhoun)    Degenerative arthritis of hip     Depression 03/11/2015    Depression with anxiety     Prior psychiatrist & therapist:Dr. Koroma.     Diabetes mellitus ([facility]) 2004    Endo Dr. Meyer:see type 1 note below in [facility]    Diabetic eye exam ([facility]) 01/20/2014    [facility]:Dr. STEPHEN Hayward:No retinopathy. Cataract    Diabetic retinopathy ([facility])     under care of [facility]:Dr. Dez Hayward    Diverticulitis 11/26/2011    Diverticulosis 11/26/2011    DKA (diabetic ketoacidoses)     Emphysema     Esophageal candidiasis (HCC) 07/16/2013    GI:EGD    Essential hypertension, benign 11/2010    ETOH abuse     Fatty liver 10/09/2012    Foot ulcer:left 09/30/2013    Gastric ulcer, unspecified as acute or chronic, without mention of hemorrhage or perforation     Generalized anxiety disorder     GERD (gastroesophageal reflux disease)

## 2025-07-14 NOTE — ED NOTES
Educated patient about keeping his medical contraptions on him to monitor his vs while he is in the ER. But, patient requested to the writer to turn off the monitor and removed all his wires.

## 2025-07-14 NOTE — PROGRESS NOTES
Occupational Therapy  Facility/Department: [address] SURG/ORTHO  Occupational Therapy Initial Assessment, and Treatment    Name: Don Hi  : 1950  MRN: [id number]  Date of Service: 2025    Discharge Recommendations:  Long Term Care with OT  OT Equipment Recommendations  Equipment Needed: No       Patient Diagnosis(es): The encounter diagnosis was Cerebrovascular accident (CVA), unspecified mechanism (HCC).  Past Medical History:  has a past medical history of Acute on chronic systolic (congestive) heart failure (HCC), Alcohol dependence (HCC), Anxiety disorder, BPH (benign prostatic hypertrophy), Calcified granuloma of lung, Cataract, Chronic diastolic heart failure (HCC), Chronic pain, Chronic viral hepatitis (HCC), CKD (chronic kidney disease), Cognitive communication deficit, COPD (chronic obstructive pulmonary disease) (HCC), Degenerative arthritis of hip, Depression, Depression with anxiety, Diabetes mellitus (HCC), Diabetic eye exam (HCC), Diabetic retinopathy (HCC), Diverticulitis, Diverticulosis, DKA (diabetic ketoacidoses), Emphysema, Esophageal candidiasis (HCC), Essential hypertension, benign, ETOH abuse, Fatty liver, Foot ulcer:left, Gastric ulcer, unspecified as acute or chronic, without mention of hemorrhage or perforation, Generalized anxiety disorder, GERD (gastroesophageal reflux disease), Gout, Hearing decreased, Heart failure with preserved ejection fraction (HCC), Hemangioma, hepatitis c, HTN (hypertension), Hyperlipidemia LDL goal < 100, Hypertensive heart disease with heart failure (HCC), Left-sided weakness, Low HDL (under 40), Myocardial infarction (HCC), Orthostatic hypotension, Peripheral neuropathy, Pneumonia, Protein calorie malnutrition, PTSD (post-traumatic stress disorder), Pyogenic granuloma, Recurrent strokes (HCC), Restrictive lung disease, S/P colonoscopy, S/P colonoscopy, S/P endoscopy, Sciatica, Superficial phlebitis of left leg, Tachycardia, Therapeutic

## 2025-07-14 NOTE — PROGRESS NOTES
Hospital Medicine Progress Note  V 5.17      Date of Admission: 7/13/2025    Hospital Day: 2      Chief Admission Complaint:  CVA    Subjective:  Patient seen at bedside this morning. No acute concerns. Ongoing pain in knees bilaterally and lower back. No numbness, tingling, sob, chest pain, pain anywhere else. Patient also has an ongoing cough without sputum production.    Presenting Admission History:       74 y.o. male who presented to [facility] from long-term facility with acute on chronic left-sided facial droop. History of stroke multiple strokes in the past and has reported left-sided facial droop at baseline.  Reports that he has significantly worsened left-sided facial droop to the point where he has some slurred speech.  Denies any other deficits.  Started just over 24 hours ago.       PMH significant for HTN, HLD, HFpEF, CKD, DM/DKA, CVAs, dementia, alcohol dependence history, PTSD, tobacco use,COPD, hepatic steatosis, hep C, resides in [facility].      ED stroke workup was negative and patient admitted for further management.    Assessment/Plan:      #CVA  #L facial droop  #HTN hx  #HLD hx  #CHFpEF hx  #DM2 hx  #Dementia hx  Echo 3/2024: 60% EF  Plan:  Aspirin, atorvastatin, plavix in place  Neurochecks in place  Neurology consulted, appreciate recommendations  Depakote in place, sertraline in place, Buspar in place, Memantine 10mg BID  MRI brain ordered, pending   PT/OT  Echo ordered, pending  Blood pressure regimen, adjust as needed: Metoprolol 100, Amlodipine 5mg daily, Imdur 30mg daily,   TSH ordered  POC glucose  ISS in place, Lantus 18U BID  Adjust regimen as needed      #COPD hx  #BPH hx  #GERD hx  Plan:  Maintain oxygenation  Continue to monitor  Continue inhalers  Flomax in place  PPI    Ongoing threat to life and/or bodily function without ongoing treatment due to:  CVA    Consults:      IP CONSULT TO HOSPITALIST  IP CONSULT TO NEUROLOGY  IP CONSULT TO

## 2025-07-14 NOTE — PLAN OF CARE
Problem: SLP Adult - Impaired Swallowing  Goal: By Discharge: Advance to least restrictive diet without signs or symptoms of aspiration for planned discharge setting.  See evaluation for individualized goals.  Outcome: Progressing     SLP completed evaluation. Please refer to notes in EMR.     Mariluz Hackett M.A., CCC-SLP  Speech-Language Pathologist  [phone]

## 2025-07-14 NOTE — CONSULTS
Inpatient Neurology Consult  [facility] Neurology  Mohit Sinha MD    Don Hi  1950    Date of Service: 7/14/2025    Referring Physician: Sha Jaquez MD    Reason for the consult and CC: acute on chronic left facial droop, acute dysarthria    HPI:   Don Hi is a 74 y.o. male who  has a past medical history of Acute on chronic systolic (congestive) heart failure (HCC), Alcohol dependence (HCC), Anxiety disorder, BPH (benign prostatic hypertrophy), Calcified granuloma of lung, Cataract, Chronic diastolic heart failure (HCC), Chronic pain, Chronic viral hepatitis (HCC), CKD (chronic kidney disease), Cognitive communication deficit, COPD (chronic obstructive pulmonary disease) (HCC), Degenerative arthritis of hip, Depression, Depression with anxiety, Diabetes mellitus (HCC), Diabetic eye exam (HCC), Diabetic retinopathy (HCC), Diverticulitis, Diverticulosis, DKA (diabetic ketoacidoses), Emphysema, Esophageal candidiasis (HCC), Essential hypertension, benign, ETOH abuse, Fatty liver, Foot ulcer:left, Gastric ulcer, unspecified as acute or chronic, without mention of hemorrhage or perforation, Generalized anxiety disorder, GERD (gastroesophageal reflux disease), Gout, Hearing decreased, Heart failure with preserved ejection fraction (HCC), Hemangioma, hepatitis c, HTN (hypertension), Hyperlipidemia LDL goal < 100, Hypertensive heart disease with heart failure (HCC), Left-sided weakness, Low HDL (under 40), Myocardial infarction (HCC), Orthostatic hypotension, Peripheral neuropathy, Pneumonia, Protein calorie malnutrition, PTSD (post-traumatic stress disorder), Pyogenic granuloma, Recurrent strokes (HCC), Restrictive lung disease, S/P colonoscopy, S/P colonoscopy, S/P endoscopy, Sciatica, Superficial phlebitis of left leg, Tachycardia, Therapeutic drug monitoring, Type 1 diabetes mellitus not at goal (HCC), and Vascular dementia (HCC). Admitted for acute on chronic left facial droop,

## 2025-07-14 NOTE — ED NOTES
Don Hi is a 74 y.o. male admitted for  Principal Problem:    Acute stroke due to ischemia (HCC)  Resolved Problems:    * No resolved hospital problems. *  .   Patient SNF LTC via EMS transportation with   Chief Complaint   Patient presents with    Fatigue     Patient arrives from [facility] for facial droop and slurred speech. Pt reports that he has a hx of 5 stroke and 1 heart attack. Pt was assessed by provider upon arrival, no code stroke called. Pt is A&Ox4 and ambulatory with a walker at baseline.    .  Patient is alert and Person, Place, Time, and Situation  Patient's baseline mobility: Baseline Mobility: Walker  Code Status: Prior   Cardiac Rhythm:       Is patient on baseline Oxygen: no how many Liters:   Abnormal Assessment Findings: fatigue    Isolation: None      NIH Score:    C-SSRS: Risk of Suicide: No Risk  Bedside swallow:        Active LDA's:        Vitals: MEWS Score: 1    Vitals:    07/13/25 2219 07/13/25 2303 07/13/25 2346 07/14/25 0126   BP: (!) 149/96 (!) 183/90 (!) 161/82 124/69   Pulse: 89 82 81 83   Resp: (!) 9  15 14   Temp:       TempSrc:       SpO2: 100% 94% 94%        Last documented pain score (0-10 scale) Pain Level: 0  Pain medication administered No.    Pertinent or High Risk Medications/Drips: No.    Pending Blood Product Administration: no    Abnormal labs:   Abnormal Labs Reviewed   CBC WITH AUTO DIFFERENTIAL - Abnormal; Notable for the following components:       Result Value    RBC 4.04 (*)     Hemoglobin 11.5 (*)     Hematocrit 34.7 (*)     RDW 16.0 (*)     All other components within normal limits   COMPREHENSIVE METABOLIC PANEL W/ REFLEX TO MG FOR LOW K - Abnormal; Notable for the following components:    Creatinine 1.6 (*)     Est, Glom Filt Rate 45 (*)     ALT 9 (*)     All other components within normal limits   URINALYSIS WITH REFLEX TO CULTURE - Abnormal; Notable for the following components:    Protein, UA TRACE (*)     All other components within normal

## 2025-07-14 NOTE — CARE COORDINATION
Case Management Assessment  Initial Evaluation    Date/Time of Evaluation: 7/14/2025 10:01 AM  Assessment Completed by: Bere Reina RN    If patient is discharged prior to next notation, then this note serves as note for discharge by case management.    Patient Name: Don Hi                   YOB: 1950  Diagnosis: Cerebrovascular accident (CVA), unspecified mechanism (HCC) [I63.9]  Acute stroke due to ischemia (HCC) [I63.9]                   Date / Time: 7/13/2025  6:36 PM    Patient Admission Status: Inpatient   Readmission Risk (Low < 19, Mod (19-27), High > 27): Readmission Risk Score: 16.3    Current PCP: Lisa Wells MD  PCP verified by CM? Yes (Lisa Wells)    Chart Reviewed: Yes      History Provided by: Patient, Medical Record, Other (see comment) (staff at [facility])  Patient Orientation: Alert and Oriented, Person    Patient Cognition: Alert    Hospitalization in the last 30 days (Readmission):  No    If yes, Readmission Assessment in CM Navigator will be completed.    Advance Directives:      Code Status: Full Code   Patient's Primary Decision Maker is: Patient Declined (Legal Next of Kin Remains as Decision Maker)    Primary Decision Maker (Active): Mallory Arauz - Brother/Sister - [phone]    Secondary Decision Maker: Ten Hi - Brother/Sister    Discharge Planning:    Patient lives with: Other (Comment) (Staff at [facility]) Type of Home: Long-Term Care  Primary Care Giver: Self (With Aide assistance if he asks)  Patient Support Systems include: Family Members, Other (Comment) (Staff at [facility])   Current Financial resources: Medicare  Current community resources: ECF/Home Care (LTC at [facility])  Current services prior to admission: Durable Medical Equipment            Current DME: Walker            Type of Home Care services:  None    ADLS  Prior functional level: Independent in ADLs/IADLs, Mobility (Uses

## 2025-07-14 NOTE — PLAN OF CARE
Problem: Chronic Conditions and Co-morbidities  Goal: Patient's chronic conditions and co-morbidity symptoms are monitored and maintained or improved  7/14/2025 1039 by Shayna Vela RN  Outcome: Progressing  Flowsheets  Taken 7/14/2025 1039  Care Plan - Patient's Chronic Conditions and Co-Morbidity Symptoms are Monitored and Maintained or Improved:   Collaborate with multidisciplinary team to address chronic and comorbid conditions and prevent exacerbation or deterioration   Monitor and assess patient's chronic conditions and comorbid symptoms for stability, deterioration, or improvement  Taken 7/14/2025 0751  Care Plan - Patient's Chronic Conditions and Co-Morbidity Symptoms are Monitored and Maintained or Improved: Monitor and assess patient's chronic conditions and comorbid symptoms for stability, deterioration, or improvement  7/14/2025 0341 by Indu Wynn RN  Outcome: Progressing     Problem: Discharge Planning  Goal: Discharge to home or other facility with appropriate resources  7/14/2025 1039 by Shayna Vela RN  Outcome: Progressing  Flowsheets (Taken 7/14/2025 1039)  Discharge to home or other facility with appropriate resources:   Identify barriers to discharge with patient and caregiver   Arrange for needed discharge resources and transportation as appropriate   Identify discharge learning needs (meds, wound care, etc)

## 2025-07-14 NOTE — PROGRESS NOTES
07/14/25 0528   RT Protocol   History Pulmonary Disease 1   Respiratory pattern 0   Breath sounds 2   Cough 0   Indications for Bronchodilator Therapy On home bronchodilators   Bronchodilator Assessment Score 3

## 2025-07-14 NOTE — PROGRESS NOTES
Physical Therapy  Facility/Department: [address] SURG/ORTHO  Physical Therapy Initial Assessment/treatment    Name: Don iH  : 1950  MRN: [id number]  Date of Service: 2025    Discharge Recommendations:  Long Term Care with PT   PT Equipment Recommendations  Equipment Needed: No  Other: owns needed DME    If pt is unable to be seen after this session, please let this note serve as discharge summary.  Please see case management note for discharge disposition.  Thank you.    Patient Diagnosis(es): The encounter diagnosis was Cerebrovascular accident (CVA), unspecified mechanism (HCC).  Past Medical History:  has a past medical history of Acute on chronic systolic (congestive) heart failure (HCC), Alcohol dependence (HCC), Anxiety disorder, BPH (benign prostatic hypertrophy), Calcified granuloma of lung, Cataract, Chronic diastolic heart failure (HCC), Chronic pain, Chronic viral hepatitis (HCC), CKD (chronic kidney disease), Cognitive communication deficit, COPD (chronic obstructive pulmonary disease) (HCC), Degenerative arthritis of hip, Depression, Depression with anxiety, Diabetes mellitus (HCC), Diabetic eye exam (HCC), Diabetic retinopathy (HCC), Diverticulitis, Diverticulosis, DKA (diabetic ketoacidoses), Emphysema, Esophageal candidiasis (HCC), Essential hypertension, benign, ETOH abuse, Fatty liver, Foot ulcer:left, Gastric ulcer, unspecified as acute or chronic, without mention of hemorrhage or perforation, Generalized anxiety disorder, GERD (gastroesophageal reflux disease), Gout, Hearing decreased, Heart failure with preserved ejection fraction (HCC), Hemangioma, hepatitis c, HTN (hypertension), Hyperlipidemia LDL goal < 100, Hypertensive heart disease with heart failure (HCC), Left-sided weakness, Low HDL (under 40), Myocardial infarction (HCC), Orthostatic hypotension, Peripheral neuropathy, Pneumonia, Protein calorie malnutrition, PTSD (post-traumatic stress disorder), Pyogenic

## 2025-07-14 NOTE — ED PROVIDER NOTES
[facility] EMERGENCY DEPARTMENT  EMERGENCY DEPARTMENT ENCOUNTER        Pt Name: Don Hi  MRN: [id number]  Birthdate 1950  Date of evaluation: 7/13/2025  Provider: ZANDER Adorno Jr  PCP: Lisa Wells MD  Note Started: 10:15 PM EDT 7/13/25       I have seen and evaluated this patient with my supervising physician Hardy Flores MD.      CHIEF COMPLAINT       Chief Complaint   Patient presents with    Fatigue     Patient arrives from [facility] for facial droop and slurred speech. Pt reports that he has a hx of 5 stroke and 1 heart attack. Pt was assessed by provider upon arrival, no code stroke called. Pt is A&Ox4 and ambulatory with a walker at baseline.        HISTORY OF PRESENT ILLNESS: 1 or more Elements     History from : Patient    Limitations to history : None    Don Hi is a 74 y.o. male who presents with reports of a left-sided facial droop that he believes started yesterday.  He feels that it was approximately 24 hours prior to arrival in the emergency department.  He does report that he has had 5 strokes in the past.  He is not on any anticoagulation medication but does take aspirin.  He is denying any vision disturbances.    This patient was seen at the stroke stop by myself on arrival.  Considering the timeframe he was not meeting criteria for a stroke alert.    Nursing Notes were all reviewed and agreed with or any disagreements were addressed in the HPI.      SURGICAL HISTORY     Past Surgical History:   Procedure Laterality Date    CARDIAC PROCEDURE N/A 5/31/2024    Left heart cath / coronary angiography performed by Angelito Abernathy DO at [facility] CARDIAC CATH LAB    COLONOSCOPY      DIAGNOSTIC CARDIAC CATH LAB PROCEDURE  2013    DILATATION, ESOPHAGUS      FOOT SURGERY Left Hammer toe, 2nd toe    10/9/14    FOOT SURGERY Left 12/5/2019    REMOVAL HARDWARE LEFT FOOT performed by Haresh Cotton DPM at [facility] OR    FRACTURE SURGERY      HARDWARE

## 2025-07-14 NOTE — PLAN OF CARE
Problem: Chronic Conditions and Co-morbidities  Goal: Patient's chronic conditions and co-morbidity symptoms are monitored and maintained or improved  Outcome: Progressing     Problem: Discharge Planning  Goal: Discharge to home or other facility with appropriate resources  Outcome: Progressing     Problem: Pain  Goal: Verbalizes/displays adequate comfort level or baseline comfort level  Outcome: Progressing  Pt scoring pain on 0-10 scale. Pain medications given per MAR. Pt instructed to call out when pain level increasing. Call light within reach.     Problem: Safety - Adult  Goal: Free from fall injury  Outcome: Progressing  Bed in lowest position, wheels locked, 2/4 side rails up, nonskid footwear on. Bed/ chair check alarm in place, call light within reach. Pt instructed to call out when needing assistance. Pt stated understanding. Nurse will continue to monitor.

## 2025-07-14 NOTE — H&P
[phone] Brother/Sis* No   MAGO KRAUSE    Brother/Sis*         PT/OT Eval Status: Ordered and pending    Anticipated Discharge Day/Date: TBD    Anticipated Discharge Location: Assisted Living Facility or TBD    Consults:      IP CONSULT TO HOSPITALIST  IP CONSULT TO NEUROLOGY  IP CONSULT TO CASE MANAGEMENT    I personally have obtained, updated and/or reviewed the patient’s medication list on 7/14/2025   --------------------------------------------------------------------------------------------------------------------------------------------------------------------    Imaging:     CTA HEAD NECK W CONTRAST  Result Date: 7/13/2025  EXAM: CTA HEAD NECK W CONTRAST INDICATION: Left facial droop, subacute COMPARISON: 7/13/2025 TECHNIQUE: Standard CTA HEAD NECK W CONTRAST obtained. 3D MIP and multiplanar MPR reconstructions were performed under concurrent radiologist supervision. Up-to-date CT equipment and radiation dose reduction techniques were employed. CONTRAST: Isovue-370 intravenous FINDINGS: Aortic arch: Sequential 3 vessel branching pattern. Mild atherosclerotic calcification of the arch. Subclavian arteries: Mildly limited in evaluation secondary to streak artifact from contrast bolus injection, otherwise normal. Common carotid arteries: Mild-to-moderate atherosclerotic calcifications of the carotid bifurcations. Otherwise grossly normal. Right cervical internal carotid artery: No stenosis of the proximal right cervical internal carotid by NASCET criteria. Right external carotid artery: No stenosis Left cervical internal carotid artery: Approximately 45% stenosis of the proximal left cervical internal carotid by NASCET criteria. Left external carotid artery: No stenosis Intracranial internal carotid arteries: Normal. Anterior circulation: M1, A1, and their more distal segments are normal. Cervical vertebral arteries: There is mild stenosis of the origin of the right vertebral artery secondary to  for Constipation  Patient not taking: Reported on 7/14/2025    Malachi Nuñez MD   senna (SENOKOT) 8.6 MG tablet Take 2 tablets by mouth nightly as needed for Constipation  Patient not taking: Reported on 7/14/2025    Malachi Nuñez MD   hydrOXYzine pamoate (VISTARIL) 25 MG capsule Take 1 capsule by mouth every 12 hours as needed for Anxiety  Patient not taking: Reported on 6/8/2025    Mlaachi Nuñez MD   aluminum & magnesium hydroxide-simethicone (MAALOX) 200-200-20 MG/5ML SUSP suspension Take 5 mLs by mouth every 6 hours as needed for Indigestion Takes mylanta 30 ml Q4 hours PRN  Patient not taking: Reported on 7/14/2025    Malachi Nuñez MD   loperamide (IMODIUM) 2 MG capsule Take 1 capsule by mouth every 6 hours as needed for Diarrhea  Patient not taking: Reported on 7/5/2024    Malachi Nuñez MD   SUMAtriptan (IMITREX) 100 MG tablet Take 1 tablet by mouth daily as needed for Migraine Give one additional dose if needed after 2 hours if 1st dose is ineffective (NTE 200mg/24 hrs)  Patient not taking: Reported on 7/14/2025    Malachi Nuñez MD   umeclidinium-vilanterol (ANORO ELLIPTA) 62.5-25 MCG/INH AEPB inhaler Inhale 1 puff into the lungs daily  Patient not taking: Reported on 7/14/2025 3/27/18   Payal Jo MD       Labs: Personally reviewed and interpreted for clinical significance.   Recent Labs     07/13/25 1852   WBC 8.1   HGB 11.5*   HCT 34.7*        Recent Labs     07/13/25 1852      K 4.1      CO2 26   BUN 12   CREATININE 1.6*   CALCIUM 8.9     Recent Labs     07/13/25 1852 07/13/25  1946   TROPHS 22 21     No results for input(s): \"LABA1C\" in the last 72 hours.  Recent Labs     07/13/25 1852   AST 17   ALT 9*   BILITOT <0.2   ALKPHOS 94     No results for input(s): \"INR\", \"LACTA\", \"TSH\" in the last 72 hours.     Clarice Perera MD

## 2025-07-15 ENCOUNTER — APPOINTMENT (OUTPATIENT)
Dept: MRI IMAGING | Age: 75
DRG: 065 | End: 2025-07-15
Payer: MEDICARE

## 2025-07-15 LAB
25(OH)D3 SERPL-MCNC: 35.9 NG/ML
ANION GAP SERPL CALCULATED.3IONS-SCNC: 11 MMOL/L (ref 3–16)
BASOPHILS # BLD: 0.1 K/UL (ref 0–0.2)
BASOPHILS NFR BLD: 0.8 %
BUN SERPL-MCNC: 15 MG/DL (ref 7–20)
CALCIUM SERPL-MCNC: 8.9 MG/DL (ref 8.3–10.6)
CHLORIDE SERPL-SCNC: 103 MMOL/L (ref 99–110)
CHOLEST SERPL-MCNC: 114 MG/DL (ref 0–199)
CO2 SERPL-SCNC: 24 MMOL/L (ref 21–32)
CREAT SERPL-MCNC: 1.7 MG/DL (ref 0.8–1.3)
DEPRECATED RDW RBC AUTO: 16.1 % (ref 12.4–15.4)
EOSINOPHIL # BLD: 0.3 K/UL (ref 0–0.6)
EOSINOPHIL NFR BLD: 3.8 %
EST. AVERAGE GLUCOSE BLD GHB EST-MCNC: 188.6 MG/DL
FOLATE SERPL-MCNC: >40 NG/ML (ref 4.78–24.2)
GFR SERPLBLD CREATININE-BSD FMLA CKD-EPI: 42 ML/MIN/{1.73_M2}
GLUCOSE BLD-MCNC: 120 MG/DL (ref 70–99)
GLUCOSE BLD-MCNC: 177 MG/DL (ref 70–99)
GLUCOSE BLD-MCNC: 274 MG/DL (ref 70–99)
GLUCOSE BLD-MCNC: 62 MG/DL (ref 70–99)
GLUCOSE BLD-MCNC: 99 MG/DL (ref 70–99)
GLUCOSE SERPL-MCNC: 121 MG/DL (ref 70–99)
HBA1C MFR BLD: 8.2 %
HCT VFR BLD AUTO: 33.4 % (ref 40.5–52.5)
HDLC SERPL-MCNC: 32 MG/DL (ref 40–60)
HGB BLD-MCNC: 11 G/DL (ref 13.5–17.5)
LDLC SERPL CALC-MCNC: 47 MG/DL
LYMPHOCYTES # BLD: 2.1 K/UL (ref 1–5.1)
LYMPHOCYTES NFR BLD: 29.3 %
MCH RBC QN AUTO: 28.2 PG (ref 26–34)
MCHC RBC AUTO-ENTMCNC: 33 G/DL (ref 31–36)
MCV RBC AUTO: 85.4 FL (ref 80–100)
MONOCYTES # BLD: 0.9 K/UL (ref 0–1.3)
MONOCYTES NFR BLD: 13.4 %
NEUTROPHILS # BLD: 3.7 K/UL (ref 1.7–7.7)
NEUTROPHILS NFR BLD: 52.7 %
PERFORMED ON: ABNORMAL
PERFORMED ON: NORMAL
PLATELET # BLD AUTO: 273 K/UL (ref 135–450)
PMV BLD AUTO: 7.7 FL (ref 5–10.5)
POTASSIUM SERPL-SCNC: 3.7 MMOL/L (ref 3.5–5.1)
RBC # BLD AUTO: 3.91 M/UL (ref 4.2–5.9)
SODIUM SERPL-SCNC: 138 MMOL/L (ref 136–145)
TRIGL SERPL-MCNC: 176 MG/DL (ref 0–150)
TSH SERPL DL<=0.005 MIU/L-ACNC: 1.86 UIU/ML (ref 0.27–4.2)
VIT B12 SERPL-MCNC: 702 PG/ML (ref 211–911)
VLDLC SERPL CALC-MCNC: 35 MG/DL
WBC # BLD AUTO: 7.1 K/UL (ref 4–11)

## 2025-07-15 PROCEDURE — 36415 COLL VENOUS BLD VENIPUNCTURE: CPT

## 2025-07-15 PROCEDURE — 6370000000 HC RX 637 (ALT 250 FOR IP)

## 2025-07-15 PROCEDURE — 6370000000 HC RX 637 (ALT 250 FOR IP): Performed by: STUDENT IN AN ORGANIZED HEALTH CARE EDUCATION/TRAINING PROGRAM

## 2025-07-15 PROCEDURE — 84443 ASSAY THYROID STIM HORMONE: CPT

## 2025-07-15 PROCEDURE — 6370000000 HC RX 637 (ALT 250 FOR IP): Performed by: NURSE PRACTITIONER

## 2025-07-15 PROCEDURE — 6360000002 HC RX W HCPCS: Performed by: STUDENT IN AN ORGANIZED HEALTH CARE EDUCATION/TRAINING PROGRAM

## 2025-07-15 PROCEDURE — 2500000003 HC RX 250 WO HCPCS: Performed by: STUDENT IN AN ORGANIZED HEALTH CARE EDUCATION/TRAINING PROGRAM

## 2025-07-15 PROCEDURE — 85025 COMPLETE CBC W/AUTO DIFF WBC: CPT

## 2025-07-15 PROCEDURE — 82607 VITAMIN B-12: CPT

## 2025-07-15 PROCEDURE — 80061 LIPID PANEL: CPT

## 2025-07-15 PROCEDURE — 1200000000 HC SEMI PRIVATE

## 2025-07-15 PROCEDURE — 80048 BASIC METABOLIC PNL TOTAL CA: CPT

## 2025-07-15 PROCEDURE — 6360000002 HC RX W HCPCS

## 2025-07-15 PROCEDURE — 70551 MRI BRAIN STEM W/O DYE: CPT

## 2025-07-15 PROCEDURE — 82306 VITAMIN D 25 HYDROXY: CPT

## 2025-07-15 PROCEDURE — APPSS30 APP SPLIT SHARED TIME 16-30 MINUTES

## 2025-07-15 PROCEDURE — 82746 ASSAY OF FOLIC ACID SERUM: CPT

## 2025-07-15 PROCEDURE — 83036 HEMOGLOBIN GLYCOSYLATED A1C: CPT

## 2025-07-15 RX ORDER — MECOBALAMIN 5000 MCG
5 TABLET,DISINTEGRATING ORAL NIGHTLY
Status: DISCONTINUED | OUTPATIENT
Start: 2025-07-15 | End: 2025-07-16 | Stop reason: HOSPADM

## 2025-07-15 RX ORDER — INSULIN LISPRO 100 [IU]/ML
0-8 INJECTION, SOLUTION INTRAVENOUS; SUBCUTANEOUS
Status: DISCONTINUED | OUTPATIENT
Start: 2025-07-15 | End: 2025-07-16 | Stop reason: HOSPADM

## 2025-07-15 RX ORDER — BUTALBITAL, ACETAMINOPHEN AND CAFFEINE 50; 325; 40 MG/1; MG/1; MG/1
1 TABLET ORAL EVERY 4 HOURS PRN
Status: DISCONTINUED | OUTPATIENT
Start: 2025-07-15 | End: 2025-07-16 | Stop reason: HOSPADM

## 2025-07-15 RX ORDER — BUTALBITAL, ACETAMINOPHEN AND CAFFEINE 50; 325; 40 MG/1; MG/1; MG/1
1 TABLET ORAL ONCE
Status: COMPLETED | OUTPATIENT
Start: 2025-07-15 | End: 2025-07-15

## 2025-07-15 RX ORDER — ACETAMINOPHEN 325 MG/1
650 TABLET ORAL EVERY 4 HOURS PRN
Status: DISCONTINUED | OUTPATIENT
Start: 2025-07-15 | End: 2025-07-16 | Stop reason: HOSPADM

## 2025-07-15 RX ADMIN — ONDANSETRON 4 MG: 2 INJECTION, SOLUTION INTRAMUSCULAR; INTRAVENOUS at 10:56

## 2025-07-15 RX ADMIN — ATORVASTATIN CALCIUM 80 MG: 80 TABLET, FILM COATED ORAL at 19:39

## 2025-07-15 RX ADMIN — BUSPIRONE HYDROCHLORIDE 10 MG: 5 TABLET ORAL at 14:45

## 2025-07-15 RX ADMIN — Medication 5 MG: at 22:48

## 2025-07-15 RX ADMIN — ENOXAPARIN SODIUM 30 MG: 100 INJECTION SUBCUTANEOUS at 19:40

## 2025-07-15 RX ADMIN — OXYCODONE AND ACETAMINOPHEN 1 TABLET: 5; 325 TABLET ORAL at 10:28

## 2025-07-15 RX ADMIN — TAMSULOSIN HYDROCHLORIDE 0.4 MG: 0.4 CAPSULE ORAL at 08:31

## 2025-07-15 RX ADMIN — DIVALPROEX SODIUM 125 MG: 125 CAPSULE ORAL at 08:31

## 2025-07-15 RX ADMIN — MEMANTINE 10 MG: 5 TABLET ORAL at 19:40

## 2025-07-15 RX ADMIN — ISOSORBIDE MONONITRATE 30 MG: 30 TABLET, EXTENDED RELEASE ORAL at 08:32

## 2025-07-15 RX ADMIN — ENOXAPARIN SODIUM 30 MG: 100 INJECTION SUBCUTANEOUS at 08:32

## 2025-07-15 RX ADMIN — SODIUM CHLORIDE, PRESERVATIVE FREE 10 ML: 5 INJECTION INTRAVENOUS at 19:55

## 2025-07-15 RX ADMIN — SERTRALINE 125 MG: 50 TABLET, FILM COATED ORAL at 08:30

## 2025-07-15 RX ADMIN — OXYCODONE AND ACETAMINOPHEN 1 TABLET: 5; 325 TABLET ORAL at 04:43

## 2025-07-15 RX ADMIN — DIVALPROEX SODIUM 125 MG: 125 CAPSULE ORAL at 19:40

## 2025-07-15 RX ADMIN — INSULIN GLARGINE 18 UNITS: 100 INJECTION, SOLUTION SUBCUTANEOUS at 19:52

## 2025-07-15 RX ADMIN — BUTALBITAL, ACETAMINOPHEN, AND CAFFEINE 1 TABLET: 325; 50; 40 TABLET ORAL at 17:16

## 2025-07-15 RX ADMIN — OXYCODONE AND ACETAMINOPHEN 1 TABLET: 5; 325 TABLET ORAL at 19:58

## 2025-07-15 RX ADMIN — ASPIRIN 325 MG: 325 TABLET, COATED ORAL at 08:29

## 2025-07-15 RX ADMIN — SODIUM CHLORIDE, PRESERVATIVE FREE 10 ML: 5 INJECTION INTRAVENOUS at 08:32

## 2025-07-15 RX ADMIN — PANTOPRAZOLE SODIUM 40 MG: 40 TABLET, DELAYED RELEASE ORAL at 08:32

## 2025-07-15 RX ADMIN — BUSPIRONE HYDROCHLORIDE 10 MG: 5 TABLET ORAL at 08:29

## 2025-07-15 RX ADMIN — MEMANTINE 10 MG: 5 TABLET ORAL at 08:29

## 2025-07-15 RX ADMIN — ACETAMINOPHEN 650 MG: 325 TABLET ORAL at 19:51

## 2025-07-15 RX ADMIN — DIAZEPAM 2 MG: 2 TABLET ORAL at 08:32

## 2025-07-15 RX ADMIN — BUSPIRONE HYDROCHLORIDE 10 MG: 5 TABLET ORAL at 19:40

## 2025-07-15 RX ADMIN — GUAIFENESIN AND DEXTROMETHORPHAN 5 ML: 100; 10 SYRUP ORAL at 17:16

## 2025-07-15 RX ADMIN — INSULIN LISPRO 4 UNITS: 100 INJECTION, SOLUTION INTRAVENOUS; SUBCUTANEOUS at 19:53

## 2025-07-15 RX ADMIN — BUTALBITAL, ACETAMINOPHEN, AND CAFFEINE 1 TABLET: 325; 50; 40 TABLET ORAL at 11:12

## 2025-07-15 RX ADMIN — INSULIN GLARGINE 18 UNITS: 100 INJECTION, SOLUTION SUBCUTANEOUS at 08:32

## 2025-07-15 RX ADMIN — CLOPIDOGREL BISULFATE 75 MG: 75 TABLET, FILM COATED ORAL at 08:31

## 2025-07-15 RX ADMIN — METOPROLOL SUCCINATE 100 MG: 50 TABLET, EXTENDED RELEASE ORAL at 08:30

## 2025-07-15 RX ADMIN — AMLODIPINE BESYLATE 10 MG: 5 TABLET ORAL at 08:29

## 2025-07-15 ASSESSMENT — PAIN DESCRIPTION - LOCATION
LOCATION: HEAD
LOCATION: NECK

## 2025-07-15 ASSESSMENT — PAIN DESCRIPTION - DESCRIPTORS
DESCRIPTORS: ACHING

## 2025-07-15 ASSESSMENT — PAIN SCALES - GENERAL
PAINLEVEL_OUTOF10: 8
PAINLEVEL_OUTOF10: 7
PAINLEVEL_OUTOF10: 6
PAINLEVEL_OUTOF10: 5
PAINLEVEL_OUTOF10: 7
PAINLEVEL_OUTOF10: 8

## 2025-07-15 ASSESSMENT — PAIN - FUNCTIONAL ASSESSMENT
PAIN_FUNCTIONAL_ASSESSMENT: ACTIVITIES ARE NOT PREVENTED
PAIN_FUNCTIONAL_ASSESSMENT: PREVENTS OR INTERFERES WITH ALL ACTIVE AND SOME PASSIVE ACTIVITIES

## 2025-07-15 ASSESSMENT — PAIN DESCRIPTION - ORIENTATION
ORIENTATION: MID

## 2025-07-15 NOTE — CONSULTS
PALLIATIVE MEDICINE CONSULTATION     Patient name:Don Hi   MRN:[id number]    :1950  Room/Bed:0526/0526-01   LOS: 1 day         Date of consult:7/15/2025    Inpatient consult to Palliative Care  Consult performed by: Anna Bennett APRN - CNP  Consult ordered by: Sha Jaquez MD          Consult ordered for: goals of care    ASSESSMENT/RECOMMENDATIONS     74 y.o. male with HFrEF, history of multiple CVAs, COPD and vascular dementia who lives in LTC at baseline.  Admitted with another stroke.  Baseline function appears unchanged.  PT/OT recs for PT/OT at LTC.  Currently taking modified PO and is alert and oriented and complaining of chronic arthritic pain.    Number of ED visits and hospital admissions in past 12 months:  3  Last hospitalization: 25      Goals of Care: \"I want to make the best of what I have\".  He is not sure therapy is beneficial any more, but willing to keep trying. Plans to be in a SNF for rest of life.  In the event of a severe stroke, would be willing to consider a PEG, but not likely to want to rely on it permanently.  Would never agree to dialysis and would switch goals to comfort in that case.  Does not want to be resuscitated and prefers to have a natural and comfortable death.  Does not want any form of resuscitation in favor of a comfortable death.  Code status amended to Limited x 4 Nos.     Recommendations:   Goals of care discussions should be revisited with each hospital admission, with a significant change in treatment plan, or upon further decline in health, function, or quality of life.  Also revisit willingness to complete HPOA    Consider pain management consult at SNF (if available) for management of his chronic pain.  Patient states his 10 mg percocet is not working and SNF will not give any additional pain medication.  Patient states his quality of life is impacted by his arthritic pain.  He would like to get pain from an

## 2025-07-15 NOTE — PROGRESS NOTES
Don Hi  Neurology Follow-up  [facility] Neurology    Date of Service: 7/15/2025    Subjective:   CC: Follow up today regarding: acute on chronic left facial droop, acute dysarthria     Events noted. Chart and lab reviewed. Awaiting MRI, TTE. Exam unchanged, reports ongoing headache.       ROS : A 10-12 system review obtained and updated today and is unremarkable except as mentioned  in my interval history.     Medication, past medical history, social history, and family history reviewed.    Objective:  Exam:   Constitutional:   Vitals:    07/15/25 0443 07/15/25 0513 07/15/25 0815 07/15/25 1028   BP:   (!) 139/94    Pulse:   71    Resp: 20 18 16 16   Temp:   97.7 °F (36.5 °C)    TempSrc:   Axillary    SpO2:   92%    Weight:       Height:         General appearance:  Normal development and appear in no acute distress.   Mental Status:   Oriented to person, place, and time.    Memory: Good recent recall.  Normal attention span and concentration.  Language: Mild dysarthria  Good fund of Knowledge.   Cranial Nerves:   II: Visual fields: L homonymous hemianopsia  Pupils: equal, round, reactive to light  III,IV,VI: Extra Ocular Movements are intact. No nystagmus  V: Facial sensation is intact  VII: Facial strength and movements: Slight L facial droop.   XII: Tongue movements are normal  Musculoskeletal: 5/5 BUE, RLE. 4/5 LLE, give way weakness.  Reflexes: Absent patellar, 2+ biceps  Coordination: no pronator drift, no dysmetria with FNF. BUE tremor.  Sensation: normal to light touch in both arms and legs.        Data:  LABS:   Lab Results   Component Value Date/Time     07/15/2025 05:10 AM    K 3.7 07/15/2025 05:10 AM    K 4.1 07/13/2025 06:52 PM     07/15/2025 05:10 AM    CO2 24 07/15/2025 05:10 AM    BUN 15 07/15/2025 05:10 AM    CREATININE 1.7 07/15/2025 05:10 AM    GFRAA >60 02/21/2022 05:54 AM    GFRAA >60 06/15/2013 05:30 AM    LABGLOM 42 07/15/2025 05:10 AM    LABGLOM 49 04/07/2024 02:06 PM

## 2025-07-15 NOTE — PROGRESS NOTES
Hospital Medicine Progress Note  V 5.17      Date of Admission: 7/13/2025    Hospital Day: 3      Chief Admission Complaint:  CVA    Subjective:  Patient seen at bedside this morning. No acute concerns. Ongoing pain in arm, IV placement site. No numbness, tingling, sob, chest pain, pain anywhere else. Ongoing headache.    Presenting Admission History:       74 y.o. male who presented to [facility] from long-term facility with acute on chronic left-sided facial droop. History of stroke multiple strokes in the past and has reported left-sided facial droop at baseline.  Reports that he has significantly worsened left-sided facial droop to the point where he has some slurred speech.  Denies any other deficits.  Started just over 24 hours ago.       PMH significant for HTN, HLD, HFpEF, CKD, DM/DKA, CVAs, dementia, alcohol dependence history, PTSD, tobacco use,COPD, hepatic steatosis, hep C, resides in [facility].      ED stroke workup was negative and patient admitted for further management.    Assessment/Plan:      #CVA  #L facial droop  #HTN hx  #HLD hx  #CHFpEF hx  #DM2 hx  #Dementia hx  Echo 3/2024: 60% EF  Plan:  Aspirin, atorvastatin, plavix in place  Neurochecks in place  Neurology consulted, appreciate recommendations  Depakote in place, sertraline in place, Buspar in place, Memantine 10mg BID  MRI brain ordered, pending   PT/OT  Echo ordered, pending  Blood pressure regimen, adjust as needed: Metoprolol 100, Amlodipine 5mg daily, Imdur 30mg daily,   TSH ordered  POC glucose  ISS in place, Lantus 18U BID  Adjust regimen as needed      #COPD hx  #BPH hx  #GERD hx  Plan:  Maintain oxygenation  Continue to monitor  Continue inhalers  Flomax in place  PPI    Ongoing threat to life and/or bodily function without ongoing treatment due to:  CVA    Consults:      IP CONSULT TO HOSPITALIST  IP CONSULT TO NEUROLOGY  IP CONSULT TO CASE MANAGEMENT  IP CONSULT TO PALLIATIVE CARE   Independent Interpretation of tests (any 1)    [] Telemetry (Rhythm Strip) personally reviewed and interpreted        [] Imaging personally reviewed and interpreted     [x] Discussion (any 1)  [x] Multi-Disciplinary Rounds with Case Management  [] Discussed management of the case with           Labs:  Personally reviewed on 7/15/2025 and interpreted for clinical significance as documented above.     Recent Labs     07/13/25  1852 07/15/25  0510   WBC 8.1 7.1   HGB 11.5* 11.0*   HCT 34.7* 33.4*    273     Recent Labs     07/13/25  1852 07/15/25  0510    138   K 4.1 3.7    103   CO2 26 24   BUN 12 15   CREATININE 1.6* 1.7*   CALCIUM 8.9 8.9     Recent Labs     07/13/25 1852 07/13/25  1946   TROPHS 22 21     No results for input(s): \"LABA1C\" in the last 72 hours.  Recent Labs     07/13/25 1852   AST 17   ALT 9*   BILITOT <0.2   ALKPHOS 94     No results for input(s): \"INR\", \"LACTA\", \"TSH\" in the last 72 hours.    Urine Cultures:   Lab Results   Component Value Date/Time    LABURIN >100,000 CFU/ml 03/29/2021 04:45 PM     Blood Cultures:   Lab Results   Component Value Date/Time    BC No Growth after 4 days of incubation. 10/06/2021 03:40 PM     Lab Results   Component Value Date/Time    BLOODCULT2 No Growth after 4 days of incubation. 10/06/2021 03:50 PM     Organism:   Lab Results   Component Value Date/Time    ORG Staphylococcus aureus 03/29/2021 04:45 PM         Sha Jaquez MD

## 2025-07-15 NOTE — PLAN OF CARE
Problem: Chronic Conditions and Co-morbidities  Goal: Patient's chronic conditions and co-morbidity symptoms are monitored and maintained or improved  7/14/2025 2345 by Dianelys Ureña RN  Outcome: Progressing     Problem: Discharge Planning  Goal: Discharge to home or other facility with appropriate resources  7/14/2025 2345 by Dianelys Ureña RN  Outcome: Progressing     Problem: Pain  Goal: Verbalizes/displays adequate comfort level or baseline comfort level  Outcome: Progressing     Problem: Safety - Adult  Goal: Free from fall injury  Outcome: Progressing     Problem: ABCDS Injury Assessment  Goal: Absence of physical injury  Outcome: Progressing     Problem: Skin/Tissue Integrity  Goal: Skin integrity remains intact  Description: 1.  Monitor for areas of redness and/or skin breakdown  2.  Assess vascular access sites hourly  3.  Every 4-6 hours minimum:  Change oxygen saturation probe site  4.  Every 4-6 hours:  If on nasal continuous positive airway pressure, respiratory therapy assess nares and determine need for appliance change or resting period  Outcome: Progressing

## 2025-07-15 NOTE — PLAN OF CARE
Problem: Chronic Conditions and Co-morbidities  Goal: Patient's chronic conditions and co-morbidity symptoms are monitored and maintained or improved  7/15/2025 1141 by Shayna Vela RN  Outcome: Progressing  Flowsheets  Taken 7/15/2025 1141  Care Plan - Patient's Chronic Conditions and Co-Morbidity Symptoms are Monitored and Maintained or Improved:   Monitor and assess patient's chronic conditions and comorbid symptoms for stability, deterioration, or improvement   Collaborate with multidisciplinary team to address chronic and comorbid conditions and prevent exacerbation or deterioration  Taken 7/15/2025 0815  Care Plan - Patient's Chronic Conditions and Co-Morbidity Symptoms are Monitored and Maintained or Improved: Collaborate with multidisciplinary team to address chronic and comorbid conditions and prevent exacerbation or deterioration  7/14/2025 2345 by Dianelys Ureña, RN  Outcome: Progressing     Problem: Discharge Planning  Goal: Discharge to home or other facility with appropriate resources  7/15/2025 1141 by Shayna Vela RN  Outcome: Progressing  Flowsheets (Taken 7/15/2025 1141)  Discharge to home or other facility with appropriate resources: Arrange for needed discharge resources and transportation as appropriate  7/14/2025 2345 by Dianelys Ureña, RN  Outcome: Progressing

## 2025-07-16 VITALS
SYSTOLIC BLOOD PRESSURE: 148 MMHG | TEMPERATURE: 97.7 F | RESPIRATION RATE: 16 BRPM | BODY MASS INDEX: 26.47 KG/M2 | OXYGEN SATURATION: 92 % | WEIGHT: 224.21 LBS | DIASTOLIC BLOOD PRESSURE: 68 MMHG | HEART RATE: 72 BPM | HEIGHT: 77 IN

## 2025-07-16 PROBLEM — R56.9 SEIZURE-LIKE ACTIVITY (HCC): Status: ACTIVE | Noted: 2018-10-18

## 2025-07-16 LAB
ANION GAP SERPL CALCULATED.3IONS-SCNC: 11 MMOL/L (ref 3–16)
BASOPHILS # BLD: 0.1 K/UL (ref 0–0.2)
BASOPHILS NFR BLD: 0.8 %
BUN SERPL-MCNC: 17 MG/DL (ref 7–20)
CALCIUM SERPL-MCNC: 8.7 MG/DL (ref 8.3–10.6)
CHLORIDE SERPL-SCNC: 101 MMOL/L (ref 99–110)
CO2 SERPL-SCNC: 24 MMOL/L (ref 21–32)
CREAT SERPL-MCNC: 1.8 MG/DL (ref 0.8–1.3)
DEPRECATED RDW RBC AUTO: 15.6 % (ref 12.4–15.4)
EOSINOPHIL # BLD: 0.3 K/UL (ref 0–0.6)
EOSINOPHIL NFR BLD: 4.4 %
GFR SERPLBLD CREATININE-BSD FMLA CKD-EPI: 39 ML/MIN/{1.73_M2}
GLUCOSE BLD-MCNC: 142 MG/DL (ref 70–99)
GLUCOSE BLD-MCNC: 190 MG/DL (ref 70–99)
GLUCOSE BLD-MCNC: 236 MG/DL (ref 70–99)
GLUCOSE SERPL-MCNC: 136 MG/DL (ref 70–99)
HCT VFR BLD AUTO: 32.6 % (ref 40.5–52.5)
HGB BLD-MCNC: 10.7 G/DL (ref 13.5–17.5)
LYMPHOCYTES # BLD: 2.6 K/UL (ref 1–5.1)
LYMPHOCYTES NFR BLD: 36 %
MCH RBC QN AUTO: 28.1 PG (ref 26–34)
MCHC RBC AUTO-ENTMCNC: 32.7 G/DL (ref 31–36)
MCV RBC AUTO: 85.8 FL (ref 80–100)
MONOCYTES # BLD: 0.9 K/UL (ref 0–1.3)
MONOCYTES NFR BLD: 11.9 %
NEUTROPHILS # BLD: 3.4 K/UL (ref 1.7–7.7)
NEUTROPHILS NFR BLD: 46.9 %
PERFORMED ON: ABNORMAL
PLATELET # BLD AUTO: 261 K/UL (ref 135–450)
PMV BLD AUTO: 7.5 FL (ref 5–10.5)
POTASSIUM SERPL-SCNC: 3.6 MMOL/L (ref 3.5–5.1)
RBC # BLD AUTO: 3.8 M/UL (ref 4.2–5.9)
SODIUM SERPL-SCNC: 136 MMOL/L (ref 136–145)
WBC # BLD AUTO: 7.3 K/UL (ref 4–11)

## 2025-07-16 PROCEDURE — 6370000000 HC RX 637 (ALT 250 FOR IP)

## 2025-07-16 PROCEDURE — 6360000002 HC RX W HCPCS

## 2025-07-16 PROCEDURE — 80048 BASIC METABOLIC PNL TOTAL CA: CPT

## 2025-07-16 PROCEDURE — APPSS30 APP SPLIT SHARED TIME 16-30 MINUTES

## 2025-07-16 PROCEDURE — 6370000000 HC RX 637 (ALT 250 FOR IP): Performed by: STUDENT IN AN ORGANIZED HEALTH CARE EDUCATION/TRAINING PROGRAM

## 2025-07-16 PROCEDURE — 99232 SBSQ HOSP IP/OBS MODERATE 35: CPT | Performed by: STUDENT IN AN ORGANIZED HEALTH CARE EDUCATION/TRAINING PROGRAM

## 2025-07-16 PROCEDURE — 85025 COMPLETE CBC W/AUTO DIFF WBC: CPT

## 2025-07-16 PROCEDURE — 36415 COLL VENOUS BLD VENIPUNCTURE: CPT

## 2025-07-16 RX ORDER — CLOPIDOGREL BISULFATE 75 MG/1
75 TABLET ORAL DAILY
Qty: 87 TABLET | Refills: 0 | Status: SHIPPED | OUTPATIENT
Start: 2025-07-17 | End: 2025-10-12

## 2025-07-16 RX ORDER — DIVALPROEX SODIUM 125 MG/1
250 CAPSULE, COATED PELLETS ORAL 2 TIMES DAILY
Status: DISCONTINUED | OUTPATIENT
Start: 2025-07-16 | End: 2025-07-16 | Stop reason: HOSPADM

## 2025-07-16 RX ORDER — HYDRALAZINE HYDROCHLORIDE 10 MG/1
10 TABLET, FILM COATED ORAL EVERY 8 HOURS SCHEDULED
Status: DISCONTINUED | OUTPATIENT
Start: 2025-07-16 | End: 2025-07-16 | Stop reason: HOSPADM

## 2025-07-16 RX ADMIN — MEMANTINE 10 MG: 5 TABLET ORAL at 09:38

## 2025-07-16 RX ADMIN — TAMSULOSIN HYDROCHLORIDE 0.4 MG: 0.4 CAPSULE ORAL at 09:37

## 2025-07-16 RX ADMIN — BUTALBITAL, ACETAMINOPHEN, AND CAFFEINE 1 TABLET: 325; 50; 40 TABLET ORAL at 09:47

## 2025-07-16 RX ADMIN — GUAIFENESIN AND DEXTROMETHORPHAN 5 ML: 100; 10 SYRUP ORAL at 12:23

## 2025-07-16 RX ADMIN — SERTRALINE 125 MG: 50 TABLET, FILM COATED ORAL at 09:37

## 2025-07-16 RX ADMIN — BUSPIRONE HYDROCHLORIDE 10 MG: 5 TABLET ORAL at 09:38

## 2025-07-16 RX ADMIN — METOPROLOL SUCCINATE 100 MG: 50 TABLET, EXTENDED RELEASE ORAL at 09:37

## 2025-07-16 RX ADMIN — INSULIN GLARGINE 18 UNITS: 100 INJECTION, SOLUTION SUBCUTANEOUS at 09:30

## 2025-07-16 RX ADMIN — BUSPIRONE HYDROCHLORIDE 10 MG: 5 TABLET ORAL at 13:55

## 2025-07-16 RX ADMIN — CLOPIDOGREL BISULFATE 75 MG: 75 TABLET, FILM COATED ORAL at 09:38

## 2025-07-16 RX ADMIN — ASPIRIN 325 MG: 325 TABLET, COATED ORAL at 09:38

## 2025-07-16 RX ADMIN — INSULIN LISPRO 2 UNITS: 100 INJECTION, SOLUTION INTRAVENOUS; SUBCUTANEOUS at 12:21

## 2025-07-16 RX ADMIN — AMLODIPINE BESYLATE 10 MG: 5 TABLET ORAL at 09:38

## 2025-07-16 RX ADMIN — HYDRALAZINE HYDROCHLORIDE 10 MG: 10 TABLET ORAL at 13:55

## 2025-07-16 RX ADMIN — PANTOPRAZOLE SODIUM 40 MG: 40 TABLET, DELAYED RELEASE ORAL at 05:04

## 2025-07-16 RX ADMIN — ENOXAPARIN SODIUM 30 MG: 100 INJECTION SUBCUTANEOUS at 09:30

## 2025-07-16 RX ADMIN — DIVALPROEX SODIUM 125 MG: 125 CAPSULE ORAL at 09:30

## 2025-07-16 RX ADMIN — ISOSORBIDE MONONITRATE 30 MG: 30 TABLET, EXTENDED RELEASE ORAL at 09:37

## 2025-07-16 RX ADMIN — BUTALBITAL, ACETAMINOPHEN, AND CAFFEINE 1 TABLET: 325; 50; 40 TABLET ORAL at 13:56

## 2025-07-16 RX ADMIN — OXYCODONE AND ACETAMINOPHEN 1 TABLET: 5; 325 TABLET ORAL at 16:41

## 2025-07-16 ASSESSMENT — PAIN DESCRIPTION - DESCRIPTORS
DESCRIPTORS: ACHING

## 2025-07-16 ASSESSMENT — PAIN DESCRIPTION - ORIENTATION
ORIENTATION: MID
ORIENTATION: MID

## 2025-07-16 ASSESSMENT — PAIN DESCRIPTION - LOCATION
LOCATION: HEAD

## 2025-07-16 ASSESSMENT — PAIN SCALES - GENERAL
PAINLEVEL_OUTOF10: 8

## 2025-07-16 NOTE — PLAN OF CARE
Problem: Chronic Conditions and Co-morbidities  Goal: Patient's chronic conditions and co-morbidity symptoms are monitored and maintained or improved  7/15/2025 2219 by Faiza Moreland RN  Outcome: Progressing  7/15/2025 1141 by Shayna Vela RN  Outcome: Progressing  Flowsheets  Taken 7/15/2025 1141  Care Plan - Patient's Chronic Conditions and Co-Morbidity Symptoms are Monitored and Maintained or Improved:   Monitor and assess patient's chronic conditions and comorbid symptoms for stability, deterioration, or improvement   Collaborate with multidisciplinary team to address chronic and comorbid conditions and prevent exacerbation or deterioration  Taken 7/15/2025 0815  Care Plan - Patient's Chronic Conditions and Co-Morbidity Symptoms are Monitored and Maintained or Improved: Collaborate with multidisciplinary team to address chronic and comorbid conditions and prevent exacerbation or deterioration     Problem: Discharge Planning  Goal: Discharge to home or other facility with appropriate resources  7/15/2025 2219 by Faiza Moreland RN  Outcome: Progressing  7/15/2025 1141 by Shayna Vela RN  Outcome: Progressing  Flowsheets (Taken 7/15/2025 1141)  Discharge to home or other facility with appropriate resources: Arrange for needed discharge resources and transportation as appropriate     Problem: Pain  Goal: Verbalizes/displays adequate comfort level or baseline comfort level  Outcome: Progressing     Problem: Safety - Adult  Goal: Free from fall injury  Outcome: Progressing     Problem: Skin/Tissue Integrity  Goal: Skin integrity remains intact  Description: 1.  Monitor for areas of redness and/or skin breakdown  2.  Assess vascular access sites hourly  3.  Every 4-6 hours minimum:  Change oxygen saturation probe site  4.  Every 4-6 hours:  If on nasal continuous positive airway pressure, respiratory therapy assess nares and determine need for appliance change or resting period  Outcome: Progressing

## 2025-07-16 NOTE — DISCHARGE SUMMARY
1555 Attempted to call report to [phone], 3 attempts, no answer.    1645 Pt discharged with transport. All belongings sent with pt. Discharge instructions printed.

## 2025-07-16 NOTE — CARE COORDINATION
CASE MANAGEMENT DISCHARGE SUMMARY      Discharge to: [facility] of [facility]    Precertification completed: N/A  Hospital Exemption Notification (HENS) completed: N/A    IMM given: (date) 7/14/25    New Durable Medical Equipment ordered/agency: Provided by the facility    Transportation:       Medical Transport explained to pt/family. Pt/family voice no agency preference.    Agency used: [employer]   time: 1630   Ambulance form completed: Yes    Confirmed discharge plan with: Returning to [facility] where he is a LTC Resident      Patient: yes     Family:  yes    Name: Mallory Rodriguez  Contact number: [phone]     Facility/Agency, name: [facility] can pull from Epic NINA/AVS does not need to be faxed   Phone number for report to facility: [phone]     RN, name: Shayna    Note: Discharging nurse to complete ECOC, reconcile AVS, and place final copy with patient's discharge packet. RN to ensure that written prescriptions for  Level II medications are sent with patient to the facility as per protocol.  .Bere Reina RN

## 2025-07-16 NOTE — DISCHARGE INSTRUCTIONS
Instruction for your upcoming electroencephalography (EEG) test:   If you are prescribed an anti-seizure medication, then you should take your anti-seizure medicine, as prescribed, on the day of the test.   Do NOT have more than 5 hours of sleep the night prior to the EEG.   Have someone else drive you to and from the EEG appointment because you will be restricting your sleep the night prior to the EEG appointment.   You should have CLEAN hair, no hairspray, gel, cream rinse, hair pins, or chemical treatments within 48 hours prior to EEG.  NO caffeine, pain medications or sedatives on the day of the test (TAKE SEIZURE MEDS!).  Arrive 30 minutes prior to appointment time (check in at Registration Desk on 1st floor of hospital main entrance - by the gift shop).   Procedure will last 60-90 minutes.

## 2025-07-16 NOTE — DISCHARGE INSTR - COC
Continuity of Care Form    Patient Name: Don Hi   :  1950  MRN:  [id number]    Admit date:  2025  Discharge date:  2025    Code Status Order: Limited   Advance Directives:     Admitting Physician:  Clarice Perera MD  PCP: Lisa Wells MD    Discharging Nurse: Shayna Valeraarging Hospital Unit/Room#: 0526/0526-01  Discharging Unit Phone Number: [phone]    Emergency Contact:   Extended Emergency Contact Information  Primary Emergency Contact: Mallory Arauz   [facility]  Home Phone: [phone]  Mobile Phone: [phone]  Relation: Brother/Sister  Secondary Emergency Contact: Ten Hi  Address: [address]           [id number]   [place]  Relation: Brother/Sister    Past Surgical History:  Past Surgical History:   Procedure Laterality Date    CARDIAC PROCEDURE N/A 2024    Left heart cath / coronary angiography performed by Angelito Abernathy DO at [facility] CARDIAC CATH LAB    COLONOSCOPY      DIAGNOSTIC CARDIAC CATH LAB PROCEDURE      DILATATION, ESOPHAGUS      FOOT SURGERY Left Hammer toe, 2nd toe    10/9/14    FOOT SURGERY Left 2019    REMOVAL HARDWARE LEFT FOOT performed by Haresh Cotton DPM at [facility] OR    FRACTURE SURGERY      HARDWARE REMOVAL Left 2019    REMOVAL HARDWARE LEFT FOOT martha Cotton 19    HIP FRACTURE SURGERY      LEG SURGERY      broken leg    OTHER SURGICAL HISTORY Left 2013    EXCISION 5TH METATRSAL LEFT FOOT          OTHER SURGICAL HISTORY Right 2019    Procedure: PARTIAL RESECTION RIGHT FIFTH METATARSAL, ULCER DEBRIDEMENT WITH GRAFT APPLICATION    PRESSURE ULCER DEBRIDEMENT Right 2019    PARTIAL RESECTION RIGHT FIFTH METATARSAL, ULCER DEBRIDEMENT WITH GRAFT APPLICATION performed by Hraesh Cotton DPM at [facility] OR    REVISION COLOSTOMY  2017    COLOSTOMY REVERSAL     TONSILLECTOMY      UPPER GASTROINTESTINAL ENDOSCOPY  2013    Esophageal Brushing       Immunization

## 2025-07-16 NOTE — PLAN OF CARE
Problem: Chronic Conditions and Co-morbidities  Goal: Patient's chronic conditions and co-morbidity symptoms are monitored and maintained or improved  7/16/2025 1424 by Shayna Vela RN  Outcome: Adequate for Discharge  7/16/2025 1006 by Shayna Vela RN  Outcome: Progressing  Flowsheets  Taken 7/16/2025 1006  Care Plan - Patient's Chronic Conditions and Co-Morbidity Symptoms are Monitored and Maintained or Improved: Monitor and assess patient's chronic conditions and comorbid symptoms for stability, deterioration, or improvement  Taken 7/16/2025 0930  Care Plan - Patient's Chronic Conditions and Co-Morbidity Symptoms are Monitored and Maintained or Improved: Monitor and assess patient's chronic conditions and comorbid symptoms for stability, deterioration, or improvement     Problem: Discharge Planning  Goal: Discharge to home or other facility with appropriate resources  7/16/2025 1424 by Shayna Vela RN  Outcome: Adequate for Discharge  7/16/2025 1006 by Shayna Vela RN  Outcome: Progressing  Flowsheets (Taken 7/16/2025 1006)  Discharge to home or other facility with appropriate resources: Identify barriers to discharge with patient and caregiver     Problem: Pain  Goal: Verbalizes/displays adequate comfort level or baseline comfort level  7/16/2025 1424 by Shayna Vela RN  Outcome: Adequate for Discharge  7/16/2025 1006 by Shayna Vela RN  Outcome: Progressing  Flowsheets  Taken 7/16/2025 1006  Verbalizes/displays adequate comfort level or baseline comfort level:   Encourage patient to monitor pain and request assistance   Assess pain using appropriate pain scale   Administer analgesics based on type and severity of pain and evaluate response  Taken 7/16/2025 0800  Verbalizes/displays adequate comfort level or baseline comfort level: Encourage patient to monitor pain and request assistance     Problem: Safety - Adult  Goal: Free from fall injury  Outcome: Adequate for Discharge     Problem: Skin/Tissue

## 2025-07-16 NOTE — PLAN OF CARE
Problem: Chronic Conditions and Co-morbidities  Goal: Patient's chronic conditions and co-morbidity symptoms are monitored and maintained or improved  7/16/2025 1424 by Shayna Vela RN  Outcome: Adequate for Discharge  7/16/2025 1006 by Shayna Vela RN  Outcome: Progressing  Flowsheets  Taken 7/16/2025 1006  Care Plan - Patient's Chronic Conditions and Co-Morbidity Symptoms are Monitored and Maintained or Improved: Monitor and assess patient's chronic conditions and comorbid symptoms for stability, deterioration, or improvement  Taken 7/16/2025 0930  Care Plan - Patient's Chronic Conditions and Co-Morbidity Symptoms are Monitored and Maintained or Improved: Monitor and assess patient's chronic conditions and comorbid symptoms for stability, deterioration, or improvement     Problem: Discharge Planning  Goal: Discharge to home or other facility with appropriate resources  7/16/2025 1424 by Shayna Vela RN  Outcome: Adequate for Discharge  7/16/2025 1006 by Shayna Vela RN  Outcome: Progressing  Flowsheets (Taken 7/16/2025 1006)  Discharge to home or other facility with appropriate resources: Identify barriers to discharge with patient and caregiver     Problem: Pain  Goal: Verbalizes/displays adequate comfort level or baseline comfort level  7/16/2025 1424 by Shayna Vela RN  Outcome: Adequate for Discharge  7/16/2025 1006 by Shayna Vela RN  Outcome: Progressing  Flowsheets  Taken 7/16/2025 1006  Verbalizes/displays adequate comfort level or baseline comfort level:   Encourage patient to monitor pain and request assistance   Assess pain using appropriate pain scale   Administer analgesics based on type and severity of pain and evaluate response  Taken 7/16/2025 0800  Verbalizes/displays adequate comfort level or baseline comfort level: Encourage patient to monitor pain and request assistance     Problem: Safety - Adult  Goal: Free from fall injury  Outcome: Adequate for Discharge     Problem: Skin/Tissue  Integrity  Goal: Skin integrity remains intact  Description: 1.  Monitor for areas of redness and/or skin breakdown  2.  Assess vascular access sites hourly  3.  Every 4-6 hours minimum:  Change oxygen saturation probe site  4.  Every 4-6 hours:  If on nasal continuous positive airway pressure, respiratory therapy assess nares and determine need for appliance change or resting period  Outcome: Adequate for Discharge     Problem: ABCDS Injury Assessment  Goal: Absence of physical injury  Outcome: Adequate for Discharge     Problem: Neurosensory - Adult  Goal: Achieves stable or improved neurological status  Outcome: Adequate for Discharge  Goal: Achieves maximal functionality and self care  Outcome: Adequate for Discharge

## 2025-07-16 NOTE — PROGRESS NOTES
Don Hi  Neurology Follow-up  [facility]rology    Date of Service: 7/16/2025    Subjective:   CC: Follow up today regarding: acute on chronic left facial droop, acute dysarthria     Events noted. Chart and lab reviewed. Per his sister, his L visual field cut has been present since his prior stroke. MRI revealed no acute findings. His speech remains mildly dysarthric but he isn't wearing his dentures. He has a mild L facial droop at rest but it resolves w/ activation.       ROS : A 10-12 system review obtained and updated today and is unremarkable except as mentioned  in my interval history.     Medication, past medical history, social history, and family history reviewed.    Objective:  Exam:   Constitutional:   Vitals:    07/15/25 1937 07/15/25 2332 07/16/25 0324 07/16/25 0500   BP: (!) 150/74 (!) 150/82 (!) 144/66    Pulse: 70 64 65    Resp: 18 18 18    Temp: 97.9 °F (36.6 °C) 97.3 °F (36.3 °C) 97.5 °F (36.4 °C)    TempSrc: Oral Oral Oral    SpO2: 95% 94% 94%    Weight:    101.7 kg (224 lb 3.3 oz)   Height:         General appearance:  Normal development and appear in no acute distress.   Mental Status:   Oriented to person, place, and time.    Memory: Good recent recall.  Normal attention span and concentration.  Language: Mild dysarthria  Good fund of Knowledge.   Cranial Nerves:   II: Visual fields: L homonymous hemianopsia  Pupils: equal, round, reactive to light  III,IV,VI: Extra Ocular Movements are intact. No nystagmus  V: Facial sensation is intact  VII: Facial strength and movements: Slight L facial droop, resolves w/ activation.    XII: Tongue movements are normal  Musculoskeletal: 5/5 BUE, RLE. 4/5 LLE, give way weakness.  Reflexes: Absent patellar, 2+ biceps  Coordination: no pronator drift, no dysmetria with FNF. BUE tremor.  Sensation: normal to light touch in both arms and legs.        Data:  LABS:   Lab Results   Component Value Date/Time     07/16/2025 05:10 AM    K 3.6  R PCA territory infarct manifesting as L weakness/numbness L HHO.     Vascular dementia     Orthostatic hypotension         Recommendation  Outpatient TTE  Aspirin 325mg daily  Plavix 75mg daily for 21 days total then stop  Continue atorvastatin 80mg nightly   Blood pressure <150 systolic  Follow up neurology clinic 3 months.   Ok for discharge from neurology perspective.        Electronically signed by RICCO Guardado CNP on 7/16/25 at 9:24 AM EDT       ADDENDUM 7/16 7007: Received page from RN at 2970 that patient \"thought he had a seizure\".  When RN entered the room he was completely alert and oriented, his mental status was unchanged.  I went to bedside and assessed him shortly afterward and he said that he had a spell of \"shaking all over\" that affected bilateral arms and legs.  He said that he did not lose awareness during this spell and it lasted for about 30 seconds. No tongue biting, no incontinence. He currently feels back to baseline.  He says that he has had spells like this in the past. His neurological exam is completely unchanged from my exam this morning.  We ordered an outpatient EEG. He may still be discharged from neurology perspective.      This dictation was generated by voice recognition computer software. Although all attempts are made to edit the dictation for accuracy, there may be errors in the transcription that are not intended.      ===    I saw and evaluated the patient. I agree with the findings and plan of care as documented in the NP’s note. Additionally, I have included further information about my assessment and plan below:     DATA REVIEWED:    I independently interpreted 7/15/25 brain MRI wo = cerebral atrophy, mod white matter disease, right occipital encephalomalacia, no acute intracranial abnormality     7/13/25 NCHCT = remote infarct R PCA territory (temporal and occipital lobes), no acute intracranial abnormality      7/13/25 CTA head/neck =   CTA Head:  Diminutive

## 2025-07-16 NOTE — PLAN OF CARE
Problem: Chronic Conditions and Co-morbidities  Goal: Patient's chronic conditions and co-morbidity symptoms are monitored and maintained or improved  7/16/2025 1006 by Shayna Vela RN  Outcome: Progressing  Flowsheets  Taken 7/16/2025 1006  Care Plan - Patient's Chronic Conditions and Co-Morbidity Symptoms are Monitored and Maintained or Improved: Monitor and assess patient's chronic conditions and comorbid symptoms for stability, deterioration, or improvement  Taken 7/16/2025 0930  Care Plan - Patient's Chronic Conditions and Co-Morbidity Symptoms are Monitored and Maintained or Improved: Monitor and assess patient's chronic conditions and comorbid symptoms for stability, deterioration, or improvement    Problem: Discharge Planning  Goal: Discharge to home or other facility with appropriate resources  7/16/2025 1006 by Shayna Vela RN  Outcome: Progressing  Flowsheets (Taken 7/16/2025 1006)  Discharge to home or other facility with appropriate resources: Identify barriers to discharge with patient and caregiver    Problem: Pain  Goal: Verbalizes/displays adequate comfort level or baseline comfort level  7/16/2025 1006 by Shayna Vela RN  Outcome: Progressing  Flowsheets  Taken 7/16/2025 1006  Verbalizes/displays adequate comfort level or baseline comfort level:   Encourage patient to monitor pain and request assistance   Assess pain using appropriate pain scale   Administer analgesics based on type and severity of pain and evaluate response  Taken 7/16/2025 0800  Verbalizes/displays adequate comfort level or baseline comfort level: Encourage patient to monitor pain and request assistance

## 2025-07-22 NOTE — DISCHARGE SUMMARY
Hospital Medicine Discharge Summary    Patient: Don Hi   : 1950     Hospital:  [facility]  Admit Date: 2025   Discharge Date: 2025    Disposition:  [facility]   Code status:  Full  Condition at Discharge: Stable  Primary Care Provider: Lisa Wells MD    Admitting Provider: Clarice Perera MD  Discharge Provider: Sha Jaquez MD     Discharge Diagnoses:      Active Hospital Problems    Diagnosis     Acute stroke due to ischemia (HCC) [I63.9]     Weakness on left side of face [R29.810]     History of stroke [Z86.73]     Seizure-like activity (HCC) [R56.9]        Presenting Admission History:      74 y.o. male who presented to [facility] from long-term facility with acute on chronic left-sided facial droop. History of stroke multiple strokes in the past and has reported left-sided facial droop at baseline.  Reports that he has significantly worsened left-sided facial droop to the point where he has some slurred speech.  Denies any other deficits.  Started just over 24 hours ago.       PMH significant for HTN, HLD, HFpEF, CKD, DM/DKA, CVAs, dementia, alcohol dependence history, PTSD, tobacco use,COPD, hepatic steatosis, hep C, resides in [facility].      ED stroke workup was negative and patient admitted for further management.     Assessment/Plan:      #CVA  #L facial droop  #HTN hx  #HLD hx  #CHFpEF hx  #DM2 hx  #Dementia hx  Echo 3/2024: 60% EF  MRI brain: Unremarkable for hemorrhage or ischemia, R occipital encephalomalacia  During admission:  Aspirin, atorvastatin, plavix in place  Neurochecks in place  Neurology consulted, appreciated recommendations  Depakote in place, sertraline in place, Buspar in place, Memantine 10mg BID  PT/OT  Echo  Blood pressure regimen, adjusted as needed: Metoprolol 100, Amlodipine 5mg daily, Imdur 30mg daily,   TSH ordered  POC glucose  ISS in place, Lantus 18U

## (undated) DEVICE — ELECTRODE PT RET AD L9FT HI MOIST COND ADH HYDRGEL CORDED

## (undated) DEVICE — CANNULA NSL 13FT TUBE AD ETCO2 DIV SAMP M

## (undated) DEVICE — 3M™ COBAN™ NL STERILE NON-LATEX SELF-ADHERENT WRAP, 2084S, 4 IN X 5 YD (10 CM X 4,5 M), 18 ROLLS/CASE: Brand: 3M™ COBAN™

## (undated) DEVICE — SYRINGE MED 10ML LUERLOCK TIP W/O SFTY DISP

## (undated) DEVICE — CATH LAB PACK: Brand: MEDLINE INDUSTRIES, INC.

## (undated) DEVICE — OCCLUSIVE GAUZE STRIP,3% BISMUTH TRIBROMOPHENATE IN PETROLATUM BLEND: Brand: XEROFORM

## (undated) DEVICE — SUTURE ETHLN SZ 3-0 L30IN NONABSORBABLE BLK FSL L30MM 3/8 1671H

## (undated) DEVICE — BANDAGE GZ W45INXL4 1 10YD FLUF RL 6 PLY DERMACEA

## (undated) DEVICE — PACK ORTH LO EXT VI

## (undated) DEVICE — X-RAY CASSETTE COVER: Brand: CONVERTORS

## (undated) DEVICE — BANDAGE ROLL,100% COTTON, 8 PLY, LARGE: Brand: KERLIX

## (undated) DEVICE — APPLICATOR PREP 26ML 0.7% IOD POVACRYLEX 74% ISO ALC ST

## (undated) DEVICE — RADIFOCUS OPTITORQUE ANGIOGRAPHIC CATHETER: Brand: OPTITORQUE

## (undated) DEVICE — MAJOR SET UP PK

## (undated) DEVICE — NEEDLE HYPO 25GA L1.5IN BLU POLYPR HUB S STL REG BVL STR

## (undated) DEVICE — Z INACTIVE USE 2660664 SOLUTION IRRIG 3000ML 0.9% SOD CHL USP UROMATIC PLAS CONT

## (undated) DEVICE — GLOVE ORANGE PI 7 1/2   MSG9075

## (undated) DEVICE — PADDING CAST W4INXL4YD NONSTERILE COT RAYON MICROPLEATED

## (undated) DEVICE — TIP SUCT DIA12FR W STYL CTRL VENT DISPOSABLE FRAZ

## (undated) DEVICE — SHOE POSTOP M MAN 9-11 UNIV FOAM TRICOT SEMI FLX SKID

## (undated) DEVICE — ABDOMINAL PAD: Brand: DERMACEA

## (undated) DEVICE — HANDPIECE SET WITH HIGH FLOW TIP AND SUCTION TUBE: Brand: INTERPULSE

## (undated) DEVICE — ESMARK: Brand: DEROYAL

## (undated) DEVICE — SUTURE VCRL + SZ 2-0 L27IN ABSRB VLT L26MM CT-2 1/2 CIR VCP333H

## (undated) DEVICE — SUTURE NONABSORBABLE MONOFILAMENT 4-0 FS-2 18 IN ETHILON 662H

## (undated) DEVICE — SOLUTION IV IRRIG 500ML 0.9% SODIUM CHL 2F7123

## (undated) DEVICE — GOWN SIRUS NONREIN XL W/TWL: Brand: MEDLINE INDUSTRIES, INC.

## (undated) DEVICE — GAUZE,SPONGE,4"X4",8PLY,STRL,LF,10/TRAY: Brand: MEDLINE

## (undated) DEVICE — INTENDED FOR TISSUE SEPARATION, AND OTHER PROCEDURES THAT REQUIRE A SHARP SURGICAL BLADE TO PUNCTURE OR CUT.: Brand: BARD-PARKER ® STAINLESS STEEL BLADES

## (undated) DEVICE — 260 CM J TIP WIRE .035

## (undated) DEVICE — PRECISION THIN (9.0 X 0.38 X 18.5MM)

## (undated) DEVICE — TR BAND RADIAL ARTERY COMPRESSION DEVICE: Brand: TR BAND

## (undated) DEVICE — SHOE POSTOP PREMIER PRO M RUB SOLE HK AND LOOP CLSR NYL L

## (undated) DEVICE — SHOE, POST-OPERATIVE: Brand: DEROYAL

## (undated) DEVICE — GLIDESHEATH SLENDER STAINLESS STEEL KIT: Brand: GLIDESHEATH SLENDER

## (undated) DEVICE — PERCUTANEOUS ENTRY THINWALL NEEDLE  ONE-PART: Brand: COOK

## (undated) DEVICE — PRECISION THIN (5.5 X 0.38 X 11.5MM)

## (undated) DEVICE — PAD,ABDOMINAL,8"X10",ST,LF: Brand: MEDLINE